# Patient Record
Sex: MALE | Race: WHITE | NOT HISPANIC OR LATINO | Employment: FULL TIME | URBAN - METROPOLITAN AREA
[De-identification: names, ages, dates, MRNs, and addresses within clinical notes are randomized per-mention and may not be internally consistent; named-entity substitution may affect disease eponyms.]

---

## 2017-02-22 ENCOUNTER — HOSPITAL ENCOUNTER (OUTPATIENT)
Dept: RADIOLOGY | Facility: OTHER | Age: 42
Discharge: HOME | End: 2017-02-22
Payer: MEDICARE

## 2017-02-22 ENCOUNTER — ALLSCRIPTS OFFICE VISIT (OUTPATIENT)
Dept: OTHER | Facility: OTHER | Age: 42
End: 2017-02-22

## 2017-02-22 ENCOUNTER — TRANSCRIBE ORDERS (OUTPATIENT)
Dept: ADMINISTRATIVE | Facility: HOSPITAL | Age: 42
End: 2017-02-22

## 2017-02-22 DIAGNOSIS — M25.511 PAIN IN JOINT OF RIGHT SHOULDER: Primary | ICD-10-CM

## 2017-02-22 DIAGNOSIS — M25.519 PAIN IN SHOULDER: ICD-10-CM

## 2017-02-22 PROCEDURE — 73030 X-RAY EXAM OF SHOULDER: CPT

## 2017-03-02 ENCOUNTER — HOSPITAL ENCOUNTER (OUTPATIENT)
Dept: RADIOLOGY | Facility: HOSPITAL | Age: 42
Discharge: HOME | End: 2017-03-02
Attending: ORTHOPAEDIC SURGERY
Payer: MEDICARE

## 2017-03-02 ENCOUNTER — HOSPITAL ENCOUNTER (OUTPATIENT)
Dept: RADIOLOGY | Facility: HOSPITAL | Age: 42
Discharge: HOME | End: 2017-03-02
Attending: ORTHOPAEDIC SURGERY | Admitting: RADIOLOGY
Payer: MEDICARE

## 2017-03-02 ENCOUNTER — TRANSCRIBE ORDERS (OUTPATIENT)
Dept: RADIOLOGY | Facility: HOSPITAL | Age: 42
End: 2017-03-02

## 2017-03-02 DIAGNOSIS — M25.519 PAIN IN SHOULDER: ICD-10-CM

## 2017-03-02 DIAGNOSIS — M25.511 PAIN IN JOINT OF RIGHT SHOULDER: ICD-10-CM

## 2017-03-02 PROCEDURE — A9577 INJ MULTIHANCE: HCPCS | Performed by: ORTHOPAEDIC SURGERY

## 2017-03-02 PROCEDURE — 20610 DRAIN/INJ JOINT/BURSA W/O US: CPT

## 2017-03-02 PROCEDURE — 23350 INJECTION FOR SHOULDER X-RAY: CPT

## 2017-03-02 PROCEDURE — 77002 NEEDLE LOCALIZATION BY XRAY: CPT

## 2017-03-02 PROCEDURE — 73222 MRI JOINT UPR EXTREM W/DYE: CPT

## 2017-03-02 PROCEDURE — 72080 X-RAY EXAM THORACOLMB 2/> VW: CPT

## 2017-03-02 RX ORDER — LIDOCAINE HYDROCHLORIDE 10 MG/ML
5 INJECTION, SOLUTION INFILTRATION; PERINEURAL
Status: COMPLETED | OUTPATIENT
Start: 2017-03-02 | End: 2017-03-02

## 2017-03-02 RX ORDER — 0.9 % SODIUM CHLORIDE 0.9 %
5 VIAL (ML) INJECTION
Status: COMPLETED | OUTPATIENT
Start: 2017-03-02 | End: 2017-03-02

## 2017-03-02 RX ADMIN — IOHEXOL 3 ML: 300 INJECTION, SOLUTION INTRAVENOUS at 15:20

## 2017-03-02 RX ADMIN — SODIUM CHLORIDE 3 ML: 9 INJECTION, SOLUTION INTRAMUSCULAR; INTRAVENOUS; SUBCUTANEOUS at 15:20

## 2017-03-02 RX ADMIN — GADOBENATE DIMEGLUMINE 0.01 ML: 529 INJECTION, SOLUTION INTRAVENOUS at 16:39

## 2017-03-02 RX ADMIN — LIDOCAINE HYDROCHLORIDE 2 ML: 10 INJECTION, SOLUTION INFILTRATION; PERINEURAL at 15:20

## 2017-03-08 ENCOUNTER — ALLSCRIPTS OFFICE VISIT (OUTPATIENT)
Dept: OTHER | Facility: OTHER | Age: 42
End: 2017-03-08

## 2017-05-02 ENCOUNTER — ALLSCRIPTS OFFICE VISIT (OUTPATIENT)
Dept: OTHER | Facility: OTHER | Age: 42
End: 2017-05-02

## 2017-05-02 DIAGNOSIS — F41.8 OTHER SPECIFIED ANXIETY DISORDERS: ICD-10-CM

## 2017-05-02 DIAGNOSIS — Z98.84 BARIATRIC SURGERY STATUS: ICD-10-CM

## 2017-05-02 DIAGNOSIS — Z13.6 ENCOUNTER FOR SCREENING FOR CARDIOVASCULAR DISORDERS: ICD-10-CM

## 2017-05-02 DIAGNOSIS — M25.519 PAIN IN SHOULDER: ICD-10-CM

## 2017-05-02 DIAGNOSIS — R53.83 OTHER FATIGUE: ICD-10-CM

## 2017-05-12 ENCOUNTER — GENERIC CONVERSION - ENCOUNTER (OUTPATIENT)
Dept: OTHER | Facility: OTHER | Age: 42
End: 2017-05-12

## 2017-06-02 ENCOUNTER — ALLSCRIPTS OFFICE VISIT (OUTPATIENT)
Dept: OTHER | Facility: OTHER | Age: 42
End: 2017-06-02

## 2017-06-30 ENCOUNTER — ALLSCRIPTS OFFICE VISIT (OUTPATIENT)
Dept: OTHER | Facility: OTHER | Age: 42
End: 2017-06-30

## 2017-07-28 ENCOUNTER — GENERIC CONVERSION - ENCOUNTER (OUTPATIENT)
Dept: OTHER | Facility: OTHER | Age: 42
End: 2017-07-28

## 2017-08-02 ENCOUNTER — APPOINTMENT (EMERGENCY)
Dept: RADIOLOGY | Facility: HOSPITAL | Age: 42
End: 2017-08-02
Payer: MEDICARE

## 2017-08-02 ENCOUNTER — HOSPITAL ENCOUNTER (EMERGENCY)
Facility: HOSPITAL | Age: 42
Discharge: HOME | End: 2017-08-02
Attending: EMERGENCY MEDICINE
Payer: MEDICARE

## 2017-08-02 VITALS
TEMPERATURE: 98.2 F | HEART RATE: 72 BPM | WEIGHT: 160 LBS | RESPIRATION RATE: 19 BRPM | SYSTOLIC BLOOD PRESSURE: 152 MMHG | DIASTOLIC BLOOD PRESSURE: 79 MMHG | OXYGEN SATURATION: 99 %

## 2017-08-02 DIAGNOSIS — M54.42 CHRONIC BILATERAL LOW BACK PAIN WITH LEFT-SIDED SCIATICA: Primary | ICD-10-CM

## 2017-08-02 DIAGNOSIS — G89.29 CHRONIC BILATERAL LOW BACK PAIN WITH LEFT-SIDED SCIATICA: Primary | ICD-10-CM

## 2017-08-02 DIAGNOSIS — N20.0 KIDNEY STONE ON RIGHT SIDE: ICD-10-CM

## 2017-08-02 LAB
BACTERIA UR QL AUTO: ABNORMAL /HPF
BILIRUB UR QL STRIP: ABNORMAL
CLARITY UR: ABNORMAL
CLARITY, POC: NORMAL
COLOR UR: YELLOW
COLOR, POC: YELLOW
GLUCOSE UR STRIP-MCNC: NEGATIVE MG/DL
HGB UR QL STRIP.AUTO: ABNORMAL
HYALINE CASTS #/AREA URNS LPF: ABNORMAL /LPF
KETONES UR STRIP-MCNC: NEGATIVE MG/DL
LEUKOCYTE ESTERASE UR QL STRIP: ABNORMAL
NITRITE UR QL STRIP: NEGATIVE
NON-SQ EPI CELLS URNS QL MICRO: ABNORMAL /HPF
PH UR STRIP.AUTO: 6.5 [PH] (ref 4.5–8)
PROT UR STRIP-MCNC: ABNORMAL MG/DL
RBC #/AREA URNS AUTO: ABNORMAL /HPF
SP GR UR STRIP.AUTO: >=1.03 (ref 1–1.03)
UROBILINOGEN UR QL STRIP.AUTO: 1 E.U./DL
WBC #/AREA URNS AUTO: ABNORMAL /HPF

## 2017-08-02 PROCEDURE — 81001 URINALYSIS AUTO W/SCOPE: CPT

## 2017-08-02 PROCEDURE — 87086 URINE CULTURE/COLONY COUNT: CPT

## 2017-08-02 PROCEDURE — 74176 CT ABD & PELVIS W/O CONTRAST: CPT

## 2017-08-02 PROCEDURE — 96372 THER/PROPH/DIAG INJ SC/IM: CPT

## 2017-08-02 PROCEDURE — 72080 X-RAY EXAM THORACOLMB 2/> VW: CPT

## 2017-08-02 PROCEDURE — 81002 URINALYSIS NONAUTO W/O SCOPE: CPT | Performed by: EMERGENCY MEDICINE

## 2017-08-02 PROCEDURE — 99284 EMERGENCY DEPT VISIT MOD MDM: CPT

## 2017-08-02 RX ORDER — CELECOXIB 100 MG/1
100 CAPSULE ORAL DAILY
COMMUNITY
End: 2017-11-14 | Stop reason: HOSPADM

## 2017-08-02 RX ORDER — KETOROLAC TROMETHAMINE 30 MG/ML
15 INJECTION, SOLUTION INTRAMUSCULAR; INTRAVENOUS ONCE
Status: COMPLETED | OUTPATIENT
Start: 2017-08-02 | End: 2017-08-02

## 2017-08-02 RX ORDER — MORPHINE SULFATE 15 MG/1
15 TABLET ORAL ONCE
Status: COMPLETED | OUTPATIENT
Start: 2017-08-02 | End: 2017-08-02

## 2017-08-02 RX ORDER — ONDANSETRON 4 MG/1
4 TABLET, ORALLY DISINTEGRATING ORAL ONCE
Status: COMPLETED | OUTPATIENT
Start: 2017-08-02 | End: 2017-08-02

## 2017-08-02 RX ADMIN — KETOROLAC TROMETHAMINE 15 MG: 30 INJECTION, SOLUTION INTRAMUSCULAR at 01:52

## 2017-08-02 RX ADMIN — ONDANSETRON 4 MG: 4 TABLET, ORALLY DISINTEGRATING ORAL at 01:52

## 2017-08-02 RX ADMIN — MORPHINE SULFATE 15 MG: 15 TABLET ORAL at 02:54

## 2017-08-03 ENCOUNTER — ALLSCRIPTS OFFICE VISIT (OUTPATIENT)
Dept: OTHER | Facility: OTHER | Age: 42
End: 2017-08-03

## 2017-08-03 LAB — BACTERIA UR CULT: NORMAL

## 2017-08-04 ENCOUNTER — ALLSCRIPTS OFFICE VISIT (OUTPATIENT)
Dept: OTHER | Facility: OTHER | Age: 42
End: 2017-08-04

## 2017-08-04 ENCOUNTER — APPOINTMENT (OUTPATIENT)
Dept: LAB | Facility: CLINIC | Age: 42
End: 2017-08-04
Payer: MEDICARE

## 2017-08-04 DIAGNOSIS — N20.0 CALCULUS OF KIDNEY: ICD-10-CM

## 2017-08-04 DIAGNOSIS — M89.9 DISORDER OF BONE: ICD-10-CM

## 2017-08-04 DIAGNOSIS — M25.519 PAIN IN SHOULDER: ICD-10-CM

## 2017-08-04 DIAGNOSIS — Z13.6 ENCOUNTER FOR SCREENING FOR CARDIOVASCULAR DISORDERS: ICD-10-CM

## 2017-08-04 DIAGNOSIS — F41.8 OTHER SPECIFIED ANXIETY DISORDERS: ICD-10-CM

## 2017-08-04 DIAGNOSIS — Z98.84 BARIATRIC SURGERY STATUS: ICD-10-CM

## 2017-08-04 DIAGNOSIS — R53.83 OTHER FATIGUE: ICD-10-CM

## 2017-08-04 LAB
25(OH)D3 SERPL-MCNC: 18.1 NG/ML (ref 30–100)
ALBUMIN SERPL BCP-MCNC: 3.7 G/DL (ref 3.5–5)
ALP SERPL-CCNC: 66 U/L (ref 46–116)
ALT SERPL W P-5'-P-CCNC: 11 U/L (ref 12–78)
ANION GAP SERPL CALCULATED.3IONS-SCNC: 8 MMOL/L (ref 4–13)
AST SERPL W P-5'-P-CCNC: 8 U/L (ref 5–45)
BILIRUB SERPL-MCNC: 0.31 MG/DL (ref 0.2–1)
BUN SERPL-MCNC: 20 MG/DL (ref 5–25)
CALCIUM SERPL-MCNC: 9.2 MG/DL (ref 8.3–10.1)
CHLORIDE SERPL-SCNC: 104 MMOL/L (ref 100–108)
CHOLEST SERPL-MCNC: 129 MG/DL (ref 50–200)
CO2 SERPL-SCNC: 26 MMOL/L (ref 21–32)
CREAT SERPL-MCNC: 0.9 MG/DL (ref 0.6–1.3)
GFR SERPL CREATININE-BSD FRML MDRD: 106 ML/MIN/1.73SQ M
GLUCOSE P FAST SERPL-MCNC: 77 MG/DL (ref 65–99)
HDLC SERPL-MCNC: 47 MG/DL (ref 40–60)
IRON SERPL-MCNC: 16 UG/DL (ref 65–175)
LDLC SERPL CALC-MCNC: 66 MG/DL (ref 0–100)
POTASSIUM SERPL-SCNC: 4.7 MMOL/L (ref 3.5–5.3)
PROT SERPL-MCNC: 7.5 G/DL (ref 6.4–8.2)
SODIUM SERPL-SCNC: 138 MMOL/L (ref 136–145)
TRIGL SERPL-MCNC: 80 MG/DL
TSH SERPL DL<=0.05 MIU/L-ACNC: 0.86 UIU/ML (ref 0.36–3.74)
VIT B12 SERPL-MCNC: 290 PG/ML (ref 100–900)

## 2017-08-04 PROCEDURE — 36415 COLL VENOUS BLD VENIPUNCTURE: CPT

## 2017-08-04 PROCEDURE — 82306 VITAMIN D 25 HYDROXY: CPT

## 2017-08-04 PROCEDURE — 80053 COMPREHEN METABOLIC PANEL: CPT

## 2017-08-04 PROCEDURE — 84425 ASSAY OF VITAMIN B-1: CPT

## 2017-08-04 PROCEDURE — 82607 VITAMIN B-12: CPT

## 2017-08-04 PROCEDURE — 83540 ASSAY OF IRON: CPT

## 2017-08-04 PROCEDURE — 80061 LIPID PANEL: CPT

## 2017-08-04 PROCEDURE — 84165 PROTEIN E-PHORESIS SERUM: CPT

## 2017-08-04 PROCEDURE — 84443 ASSAY THYROID STIM HORMONE: CPT

## 2017-08-07 ENCOUNTER — GENERIC CONVERSION - ENCOUNTER (OUTPATIENT)
Dept: OTHER | Facility: OTHER | Age: 42
End: 2017-08-07

## 2017-08-07 LAB
ALBUMIN SERPL ELPH-MCNC: 4.16 G/DL (ref 3.5–5)
ALBUMIN SERPL ELPH-MCNC: 57 % (ref 52–65)
ALPHA1 GLOB SERPL ELPH-MCNC: 0.35 G/DL (ref 0.1–0.4)
ALPHA1 GLOB SERPL ELPH-MCNC: 4.8 % (ref 2.5–5)
ALPHA2 GLOB SERPL ELPH-MCNC: 0.87 G/DL (ref 0.4–1.2)
ALPHA2 GLOB SERPL ELPH-MCNC: 11.9 % (ref 7–13)
BETA GLOB ABNORMAL SERPL ELPH-MCNC: 0.52 G/DL (ref 0.4–0.8)
BETA1 GLOB SERPL ELPH-MCNC: 7.1 % (ref 5–13)
BETA2 GLOB SERPL ELPH-MCNC: 4.5 % (ref 2–8)
BETA2+GAMMA GLOB SERPL ELPH-MCNC: 0.33 G/DL (ref 0.2–0.5)
GAMMA GLOB ABNORMAL SERPL ELPH-MCNC: 1.07 G/DL (ref 0.5–1.6)
GAMMA GLOB SERPL ELPH-MCNC: 14.7 % (ref 12–22)
IGG/ALB SER: 1.33 {RATIO} (ref 1.1–1.8)
PROT PATTERN SERPL ELPH-IMP: NORMAL
PROT SERPL-MCNC: 7.3 G/DL (ref 6.4–8.2)
VIT B1 BLD-SCNC: 98.8 NMOL/L (ref 66.5–200)

## 2017-08-08 ENCOUNTER — HOSPITAL ENCOUNTER (OUTPATIENT)
Facility: HOSPITAL | Age: 42
Setting detail: OUTPATIENT SURGERY
Discharge: HOME | End: 2017-08-08
Attending: UROLOGY | Admitting: UROLOGY
Payer: MEDICARE

## 2017-08-08 ENCOUNTER — ANESTHESIA (OUTPATIENT)
Dept: PERIOP | Facility: HOSPITAL | Age: 42
End: 2017-08-08
Payer: MEDICARE

## 2017-08-08 ENCOUNTER — ANESTHESIA EVENT (OUTPATIENT)
Dept: PERIOP | Facility: HOSPITAL | Age: 42
End: 2017-08-08
Payer: MEDICARE

## 2017-08-08 ENCOUNTER — APPOINTMENT (OUTPATIENT)
Dept: RADIOLOGY | Facility: HOSPITAL | Age: 42
End: 2017-08-08
Payer: MEDICARE

## 2017-08-08 VITALS
TEMPERATURE: 97.2 F | HEART RATE: 64 BPM | DIASTOLIC BLOOD PRESSURE: 78 MMHG | SYSTOLIC BLOOD PRESSURE: 133 MMHG | RESPIRATION RATE: 16 BRPM | BODY MASS INDEX: 23.19 KG/M2 | OXYGEN SATURATION: 100 % | WEIGHT: 171.2 LBS | HEIGHT: 72 IN

## 2017-08-08 PROCEDURE — C1769 GUIDE WIRE: HCPCS | Performed by: UROLOGY

## 2017-08-08 PROCEDURE — C1758 CATHETER, URETERAL: HCPCS | Performed by: UROLOGY

## 2017-08-08 PROCEDURE — 74000 HB X-RAY EXAM OF ABDOMEN (SINGLE ANTEROPOSTERIOR VIEW): CPT

## 2017-08-08 PROCEDURE — C2617 STENT, NON-COR, TEM W/O DEL: HCPCS | Performed by: UROLOGY

## 2017-08-08 DEVICE — STENT URETERAL 6 FR 26CM INLAY OPTIMA: Type: IMPLANTABLE DEVICE | Site: URETER | Status: FUNCTIONAL

## 2017-08-08 RX ORDER — FENTANYL CITRATE 50 UG/ML
INJECTION, SOLUTION INTRAMUSCULAR; INTRAVENOUS AS NEEDED
Status: DISCONTINUED | OUTPATIENT
Start: 2017-08-08 | End: 2017-08-08 | Stop reason: SURG

## 2017-08-08 RX ORDER — MIDAZOLAM HYDROCHLORIDE 1 MG/ML
INJECTION INTRAMUSCULAR; INTRAVENOUS AS NEEDED
Status: DISCONTINUED | OUTPATIENT
Start: 2017-08-08 | End: 2017-08-08 | Stop reason: SURG

## 2017-08-08 RX ORDER — HYDROCODONE BITARTRATE AND ACETAMINOPHEN 5; 325 MG/1; MG/1
1 TABLET ORAL EVERY 4 HOURS PRN
Status: DISCONTINUED | OUTPATIENT
Start: 2017-08-08 | End: 2017-08-11 | Stop reason: HOSPADM

## 2017-08-08 RX ORDER — GLYCOPYRROLATE 0.2 MG/ML
INJECTION INTRAMUSCULAR; INTRAVENOUS AS NEEDED
Status: DISCONTINUED | OUTPATIENT
Start: 2017-08-08 | End: 2017-08-08 | Stop reason: SURG

## 2017-08-08 RX ORDER — ONDANSETRON 2 MG/ML
4 INJECTION INTRAMUSCULAR; INTRAVENOUS EVERY 4 HOURS PRN
Status: DISCONTINUED | OUTPATIENT
Start: 2017-08-08 | End: 2017-08-11 | Stop reason: HOSPADM

## 2017-08-08 RX ORDER — ONDANSETRON 2 MG/ML
INJECTION INTRAMUSCULAR; INTRAVENOUS AS NEEDED
Status: DISCONTINUED | OUTPATIENT
Start: 2017-08-08 | End: 2017-08-08 | Stop reason: SURG

## 2017-08-08 RX ORDER — SODIUM CHLORIDE, SODIUM LACTATE, POTASSIUM CHLORIDE, CALCIUM CHLORIDE 600; 310; 30; 20 MG/100ML; MG/100ML; MG/100ML; MG/100ML
100 INJECTION, SOLUTION INTRAVENOUS CONTINUOUS
Status: DISCONTINUED | OUTPATIENT
Start: 2017-08-08 | End: 2017-08-11 | Stop reason: HOSPADM

## 2017-08-08 RX ORDER — HYDROMORPHONE HYDROCHLORIDE 2 MG/1
2 TABLET ORAL EVERY 6 HOURS PRN
COMMUNITY
End: 2017-11-14 | Stop reason: HOSPADM

## 2017-08-08 RX ORDER — MAGNESIUM HYDROXIDE 1200 MG/15ML
LIQUID ORAL AS NEEDED
Status: DISCONTINUED | OUTPATIENT
Start: 2017-08-08 | End: 2017-08-08 | Stop reason: HOSPADM

## 2017-08-08 RX ORDER — PROPOFOL 10 MG/ML
INJECTION, EMULSION INTRAVENOUS AS NEEDED
Status: DISCONTINUED | OUTPATIENT
Start: 2017-08-08 | End: 2017-08-08 | Stop reason: SURG

## 2017-08-08 RX ORDER — LIDOCAINE HYDROCHLORIDE 10 MG/ML
INJECTION, SOLUTION INFILTRATION; PERINEURAL AS NEEDED
Status: DISCONTINUED | OUTPATIENT
Start: 2017-08-08 | End: 2017-08-08 | Stop reason: SURG

## 2017-08-08 RX ORDER — CIPROFLOXACIN 500 MG/1
500 TABLET, FILM COATED ORAL 2 TIMES DAILY
Qty: 6 TABLET | Refills: 0 | Status: SHIPPED | OUTPATIENT
Start: 2017-08-08 | End: 2017-08-11

## 2017-08-08 RX ORDER — ACETAMINOPHEN 325 MG/1
650 TABLET ORAL EVERY 4 HOURS PRN
Status: DISCONTINUED | OUTPATIENT
Start: 2017-08-08 | End: 2017-08-11 | Stop reason: HOSPADM

## 2017-08-08 RX ORDER — SODIUM CHLORIDE, SODIUM LACTATE, POTASSIUM CHLORIDE, CALCIUM CHLORIDE 600; 310; 30; 20 MG/100ML; MG/100ML; MG/100ML; MG/100ML
INJECTION, SOLUTION INTRAVENOUS CONTINUOUS PRN
Status: DISCONTINUED | OUTPATIENT
Start: 2017-08-08 | End: 2017-08-08 | Stop reason: SURG

## 2017-08-08 RX ORDER — FENTANYL CITRATE/PF 50 MCG/ML
25 SYRINGE (ML) INJECTION
Status: COMPLETED | OUTPATIENT
Start: 2017-08-08 | End: 2017-08-08

## 2017-08-08 RX ORDER — MORPHINE SULFATE 4 MG/ML
4 INJECTION, SOLUTION INTRAMUSCULAR; INTRAVENOUS EVERY 4 HOURS PRN
Status: DISCONTINUED | OUTPATIENT
Start: 2017-08-08 | End: 2017-08-11 | Stop reason: HOSPADM

## 2017-08-08 RX ORDER — HYDROCODONE BITARTRATE AND ACETAMINOPHEN 5; 325 MG/1; MG/1
TABLET ORAL
Status: DISPENSED
Start: 2017-08-08 | End: 2017-08-09

## 2017-08-08 RX ORDER — HYDROCODONE BITARTRATE AND ACETAMINOPHEN 5; 325 MG/1; MG/1
1 TABLET ORAL EVERY 6 HOURS PRN
Qty: 20 TABLET | Refills: 0 | Status: SHIPPED | OUTPATIENT
Start: 2017-08-08 | End: 2017-08-18

## 2017-08-08 RX ORDER — ONDANSETRON 2 MG/ML
4 INJECTION INTRAMUSCULAR; INTRAVENOUS ONCE AS NEEDED
Status: DISCONTINUED | OUTPATIENT
Start: 2017-08-08 | End: 2017-08-08 | Stop reason: HOSPADM

## 2017-08-08 RX ADMIN — FENTANYL CITRATE 25 MCG: 50 INJECTION, SOLUTION INTRAMUSCULAR; INTRAVENOUS at 14:10

## 2017-08-08 RX ADMIN — GLYCOPYRROLATE 0.2 MG: 0.2 INJECTION, SOLUTION INTRAMUSCULAR; INTRAVENOUS at 12:52

## 2017-08-08 RX ADMIN — LIDOCAINE HYDROCHLORIDE 50 MG: 10 INJECTION, SOLUTION INFILTRATION; PERINEURAL at 12:52

## 2017-08-08 RX ADMIN — MIDAZOLAM HYDROCHLORIDE 2 MG: 1 INJECTION, SOLUTION INTRAMUSCULAR; INTRAVENOUS at 12:48

## 2017-08-08 RX ADMIN — FENTANYL CITRATE 25 MCG: 50 INJECTION, SOLUTION INTRAMUSCULAR; INTRAVENOUS at 14:20

## 2017-08-08 RX ADMIN — ONDANSETRON 4 MG: 2 INJECTION INTRAMUSCULAR; INTRAVENOUS at 12:59

## 2017-08-08 RX ADMIN — FENTANYL CITRATE 50 MCG: 50 INJECTION INTRAMUSCULAR; INTRAVENOUS at 13:11

## 2017-08-08 RX ADMIN — DEXAMETHASONE SODIUM PHOSPHATE 10 MG: 10 INJECTION INTRAMUSCULAR; INTRAVENOUS at 12:59

## 2017-08-08 RX ADMIN — PROPOFOL 200 MG: 10 INJECTION, EMULSION INTRAVENOUS at 12:52

## 2017-08-08 RX ADMIN — HYDROCODONE BITARTRATE AND ACETAMINOPHEN 1 TABLET: 5; 325 TABLET ORAL at 15:04

## 2017-08-08 RX ADMIN — FENTANYL CITRATE 50 MCG: 50 INJECTION INTRAMUSCULAR; INTRAVENOUS at 12:59

## 2017-08-08 RX ADMIN — CEFAZOLIN SODIUM 1000 MG: 1 SOLUTION INTRAVENOUS at 12:49

## 2017-08-08 RX ADMIN — SODIUM CHLORIDE, SODIUM LACTATE, POTASSIUM CHLORIDE, AND CALCIUM CHLORIDE: .6; .31; .03; .02 INJECTION, SOLUTION INTRAVENOUS at 12:38

## 2017-08-08 RX ADMIN — FENTANYL CITRATE 25 MCG: 50 INJECTION, SOLUTION INTRAMUSCULAR; INTRAVENOUS at 14:05

## 2017-08-08 RX ADMIN — FENTANYL CITRATE 25 MCG: 50 INJECTION, SOLUTION INTRAMUSCULAR; INTRAVENOUS at 14:15

## 2017-08-24 ENCOUNTER — GENERIC CONVERSION - ENCOUNTER (OUTPATIENT)
Dept: OTHER | Facility: OTHER | Age: 42
End: 2017-08-24

## 2017-08-29 ENCOUNTER — TRANSCRIBE ORDERS (OUTPATIENT)
Dept: ADMINISTRATIVE | Facility: HOSPITAL | Age: 42
End: 2017-08-29

## 2017-08-29 ENCOUNTER — ALLSCRIPTS OFFICE VISIT (OUTPATIENT)
Dept: OTHER | Facility: OTHER | Age: 42
End: 2017-08-29

## 2017-08-29 DIAGNOSIS — N20.0 URIC ACID NEPHROLITHIASIS: Primary | ICD-10-CM

## 2017-08-29 LAB
BILIRUB UR QL STRIP: NORMAL
CLARITY UR: NORMAL
COLOR UR: YELLOW
GLUCOSE (HISTORICAL): NORMAL
HGB UR QL STRIP.AUTO: NORMAL
KETONES UR STRIP-MCNC: NORMAL MG/DL
LEUKOCYTE ESTERASE UR QL STRIP: NORMAL
NITRITE UR QL STRIP: NORMAL
PH UR STRIP.AUTO: 7 [PH]
PROT UR STRIP-MCNC: NORMAL MG/DL
SP GR UR STRIP.AUTO: 1.03

## 2017-08-30 ENCOUNTER — GENERIC CONVERSION - ENCOUNTER (OUTPATIENT)
Dept: OTHER | Facility: OTHER | Age: 42
End: 2017-08-30

## 2017-08-31 ENCOUNTER — GENERIC CONVERSION - ENCOUNTER (OUTPATIENT)
Dept: OTHER | Facility: OTHER | Age: 42
End: 2017-08-31

## 2017-09-05 ENCOUNTER — GENERIC CONVERSION - ENCOUNTER (OUTPATIENT)
Dept: OTHER | Facility: OTHER | Age: 42
End: 2017-09-05

## 2017-09-14 ENCOUNTER — GENERIC CONVERSION - ENCOUNTER (OUTPATIENT)
Dept: OTHER | Facility: OTHER | Age: 42
End: 2017-09-14

## 2017-09-15 ENCOUNTER — GENERIC CONVERSION - ENCOUNTER (OUTPATIENT)
Dept: OTHER | Facility: OTHER | Age: 42
End: 2017-09-15

## 2017-09-20 ENCOUNTER — GENERIC CONVERSION - ENCOUNTER (OUTPATIENT)
Dept: OTHER | Facility: OTHER | Age: 42
End: 2017-09-20

## 2017-10-13 ENCOUNTER — GENERIC CONVERSION - ENCOUNTER (OUTPATIENT)
Dept: OTHER | Facility: OTHER | Age: 42
End: 2017-10-13

## 2017-10-23 DIAGNOSIS — N20.0 CALCULUS OF KIDNEY: ICD-10-CM

## 2017-10-24 NOTE — PROCEDURES
Assessment  1  Nephrolithiasis (592 0) (N20 0)    Plan  Nephrolithiasis    · LevoFLOXacin 500 MG Oral Tablet (Levaquin); TAKE 1 TABLET DAILY AS  DIRECTED   Rx By: Stacie Heimlich; Dispense: 2 Days ; #:2 Tablet; Refill: 0;For: Nephrolithiasis; TOMASZ = N; Sent To: [facility]/PHARMACY #[id number]  · * XR ABDOMEN 1 VIEW KUB; Status:Active; Requested for:10Oct2017;    Perform:[facility]; Due:10Oct2018; Ordered;For:Nephrolithiasis; Ordered By:Mitra Ortega;   · Urine Dip Non-Automated- POC; Status:Resulted - Requires Verification,Retrospective  By Protocol Authorization;   Done: 20KIK2929 09:25AM   Performed: In Office; Due:73Ope5980; Last Updated By:Herminio Gunderson; 8/29/2017 9:26:21 AM;Ordered;For:Nephrolithiasis; Ordered By:Mitra Ortega;   · [address] [place] [address]; Status:Hold For - Scheduling; Requested for:10Oct2017;    Perform:[facility]; Due:10Oct2018; Ordered;For:Nephrolithiasis; Ordered By:Mitra Ortega;    Discussion/Summary  Discussion Summary: This is a healthy 51-year-old male status post ureteroscopic management of a 1 5 cm right UPJ calculus  He is doing well clinically  His postoperative ureteral stent was removed today via cystoscopy  Next  will be prescribed 48 hours of the fluoroquinolone antibiotic to prevent any infection  He will follow-up in 2 months time with a postoperative KUB and renal ultrasound or sooner if needed  Chief Complaint  Chief Complaint Free Text Note Form: Patient presents for cysto/stent removal s/p cysto/URS, right ureteral stent on 8/8/17 with Dr Eduar Chen      History of Present Illness  HPI: Patient returns in follow-up status post ureteroscopic extraction of a 1 5 cm right UPJ calculus with my partner Dr Eduar Chen on August 8  Next  did well postoperatively  He did experience postoperative lower urinary tract symptoms which are managed with empiric antibiotic  He completed a course of this   He is tolerating the stent well and has no lower urinary tract symptoms at the present time  He presents today for stent removal      Review of Systems  Complete-Male Urology:   Constitutional: No fever or chills, feels well, no tiredness, no recent weight gain or weight loss  Respiratory: No complaints of shortness of breath, no wheezing, no cough, no SOB on exertion, no orthopnea or PND  Cardiovascular: No complaints of slow heart rate, no fast heart rate, no chest pain, no palpitations, no leg claudication, no lower extremity  Gastrointestinal: No complaints of abdominal pain, no constipation, no nausea or vomiting, no diarrhea or bloody stools  Genitourinary: No complaints of dysuria, no incontinence, no hesitancy, no nocturia, no genital lesion, no testicular pain  Musculoskeletal: No complaints of arthralgia, no myalgias, no joint swelling or stiffness, no limb pain or swelling  Integumentary: No complaints of skin rash or skin lesions, no itching, no skin wound, no dry skin  Hematologic/Lymphatic: No complaints of swollen glands, no swollen glands in the neck, does not bleed easily, no easy bruising  Neurological: No compliants of headache, no confusion, no convulsions, no numbness or tingling, no dizziness or fainting, no limb weakness, no difficulty walking  Active Problems  1  Chronic low back pain (724 2,338 29) (M54 5,G89 29)   2  Depression with anxiety (300 4) (F41 8)   3  Encounter for screening for cardiovascular disorders (V81 2) (Z13 6)   4  Fatigue (780 79) (R53 83)   5  Hydronephrosis (591) (N13 30)   6  Lytic bone lesions on xray (733 90) (M89 9)   7  Nephrolithiasis (592 0) (N20 0)   8  Shoulder pain (719 41) (M25 519)   9  Status post gastric bypass for obesity (V45 86) (Z98 84)   10  Tobacco abuse counseling (V65 42,305 1) (Z71 6)    Past Medical History  1  History of depression (V11 8) (Z86 59)   2  No pertinent past medical history  Active Problems And Past Medical History Reviewed:    The active problems and past medical history were reviewed and updated today  Surgical History  1  History of Fusion / Refusion Of Vertebrae   2  History of Gastric Surgery For Morbid Obesity Gastric Bypass   3  History of Tonsillectomy With Adenoidectomy    Family History  Mother    1  Family history of arthritis (V17 7) (Z82 61)   2  Family history of malignant neoplasm of stomach (V16 0) (Z80 0)  Father    3  Family history of Brain tumor   4  Family history of malignant neoplasm of esophagus (V16 0) (Z80 0)    Social History   · Current every day smoker (305 1) (F17 200)   · Drinks caffeinated tea   · No alcohol use    Current Meds   1  ALPRAZolam 0 5 MG Oral Tablet; take 1 tablet every 4-6  hours prn anxiety; Therapy: 07Aug2017 to (Last Rx:07Aug2017) Ordered   2  Celecoxib 200 MG Oral Capsule; take 1 capsule daily as needed; Therapy: 07FWJ8113 to (Evaluate:17Oct2017)  Requested for: 00ZUB8412; Last   Rx:18Aug2017 Ordered   3  HYDROmorphone HCl - 4 MG Oral Tablet; TAKE 1 TABLET AT BEDTIME AS NEEDED   FOR PAIN;   Therapy: (Recorded:30Jun2017) to Recorded   4  PriLOSEC OTC 20 MG Oral Tablet Delayed Release; TAKE 1 TABLET DAILY; Therapy: 71CHI2224 to (Evaluate:75Bff9496); Last Rx:30Jun2017 Ordered   5  Sertraline HCl - 100 MG Oral Tablet; TAKE 1 TABLET BY MOUTH DAILY; Therapy: 55YNQ1053 to (Sylvia Buenrostro)  Requested for: 07Aug2017; Last   Rx:07Aug2017 Ordered    Allergies  1   Aspirin TABS    Vitals  Vital Signs    Recorded: 29Aug2017 09:23AM   Heart Rate 78   Systolic 703   Diastolic 78   Height 6 ft    Weight 174 lb 12 8 oz   BMI Calculated 23 71   BSA Calculated 2 01     Results/Data  Urine Dip Non-Automated- POC 29Aug2017 09:25AM Lucnancy Pinks     Test Name Result Flag Reference   Color Yellow     Clarity Transparent     Leukocytes neg     Nitrite neg     Blood large     Bilirubin neg     Protein neg     Ph 7 0     Specific Gravity 1 030     Ketone neg     Glucose neg     Color Yellow     Clarity Transparent     Leukocytes neg     Nitrite neg     Blood large     Bilirubin neg     Protein neg     Ph 7 0     Specific Gravity 1 030     Ketone neg     Glucose neg               Procedure    Procedure: Written consent was obtained prior to the procedure and is detailed in the patient's record  Procedure Note:    Post-procedure: the bladder was drained and the cystoscope was removed       Procedure: Cystoscopy / Stent Removal   After consent was obtained, the patient was placed in the supine position and prepped in the usual fashion  Flexible cystourethroscopy was performed and the indwelling right ureteral stent was identified  The flexible grasping forceps were then used to remove the stent without difficulty  The patient tolerated the procedure well and without complications  Future Appointments    Date/Time Provider Specialty Site   08/30/2017 01:00 PM ANTONIA Hampton [facility] SPINE   10/31/2017 11:00 AM HATTIE Estrella   Psychiatry [facility] 81     Signatures   Electronically signed by : Neida Hester MD; Aug 29 2017  9:50AM EST                       (Author)

## 2017-10-25 ENCOUNTER — HOSPITAL ENCOUNTER (OUTPATIENT)
Dept: RADIOLOGY | Facility: HOSPITAL | Age: 42
Discharge: HOME | End: 2017-10-25
Payer: MEDICARE

## 2017-10-25 ENCOUNTER — TRANSCRIBE ORDERS (OUTPATIENT)
Dept: LAB | Facility: HOSPITAL | Age: 42
End: 2017-10-25

## 2017-10-25 ENCOUNTER — TRANSCRIBE ORDERS (OUTPATIENT)
Dept: RADIOLOGY | Facility: HOSPITAL | Age: 42
End: 2017-10-25

## 2017-10-25 ENCOUNTER — APPOINTMENT (OUTPATIENT)
Dept: LAB | Facility: HOSPITAL | Age: 42
End: 2017-10-25
Payer: MEDICARE

## 2017-10-25 DIAGNOSIS — M89.9 BONE DISORDER: ICD-10-CM

## 2017-10-25 DIAGNOSIS — Z98.84 BARIATRIC SURGERY STATUS: ICD-10-CM

## 2017-10-25 DIAGNOSIS — M89.9 DISORDER OF BONE: ICD-10-CM

## 2017-10-25 DIAGNOSIS — R53.83 FATIGUE, UNSPECIFIED TYPE: ICD-10-CM

## 2017-10-25 DIAGNOSIS — F41.8 DEPRESSION WITH ANXIETY: ICD-10-CM

## 2017-10-25 DIAGNOSIS — M89.9 BONE DISORDER: Primary | ICD-10-CM

## 2017-10-25 LAB
BASOPHILS # BLD AUTO: 0.05 THOUSANDS/ΜL (ref 0–0.1)
BASOPHILS NFR BLD AUTO: 1 % (ref 0–1)
EOSINOPHIL # BLD AUTO: 0.08 THOUSAND/ΜL (ref 0–0.61)
EOSINOPHIL NFR BLD AUTO: 2 % (ref 0–6)
ERYTHROCYTE [DISTWIDTH] IN BLOOD BY AUTOMATED COUNT: 16.9 % (ref 11.6–15.1)
HCT VFR BLD AUTO: 37.7 % (ref 36.5–49.3)
HGB BLD-MCNC: 11.5 G/DL (ref 12–17)
LYMPHOCYTES # BLD AUTO: 1.94 THOUSANDS/ΜL (ref 0.6–4.47)
LYMPHOCYTES NFR BLD AUTO: 39 % (ref 14–44)
MCH RBC QN AUTO: 20 PG (ref 26.8–34.3)
MCHC RBC AUTO-ENTMCNC: 30.5 G/DL (ref 31.4–37.4)
MCV RBC AUTO: 66 FL (ref 82–98)
MONOCYTES # BLD AUTO: 0.42 THOUSAND/ΜL (ref 0.17–1.22)
MONOCYTES NFR BLD AUTO: 9 % (ref 4–12)
NEUTROPHILS # BLD AUTO: 2.43 THOUSANDS/ΜL (ref 1.85–7.62)
NEUTS SEG NFR BLD AUTO: 49 % (ref 43–75)
NRBC BLD AUTO-RTO: 0 /100 WBCS
PLATELET # BLD AUTO: 354 THOUSANDS/UL (ref 149–390)
PMV BLD AUTO: 10.4 FL (ref 8.9–12.7)
RBC # BLD AUTO: 5.74 MILLION/UL (ref 3.88–5.62)
WBC # BLD AUTO: 4.93 THOUSAND/UL (ref 4.31–10.16)

## 2017-10-25 PROCEDURE — 72110 X-RAY EXAM L-2 SPINE 4/>VWS: CPT

## 2017-10-25 PROCEDURE — 85025 COMPLETE CBC W/AUTO DIFF WBC: CPT

## 2017-10-25 PROCEDURE — 36415 COLL VENOUS BLD VENIPUNCTURE: CPT

## 2017-10-25 PROCEDURE — 83883 ASSAY NEPHELOMETRY NOT SPEC: CPT

## 2017-10-25 PROCEDURE — 72170 X-RAY EXAM OF PELVIS: CPT

## 2017-10-26 LAB
KAPPA LC FREE SER-MCNC: 20.8 MG/L (ref 3.3–19.4)
KAPPA LC FREE/LAMBDA FREE SER: 1.08 {RATIO} (ref 0.26–1.65)
LAMBDA LC FREE SERPL-MCNC: 19.3 MG/L (ref 5.7–26.3)

## 2017-10-30 ENCOUNTER — ALLSCRIPTS OFFICE VISIT (OUTPATIENT)
Dept: OTHER | Facility: OTHER | Age: 42
End: 2017-10-30

## 2017-10-31 ENCOUNTER — ALLSCRIPTS OFFICE VISIT (OUTPATIENT)
Dept: OTHER | Facility: OTHER | Age: 42
End: 2017-10-31

## 2017-10-31 NOTE — CONSULTS
Assessment  1  Chronic left lumbar radiculopathy (724 4) (M54 16)  2  Pain syndrome, chronic (338 4) (G89 4)  3  Status post lumbar laminectomy (V45 89) (U20 499)    Plan  Status post lumbar laminectomy    · Schedule Surgery Treatment  Procedure  Status: Hold For - Scheduling  Requested for:  71WLW0206  Ordered; For: Status post lumbar laminectomy;  Ordered By: Colby Asher    Performed:   Due: 16PGK5526    Discussion/Summary    This is a 49-year-old male with a history of chronic pain of his back and lower extremities who underwent placement of a thoracic paddle spinal cord stimulator system 1 year prior  He is noting significant pain relief with the system  However he is referred by Dr Gali Gatica of for evaluation of pain at the right lower medial buttock generator site as well as difficulty with Re- charging  He is also noticing a burning sensation with charging, as well as the generator having significant movement with flipping  The generator is in a location in which she sits and lies on it  On examination the generator is quite mobile and tender to palpation  His symptoms are consistent with poor location as well as malfunction of generator  Options were discussed with the patient including observation, conservative measures, and surgery  Surgery would involve either replacement of the generator or replacement with repositioning of the generator  After discussion he would like to proceed with replacement and repositioning the generator in a more superior, lateral position  Further discussion was held in reference to a non rechargeable versus rechargeable device  After discussion of the pros and cons of the Medtronic system he would like to proceed with another rechargeable device with upgrade to the new [employer] technologyis weaned off all narcotics and is presently on a Butryn patch  Dr Gali Gatica be managing his pain medications postoperatively  He works as an        The risks and benefits of surgery were reviewed with the patient and family and they wish to proceed  These risks include, but are not limited to bleeding, infection, device malfunction, and malpositioning  All questions were answered and appropriate contact information was given in case questions arise in the future  They will undergo preoperative clearance and be scheduled for surgery in the near future  Chief Complaint  Patient presents to office for consult of medtronic IPG change  History of Present Illness  This is a very pleasant 41-year-old male with a history of chronic pain involving his lower back and lower extremities  He does have a history of a prior lumbar surgery  He underwent placement of a Medtronic thoracic paddle spinal cord stimulator implantation by Dr Jose Severino in August of 2016 in [address]  He is noting significant pain relief with the stimulator and is very pleased with settings under tonic stimulation  However he notes that he has discomfort at the right lower medial buttock spinal cord stimulator generator site  He notes that he sits and lies on it  He is having difficulty with charging in which he received has burning sensations as well as cannot connect properly  Lastly, he is noting a significant amount of movement and flipping of the generator in the pocket  is referred by Dr Lola Sofia to discuss options  He has weaned off his narcotic medications and is presently on a Butryn patch  He has returned to work as an   Review of Systems    Constitutional: No fever or chills, feels well, no tiredness, no recent weight gain or weight loss  Eyes: No complaints of eye pain, no red eyes, no discharge from eyes, no itchy eyes  ENT: no complaints of earache, no hearing loss, no nosebleeds, no nasal discharge, no sore throat, no hoarseness  Cardiovascular: No complaints of slow heart rate, no fast heart rate, no chest pain, no palpitations, no leg claudication, no lower extremity     Respiratory: No complaints of shortness of breath, no wheezing, no cough, no SOB on exertion, no orthopnea or PND  Gastrointestinal: No complaints of abdominal pain, no constipation, no nausea or vomiting, no diarrhea or bloody stools  Genitourinary: No complaints of dysuria, no incontinence, no hesitancy, no nocturia, no genital lesion, no testicular pain  Musculoskeletal: limb pain-- and-- bilateral leg pain worse on left  Integumentary: No complaints of skin rash or skin lesions, no itching, no skin wound, no dry skin  Neurological: numbness,-- limb weakness-- and-- left leg numbness between knee and upper thigh, but-- no headache,-- no tingling,-- no confusion,-- no dizziness,-- no convulsions-- and-- no fainting  Psychiatric: anxiety,-- depression-- and-- PTSD  Endocrine: No complaints of proptosis, no hot flashes, no muscle weakness, no erectile dysfunction, no deepening of the voice, no feelings of weakness  Hematologic/Lymphatic: No complaints of swollen glands, no swollen glands in the neck, does not bleed easily, no easy bruising  ROS reviewed  Active Problems  1  Chronic left lumbar radiculopathy (724 4) (M54 16)  2  Chronic low back pain (724 2,338 29) (M54 5,G89 29)  3  Chronic right shoulder pain (719 41,338 29) (M25 511,G89 29)  4  Depression with anxiety (300 4) (F41 8)  5  Encounter for long-term opiate analgesic use (V58 69) (Z79 891)  6  Encounter for screening for cardiovascular disorders (V81 2) (Z13 6)  7  Fatigue (780 79) (R53 83)  8  Hydronephrosis (591) (N13 30)  9  Lytic bone lesions on xray (733 90) (M89 9)  10  Nephrolithiasis (592 0) (N20 0)  11  Pain syndrome, chronic (338 4) (G89 4)  12  Status post gastric bypass for obesity (V45 86) (Z98 84)  13  Status post lumbar laminectomy (V45 89) (Z98 890)  14  Tobacco abuse counseling (V65 42,305 1) (Z71 6)  15  Uncomplicated opioid dependence (304 00) (F11 20)    Past Medical History  1  History of arthritis (V13 4) (Z87 50)  2  History of depression (V11 8) (Z86 59)  3  No pertinent past medical history  4  History of Shoulder pain (719 41) (M25 519)    The active problems and past medical history were reviewed and updated today  Surgical History  1  History of Appendectomy  2  History of Fusion / Refusion Of Vertebrae  3  History of Gastric Surgery For Morbid Obesity Gastric Bypass  4  History of Tonsillectomy With Adenoidectomy    The surgical history was reviewed and updated today  Family History  Mother   1  Family history of arthritis (V17 7) (Z82 61)  2  Family history of malignant neoplasm of stomach (V16 0) (Z80 0)  Father   3  Family history of Brain tumor  4  Family history of malignant neoplasm of esophagus (V16 0) (Z80 0)    The family history was reviewed and updated today  Social History   · Current every day smoker (305 1) (F17 200)   · Drinks caffeinated tea   · Denied: History of No alcohol use   · Social alcohol use (Z78 9)  The social history was reviewed and updated today  Current Meds  1  ALPRAZolam 0 5 MG Oral Tablet; take 1 tablet every 4-6  hours prn anxiety; Therapy: 66Dyv7843 to (Last Rx:75Zcc7208) Ordered  2  Butrans 7 5 MCG/HR Transdermal Patch Weekly; Apply 1 patch td every 7 days; Therapy: 98Zpg6138 to (Evaluate:12Nov2017); Last Rx:13Oct2017 Ordered  3  PriLOSEC OTC 20 MG Oral Tablet Delayed Release; TAKE 1 TABLET DAILY; Therapy: 94NAV8745 to (Evaluate:38Rsu0510); Last Rx:30Jun2017 Ordered  4  Sertraline HCl - 100 MG Oral Tablet; take 1 tablet every day; Therapy: 39HNX3687 to (Rissa Soler)  Requested for: 21Oct2017; Last   Rx:21Oct2017 Ordered    The medication list was reviewed and updated today  Allergies  1  Aspirin TABS  2   Penicillins    Vitals  Vital Signs    Recorded: 99ZCG8448 08:03AM   Temperature 97 2 F, Tympanic   Heart Rate 75, L Brachial Artery   Pulse Quality Normal, L Brachial Artery   Respiration Quality Normal   Respiration 16   Systolic 901, LUE, Sitting   Diastolic 60, LUE, Sitting   Height 6 ft    Weight 178 lb    BMI Calculated 24 14   BSA Calculated 2 03     Physical Exam     Constitutional Patient appears healthy and well developed  Head and Face Normal on inspection  Respiratory Respiratory effort: Normal    Abdomen Soft, nontender, and nondistended without guarding, rigidity or rebound tenderness  Musculo: Spine Contour is normal  No tenderness of the spine billaterally  Discomfort with palpation of right lower medial buttock generator  Skin warm and dry  -- Incisions for thoracic paddle spinal cord stimulator placement are well healed  Neurologic - Mental Status: Alert and Oriented x3  Motor System General Motor Strength: No pronator drift and no parietal drift  Motor System - Upper Extremities: Muscle strength: 5/5 bilaterally  Motor System - Lower Extremities: Muscle strength: 5/5 bilaterally  Reflexes: DTR's are brisk, symmetric and 2+ bilaterally  Babinski's reflex is down going bilaterally symmetrical bilaterally  Coordination: Normal    Sensory: Sensation grossly intact to light touch  Sensation grossly intact to pinprick  Gait and Station: [place] with a normal gait  Surgery Scheduling Form    Location:[address] [place] [address]    Confirmation Number:   Procedure Date: 11/14/171    Requested Time:   Surgeon: Sada Carver  Co-Surgeon:   [facility] Required:   Bed:1  Out Patient - No Bed Required1   Anesthesia: IV Sedation w/Anesthesia  PROCEDURE DETAILS   Procedure:  Removal 1  of right lower medial buttock spinal cord stimulator generator, placement of a new right buttock spinal cord stimulator generator through separate incision  Laterality/Level: Anticipated frozen section:   CPT Code(s):1  [id number], [id number]    Pre-Op Diagnosis: chronic pain syndrome, post laminectomy syndrome  Dx Code(s):1  G89 4, M96 11     Length of Procedure: 30 minutes  Equipment:   Equipment Needs: Lateral,-- No monitoring needs  Implants/Representative: [employer]   Is the patient able to walk up a flight of stairs, walk up a hill or do heavy housework WITHOUT having chest pain or shortness of breath? REGISTRATION & FINANCIAL CLEARANCE    FA Initials:   Insurance:1  MEDICARE1    Policy Number:1  [id number]  Group Number:     PRE-ADMISSION TESTING/CLINICAL INFORMATION   PAT Location:1  Outpatient Testing Elsewhere1    PAT Comments:1  OUTPT AT  - NO LATER THEN 11/7/171   Communication Barrier:   Primary Care Physician:Jamey MCKAY       CONSULTS NEEDED:   Anesthesia Consult:   Medical Consult:   Cardiac Consult:     ALLERGIES AND ALERTS   Latex Allergy:1  NO1    Penicillin Allergy:1  YES1    Malignant Hyperthermia:1  NO1    Diabetic Patient:1  NO1    Height:1  6'01   Weight:1  80 74 KG1   COMMENTS   Scheduling Information Provided by:1  WILL   IN OFFICE USE   Urgency:   Craniotomy Details:   Lab Studies Ordered: Full Labs Ordered,-- Medically Cleared--   RIDGE - NO LATER THEN 11/7/171   Films   Bracing:   Bone Stimulator   Return to office 2 Weeks post op  1 Amended By: Reji Galdamez; Oct 30 2017 1:43 PM EST    Future Appointments    Date/Time Provider Specialty Site   11/09/2017 08:00 AM ANTONIA Gutierrez Pain Management [facility] SPINE   10/31/2017 11:00 AM HATTIE Field   Psychiatry [facility]   10/31/2017 10:15 AM NELY MariaCTCHEKO AT [facility] Urology [address]     Signatures   Electronically signed by : HATTIE Waters ; Oct 30 2017  9:36AM EST                       (Author)    Electronically signed by : HATTIE Waters ; Oct 30 2017  4:14PM EST                       (Author)

## 2017-11-02 ENCOUNTER — APPOINTMENT (OUTPATIENT)
Dept: LAB | Facility: CLINIC | Age: 42
End: 2017-11-02
Payer: MEDICARE

## 2017-11-02 ENCOUNTER — TRANSCRIBE ORDERS (OUTPATIENT)
Dept: LAB | Facility: CLINIC | Age: 42
End: 2017-11-02

## 2017-11-02 DIAGNOSIS — Z79.899 LONG TERM USE OF DRUG: ICD-10-CM

## 2017-11-02 DIAGNOSIS — G89.4 CHRONIC PAIN SYNDROME: Primary | ICD-10-CM

## 2017-11-02 DIAGNOSIS — G89.4 CHRONIC PAIN SYNDROME: ICD-10-CM

## 2017-11-02 DIAGNOSIS — Z01.812 PRE-OPERATIVE LABORATORY EXAMINATION: ICD-10-CM

## 2017-11-02 LAB
ANION GAP SERPL CALCULATED.3IONS-SCNC: 3 MMOL/L (ref 4–13)
APTT PPP: 33 SECONDS (ref 23–35)
BACTERIA UR QL AUTO: ABNORMAL /HPF
BILIRUB UR QL STRIP: NEGATIVE
BUN SERPL-MCNC: 20 MG/DL (ref 5–25)
CALCIUM SERPL-MCNC: 9.4 MG/DL (ref 8.3–10.1)
CHLORIDE SERPL-SCNC: 103 MMOL/L (ref 100–108)
CLARITY UR: ABNORMAL
CO2 SERPL-SCNC: 31 MMOL/L (ref 21–32)
COLOR UR: YELLOW
CREAT SERPL-MCNC: 0.91 MG/DL (ref 0.6–1.3)
EST. AVERAGE GLUCOSE BLD GHB EST-MCNC: 126 MG/DL
GFR SERPL CREATININE-BSD FRML MDRD: 104 ML/MIN/1.73SQ M
GLUCOSE P FAST SERPL-MCNC: 80 MG/DL (ref 65–99)
GLUCOSE UR STRIP-MCNC: NEGATIVE MG/DL
HBA1C MFR BLD: 6 % (ref 4.2–6.3)
HGB UR QL STRIP.AUTO: NEGATIVE
HYALINE CASTS #/AREA URNS LPF: ABNORMAL /LPF
INR PPP: 1.09 (ref 0.86–1.16)
KETONES UR STRIP-MCNC: NEGATIVE MG/DL
LEUKOCYTE ESTERASE UR QL STRIP: NEGATIVE
NITRITE UR QL STRIP: NEGATIVE
NON-SQ EPI CELLS URNS QL MICRO: ABNORMAL /HPF
PH UR STRIP.AUTO: 7 [PH] (ref 4.5–8)
POTASSIUM SERPL-SCNC: 4.7 MMOL/L (ref 3.5–5.3)
PROT UR STRIP-MCNC: ABNORMAL MG/DL
PROTHROMBIN TIME: 14.1 SECONDS (ref 12.1–14.4)
RBC #/AREA URNS AUTO: ABNORMAL /HPF
SODIUM SERPL-SCNC: 137 MMOL/L (ref 136–145)
SP GR UR STRIP.AUTO: 1.02 (ref 1–1.03)
UROBILINOGEN UR QL STRIP.AUTO: 0.2 E.U./DL
WBC #/AREA URNS AUTO: ABNORMAL /HPF

## 2017-11-02 PROCEDURE — 85730 THROMBOPLASTIN TIME PARTIAL: CPT

## 2017-11-02 PROCEDURE — 81001 URINALYSIS AUTO W/SCOPE: CPT

## 2017-11-02 PROCEDURE — 83036 HEMOGLOBIN GLYCOSYLATED A1C: CPT

## 2017-11-02 PROCEDURE — 85610 PROTHROMBIN TIME: CPT

## 2017-11-02 PROCEDURE — 80048 BASIC METABOLIC PNL TOTAL CA: CPT

## 2017-11-02 PROCEDURE — 36415 COLL VENOUS BLD VENIPUNCTURE: CPT

## 2017-11-07 ENCOUNTER — ALLSCRIPTS OFFICE VISIT (OUTPATIENT)
Dept: OTHER | Facility: OTHER | Age: 42
End: 2017-11-07

## 2017-11-08 NOTE — PROGRESS NOTES
Assessment  1  Pre-operative clearance (V72 84) (Z01 818)   2  Chronic low back pain (724 2,338 29) (M54 5,G89 29)   3  Iron deficiency anemia (280 9) (D50 9)   4  Status post gastric bypass for obesity (V45 86) (C19 93)    Discussion/Summary  Surgical Clearance: He is at a LOW risk from a cardiovascular standpoint at this time without any additional cardiac testing  Reevaluation needed, if he should present with symptoms prior to surgery/procedure  Preop evaluation prior to pain stimulator replacement  Patient offers no complaints and denies chest pain, palpitations, shortness of breath or dizziness  of post anesthesia nausea and vomiting in the past, patient will review with anesthesiologist prior to surgery  remains under care of Psychiatry, Pain Management and Neurosurgery  Recent diagnosis of iron deficiency anemia  I advised him again to set up an evaluation with Hematology for further evaluation  No lytic lesions on recent spine/pelvis/hip x-rays  I advisedto review results of this workup with Dr Grijalva  as needed  Chief Complaint  Pt is here for pre operative clearance  Pt states that he is scheduled to have his spinal stimulator moved to a different area on 11/14/2017  Surgery is to be performed by Dr Silke Wellington in [facility]  All meds/allergies reviewed with pt  History of Present Illness  Pre-Op Visit (Brief): The patient is being seen for a preoperative visit  Surgical Risk Assessment:   Prior Anesthesia: He had prior anesthesia,-- a prior adverse reaction to general anesthesia-- and-- Postop nausea vomiting  Exercise Capacity: able to walk four blocks without symptoms-- and-- able to walk two flights of stairs without symptoms  Lifestyle Factors: denies tobacco use  Symptoms: no easy bleeding,-- no easy bruising,-- no frequent nosebleeds,-- no chest pain,-- no cough,-- no dyspnea,-- no edema,-- no palpitations-- and-- no wheezing     HPI: no exertional s/o N/V with anesthesia  patient denies chest pain, palpitations, shortness of breath or dizzinessremains under care of Neurosurgery, Pain Management and Psychiatrymedications updatedwork and x-ray results discussedconcern about lytic lesions detected on CT so far ruled out  was normallytic lesions on pelvic and hip x-raysdeficiency anemia with decreased hemoglobin of 11 5, patient with history of gastric bypass surgery and likely decreased iron absorptiondenies abdominal pain, melena or bright red blood per rectumwas recently evaluated by Psychiatry  He remains on Zoloft 100 mg daily  Recent addition of Seroquel 50 mg at bedtime         Review of Systems    Constitutional: No fever or chills, feels well, no tiredness, no recent weight gain or weight loss  Eyes: No complaints of eye pain, no red eyes, no discharge from eyes, no itchy eyes  ENT: no complaints of earache, no hearing loss, no nosebleeds, no nasal discharge, no sore throat, no hoarseness  Cardiovascular: No complaints of slow heart rate, no fast heart rate, no chest pain, no palpitations, no leg claudication, no lower extremity  Respiratory: No complaints of shortness of breath, no wheezing, no cough, no SOB on exertion, no orthopnea or PND  Gastrointestinal: No complaints of abdominal pain, no constipation, no nausea or vomiting, no diarrhea or bloody stools  Genitourinary: No complaints of dysuria, no incontinence, no hesitancy, no nocturia, no genital lesion, no testicular pain  Musculoskeletal: lower back pain  Integumentary: No complaints of skin rash or skin lesions, no itching, no skin wound, no dry skin  Neurological: No compliants of headache, no confusion, no convulsions, no numbness or tingling, no dizziness or fainting, no limb weakness, no difficulty walking  Psychiatric: as noted in HPI  Endocrine: No complaints of proptosis, no hot flashes, no muscle weakness, no erectile dysfunction, no deepening of the voice, no feelings of weakness  Hematologic/Lymphatic: No complaints of swollen glands, no swollen glands in the neck, does not bleed easily, no easy bruising  Active Problems  1  Bipolar 2 disorder, major depressive episode (296 89) (F31 81)   2  Chronic left lumbar radiculopathy (724 4) (M54 16)   3  Chronic low back pain (724 2,338 29) (M54 5,G89 29)   4  Chronic right shoulder pain (719 41,338 29) (M25 511,G89 29)   5  Depression with anxiety (300 4) (F41 8)   6  Encounter for long-term opiate analgesic use (V58 69) (Z79 891)   7  Encounter for screening for cardiovascular disorders (V81 2) (Z13 6)   8  Fatigue (780 79) (R53 83)   9  Hydronephrosis (591) (N13 30)   10  Iron deficiency anemia (280 9) (D50 9)   11  Lytic bone lesions on xray (733 90) (M89 9)   12  Nephrolithiasis (592 0) (N20 0)   13  Pain syndrome, chronic (338 4) (G89 4)   14  Status post gastric bypass for obesity (V45 86) (Z98 84)   15  Status post lumbar laminectomy (V45 89) (Z98 890)   16  Tobacco abuse counseling (V65 42,305 1) (Z71 6)   17  Uncomplicated opioid dependence (304 00) (F11 20)    Past Medical History   · History of arthritis (V13 4) (Z87 39)   · History of depression (V11 8) (Z86 59)   · No pertinent past medical history   · History of Shoulder pain (719 41) (M25 519)    Surgical History   · History of Appendectomy   · History of Fusion / Refusion Of Vertebrae   · History of Gastric Surgery For Morbid Obesity Gastric Bypass   · History of Tonsillectomy With Adenoidectomy    The surgical history was reviewed and updated today         Family History  Mother    · Family history of arthritis (V17 7) (Z82 61)   · Family history of malignant neoplasm of stomach (V16 0) (Z80 0)  Father    · Family history of Brain tumor   · Family history of malignant neoplasm of esophagus (V16 0) (Z80 0)    Social History   · Chooses not to have children (V25 9) (Z30 9)   · Current every day smoker (305 1) (F17 200)   ·    · Drinks caffeinated tea   · General equivalency diploma (GED)   · Lives with spouse   · Denied: History of No alcohol use   · Quit drinking alcohol (V11 3) (Z87 898)   · Social alcohol use (Z78 9)    Current Meds   1  ALPRAZolam 0 5 MG Oral Tablet; take 1 tablet every 4-6  hours prn anxiety; Therapy: 83Blu8962 to (Evaluate:30Nov2017); Last Rx:31Oct2017 Ordered   2  Butrans 7 5 MCG/HR Transdermal Patch Weekly; Apply 1 patch td every 7 days; Therapy: 43Foz1844 to (Evaluate:12Nov2017); Last Rx:13Oct2017 Ordered   3  PriLOSEC OTC 20 MG Oral Tablet Delayed Release; TAKE 1 TABLET DAILY; Therapy: 31OOZ0701 to (Evaluate:76Ant5336); Last Rx:30Jun2017 Ordered   4  QUEtiapine Fumarate 50 MG Oral Tablet; TAKE 1 TABLET Bedtime; Therapy: 08PSR2219 to [id number] [id number])  Requested for: 31Oct2017; Last   Rx:31Oct2017 Ordered   5  Sertraline HCl - 100 MG Oral Tablet; take 1 tablet every day; Therapy: 37ATV2742 to (Yolanda Michel)  Requested for: 31Oct2017; Last   Rx:21Oct2017 Ordered    The medication list was reviewed and updated today  Allergies  1  Aspirin TABS   2  Penicillins    Vitals   Recorded: 21UNN2615 10:01AM   Temperature 96 2 F   Heart Rate 80   Systolic 922   Diastolic 60   Height 6 ft    Weight 177 lb 2 oz   BMI Calculated 24 02   BSA Calculated 2 02     Physical Exam    Constitutional   General appearance: No acute distress, well appearing and well nourished  Neck   Neck: Supple, symmetric, trachea midline, no masses  Thyroid: Normal, no thyromegaly  Pulmonary   Respiratory effort: No increased work of breathing or signs of respiratory distress  Auscultation of lungs: Clear to auscultation  Cardiovascular   Auscultation of heart: Normal rate and rhythm, normal S1 and S2, no murmurs  Carotid pulses: 2+ bilaterally  Abdominal aorta: Normal     Abdomen   Abdomen: Non-tender, no masses  Liver and spleen: No hepatomegaly or splenomegaly      Musculoskeletal   Gait and station: Normal     Neurologic   Cranial nerves: Cranial nerves 2-12 intact  Psychiatric   Judgment and insight: Normal     Orientation to person, place and time: Normal     Recent and remote memory: Intact  Mood and affect: Normal        Results/Data  A 12 lead ECG was performed and was normal -- No acute ischemia  Rhythm and rate: normal sinus rhythm  (1) HEMOGLOBIN A1C 58BJE6644 10:19AM Cam Bunch     Test Name Result Flag Reference   HEMOGLOBIN A1C 6 0 %  4 2-6 3   EST  AVG  GLUCOSE 126 mg/dl       (1) BASIC METABOLIC PROFILE 36FYY9875 10:19AM Cam Bunch     Test Name Result Flag Reference   SODIUM 137 mmol/L  136-145   POTASSIUM 4 7 mmol/L  3 5-5 3   CHLORIDE 103 mmol/L  100-108   CARBON DIOXIDE 31 mmol/L  21-32   ANION GAP (CALC) 3 mmol/L L 4-13   BLOOD UREA NITROGEN 20 mg/dL  5-25   CREATININE 0 91 mg/dL  0 60-1 30   Standardized to IDMS reference method   CALCIUM 9 4 mg/dL  8 3-10 1   eGFR 104 ml/min/1 73sq m     National Kidney Disease Education Program recommendations are as follows:  GFR calculation is accurate only with a steady state creatinine  Chronic Kidney disease less than 60 ml/min/1 73 sq  meters  Kidney failure less than 15 ml/min/1 73 sq  meters  GLUCOSE FASTING 80 mg/dL  65-99   Specimen collection should occur prior to Sulfasalazine administration due to the potential for falsely depressed results  Specimen collection should occur prior to Sulfapyridine administration due to the potential for falsely elevated results  * XR SPINE LUMBAR MINIMUM 4 VIEWS NON INJURY 25Oct2017 10:34AM Eugene Moran Order Number: [id number]     Test Name Result Flag Reference   XR SPINE LUMBAR MINIMUM 4 VIEWS (Report)     LUMBAR SPINE     INDICATION: Lower back pain  COMPARISON: August 2, 2017     VIEWS: AP, lateral and bilateral oblique projections;      IMAGES: 4     FINDINGS: Lumbar fusion noted extending from L4 through S1 with the transpedicular screws   Alignment is stable     Moderate to severe disc space narrowing seen at L5-S1   A spinal cord stimulator is noted   There is solid osseous fusion noted in the bone graft in the paravertebral region   The disc spaces are still visible   There is no radiographic evidence of acute fracture or destructive osseous lesion  Visualized soft tissues appear unremarkable  IMPRESSION:     Stable alignment   Intact spinal fusion hardware       Workstation performed: [id number]     Signed by:   Helen eBlle MD   10/28/17     * XR PELVIS AP ONLY 1 OR 2 VIEW 25Oct2017 10:34AM Shira Spence Order Number: [id number]     Test Name Result Flag Reference   XR PELVIS AP ONLY 1 OR 2 VW (Report)     PELVIS     INDICATION: Pelvic pain  COMPARISON: None     VIEWS: AP     IMAGES: 1     FINDINGS:     No fracture or pathologic bone lesions  Mild degenerative changes seen in the both hip joint   Lumbar fusion noted with the disc replacement at L4-5 and L5-S1   No lytic or blastic lesions are seen  Regional soft tissues are unremarkable  IMPRESSION:     No acute displaced fracture seen   Mild degenerative changes in the both hip joint        Workstation performed: [id number]     Signed by:   Helen Belle MD   10/28/17     (1) CBC/PLT/DIFF 85YSK3576 10:24AM Jorje Hollins     Test Name Result Flag Reference   WBC COUNT 4 93 Thousand/uL  4 31-10 16   RBC COUNT 5 74 Million/uL H 3 88-5 62   HEMOGLOBIN 11 5 g/dL L 12 0-17 0   HEMATOCRIT 37 7 %  36 5-49 3   MCV 66 fL L 82-98   MCH 20 0 pg L 26 8-34 3   MCHC 30 5 g/dL L 31 4-37 4   RDW 16 9 % H 11 6-15 1   MPV 10 4 fL  8 9-12 7   PLATELET COUNT 654 Thousands/uL  149-390   nRBC AUTOMATED 0 /100 WBCs     NEUTROPHILS RELATIVE PERCENT 49 %  43-75   LYMPHOCYTES RELATIVE PERCENT 39 %  14-44   MONOCYTES RELATIVE PERCENT 9 %  4-12   EOSINOPHILS RELATIVE PERCENT 2 %  0-6   BASOPHILS RELATIVE PERCENT 1 %  0-1   NEUTROPHILS ABSOLUTE COUNT 2 43 Thousands/? ??L  1 85-7 62   LYMPHOCYTES ABSOLUTE COUNT 1 94 Thousands/? ??L  0 60-4 47   MONOCYTES ABSOLUTE COUNT 0 42 Thousand/? ??L  0 17-1 22   EOSINOPHILS ABSOLUTE COUNT 0 08 Thousand/? ??L  0 00-0 61   BASOPHILS ABSOLUTE COUNT 0 05 Thousands/? ??L  0 00-0 10   This is a patient instruction: This test is non-fasting  Please drink two glasses of water morning of bloodwork  (1) FREE LIGHT CHAINS, SERUM 25Oct2017 10:24AM Juan Antonio Shearing     Test Name Result Flag Reference   FREE KAPPA LIGHT CHAINS, SERUM 20 8 mg/L H 3 3 - 19 4   FREE LAMBDA LIGHT CHAINS, SERUM 19 3 mg/L  5 7 - 26 3   KAPPA/LAMBDA RATIO, SERUM 1 08  0 26 - 1 65   Performed at:  [facility] [employer]  [id number]  : Isrrael Beltre MD, Phone:  [phone]     End of Encounter Meds  1  QUEtiapine Fumarate 50 MG Oral Tablet; TAKE 1 TABLET Bedtime; Therapy: 80SAJ1021 to [phone])  Requested for: 31Oct2017; Last   Rx:31Oct2017 Ordered  2  ALPRAZolam 0 5 MG Oral Tablet (Xanax); take 1 tablet every 4-6  hours prn anxiety; Therapy: 47Llv8941 to (Evaluate:30Nov2017); Last Rx:31Oct2017 Ordered   3  Sertraline HCl - 100 MG Oral Tablet; take 1 tablet every day; Therapy: 88BCI9097 to (Evaluate:20Nov2017)  Requested for: 31Oct2017; Last   Rx:21Oct2017 Ordered  4  Butrans 7 5 MCG/HR Transdermal Patch Weekly; Apply 1 patch td every 7 days; Therapy: 85Zqv6770 to (Evaluate:12Nov2017); Last Rx:13Oct2017 Ordered  5  PriLOSEC OTC 20 MG Oral Tablet Delayed Release; TAKE 1 TABLET DAILY; Therapy: 34XFG7506 to (Evaluate:83Ept2138); Last Rx:30Jun2017 Ordered    Future Appointments    Date/Time Provider Specialty Site   11/14/2017 12:00 PM HATTIE Ackerman  [email or web address] OR   11/09/2017 08:00 AM ANTONIA Zhang Pain Management [facility] SPINE   12/06/2017 04:00 PM HATTIE Gomez   Psychiatry [facility]   11/28/2017 09:00 AM Maru Luis, [facility] Neurosurgery [facility] Signatures   Electronically signed by :  HATTIE Wilson ; Nov 7 2017 10:54PM EST                       (Author)

## 2017-11-09 ENCOUNTER — ALLSCRIPTS OFFICE VISIT (OUTPATIENT)
Dept: OTHER | Facility: OTHER | Age: 42
End: 2017-11-09

## 2017-11-09 RX ORDER — QUETIAPINE FUMARATE 50 MG/1
1 TABLET, FILM COATED ORAL
COMMUNITY
Start: 2017-10-31 | End: 2018-02-15 | Stop reason: SDUPTHER

## 2017-11-09 RX ORDER — OMEPRAZOLE 20 MG/1
20 TABLET, DELAYED RELEASE ORAL AS NEEDED
COMMUNITY
Start: 2017-06-30 | End: 2018-12-17

## 2017-11-09 RX ORDER — ALPRAZOLAM 0.5 MG/1
0.5 TABLET ORAL EVERY 4 HOURS PRN
COMMUNITY
Start: 2017-08-07 | End: 2018-02-15 | Stop reason: SDUPTHER

## 2017-11-09 RX ORDER — BUPRENORPHINE 7.5 UG/H
1 PATCH TRANSDERMAL WEEKLY
COMMUNITY
Start: 2017-11-09 | End: 2018-02-01 | Stop reason: SDUPTHER

## 2017-11-09 RX ORDER — SERTRALINE HYDROCHLORIDE 100 MG/1
100 TABLET, FILM COATED ORAL
COMMUNITY
Start: 2017-05-02 | End: 2018-02-15 | Stop reason: SDUPTHER

## 2017-11-09 NOTE — PRE-PROCEDURE INSTRUCTIONS
The following information was developed to assist you to prepare for your operation  What do I need to do before coming to the hospital?  · Arrange for a responsible person to drive you to and from the hospital   · Arrange care for your children at home  Children are not allowed in the recovery area of the hospital   · Plan to wear clothing that is easy to put on and take off  If you are having shoulder surgery, wear a shirt that buttons or zippers in front  Bathing  · Shower the evening before and the morning of your surgery with antibacterial soap  Please refer to the Pre Op Showering Instructions for Surgery Patient Sheet  · Remove nail polish and all body piercing jewelry  · Do not shave any body part for at least 24 hours before surgery-this includes face, arm, legs and upper body  Food  · Nothing to eat or drink after midnight the night before your surgery  This includes candy and chewing gum  Exception: If your surgery is after 12:00 pm (noon), you may have clear liquids such as 7-Up, ginger ale, apple or cranberry juice, Jello-O, water, or clear broth until 8:00 am   · Do not drink mild or juice with pulp on the morning before surgery  · Do not drink alcohol 24 hours before surgery  · Do not smoke 12 hours before surgery  Medicine  · Follow instructions you are received from your surgeon about which medicines you may take on the day of surgery  · If instructed to take medicine on the morning of surgery, take pills with just a small sip of water  Call your prescribing doctor for specific instructions on what to do if you take insulin  What should I bring to the hospital?  Bring  · Crutches or a walker, if you have them, for foot or knee surgery  · A list of the daily medications, vitamins, minerals, herbals and nutritional supplements you take  Include the dosages of medicines and the time you take them each day    · Glasses, dentures or hearing aids  · Minimal clothing; you will be wearing hospital sleepwear  · Photo ID; required to verify your identity  · If you have a Living Will or Power of , bring a copy of the documents  (only if being admitted)  · If you have an ostomy, bring an extra pouch and any supplies you use  Do Not Bring  · Medicines or Inhalers  · Money, valuables or jewelry    What other information should I know about the day of surgery? · Notify your surgeon if you develop a cold, sore throat, cough, fever, rash or any other illness  · Report to the Ambulatory Surgical/Same Day Surgery Unit  You will be instructed to stop at Registration only if you have not been pre-registered  · Inform your  if they do not stay that they will be asked by the staff to leave a phone number where they can be reached  · Be available to be reached before surgery  In the even the operating room schedule changes, you may be asked to come in earlier or later than expected  It is important to tell your doctor and others involved in your health care if you are taking or have been taking any non-prescription drugs, vitamins, minerals, herbals or other nutritional supplements  Any of these may interact with some food or medicines and cause a reaction  Before your operation, you play an important role in decreasing your risk for infection by washing with special antiseptic soap  This is an effective way to reduce bacteria on the skin which may help to prevent infections at the surgical site  Please read the following directions in advance  2  In the week before your operation purchase a 4 ounce bottle of antiseptic soap containing chlorhexidine gluconate 4%  Some brand names include: Aplicare, Endure, and Hibiclens  The cost is usually less than $5 00  · For your convenience, the [address] [place] [address] carries the soap  · It may also be available at your doctor's office or pre-admission testing center, and at most retail pharmacies    · If you are allergic or sensitive to soaps containing chlorhexidine gluconate (CHG), please let your doctor know so another antiseptic soap can be suggested  · CHG antiseptic soap is for external use only  2      The day before your operation follow these directions carefully to get ready  · Place clean lines (sheets) on your bed; you should sleep on clean sheets after your evening shower  · Get clean towels and washcloths ready - you need enough for 2 showers  · Set aside clean underwear, pajamas, and clothing to wear after the shower  Reminders:  · DO NOT use any other soap or body rinse on your skin during or after the antiseptic showers  · DO NOT use lotion , powder, deodorant, or perfume/aftershave of any kind on your skin after your antiseptic shower  · DO NOT shave any body parts in the 24 hours/the day before your operation  · DO NOT get the antiseptic soap in your eyes, ears, nose, mouth, or vaginal area  3      You will need to shower the night before AND the morning of your Surgery  Shower 1:  · The evening before your operation, take the fist shower  · First, shampoo your hair with regular shampoo and rinse it completely before you use the anitseptic soap  After washing and rinsing your hair, rinse your body  · Next, use a clean wash cloth to apply the antiseptic soap and wash your body from the neck down to your toes using 1/2 bottle of the antiseptic soap  You will use the other 1/2 bottle for the second shower  · Clean the area where your incision will be; later this area well for about 2 minutes  · If you ar having head or neck surgery, wash areas with the antiseptic soap  · Rinse yourself completely with running water  · Use a clean towel to dry off  · Wear clean underwear and clothing/pajamas  Shower 2:  · The Morning of your operation, take the second shower following the same steps as Shower 1 using the second 1/2 of the bottle of antiseptic soap    · Use clean cloths and towels to was and dry yourself off  · Wear clean underwear and clothing  Pre-Surgery Instructions:   Medication Instructions    ALPRAZolam (XANAX) 0 5 mg tablet Patient was instructed by Physician and understands   Buprenorphine (BUTRANS) 7 5 MCG/HR PTWK Patient was instructed to contact Physician for medication instruction   omeprazole (PRILOSEC OTC) 20 MG tablet Instructed patient per Anesthesia Guidelines   QUEtiapine (SEROquel) 50 mg tablet Patient was instructed by Physician and understands   sertraline (ZOLOFT) 100 mg tablet Patient was instructed by Physician and understands

## 2017-11-11 NOTE — PROGRESS NOTES
Assessment    1  Chronic low back pain (724 2,338 29) (M54 5,G89 29)   2  Chronic left lumbar radiculopathy (724 4) (M54 16)   3  Chronic right shoulder pain (719 41,338 29) (M25 511,G89 29)   4  Encounter for long-term opiate analgesic use (V58 69) (Z79 891)   5  Pain syndrome, chronic (338 4) (G89 4)   6  Status post lumbar laminectomy (V45 89) (Z98 890)   7  Uncomplicated opioid dependence (304 00) (F11 20)   8  Tobacco abuse counseling (V65 42,305 1) (Z71 6)    Plan   Chronic left lumbar radiculopathy, Chronic low back pain, Pain syndrome, chronic    · Hydrocodone-Acetaminophen 5-325 MG Oral Tablet; Take 1 PO QD PRN for severepain ONLY for post op pain   Rx By: Juliet Bey; Dispense: 10 Days ; #:10 Tablet; Refill: 0;For: Chronic left lumbar radiculopathy, Chronic low back pain, Pain syndrome, chronic; TOMSAZ = N; Print Rx  Pain syndrome, chronic    · Butrans 7 5 MCG/HR Transdermal Patch Weekly; Apply 1 patch td every 7 days   Rx By: Juliet Bey; Dispense: 30 Days ; #:1 X 4 Patch Weekly Box; Refill: 0;Pain syndrome, chronic; TOMASZ = N; Print Rx  Tobacco abuse counseling    · We recommend you quit smoking  Time spent counseling today was greater than 3minutes ; Status:Complete;   Done: 21LDD1782   Ordered; For:Tobacco abuse counseling; Ordered By:Patric Dozier;   · You need to quit smoking ; Status:Complete;   Done: 31YFB3028   Ordered;abuse counseling; Ordered By:Patric Dozier; Follow-up visit in 3 months Evaluation and Treatment  Follow-up  Status: Hold For - Scheduling  Requested for: 54ALQ2969 Ordered; For: Chronic low back pain, Pain syndrome, chronic;  Ordered By: Juliet Bey  Performed:   Due: 59VIY4462   Discussion/Summary    While the patient was in the office today, I did have a thorough conversation with the patient regarding his medication regimen treatment plan   At this point time he should proceed with the Medtronics IPG replacement surgery with Dr Hien Hammond as scheduled on the 14th and I did give him a small prescription for hydrocodone 5/3251 tab p o  q day p r n  for severe pain to help him get through the recovery after the procedure  I advised him that as per the contract if he has any issues managing his pain symptoms with his medication regimen after the procedure, he is to call our office unless it is surgically related  I advised the patient that since the Butrans patch seems to be providing significant relief, without side effects, I feel would be in his best interest for now to continue with medication as prescribed  However, hopefully with the IPG replacement, he was decreased pain at the IPG site and get better relief from the stimulator than the future at the very least we can decrease the Butrans patch down to 5 mcg, if not eventually come off of it altogether  The patient was agreeable and verbalized an understanding  the patient was in the office today, I did review the patient's report on the [address] [place] [address] web site and found it to be appropriate for what is being prescribed and I reviewed it for any inconsistencies or evidence of multiple prescribers/drug diversion  A copy of the patient's report can be found in their chart  urine drug screen was collected at today's office visit as part of our medication management protocol  The point of care testing results were appropriate for what was being prescribed  The specimen will be sent for confirmatory testing  The drug screen is medically necessary because the patient is either dependent on opioid medication or is being considered for opioid medication therapy and the results could impact ongoing or future treatment  The drug screen is to evaluate for the presences or absence of prescribed, non-prescribed, and/or illicit drugs/substances  patient was given a 3 month supply of prescriptions with a Do Not Fill date(s) of December 7, 2017 and January 4, 2018    risk of opioid medications, including dependence, addiction and tolerance were explained to the patient  The patient understands and agrees to use these medications only as prescribed  I have fully discussed the potential side effects of the medication with the patient, which include, but are not limited to, constipation, drowsiness, addiction, impaired judgment and risk of fatal overdose as not taken as prescribed  I have warned the patient that sharing medications is a felony  I warned against driving while taking sedating medications  At this point in time, the patient is showing no signs of addiction, abuse, diversion or suicidal ideation  The patient has the current Goals: To continue with at least a current level of pain relief and hopefully proceed with the IPG replacement and site revision to see if he can get continued use of the Medtronics stimulator  The patent has the current Barriers: Chronic pain syndrome and opioid dependence  Patient is able to Self-Care  Educational resources provided: The patient was in the office today, I did spend 5-10 minutes reviewing smoking cessation techniques and encouraged the patient to participate with support groups as research shows that patients who quit with a support network are more likely to be successful with smoking cessation  I also discussed with the patient that more and more research is showing that patient's who smoke do not process medications as well, including pain medications  I reviewed with the patient that it is in their best interest to quit smoking for their pain and overall health  I did provide the cessation materials/hand outs today  The treatment plan was reviewed with the patient/guardian  The patient/guardian understands and agrees with the treatment plan   The patient was counseled regarding instructions for management,-- prognosis,-- patient and family education,-- risks and benefits of treatment options-- and-- importance of compliance with treatment   total time of encounter was 25 minutes  Chief Complaint    1  Pain  Chronic thoracic, low back, and left leg pain, improved/stable  History of Present Illness  The patient presents today for a follow-up office visit  He is currently being treated for his chronic back and leg pain symptoms which have improved since his last office visit with the changes in his medication regimen  Patient is now on the Butrans patch 7 5 mcg and reports that overall he is noting a 70% improvement in his pain symptoms as a result the medication, without any side effects  Since his last office visit he has also followed up with reprogramming with the [address] [place] [address]  staff, who at this point feels that he needs an IPG replacement and site revision which may help with the discomfort at his IPG pocket site  At this point he is scheduled on November 14, 2017 for the IPG replacement  The patient presents today to discuss his medication regimen and treatment plan  Eddie Xiong presents with complaints of intermittent episodes of moderate bilateral lower back and left shoulder pain, described as sharp and throbbing  On a scale of 1 to 10, the patient rates the pain as 5  Symptoms are improving  Review of Systems   Constitutional: no fever,-- no recent weight gain-- and-- no recent weight loss  Eyes: no double vision-- and-- no blurry vision  Cardiovascular: no chest pain,-- no palpitations-- and-- no lower extremity edema  Respiratory: no complaints of shortness of breath-- and-- no wheezing  Musculoskeletal: joint stiffness-- and-- decreased range of motion, but-- no difficulty walking,-- no muscle weakness,-- no joint swelling,-- no limb swelling-- and-- no pain in extremity  Neurological: no dizziness,-- no difficulty swallowing,-- no memory loss,-- no loss of consciousness-- and-- no seizures  Gastrointestinal: no nausea,-- no vomiting,-- no constipation-- and-- no diarrhea    Genitourinary: no difficulty initiating urine stream,-- no genital pain-- and-- no frequent urination  Integumentary: no complaints of skin rash  Psychiatric: no depression  Endocrine: no excessive thirst,-- no adrenal disease,-- no hypothyroidism-- and-- no hyperthyroidism  Hematologic/Lymphatic: no tendency for easy bruising-- and-- no tendency for easy bleeding  Active Problems  1  Bipolar 2 disorder, major depressive episode (296 89) (F31 81)   2  Chronic left lumbar radiculopathy (724 4) (M54 16)   3  Chronic low back pain (724 2,338 29) (M54 5,G89 29)   4  Chronic right shoulder pain (719 41,338 29) (M25 511,G89 29)   5  Depression with anxiety (300 4) (F41 8)   6  Encounter for long-term opiate analgesic use (V58 69) (Z79 891)   7  Encounter for screening for cardiovascular disorders (V81 2) (Z13 6)   8  Fatigue (780 79) (R53 83)   9  Hydronephrosis (591) (N13 30)   10  Iron deficiency anemia (280 9) (D50 9)   11  Lytic bone lesions on xray (733 90) (M89 9)   12  Nephrolithiasis (592 0) (N20 0)   13  Pain syndrome, chronic (338 4) (G89 4)   14  Pre-operative clearance (V72 84) (Z01 818)   15  Status post gastric bypass for obesity (V45 86) (Z98 84)   16  Status post lumbar laminectomy (V45 89) (Z98 890)   17  Tobacco abuse counseling (V65 42,305 1) (Z71 6)   18  Uncomplicated opioid dependence (304 00) (F11 20)    Past Medical History  1  History of arthritis (V13 4) (Z87 39)   2  History of depression (V11 8) (Z86 59)   3  No pertinent past medical history   4  History of Shoulder pain (719 41) (M25 519)    The active problems and past medical history were reviewed and updated today  Surgical History  1  History of Appendectomy   2  History of Fusion / Refusion Of Vertebrae   3  History of Gastric Surgery For Morbid Obesity Gastric Bypass   4  History of Tonsillectomy With Adenoidectomy    The surgical history was reviewed and updated today  Family History  Mother    1  Family history of arthritis (V17 7) (Z82 61)   2   Family history of malignant neoplasm of stomach (V16 0) (Z80 0)  Father    3  Family history of Brain tumor   4  Family history of malignant neoplasm of esophagus (V16 0) (Z80 0)    The family history was reviewed and updated today  Social History     · Chooses not to have children (V25 9) (Z30 9)   · Current every day smoker (305 1) (F17 200)   ·    · Drinks caffeinated tea   · General equivalency diploma (GED)   · Lives with spouse   · Denied: History of No alcohol use   · Quit drinking alcohol (V11 3) (Z87 898)   · Social alcohol use (Z78 9)  The social history was reviewed and updated today  The social history was reviewed and is unchanged  Current Meds   1  ALPRAZolam 0 5 MG Oral Tablet; take 1 tablet every 4-6  hours prn anxiety; Therapy: 75Jnr4894 to (Evaluate:30Nov2017); Last Rx:31Oct2017 Ordered   2  Butrans 7 5 MCG/HR Transdermal Patch Weekly; Apply 1 patch td every 7 days; Therapy: 80Vde8611 to (Evaluate:12Nov2017); Last Rx:13Oct2017 Ordered   3  PriLOSEC OTC 20 MG Oral Tablet Delayed Release; TAKE 1 TABLET DAILY; Therapy: 73GEL5144 to (Evaluate:60Cpf8418); Last Rx:30Jun2017 Ordered   4  QUEtiapine Fumarate 50 MG Oral Tablet; TAKE 1 TABLET Bedtime; Therapy: 06QUQ7844 to 96 117150)  Requested for: 31Oct2017; Last Rx:31Oct2017 Ordered   5  Sertraline HCl - 100 MG Oral Tablet; take 1 tablet every day; Therapy: 95OON1558 to (Jose Vogel)  Requested for: 31Oct2017; Last Rx:21Oct2017 Ordered    The medication list was reviewed and updated today  Allergies  1  Aspirin TABS   2  Penicillins    Vitals  Vital Signs    Recorded: 15LKM1588 07:49AM   Temperature 98 2 F   Heart Rate 78   Systolic 876   Diastolic 62   Height 6 ft    Weight 185 lb    BMI Calculated 25 09   BSA Calculated 2 06   Pain Scale 5       Physical Exam   Constitutional  General appearance: Well developed, well nourished, alert, in no distress, non-toxic and no overt pain behavior     Eyes  Sclera: anicteric  HEENT  Hearing grossly intact  Pulmonary  Respiratory effort: Even and unlabored  Cardiovascular  Examination of extremities: No edema or pitting edema present  Abdomen  Abdomen: Soft, non-tender, non-distended  Skin  Skin and subcutaneous tissue: Abnormal   multiple tattoos  Psychiatric  Mood and affect: Mood and affect appropriate  Neurologic Motor Tone:   Cranial nerves: Cranial nerves II-XII grossly intact  -- Slightly antalgic, but steady gait without the use of any assistive devices  Musculoskeletal       Results/Data  Procedure Flowsheet [id number] 09:36AM Valeriy Berg     Test Name Result Flag Reference   Urine Drug Screen Performed Date 20KNP6271         Future Appointments    Date/Time Provider Specialty Site   11/14/2017 12:00 PM HATTIE Bansal  Neurosurgery [facility] HOSPTIAL [place] [facility]   02/01/2018 08:00 AM ANTONIA Cunningham Pain Management [facility] SPINE   12/06/2017 04:00 PM HATTIE Hodges   Psychiatry [facility]   11/28/2017 09:00 AM Ines Luis, [address] [facility]       Signatures   Electronically signed by : [address]; Nov 10 2017 10:37AM EST                       (Author)    Electronically signed by : Manuel Hudson DO; Nov 10 2017 11:15AM EST

## 2017-11-13 ENCOUNTER — GENERIC CONVERSION - ENCOUNTER (OUTPATIENT)
Dept: OTHER | Facility: OTHER | Age: 42
End: 2017-11-13

## 2017-11-13 RX ORDER — VANCOMYCIN HYDROCHLORIDE 1 G/200ML
1000 INJECTION, SOLUTION INTRAVENOUS ONCE
Status: COMPLETED | OUTPATIENT
Start: 2017-11-14 | End: 2017-11-14

## 2017-11-13 RX ORDER — SODIUM CHLORIDE, SODIUM LACTATE, POTASSIUM CHLORIDE, CALCIUM CHLORIDE 600; 310; 30; 20 MG/100ML; MG/100ML; MG/100ML; MG/100ML
20 INJECTION, SOLUTION INTRAVENOUS CONTINUOUS
Status: DISCONTINUED | OUTPATIENT
Start: 2017-11-14 | End: 2017-11-14 | Stop reason: HOSPADM

## 2017-11-13 RX ORDER — CHLORHEXIDINE GLUCONATE 0.12 MG/ML
15 RINSE ORAL ONCE
Status: COMPLETED | OUTPATIENT
Start: 2017-11-14 | End: 2017-11-14

## 2017-11-14 ENCOUNTER — ANESTHESIA EVENT (OUTPATIENT)
Dept: PERIOP | Facility: HOSPITAL | Age: 42
End: 2017-11-14
Payer: MEDICARE

## 2017-11-14 ENCOUNTER — GENERIC CONVERSION - ENCOUNTER (OUTPATIENT)
Dept: PERIOP | Facility: HOSPITAL | Age: 42
End: 2017-11-14

## 2017-11-14 ENCOUNTER — HOSPITAL ENCOUNTER (OUTPATIENT)
Facility: HOSPITAL | Age: 42
Setting detail: OUTPATIENT SURGERY
Discharge: HOME | End: 2017-11-14
Attending: NEUROLOGICAL SURGERY | Admitting: NEUROLOGICAL SURGERY
Payer: MEDICARE

## 2017-11-14 ENCOUNTER — ANESTHESIA (OUTPATIENT)
Dept: PERIOP | Facility: HOSPITAL | Age: 42
End: 2017-11-14
Payer: MEDICARE

## 2017-11-14 VITALS
RESPIRATION RATE: 16 BRPM | DIASTOLIC BLOOD PRESSURE: 63 MMHG | BODY MASS INDEX: 24.11 KG/M2 | HEIGHT: 72 IN | TEMPERATURE: 97.7 F | WEIGHT: 178 LBS | HEART RATE: 54 BPM | OXYGEN SATURATION: 100 % | SYSTOLIC BLOOD PRESSURE: 107 MMHG

## 2017-11-14 PROCEDURE — C1820 GENERATOR NEURO RECHG BAT SY: HCPCS | Performed by: NEUROLOGICAL SURGERY

## 2017-11-14 DEVICE — NEUROSTIM INTELLIS SURESCAN MRI W/ ADAPTIVESTIM
Type: IMPLANTABLE DEVICE | Site: BUTTOCKS | Status: NON-FUNCTIONAL
Removed: 2022-01-12

## 2017-11-14 RX ORDER — FENTANYL CITRATE 50 UG/ML
INJECTION, SOLUTION INTRAMUSCULAR; INTRAVENOUS AS NEEDED
Status: DISCONTINUED | OUTPATIENT
Start: 2017-11-14 | End: 2017-11-14 | Stop reason: SURG

## 2017-11-14 RX ORDER — MIDAZOLAM HYDROCHLORIDE 1 MG/ML
INJECTION INTRAMUSCULAR; INTRAVENOUS AS NEEDED
Status: DISCONTINUED | OUTPATIENT
Start: 2017-11-14 | End: 2017-11-14 | Stop reason: SURG

## 2017-11-14 RX ORDER — OXYCODONE HYDROCHLORIDE AND ACETAMINOPHEN 5; 325 MG/1; MG/1
2 TABLET ORAL EVERY 4 HOURS PRN
Status: DISCONTINUED | OUTPATIENT
Start: 2017-11-14 | End: 2017-11-14 | Stop reason: HOSPADM

## 2017-11-14 RX ORDER — SCOLOPAMINE TRANSDERMAL SYSTEM 1 MG/1
1 PATCH, EXTENDED RELEASE TRANSDERMAL
Status: DISCONTINUED | OUTPATIENT
Start: 2017-11-14 | End: 2017-11-14 | Stop reason: HOSPADM

## 2017-11-14 RX ORDER — FENTANYL CITRATE/PF 50 MCG/ML
50 SYRINGE (ML) INJECTION
Status: DISCONTINUED | OUTPATIENT
Start: 2017-11-14 | End: 2017-11-14 | Stop reason: HOSPADM

## 2017-11-14 RX ORDER — LIDOCAINE HYDROCHLORIDE AND EPINEPHRINE 5; 5 MG/ML; UG/ML
INJECTION, SOLUTION INFILTRATION; PERINEURAL AS NEEDED
Status: DISCONTINUED | OUTPATIENT
Start: 2017-11-14 | End: 2017-11-14 | Stop reason: HOSPADM

## 2017-11-14 RX ORDER — PROPOFOL 10 MG/ML
INJECTION, EMULSION INTRAVENOUS CONTINUOUS PRN
Status: DISCONTINUED | OUTPATIENT
Start: 2017-11-14 | End: 2017-11-14 | Stop reason: SURG

## 2017-11-14 RX ADMIN — CHLORHEXIDINE GLUCONATE 15 ML: 1.2 RINSE ORAL at 09:43

## 2017-11-14 RX ADMIN — PROPOFOL 120 MCG/KG/MIN: 10 INJECTION, EMULSION INTRAVENOUS at 10:14

## 2017-11-14 RX ADMIN — FENTANYL CITRATE 100 MCG: 50 INJECTION, SOLUTION INTRAMUSCULAR; INTRAVENOUS at 10:03

## 2017-11-14 RX ADMIN — VANCOMYCIN HYDROCHLORIDE 1000 MG: 1 INJECTION, SOLUTION INTRAVENOUS at 10:02

## 2017-11-14 RX ADMIN — MIDAZOLAM HYDROCHLORIDE 2 MG: 1 INJECTION, SOLUTION INTRAMUSCULAR; INTRAVENOUS at 10:03

## 2017-11-14 RX ADMIN — SCOPALAMINE 1 PATCH: 1 PATCH, EXTENDED RELEASE TRANSDERMAL at 10:06

## 2017-11-14 RX ADMIN — SODIUM CHLORIDE, SODIUM LACTATE, POTASSIUM CHLORIDE, AND CALCIUM CHLORIDE 20 ML/HR: .6; .31; .03; .02 INJECTION, SOLUTION INTRAVENOUS at 09:44

## 2017-11-14 RX ADMIN — OXYCODONE HYDROCHLORIDE AND ACETAMINOPHEN 1 TABLET: 5; 325 TABLET ORAL at 11:19

## 2017-11-14 RX ADMIN — MIDAZOLAM HYDROCHLORIDE 2 MG: 1 INJECTION, SOLUTION INTRAMUSCULAR; INTRAVENOUS at 10:19

## 2017-11-14 NOTE — OP NOTE
OPERATIVE REPORT  PATIENT NAME: Yosef Alejo    :  1975  MRN: [id number]  Pt Location: [facility] OR ROOM 03    SURGERY DATE: 2017    Surgeon(s) and Role:     * Femi Raza MD - Primary     * Holly Carrera PA-C - Assisting  No qualified resident available for exposure  PA Present throughout procedure  Assisted with exposure and wound closure providing essential assistance throughout including retraction and hemostasis to achieve the necessary surgical goal       Preop Diagnosis:  Chronic pain syndrome [G89 4]  Postlaminectomy syndrome, not elsewhere classified [M96 1]    Post-Op Diagnosis Codes:     * Chronic pain syndrome [G89 4]     * Postlaminectomy syndrome, not elsewhere classified [M96 1]    Specimen(s):  * No specimens in log *    Estimated Blood Loss:   Minimal    Drains:       Anesthesia Type:   IV Sedation with Anesthesia    Operative Indications:  Chronic pain syndrome [G89 4]  Postlaminectomy syndrome, not elsewhere classified [M96 1]  EOL SCS IPG    Complications:   None    Procedure and Technique:  1  Removal of a right back implantable pulse generator    2  Placement of a new right buttock implantable pulse generator through separate incision  3  Electronic analysis complex programming spinal cord stimulator system postoperative period approximately 1 hour     Operative Findings:  Impedances WNL     Implants:  1  Medtronic Intellus [id number] rechargeable Generator SN [id number]     Indications for procedure; This is a 49-year-old male with a long-standing history of chronic pain involving the lower back and lower extremities  Previous placed SCS system that delivers efficacy and now presents at EOL of IPG with possible malfunction  He also has pain at the current IPG site and would like it repositioned  The risks and benefits of the procedure for replacement reviewed with the patient and family and they wished to proceed      Description of procedure;   The patient was identified and brought to the operating room where they underwent the induction of MAC anesthesia  Placed lateral on the operating room table with chest rolls  Prepped and draped in the usual sterile fashion  1g of Vanco was administered as antibiotic prophylaxis  Bilateral lower extremity SCDs were placed for DVT prophylaxis  A timeout was then performed      Attention placed on the right back incision which was anesthetized with 1% lidocaine with epinephrine and incised with a 10 blade  Bovie electrocautery dissected the subcutaneous tissue  Then using sharp and blunt dissection the IPG was freed from the underlying pocket and  from the distal portion of the electrode  The electrode was then freed from the underlying tissue      Attention placed on the new right buttock incision which was anesthetized with 1% lidocaine with epinephrine and incised with a 10 blade inferior and lateral to the previous incision  Bovie electrocautery dissected the subcutaneous tissue  Then using sharp and blunt dissection a pocket was created in the inferior direction  Electrode then tunneled to this incision with a tunneling tool  The distal portion of the electrode was then connected to the new generator and secured  Both the excess lead and generator were then placed into the pocket and secured with a 2-0 silk suture  System was interrogated and working within normal limits with normal impedances      Incisions copiously irrigated with antibiotic-induced solution  The left back incision had the pocket closed upon itself with an interrupted 2-0 Vicryl plus suture  The skin was approximated with interrupted inverted 2-0 Vicryl plus suture with stainless steel staples for the skin  The new left buttock incision had the pocket closed over the generator with an interrupted 2-0 Vicryl plus suture  The skin was approximated with interrupted inverted 2-0 Vicryl plus suture with stainless steel staples for the skin   Routine sterile dressings were then applied      At the end of the case all counts were reported to be correct  There was no breaks in surgical technique or complications encountered during the procedure  The patient woke from anesthesia in stable condition being taken to the recovery room      In the postoperative period the patient underwent electronic analysis and complex programming of the spinal cord stimulator system for approximately 1 hour  The final settings used bipole configuration on tripole electrode  This was run at a pulse width of 350 and a rate of 40 Hz       I was present for the entire procedure    Patient Disposition:  PACU     SIGNATURE: Jim Mandel MD  DATE: November 14, 2017  TIME: 10:38 AM

## 2017-11-14 NOTE — INTERIM OP NOTE
REMOVAL LOWER MEDIAL BUTTOCK SPINAL CORD STIMULATOR GENERATOR; PLACEMENT OF NEW BUTTOCK SPINAL CORD STIMULATOR THROUGH SEPERATE INCISION  Postoperative Note  PATIENT NAME: Arvind December  : 1975  MRN: [id number]  [facility] ROOM 03    Surgery Date: 2017    Preop Diagnosis:  Chronic pain syndrome [G89 4]  Postlaminectomy syndrome, not elsewhere classified [M96 1]    Post-Op Diagnosis Codes:     * Chronic pain syndrome [G89 4]     * Postlaminectomy syndrome, not elsewhere classified [M96 1]    Procedure(s) (LRB):  REMOVAL LOWER MEDIAL BUTTOCK SPINAL CORD STIMULATOR GENERATOR; PLACEMENT OF NEW BUTTOCK SPINAL CORD STIMULATOR THROUGH SEPERATE INCISION (Right)    Surgeon(s) and Role:     * Radha Jack MD - Primary     * Shruthi Leonard PA-C - Assisting    Specimens:  * No specimens in log *    Estimated Blood Loss:   Minimal    Anesthesia Type:   IV Sedation with Anesthesia     Findings:    Impedances WNL    Complications:   None    SIGNATURE: Radha Jack MD   DATE: 2017   TIME: 10:38 AM

## 2017-11-14 NOTE — ANESTHESIA POSTPROCEDURE EVALUATION
Post-Op Assessment Note      CV Status:  Stable    Mental Status:  Alert and awake    Hydration Status:  Euvolemic    PONV Controlled:  Controlled    Airway Patency:  Patent    Post Op Vitals Reviewed: Yes          Staff: Anesthesiologist           BP      Temp      Pulse    Resp      SpO2

## 2017-11-14 NOTE — DISCHARGE INSTRUCTIONS
Follow Up Dr Adrienne Jade 2 weeks  Remove Dressing 3 days  Antibiotics prescription at pharmacy  Prescription for pain by pain physician     STIMULATOR BATTERY CHANGE DISCHARGE INSTRUCTIONS    · No strenous activities for 2 days following surgery  · You can remove the dressing in 3 days  · You may shower in 2 days  · You will be given a prescription for one week of post-operative antibiotics  Please take them as directed  · Please set up a two-week follow-up appointment with our office  · Contact our office if you experience any of the following after your surgery:       Skin around the incision feels warm to the touch; there is redness, swelling,   drainage, or bleeding from the incision site  You are experiencing a fever over 101 degrees  · If any questions arise after your procedure, do not hesitate to call our office at [phone]

## 2017-11-14 NOTE — ANESTHESIA PREPROCEDURE EVALUATION
Review of Systems/Medical History  Patient summary reviewed  Chart reviewed  History of anesthetic complications PONV    Cardiovascular  Negative cardio ROS Exercise tolerance: good,     Pulmonary  Smoker cigarette smoker more than 10 packs per year , ,        GI/Hepatic    GERD well controlled,        Kidney stones,        Endo/Other     GYN       Hematology  Negative hematology ROS      Musculoskeletal  Back pain , chronic back pain and lumbar pain,        Neurology  Negative neurology ROS      Psychology   Anxiety,            Physical Exam    Airway    Mallampati score: I  TM Distance: >3 FB  Neck ROM: full     Dental   upper dentures and lower dentures,     Cardiovascular  Comment: Negative ROS, Cardiovascular exam normal    Pulmonary  Pulmonary exam normal     Other Findings        Anesthesia Plan  ASA Score- 2       Anesthesia Type- IV sedation with anesthesia with ASA Monitors  Additional Monitors:   Airway Plan:           Induction- intravenous  Informed Consent- Anesthetic plan and risks discussed with patient

## 2017-11-15 ENCOUNTER — GENERIC CONVERSION - ENCOUNTER (OUTPATIENT)
Dept: OTHER | Facility: OTHER | Age: 42
End: 2017-11-15

## 2017-11-16 ENCOUNTER — GENERIC CONVERSION - ENCOUNTER (OUTPATIENT)
Dept: OTHER | Facility: OTHER | Age: 42
End: 2017-11-16

## 2017-11-21 ENCOUNTER — GENERIC CONVERSION - ENCOUNTER (OUTPATIENT)
Dept: OTHER | Facility: OTHER | Age: 42
End: 2017-11-21

## 2017-11-28 ENCOUNTER — ALLSCRIPTS OFFICE VISIT (OUTPATIENT)
Dept: OTHER | Facility: OTHER | Age: 42
End: 2017-11-28

## 2017-11-29 NOTE — PROGRESS NOTES
Assessment    1  Encounter for staple removal (V58 32) (Z48 02)    Plan  Encounter for staple removal    · Follow-up PRN Evaluation and Treatment  Follow-up  Status: Complete  Done:28Nov2017   Ordered;Encounter for staple removal; Ordered By: Olivia Hillman Performed:  Due: 83IGU1304    Discussion/Summary    This is a 43year old male with reported history of chronic low back pain and chronic left thigh numbness  He has history of prior lumbar surgery  He previously underwent placement of a [employer] thoracic paddle spinal cord stimulator implantation by Dr Celestina Holland in August 2016 in [address]  Patient found the SCS beneficial but noted discomfort at the right low back SCS generator site and difficulty with charging the battery  He saw Dr Katerina Kyle 10/30/17 and options of management were discussed and patient subsequently underwent the following procedure on 11/14/17 with Dr Sandra Dove of a right back implantable pulse generator  Placement of a new right buttock implantable pulse generator through separate incision  Electronic analysis complex programming spinal cord stimulator system postoperative period approximately 1 hour  Yamilex Terry presents now for routine post-operative follow up staple removal denies any difficulty with his surgical incisions  Hailey reports current SCS device is on and operational and is providing approximately 80% relief of his chronic low back pain  Patient denies any difficulty with charging his new IPG  Patient reports he is happy with the new battery location in right lower buttock and is happy that the new battery is smaller in size from prior battery  He reports the prior discomfort of right side of low back at prior battery site that he experienced prior to surgery has resolved after removal of the old battery  surgical incisions are healing well  The staples were removed from right sided low back incision and right buttock incision without event   There was scant bleeding at few staple sites of the right buttock incision when staples removed  Folded 4x4 gauze dressing were applied / taped on the right low back and right buttock incisions  Patient advised he may remove the gauze dressing later today and leave incisions open to air if dry  Patient advised to refrain from placing ointments, oils, lotions on surgical incisions  He is advised to refrain from submerging incision (ie  no baths, no hot-tubs) for another 4 weeks  may gradually increase his activity from neurosurgical standpoint but cautioned to avoid strenuous activity and refrain from bending / twisting back for the next few weeks post-op as he continues to heal Hailey reports he has resumed working as an  as of a few days post-op and reports he has tolerated returning to work  Hailey is released to follow up with our office on an as needed basis from a neurosurgical standpoint at this juncture  Patient advised to contact our office if he experiences incision concern (ie  redness, swelling, drainage, opening / gapping), fevers/chills, new/worsening pain, sensory / motor change, or other neurological change  is advised to continue follow up with his Pain Management specialist Belinda Beltran) at [facility] for his chronic pain management care  Hailey is meeting with the [employer] SCS representative today for additional SCS programming  Patient advised to contact the SCS representative in future if he desires further thoracic SCS programming  Hailey expressed understanding and agreement  The patient was counseled regarding instructions for management,-- impressions  The patient has the current Goals: Staple removal  The patent has the current Barriers: None  Patient is able to Self-Care  The treatment plan was reviewed with the patient/guardian   The patient/guardian understands and agrees with the treatment plan      Chief Complaint  Follow up for staple removal Post-Op  Post-Op Lumbar/Sacral Spine:  Nelson Mcdonnell is status post of 11/14/17 (Dr Ra Johnson)  Procedure and Technique:Removal of a right back implantable pulse generator Â Placement of a new right buttock implantable pulse generator through separate incisionElectronic analysis complex programming spinal cord stimulator system postoperative period approximately 1 hour  for chronic pain syndrome, postlaminectomy syndrome, EOL of SCS IPG   History of Present Illness: This is a 43year old male with reported history of chronic low back pain and left thigh numbness  He has history of prior lumbar surgery  He previously underwent placement of a [employer]  thoracic paddle spinal cord stimulator implantation by Dr Hilary Turner in August 2016 in [address]  He found the SCS beneficial but noted discomfort at the right low back SCS generator site and difficulty with charging the battery  He saw Dr Ra Johnson 10/30/17 and options of management were discussed and patient  subsequently underwent  the following on 11/14/17 (Dr Jaden Benz): 1  Removal of a right back implantable pulse generator   2  Placement of a new right buttock implantable pulse generator through separate incision 3  Electronic analysis complex programming spinal cord stimulator system postoperative period approximately 1 hour  He presents now for routine post-operative follow up staple removal  Patient denies any incision difficulty  He denies any drainage form surgical incisions  He reports his significant other placed a dressing (gauze / Rebeka Distance) on the lower right buttock incision about 2 days ago as staples were rubbing on pants  He denies fevers or chills  He reports he completed the routine post-operative antibiotic as was prescribed    The patient reports lower extremity numbness-- (numbness anterior left thigh which patient reports is at his chronic baseline since work injury in 2010), but-- no nausea,-- no fever,-- no lower extremity weakness,-- normal bowel and bladder function,-- no lower extremity pain-- and-- walking/standing/sitting tolerance--   The patient presents with complaints of back pain (Patient reports current SCS device has provided approximately 80% relief of his chronic low back pain  Patient reports he is happy with the new battery location in right lower buttock and given that the new battery is smaller in size from prior battery  He reports the prior discomfort of right side of low back at prior battery site that he experienced prior to surgery has resolved with removal of the old battery)  Patient reports he remains on Butrans which is chronically managed by Pain management specialist at [facility]  Additionally, he reports taking Tizanidine 2 mg 1 tab BID prn  Patient reports he stopped utilizing Hydrocodone/APAP about 1 week ago  After discontinuing Hydrocodone/APAP patient reports he began taking OTC Tylenol 500mg 2 tab q 12 hrs prn pain  Patient reports he completed Medrol dose pack as prescribed by his Pain Management specialist    Mr Shanta Cramer is scheduled for routine follow up with Sharon Mae at [facility] in Feb 2018  Mr Shanta Cramer reports he resumed working ([employer]) about 4-5 days after his surgery with Dr Abdelrahman Bloom  Patient denies any difficulty completing his work functions  Physical Examination:  Surgical incision site is clean, dry and intact-- (right sided lower back and right buttock incisions are clean, dry, and intact w/ staples)  Evaluation of the back demonstrates no warmth,-- no erythema,-- no swelling,-- no induration,-- no ecchymosis-- and-- no tenderness  The gait was normal and the station was normal   Lower Extremity DVT Assessment: no signs or symptoms suggestive of deep vein thrombosis  Motor, Sensory, Deep Tendon Reflexes: (Sensation: Pinprick diminished of anterior left thigh [patient reports chronic baseline prior to surgery]   Otherwise sensation to pinprick intact upper extremities, torso, and lower extremities bilaterally  Reflexes: Patellar +2 bilaterally  Achilles +2 bilaterally)  Motor Exam: motor groups within normal limits of strength & tone bilaterally  Assessment:  Post-op, the patient is doing well-- and-- with good pain control  Plan: Released to prn neurosurgical follow up  Patient encouraged continued pain management follow up with his established pain management provider Cherrie Santana at [facility])  Medication(s): no medications were prescribed  Review of Systems   Constitutional: No fever or chills, feels well, no tiredness, no recent weight gain or weight loss  Eyes: No complaints of eye pain, no red eyes, no discharge from eyes, no itchy eyes  ENT: no complaints of earache, no hearing loss, no nosebleeds, no nasal discharge, no sore throat, no hoarseness  Cardiovascular: No complaints of slow heart rate, no fast heart rate, no chest pain, no palpitations, no leg claudication, no lower extremity  Respiratory: No complaints of shortness of breath, no wheezing, no cough, no SOB on exertion, no orthopnea or PND  Gastrointestinal: No complaints of abdominal pain, no constipation, no nausea or vomiting, no diarrhea or bloody stools  Genitourinary: No complaints of dysuria, no incontinence, no hesitancy, no nocturia, no genital lesion, no testicular pain  Musculoskeletal: No complaints of arthralgia, no myalgias, no joint swelling or stiffness, no limb pain or swelling  Integumentary: No complaints of skin rash or skin lesions, no itching, no skin wound, no dry skin  Neurological: numbness-- (chronic left anterior thigh numbness ), but-- No compliants of headache, no confusion, no convulsions, no numbness or tingling, no dizziness or fainting, no limb weakness, no difficulty walking    Psychiatric: anxiety-- and-- bipolar, but-- not suicidal,-- no personality change,-- no sleep disturbances-- and-- no emotional problems  Endocrine: No complaints of proptosis, no hot flashes, no muscle weakness, no erectile dysfunction, no deepening of the voice, no feelings of weakness  Hematologic/Lymphatic: No complaints of swollen glands, no swollen glands in the neck, does not bleed easily, no easy bruising  ROS reviewed  Active Problems  1  Bipolar 2 disorder, major depressive episode (296 89) (F31 81)   2  Chronic left lumbar radiculopathy (724 4) (M54 16)   3  Chronic low back pain (724 2,338 29) (M54 5,G89 29)   4  Chronic right shoulder pain (719 41,338 29) (M25 511,G89 29)   5  Depression with anxiety (300 4) (F41 8)   6  Encounter for long-term opiate analgesic use (V58 69) (Z79 891)   7  Encounter for screening for cardiovascular disorders (V81 2) (Z13 6)   8  Fatigue (780 79) (R53 83)   9  Hydronephrosis (591) (N13 30)   10  Iron deficiency anemia (280 9) (D50 9)   11  Lytic bone lesions on xray (733 90) (M89 9)   12  Nephrolithiasis (592 0) (N20 0)   13  Pain syndrome, chronic (338 4) (G89 4)   14  Pre-operative clearance (V72 84) (Z01 818)   15  Status post gastric bypass for obesity (V45 86) (Z98 84)   16  Status post lumbar laminectomy (V45 89) (Z98 890)   17  Status post surgery (V45 89) (Z98 890)   18  Tobacco abuse counseling (V65 42,305 1) (Z71 6)   19  Uncomplicated opioid dependence (304 00) (F11 20)    Social History     · Chooses not to have children (V25 9) (Z30 9)   · Current every day smoker (305 1) (F17 200)   ·    · Drinks caffeinated tea   · General equivalency diploma (GED)   · Lives with spouse   · Denied: History of No alcohol use   · Quit drinking alcohol (V11 3) (Z87 898)   · Social alcohol use (Z78 9)    Current Meds   1  ALPRAZolam 0 5 MG Oral Tablet; TAKE 1 TABLET EVERY 4 TO 6 HOURS AS NEEDED FOR ANXIETY; Therapy: 54Ubf3401 to (Toni Yarbrough)  Requested for: 21Nov2017; Last Rx:21Nov2017 Ordered   2  Butrans 7 5 MCG/HR Transdermal Patch Weekly;  Apply 1 patch td every 7 days; Therapy: 83Ecb8735 to (Evaluate:74Sgg8245); Last Rx:09Nov2017 Ordered   3  PriLOSEC OTC 20 MG Oral Tablet Delayed Release; TAKE 1 TABLET DAILY; Therapy: 94WIQ7150 to (Evaluate:71Ghn7187); Last Rx:86Lqk4610 Ordered   4  QUEtiapine Fumarate 50 MG Oral Tablet; TAKE 1 TABLET Bedtime; Therapy: 20YTP8630 to 450 39 173)  Requested for: 31Oct2017; Last Rx:31Oct2017 Ordered   5  Sertraline HCl - 100 MG Oral Tablet; take 1 tablet every day; Therapy: 94SIW3255 to (Evaluate:87Put3868)  Requested for: 20Nov2017; Last Rx:20Nov2017 Ordered   6  TiZANidine HCl - 2 MG Oral Tablet; Take 1 po tid prn spasms; Therapy: 00EGB4968 to (Evaluate:47Rwm3583)  Requested for: 75LKA0102; Last Rx:16Nov2017 Ordered   7  Tylenol 500 MG CAPS; TAKE 2 CAPSULE Every twelve hours PRN pain; Therapy: (Recorded:28Nov2017) to Recorded    Allergies  1  Aspirin TABS   2  Penicillins    Vitals   Recorded: 79FUT3857 09:24AM   Temperature 97 5 F   Respiration 10   Systolic 749   Diastolic 60   Height 6 ft    Weight 181 lb 4 oz   BMI Calculated 24 58   BSA Calculated 2 04   Pain Scale 2       Procedure   Wound Exam: well healed with no sign of infection  Procedure Note: staples were removed  Staples were removed from right sided low back incision and right buttock incision  Dressing: a sterile dressing was placed-- (folded 4x4 gauze dressing applied / taped over the right lower back and right buttock incisions)  Patient Status:  the patient tolerated the procedure well  Complications:  there were no complications  There was scant bleeding at few staple sites of the right buttock incision when staples removed  Future Appointments    Date/Time Provider Specialty Site   02/01/2018 08:00 AM ANTONIA Graham Pain Management [facility] SPINE   12/06/2017 04:00 PM HATTIE Mobley   Psychiatry [facility] 81       Signatures   Electronically signed by : Ruby Owens, [facility]; Nov 28 2017 11:09AM EST (Author)    Electronically signed by : HATTIE Franco ; Nov 28 2017  3:54PM EST                       (Author)

## 2018-01-10 NOTE — MISCELLANEOUS
Message  Pre operative call day prior surgery scheduled in the AM w/ DR Rona Hodges the following information is confirmed / discussed: Allergies Reviewed ASA PCN   Hold medications reviewed: NA  NPO after MN, night prior surgery reviewed:--YES   Medication (s) instructed by healthcare provider to take the morning of surgery w/ sip of water 4 OZ discussed:--Prilosec  Post operative scripts electronic transmission: clindamycin   PDMP site reviewed accessed and reviewed scheduled drug list printed and scanned into record--yes  Pain management script:Pain managed by Luana Walls Hydrocodone , no script necessary   Pre- operative shower protocol reviewed; Clarify instructions as per protocol, third chlorhexidine shower tonight before surgery, then use MYLA wipes as per packaging instructions, Use a clean towel and wash cloth starting tonight and continue nightly until seen 2 weeks post operative visit for staple removal  Change bed linens tonight and continue at least 1-2 times weekly  Smoking cessation discussed   Informed will receive a telephone call tonight from a hospital representative with time to report on surgery day: Informed will receive a f/u call within in 24 -48 hours post-op for DR Rona Hodges NP to see how you are recovering, provide additional instructions, and to answer any questions  PAT packet review complete per protocol: Follow-up appointments reviewed --yes   Patient verbalized understanding information provided /discussed        Plan  Status post surgery    · Clindamycin HCl - 300 MG Oral Capsule; TAKE 2 CAPSULE Every 8 hours    Signatures   Electronically signed by : Lois Cordova; Nov 13 2017  5:37PM EST                       (Author)

## 2018-01-10 NOTE — PSYCH
Behavioral Health Outpatient Intake    Referred By: DR Mikie Adorno  Intake Questions: there are no developmental disabilities  the patient does not have a hearing impairment  the patient does not have an ICM or CTT  patient is not taking injectable psychiatric medications  Employment: The patient is not employed  at [employer]  Emergency Contact Information:   Emergency Contact: Benita Peña   Relationship to Patient: LUIS   Phone Number: [phone]   Previous Psychiatric Treatment: He has previously been seen by a psychiatrist  2014  He has previously been seen by a therapist  2016   History: no  service  He has not had combat service  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

## 2018-01-11 NOTE — MISCELLANEOUS
Message  Dear Mr Hart,    I'm writing you in regards of outstanding testing for evaluation of abnormal bony lesions that were noted incidentally on a CAT scan performed at emergency room of [facility] on August 2  As you recall, I discussed those findings with you during office visit on August 4  I recommended to proceed with x-ray of your pelvic bones and lumbar sacral spine for further evaluation  I also advised to proceed with CBC with differential, blood work, that unfortunately was not performed by our lab as ordered     My nursing staff contacted you regarding this matter within past few weeks and I personally left a detailed message for you on September 8th, but unfortunately have not received results of those tests so far  I believe this testing is of ultimate importance to rule out possibility of multiple myeloma  I strongly advise you to proceed with this evaluation as soon as possible  I am attaching blood work and x-ray orders  If you have any questions, please contact us at [phone]  Thank you       Signatures   Electronically signed by :  HATTIE Houston ; Sep 15 2017  7:40AM EST                       (Author)
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
## 2018-01-11 NOTE — RESULT NOTES
Message   Recorded as Task   Date: 11/16/2017 03:29 PM, Created By: Rob Clifford   Task Name: Miscellaneous   Assigned To: [facility]od[facility] Daley clinical,Team   Regarding Patient: Tere Rojo, Status: Active   Comment:    Eliza Sofia - 16 Nov 2017 3:29 PM     TASK CREATED  Pt called stating instructions given yesterday regarding medication are not working  He said nothing is touching the pain  Pls call pt at [phone]  Savita Benjamin - 16 Nov 2017 3:53 PM     TASK EDITED  What do you think DG  ? Call Elizabeth Ramsay? SCS placed on 11/14 c/ Patric Hazel - 16 Nov 2017 4:04 PM     TASK REPLIED TO: Previously Assigned To Reji Miller - 16 Nov 2017 4:10 PM     TASK REASSIGNED: Previously Assigned To [facility] [place] [facility],Team  He is probably having an severe acute inflammatory flare up fro the IPG replacement yesterday  I escribed a medrol dose maegan to his pharmacy to try to help with the inflammation and pain  He is probably having spasms as well and I escribed a muscle relaxer, tizanidine, 2 mg 1 PO TID to take as well  He is to continue the rest of his medication regiment as presribed and discussed  Hopefully this will help, but increasing narcotics more, most likely will not help at this time  Savita Benjamin - 16 Nov 2017 4:19 PM     TASK EDITED  S/w the pt  and he is aware          Signatures   Electronically signed by : Elvira Poon, ; Nov 16 2017  4:19PM EST                       (Author)

## 2018-01-12 VITALS
SYSTOLIC BLOOD PRESSURE: 118 MMHG | DIASTOLIC BLOOD PRESSURE: 62 MMHG | WEIGHT: 185 LBS | BODY MASS INDEX: 25.06 KG/M2 | TEMPERATURE: 98.2 F | HEIGHT: 72 IN | HEART RATE: 78 BPM

## 2018-01-12 VITALS
HEART RATE: 72 BPM | DIASTOLIC BLOOD PRESSURE: 68 MMHG | RESPIRATION RATE: 16 BRPM | WEIGHT: 181.38 LBS | BODY MASS INDEX: 24.57 KG/M2 | HEIGHT: 72 IN | TEMPERATURE: 96.4 F | SYSTOLIC BLOOD PRESSURE: 110 MMHG

## 2018-01-12 VITALS
DIASTOLIC BLOOD PRESSURE: 60 MMHG | BODY MASS INDEX: 24.55 KG/M2 | HEIGHT: 72 IN | WEIGHT: 181.25 LBS | RESPIRATION RATE: 10 BRPM | TEMPERATURE: 97.5 F | SYSTOLIC BLOOD PRESSURE: 118 MMHG

## 2018-01-13 VITALS
HEIGHT: 72 IN | SYSTOLIC BLOOD PRESSURE: 132 MMHG | BODY MASS INDEX: 23.03 KG/M2 | HEART RATE: 72 BPM | WEIGHT: 170 LBS | DIASTOLIC BLOOD PRESSURE: 80 MMHG

## 2018-01-13 VITALS
HEIGHT: 72 IN | DIASTOLIC BLOOD PRESSURE: 60 MMHG | SYSTOLIC BLOOD PRESSURE: 122 MMHG | HEART RATE: 80 BPM | BODY MASS INDEX: 23.99 KG/M2 | WEIGHT: 177.13 LBS | TEMPERATURE: 96.2 F

## 2018-01-13 VITALS
DIASTOLIC BLOOD PRESSURE: 78 MMHG | SYSTOLIC BLOOD PRESSURE: 108 MMHG | WEIGHT: 174.8 LBS | BODY MASS INDEX: 23.68 KG/M2 | HEIGHT: 72 IN | HEART RATE: 78 BPM

## 2018-01-14 VITALS
HEART RATE: 68 BPM | HEIGHT: 72 IN | RESPIRATION RATE: 20 BRPM | SYSTOLIC BLOOD PRESSURE: 138 MMHG | DIASTOLIC BLOOD PRESSURE: 70 MMHG | WEIGHT: 182.13 LBS | TEMPERATURE: 97.6 F | BODY MASS INDEX: 24.67 KG/M2

## 2018-01-14 VITALS
TEMPERATURE: 96 F | HEART RATE: 76 BPM | HEIGHT: 72 IN | BODY MASS INDEX: 23.45 KG/M2 | DIASTOLIC BLOOD PRESSURE: 68 MMHG | WEIGHT: 173.13 LBS | SYSTOLIC BLOOD PRESSURE: 120 MMHG

## 2018-01-14 VITALS
BODY MASS INDEX: 24.11 KG/M2 | TEMPERATURE: 97.2 F | SYSTOLIC BLOOD PRESSURE: 103 MMHG | WEIGHT: 178 LBS | HEIGHT: 72 IN | RESPIRATION RATE: 16 BRPM | HEART RATE: 75 BPM | DIASTOLIC BLOOD PRESSURE: 60 MMHG

## 2018-01-14 VITALS
HEART RATE: 76 BPM | DIASTOLIC BLOOD PRESSURE: 76 MMHG | BODY MASS INDEX: 24.04 KG/M2 | HEIGHT: 72 IN | WEIGHT: 177.5 LBS | SYSTOLIC BLOOD PRESSURE: 122 MMHG

## 2018-01-15 NOTE — MISCELLANEOUS
Message   Recorded as Task   Date: 09/15/2017 01:20 PM, Created By: Leyla Luis   Task Name: Miscellaneous   Assigned To: [facility] [place] [facility],Team   Regarding Patient: Mike Romo, Status: In Progress   Comment:    KimberlynEliza - 15 Sep 2017 1:20 PM     TASK CREATED  Pt called stating he saw [facility] yesterday and was given a script for Butrans patch 5mg  He said he used to be on 10mg  He said the 5mg is not working  He said he put the patch on yesterday when he got home and woke up and this morning and had a lot of pain  Pt uses [facility] (on file in [facility])  Pt can be reached at [phone]  Savita Benjamin - 15 Sep 2017 1:55 PM     TASK EDITED  DG please advise  thanks   Patric Dozier - 15 Sep 2017 2:14 PM     TASK REPLIED TO: Previously Assigned To Patric Dozier  The patch may take a few days to work  I would like him to give it a few more days and call us early next week with an update  His insurance is not going to let us fill a new script yet  Even so I would actually consider increasing him to the 7 5 mcg dosage before I would go to the 10 mcg dosage  I want to try to keep him on the lowest amount  Savita Benjamin - 15 Sep 2017 2:42 PM     TASK EDITED  S/w the pt  and he was agreeable and will CB on monday if his pain is not relieved  Savita Benjamin - 15 Sep 2017 2:42 PM     TASK IN PROGRESS        Active Problems    1  Chronic left lumbar radiculopathy (724 4) (M54 16)   2  Chronic low back pain (724 2,338 29) (M54 5,G89 29)   3  Chronic right shoulder pain (719 41,338 29) (M25 511,G89 29)   4  Depression with anxiety (300 4) (F41 8)   5  Encounter for long-term opiate analgesic use (V58 69) (Z79 891)   6  Encounter for screening for cardiovascular disorders (V81 2) (Z13 6)   7  Fatigue (780 79) (R53 83)   8  Hydronephrosis (591) (N13 30)   9  Lytic bone lesions on xray (733 90) (M89 9)   10  Nephrolithiasis (592 0) (N20 0)   11  Pain syndrome, chronic (338 4) (G89 4)   12  Status post gastric bypass for obesity (V45 86) (Z98 84)   13  Status post lumbar laminectomy (V45 89) (Z98 890)   14  Tobacco abuse counseling (V65 42,305 1) (Z71 6)   15  Uncomplicated opioid dependence (304 00) (F11 20)    Current Meds   1  ALPRAZolam 0 5 MG Oral Tablet (Xanax); take 1 tablet every 4-6  hours prn anxiety; Therapy: 97Ytm6066 to (Last Rx:02Tqy8334) Ordered   2  Butrans 5 MCG/HR Transdermal Patch Weekly; Apply 1 patch TD every 7 days; Therapy: 33Oed0917 to (Evaluate:14Oct2017); Last Rx:73Dnk9561 Ordered   3  PriLOSEC OTC 20 MG Oral Tablet Delayed Release; TAKE 1 TABLET DAILY; Therapy: 84SIJ6533 to (Evaluate:44Qib5957); Last Rx:30Jun2017 Ordered    Allergies    1  Aspirin TABS   2   Penicillins    Signatures   Electronically signed by : Abdoulaye Bryant, ; Sep 20 2017  9:47AM EST                       (Author)

## 2018-01-15 NOTE — PROGRESS NOTES
Assessment    1  Shoulder pain (294 25) (M25 519)    Plan  Shoulder pain    · Apply an ice pack as needed for pain twice a day for 20 minutes ; Status:Complete;    Done: 13ETY3591   · *1 - [facility] Therapy  Consult right shoulder subluxation, partial  cuff tear, focus on shoulder ROM, scapular stabilization, light cuff strengthening, 1-3x per  week for 6 weeks  Status: Active  Requested for: 14LEL7476  Care Summary provided  : Yes    Chief Complaint    1  Shoulder Pain    Discussion/Summary    Right shoulder subluxation, Hill-Sachs lesion, partial thickness supraspinatus tear    1  MRI reviewed with the patient  We will begin physical therapy for range of motion and scapular stabilizing  2  Anti-inflammatories as needed  3  Follow-up in 6 weeks  History of Present Illness  42-year-old male follow-up right shoulder pain and instability  She has had onset of burning pain posteriorly in October 2016 without injury  This was intermittent and gradually worsened until about a week ago when he was carrying garbage bags and felt sudden shooting pain in his arm and what felt like a dislocation  He was able to reduce the shoulder on his own  Since then he has had severe pain with all shoulder movements  He does have some radiation down the arm to the elbow  He does have some constant numbness in the ulnar 3 digits  Denies neck pain  Currently reports moderate improvements over the past couple of weeks  He is here to review her MRI  The patient's medical history, surgical history, social history, family history, medications, allergies, and review of systems were reviewed and updated today     _______________________________________________________________________________  Review of Systems:   Constitutional: No fever or chills, feels well, no tiredness, no recent weight gain or loss  Eyes: No complaints of eyesight problems, no red eyes     ENT: No loss of hearing, no nosebleeds, no sore throat  Cardiovascular: No chest pain, palpitations, leg claudication, or lower extremity edema  Respiratory: No shortness of breath, wheezing, or cough  Gastrointestinal: No abdominal pain, constipation, nausea / vomiting, no diarrhea  Genitourinary: No dysruia or incontinence  Musculoskeletal: As noted in HPI  Integumentary: No rash or skin lesions, no itching or dry skin, no wounds  Neurological: No headache, confusion, numbness or tingling, or dizziness  Endocrine: No muscle weakness, frequent urination, or excessive thirst    Psychiatric: No suicidal thoughts, anxiety, or depression  Active Problems    1  Shoulder pain (719 41) (M25 519)    Past Medical History    · No pertinent past medical history    Surgical History    · History of Fusion / Refusion Of Vertebrae   · History of Gastric Surgery For Morbid Obesity Gastric Bypass    Family History  Mother    · Family history of arthritis (V17 7) (Z82 61)   · Family history of malignant neoplasm of stomach (V16 0) (Z80 0)  Father    · Family history of malignant neoplasm of esophagus (V16 0) (Z80 0)    Social History    · Current every day smoker (305 1) (F17 200)   · Drinks caffeinated tea   · No alcohol use    Current Meds   1  HYDROmorphone HCl - 4 MG Oral Tablet; Therapy: (Recorded:07Msg8894) to Recorded    Allergies    1  Aspirin TABS    Vitals   Recorded: 90OEO4995 01:12PM   Heart Rate 69   Systolic 515   Diastolic 71   Height 6 ft    Weight 173 lb    BMI Calculated 23 46   BSA Calculated 2 01     Physical Exam    Right Shoulder: Appearance: Normal except  Tenderness: None except the  ROM: Full except as noted: Motor: Normal except as noted:   Special Tests: Negative except  (Active elevation 150 degrees , external rotation to 60 degrees , internal rotation to T8  Cuff strength 4+/5 all directions  Negative apprehension and relocation  Negative Spurling    Neurovascular intact)      Constitutional - General appearance: Normal  Musculoskeletal - Gait and station: Normal  Digits and nails: Normal  Muscle strength/tone: Normal    Cardiovascular - Pulses: Normal  Examination of extremities for edema and/or varicosities: Normal    Skin - Skin and subcutaneous tissue: Normal    Neurologic - Sensation: Normal    Psychiatric - Orientation to person, place, and time: Normal  Mood and affect: Normal    Eyes   Conjunctiva and lids: Normal     Pupils and irises: Normal        Results/Data  I personally reviewed the films/images/results in the office today  My interpretation follows  MRI Review Right shoulder: Intact glenoid labrum  Partial tearing of the supraspinatus  Possible Hill-Sachs lesion  Future Appointments    Date/Time Provider Specialty Site   04/19/2017 09:10 AM HATTIE Chase   Orthopedic Surgery [facility]     Signatures   Electronically signed by : HATTIE Vázquez ; Mar  8 2017  4:29PM EST                       (Author)

## 2018-01-15 NOTE — RESULT NOTES
Message   Recorded as Task   Date: 10/13/2017 01:27 PM, Created By: Yoan Knott   Task Name: Miscellaneous   Assigned To: [facility] [place] [facility],Team   Regarding Patient: Lila Morse, Status: Active   Comment:    NaimaIvania - 13 Oct 2017 1:27 PM     TASK CREATED  pt calling in and said that he was on a butrans 5 mg patch  [facility] but wanted the dosage to be changed to 7 5 so he was seeing if that can be done  please call pt back at [phone]   Beaumont Ladi - 13 Oct 2017 1:58 PM     TASK EDITED  S/w the pt  and he stated the 5mg patch is not really covering all of his pain  The pharmacy is not able to get the 5mg patch in until monday  He wants to know if the patch can be increased  Inquired to see if he was able to make an appointment to see Dr Tapan Parish and he stated it is on 10/30  His next office visit with you is on 11/3 he stated  DG to advise  Thanks   Patric Dozier - 13 Oct 2017 2:01 PM     TASK REPLIED TO: Previously Assigned To Patric Dozier  He would have to bring the script for the 5 mcg patch back today and we can give a script for 7 5 mcg to get him to his next OV as scheduled  Savita Benjmain - 13 Oct 2017 2:07 PM     TASK EDITED  S/w the pt  and he stated he will return the 5mcg in exchange for the 7 5mcg patch  Pt  aware of hours  Patric Dozier - 13 Oct 2017 2:08 PM     TASK REPLIED TO: Previously Assigned To Patric Dozier  Provider aware  It is upfront at [facility] office  THank you          Signatures   Electronically signed by : Pipo Young, ; Oct 13 2017  2:23PM EST                       (Author)
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
## 2018-01-16 NOTE — CONSULTS
Chief Complaint  Pt is here for pre operative clearance  Pt states that he is scheduled to have his spinal stimulator moved to a different area on 11/14/2017  Surgery is to be performed by Dr Maria C Andre in [address] [place] [address]  All meds/allergies reviewed with pt  History of Present Illness  Pre-Op Visit (Brief): The patient is being seen for a preoperative visit  Surgical Risk Assessment:   Prior Anesthesia: He had prior anesthesia, a prior adverse reaction to general anesthesia and Postop nausea vomiting  Exercise Capacity: able to walk four blocks without symptoms and able to walk two flights of stairs without symptoms  Lifestyle Factors: denies tobacco use  Symptoms: no easy bleeding, no easy bruising, no frequent nosebleeds, no chest pain, no cough, no dyspnea, no edema, no palpitations and no wheezing  HPI: no exertional s/o   h/o N/V with anesthesia   patient denies chest pain, palpitations, shortness of breath or dizziness  He remains under care of Neurosurgery, Pain Management and Psychiatry  Current medications updated  Blood work and x-ray results discussed  Original concern about lytic lesions detected on CT so far ruled out  SPEP was normal  No lytic lesions on pelvic and hip x-rays  Iron deficiency anemia with decreased hemoglobin of 11 5, patient with history of gastric bypass surgery and likely decreased iron absorption  He denies abdominal pain, melena or bright red blood per rectum  Patient was recently evaluated by Psychiatry  He remains on Zoloft 100 mg daily  Recent addition of Seroquel 50 mg at bedtime         Review of Systems    Constitutional: No fever or chills, feels well, no tiredness, no recent weight gain or weight loss  Eyes: No complaints of eye pain, no red eyes, no discharge from eyes, no itchy eyes  ENT: no complaints of earache, no hearing loss, no nosebleeds, no nasal discharge, no sore throat, no hoarseness     Cardiovascular: No complaints of slow heart rate, no fast heart rate, no chest pain, no palpitations, no leg claudication, no lower extremity  Respiratory: No complaints of shortness of breath, no wheezing, no cough, no SOB on exertion, no orthopnea or PND  Gastrointestinal: No complaints of abdominal pain, no constipation, no nausea or vomiting, no diarrhea or bloody stools  Genitourinary: No complaints of dysuria, no incontinence, no hesitancy, no nocturia, no genital lesion, no testicular pain  Musculoskeletal: lower back pain  Integumentary: No complaints of skin rash or skin lesions, no itching, no skin wound, no dry skin  Neurological: No compliants of headache, no confusion, no convulsions, no numbness or tingling, no dizziness or fainting, no limb weakness, no difficulty walking  Psychiatric: as noted in HPI  Endocrine: No complaints of proptosis, no hot flashes, no muscle weakness, no erectile dysfunction, no deepening of the voice, no feelings of weakness  Hematologic/Lymphatic: No complaints of swollen glands, no swollen glands in the neck, does not bleed easily, no easy bruising  Active Problems    1  Bipolar 2 disorder, major depressive episode (296 89) (F31 81)   2  Chronic left lumbar radiculopathy (724 4) (M54 16)   3  Chronic low back pain (724 2,338 29) (M54 5,G89 29)   4  Chronic right shoulder pain (719 41,338 29) (M25 511,G89 29)   5  Depression with anxiety (300 4) (F41 8)   6  Encounter for long-term opiate analgesic use (V58 69) (Z79 891)   7  Encounter for screening for cardiovascular disorders (V81 2) (Z13 6)   8  Fatigue (780 79) (R53 83)   9  Hydronephrosis (591) (N13 30)   10  Iron deficiency anemia (280 9) (D50 9)   11  Lytic bone lesions on xray (733 90) (M89 9)   12  Nephrolithiasis (592 0) (N20 0)   13  Pain syndrome, chronic (338 4) (G89 4)   14  Status post gastric bypass for obesity (V45 86) (Z98 84)   15  Status post lumbar laminectomy (V45 89) (Z98 890)   16  Tobacco abuse counseling (V65 42,305 1) (Z71 6)   17  Uncomplicated opioid dependence (304 00) (F11 20)    Past Medical History    · History of arthritis (V13 4) (Z87 39)   · History of depression (V11 8) (Z86 59)   · No pertinent past medical history   · History of Shoulder pain (719 41) (M25 519)    Surgical History    · History of Appendectomy   · History of Fusion / Refusion Of Vertebrae   · History of Gastric Surgery For Morbid Obesity Gastric Bypass   · History of Tonsillectomy With Adenoidectomy    The surgical history was reviewed and updated today  Family History    · Family history of arthritis (V17 7) (Z82 61)   · Family history of malignant neoplasm of stomach (V16 0) (Z80 0)    · Family history of Brain tumor   · Family history of malignant neoplasm of esophagus (V16 0) (Z80 0)    Social History    · Chooses not to have children (V25 9) (Z30 9)   · Current every day smoker (305 1) (F17 200)   ·    · Drinks caffeinated tea   · General equivalency diploma (GED)   · Lives with spouse   · Denied: History of No alcohol use   · Quit drinking alcohol (V11 3) (Z87 898)   · Social alcohol use (Z78 9)    Current Meds   1  ALPRAZolam 0 5 MG Oral Tablet; take 1 tablet every 4-6  hours prn anxiety; Therapy: 86Sdq9280 to (Evaluate:30Nov2017); Last Rx:31Oct2017 Ordered   2  Butrans 7 5 MCG/HR Transdermal Patch Weekly; Apply 1 patch td every 7 days; Therapy: 21Pjm4627 to (Evaluate:12Nov2017); Last Rx:13Oct2017 Ordered   3  PriLOSEC OTC 20 MG Oral Tablet Delayed Release; TAKE 1 TABLET DAILY; Therapy: 91IAW4752 to (Evaluate:61Tcn9652); Last Rx:87Sjo0408 Ordered   4  QUEtiapine Fumarate 50 MG Oral Tablet; TAKE 1 TABLET Bedtime; Therapy: 51KPX5796 to [phone])  Requested for: 31Oct2017; Last   Rx:31Oct2017 Ordered   5  Sertraline HCl - 100 MG Oral Tablet; take 1 tablet every day; Therapy: 75JKA7434 to (Annalisa Marlow)  Requested for: 31Oct2017; Last   Rx:21Oct2017 Ordered    The medication list was reviewed and updated today  Allergies    1  Aspirin TABS   2  Penicillins    Vitals  Signs    Temperature: 96 2 F  Heart Rate: 80  Systolic: 061  Diastolic: 60  Height: 6 ft   Weight: 177 lb 2 oz  BMI Calculated: 24 02  BSA Calculated: 2 02    Physical Exam    Constitutional   General appearance: No acute distress, well appearing and well nourished  Neck   Neck: Supple, symmetric, trachea midline, no masses  Thyroid: Normal, no thyromegaly  Pulmonary   Respiratory effort: No increased work of breathing or signs of respiratory distress  Auscultation of lungs: Clear to auscultation  Cardiovascular   Auscultation of heart: Normal rate and rhythm, normal S1 and S2, no murmurs  Carotid pulses: 2+ bilaterally  Abdominal aorta: Normal     Abdomen   Abdomen: Non-tender, no masses  Liver and spleen: No hepatomegaly or splenomegaly  Musculoskeletal   Gait and station: Normal     Neurologic   Cranial nerves: Cranial nerves 2-12 intact  Psychiatric   Judgment and insight: Normal     Orientation to person, place and time: Normal     Recent and remote memory: Intact  Mood and affect: Normal        Results/Data  A 12 lead ECG was performed and was normal  No acute ischemia  Rhythm and rate: normal sinus rhythm  (1) HEMOGLOBIN A1C 37OJI4659 10:19AM [facility]     Test Name Result Flag Reference   HEMOGLOBIN A1C 6 0 %  4 2-6 3   EST  AVG   GLUCOSE 126 mg/dl       (1) BASIC METABOLIC PROFILE 88GQZ0828 10:19AM [facility]     Test Name Result Flag Reference   SODIUM 137 mmol/L  136-145   POTASSIUM 4 7 mmol/L  3 5-5 3   CHLORIDE 103 mmol/L  100-108   CARBON DIOXIDE 31 mmol/L  21-32   ANION GAP (CALC) 3 mmol/L L 4-13   BLOOD UREA NITROGEN 20 mg/dL  5-25   CREATININE 0 91 mg/dL  0 60-1 30   Standardized to IDMS reference method   CALCIUM 9 4 mg/dL  8 3-10 1   eGFR 104 ml/min/1 73sq m     National Kidney Disease Education Program recommendations are as follows:  GFR calculation is accurate only with a steady state creatinine  Chronic Kidney disease less than 60 ml/min/1 73 sq  meters  Kidney failure less than 15 ml/min/1 73 sq  meters  GLUCOSE FASTING 80 mg/dL  65-99   Specimen collection should occur prior to Sulfasalazine administration due to the potential for falsely depressed results  Specimen collection should occur prior to Sulfapyridine administration due to the potential for falsely elevated results  * XR SPINE LUMBAR MINIMUM 4 VIEWS NON INJURY 25Oct2017 10:34AM  [facility] Order Number: [id number]     Test Name Result Flag Reference   XR SPINE LUMBAR MINIMUM 4 VIEWS (Report)     LUMBAR SPINE     INDICATION: Lower back pain  COMPARISON: August 2, 2017     VIEWS: AP, lateral and bilateral oblique projections;      IMAGES: 4     FINDINGS: Lumbar fusion noted extending from L4 through S1 with the transpedicular screws  Alignment is stable     Moderate to severe disc space narrowing seen at L5-S1   A spinal cord stimulator is noted   There is solid osseous fusion noted in the bone graft in the paravertebral region   The disc spaces are still visible   There is no radiographic evidence of acute fracture or destructive osseous lesion  Visualized soft tissues appear unremarkable  IMPRESSION:     Stable alignment   Intact spinal fusion hardware       Workstation performed: [id number]     Signed by:   Stan Salmeron MD   10/28/17     * XR PELVIS AP ONLY 1 OR 2 VIEW 25Oct2017 10:34AM  [facility] Order Number: [id number]     Test Name Result Flag Reference   XR PELVIS AP ONLY 1 OR 2 VW (Report)     PELVIS     INDICATION: Pelvic pain  COMPARISON: None     VIEWS: AP     IMAGES: 1     FINDINGS:     No fracture or pathologic bone lesions  Mild degenerative changes seen in the both hip joint   Lumbar fusion noted with the disc replacement at L4-5 and L5-S1   No lytic or blastic lesions are seen  Regional soft tissues are unremarkable          IMPRESSION:     No acute displaced fracture seen   Mild degenerative changes in the both hip joint        Workstation performed: [id number]     Signed by:   Danny De La Cruz MD   10/28/17     (1) CBC/PLT/DIFF 84UOI8017 10:24AM [facility]     Test Name Result Flag Reference   WBC COUNT 4 93 Thousand/uL  4 31-10 16   RBC COUNT 5 74 Million/uL H 3 88-5 62   HEMOGLOBIN 11 5 g/dL L 12 0-17 0   HEMATOCRIT 37 7 %  36 5-49 3   MCV 66 fL L 82-98   MCH 20 0 pg L 26 8-34 3   MCHC 30 5 g/dL L 31 4-37 4   RDW 16 9 % H 11 6-15 1   MPV 10 4 fL  8 9-12 7   PLATELET COUNT 591 Thousands/uL  149-390   nRBC AUTOMATED 0 /100 WBCs     NEUTROPHILS RELATIVE PERCENT 49 %  43-75   LYMPHOCYTES RELATIVE PERCENT 39 %  14-44   MONOCYTES RELATIVE PERCENT 9 %  4-12   EOSINOPHILS RELATIVE PERCENT 2 %  0-6   BASOPHILS RELATIVE PERCENT 1 %  0-1   NEUTROPHILS ABSOLUTE COUNT 2 43 Thousands/? ??L  1 85-7 62   LYMPHOCYTES ABSOLUTE COUNT 1 94 Thousands/? ??L  0 60-4 47   MONOCYTES ABSOLUTE COUNT 0 42 Thousand/? ??L  0 17-1 22   EOSINOPHILS ABSOLUTE COUNT 0 08 Thousand/? ??L  0 00-0 61   BASOPHILS ABSOLUTE COUNT 0 05 Thousands/? ??L  0 00-0 10   This is a patient instruction: This test is non-fasting  Please drink two glasses of water morning of bloodwork  (1) FREE LIGHT CHAINS, SERUM 25Oct2017 10:24AM [facility]     Test Name Result Flag Reference   FREE KAPPA LIGHT CHAINS, SERUM 20 8 mg/L H 3 3 - 19 4   FREE LAMBDA LIGHT CHAINS, SERUM 19 3 mg/L  5 7 - 26 3   KAPPA/LAMBDA RATIO, SERUM 1 08  0 26 - 1 65   Performed at:  [facility] [place] [address]  [id number]  : Heide Barriga MD, Phone:  [phone]     Assessment    1  Pre-operative clearance (V72 84) (Z01 818)   2  Chronic low back pain (724 2,338 29) (M54 5,G89 29)   3  Iron deficiency anemia (280 9) (D50 9)   4   Status post gastric bypass for obesity (V45 86) (Z98 84)    Discussion/Summary  Surgical Clearance: He is at a LOW risk from a cardiovascular standpoint at this time without any additional cardiac testing  Reevaluation needed, if he should present with symptoms prior to surgery/procedure  Preop evaluation prior to pain stimulator replacement  Patient offers no complaints and denies chest pain, palpitations, shortness of breath or dizziness  History of post anesthesia nausea and vomiting in the past, patient will review with anesthesiologist prior to surgery  Patient remains under care of Psychiatry, Pain Management and Neurosurgery  Recent diagnosis of iron deficiency anemia  I advised him again to set up an evaluation with Hematology for further evaluation  No lytic lesions on recent spine/pelvis/hip x-rays  I advised  Patient to review results of this workup with Dr Grijalva  Follow-up as needed  End of Encounter Meds    1  QUEtiapine Fumarate 50 MG Oral Tablet; TAKE 1 TABLET Bedtime; Therapy: 40MSX0749 to [phone])  Requested for: 31Oct2017; Last   Rx:31Oct2017 Ordered    2  ALPRAZolam 0 5 MG Oral Tablet (Xanax); take 1 tablet every 4-6  hours prn anxiety; Therapy: 18Hws8160 to (Evaluate:30Nov2017); Last Rx:31Oct2017 Ordered   3  Sertraline HCl - 100 MG Oral Tablet; take 1 tablet every day; Therapy: 92GEM4920 to (Evaluate:20Nov2017)  Requested for: 31Oct2017; Last   Rx:21Oct2017 Ordered    4  Butrans 7 5 MCG/HR Transdermal Patch Weekly; Apply 1 patch td every 7 days; Therapy: 25Cvj8986 to (Evaluate:12Nov2017); Last Rx:13Oct2017 Ordered    5  PriLOSEC OTC 20 MG Oral Tablet Delayed Release; TAKE 1 TABLET DAILY; Therapy: 05BIW3242 to (Evaluate:02Jnk6940); Last Rx:30Jun2017 Ordered    Signatures   Electronically signed by :  Hudson Sacks, M D ; Nov 7 2017 10:54PM EST                       (Author)

## 2018-01-16 NOTE — MISCELLANEOUS
Message   Recorded as Task   Date: 09/01/2017 08:01 AM, Created By: Todd Jo   Task Name: Follow Up   Assigned To: [facility]   Regarding Patient: Rhonda Soliz, Status: Active   Fransisca Hernandez - 01 Sep 2017 8:01 AM     TASK CREATED  Can you please call [facility] in [place] and have them fax the last MRI results of the thoracic and lumbar spine to our office? Thank you  Radha Padilla - 01 Sep 2017 10:32 AM     TASK EDITED  LMOM to cb on home / cell   Eliza Sofia - 01 Sep 2017 11:09 AM     TASK EDITED  Pt returned call and can be reached at [phone]  Radha Padilla - 01 Sep 2017 2:21 PM     TASK EDITED  s/w pt, states his mri's were done in [address] [place] [address]  Confirmed MRI of Magdaleno Helton  Reports were requested  Fax number provided  Radha Padilla - 05 Sep 2017 8:57 AM     TASK REASSIGNED: Previously Assigned To [facility]  do you have these reports? Patric Dozier - 05 Sep 2017 9:12 AM     TASK REPLIED TO: Previously Assigned To Patric Dozier  Yes, I reviewed them and they are in his chart  We will discuss everything further at his next OV  Thank you  Hilary Villegas - 05 Sep 2017 9:56 AM     TASK EDITED  Bernardino W/cb# provided office hours   Callie Garnett - 05 Sep 2017 2:19 PM     TASK EDITED  phone call from patient returning your call, please call patient at [phone]  Radha Padilla - 05 Sep 2017 2:36 PM     TASK EDITED  s/w pt, advised of above  Confirmed 9/14 ov  Pt verbalized understanding and appreciation  Active Problems    1  Chronic left lumbar radiculopathy (724 4) (M54 16)   2  Chronic low back pain (724 2,338 29) (M54 5,G89 29)   3  Chronic right shoulder pain (719 41,338 29) (M25 511,G89 29)   4  Depression with anxiety (300 4) (F41 8)   5  Encounter for long-term opiate analgesic use (V58 69) (Z79 891)   6  Encounter for screening for cardiovascular disorders (V81 2) (Z13 6)   7  Fatigue (780 79) (R53 83)   8  Hydronephrosis (591) (N13 30)   9  Lytic bone lesions on xray (733 90) (M89 9)   10  Nephrolithiasis (592 0) (N20 0)   11  Pain syndrome, chronic (338 4) (G89 4)   12  Status post gastric bypass for obesity (V45 86) (Z98 84)   13  Status post lumbar laminectomy (V45 89) (Z98 890)   14  Tobacco abuse counseling (V65 42,305 1) (Z71 6)   15  Uncomplicated opioid dependence (304 00) (F11 20)    Current Meds   1  ALPRAZolam 0 5 MG Oral Tablet (Xanax); take 1 tablet every 4-6  hours prn anxiety; Therapy: 07Aug2017 to (Last Rx:07Aug2017) Ordered   2  Gabapentin 300 MG Oral Capsule; Take 1 pill every other night x 4 days, then 1 PO HS; Therapy: 39Mur9196 to (Juan Carrasco)  Requested for: 31Aug2017; Last   Rx:31Aug2017 Ordered   3  HYDROmorphone HCl - 4 MG Oral Tablet; TAKE 1 TABLET AT BEDTIME AS NEEDED   FOR PAIN;   Therapy: (Recorded:81Mek7383) to Recorded   4  PriLOSEC OTC 20 MG Oral Tablet Delayed Release; TAKE 1 TABLET DAILY; Therapy: 08WQT6132 to (Evaluate:11Utl0711); Last Rx:30Jun2017 Ordered   5  Sertraline HCl - 100 MG Oral Tablet; TAKE 1 TABLET BY MOUTH DAILY; Therapy: 06EBQ8111 to (Elena Lopez)  Requested for: 29Jys8403; Last   Rx:07Aug2017 Ordered    Allergies    1  Aspirin TABS   2   Penicillins    Signatures   Electronically signed by : Eva Graham, ; Sep  5 2017  2:36PM EST                       (Author)

## 2018-01-16 NOTE — MISCELLANEOUS
Message  Post operative call s/p surgery on w/ DR Gabriela Foote s/p ;IPG replacement   Post operative pain: not controlled , managed by pain management called office x 2 past to days to increasing Vicodin dosing schedule   Antibiotic:clindamycin started   Gastrointestinal (BM, NVD, Appetite /Fluid intake)--denies problems  Urine ---denies problems   Call if you develop any signs or symptoms of incision (s) redness, drainage, dehiscence, or a fever of 101 or higher , uncontrolled nausea and /or vomiting  Wound/dressing; as per postoperative discharge instructions remove dressings in change Friday  Post operative Hygiene: Gently wash surgical incision(s) with a clean wash cloth and pat dry using a clean wash towel  Reinforced use clean wash cloth and towel daily, use antibacterial soap, change bed linens 1-2 times per week and pajamas several times per week  Post operative Activity: Ambulate as tolerate, Lift no greater than 10 LBS until 6 weeks, Avoid submersion in water x 3 weeks, and refrain for bending or twisting until about 4 weeks -light duty until then    Patent verbalized an understanding of information communicated         Signatures   Electronically signed by : Dejan Burciaga; Nov 16 2017  4:14PM EST                       (Author)

## 2018-01-16 NOTE — MISCELLANEOUS
Message   Recorded as Task   Date: 08/07/2017 07:42 AM, Created By: Gilma Whaley   Task Name: Follow Up   Assigned To: Elba Newby   Regarding Patient: Esperanza Saleh, Status: Active   CommentJosefina Nix - 07 Aug 2017 7:42 AM     TASK CREATED  Caller: Self; General Medical Question; [phone] (Home)  Patient would like medication prescribed for anxiety due to his upcoming surgery he is not able to sleep  Patient uses [facility] in [place]  Patient can be reached at [phone]  Lenka Ferrari - 07 Aug 2017 10:44 AM     TASK EDITED   Bridgette Mouraker - 07 Aug 2017 5:31 PM     TASK REPLIED TO: Previously Assigned To Elba Newby for pt   I spoke with patient regarding bloodwork results  Unfortunately CBC with differential was not performed as ordered  Patient will proceed with the next few days  Vitamin D level is low, we will start vitamin D3 over-the-counter 5000 units once a day  Vitamin B 12 level is low  We will start vitamin B 12 injections in the office every 2 weeks Ã4 then every 4 weeks  We will address iron deficiency based on results of CBC  Serum protein electrophoresis is normal  X-rays are pending  Patient is complaining of increased symptoms of anxiety and irritability  We will increase dose of Zoloft to 100 mg daily  Patient will use Xanax short-term on the as-needed basis, he is worried about recent health developments  Patient is not using any opioid containing patches or Dilaudid at present time  I advise him that he cannot combine Xanax with pain medications, he is well aware  Plan  Depression with anxiety    · ALPRAZolam 0 5 MG Oral Tablet (Xanax); take 1 tablet every 4-6  hours prn  anxiety   · Sertraline HCl - 100 MG Oral Tablet; TAKE 1 TABLET BY MOUTH DAILY    Signatures   Electronically signed by :  HATTIE Salazar ; Aug  7 2017  6:09PM EST                       (Author)

## 2018-01-16 NOTE — MISCELLANEOUS
Message   Recorded as Task   Date: 2017 01:41 PM, Created By: Radha Harris   Task Name: Intake   Assigned To: Zhang Miner   Regarding Patient: Vik Breaux, Status: In Progress   Comment:    Toshia Taveras - 2017 1:41 PM     TASK CREATED  Date: 7/3     Patient Name: Eddie Xiong  : 1975  Address: [place] [address]  Home Phone: [phone]  Cell Phone: [  ]    Insurance: Blaire Dayner  Referral Required? No ]     PCP: Luisana Andrade   Phone: [phone]     [ Maribel Velazquez ] [ Beth Jack ]   Date of Injury: [  ]  Employer: [  ]  Co: [  ]  Claim #: [  ]  Address: [  ]  Contact Name: [  ]  Phone #: [  ]    Reviewed: [  ]    Ref Phys: Luisana Andrade  Phone #: [phone]    Reason for Appointment: Chronic Back Pain  Studies: MRI   Where: Pt not sure  When: Aug 2016    Have you seen a pain specialist in the past? Yes Dr Nery Beard,   (needed records) Who, Where and When? 2017     Notes: Pt has spinal stimulator   Appointment: Date [  ] Time: [  ]    Dr Shaggy Kimbrough     Mailed Packet: [ Yes ] [ No ]  Printed Forms: date [  ]  Reviewed by: [  ]   Selina Fernandez - 2017 3:55 PM     TASK EDITED  PLEASE REVIEW INTAKE  PREV RECS EMAILED TO Rogers Petersen - 2017 4:28 PM     TASK REPLIED TO: Previously Assigned To Rogers Barnett  where would I see the records? Chuck Pascual - 2017 12:27 PM     TASK EDITED  The patient's records were emailed to Marcela Reynolds Jerrie Eaton and Hoyt Look on 17  Rogers Barnett - 2017 12:35 PM     TASK REASSIGNED: Previously Assigned To Rogers Barnett  do we have them? Mercy Beaulieu - 2017 9:59 AM     TASK IN PROGRESS   Mercy Beaulieu - 2017 10:00 AM     TASK EDITED  Patient intake & medical records waiting for Dr Loc Rivera to review  Mercy Beaulieu - 2017 10:28 AM     TASK REPLIED TO: Previously Assigned To Zhang Miner  Have these patient records been reviewed?   The records are not at the [facility] and this task dates back to 7/13/17  Rogers Barnett - 28 Jul 2017 10:41 AM     TASK REPLIED TO: Previously Assigned To Rogers Barnett  yes, i believe there is a pop up   [employer] - 28 Jul 2017 11:11 AM     TASK REPLIED TO: Previously Assigned To [employer]  The only pop up in [facility] states that you are reviewing the records  When I checked further, the records we received were already scanned in his chart under correspondence  I don't know why they were scanned when no appointment was made  Would you please look at them and see if I should schedule the patient? Thank you  Rogers Barnett - 28 Jul 2017 11:39 AM     TASK REPLIED TO: Previously Assigned To Rogers Barnett  he is a do not shcedule please put pop up in to not schedule-thank you   Mercy Beaulieu - 28 Jul 2017 1:25 PM     TASK REPLIED TO: Previously Assigned To [employer]  I put a pop up in [facility] do NOT schedule at [facility]  Thank you  Rogers Barnett - 28 Jul 2017 2:10 PM     TASK REPLIED TO: Previously Assigned To Rogers Barnett  thanks        Active Problems    1  Chronic low back pain (724 2,338 29) (M54 5,G89 29)   2  Depression with anxiety (300 4) (F41 8)   3  Encounter for screening for cardiovascular disorders (V81 2) (Z13 6)   4  Fatigue (780 79) (R53 83)   5  Shoulder pain (719 41) (M25 519)   6  Status post gastric bypass for obesity (V45 86) (Z98 84)   7  Tobacco abuse counseling (V65 42,305 1) (Z71 6)    Current Meds   1  Butrans 10 MCG/HR Transdermal Patch Weekly; use 1 patch weekly; Therapy: (Recorded:30Jun2017) to Recorded   2  Celecoxib 200 MG Oral Capsule (CeleBREX); TAKE 1 CAPSULE DAILY AS NEEDED; Therapy: 97FQQ5087 to (Evaluate:69Tuh8089)  Requested for: 49WSA5936; Last   Rx:30Jun2017 Ordered   3  HYDROmorphone HCl - 4 MG Oral Tablet; TAKE 1 TABLET AT BEDTIME AS NEEDED   FOR PAIN;   Therapy: (Recorded:30Jun2017) to Recorded   4  PriLOSEC OTC 20 MG Oral Tablet Delayed Release; TAKE 1 TABLET DAILY;    Therapy: 31VOJ7595 to (Evaluate:14Vio5878); Last Rx:30Jun2017 Ordered   5  Sertraline HCl - 50 MG Oral Tablet (Zoloft); TAKE  1 TABLET BY MOUTH EVERY DAY; Therapy: 22DBP7584 to (Last Rx:02Jun2017)  Requested for: 02Jun2017 Ordered    Allergies    1  Aspirin TABS    Signatures   Electronically signed by :  Kallie Fine, ; Jul 28 2017  2:59PM EST                       (Author)

## 2018-01-16 NOTE — MISCELLANEOUS
Message   Recorded as Task   Date: 08/30/2017 01:17 PM, Created By: Abundio Crockett   Task Name: Miscellaneous   Assigned To: Abundio Crockett   Regarding Patient: Milton Tamayo, Status: Active   CommentBeatrice Duverney - 30 Aug 2017 1:17 PM     TASK CREATED  Patient missed his appt  8/30/17 @ 1 PM with Patric  I called the patient & left a message on his voice mail about the missed appt  & told him to call the office to reschedule  the patient was going to fill out the paperwork online, but I sent a packet in the mail today  Patric Dozier - 30 Aug 2017 2:18 PM     TASK REPLIED TO: Previously Assigned To Patric Dozier  Provider aware  Thank you  Signatures   Electronically signed by :  Francisca Dukes, ; Aug 30 2017  3:35PM EST                       (Author)

## 2018-01-17 NOTE — MISCELLANEOUS
Message   Recorded as Task   Date: 08/22/2017 10:46 AM, Created By: Milton Richmond   Task Name: Care Coordination   Assigned To: Sharon Eldridge   Regarding Patient: Johanna Waters, Status: Active   Comment:    Milton Richmond - 22 Aug 2017 10:46 AM     TASK CREATED  please schedule him for new consult with DG, remind patient no meds first visit, please obtain records from previous pain doc     I spoke with his primary care physician who stated that he is only on the Butrans patch she feels he is being medicated like to wean off the Butrans patch he was given a prescription for Dilaudid but is not taking at this time  Please put in no speak with [facility] - 23 Aug 2017 2:31 PM     TASK REPLIED TO: Previously Assigned To Sharon Eldridge  Patient scheduled 8/30/17 @ 1 PM with Rogers Reyes - 23 Aug 2017 4:06 PM     TASK REPLIED TO: Previously Assigned To Rogers Barnett  aware        Active Problems    1  Chronic low back pain (724 2,338 29) (M54 5,G89 29)   2  Depression with anxiety (300 4) (F41 8)   3  Encounter for screening for cardiovascular disorders (V81 2) (Z13 6)   4  Fatigue (780 79) (R53 83)   5  Hydronephrosis (591) (N13 30)   6  Lytic bone lesions on xray (733 90) (M89 9)   7  Nephrolithiasis (592 0) (N20 0)   8  Shoulder pain (719 41) (M25 519)   9  Status post gastric bypass for obesity (V45 86) (Z98 84)   10  Tobacco abuse counseling (V65 42,305 1) (Z71 6)    Current Meds   1  ALPRAZolam 0 5 MG Oral Tablet (Xanax); take 1 tablet every 4-6  hours prn anxiety; Therapy: 29Lwm7115 to (Last Rx:55Knv6674) Ordered   2  Celecoxib 200 MG Oral Capsule (CeleBREX); take 1 capsule daily as needed; Therapy: 53BUM9743 to (Evaluate:17Oct2017)  Requested for: 78SPF0309; Last   Rx:31Xrg2408 Ordered   3  HYDROmorphone HCl - 4 MG Oral Tablet; TAKE 1 TABLET AT BEDTIME AS NEEDED   FOR PAIN;   Therapy: (Recorded:70Rhf1589) to Recorded   4  PriLOSEC OTC 20 MG Oral Tablet Delayed Release; TAKE 1 TABLET DAILY;    Therapy: 14QJL4046 to (Evaluate:57Kyc9942); Last Rx:30Jun2017 Ordered   5  Sertraline HCl - 100 MG Oral Tablet; TAKE 1 TABLET BY MOUTH DAILY; Therapy: 68HIW5602 to (Doyle Allison)  Requested for: 90Xoc5147; Last   Rx:09Bdi7023 Ordered    Allergies    1  Aspirin TABS    Signatures   Electronically signed by :  Mary Garcia, ; Aug 24 2017  1:41PM EST                       (Author)

## 2018-01-18 NOTE — MISCELLANEOUS
Message  Patient telephoned requesting staples be removed are causing him much discomfort , 2 staples already came out  staples medial incision 2 came out, denies fever chills, redness, drainage , swelling , dehiscence or tenderness to touch  Reinforced the BLT and stretching, reinforced s/s to assess incision for as above , call office immediately if occur come to office if unable to speak with someone on one call  Question patient about order for Medrol dose pack (11/16 , surgery date 11/14) , reports he was stated on medication by pain management post op for pain control, to early to fill narcotic scripts  Explained the effects of steroids on wound healing  1  Explained the importance of staples for 2 weeks postoperatively and risk of premature removal, area where staples out denies signs /symptoms as described above  Patient in agreement w/ maintaining staples intact thru 2 week f/u appointment 111/28/ he verbalized an understanding  1 Amended By: Reinier Hernandez;  Nov 21 2017 2:17 PM EST    Signatures   Electronically signed by : Poornima Wong; Nov 21 2017  2:20PM EST                       (Author)

## 2018-01-18 NOTE — MISCELLANEOUS
Message   Recorded as Task   Date: 11/15/2017 08:20 AM, Created By: Leelee Zeng   Task Name: Miscellaneous   Assigned To: [facility] [place]   Regarding Patient: Hilary Clark, Status: In Progress   Comment:    Mable Sutton - 15 Nov 2017 8:20 AM     TASK CREATED  Pt called stating that he had stim surgery yesterday and he is in a lot of pain  Was told to call back if the Vicodin was not helping and that the dosage could be increased  Pt would like a call back to discuss at [phone]  Radha Padilla - 15 Nov 2017 12:15 PM     TASK EDITED  LMOM to cb on home/ cell #  Provided cb number and office hours  Advised pt to ask to s/w the nurse  ***Please tx caller to the nurse  Thanks! Radha Padilla - 15 Nov 2017 12:38 PM     TASK EDITED  s/w pt, c/o increased pain s/t scs surgery yesterday, 11/14  Per pt, hydrocodone-acetaminophen 5/325mg, 1 tab po qd prn severe pain w/ + relief, no se's  Pt states he feels he needs to take the medication more frequently than ordered  Advised pt, will d/w DG and cb to advise  Patric Dozier - 15 Nov 2017 12:47 PM     TASK REPLIED TO: Previously Assigned To Patric Dozier  Advise him he can take it 1 PO BID PRN for the next 2-3 days and try to use ice and 1000 mg tylenol TID as well to try to help  Then after 2-3 days, try to go back to the 1 PO QD and continue the rest, ice, and Tylenol and rest of his meds as prescribed  Eliza Sofia - 15 Nov 2017 4:03 PM     TASK EDITED  Pt called asking for the nurse to see if she heard anything yet  Pls call pt at [phone]  Jaleesa Maciel - 15 Nov 2017 4:12 PM     TASK IN PROGRESS   Navya Mccall - 15 Nov 2017 4:13 PM     TASK EDITED  S/w pt and advised of same  Pt verbalized understanding and appreciative of cb  Active Problems    1  Bipolar 2 disorder, major depressive episode (296 89) (F31 81)   2  Chronic left lumbar radiculopathy (724 4) (M54 16)   3   Chronic low back pain (724 2,338 29) (M54 5,G89 29) 4  Chronic right shoulder pain (719 41,338 29) (M25 511,G89 29)   5  Depression with anxiety (300 4) (F41 8)   6  Encounter for long-term opiate analgesic use (V58 69) (Z79 891)   7  Encounter for screening for cardiovascular disorders (V81 2) (Z13 6)   8  Fatigue (780 79) (R53 83)   9  Hydronephrosis (591) (N13 30)   10  Iron deficiency anemia (280 9) (D50 9)   11  Lytic bone lesions on xray (733 90) (M89 9)   12  Nephrolithiasis (592 0) (N20 0)   13  Pain syndrome, chronic (338 4) (G89 4)   14  Pre-operative clearance (V72 84) (Z01 818)   15  Status post gastric bypass for obesity (V45 86) (Z98 84)   16  Status post lumbar laminectomy (V45 89) (Z98 890)   17  Status post surgery (V45 89) (Z98 890)   18  Tobacco abuse counseling (V65 42,305 1) (Z71 6)   19  Uncomplicated opioid dependence (304 00) (F11 20)    Current Meds   1  ALPRAZolam 0 5 MG Oral Tablet (Xanax); take 1 tablet every 4-6  hours prn anxiety; Therapy: 50Mxk0311 to (Evaluate:30Nov2017); Last Rx:31Oct2017 Ordered   2  Butrans 7 5 MCG/HR Transdermal Patch Weekly; Apply 1 patch td every 7 days; Therapy: 96Kmd1590 to (Evaluate:38Ppd9708); Last Rx:09Nov2017 Ordered   3  Clindamycin HCl - 300 MG Oral Capsule; TAKE 2 CAPSULE Every 8 hours; Therapy: 65IOG0918 to (Evaluate:20Nov2017)  Requested for: 42HQS7479; Last   Rx:13Nov2017 Ordered   4  Hydrocodone-Acetaminophen 5-325 MG Oral Tablet; Take 1 PO QD PRN for severe pain   ONLY for post op pain; Therapy: 11YEO4447 to (Evaluate:19Nov2017); Last Rx:09Nov2017 Ordered   5  PriLOSEC OTC 20 MG Oral Tablet Delayed Release; TAKE 1 TABLET DAILY; Therapy: 32TDY7067 to (Evaluate:77Zzm7144); Last Rx:07Nyz8239 Ordered   6  QUEtiapine Fumarate 50 MG Oral Tablet; TAKE 1 TABLET Bedtime; Therapy: 19NQJ1168 to 498 21 311)  Requested for: 31Oct2017; Last   Rx:31Oct2017 Ordered   7  Sertraline HCl - 100 MG Oral Tablet; take 1 tablet every day;    Therapy: 20PAC5462 to (Evaluate:20Nov2017)  Requested for: 86ZEL6130; Last   Rx:21Oct2017 Ordered    Allergies    1  Aspirin TABS   2   Penicillins    Signatures   Electronically signed by : Suzy Wilcox, ; Nov 15 2017  4:14PM EST                       (Author)

## 2018-01-19 ENCOUNTER — ALLSCRIPTS OFFICE VISIT (OUTPATIENT)
Dept: OTHER | Facility: OTHER | Age: 43
End: 2018-01-19

## 2018-01-19 ENCOUNTER — APPOINTMENT (OUTPATIENT)
Dept: RADIOLOGY | Facility: OTHER | Age: 43
End: 2018-01-19
Payer: MEDICARE

## 2018-01-19 DIAGNOSIS — M77.11 LATERAL EPICONDYLITIS OF RIGHT ELBOW: ICD-10-CM

## 2018-01-19 DIAGNOSIS — M25.521 PAIN IN RIGHT ELBOW: ICD-10-CM

## 2018-01-19 PROCEDURE — 73080 X-RAY EXAM OF ELBOW: CPT

## 2018-01-20 NOTE — PROGRESS NOTES
Assessment   1  Right lateral epicondylitis (726 32) (M77 11)   2  Right elbow pain (719 42) (M25 521)    Plan   Right elbow pain    · * XR ELBOW 3+ VIEW RIGHT; Status:Active - Retrospective By Protocol Authorization; Requested [id number];   Right elbow pain, Right lateral epicondylitis    · Pennsaid 2 % Transdermal Solution; Apply 2 sprays to right elbow twice daily as    needed for pain   · *1 - [facility]  *  Status: Active  Requested for: 36CFO3695  Care Summary provided  : Yes  Right lateral epicondylitis    · Dexamethasone Sodium Phosphate 4 MG/ML Injection Solution; USE AS    DIRECTED    Discussion/Summary   Patient discussion: discussed with the patient, 30 minute visit, greater than half of the time was spent on counseling  Explained my current clinical findings to Mr Hart  He likely has right lateral elbow epicondylitis  Explained treatment options and suggest: Wearing a tennis elbow strap during the day and a right wrist brace at night  Physical therapy rehabilitation including iontophoresis using dexamethasone  Topical Pennsaid solution and local ice application as needed and may also use local TENS machine for comfort  Activity modification  follow-up in about 2 months time for clinical reassessment in this regard  The patient was counseled regarding diagnostic results,-- instructions for management,-- risk factor reductions,-- prognosis,-- patient and family education,-- impressions,-- risks and benefits of treatment options  Possible side effects of new medications were reviewed with the patient/guardian today  The treatment plan was reviewed with the patient/guardian   The patient/guardian understands and agrees with the treatment plan      Chief Complaint   1  Elbow Pain  Right elbow pain      History of Present Illness   HPI: Mr Miller Agosto is a 59-year-old right-hand-dominant gentleman who is here today for evaluation of right lateral elbow pain of approximately 2 weeks' duration  Denies any history of trauma prior to the onset of his right elbow pain  Pain is aching in nature mostly on the lateral aspect with some radiation on the dorsoradial aspect of the right forearm up to the wrist  Denies any associated right distal upper extremity tingling numbness or weakness  Pain is made worse with elbow movement as well as right wrist movement  It is aching in nature and intermittently sharp  Review of Systems        Constitutional: No fever or chills, feels well, no tiredness, no recent weight loss or weight gain  Eyes: No complaints of red eyes, no eyesight problems  ENT: no complaints of loss of hearing, no nosebleeds, no sore throat  Cardiovascular: No complaints of chest pain, no palpitations, no leg claudication or lower extremity edema  Respiratory: No complaints of shortness of breath, no wheezing, no cough  Gastrointestinal: No complaints of abdominal pain, no constipation, no nausea or vomiting, no diarrhea or bloody stools  Genitourinary: No complaints of dysuria or incontinence, no hesitancy, no nocturia  Musculoskeletal: as noted in HPI  Integumentary: No complaints of skin rash or lesion, no itching or dry skin, no skin wounds  Neurological: No complaints of headache, no confusion, no numbness or tingling, no dizziness  Psychiatric: No suicidal thoughts, no anxiety, no depression  Endocrine: No muscle weakness, no frequent urination, no excessive thirst, no feelings of weakness  Active Problems   1  Bipolar 2 disorder, major depressive episode (296 89) (F31 81)   2  Chronic left lumbar radiculopathy (724 4) (M54 16)   3  Chronic low back pain (724 2,338 29) (M54 5,G89 29)   4  Chronic right shoulder pain (719 41,338 29) (M25 511,G89 29)   5  Depression with anxiety (300 4) (F41 8)   6  Encounter for long-term opiate analgesic use (V58 69) (Z79 891)   7   Encounter for screening for cardiovascular disorders (V81 2) (Z13 6)   8  Encounter for staple removal (V58 32) (Z48 02)   9  Fatigue (780 79) (R53 83)   10  Hydronephrosis (591) (N13 30)   11  Iron deficiency anemia (280 9) (D50 9)   12  Lytic bone lesions on xray (733 90) (M89 9)   13  Nephrolithiasis (592 0) (N20 0)   14  Pain syndrome, chronic (338 4) (G89 4)   15  Pre-operative clearance (V72 84) (Z01 818)   16  Right elbow pain (719 42) (M25 521)   17  Right lateral epicondylitis (726 32) (M77 11)   18  Status post gastric bypass for obesity (V45 86) (Z98 84)   19  Status post lumbar laminectomy (V45 89) (Z98 890)   20  Status post surgery (V45 89) (Z98 890)   21  Tobacco abuse counseling (V65 42,305 1) (Z71 6)   22  Uncomplicated opioid dependence (304 00) (F11 20)    Past Medical History    · History of arthritis (V13 4) (Z87 39)   · History of depression (V11 8) (Z86 59)   · No pertinent past medical history   · History of Shoulder pain (719 41) (M25 519)     The active problems and past medical history were reviewed and updated today  Surgical History    · History of Appendectomy   · History of Fusion / Refusion Of Vertebrae   · History of Gastric Surgery For Morbid Obesity Gastric Bypass   · History of Spinal Neurostimulator Pulse Generator   · History of Spinal Neurostimulator Pulse Generator Removal   · History of Tonsillectomy With Adenoidectomy     The surgical history was reviewed and updated today  Family History   Mother    · Family history of arthritis (V17 7) (Z82 61)   · Family history of malignant neoplasm of stomach (V16 0) (Z80 0)  Father    · Family history of Brain tumor   · Family history of malignant neoplasm of esophagus (V16 0) (Z80 0)     The family history was reviewed and updated today         Social History    · Chooses not to have children (V25 9) (Z30 9)   · Current every day smoker (305 1) (F17 200)   ·    · Drinks caffeinated tea   · General equivalency diploma (GED)   · Lives with spouse   · Denied: History of No alcohol use   · Quit drinking alcohol (V11 3) (Z87 898)   · Social alcohol use (Z78 9)  The social history was reviewed and updated today  The social history was reviewed and is unchanged  Current Meds    1  ALPRAZolam 0 5 MG Oral Tablet (Xanax); TAKE 1 TABLET EVERY 4 TO 6 HOURS AS     NEEDED FOR ANXIETY; Therapy: 74Gvg1860 to (Evaluate:06Jan2018)  Requested for: 66Pfr5746; Last     Rx:29Dec2017 Ordered   2  Butrans 7 5 MCG/HR Transdermal Patch Weekly; Apply 1 patch td every 7 days; Therapy: 56Xds4620 to (Evaluate:23Xbk4090); Last Rx:09Nov2017 Ordered   3  PriLOSEC OTC 20 MG Oral Tablet Delayed Release; TAKE 1 TABLET DAILY; Therapy: 03LMD3478 to (Evaluate:84Pva7006); Last Rx:30Jun2017 Ordered   4  QUEtiapine Fumarate 50 MG Oral Tablet; TAKE 1 TABLET Bedtime; Therapy: 11JCN9416 to [phone])  Requested for: 64RCJ4090; Last     Rx:17Jan2018 Ordered   5  Sertraline HCl - 100 MG Oral Tablet; take 1 tablet every day; Therapy: 84WRF3964 to (Evaluate:30Moh9581)  Requested for: 64OBN7112; Last     Rx:29Nov2017 Ordered   6  TiZANidine HCl - 2 MG Oral Tablet; Take 1 po tid prn spasms; Therapy: 91NJD6389 to (Evaluate:70Jzn9986)  Requested for: 49Fad2344; Last     Rx:16Nov2017; Status: ACTIVE - Renewal Denied Ordered   7  Tylenol 500 MG CAPS; TAKE 2 CAPSULE Every twelve hours PRN pain; Therapy: (Recorded:28Nov2017) to Recorded     The medication list was reviewed and updated today  Allergies   1  Aspirin TABS   2  Penicillins    Vitals    Recorded: 62SHE4545 10:16AM   Heart Rate 73   Systolic 534   Diastolic 69   Height 6 ft    Weight 188 lb    BMI Calculated 25 5   BSA Calculated 2 08   Pain Scale 6-7     Physical Exam      Right Elbow: Appearance: Normal  Tenderness: lateral epicondyle  ROM: Full   Special Tests: Discomfort with resisted right wrist dorsiflexion both with elbow in extension as well as at 90Â° flexion, but-- negative Milking test,-- negative Tinel's at the ulnar nerve,-- negative Tinel's at the cubital tunnel-- and-- negative valgus stress  Constitutional - General appearance: Normal       Musculoskeletal - Gait and station: Normal       Cardiovascular - Pulses: Normal       Skin - Skin and subcutaneous tissue: Normal       Neurologic - Cranial nerves: Normal -- Sensation: Normal -- Upper extremity peripheral neuro exam: Normal       Psychiatric - Orientation to person, place, and time: Normal -- Mood and affect: Normal       Eyes      Conjunctiva and lids: Normal        Pupils and irises: Normal        Results/Data   I personally reviewed the films/images/results in the office today  My interpretation follows  X-ray Review Plain radiograph of the right elbow does not reveal any acute fracture or dislocation  Future Appointments      Date/Time Provider Specialty Site   02/01/2018 08:00 AM ANTONIA [facility]   02/14/2018 11:20 AM HATTIE Vicente  Psychiatry [facility] [address]     Signatures    Electronically signed by :  HATTIE Ferris ; Jan 19 2018 12:51PM EST                       (Author)

## 2018-01-22 VITALS
TEMPERATURE: 97.6 F | WEIGHT: 175.81 LBS | DIASTOLIC BLOOD PRESSURE: 80 MMHG | SYSTOLIC BLOOD PRESSURE: 130 MMHG | HEART RATE: 80 BPM | BODY MASS INDEX: 23.81 KG/M2 | HEIGHT: 72 IN

## 2018-01-22 VITALS
HEIGHT: 72 IN | BODY MASS INDEX: 23.43 KG/M2 | HEART RATE: 69 BPM | WEIGHT: 173 LBS | DIASTOLIC BLOOD PRESSURE: 71 MMHG | SYSTOLIC BLOOD PRESSURE: 134 MMHG

## 2018-01-22 VITALS
WEIGHT: 176.81 LBS | HEART RATE: 80 BPM | SYSTOLIC BLOOD PRESSURE: 106 MMHG | HEIGHT: 72 IN | DIASTOLIC BLOOD PRESSURE: 70 MMHG | BODY MASS INDEX: 23.95 KG/M2 | TEMPERATURE: 97.2 F

## 2018-01-22 VITALS
SYSTOLIC BLOOD PRESSURE: 112 MMHG | HEIGHT: 72 IN | WEIGHT: 181 LBS | HEART RATE: 88 BPM | DIASTOLIC BLOOD PRESSURE: 70 MMHG | BODY MASS INDEX: 24.52 KG/M2 | RESPIRATION RATE: 16 BRPM

## 2018-01-23 VITALS
DIASTOLIC BLOOD PRESSURE: 69 MMHG | SYSTOLIC BLOOD PRESSURE: 111 MMHG | HEIGHT: 72 IN | BODY MASS INDEX: 25.47 KG/M2 | WEIGHT: 188 LBS | HEART RATE: 73 BPM

## 2018-01-23 NOTE — MISCELLANEOUS
Message  Post operative phone call received  left message         Signatures   Electronically signed by : Candace Samuel; Jan 6 2018  7:59PM EST                       (Author)

## 2018-01-30 ENCOUNTER — OFFICE VISIT (OUTPATIENT)
Dept: FAMILY MEDICINE CLINIC | Facility: CLINIC | Age: 43
End: 2018-01-30
Payer: MEDICARE

## 2018-01-30 VITALS
DIASTOLIC BLOOD PRESSURE: 70 MMHG | WEIGHT: 188.8 LBS | BODY MASS INDEX: 25.57 KG/M2 | TEMPERATURE: 96.5 F | SYSTOLIC BLOOD PRESSURE: 130 MMHG | HEIGHT: 72 IN | HEART RATE: 80 BPM

## 2018-01-30 DIAGNOSIS — J06.9 ACUTE URI: Primary | ICD-10-CM

## 2018-01-30 PROCEDURE — 99213 OFFICE O/P EST LOW 20 MIN: CPT | Performed by: FAMILY MEDICINE

## 2018-01-30 RX ORDER — OSELTAMIVIR PHOSPHATE 75 MG/1
75 CAPSULE ORAL 2 TIMES DAILY
Qty: 10 CAPSULE | Refills: 0 | Status: SHIPPED | OUTPATIENT
Start: 2018-01-30 | End: 2018-02-04

## 2018-01-30 RX ORDER — AZITHROMYCIN 250 MG/1
TABLET, FILM COATED ORAL
Qty: 6 TABLET | Refills: 0 | Status: SHIPPED | OUTPATIENT
Start: 2018-01-30 | End: 2018-02-03

## 2018-01-30 NOTE — PROGRESS NOTES
FAMILY PRACTICE OFFICE VISIT       NAME: Atif Reich  AGE: 43 y o  SEX: male       : 1975        MRN: [id number]    DATE: 2018  TIME: 10:02 AM    Assessment and Plan     Problem List Items Addressed This Visit     None      Visit Diagnoses     Acute URI    -  Primary    Relevant Medications    oseltamivir (TAMIFLU) 75 mg capsule    azithromycin (ZITHROMAX) 250 mg tablet       Patient presents for evaluation of acute febrile upper respiratory infection  His history and physical exam are consistent with influenza  Will treat with combination of Tamiflu on Z-Derrick  Patient will use Tylenol and ibuprofen as needed for fever  Advised him to try Delsym as needed for cough  He will contact me in a few days if his symptoms are not improving significant    Patient Instructions   I please take 400 mg of ibuprofen every 6-8 hours after food  You may also use Tylenol/acetaminophen 650 mg every 4-6 hours on  as needed basis  Please try Delsym as needed for cough please do not take Tamiflu and Zithromax at the same time  to avoid GI upset          Chief Complaint     Chief Complaint   Patient presents with    Cold Like Symptoms     Pt began not feeling well for the past two days  He c/o fever, sweats, cough, sore throat  He states that his finance was diagnosed last week with flu  History of Present Illness     Patient presents for evaluation of cold symptoms  He is complaining of symptoms of fever, chills, fatigue, generalized achiness, sore throat, cough, he denies chest tightness or shortness of breath  He is fiance was diagnosed with flu a and flu B few days ago  His symptoms started within past 48 hours  Review of Systems   Review of Systems   Constitutional: Positive for appetite change, chills, fatigue and fever  HENT: Positive for postnasal drip and sore throat  Eyes: Negative  Respiratory: Positive for cough  Negative for shortness of breath and wheezing  Cardiovascular: Negative  Gastrointestinal: Negative  Genitourinary: Negative  Musculoskeletal: Negative  Neurological: Positive for headaches  Active Problem List   There is no problem list on file for this patient  Past Medical History:  Past Medical History:   Diagnosis Date    Anxiety     Bipolar disorder ([facility] [address] )     GERD (gastroesophageal reflux disease)     Kidney stone     PONV (postoperative nausea and vomiting)        Past Surgical History:  Past Surgical History:   Procedure Laterality Date    ADENOIDECTOMY Bilateral     APPENDECTOMY      BACK SURGERY      GASTRIC BYPASS      2009    MD CYSTO/URETERO W/LITHOTRIPSY &INDWELL STENT INSRT Right 8/8/2017    Procedure: CYSTOSCOPY; URETEROSCOPY; HOLMIUM LASER; RETROGRADE PYELOGRAM; STENT;  Surgeon: Tono Obregon MD;  [facility];  Service: Urology    MD IMPLANT SPINAL NEUROSTIM/ Right 11/14/2017    Procedure: REMOVAL LOWER MEDIAL BUTTOCK SPINAL CORD STIMULATOR GENERATOR; PLACEMENT OF NEW BUTTOCK SPINAL CORD STIMULATOR THROUGH SEPERATE INCISION;  Surgeon: Mckenzie Ko MD;  Location: [facility];  Service: Neurosurgery    SPINAL CORD STIMULATOR IMPLANT      TONSILLECTOMY         Family History:  Family History   Problem Relation Age of Onset    Cancer Mother     Cancer Father     Esophageal cancer Father     No Known Problems Sister     No Known Problems Sister     No Known Problems Sister        Social History:  Social History     Social History    Marital status: Single     Spouse name: N/A    Number of children: N/A    Years of education: N/A     Occupational History    Not on file       Social History Main Topics    Smoking status: Current Every Day Smoker     Packs/day: 1 00     Types: Cigarettes    Smokeless tobacco: Never Used    Alcohol use No    Drug use: No    Sexual activity: Not on file     Other Topics Concern    Not on file     Social History Narrative    No narrative on file I have reviewed the patient's medical history in detail; there are no changes to the history as noted in the electronic medical record  Objective     Vitals:    01/30/18 0931   BP: 130/70   Pulse: 80   Temp: (!) 96 5 °F (35 8 °C)   Weight: 85 6 kg (188 lb 12 8 oz)   Height: 6' (1 829 m)     Wt Readings from Last 3 Encounters:   01/30/18 85 6 kg (188 lb 12 8 oz)   01/19/18 85 3 kg (188 lb)   11/28/17 82 2 kg (181 lb 4 oz)       Physical Exam   Constitutional: He appears well-developed  He appears ill  HENT:   Head: Normocephalic and atraumatic  Oropharynx-erythema, copious postnasal drip, no exudates   Eyes: Conjunctivae are normal  Pupils are equal, round, and reactive to light  Neck: Neck supple  Cardiovascular: Normal rate, regular rhythm and normal heart sounds  No murmur heard  Pulmonary/Chest: Effort normal and breath sounds normal  No respiratory distress  He has no wheezes  He has no rales  Neurological: He is alert  Skin: No rash noted  Psychiatric: He has a normal mood and affect  His behavior is normal    Nursing note and vitals reviewed        Pertinent Laboratory/Diagnostic Studies:  Lab Results   Component Value Date    BUN 20 11/02/2017    CREATININE 0 91 11/02/2017    CALCIUM 9 4 11/02/2017     11/02/2017    K 4 7 11/02/2017    CO2 31 11/02/2017     11/02/2017     Lab Results   Component Value Date    ALT 11 (L) 08/04/2017    AST 8 08/04/2017    ALKPHOS 66 08/04/2017    BILITOT 0 31 08/04/2017       Lab Results   Component Value Date    WBC 4 93 10/25/2017    HGB 11 5 (L) 10/25/2017    HCT 37 7 10/25/2017    MCV 66 (L) 10/25/2017     10/25/2017       No results found for: TSH    Lab Results   Component Value Date    CHOL 129 08/04/2017     Lab Results   Component Value Date    TRIG 80 08/04/2017     Lab Results   Component Value Date    HDL 47 08/04/2017     Lab Results   Component Value Date    LDLCALC 66 08/04/2017     Lab Results   Component Value Date HGBA1C 6 0 11/02/2017       Results for orders placed or performed in visit on 40/39/27   Basic metabolic panel   Result Value Ref Range    Sodium 137 136 - 145 mmol/L    Potassium 4 7 3 5 - 5 3 mmol/L    Chloride 103 100 - 108 mmol/L    CO2 31 21 - 32 mmol/L    Anion Gap 3 (L) 4 - 13 mmol/L    BUN 20 5 - 25 mg/dL    Creatinine 0 91 0 60 - 1 30 mg/dL    Glucose, Fasting 80 65 - 99 mg/dL    Calcium 9 4 8 3 - 10 1 mg/dL    eGFR 104 ml/min/1 73sq m   Protime-INR   Result Value Ref Range    Protime 14 1 12 1 - 14 4 seconds    INR 1 09 0 86 - 1 16   APTT   Result Value Ref Range    PTT 33 23 - 35 seconds   Hemoglobin A1c   Result Value Ref Range    Hemoglobin A1C 6 0 4 2 - 6 3 %     mg/dl       No orders of the defined types were placed in this encounter  ALLERGIES:  Allergies   Allergen Reactions    Ampicillin GI Intolerance     Vomiting    Aspirin GI Intolerance     vomiting      Penicillins GI Intolerance     Vomit       Current Medications     Current Outpatient Prescriptions   Medication Sig Dispense Refill    ALPRAZolam (XANAX) 0 5 mg tablet Take 0 5 mg by mouth every 4 (four) hours as needed      Buprenorphine (BUTRANS) 7 5 MCG/HR PTWK Place 1 patch on the skin once a week      omeprazole (PRILOSEC OTC) 20 MG tablet Take 20 mg by mouth daily        QUEtiapine (SEROquel) 50 mg tablet Take 1 tablet by mouth daily at bedtime      sertraline (ZOLOFT) 100 mg tablet Take 100 mg by mouth daily at bedtime        azithromycin (ZITHROMAX) 250 mg tablet Take 2 tablets today then 1 tablet daily x 4 days 6 tablet 0    oseltamivir (TAMIFLU) 75 mg capsule Take 1 capsule (75 mg total) by mouth 2 (two) times a day for 5 days 10 capsule 0     No current facility-administered medications for this visit            Health Maintenance     Health Maintenance   Topic Date Due    HIV SCREENING  1975    PNEUMOCOCCAL POLYSACCHARIDE VACCINE AGE 2-64 HIGH RISK  08/22/1977    DTaP,Tdap,and Td Vaccines (1 - Tdap) 08/22/1982    INFLUENZA VACCINE  09/01/2017       There is no immunization history on file for this patient      Jesu Jacob MD

## 2018-01-30 NOTE — PATIENT INSTRUCTIONS
I please take 400 mg of ibuprofen/  Same as Advil and Motrin every 6-8 hours after food  You may also use Tylenol/acetaminophen 650 mg every 4-6 hours on  as needed basis    Please try Delsym as needed for cough please do not take Tamiflu and Zithromax at the same time  to avoid GI upset

## 2018-02-01 ENCOUNTER — OFFICE VISIT (OUTPATIENT)
Dept: PAIN MEDICINE | Facility: CLINIC | Age: 43
End: 2018-02-01
Payer: MEDICARE

## 2018-02-01 VITALS
BODY MASS INDEX: 25.44 KG/M2 | WEIGHT: 187.6 LBS | HEART RATE: 68 BPM | TEMPERATURE: 98.6 F | SYSTOLIC BLOOD PRESSURE: 122 MMHG | DIASTOLIC BLOOD PRESSURE: 68 MMHG

## 2018-02-01 DIAGNOSIS — M77.11 RIGHT LATERAL EPICONDYLITIS: ICD-10-CM

## 2018-02-01 DIAGNOSIS — M25.521 RIGHT ELBOW PAIN: ICD-10-CM

## 2018-02-01 DIAGNOSIS — M54.42 CHRONIC LOW BACK PAIN WITH BILATERAL SCIATICA, UNSPECIFIED BACK PAIN LATERALITY: Primary | ICD-10-CM

## 2018-02-01 DIAGNOSIS — G89.29 CHRONIC LOW BACK PAIN WITH BILATERAL SCIATICA, UNSPECIFIED BACK PAIN LATERALITY: Primary | ICD-10-CM

## 2018-02-01 DIAGNOSIS — G89.29 CHRONIC RIGHT SHOULDER PAIN: ICD-10-CM

## 2018-02-01 DIAGNOSIS — M54.16 CHRONIC LEFT LUMBAR RADICULOPATHY: ICD-10-CM

## 2018-02-01 DIAGNOSIS — M54.41 CHRONIC LOW BACK PAIN WITH BILATERAL SCIATICA, UNSPECIFIED BACK PAIN LATERALITY: Primary | ICD-10-CM

## 2018-02-01 DIAGNOSIS — G89.4 PAIN SYNDROME, CHRONIC: ICD-10-CM

## 2018-02-01 DIAGNOSIS — M77.01 EPICONDYLITIS ELBOW, MEDIAL, RIGHT: ICD-10-CM

## 2018-02-01 DIAGNOSIS — M25.511 CHRONIC RIGHT SHOULDER PAIN: ICD-10-CM

## 2018-02-01 PROBLEM — N13.30 HYDRONEPHROSIS: Status: ACTIVE | Noted: 2017-08-03

## 2018-02-01 PROBLEM — N20.0 NEPHROLITHIASIS: Status: ACTIVE | Noted: 2017-08-03

## 2018-02-01 PROBLEM — F41.8 DEPRESSION WITH ANXIETY: Status: ACTIVE | Noted: 2017-05-02

## 2018-02-01 PROBLEM — M89.9 LYTIC BONE LESIONS ON XRAY: Status: ACTIVE | Noted: 2017-08-04

## 2018-02-01 PROBLEM — M89.8X9 LYTIC BONE LESIONS ON XRAY: Status: ACTIVE | Noted: 2017-08-04

## 2018-02-01 PROBLEM — F31.81 BIPOLAR 2 DISORDER, MAJOR DEPRESSIVE EPISODE (HCC): Status: ACTIVE | Noted: 2017-10-31

## 2018-02-01 PROBLEM — R53.83 FATIGUE: Status: ACTIVE | Noted: 2017-05-02

## 2018-02-01 PROBLEM — F11.20 UNCOMPLICATED OPIOID DEPENDENCE (HCC): Status: ACTIVE | Noted: 2017-08-31

## 2018-02-01 PROBLEM — M54.50 CHRONIC LOW BACK PAIN: Status: ACTIVE | Noted: 2017-05-05

## 2018-02-01 PROBLEM — D50.9 IRON DEFICIENCY ANEMIA: Status: ACTIVE | Noted: 2017-10-31

## 2018-02-01 PROCEDURE — 99214 OFFICE O/P EST MOD 30 MIN: CPT | Performed by: NURSE PRACTITIONER

## 2018-02-01 RX ORDER — BUPRENORPHINE 7.5 UG/H
1 PATCH TRANSDERMAL WEEKLY
Qty: 4 PATCH | Refills: 0 | Status: SHIPPED | OUTPATIENT
Start: 2018-02-01 | End: 2018-04-26 | Stop reason: SDUPTHER

## 2018-02-01 RX ORDER — BUPRENORPHINE 7.5 UG/H
1 PATCH TRANSDERMAL WEEKLY
Qty: 4 PATCH | Refills: 0 | Status: SHIPPED | OUTPATIENT
Start: 2018-02-01 | End: 2018-02-01 | Stop reason: SDUPTHER

## 2018-02-01 NOTE — PROGRESS NOTES
Assessment:  1  Chronic low back pain with bilateral sciatica, unspecified back pain laterality    2  Epicondylitis elbow, medial, right    3  Chronic right shoulder pain    4  Pain syndrome, chronic    5  Right elbow pain    6  Chronic left lumbar radiculopathy    7  Right lateral epicondylitis        Plan:  While the patient was in the office today, I did discuss with the patient that at this point time with regards to his right elbow pain, I agree that it seems most likely that is related to epicondylitis and that it would be in his best interest to proceed with the physical therapy and I encouraged him to take the brace with him to therapy as they can help him adjusted to make it more comfortable  I also feel that he would benefit from a topical NSAID and since the Pennsaid is not covered any has had gastric bypass surgery so he cannot tolerate oral NSAIDs, I feel we can try diclofenac gel and hopefully his insurance company will cover that  The patient was agreeable and verbalized an understanding  With regards to the Butrans, explained to the patient at this point time since he is noting moderate to significant relief and I feel that his medication regimen is reasonable appropriate, for now, we will continue with the Butrans as prescribed, however, he does understand that our long-term goal to continue to titrate down and hopefully off of any opioid medications in the future  The patient was agreeable and verbalized an understanding  [facility] Prescription Drug Monitoring Program report was reviewed and was appropriate      The patient was not picked for a pill count today, but he did bring his medications as required  The patient was given a 3 month supply of prescriptions with a Do Not Fill date(s) of March 1, 2018 and March 29, 2018  There are risks associated with opioid medications, including dependence, addiction and tolerance   The patient understands and agrees to use these medications only as prescribed  Potential side effects of the medications include, but are not limited to, constipation, drowsiness, addiction, impaired judgment and risk of fatal overdose if not taken as prescribed  The patient was warned against driving while taking sedation medications  Sharing medications is a felony  At this point in time, the patient is showing no signs of addiction, abuse, diversion or suicidal ideation  The patient will follow-up in 12 weeks for medication prescription refill and reevaluation  The patient was advised to contact the office should their symptoms worsen in the interim  The patient was agreeable and verbalized an understanding  I attest that I have spent 25 minutes face to face with the patient and that at least 50% of the time was educating and/or discussing the patient's symptoms and treatment plan  History of Present Illness: The patient is a 43 y o  male last seen on November 9, 2017 who presents for a follow up office visit in regards to chronic pain secondary to lumbar spondylosis and stenosis  The patient currently reports he does feel that his back and leg pain has improved since his last office visit with the changes made to his medication regimen, however, he reports that without any recent trauma or injury he has noted worsening right elbow pain and did follow-up with orthopedics who diagnose as epicondylitis  He reports that he was given a brace for his elbow, however, he feels that actually make the pain worse and was also instructed to follow up with physical therapy, which he has not yet started  Orthopedic also ordered Pennsaid, however, his insurance does not cover it  Current pain medications includes:  Butrans patch 7 5 mcg  The patient reports that this regimen is providing 80% pain relief  The patient is reporting no side effects from this pain medication regimen      Pain Contract Signed:  September 14, 2017  Last Urine Drug Screen: November 13, 2017    I have personally reviewed and/or updated the patient's past medical history, past surgical history, family history, social history, current medications, allergies, and vital signs today  Review of Systems:    Review of Systems      Past Medical History:   Diagnosis Date    Anxiety     Bipolar disorder ([address] )     GERD (gastroesophageal reflux disease)     Kidney stone     PONV (postoperative nausea and vomiting)        Past Surgical History:   Procedure Laterality Date    ADENOIDECTOMY Bilateral     APPENDECTOMY      BACK SURGERY      GASTRIC BYPASS      2009    ID CYSTO/URETERO W/LITHOTRIPSY &INDWELL STENT INSRT Right 8/8/2017    Procedure: CYSTOSCOPY; URETEROSCOPY; HOLMIUM LASER; RETROGRADE PYELOGRAM; STENT;  Surgeon: Arabella Kilpatrick MD;  [facility];  Service: Urology    ID IMPLANT SPINAL NEUROSTIM/ Right 11/14/2017    Procedure: REMOVAL LOWER MEDIAL BUTTOCK SPINAL CORD STIMULATOR GENERATOR; PLACEMENT OF NEW BUTTOCK SPINAL CORD STIMULATOR THROUGH SEPERATE INCISION;  Surgeon: Aguilar Hernandez MD;  [facility];  Service: Neurosurgery    SPINAL CORD STIMULATOR IMPLANT      TONSILLECTOMY         Family History   Problem Relation Age of Onset    Cancer Mother     Cancer Father     Esophageal cancer Father     No Known Problems Sister     No Known Problems Sister     No Known Problems Sister        Social History     Occupational History    Not on file       Social History Main Topics    Smoking status: Current Every Day Smoker     Packs/day: 1 00     Types: Cigarettes    Smokeless tobacco: Never Used    Alcohol use No    Drug use: No    Sexual activity: Not on file         Current Outpatient Prescriptions:     ALPRAZolam (XANAX) 0 5 mg tablet, Take 0 5 mg by mouth every 4 (four) hours as needed, Disp: , Rfl:     azithromycin (ZITHROMAX) 250 mg tablet, Take 2 tablets today then 1 tablet daily x 4 days, Disp: 6 tablet, Rfl: 0    Buprenorphine (BUTRANS) 7 5 MCG/HR PTWK, Place 1 patch on the skin once a week, Disp: 4 patch, Rfl: 0    diclofenac sodium (VOLTAREN) 1 %, Apply 4 g topically 4 (four) times a day Apply 4 grams to right elbow QID PRN for pain, Disp: 300 Tube, Rfl: 2    omeprazole (PRILOSEC OTC) 20 MG tablet, Take 20 mg by mouth daily  , Disp: , Rfl:     oseltamivir (TAMIFLU) 75 mg capsule, Take 1 capsule (75 mg total) by mouth 2 (two) times a day for 5 days, Disp: 10 capsule, Rfl: 0    QUEtiapine (SEROquel) 50 mg tablet, Take 1 tablet by mouth daily at bedtime, Disp: , Rfl:     sertraline (ZOLOFT) 100 mg tablet, Take 100 mg by mouth daily at bedtime  , Disp: , Rfl:     Allergies   Allergen Reactions    Ampicillin GI Intolerance     Vomiting    Aspirin GI Intolerance     vomiting      Penicillins GI Intolerance     Vomit       Physical Exam:    /68 (BP Location: Left arm, Patient Position: Sitting, Cuff Size: Standard)   Pulse 68   Temp 98 6 °F (37 °C) (Oral)   Wt 85 1 kg (187 lb 9 6 oz)   BMI 25 44 kg/m²     Constitutional:normal, well developed, well nourished, alert, in no distress and non-toxic and no overt pain behavior  Eyes:anicteric  HEENT:grossly intact  Neck:supple, symmetric, trachea midline and no masses   Pulmonary:even and unlabored  Cardiovascular:No edema or pitting edema present  Skin:Normal without rashes or lesions and well hydrated  Psychiatric:Mood and affect appropriate  Neurologic:Cranial Nerves II-XII grossly intact  Musculoskeletal:normal and right elbow was tender to touch and manipulation, but with no obvious signs of erythema or edema  Imaging  No orders to display         No orders of the defined types were placed in this encounter

## 2018-02-12 ENCOUNTER — TELEPHONE (OUTPATIENT)
Dept: PAIN MEDICINE | Facility: MEDICAL CENTER | Age: 43
End: 2018-02-12

## 2018-02-12 DIAGNOSIS — M79.646 THUMB PAIN, UNSPECIFIED LATERALITY: Primary | ICD-10-CM

## 2018-02-12 NOTE — TELEPHONE ENCOUNTER
Pt is calling to inform Patric that he is on his way to the ED and was told that he should call because of medication purposes   Please call pt back at [phone]

## 2018-02-12 NOTE — TELEPHONE ENCOUNTER
S/w pt, states he went to urgent care  Dx w/ 2nd degree electrical burn on his thumb while installing a water heater  Pt states the wound was cleaned, received a torradol injection and rx for hydrocodone - acetaminophen 5/300, 1 tab po q 4-6 hrs prn pain #10  Pt states he plans to take tylenol during the day and the hydrocodone - acetaminophen at hs  Advised pt, will make DG aware and cb if there is anything additional  Pt verbalized understanding and appreciation

## 2018-02-14 ENCOUNTER — TELEPHONE (OUTPATIENT)
Dept: BEHAVIORAL/MENTAL HEALTH CLINIC | Facility: CLINIC | Age: 43
End: 2018-02-14

## 2018-02-14 NOTE — TELEPHONE ENCOUNTER
Called patient to reschedule appointment today, and he states he needs refills on all his medications  He is schedule to see you on 05/29

## 2018-02-15 DIAGNOSIS — F33.2 MDD (MAJOR DEPRESSIVE DISORDER), RECURRENT SEVERE, WITHOUT PSYCHOSIS (HCC): Primary | ICD-10-CM

## 2018-02-15 RX ORDER — QUETIAPINE FUMARATE 50 MG/1
50 TABLET, FILM COATED ORAL
Qty: 30 TABLET | Refills: 2 | Status: SHIPPED | OUTPATIENT
Start: 2018-02-15 | End: 2018-04-23 | Stop reason: SDUPTHER

## 2018-02-15 RX ORDER — ALPRAZOLAM 0.5 MG/1
0.5 TABLET ORAL EVERY 4 HOURS PRN
Qty: 120 TABLET | Refills: 2 | Status: SHIPPED | OUTPATIENT
Start: 2018-02-15 | End: 2018-05-02 | Stop reason: SDUPTHER

## 2018-02-15 RX ORDER — SERTRALINE HYDROCHLORIDE 100 MG/1
100 TABLET, FILM COATED ORAL
Qty: 30 TABLET | Refills: 2 | Status: SHIPPED | OUTPATIENT
Start: 2018-02-15 | End: 2018-05-02 | Stop reason: SDUPTHER

## 2018-02-16 PROBLEM — M79.646 THUMB PAIN: Status: ACTIVE | Noted: 2018-02-16

## 2018-02-16 RX ORDER — HYDROCODONE BITARTRATE AND ACETAMINOPHEN 7.5; 325 MG/1; MG/1
1 TABLET ORAL 2 TIMES DAILY PRN
Qty: 30 TABLET | Refills: 0
Start: 2018-02-16 | End: 2018-04-26

## 2018-02-16 NOTE — TELEPHONE ENCOUNTER
I will give him a short script for Norco 7 5/325, 1 PO BID PRN for pain, disp #30, which is a 2 week supply and hopefully by then his finger should be better and if it gets worse and does not improve in the next day or two he should either see his PCP or go back to the Er  Thank you  I updated the Stephanie Amor in his chart

## 2018-02-16 NOTE — TELEPHONE ENCOUNTER
Pt calling back stating that he is still having a lot of pain in his L thumb from the burn  States that he finished the medication that he received from the ER and wondering if we can prescribe him something for the pain  Pt uses [facility] pharmacy in [place] (on file)  Pt can be reached at [phone]

## 2018-04-23 ENCOUNTER — TELEPHONE (OUTPATIENT)
Dept: BEHAVIORAL/MENTAL HEALTH CLINIC | Facility: CLINIC | Age: 43
End: 2018-04-23

## 2018-04-23 DIAGNOSIS — F33.2 MDD (MAJOR DEPRESSIVE DISORDER), RECURRENT SEVERE, WITHOUT PSYCHOSIS (HCC): ICD-10-CM

## 2018-04-23 RX ORDER — QUETIAPINE FUMARATE 50 MG/1
50 TABLET, FILM COATED ORAL
Qty: 30 TABLET | Refills: 2 | Status: SHIPPED | OUTPATIENT
Start: 2018-04-23 | End: 2018-05-02 | Stop reason: SDUPTHER

## 2018-04-26 ENCOUNTER — OFFICE VISIT (OUTPATIENT)
Dept: PAIN MEDICINE | Facility: CLINIC | Age: 43
End: 2018-04-26
Payer: MEDICARE

## 2018-04-26 VITALS
TEMPERATURE: 98 F | DIASTOLIC BLOOD PRESSURE: 70 MMHG | HEIGHT: 72 IN | WEIGHT: 198 LBS | HEART RATE: 76 BPM | SYSTOLIC BLOOD PRESSURE: 110 MMHG | BODY MASS INDEX: 26.82 KG/M2

## 2018-04-26 DIAGNOSIS — G89.29 CHRONIC RIGHT SHOULDER PAIN: ICD-10-CM

## 2018-04-26 DIAGNOSIS — M25.511 CHRONIC RIGHT SHOULDER PAIN: ICD-10-CM

## 2018-04-26 DIAGNOSIS — G89.4 PAIN SYNDROME, CHRONIC: ICD-10-CM

## 2018-04-26 DIAGNOSIS — F11.20 UNCOMPLICATED OPIOID DEPENDENCE (HCC): ICD-10-CM

## 2018-04-26 DIAGNOSIS — G89.29 CHRONIC LOW BACK PAIN WITH BILATERAL SCIATICA, UNSPECIFIED BACK PAIN LATERALITY: Primary | ICD-10-CM

## 2018-04-26 DIAGNOSIS — M54.41 CHRONIC LOW BACK PAIN WITH BILATERAL SCIATICA, UNSPECIFIED BACK PAIN LATERALITY: Primary | ICD-10-CM

## 2018-04-26 DIAGNOSIS — M54.16 CHRONIC LEFT LUMBAR RADICULOPATHY: ICD-10-CM

## 2018-04-26 DIAGNOSIS — M54.42 CHRONIC LOW BACK PAIN WITH BILATERAL SCIATICA, UNSPECIFIED BACK PAIN LATERALITY: Primary | ICD-10-CM

## 2018-04-26 DIAGNOSIS — Z79.891 ENCOUNTER FOR LONG-TERM OPIATE ANALGESIC USE: ICD-10-CM

## 2018-04-26 PROCEDURE — 80305 DRUG TEST PRSMV DIR OPT OBS: CPT | Performed by: NURSE PRACTITIONER

## 2018-04-26 PROCEDURE — 99214 OFFICE O/P EST MOD 30 MIN: CPT | Performed by: NURSE PRACTITIONER

## 2018-04-26 RX ORDER — BUPRENORPHINE 7.5 UG/H
1 PATCH TRANSDERMAL WEEKLY
Qty: 4 PATCH | Refills: 2 | Status: SHIPPED | OUTPATIENT
Start: 2018-04-26 | End: 2018-07-19

## 2018-04-26 NOTE — PROGRESS NOTES
Assessment:  1  Chronic low back pain with bilateral sciatica, unspecified back pain laterality    2  Chronic right shoulder pain    3  Pain syndrome, chronic    4  Uncomplicated opioid dependence ([address] )    5  Chronic left lumbar radiculopathy        Plan:  While the patient was in the office today, I discussed with the patient that at this point time I agree that it would be in his best interest to try to find a job that is not as physical and just continue with his current medication regimen as prescribed  The patient was agreeable and verbalized an understanding  [place] Prescription Drug Monitoring Program report was reviewed and was appropriate     A urine drug screen was collected at today's office visit as part of our medication management protocol  The point of care testing results were appropriate for what was being prescribed  The specimen will be sent for confirmatory testing  The drug screen is medically necessary because the patient is either dependent on opioid medication or is being considered for opioid medication therapy and the results could impact ongoing or future treatment  The drug screen is to evaluate for the presences or absence of prescribed, non-prescribed, and/or illicit drugs/substances  There are risks associated with opioid medications, including dependence, addiction and tolerance  The patient understands and agrees to use these medications only as prescribed  Potential side effects of the medications include, but are not limited to, constipation, drowsiness, addiction, impaired judgment and risk of fatal overdose if not taken as prescribed  The patient was warned against driving while taking sedation medications  Sharing medications is a felony  At this point in time, the patient is showing no signs of addiction, abuse, diversion or suicidal ideation  The patient will follow-up in 12 weeks for medication prescription refill and reevaluation   The patient was advised to contact the office should their symptoms worsen in the interim  The patient was agreeable and verbalized an understanding  I attest that I have spent at least 25 minutes face to face with the patient and that at least 50% of the time was educating and/or discussing the patient's symptoms and treatment plan options until the patient was satisfied with the plan  History of Present Illness: The patient is a 43 y o  male last seen on 2/1/18 who presents for a follow up office visit in regards to chronic pain secondary to cervical, thoracic, and lumbar spondylosis with stenosis  The patient currently reports that since his last office visit his pain symptoms have worsened, however, he has started a new job in the past week or so which he was recently total was not going to of all of heavy lifting, come to find out a does and he is going to have to give up the job because he cannot continue to do heavy lifting on a daily basis  He reports that this point he is going to try to find 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, but does not require so much physical activity  Current pain medications includes:  Butrans patch 7 5 mcg, changing 1 patch every 7 days  The patient reports that this regimen is providing 30% pain relief  The patient is reporting no side effects from this pain medication regimen  Pain Contract Signed: 9/14/17  Last Urine Drug Screen: 4/26/18    I have personally reviewed and/or updated the patient's past medical history, past surgical history, family history, social history, current medications, allergies, and vital signs today  Review of Systems:    Review of Systems   Musculoskeletal: Positive for gait problem           Past Medical History:   Diagnosis Date    Anxiety     Bipolar disorder (HCC)     Chronic left lumbar radiculopathy     Chronic low back pain     Chronic pain syndrome     Chronic right shoulder pain     Depression with anxiety  Fatigue     GERD (gastroesophageal reflux disease)     Hydronephrosis     Iron deficiency anemia     Kidney stone     Lytic bone lesions on xray     Nephrolithiasis     PONV (postoperative nausea and vomiting)     Right elbow pain     Right lateral epicondylitis        Past Surgical History:   Procedure Laterality Date    ADENOIDECTOMY Bilateral     APPENDECTOMY      BACK SURGERY      GASTRIC BYPASS      2009    HI CYSTO/URETERO W/LITHOTRIPSY &INDWELL STENT INSRT Right 8/8/2017    Procedure: CYSTOSCOPY; URETEROSCOPY; HOLMIUM LASER; RETROGRADE PYELOGRAM; STENT;  Surgeon: Phineas Mohs, MD;  [facility];  Service: Urology    HI IMPLANT SPINAL NEUROSTIM/ Right 11/14/2017    Procedure: REMOVAL LOWER MEDIAL BUTTOCK SPINAL CORD STIMULATOR GENERATOR; PLACEMENT OF NEW BUTTOCK SPINAL CORD STIMULATOR THROUGH SEPERATE INCISION;  Surgeon: Radha Jack MD;  [facility];  Service: Neurosurgery    SPINAL CORD STIMULATOR IMPLANT      TONSILLECTOMY         Family History   Problem Relation Age of Onset    Cancer Mother     Cancer Father     Esophageal cancer Father     No Known Problems Sister     No Known Problems Sister     No Known Problems Sister        Social History     Occupational History    Not on file       Social History Main Topics    Smoking status: Current Every Day Smoker     Packs/day: 1 00     Types: Cigarettes    Smokeless tobacco: Never Used    Alcohol use No    Drug use: No    Sexual activity: Not on file         Current Outpatient Prescriptions:     ALPRAZolam (XANAX) 0 5 mg tablet, Take 1 tablet (0 5 mg total) by mouth every 4 (four) hours as needed for anxiety, Disp: 120 tablet, Rfl: 2    Buprenorphine (BUTRANS) 7 5 MCG/HR PTWK, Place 1 patch on the skin once a week, Disp: 4 patch, Rfl: 0    diclofenac sodium (VOLTAREN) 1 %, Apply 4 g topically 4 (four) times a day Apply 4 grams to right elbow QID PRN for pain, Disp: 300 Tube, Rfl: 2   HYDROcodone-acetaminophen (NORCO) 7 5-325 mg per tablet, Take 1 tablet by mouth 2 (two) times a day as needed for pain (For thumb pain) Max Daily Amount: 2 tablets, Disp: 30 tablet, Rfl: 0    HYDROcodone-acetaminophen (XODOL) 5-300 MG per tablet, , Disp: , Rfl:     omeprazole (PRILOSEC OTC) 20 MG tablet, Take 20 mg by mouth daily  , Disp: , Rfl:     QUEtiapine (SEROquel) 50 mg tablet, Take 1 tablet (50 mg total) by mouth daily at bedtime, Disp: 30 tablet, Rfl: 2    sertraline (ZOLOFT) 100 mg tablet, Take 1 tablet (100 mg total) by mouth daily at bedtime, Disp: 30 tablet, Rfl: 2    Allergies   Allergen Reactions    Ampicillin GI Intolerance     Vomiting    Aspirin GI Intolerance     vomiting      Penicillins GI Intolerance     Vomit       Physical Exam:    There were no vitals taken for this visit  Constitutional:normal, well developed, well nourished, alert, in no distress and non-toxic and no overt pain behavior  Eyes:anicteric  HEENT:grossly intact  Neck:supple, symmetric, trachea midline and no masses   Pulmonary:even and unlabored  Cardiovascular:No edema or pitting edema present  Skin:Normal without rashes or lesions and well hydrated  Psychiatric:Mood and affect appropriate  Neurologic:Cranial Nerves II-XII grossly intact  Musculoskeletal:normal      Imaging  No orders to display         No orders of the defined types were placed in this encounter

## 2018-05-02 ENCOUNTER — OFFICE VISIT (OUTPATIENT)
Dept: PSYCHIATRY | Facility: CLINIC | Age: 43
End: 2018-05-02
Payer: MEDICARE

## 2018-05-02 DIAGNOSIS — F33.2 MDD (MAJOR DEPRESSIVE DISORDER), RECURRENT SEVERE, WITHOUT PSYCHOSIS (HCC): ICD-10-CM

## 2018-05-02 PROCEDURE — 99213 OFFICE O/P EST LOW 20 MIN: CPT | Performed by: PSYCHIATRY & NEUROLOGY

## 2018-05-02 RX ORDER — ALPRAZOLAM 0.5 MG/1
0.5 TABLET ORAL EVERY 4 HOURS PRN
Qty: 120 TABLET | Refills: 2 | Status: SHIPPED | OUTPATIENT
Start: 2018-05-02 | End: 2019-01-19 | Stop reason: SDUPTHER

## 2018-05-02 RX ORDER — QUETIAPINE FUMARATE 100 MG/1
100 TABLET, FILM COATED ORAL
Qty: 30 TABLET | Refills: 2 | Status: SHIPPED | OUTPATIENT
Start: 2018-05-02 | End: 2018-07-24 | Stop reason: SDUPTHER

## 2018-05-02 RX ORDER — SERTRALINE HYDROCHLORIDE 100 MG/1
150 TABLET, FILM COATED ORAL
Qty: 45 TABLET | Refills: 2 | Status: SHIPPED | OUTPATIENT
Start: 2018-05-02 | End: 2018-08-13 | Stop reason: ALTCHOICE

## 2018-05-02 NOTE — PSYCH
Subjective: Medication Management      Patient ID: Bernardino Rousseau is a 43 y o  male  HPI ROS Appetite Changes and Sleep: normal appetite, decreased energy, no weight change and normal number of sleep hours   Patient is reporting anxiety and racing thoughts at night and often difficulties falling asleep  He agrees with dose increase on bedtime quetiapine  Mood is depressed as well  No recent health changes or new medications  Denies medications side effects  Review Of Systems:     Mood Anxiety, Depression and Emotional Lability   Behavior Normal    Thought Content Disturbing Thoughts, Feelings and Unreasonalbe or Irrational Fears   General Relationship Problems, Emotional Problems, Sleep Disturbances and Decreased Functioning   Personality Normal   Other Psych Symptoms Normal   Constitutional Negative   ENT Negative   Cardiovascular Negative   Respiratory Negative   Gastrointestinal Negative   Genitourinary Negative   Musculoskeletal Negative   Integumentary Negative   Neurological Negative   Endocrine Normal    Other Symptoms Normal              Laboratory Results: No results found for this or any previous visit  Substance Abuse History:  History   Drug Use No       Family Psychiatric History:   Family History   Problem Relation Age of Onset    Cancer Mother     Cancer Father     Esophageal cancer Father     No Known Problems Sister     No Known Problems Sister     No Known Problems Sister        The following portions of the patient's history were reviewed and updated as appropriate: allergies, current medications, past family history, past medical history, past social history, past surgical history and problem list     Social History     Social History    Marital status: Single     Spouse name: N/A    Number of children: N/A    Years of education: N/A     Occupational History    Not on file       Social History Main Topics    Smoking status: Current Every Day Smoker     Packs/day: 1 00 Types: Cigarettes    Smokeless tobacco: Never Used    Alcohol use No    Drug use: No    Sexual activity: Not on file     Other Topics Concern    Not on file     Social History Narrative    No narrative on file     Social History     Social History Narrative    No narrative on file       Objective:       Mental status:  Appearance calm and cooperative , adequate hygiene and grooming and good eye contact    Mood dysphoric, depressed and anxious   Affect affect was constricted   Speech a normal rate   Thought Processes coherent/organized and normal thought processes   Hallucinations no hallucinations present    Thought Content no delusions   Abnormal Thoughts no suicidal thoughts  and no homicidal thoughts    Orientation  oriented to person and place and time   Remote Memory short term memory intact and long term memory intact   Attention Span concentration impaired   Intellect Appears to be of Average Intelligence   Insight Limited insight   Judgement judgment was limited   Muscle Strength Muscle strength and tone were normal and Normal gait    Language no difficulty naming common objects, no difficulty repeating a phrase  and no difficulty writing a sentence    Fund of Knowledge displays adequate knowledge of current events, adequate fund of knowledge regarding past history and adequate fund of knowledge regarding vocabulary    Pain none   Pain Scale 0       Assessment/Plan:       Diagnoses and all orders for this visit:    MDD (major depressive disorder), recurrent severe, without psychosis ([facility] )  -     QUEtiapine (SEROquel) 100 mg tablet; Take 1 tablet (100 mg total) by mouth daily at bedtime  -     sertraline (ZOLOFT) 100 mg tablet; Take 1 5 tablets (150 mg total) by mouth daily at bedtime  -     ALPRAZolam (XANAX) 0 5 mg tablet;  Take 1 tablet (0 5 mg total) by mouth every 4 (four) hours as needed for anxiety            Treatment Recommendations- Risks Benefits      Immediate Medical/Psychiatric/Psychotherapy Treatments and Any Precautions: increase quetiapine to 100 mg qhs     Risks, Benefits And Possible Side Effects Of Medications:  {PSYCH RISK, BENEFITS AND POSSIBLE SIDE EFFECTS (Optional):10745    Controlled Medication Discussion: Discussed with patient Black Box warning on concurrent use of benzodiazepines and opioid medications including sedation, respiratory depression, coma and death  Patient understands the risk of treatment with benzodiazepines in addition to opioids and wants to continue taking those medications  , Discussed with patient the risks of sedation, respiratory depression, impairment of ability to drive and potential for abuse and addiction related to treatment with benzodiazepine medications  The patient understands risk of treatment with benzodiazepine medications, agrees to not drive if feels impaired and agrees to take medications as prescribed   and The patient has been filling controlled prescriptions on time as prescribed to [facility]

## 2018-05-03 ENCOUNTER — TELEPHONE (OUTPATIENT)
Dept: PSYCHIATRY | Facility: CLINIC | Age: 43
End: 2018-05-03

## 2018-05-03 NOTE — TELEPHONE ENCOUNTER
----- Message from Pascual Walker MD sent at 5/2/2018 12:29 PM EDT -----  Regarding: counseling appt  Patient will like to start counseling

## 2018-05-04 ENCOUNTER — TELEPHONE (OUTPATIENT)
Dept: PSYCHIATRY | Facility: CLINIC | Age: 43
End: 2018-05-04

## 2018-05-04 NOTE — TELEPHONE ENCOUNTER
----- Message from Fidencio Greenberg sent at 5/3/2018  9:23 AM EDT -----  Regarding: FW: counseling appt      ----- Message -----  From: Urszula Segura MD  Sent: 5/2/2018  12:29 PM  To:  Rodríguez Hi MA  Subject: counseling appt                                  Patient will like to start counseling

## 2018-05-10 ENCOUNTER — OFFICE VISIT (OUTPATIENT)
Dept: BEHAVIORAL/MENTAL HEALTH CLINIC | Facility: CLINIC | Age: 43
End: 2018-05-10
Payer: MEDICARE

## 2018-05-10 DIAGNOSIS — F43.10 PTSD (POST-TRAUMATIC STRESS DISORDER): Primary | ICD-10-CM

## 2018-05-10 DIAGNOSIS — F31.81 BIPOLAR 2 DISORDER, MAJOR DEPRESSIVE EPISODE (HCC): ICD-10-CM

## 2018-05-10 DIAGNOSIS — F41.8 DEPRESSION WITH ANXIETY: ICD-10-CM

## 2018-05-10 PROCEDURE — 90791 PSYCH DIAGNOSTIC EVALUATION: CPT | Performed by: SOCIAL WORKER

## 2018-05-10 NOTE — PSYCH
Assessment/Plan:      There are no diagnoses linked to this encounter  Subjective:      Patient ID: Jb Huitron is a 43 y o  male  HPI: "Madisyn Room"  Experiencing nightmares from past trauma  Involved with a crowd 2 years ago 'used to do a lot of bad things'  Fiance - dating 2 years  She has 3 boys all live together and get along well  Racing thoughts - find things around the house to distract  Do odd jobs  Has one biological father - doesn't communicate  Turned 18 last year  Very traumatic  Lost mother 2 years ago and dad year before that  Pre-morbid level of function and History of Present Illness: n/a  Previous Psychiatric/psychological treatment/year: n/a  Current Psychiatrist/Therapist: Dr Benjamín Calix and/or Partial and Other Community Resources Used (CTT, ICM, VNA): Outpatient  [facility]      Problem Assessment:     SOCIAL/VOCATION:  Family Constellation (include parents, relationship with each and pertinent Psych/Medical History):     Family History   Problem Relation Age of Onset    Cancer Mother     Cancer Father     Esophageal cancer Father     No Known Problems Sister     No Known Problems Sister     No Known Problems Sister          Sibling: sister Meseret, Bipolar (chose to stop taking meds)      Liliana Stevenson relates best to [employer]  he lives with Christopher Montana and her 3 kids  he does not live alone  Domestic Violence: No past history of domestic violence and There is no history of child abuse    Additional Comments related to family/relationships/peer support: "I keep to myself'  Spend time with Christopher oMntana and kids  School or Work History (strengths/limitations/needs):  On disability - got hurt at [facility]    Her highest grade level achieved was HS     history includes N/A    Financial status includes N/A    LEISURE ASSESSMENT (Include past and present hobbies/interests and level of involvement (Ex: Group/Club Affiliations): [employer]  his primary language is [place]  Preferred language is [place]  Ethnic considerations are Causcian  Religions affiliations and level of involvement Baptism  Does spirituality help you cope? Yes     HEALTH ASSESSMENT: PCP not notified     LEGAL: No Mental Health Advance Directive or Power of  on file      Risk Assessment:   The following ratings are based on my observation of this patient over the last 1 session    Risk of Harm to Self:   Demographic risk factors include   Historical Risk Factors include history of suicidal behaviors/attempts and self-mutilating behaviors  Recent Specific Risk Factors include feelings of guilt or self blame  Additional Factors for a Child or Adolescent N/A    Risk of Harm to Others:   Demographic Risk Factors include male  Historical Risk Factors include Exposed to trauma  Recent Specific Risk Factors include multiple stressors    Access to Weapons:   Maureen Salazar has access to the following weapons: NO  The following steps have been taken to ensure weapons are properly secured: N/A    Based on the above information, the client presents the following risk of harm to self or others:  low    The following interventions are recommended:   no intervention changes    Notes regarding this Risk Assessment: Acknowledged Suicide attempt 'years back'  Currently denied SI/HI or SIB          Review Of Systems:     Mood Normal   Behavior Normal    Thought Content Normal   General Normal    Personality Normal   Other Psych Symptoms Normal   Constitutional Normal   ENT Normal   Cardiovascular Normal    Respiratory Normal    Gastrointestinal Normal   Genitourinary Normal    Musculoskeletal Negative   Integumentary Normal    Neurological Normal    Endocrine Normal          Mental status:  Appearance calm and cooperative    Mood depressed   Affect affect was flat   Speech a normal rate   Thought Processes normal thought processes   Hallucinations no hallucinations present Thought Content no delusions   Abnormal Thoughts no suicidal thoughts    Orientation  oriented to person   Remote Memory short term memory intact and long term memory intact   Attention Span concentration impaired   Intellect Appears to be of Average Intelligence   Fund of Knowledge displays adequate knowledge of current events   Insight Insight intact   Judgement judgment was intact   Muscle Strength Muscle strength and tone were normal   Language no difficulty naming common objects   Pain none   Pain Scale 0

## 2018-05-22 DIAGNOSIS — F33.2 MDD (MAJOR DEPRESSIVE DISORDER), RECURRENT SEVERE, WITHOUT PSYCHOSIS (HCC): ICD-10-CM

## 2018-05-22 RX ORDER — SERTRALINE HYDROCHLORIDE 100 MG/1
100 TABLET, FILM COATED ORAL
Qty: 30 TABLET | Refills: 2 | OUTPATIENT
Start: 2018-05-22

## 2018-05-23 NOTE — TELEPHONE ENCOUNTER
Please contact [facility] pharmacy in [place]   They are requesting refill of Zoloft which was recently authorized by patient's psychiatrist   They should stop faxing automated refills on this medication since another physician is prescribing it  It can create a lot a confusion and med error      thanks

## 2018-06-15 ENCOUNTER — TELEPHONE (OUTPATIENT)
Dept: PSYCHIATRY | Facility: CLINIC | Age: 43
End: 2018-06-15

## 2018-06-15 NOTE — TELEPHONE ENCOUNTER
Patient called to cancel his appointment  He wanted you to know that he is doing well on his medications and would like you to give him a call as he would like to tell you so  He rescheduled his appointment for 8/29

## 2018-06-18 NOTE — TELEPHONE ENCOUNTER
Left voicemail message, left in the message that Dr Juarez Tran received the message he left and if he has any questions he can callback, callback number was provided

## 2018-06-28 ENCOUNTER — TELEPHONE (OUTPATIENT)
Dept: FAMILY MEDICINE CLINIC | Facility: CLINIC | Age: 43
End: 2018-06-28

## 2018-06-28 DIAGNOSIS — L25.5 DERMATITIS DUE TO PLANTS, INCLUDING POISON IVY, SUMAC, AND OAK: Primary | ICD-10-CM

## 2018-06-28 RX ORDER — PREDNISONE 10 MG/1
TABLET ORAL
Qty: 30 TABLET | Refills: 0 | Status: SHIPPED | OUTPATIENT
Start: 2018-06-28 | End: 2018-07-19

## 2018-06-29 NOTE — TELEPHONE ENCOUNTER
Patient's girlfriend Rex Collazo, called on call provider  Patient was also heard on the phone with Rex Collazo  Patient developed poison ivy which is spreading  It started on his arms and is now on his face and groin area  States he has had severe poison ivy in the past requiring cortisone injections  Prescription sent to [facility] for Prednisone taper to begin tonight  50 mg for 2 days, 40 mg for 2 days, 30 mg for 2 days, 20 mg for 2 days, and 10 mg for days  He will call if rash worsens or is not improving  Reviewed side effect profile of this medication  Always take with food

## 2018-07-09 ENCOUNTER — TELEPHONE (OUTPATIENT)
Dept: PAIN MEDICINE | Facility: MEDICAL CENTER | Age: 43
End: 2018-07-09

## 2018-07-09 DIAGNOSIS — M79.18 MYOFASCIAL PAIN SYNDROME: Primary | ICD-10-CM

## 2018-07-09 RX ORDER — TIZANIDINE 2 MG/1
TABLET ORAL
Qty: 90 TABLET | Refills: 1 | Status: SHIPPED | OUTPATIENT
Start: 2018-07-09 | End: 2018-07-30

## 2018-07-09 NOTE — TELEPHONE ENCOUNTER
Pt called stating that he is having a lot of pain  He is scheduled to see Patric on 7/19, but states that he cannot wait that long  Pt is using the patches and has his scs turned up to 7, but nothing seems to be helping with the pain  Pt would like a call back to discuss what he can do  Pt can be reached at [phone]

## 2018-07-09 NOTE — TELEPHONE ENCOUNTER
S/w pt, advised of above  Pt verbalized understanding and appreciation  Will cb prn  Pt stated that he contacted [employer] - would like to discuss alt settings at his ov  Pt stated that [employer] agreed to have a rep available at his 7/19 ov this time, however in the future, these arrangements should be made via his provider  Advised pt, will make DG aware  Pt verbalized understanding and appreciation

## 2018-07-09 NOTE — TELEPHONE ENCOUNTER
At this point, we try a muscle relaxer such as Tizanidine  I e-scribed a script for 2 mg QID PRN for pain/spasms  He should try that and give it some more time and use rest, ice, heat, and continue his Butrans patch as prescribed  He should see how the Tizanidine affects him before he drives or operates machinery  Thank you

## 2018-07-09 NOTE — TELEPHONE ENCOUNTER
S/w pt, stated that he was prescribed prednisone for poison ivy  Started 6/28, finished 2 days ago  Per pt, no help with pain symptoms  Advised pt, will d/w DG and cb to advise  Pt verbalized understanding and appreciation

## 2018-07-09 NOTE — TELEPHONE ENCOUNTER
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

## 2018-07-19 ENCOUNTER — OFFICE VISIT (OUTPATIENT)
Dept: FAMILY MEDICINE CLINIC | Facility: CLINIC | Age: 43
End: 2018-07-19
Payer: MEDICARE

## 2018-07-19 ENCOUNTER — TELEPHONE (OUTPATIENT)
Dept: UROLOGY | Facility: AMBULATORY SURGERY CENTER | Age: 43
End: 2018-07-19

## 2018-07-19 ENCOUNTER — OFFICE VISIT (OUTPATIENT)
Dept: PAIN MEDICINE | Facility: CLINIC | Age: 43
End: 2018-07-19
Payer: MEDICARE

## 2018-07-19 VITALS
DIASTOLIC BLOOD PRESSURE: 70 MMHG | HEIGHT: 72 IN | WEIGHT: 188 LBS | SYSTOLIC BLOOD PRESSURE: 110 MMHG | BODY MASS INDEX: 25.47 KG/M2 | HEART RATE: 66 BPM

## 2018-07-19 VITALS
SYSTOLIC BLOOD PRESSURE: 106 MMHG | HEIGHT: 72 IN | TEMPERATURE: 95.4 F | BODY MASS INDEX: 25.3 KG/M2 | WEIGHT: 186.8 LBS | HEART RATE: 68 BPM | RESPIRATION RATE: 14 BRPM | DIASTOLIC BLOOD PRESSURE: 58 MMHG

## 2018-07-19 DIAGNOSIS — R33.8 ACUTE URINARY RETENTION: Primary | ICD-10-CM

## 2018-07-19 DIAGNOSIS — G89.29 CHRONIC LOW BACK PAIN WITH BILATERAL SCIATICA, UNSPECIFIED BACK PAIN LATERALITY: Primary | ICD-10-CM

## 2018-07-19 DIAGNOSIS — G89.29 CHRONIC RIGHT SHOULDER PAIN: ICD-10-CM

## 2018-07-19 DIAGNOSIS — G89.4 PAIN SYNDROME, CHRONIC: ICD-10-CM

## 2018-07-19 DIAGNOSIS — Z01.818 PREOP TESTING: ICD-10-CM

## 2018-07-19 DIAGNOSIS — M54.41 CHRONIC LOW BACK PAIN WITH BILATERAL SCIATICA, UNSPECIFIED BACK PAIN LATERALITY: Primary | ICD-10-CM

## 2018-07-19 DIAGNOSIS — M54.16 CHRONIC LEFT LUMBAR RADICULOPATHY: ICD-10-CM

## 2018-07-19 DIAGNOSIS — M25.511 CHRONIC RIGHT SHOULDER PAIN: ICD-10-CM

## 2018-07-19 DIAGNOSIS — M96.1 LUMBAR POST-LAMINECTOMY SYNDROME: ICD-10-CM

## 2018-07-19 DIAGNOSIS — M54.42 CHRONIC LOW BACK PAIN WITH BILATERAL SCIATICA, UNSPECIFIED BACK PAIN LATERALITY: Primary | ICD-10-CM

## 2018-07-19 DIAGNOSIS — M79.18 MYOFASCIAL PAIN SYNDROME: ICD-10-CM

## 2018-07-19 PROCEDURE — 95971 ALYS SMPL SP/PN NPGT W/PRGRM: CPT | Performed by: NURSE PRACTITIONER

## 2018-07-19 PROCEDURE — 99213 OFFICE O/P EST LOW 20 MIN: CPT | Performed by: FAMILY MEDICINE

## 2018-07-19 PROCEDURE — 99214 OFFICE O/P EST MOD 30 MIN: CPT | Performed by: NURSE PRACTITIONER

## 2018-07-19 RX ORDER — BUPRENORPHINE 10 UG/H
PATCH TRANSDERMAL
Qty: 4 PATCH | Refills: 2 | Status: SHIPPED | OUTPATIENT
Start: 2018-07-19 | End: 2018-08-20

## 2018-07-19 NOTE — PROGRESS NOTES
Assessment:  1  Chronic low back pain with bilateral sciatica, unspecified back pain laterality    2  Chronic left lumbar radiculopathy    3  Pain syndrome, chronic    4  Lumbar post-laminectomy syndrome    5  Preop testing    6  Chronic right shoulder pain    7  Myofascial pain syndrome        Plan:  While the patient was in the office today, I did have a thorough conversation with the patient regarding his medication regimen and treatment plan  I explained to the patient at this point since it has been quite sometime since he has had any updated imaging and his pain and symptoms have changed and are worsening, that it would be beneficial to proceed with an MRI of the lumbar sacral spine with without contrast to better evaluate any new or worsening underlying etiology  I explained to the patient that once we receive the results of the MRI, our office will give him a call to review the results and discuss any other treatment plan recommendations, which may include an epidural steroid injection with Dr Tory Miller  The patient was agreeable and verbalized an understanding  I discussed with the patient that at this point I feel would be in his best interest to proceed with the stimulator reprogramming with the [facility] [place] staff as discussed  While the patient was in the office today, simple spinal cord stimulator reprogramming was completed by the [employer] representative  under my direct supervision  Programs, leads, battery, and overall functioning of the stimulator device was evaluated during the re-programming session today  We will continue to evaluate the patient's progress with the stimulation changes made today a follow up with re-programming needs/sessions in the future as needed  The [employer] representative will follow up with the patient over the next 2 weeks to see how he is doing              With regards to his medication regimen, explained to the patient at this point time since he is still on a lower dose of the Butrans patch, I feel would be beneficial to further titrate the doses to the 10 mcg patch to help with the increase in pain for now and he can continue with the tizanidine as prescribed  I advised the patient that if they experience any side effects or issues with the changes in their medication regiment, they should give our office a call to discuss  I also advised the patient not to drive or operate machinery until they see how the changes in the medication regimen affects them  The patient was agreeable and verbalized an understanding  [place] Prescription Drug Monitoring Program report was reviewed and was appropriate      The patient was not picked for a medication count today, but he did bring his medications as required  There are risks associated with opioid medications, including dependence, addiction and tolerance  The patient understands and agrees to use these medications only as prescribed  Potential side effects of the medications include, but are not limited to, constipation, drowsiness, addiction, impaired judgment and risk of fatal overdose if not taken as prescribed  The patient was warned against driving while taking sedation medications  Sharing medications is a felony  At this point in time, the patient is showing no signs of addiction, abuse, diversion or suicidal ideation  The patient will follow-up in 12 weeks for medication prescription refill and reevaluation  The patient was advised to contact the office should their symptoms worsen in the interim  The patient was agreeable and verbalized an understanding  History of Present Illness: The patient is a 43 y o  male last seen on 4/26/18 who presents for a follow up office visit in regards to chronic pain secondary to lumbar post-laminectomy syndrome    The patient currently reports that his low back and leg pain has worsened since his last office visit as he is currently participating in a government Program to try to get him back to work in some capacity  However, 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 and it had to be grabbed quickly and when he did so he felt a pop" in his back and ever since then his pain has been worse and the radicular symptoms down his left leg have definitely worsened  He denies any signs or symptoms of cauda equina syndrome but is convinced that there is something wrong  The patient presents today for stimulator reprogramming to see if they can better capture coverage of his back and leg pain  He presents today to discuss his medication regimen treatment plan  Current pain medications includes:  Butrans patch 7 5 mcg applying 1 patch transdermally every 7 days and tizanidine 2 mg q i d  p r n  for spasms  The patient reports that this regimen is providing 10% pain relief  The patient is reporting no side effects from this pain medication regimen  Pain Contract Signed: 9/14/17  Last Urine Drug Screen: 4/26/18    I have personally reviewed and/or updated the patient's past medical history, past surgical history, family history, social history, current medications, allergies, and vital signs today  Review of Systems:    Review of Systems   Respiratory: Negative for shortness of breath  Cardiovascular: Negative for chest pain  Gastrointestinal: Negative for constipation, diarrhea, nausea and vomiting  Musculoskeletal: Positive for gait problem  Negative for arthralgias, joint swelling and myalgias  Skin: Negative for rash  Neurological: Negative for dizziness, seizures and weakness  All other systems reviewed and are negative          Past Medical History:   Diagnosis Date    Anxiety     Arthritis     Bipolar disorder (HCC)     Chronic left lumbar radiculopathy     Chronic low back pain     Chronic pain syndrome     Chronic right shoulder pain     Depression with anxiety     Fatigue     GERD (gastroesophageal reflux disease)     Hydronephrosis     Iron deficiency anemia     Kidney stone     Lytic bone lesions on xray     Nephrolithiasis     PONV (postoperative nausea and vomiting)     Right elbow pain     Right lateral epicondylitis        Past Surgical History:   Procedure Laterality Date    ADENOIDECTOMY Bilateral     APPENDECTOMY      BACK SURGERY      GASTRIC BYPASS      2009    OTHER SURGICAL HISTORY      fusion/refusion of vertebrae, 2010 and 2011 l5-s1 and l4-l5    CT CYSTO/URETERO W/LITHOTRIPSY &INDWELL STENT INSRT Right 8/8/2017    Procedure: CYSTOSCOPY; URETEROSCOPY; HOLMIUM LASER; RETROGRADE PYELOGRAM; STENT;  Surgeon: Joel Rdz MD;  Location[facility];  Service: Urology    CT IMPLANT SPINAL NEUROSTIM/ Right 11/14/2017    Procedure: REMOVAL LOWER MEDIAL BUTTOCK SPINAL CORD STIMULATOR GENERATOR; PLACEMENT OF NEW BUTTOCK SPINAL CORD STIMULATOR THROUGH SEPERATE INCISION;  Surgeon: Alex Benitez MD;  Location: [facility];  Service: Neurosurgery    RIK-EN-Y PROCEDURE  2003    SPINAL CORD STIMULATOR IMPLANT  11/14/2017    dr Penny Right procedure and technique 1  removal of a right back implantable pulse generator 2 placement of a new right buttock impantable pulse generator through seperate incision 3 electric analys complex programming spinal cord stimulator system postoperative period approx 1 hour    TONSILLECTOMY         Family History   Problem Relation Age of Onset    Cancer Mother     Arthritis Mother     Stomach cancer Mother     Cancer Father     Esophageal cancer Father     Other Father         brain tumor    No Known Problems Sister     No Known Problems Sister     No Known Problems Sister        Social History     Occupational History    Not on file       Social History Main Topics    Smoking status: Current Every Day Smoker     Packs/day: 1 00     Types: Cigarettes    Smokeless tobacco: Never Used    Alcohol use No      Comment: quit drinking, social    Drug use: No    Sexual activity: Not on file         Current Outpatient Prescriptions:     ALPRAZolam (XANAX) 0 5 mg tablet, Take 1 tablet (0 5 mg total) by mouth every 4 (four) hours as needed for anxiety, Disp: 120 tablet, Rfl: 2    Buprenorphine (BUTRANS) 7 5 MCG/HR PTWK, Place 1 patch on the skin once a week, Disp: 4 patch, Rfl: 2    omeprazole (PRILOSEC OTC) 20 MG tablet, Take 20 mg by mouth daily  , Disp: , Rfl:     QUEtiapine (SEROquel) 100 mg tablet, Take 1 tablet (100 mg total) by mouth daily at bedtime, Disp: 30 tablet, Rfl: 2    sertraline (ZOLOFT) 100 mg tablet, Take 1 5 tablets (150 mg total) by mouth daily at bedtime, Disp: 45 tablet, Rfl: 2    tiZANidine (ZANAFLEX) 2 mg tablet, Take 1 PO QID PRN for pain/spasms  , Disp: 90 tablet, Rfl: 1    Allergies   Allergen Reactions    Ampicillin GI Intolerance     Vomiting    Aspirin GI Intolerance     vomiting      Penicillins GI Intolerance     Vomit       Physical Exam:    There were no vitals taken for this visit  Constitutional:normal, well developed, well nourished, alert, in no distress and non-toxic and no overt pain behavior  Eyes:anicteric  HEENT:grossly intact  Neck:supple, symmetric, trachea midline and no masses   Pulmonary:even and unlabored  Cardiovascular:No edema or pitting edema present  Skin:Normal without rashes or lesions and well hydrated  Psychiatric:Mood and affect appropriate  Neurologic:Cranial Nerves II-XII grossly intact  Musculoskeletal:Slightly antalgic, but steady gait without the use of any assistive devices  Imaging  No orders to display         No orders of the defined types were placed in this encounter

## 2018-07-19 NOTE — TELEPHONE ENCOUNTER
Patient's PCP called to see if patient could be seen today for urinary retention  Per Dr Chris García, patient has urinated x2 in the past 3 days  Takes very long time to initiate stream   Offered to see patient for RN visit and perform PVR and insert Jensen if appropriate  PCP asked patient and advised RN that neither she or the patient are comfortable with patient just seeing a RN and not a provider  Patient will proceed to ER instead

## 2018-07-19 NOTE — TELEPHONE ENCOUNTER
Patient left message on RN voicemail stating he is able to urinate, but it takes a long time to initiate a stream   He would like to make an appt  Patient is currently in ER, will wait until discharged

## 2018-07-19 NOTE — PROGRESS NOTES
FAMILY PRACTICE OFFICE VISIT       NAME: Elizabeth Saxena  AGE: 43 y o  SEX: male       : 1975        MRN: [id number]    DATE: 2018  TIME: 1:57 PM    Assessment and Plan     Problem List Items Addressed This Visit     None      Visit Diagnoses     Acute urinary retention    -  Primary    Relevant Orders    Transfer to other facility       Patient presents for evaluation of acute urinary retention  I contacted [facility]'s Urology group, unfortunately there is no available mid level provider physician to see him on an urgent basis  With that in mind, patient will proceed to emergency room of [facility] for further evaluation and treatment  He will likely need catheter and Flomax as well as PVR measure  Patient understands instructions and agrees  There are no Patient Instructions on file for this visit  Chief Complaint     Chief Complaint   Patient presents with    Difficulty Urinating     Patient is here c/o difficulty urinating x's 3 days which is getting progressively worse  History of Present Illness     Difficulty urinating for 3 days ago  Patient urinates with significant difficulty in sitting or standing position  Takes up 30- 40 minutes per day    Urinated once yesterday only   Urinated x 1 this am  C/o lower abdominal pressure   no fever or chills  Worsening of lower back pain lately, no flank pain  Patient is not able to provide urine sample today          Difficulty Urinating          Review of Systems   Review of Systems   Constitutional: Negative  Respiratory: Negative  Cardiovascular: Negative  Genitourinary: Positive for difficulty urinating  Negative for dysuria  Musculoskeletal: Positive for back pain         Active Problem List     Patient Active Problem List   Diagnosis    Chronic low back pain    Chronic left lumbar radiculopathy    Bipolar 2 disorder, major depressive episode ([facility] [address] )    Chronic right shoulder pain    Depression with anxiety  Fatigue    Hydronephrosis    Iron deficiency anemia    Lytic bone lesions on xray    Nephrolithiasis    Pain syndrome, chronic    Right elbow pain    Right lateral epicondylitis    Uncomplicated opioid dependence ([address] )    Thumb pain       Past Medical History:  Past Medical History:   Diagnosis Date    Anxiety     Arthritis     Bipolar disorder (HCC)     Chronic left lumbar radiculopathy     Chronic low back pain     Chronic pain syndrome     Chronic right shoulder pain     Depression with anxiety     Fatigue     GERD (gastroesophageal reflux disease)     Hydronephrosis     Iron deficiency anemia     Kidney stone     Lytic bone lesions on xray     Nephrolithiasis     PONV (postoperative nausea and vomiting)     Right elbow pain     Right lateral epicondylitis        Past Surgical History:  Past Surgical History:   Procedure Laterality Date    ADENOIDECTOMY Bilateral     APPENDECTOMY      BACK SURGERY      GASTRIC BYPASS      2009    OTHER SURGICAL HISTORY      fusion/refusion of vertebrae, 2010 and 2011 l5-s1 and l4-l5    ME CYSTO/URETERO W/LITHOTRIPSY &INDWELL STENT INSRT Right 8/8/2017    Procedure: CYSTOSCOPY; URETEROSCOPY; HOLMIUM LASER; RETROGRADE PYELOGRAM; STENT;  Surgeon: Tiffany Everett MD;  [facility];  Service: Urology    ME IMPLANT SPINAL NEUROSTIM/ Right 11/14/2017    Procedure: REMOVAL LOWER MEDIAL BUTTOCK SPINAL CORD STIMULATOR GENERATOR; PLACEMENT OF NEW BUTTOCK SPINAL CORD STIMULATOR THROUGH SEPERATE INCISION;  Surgeon: Rian Salomon MD;  [facility];  Service: Neurosurgery    RIK-EN-Y PROCEDURE  2003    SPINAL CORD STIMULATOR IMPLANT  11/14/2017    dr Roach Ask procedure and technique 1  removal of a right back implantable pulse generator 2 placement of a new right buttock impantable pulse generator through seperate incision 3 electric analys complex programming spinal cord stimulator system postoperative period approx 1 hour    TONSILLECTOMY         Family History:  Family History   Problem Relation Age of Onset    Cancer Mother     Arthritis Mother     Stomach cancer Mother     Cancer Father     Esophageal cancer Father     Other Father         brain tumor    No Known Problems Sister     No Known Problems Sister     No Known Problems Sister        Social History:  Social History     Social History    Marital status:      Spouse name: N/A    Number of children: N/A    Years of education: GED     Occupational History    Not on file  Social History Main Topics    Smoking status: Current Every Day Smoker     Packs/day: 1 00     Types: Cigarettes    Smokeless tobacco: Never Used    Alcohol use No      Comment: quit drinking, social    Drug use: No    Sexual activity: Not on file     Other Topics Concern    Not on file     Social History Narrative    Chooses not to have children    Drinks caffienated tea    Lives with spouse                   Objective     Vitals:    07/19/18 1316   BP: 106/58   Pulse: 68   Resp: 14   Temp: (!) 95 4 °F (35 2 °C)     Wt Readings from Last 3 Encounters:   07/19/18 84 7 kg (186 lb 12 8 oz)   04/26/18 89 8 kg (198 lb)   02/01/18 85 1 kg (187 lb 9 6 oz)       Physical Exam   Constitutional: He is oriented to person, place, and time  He appears well-developed and well-nourished  HENT:   Head: Normocephalic and atraumatic  Eyes: Conjunctivae are normal    Neck: Neck supple  Carotid bruit is not present  Cardiovascular: Normal rate, regular rhythm and normal heart sounds  No murmur heard  Pulmonary/Chest: Effort normal and breath sounds normal  No respiratory distress  He has no wheezes  He has no rales  Abdominal: Normal appearance and bowel sounds are normal  He exhibits no distension and no abdominal bruit  There is tenderness in the right lower quadrant, suprapubic area and left lower quadrant  There is no rigidity, no guarding and no CVA tenderness     Musculoskeletal: Normal range of motion  Neurological: He is alert and oriented to person, place, and time  Psychiatric: He has a normal mood and affect  His behavior is normal    Nursing note and vitals reviewed        Pertinent Laboratory/Diagnostic Studies:  Lab Results   Component Value Date    BUN 20 11/02/2017    CREATININE 0 91 11/02/2017    CALCIUM 9 4 11/02/2017     11/02/2017    K 4 7 11/02/2017    CO2 31 11/02/2017     11/02/2017     Lab Results   Component Value Date    ALT 11 (L) 08/04/2017    AST 8 08/04/2017    ALKPHOS 66 08/04/2017    BILITOT 0 31 08/04/2017       Lab Results   Component Value Date    WBC 4 93 10/25/2017    HGB 11 5 (L) 10/25/2017    HCT 37 7 10/25/2017    MCV 66 (L) 10/25/2017     10/25/2017       No results found for: TSH    Lab Results   Component Value Date    CHOL 129 08/04/2017     Lab Results   Component Value Date    TRIG 80 08/04/2017     Lab Results   Component Value Date    HDL 47 08/04/2017     Lab Results   Component Value Date    LDLCALC 66 08/04/2017     Lab Results   Component Value Date    HGBA1C 6 0 11/02/2017       Results for orders placed or performed in visit on 52/10/91   Basic metabolic panel   Result Value Ref Range    Sodium 137 136 - 145 mmol/L    Potassium 4 7 3 5 - 5 3 mmol/L    Chloride 103 100 - 108 mmol/L    CO2 31 21 - 32 mmol/L    Anion Gap 3 (L) 4 - 13 mmol/L    BUN 20 5 - 25 mg/dL    Creatinine 0 91 0 60 - 1 30 mg/dL    Glucose, Fasting 80 65 - 99 mg/dL    Calcium 9 4 8 3 - 10 1 mg/dL    eGFR 104 ml/min/1 73sq m   Protime-INR   Result Value Ref Range    Protime 14 1 12 1 - 14 4 seconds    INR 1 09 0 86 - 1 16   APTT   Result Value Ref Range    PTT 33 23 - 35 seconds   Hemoglobin A1c   Result Value Ref Range    Hemoglobin A1C 6 0 4 2 - 6 3 %     mg/dl       Orders Placed This Encounter   Procedures    Transfer to other facility       ALLERGIES:  Allergies   Allergen Reactions    Ampicillin GI Intolerance     Vomiting    Aspirin GI Intolerance     vomiting      Penicillins GI Intolerance     Vomit       Current Medications     Current Outpatient Prescriptions   Medication Sig Dispense Refill    ALPRAZolam (XANAX) 0 5 mg tablet Take 1 tablet (0 5 mg total) by mouth every 4 (four) hours as needed for anxiety 120 tablet 2    Buprenorphine (BUTRANS) 7 5 MCG/HR PTWK Place 1 patch on the skin once a week 4 patch 2    omeprazole (PRILOSEC OTC) 20 MG tablet Take 20 mg by mouth daily        QUEtiapine (SEROquel) 100 mg tablet Take 1 tablet (100 mg total) by mouth daily at bedtime 30 tablet 2    sertraline (ZOLOFT) 100 mg tablet Take 1 5 tablets (150 mg total) by mouth daily at bedtime 45 tablet 2    tiZANidine (ZANAFLEX) 2 mg tablet Take 1 PO QID PRN for pain/spasms  90 tablet 1     No current facility-administered medications for this visit  Health Maintenance     Health Maintenance   Topic Date Due    HIV SCREENING  1975    PNEUMOCOCCAL POLYSACCHARIDE VACCINE AGE 2-64 HIGH RISK  08/22/1977    DTaP,Tdap,and Td Vaccines (1 - Tdap) 08/22/1996    INFLUENZA VACCINE  09/01/2018       There is no immunization history on file for this patient      Cherylene Quarry, MD

## 2018-07-20 ENCOUNTER — PROCEDURE VISIT (OUTPATIENT)
Dept: UROLOGY | Facility: AMBULATORY SURGERY CENTER | Age: 43
End: 2018-07-20

## 2018-07-20 DIAGNOSIS — N40.1 BENIGN PROSTATIC HYPERPLASIA WITH INCOMPLETE BLADDER EMPTYING: Primary | ICD-10-CM

## 2018-07-20 DIAGNOSIS — R39.14 BENIGN PROSTATIC HYPERPLASIA WITH INCOMPLETE BLADDER EMPTYING: Primary | ICD-10-CM

## 2018-07-20 PROCEDURE — 99213 OFFICE O/P EST LOW 20 MIN: CPT | Performed by: NURSE PRACTITIONER

## 2018-07-20 RX ORDER — TAMSULOSIN HYDROCHLORIDE 0.4 MG/1
0.4 CAPSULE ORAL
Qty: 30 CAPSULE | Refills: 11 | Status: SHIPPED | OUTPATIENT
Start: 2018-07-20 | End: 2018-08-13

## 2018-07-20 NOTE — PROGRESS NOTES
7/20/2018    Yenni Puckett  1975  [id number]        Assessment  BPH with incomplete bladder emptying  Nephrolithiasis s/p right ureteroscopy (8/2017)    Discussion  Evonne Hernandez is a 43 y o  male being managed by Dr Romo  A bladder ultrasound was obtained today in the office and PVR was 100 ml  We discussed that the patient does not require Jensen catheterization however he may benefit from the use of an alpha-blocker  Use and side effects reviewed and he is agreeable  He was prescribed Flomax  He will return in 6 weeks for follow-up  Repeat PVR will be obtained at that time  He has tried to call with any issues  All questions were answered  History of Present Illness  43 y o  male with a history of nephrolithiasis status post right ureteroscopy (08/2017) presents today with complaints of urinary hesitancy and weak stream   He states that over the past few weeks his urinary symptoms have progressively worsened  States it takes long amount of time to start his stream   His stream is very weak  He denies any dysuria or gross hematuria  Denies any other complaints  He denies any stone episodes since his last visit  Review of Systems  Review of Systems   Constitutional: Negative  HENT: Negative  Respiratory: Negative  Cardiovascular: Negative  Gastrointestinal: Negative  Genitourinary:        As per HPI   Musculoskeletal: Negative  Skin: Negative  Neurological: Negative  Hematological: Negative            Past Medical History  Past Medical History:   Diagnosis Date    Anxiety     Arthritis     Bipolar disorder (HCC)     Chronic left lumbar radiculopathy     Chronic low back pain     Chronic pain syndrome     Chronic right shoulder pain     Depression with anxiety     Fatigue     GERD (gastroesophageal reflux disease)     Hydronephrosis     Iron deficiency anemia     Kidney stone     Lytic bone lesions on xray     Nephrolithiasis     PONV (postoperative nausea and vomiting)     Right elbow pain     Right lateral epicondylitis        Past Surgical History  Past Surgical History:   Procedure Laterality Date    ADENOIDECTOMY Bilateral     APPENDECTOMY      BACK SURGERY      GASTRIC BYPASS      2009    OTHER SURGICAL HISTORY      fusion/refusion of vertebrae, 2010 and 2011 l5-s1 and l4-l5    GA CYSTO/URETERO W/LITHOTRIPSY &INDWELL STENT INSRT Right 8/8/2017    Procedure: CYSTOSCOPY; URETEROSCOPY; HOLMIUM LASER; RETROGRADE PYELOGRAM; STENT;  Surgeon: Eric Finney MD;  [facility];  Service: Urology    GA IMPLANT SPINAL NEUROSTIM/ Right 11/14/2017    Procedure: REMOVAL LOWER MEDIAL BUTTOCK SPINAL CORD STIMULATOR GENERATOR; PLACEMENT OF NEW BUTTOCK SPINAL CORD STIMULATOR THROUGH SEPERATE INCISION;  Surgeon: Shahida Vera MD;  [facility];  Service: Neurosurgery    RIK-EN-Y PROCEDURE  2003    SPINAL CORD STIMULATOR IMPLANT  11/14/2017    dr Noreen Espinoza procedure and technique 1  removal of a right back implantable pulse generator 2 placement of a new right buttock impantable pulse generator through seperate incision 3 electric analys complex programming spinal cord stimulator system postoperative period approx 1 hour    TONSILLECTOMY          Past Family History  Family History   Problem Relation Age of Onset    Cancer Mother     Arthritis Mother     Stomach cancer Mother     Cancer Father     Esophageal cancer Father     Other Father         brain tumor    No Known Problems Sister     No Known Problems Sister     No Known Problems Sister        Past Social history  Social History     Social History    Marital status:      Spouse name: N/A    Number of children: N/A    Years of education: GED     Occupational History    Not on file       Social History Main Topics    Smoking status: Current Every Day Smoker     Packs/day: 1 00     Types: Cigarettes    Smokeless tobacco: Never Used    Alcohol use No Comment: quit drinking, social    Drug use: No    Sexual activity: Not on file     Other Topics Concern    Not on file     Social History Narrative    Chooses not to have children    Drinks caffienated tea    Lives with spouse                   Current Medications  Current Outpatient Prescriptions   Medication Sig Dispense Refill    ALPRAZolam (XANAX) 0 5 mg tablet Take 1 tablet (0 5 mg total) by mouth every 4 (four) hours as needed for anxiety 120 tablet 2    Buprenorphine (BUTRANS) 10 MCG/HR PTWK Apply 1 patch TD every 7 days  4 patch 2    omeprazole (PRILOSEC OTC) 20 MG tablet Take 20 mg by mouth daily        QUEtiapine (SEROquel) 100 mg tablet Take 1 tablet (100 mg total) by mouth daily at bedtime 30 tablet 2    sertraline (ZOLOFT) 100 mg tablet Take 1 5 tablets (150 mg total) by mouth daily at bedtime 45 tablet 2    tamsulosin (FLOMAX) 0 4 mg Take 1 capsule (0 4 mg total) by mouth daily with dinner 30 capsule 11    tiZANidine (ZANAFLEX) 2 mg tablet Take 1 PO QID PRN for pain/spasms  90 tablet 1     No current facility-administered medications for this visit  Allergies  Allergies   Allergen Reactions    Ampicillin GI Intolerance     Vomiting    Aspirin GI Intolerance     vomiting      Penicillins GI Intolerance     Vomit       Past Medical History, Social History, Family History, medications and allergies were reviewed and updated as appropriate  Vitals  There were no vitals filed for this visit  Physical Exam  Skin: warm, dry, intact  Pulmonary: Non-labored breathing  Abdomen: Soft, non-tender, non-distended  Musculoskeletal: AROM with no joint deformity or tenderness    Neurology: Alert and oriented        Results    Lab Results   Component Value Date    CALCIUM 9 4 11/02/2017     11/02/2017    K 4 7 11/02/2017    CO2 31 11/02/2017     11/02/2017    BUN 20 11/02/2017    CREATININE 0 91 11/02/2017     Lab Results   Component Value Date    WBC 4 93 10/25/2017    HGB 11 5 (L) 10/25/2017    HCT 37 7 10/25/2017    MCV 66 (L) 10/25/2017     10/25/2017

## 2018-07-20 NOTE — TELEPHONE ENCOUNTER
Per [facility], patient left the ER without being seen  Called patient to schedule appt here, but received voicemail  Left message to call back and schedule appt

## 2018-07-21 ENCOUNTER — APPOINTMENT (OUTPATIENT)
Dept: LAB | Facility: HOSPITAL | Age: 43
End: 2018-07-21
Payer: MEDICARE

## 2018-07-21 DIAGNOSIS — Z01.818 PREOP TESTING: ICD-10-CM

## 2018-07-21 LAB
BUN SERPL-MCNC: 16 MG/DL (ref 5–25)
CREAT SERPL-MCNC: 0.94 MG/DL (ref 0.6–1.3)
GFR SERPL CREATININE-BSD FRML MDRD: 100 ML/MIN/1.73SQ M

## 2018-07-21 PROCEDURE — 36415 COLL VENOUS BLD VENIPUNCTURE: CPT

## 2018-07-21 PROCEDURE — 84520 ASSAY OF UREA NITROGEN: CPT

## 2018-07-21 PROCEDURE — 82565 ASSAY OF CREATININE: CPT

## 2018-07-23 ENCOUNTER — TELEPHONE (OUTPATIENT)
Dept: PAIN MEDICINE | Facility: CLINIC | Age: 43
End: 2018-07-23

## 2018-07-23 NOTE — TELEPHONE ENCOUNTER
Message # [id number]  07:27p [AM]   [facility] [address] [facility] [place] [address]   PHONE: [phone]   ----------------------------------------------------------------------   Dr:NORA   Pt Name:CARMITA Skys   :[birth year]   Emerg?:Y   Msg:NEEDS NEW SCRIPT SENT TO [facility] INSURANCE DOES   NOT COVER IT  (Message Delivered)   ------------ CHILO TOM------------- :   2018 07:34p AM   TEXT TO CELL DR MATTHEW @ 7:34 PM

## 2018-07-23 NOTE — TELEPHONE ENCOUNTER
Left a detailed message on machine as per release of info on file advising of above  Provided cb number and office hours

## 2018-07-23 NOTE — TELEPHONE ENCOUNTER
Spoke to patient on Friday evening  States that he needs a prior authorization for the buprenorphine patches  I stated that the office was closed and that this could be taken care of Monday  Please follow up with patient today

## 2018-07-23 NOTE — TELEPHONE ENCOUNTER
I called the patient's pharmacy and his insurance only covers the brand name Butrans not the generic, so they were able to fill it  Can you please call the patient and let him know his script will be ready today  Thank you

## 2018-07-24 DIAGNOSIS — F33.2 MDD (MAJOR DEPRESSIVE DISORDER), RECURRENT SEVERE, WITHOUT PSYCHOSIS (HCC): ICD-10-CM

## 2018-07-24 RX ORDER — QUETIAPINE FUMARATE 100 MG/1
100 TABLET, FILM COATED ORAL
Qty: 30 TABLET | Refills: 2 | Status: ON HOLD | OUTPATIENT
Start: 2018-07-24 | End: 2018-10-28 | Stop reason: SDUPTHER

## 2018-07-25 ENCOUNTER — TELEPHONE (OUTPATIENT)
Dept: BEHAVIORAL/MENTAL HEALTH CLINIC | Facility: CLINIC | Age: 43
End: 2018-07-25

## 2018-07-30 ENCOUNTER — TELEPHONE (OUTPATIENT)
Dept: PAIN MEDICINE | Facility: MEDICAL CENTER | Age: 43
End: 2018-07-30

## 2018-07-30 ENCOUNTER — OFFICE VISIT (OUTPATIENT)
Dept: PAIN MEDICINE | Facility: CLINIC | Age: 43
End: 2018-07-30
Payer: MEDICARE

## 2018-07-30 VITALS
BODY MASS INDEX: 25.47 KG/M2 | DIASTOLIC BLOOD PRESSURE: 70 MMHG | WEIGHT: 188 LBS | HEART RATE: 72 BPM | SYSTOLIC BLOOD PRESSURE: 124 MMHG | HEIGHT: 72 IN

## 2018-07-30 DIAGNOSIS — G89.4 PAIN SYNDROME, CHRONIC: ICD-10-CM

## 2018-07-30 DIAGNOSIS — M54.16 CHRONIC LEFT LUMBAR RADICULOPATHY: Primary | ICD-10-CM

## 2018-07-30 DIAGNOSIS — M96.1 LUMBAR POST-LAMINECTOMY SYNDROME: ICD-10-CM

## 2018-07-30 DIAGNOSIS — M79.18 MYOFASCIAL PAIN SYNDROME: ICD-10-CM

## 2018-07-30 DIAGNOSIS — M54.41 CHRONIC LOW BACK PAIN WITH BILATERAL SCIATICA, UNSPECIFIED BACK PAIN LATERALITY: ICD-10-CM

## 2018-07-30 DIAGNOSIS — M54.42 CHRONIC LOW BACK PAIN WITH BILATERAL SCIATICA, UNSPECIFIED BACK PAIN LATERALITY: Primary | ICD-10-CM

## 2018-07-30 DIAGNOSIS — M54.16 CHRONIC LEFT LUMBAR RADICULOPATHY: ICD-10-CM

## 2018-07-30 DIAGNOSIS — M54.41 CHRONIC LOW BACK PAIN WITH BILATERAL SCIATICA, UNSPECIFIED BACK PAIN LATERALITY: Primary | ICD-10-CM

## 2018-07-30 DIAGNOSIS — G89.29 CHRONIC LOW BACK PAIN WITH BILATERAL SCIATICA, UNSPECIFIED BACK PAIN LATERALITY: ICD-10-CM

## 2018-07-30 DIAGNOSIS — M54.42 CHRONIC LOW BACK PAIN WITH BILATERAL SCIATICA, UNSPECIFIED BACK PAIN LATERALITY: ICD-10-CM

## 2018-07-30 DIAGNOSIS — G89.29 CHRONIC LOW BACK PAIN WITH BILATERAL SCIATICA, UNSPECIFIED BACK PAIN LATERALITY: Primary | ICD-10-CM

## 2018-07-30 PROCEDURE — 99214 OFFICE O/P EST MOD 30 MIN: CPT | Performed by: NURSE PRACTITIONER

## 2018-07-30 RX ORDER — HYDROCODONE BITARTRATE AND ACETAMINOPHEN 7.5; 325 MG/1; MG/1
TABLET ORAL
Qty: 45 TABLET | Refills: 0 | Status: SHIPPED | OUTPATIENT
Start: 2018-07-30 | End: 2018-08-17

## 2018-07-30 RX ORDER — BACLOFEN 10 MG/1
10 TABLET ORAL 3 TIMES DAILY
Qty: 90 TABLET | Refills: 2 | Status: SHIPPED | OUTPATIENT
Start: 2018-07-30 | End: 2018-09-21

## 2018-07-30 NOTE — PROGRESS NOTES
Assessment:  1  Chronic low back pain with bilateral sciatica, unspecified back pain laterality    2  Chronic left lumbar radiculopathy    3  Lumbar post-laminectomy syndrome    4  Pain syndrome, chronic    5  Myofascial pain syndrome        Plan:  While the patient was in the office today, I discussed with the patient that I feel it is in his best interest to proceed with the MRI of the lumbosacral spine as scheduled for later on this week once we have the results, our office will give him a call to review the results and discuss the treatment plan options  The patient was agreeable and verbalized an understanding  I discussed with the patient that since he has recently completed a dose of titrating oral prednisone for poison ivy, I do not feel would be in his best interest to put him on another dose of prednisone, especially since it really seem to irritate his stomach  For now, we will continue him on the Butrans patch and discontinue the tizanidine and try different muscle relaxer such as Baclofen  I did give him a small prescription for Norco 7 5/325, 1 p  o  b i d  p r n  for pain dispense number 45 pills and explained him that this is a temporary small prescription and so we can get a better idea of what is causing his pain  I advised the patient that if they experience any side effects or issues with the changes in their medication regiment, they should give our office a call to discuss  I also advised the patient not to drive or operate machinery until they see how the changes in the medication regimen affects them  The patient was agreeable and verbalized an understanding  He is to follow-up as scheduled in 10 weeks or as needed if his pain symptoms should continue to change  The patient was agreeable and verbalized an understanding           [facility] Prescription Drug Monitoring Program report was reviewed and was appropriate     There are risks associated with opioid medications, including dependence, addiction and tolerance  The patient understands and agrees to use these medications only as prescribed  Potential side effects of the medications include, but are not limited to, constipation, drowsiness, addiction, impaired judgment and risk of fatal overdose if not taken as prescribed  The patient was warned against driving while taking sedation medications  Sharing medications is a felony  At this point in time, the patient is showing no signs of addiction, abuse, diversion or suicidal ideation  The patient will follow-up in 10 weeks for medication prescription refill and reevaluation  The patient was advised to contact the office should their symptoms worsen in the interim  The patient was agreeable and verbalized an understanding  History of Present Illness: The patient is a 43 y o  male last seen on 7/20/18 who presents for a follow up office visit in regards to chronic pain secondary to lumbar post-laminectomy syndrome  The patient currently reports that since his last office visit, and especially over the past week or so his back and leg pain has continued to worsen despite the fact that we did increase his Butrans patch to the 10 mcg dosage and increse the tizanidine to the q i d  dosing  At this point he is noting minimal relief with his medication regimen but is scheduled for his MRI of the lumbosacral spine on August 2, 2018  The patient reports that this is continued after the injury and stress he had put on his back at work 2 weeks ago  The patient presents today to discuss his medication regimen treatment plan as he was not able to go to work today because of the pain   He reports that this point time he realizes that he cannot do a job that is physical any longer and does have an interview set up later on this week for less it intensive job as a  which she has done in the past   However, he presents today to see if there is anything else we can do to try to manage his pain and still we have the MRI results  Current pain medications includes:  Butrans patch 10 mcg, applying 1 patch every 7 days and tizanidine 2 mg q i d  p r n  for pain and spasms  The patient reports that this regimen is providing minimal pain relief  The patient is reporting no side effects from this pain medication regimen  I have personally reviewed and/or updated the patient's past medical history, past surgical history, family history, social history, current medications, allergies, and vital signs today  Review of Systems:    Review of Systems   Respiratory: Negative for shortness of breath  Cardiovascular: Negative for chest pain  Gastrointestinal: Positive for nausea  Negative for constipation, diarrhea and vomiting  Musculoskeletal: Positive for gait problem  Negative for arthralgias, joint swelling and myalgias  Skin: Negative for rash  Neurological: Negative for dizziness, seizures and weakness  All other systems reviewed and are negative          Past Medical History:   Diagnosis Date    Anxiety     Arthritis     Bipolar disorder (HCC)     Chronic left lumbar radiculopathy     Chronic low back pain     Chronic pain syndrome     Chronic right shoulder pain     Depression with anxiety     Fatigue     GERD (gastroesophageal reflux disease)     Hydronephrosis     Iron deficiency anemia     Kidney stone     Lytic bone lesions on xray     Nephrolithiasis     PONV (postoperative nausea and vomiting)     Right elbow pain     Right lateral epicondylitis        Past Surgical History:   Procedure Laterality Date    ADENOIDECTOMY Bilateral     APPENDECTOMY      BACK SURGERY      GASTRIC BYPASS      2009    OTHER SURGICAL HISTORY      fusion/refusion of vertebrae, 2010 and 2011 l5-s1 and l4-l5    IL CYSTO/URETERO W/LITHOTRIPSY &INDWELL STENT INSRT Right 8/8/2017    Procedure: CYSTOSCOPY; URETEROSCOPY; HOLMIUM LASER; RETROGRADE PYELOGRAM; STENT; Surgeon: Yboany Hess MD;  [facility];  Service: Urology    CT IMPLANT SPINAL NEUROSTIM/ Right 11/14/2017    Procedure: REMOVAL LOWER MEDIAL BUTTOCK SPINAL CORD STIMULATOR GENERATOR; PLACEMENT OF NEW BUTTOCK SPINAL CORD STIMULATOR THROUGH SEPERATE INCISION;  Surgeon: Lore Avalos MD;  Location: [facility];  Service: Neurosurgery    RIK-EN-Y PROCEDURE  2003    SPINAL CORD STIMULATOR IMPLANT  11/14/2017    dr Ginny Cuello procedure and technique 1  removal of a right back implantable pulse generator 2 placement of a new right buttock impantable pulse generator through seperate incision 3 electric analys complex programming spinal cord stimulator system postoperative period approx 1 hour    TONSILLECTOMY         Family History   Problem Relation Age of Onset    Cancer Mother     Arthritis Mother     Stomach cancer Mother     Cancer Father     Esophageal cancer Father     Other Father         brain tumor    No Known Problems Sister     No Known Problems Sister     No Known Problems Sister        Social History     Occupational History    Not on file  Social History Main Topics    Smoking status: Current Every Day Smoker     Packs/day: 1 00     Types: Cigarettes    Smokeless tobacco: Never Used    Alcohol use No      Comment: quit drinking, social    Drug use: No    Sexual activity: Not on file         Current Outpatient Prescriptions:     ALPRAZolam (XANAX) 0 5 mg tablet, Take 1 tablet (0 5 mg total) by mouth every 4 (four) hours as needed for anxiety, Disp: 120 tablet, Rfl: 2    Buprenorphine (BUTRANS) 10 MCG/HR PTWK, Apply 1 patch TD every 7 days  , Disp: 4 patch, Rfl: 2    omeprazole (PRILOSEC OTC) 20 MG tablet, Take 20 mg by mouth daily  , Disp: , Rfl:     QUEtiapine (SEROquel) 100 mg tablet, TAKE 1 TABLET (100 MG TOTAL) BY MOUTH DAILY AT BEDTIME, Disp: 30 tablet, Rfl: 2    sertraline (ZOLOFT) 100 mg tablet, Take 1 5 tablets (150 mg total) by mouth daily at bedtime, Disp: 45 tablet, Rfl: 2    tamsulosin (FLOMAX) 0 4 mg, Take 1 capsule (0 4 mg total) by mouth daily with dinner, Disp: 30 capsule, Rfl: 11    tiZANidine (ZANAFLEX) 2 mg tablet, Take 1 PO QID PRN for pain/spasms  , Disp: 90 tablet, Rfl: 1    Allergies   Allergen Reactions    Ampicillin GI Intolerance     Vomiting    Aspirin GI Intolerance     vomiting      Penicillins GI Intolerance     Vomit       Physical Exam:    There were no vitals taken for this visit  Constitutional:normal, well developed, well nourished, alert, in no distress and non-toxic and no overt pain behavior  Eyes:anicteric  HEENT:grossly intact  Neck:supple, symmetric, trachea midline and no masses   Pulmonary:even and unlabored  Cardiovascular:No edema or pitting edema present  Skin:Normal without rashes or lesions and well hydrated  Psychiatric:Mood and affect appropriate  Neurologic:Cranial Nerves II-XII grossly intact  Musculoskeletal:Slightly antalgic, but steady gait without the use of any assistive devices  Imaging  No orders to display         No orders of the defined types were placed in this encounter

## 2018-07-30 NOTE — TELEPHONE ENCOUNTER
Please confirm that MRI thoracic spine is to be done without contrast only      Scheduled lumbar MRI, 8/2, is with/without contrast

## 2018-07-30 NOTE — TELEPHONE ENCOUNTER
Linnette Camejo from MRI is calling stating pt called asking to make appt for MRI of the thoracic spine without contrast but pt is claiming it is for both with and without contrast  Linnette Camejo wants to verify what the order is suppose to be  Please advise   Linnette Camejo can be reached at [phone]

## 2018-07-30 NOTE — TELEPHONE ENCOUNTER
He is correct the thoracic spine should also be with and without contrast  I discontinued the old order and put in a new order  Thank you

## 2018-08-02 ENCOUNTER — HOSPITAL ENCOUNTER (OUTPATIENT)
Dept: RADIOLOGY | Facility: HOSPITAL | Age: 43
Discharge: HOME | End: 2018-08-02
Payer: MEDICARE

## 2018-08-02 ENCOUNTER — HOSPITAL ENCOUNTER (OUTPATIENT)
Dept: RADIOLOGY | Facility: HOSPITAL | Age: 43
End: 2018-08-02
Payer: MEDICARE

## 2018-08-02 DIAGNOSIS — M54.42 CHRONIC LOW BACK PAIN WITH BILATERAL SCIATICA, UNSPECIFIED BACK PAIN LATERALITY: ICD-10-CM

## 2018-08-02 DIAGNOSIS — M54.41 CHRONIC LOW BACK PAIN WITH BILATERAL SCIATICA, UNSPECIFIED BACK PAIN LATERALITY: ICD-10-CM

## 2018-08-02 DIAGNOSIS — G89.4 PAIN SYNDROME, CHRONIC: ICD-10-CM

## 2018-08-02 DIAGNOSIS — G89.29 CHRONIC LOW BACK PAIN WITH BILATERAL SCIATICA, UNSPECIFIED BACK PAIN LATERALITY: ICD-10-CM

## 2018-08-02 DIAGNOSIS — M96.1 LUMBAR POST-LAMINECTOMY SYNDROME: ICD-10-CM

## 2018-08-02 DIAGNOSIS — M54.16 CHRONIC LEFT LUMBAR RADICULOPATHY: ICD-10-CM

## 2018-08-02 PROCEDURE — 72157 MRI CHEST SPINE W/O & W/DYE: CPT

## 2018-08-02 PROCEDURE — A9585 GADOBUTROL INJECTION: HCPCS | Performed by: NURSE PRACTITIONER

## 2018-08-02 RX ADMIN — GADOBUTROL 10 ML: 604.72 INJECTION INTRAVENOUS at 11:48

## 2018-08-06 ENCOUNTER — TELEPHONE (OUTPATIENT)
Dept: PAIN MEDICINE | Facility: CLINIC | Age: 43
End: 2018-08-06

## 2018-08-06 NOTE — TELEPHONE ENCOUNTER
Left a detailed message on machine as per release of info on file, advising of above  Provided cb number and office hours   Will await pt's cb re: lumbar mri

## 2018-08-06 NOTE — TELEPHONE ENCOUNTER
Can you please call the patient and advise him that his thoracic MRI was normal, without any sig new or worsening etiology and that the thoracic SCStim lead seemed to be in place  Did he also have the lumbar MRI?

## 2018-08-07 ENCOUNTER — TELEPHONE (OUTPATIENT)
Dept: OBGYN CLINIC | Facility: HOSPITAL | Age: 43
End: 2018-08-07

## 2018-08-07 ENCOUNTER — HOSPITAL ENCOUNTER (OUTPATIENT)
Dept: RADIOLOGY | Facility: HOSPITAL | Age: 43
Discharge: HOME | End: 2018-08-07
Payer: MEDICARE

## 2018-08-07 DIAGNOSIS — M96.1 LUMBAR POST-LAMINECTOMY SYNDROME: ICD-10-CM

## 2018-08-07 DIAGNOSIS — G89.4 PAIN SYNDROME, CHRONIC: ICD-10-CM

## 2018-08-07 DIAGNOSIS — G89.29 CHRONIC LOW BACK PAIN WITH BILATERAL SCIATICA, UNSPECIFIED BACK PAIN LATERALITY: ICD-10-CM

## 2018-08-07 DIAGNOSIS — M54.16 CHRONIC LEFT LUMBAR RADICULOPATHY: ICD-10-CM

## 2018-08-07 DIAGNOSIS — M54.41 CHRONIC LOW BACK PAIN WITH BILATERAL SCIATICA, UNSPECIFIED BACK PAIN LATERALITY: ICD-10-CM

## 2018-08-07 DIAGNOSIS — M54.42 CHRONIC LOW BACK PAIN WITH BILATERAL SCIATICA, UNSPECIFIED BACK PAIN LATERALITY: ICD-10-CM

## 2018-08-07 PROCEDURE — 72158 MRI LUMBAR SPINE W/O & W/DYE: CPT

## 2018-08-07 PROCEDURE — A9585 GADOBUTROL INJECTION: HCPCS | Performed by: NURSE PRACTITIONER

## 2018-08-07 RX ADMIN — GADOBUTROL 8 ML: 604.72 INJECTION INTRAVENOUS at 14:49

## 2018-08-07 NOTE — TELEPHONE ENCOUNTER
Caller: patient  Call back number: [phone]  Fax number: [phone] attention Marjorie Argentinadax    Patient is asking for a note stating he can not work currently due to a back injury he received at work   Please advise Home

## 2018-08-07 NOTE — TELEPHONE ENCOUNTER
S/w pt, advised that this office does not determine work status  Pt may d/w pcp and/or referring doctor or this office may provide a referral to a doctor who does determine work status  Pt verbalized understanding  Stated that he will fu w/ his pcp  Pt will cb prn

## 2018-08-08 ENCOUNTER — OFFICE VISIT (OUTPATIENT)
Dept: BEHAVIORAL/MENTAL HEALTH CLINIC | Facility: CLINIC | Age: 43
End: 2018-08-08
Payer: MEDICARE

## 2018-08-08 ENCOUNTER — TELEPHONE (OUTPATIENT)
Dept: PAIN MEDICINE | Facility: CLINIC | Age: 43
End: 2018-08-08

## 2018-08-08 DIAGNOSIS — F41.8 DEPRESSION WITH ANXIETY: ICD-10-CM

## 2018-08-08 DIAGNOSIS — F31.81 BIPOLAR 2 DISORDER, MAJOR DEPRESSIVE EPISODE (HCC): Primary | ICD-10-CM

## 2018-08-08 DIAGNOSIS — M46.47 DISCITIS OF LUMBOSACRAL REGION: Primary | ICD-10-CM

## 2018-08-08 PROBLEM — F43.10 PTSD (POST-TRAUMATIC STRESS DISORDER): Status: ACTIVE | Noted: 2018-08-08

## 2018-08-08 PROCEDURE — 90834 PSYTX W PT 45 MINUTES: CPT | Performed by: SOCIAL WORKER

## 2018-08-08 NOTE — PSYCH
Elizabeth Saxena  1975     Date of Initial Treatment Plan: 8/8/18   Date of Current Treatment Plan: 08/08/18      Treatment Plan Number 1    Strengths/Personal Resources for Self Care: Compassionate, hard worker,     Diagnosis:   1  Bipolar 2 disorder, major depressive episode ([facility] [address] )     2  Depression with anxiety         Area of Needs: PTSD, Fiance's medical issues, Keyur's medical issues    Long Term Goal 1:    My depression has been elivated  Target Date: 12/2/18  Completion Date:          Short Term Objectives for Goal 1:   1  Mine and finance's medical issues  2  Family dynamics in the home    Long Term Goal 2:    My PTSD symptoms and decreased  Target Date: 12/2/18  Completion Date:     Short Term Objectives for Goal 2:   1  Avoiding crime programs on TV   2  My nightmares have left   3  Process trauma    GOAL 1: Modality: Individual therapy 2x month  Completion Date:                                  Medication management every 3 months  Completion Date:                                  Individuals responsible for goals:  Benito Gallardo and Dr Angelica Bro 2: Modality: Individual therapy 2x month  Completion Date:                                  Medication management every 3 months  Completion Date:                                  Individuals responsible for goals:  Benito Gallardo and Dr Casiano Bel: Diagnosis and Treatment Plan explained to Judy Spencer relates understanding diagnosis and is agreeable to Treatment Plan         Client Comments : Please share your thoughts, feelings, need and/or experiences regarding your treatment plan: __________________________________________________________________    __________________________________________________________________    __________________________________________________________________    __________________________________________________________________    _______________________________________                Patient signature, Date Time: __________________________________________             Physician cosigner signature, Date, Time: ________________________________
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
## 2018-08-08 NOTE — PSYCH
Treatment Plan Tracking    # 1Treatment Plan not completed within required time limits due to: Client cancelled/no-showed scheduled appointment  Chino Gonzales

## 2018-08-08 NOTE — TELEPHONE ENCOUNTER
Can you please call the patient and advise him that his lumbar spine MRI and previous surgery levels showed mild deg changes, but was stable without any sig or new etiology noted  However, there was a mild collection of fluid at L5-S1 that could represent disc edema vs OA vs discitis  To rule out the discitis of infection, I am going to order a CBC with Diff, Sed Rate, and CRP  We will call once we have those results  Thank you

## 2018-08-09 ENCOUNTER — TELEPHONE (OUTPATIENT)
Dept: NEUROSURGERY | Facility: CLINIC | Age: 43
End: 2018-08-09

## 2018-08-09 ENCOUNTER — TELEPHONE (OUTPATIENT)
Dept: PAIN MEDICINE | Facility: CLINIC | Age: 43
End: 2018-08-09

## 2018-08-09 ENCOUNTER — APPOINTMENT (OUTPATIENT)
Dept: LAB | Facility: HOSPITAL | Age: 43
End: 2018-08-09
Payer: MEDICARE

## 2018-08-09 DIAGNOSIS — M46.47 DISCITIS OF LUMBOSACRAL REGION: ICD-10-CM

## 2018-08-09 LAB
BASOPHILS # BLD AUTO: 0.06 THOUSANDS/ΜL (ref 0–0.1)
BASOPHILS NFR BLD AUTO: 1 % (ref 0–1)
CRP SERPL QL: <3 MG/L
EOSINOPHIL # BLD AUTO: 0.09 THOUSAND/ΜL (ref 0–0.61)
EOSINOPHIL NFR BLD AUTO: 1 % (ref 0–6)
ERYTHROCYTE [DISTWIDTH] IN BLOOD BY AUTOMATED COUNT: 21.3 % (ref 11.6–15.1)
ERYTHROCYTE [SEDIMENTATION RATE] IN BLOOD: 7 MM/HOUR (ref 0–10)
HCT VFR BLD AUTO: 38.8 % (ref 36.5–49.3)
HGB BLD-MCNC: 10.9 G/DL (ref 12–17)
IMM GRANULOCYTES # BLD AUTO: 0.02 THOUSAND/UL (ref 0–0.2)
IMM GRANULOCYTES NFR BLD AUTO: 0 % (ref 0–2)
LYMPHOCYTES # BLD AUTO: 1.87 THOUSANDS/ΜL (ref 0.6–4.47)
LYMPHOCYTES NFR BLD AUTO: 29 % (ref 14–44)
MCH RBC QN AUTO: 18.4 PG (ref 26.8–34.3)
MCHC RBC AUTO-ENTMCNC: 28.1 G/DL (ref 31.4–37.4)
MCV RBC AUTO: 65 FL (ref 82–98)
MONOCYTES # BLD AUTO: 0.46 THOUSAND/ΜL (ref 0.17–1.22)
MONOCYTES NFR BLD AUTO: 7 % (ref 4–12)
NEUTROPHILS # BLD AUTO: 4.01 THOUSANDS/ΜL (ref 1.85–7.62)
NEUTS SEG NFR BLD AUTO: 62 % (ref 43–75)
NRBC BLD AUTO-RTO: 0 /100 WBCS
PLATELET # BLD AUTO: 319 THOUSANDS/UL (ref 149–390)
RBC # BLD AUTO: 5.93 MILLION/UL (ref 3.88–5.62)
WBC # BLD AUTO: 6.51 THOUSAND/UL (ref 4.31–10.16)

## 2018-08-09 PROCEDURE — 85025 COMPLETE CBC W/AUTO DIFF WBC: CPT

## 2018-08-09 PROCEDURE — 85652 RBC SED RATE AUTOMATED: CPT

## 2018-08-09 PROCEDURE — 86140 C-REACTIVE PROTEIN: CPT

## 2018-08-09 PROCEDURE — 36415 COLL VENOUS BLD VENIPUNCTURE: CPT

## 2018-08-09 NOTE — TELEPHONE ENCOUNTER
S/w pt, advised of above  Pt verbalized understanding and appreciation  Will fu as directed  Advised pt, this office will cb when results are available

## 2018-08-09 NOTE — TELEPHONE ENCOUNTER
S/w pt, advised of above  Pt verbalized understanding and appreciation  Stated that his pain is no better  Pt stated that Dr Kitchen Antis did his surgery  Advised pt, will make DG aware and cb with any update

## 2018-08-09 NOTE — TELEPHONE ENCOUNTER
Returned call to patient he reports loss of SCS efficacy and new onset pain   His chronic pain is in his left low back radiates into the left buttock and front  Thigh stops above the knee  He also reports he has new onset low back pain on the right side that  radiates into the right buttock  Reports the DR that inserted his SCS placed it more on the left to cover the left side and not the right     Reports he contacted pain management had medication adjustments with no improvement in pain  PM ordered MRI lumbar and thoracici spine  MRI Lumbar spine abnormal , showed fluid   PM directed him to contact Dr Mimi Amaral  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 , recently while lifting heard/felt a pop in his back then the pain started on his right side and pain going down the left leg, sharp stabbing constant, numbness and tingling  He reports meeting with Reps for programing - x 2  with no improvement in pain on left side   The SCS does not improve the  new onset pain on the right side  Reps advised him t contact Dr Mimi Amaral  A review of MRI Lumbar spine  Revels Mild fluid signal within the L5-S1 disc without adjacent disc edema  This is likely degenerative finding  In the right clinical setting, discitis should be considered  Correlation with clinical laboratory parameters of infection recommended to include   sedimentation rate and CRP  ---lab studies completed with normal findings  Review of notes PM has referred patient to see DR Mimi Amaral concurrently / Sachin Haddad  Patient reports would like to meet with Dr Mimi Amaral to discuss if there is any surgery he can perform to cover rigt sided pain  Scheduled appointment 8/27 @ 0900 SLQ       11/14/2017REMOVAL LOWER MEDIAL BUTTOCK SPINAL CORD STIMULATOR GENERATOR; PLACEMENT OF NEW BUTTOCK SPINAL CORD STIMULATOR THROUGH SEPERATE INCISION (Right Head)    10/30/2018 Excerpt form DR Mimi Amaral consult note; He underwent placement of a [employer] thoracic paddle spinal cord stimulator implantation by Dr Angelia Laurent in August of 2016 in [address]  He is noting significant pain relief with the stimulator and is very pleased with settings under tonic stimulation  However he notes that he has discomfort at the right lower medial buttock spinal cord stimulator generator site

## 2018-08-09 NOTE — TELEPHONE ENCOUNTER
Can you please call the patient and advise him that his blood work did show some irregularities, as his H/H is lower than his last blood work, but all of the blood work to evaluate of infection is normal, so I am not suspicious that he is having discitis  How is his pain? Any better? Who did his surgery and put in his stimulator?

## 2018-08-09 NOTE — TELEPHONE ENCOUNTER
At this point, I would call Dr Shelly Agee office and schedule a f/u OV for re-evaluation and call stim rep to have them there at the same time as well

## 2018-08-13 ENCOUNTER — OFFICE VISIT (OUTPATIENT)
Dept: FAMILY MEDICINE CLINIC | Facility: CLINIC | Age: 43
End: 2018-08-13
Payer: MEDICARE

## 2018-08-13 ENCOUNTER — TELEPHONE (OUTPATIENT)
Dept: PAIN MEDICINE | Facility: MEDICAL CENTER | Age: 43
End: 2018-08-13

## 2018-08-13 VITALS
WEIGHT: 187.4 LBS | HEIGHT: 72 IN | HEART RATE: 80 BPM | DIASTOLIC BLOOD PRESSURE: 80 MMHG | BODY MASS INDEX: 25.38 KG/M2 | TEMPERATURE: 96.2 F | RESPIRATION RATE: 16 BRPM | SYSTOLIC BLOOD PRESSURE: 122 MMHG

## 2018-08-13 DIAGNOSIS — F43.10 PTSD (POST-TRAUMATIC STRESS DISORDER): ICD-10-CM

## 2018-08-13 DIAGNOSIS — M54.41 CHRONIC LOW BACK PAIN WITH BILATERAL SCIATICA, UNSPECIFIED BACK PAIN LATERALITY: ICD-10-CM

## 2018-08-13 DIAGNOSIS — M54.42 CHRONIC LOW BACK PAIN WITH BILATERAL SCIATICA, UNSPECIFIED BACK PAIN LATERALITY: ICD-10-CM

## 2018-08-13 DIAGNOSIS — M54.41 ACUTE BACK PAIN WITH SCIATICA, RIGHT: ICD-10-CM

## 2018-08-13 DIAGNOSIS — M96.1 LUMBAR POST-LAMINECTOMY SYNDROME: Primary | ICD-10-CM

## 2018-08-13 DIAGNOSIS — M54.16 LUMBAR RADICULOPATHY: ICD-10-CM

## 2018-08-13 DIAGNOSIS — M96.1 POSTLAMINECTOMY SYNDROME, LUMBAR REGION: Primary | ICD-10-CM

## 2018-08-13 DIAGNOSIS — E53.8 VITAMIN B12 DEFICIENCY: ICD-10-CM

## 2018-08-13 DIAGNOSIS — F31.81 BIPOLAR 2 DISORDER, MAJOR DEPRESSIVE EPISODE (HCC): Primary | ICD-10-CM

## 2018-08-13 DIAGNOSIS — N39.9 URINARY TRACT DISORDER: ICD-10-CM

## 2018-08-13 DIAGNOSIS — M48.062 LUMBAR STENOSIS WITH NEUROGENIC CLAUDICATION: ICD-10-CM

## 2018-08-13 DIAGNOSIS — D50.9 IRON DEFICIENCY ANEMIA, UNSPECIFIED IRON DEFICIENCY ANEMIA TYPE: ICD-10-CM

## 2018-08-13 DIAGNOSIS — G89.29 CHRONIC LOW BACK PAIN WITH BILATERAL SCIATICA, UNSPECIFIED BACK PAIN LATERALITY: ICD-10-CM

## 2018-08-13 DIAGNOSIS — M48.062 SPINAL STENOSIS OF LUMBAR REGION WITH NEUROGENIC CLAUDICATION: ICD-10-CM

## 2018-08-13 PROCEDURE — 96372 THER/PROPH/DIAG INJ SC/IM: CPT | Performed by: FAMILY MEDICINE

## 2018-08-13 PROCEDURE — 99214 OFFICE O/P EST MOD 30 MIN: CPT | Performed by: FAMILY MEDICINE

## 2018-08-13 RX ORDER — CYANOCOBALAMIN 1000 UG/ML
1000 INJECTION INTRAMUSCULAR; SUBCUTANEOUS ONCE
Status: COMPLETED | OUTPATIENT
Start: 2018-08-13 | End: 2018-08-13

## 2018-08-13 RX ORDER — DULOXETIN HYDROCHLORIDE 60 MG/1
60 CAPSULE, DELAYED RELEASE ORAL DAILY
Qty: 30 CAPSULE | Refills: 2 | Status: SHIPPED | OUTPATIENT
Start: 2018-08-13 | End: 2018-09-10 | Stop reason: SDUPTHER

## 2018-08-13 RX ADMIN — CYANOCOBALAMIN 1000 MCG: 1000 INJECTION INTRAMUSCULAR; SUBCUTANEOUS at 17:26

## 2018-08-13 NOTE — TELEPHONE ENCOUNTER
lmom to cb  Provided cb number and office hours  Please schedule procedure as discussed and advise of the following:     With regards to medications, he should stop the Hydrocodone and just continue the [facility] for now and proceed with the injections  I put in the orders  Thank you

## 2018-08-13 NOTE — TELEPHONE ENCOUNTER
S/w pt, advised of above  Pt verbalized understanding, scheduled procedure on 8/16  Reviewed pre procedure instructions: eat a light meal - npo 1 hour prioir, , loose fitting clothing, cb if illness / abx start prior to procedure  Pt verbalized understanding and denied blood thinning medication  Will cb if questions or concerns arise

## 2018-08-13 NOTE — TELEPHONE ENCOUNTER
Is the pain more in the middle of his back or the low back? If it is more his low back and legs, we can try an B/L S1 TFESI x 2 with Dr Veronica Vega  With regards to medications, he should stop the Hydrocodone and just continue the [facility] for now and proceed with the injections  I put in the orders  Thank you

## 2018-08-13 NOTE — TELEPHONE ENCOUNTER
Phone call from patient stating that the hydrocodone does not seem to be helping  Please call patient at [phone]

## 2018-08-13 NOTE — TELEPHONE ENCOUNTER
S/w pt, states he is not getting pain relief w/ hydrocodone as prescribed  Pt c/o difficulty urinating w/ hydrocodone  Pt stated that he tries to use only muscle relaxers due to urinary se's  States that provides poor pain relief  Advised pt, will d/w DG and cb to advise  Pt verbalized understanding  Pt added, Dr Foy Nathan office questioned why shawn's were not ordered for herniated disc  Advised pt, will make DG aware  Pt verbalized understanding and appreciation

## 2018-08-13 NOTE — PATIENT INSTRUCTIONS
Zoloft 100 mg once a day for 5-6 days- use in am  Stop after 6 days and switch to Duloxetine 60 mg daily    Keep night time med as before   Keep xanax as needed   Schedule eval with hematology  /t/c iron infusions   we tisha start B12 injections

## 2018-08-13 NOTE — PROGRESS NOTES
FAMILY PRACTICE OFFICE VISIT       NAME: Gilford Bitters  AGE: 43 y o  SEX: male       : 1975        MRN: [id number]        Assessment and Plan     Problem List Items Addressed This Visit     Chronic low back pain    Bipolar 2 disorder, major depressive episode ([facility] [address] ) - Primary    Relevant Medications    DULoxetine (CYMBALTA) 60 mg delayed release capsule    Iron deficiency anemia    Relevant Orders    Ambulatory referral to Hematology / Oncology    PTSD (post-traumatic stress disorder)    Relevant Medications    DULoxetine (CYMBALTA) 60 mg delayed release capsule      Other Visit Diagnoses     Urinary tract disorder        Relevant Orders    Ambulatory referral to Urology    Vitamin B12 deficiency        Relevant Medications    cyanocobalamin injection 1,000 mcg (Completed)       Patient presents for follow-up of chronic medical conditions  Persistent symptoms of depression, irritability, insomnia  He has been on regimen of Zoloft 150 mg along with Seroquel  100 mg at bedtime  Will switch his counseling sessions to our office to assure proper close follow-up  Will decrease dose of Zoloft from 150-100 mg once a day for the next 5-6 days and then switch it to Cymbalta 60 mg daily  I advised him to use Cymbalta in the morning and keep Seroquel at night  Will schedule follow-up in 1 month  Patient will contact me in the interim with any questions or concerns  He will proceed [facility] after recent episode of urinary retention  Anemia  Iron deficiency and B12 deficiency  I suspect that it is contributing to chronic fatigue and also could be a significant factor in patient's day by day symptoms  Will start vitamin B12 injections and will refer patient to [facility] to start iron infusions  Low back pain  I reviewed results of MRI with patient  He has pending follow-up with Neurosurgery    Clinically no signs of diskitis as he is nontoxic, febrile and has normal white blood cell count and inflammatory markers  Patient remains under care of [facility]  Follow-up 1 month  I have spent 30 minutes with Patient  today in which greater than 50% of this time was spent in counseling/coordination of care regarding Diagnostic results, Prognosis, Risks and benefits of tx options, Intructions for management, Patient and family education, Importance of tx compliance, Risk factor reductions and Impressions  Patient Instructions   Zoloft 100 mg once a day for 5-6 days- use in am  Stop after 6 days and switch to Duloxetine 60 mg daily    Keep night time med as before   Keep xanax as needed   Schedule eval with hematology  /t/c iron infusions   we tisha start B12 injections          Chief Complaint     Chief Complaint   Patient presents with    Medication Management     Pt is here for medication discussion        History of Present Illness     Patient presents for follow-up  He is here to discuss medications for depression, insomnia and bipolar disorder  He was diagnosed with PTSD and bipolar disorder  Patient has been under care of [address] but is concerned that his appointments a cancel frequently due to physicians schedule changes  Has missed few doses of sertraline and has experienced significant withdrawal symptoms  He is also concerned that his medication regimen is not working well as patient is complaining of persistent symptoms of irritability, anger outbursts and poor sleep  His current medication regimen includes Seroquel 100 mg at bedtime along with Zoloft 150 mg at bedtime  He has Xanax on hand but uses medication very infrequently  Patient is in counseling but would like to have his sessions scheduled on more regular basis  Ongoing stressors, personal and family member health issues  Patient was evaluated by emergency room and [facility] due to episode of urinary retention    He was prescribed Flomax, took medication for 3 days and has discontinued due to side effects  Her currently denies any recurrences of dysuria or urinary retention  He will follow up with Urology further  Chronic low back pain  Patient is under care of [facility] and was evaluated by Neurosurgery  Results of most recent MRI of lumbar sacral spine reviewed with patient  Radiologist speculates possibility of diskitis  Most recent blood work included CBC with diff, C reactive protein and sed rate  His inflammatory markers are negative  White blood cell count is normal but CBC reveals persistent anemia of 10 9  Patient was diagnosed with iron deficiency and vitamin B12 deficiency over a year ago but unfortunately did not proceed with treatment that I have advised  He states that he has tried over-the-counter iron tablets and a causing significant GI side upset  Review of Systems   Review of Systems   Constitutional: Negative  HENT: Negative  Eyes: Negative  Respiratory: Negative  Cardiovascular: Negative  Gastrointestinal: Negative  Endocrine: Negative  Genitourinary: Negative  Musculoskeletal: Positive for arthralgias and back pain  Allergic/Immunologic: Negative  Neurological: Negative  Psychiatric/Behavioral: Positive for dysphoric mood and sleep disturbance  The patient is not nervous/anxious           Increased irritability       Active Problem List     Patient Active Problem List   Diagnosis    Chronic low back pain    Chronic left lumbar radiculopathy    Bipolar 2 disorder, major depressive episode (HCC)    Chronic right shoulder pain    Depression with anxiety    Fatigue    Hydronephrosis    Iron deficiency anemia    Lytic bone lesions on xray    Nephrolithiasis    Pain syndrome, chronic    Right elbow pain    Right lateral epicondylitis    Uncomplicated opioid dependence (HCC)    Thumb pain    Lumbar post-laminectomy syndrome    Myofascial pain syndrome    PTSD (post-traumatic stress disorder)    Lumbar radiculopathy    Spinal stenosis of lumbar region with neurogenic claudication       Past Medical History:  Past Medical History:   Diagnosis Date    Anxiety     Arthritis     Bipolar disorder (HCC)     Chronic left lumbar radiculopathy     Chronic low back pain     Chronic pain syndrome     Chronic right shoulder pain     Depression with anxiety     Fatigue     GERD (gastroesophageal reflux disease)     Hydronephrosis     Iron deficiency anemia     Kidney stone     Lytic bone lesions on xray     Nephrolithiasis     PONV (postoperative nausea and vomiting)     Right elbow pain     Right lateral epicondylitis        Past Surgical History:  Past Surgical History:   Procedure Laterality Date    ADENOIDECTOMY Bilateral     APPENDECTOMY      BACK SURGERY      GASTRIC BYPASS      2009    OTHER SURGICAL HISTORY      fusion/refusion of vertebrae, 2010 and 2011 l5-s1 and l4-l5    NV CYSTO/URETERO W/LITHOTRIPSY &INDWELL STENT INSRT Right 8/8/2017    Procedure: CYSTOSCOPY; URETEROSCOPY; HOLMIUM LASER; RETROGRADE PYELOGRAM; STENT;  Surgeon: Hector Ortiz MD;  [facility];  Service: Urology    NV IMPLANT SPINAL NEUROSTIM/ Right 11/14/2017    Procedure: REMOVAL LOWER MEDIAL BUTTOCK SPINAL CORD STIMULATOR GENERATOR; PLACEMENT OF NEW BUTTOCK SPINAL CORD [address] [place] [address];  Surgeon: Ruben Olson MD;  [facility];  Service: Neurosurgery    RIK-EN-Y PROCEDURE  2003    SPINAL CORD STIMULATOR IMPLANT  11/14/2017    dr Kitchen Antis procedure and technique 1  removal of a right back implantable pulse generator 2 placement of a new right buttock impantable pulse generator through seperate incision 3 electric analys complex programming spinal cord stimulator system postoperative period approx 1 hour    TONSILLECTOMY         Family History:  Family History   Problem Relation Age of Onset    Cancer Mother  Arthritis Mother     Stomach cancer Mother     Cancer Father     Esophageal cancer Father     Other Father         brain tumor    No Known Problems Sister     No Known Problems Sister     No Known Problems Sister        Social History:  Social History     Social History    Marital status:      Spouse name: N/A    Number of children: N/A    Years of education: GED     Occupational History    Not on file  Social History Main Topics    Smoking status: Current Every Day Smoker     Packs/day: 1 00     Types: Cigarettes    Smokeless tobacco: Never Used    Alcohol use No      Comment: quit drinking, social    Drug use: No    Sexual activity: Not on file     Other Topics Concern    Not on file     Social History Narrative    Chooses not to have children    Drinks caffienated tea    Lives with spouse                   Objective     Vitals:    08/13/18 1334   BP: 122/80   Pulse: 80   Resp: 16   Temp: (!) 96 2 °F (35 7 °C)     Wt Readings from Last 3 Encounters:   08/13/18 85 kg (187 lb 6 4 oz)   08/02/18 85 3 kg (188 lb)   07/30/18 85 3 kg (188 lb)       Physical Exam   Constitutional: He is oriented to person, place, and time  He appears well-developed and well-nourished  HENT:   Head: Normocephalic and atraumatic  Neurological: He is alert and oriented to person, place, and time  Psychiatric: He has a normal mood and affect  His behavior is normal    Nursing note and vitals reviewed        Pertinent Laboratory/Diagnostic Studies:  Lab Results   Component Value Date    BUN 16 07/21/2018    CREATININE 0 94 07/21/2018    CALCIUM 9 4 11/02/2017     11/02/2017    K 4 7 11/02/2017    CO2 31 11/02/2017     11/02/2017     Lab Results   Component Value Date    ALT 11 (L) 08/04/2017    AST 8 08/04/2017    ALKPHOS 66 08/04/2017    BILITOT 0 31 08/04/2017       Lab Results   Component Value Date    WBC 6 51 08/09/2018    HGB 10 9 (L) 08/09/2018    HCT 38 8 08/09/2018    MCV 65 (L) 08/09/2018     08/09/2018       No results found for: TSH    Lab Results   Component Value Date    CHOL 129 08/04/2017     Lab Results   Component Value Date    TRIG 80 08/04/2017     Lab Results   Component Value Date    HDL 47 08/04/2017     Lab Results   Component Value Date    LDLCALC 66 08/04/2017     Lab Results   Component Value Date    HGBA1C 6 0 11/02/2017       Results for orders placed or performed in visit on 08/09/18   CBC and differential   Result Value Ref Range    WBC 6 51 4 31 - 10 16 Thousand/uL    RBC 5 93 (H) 3 88 - 5 62 Million/uL    Hemoglobin 10 9 (L) 12 0 - 17 0 g/dL    Hematocrit 38 8 36 5 - 49 3 %    MCV 65 (L) 82 - 98 fL    MCH 18 4 (L) 26 8 - 34 3 pg    MCHC 28 1 (L) 31 4 - 37 4 g/dL    RDW 21 3 (H) 11 6 - 15 1 %    Platelets 110 264 - 596 Thousands/uL    nRBC 0 /100 WBCs    Neutrophils Relative 62 43 - 75 %    Immat GRANS % 0 0 - 2 %    Lymphocytes Relative 29 14 - 44 %    Monocytes Relative 7 4 - 12 %    Eosinophils Relative 1 0 - 6 %    Basophils Relative 1 0 - 1 %    Neutrophils Absolute 4 01 1 85 - 7 62 Thousands/µL    Immature Grans Absolute 0 02 0 00 - 0 20 Thousand/uL    Lymphocytes Absolute 1 87 0 60 - 4 47 Thousands/µL    Monocytes Absolute 0 46 0 17 - 1 22 Thousand/µL    Eosinophils Absolute 0 09 0 00 - 0 61 Thousand/µL    Basophils Absolute 0 06 0 00 - 0 10 Thousands/µL   Sedimentation rate, automated   Result Value Ref Range    Sed Rate 7 0 - 10 mm/hour   C-reactive protein   Result Value Ref Range    CRP <3 0 <3 0 mg/L       Orders Placed This Encounter   Procedures    Ambulatory referral to Urology    Ambulatory referral to Hematology / Oncology       ALLERGIES:  Allergies   Allergen Reactions    Ampicillin GI Intolerance     Vomiting    Aspirin GI Intolerance     vomiting      Penicillins GI Intolerance     Vomit       Current Medications     Current Outpatient Prescriptions   Medication Sig Dispense Refill    ALPRAZolam (XANAX) 0 5 mg tablet Take 1 tablet (0 5 mg total) by mouth every 4 (four) hours as needed for anxiety 120 tablet 2    baclofen 10 mg tablet Take 1 tablet (10 mg total) by mouth 3 (three) times a day 90 tablet 2    Buprenorphine (BUTRANS) 10 MCG/HR PTWK Apply 1 patch TD every 7 days  4 patch 2    HYDROcodone-acetaminophen (NORCO) 7 5-325 mg per tablet Take 1 PO BID PRN FOR break through pain  (Patient taking differently: Take 1 tablet by mouth 3 (three) times a day Take 1 PO BID PRN FOR break through pain  ) 45 tablet 0    omeprazole (PRILOSEC OTC) 20 MG tablet Take 20 mg by mouth daily        QUEtiapine (SEROquel) 100 mg tablet TAKE 1 TABLET (100 MG TOTAL) BY MOUTH DAILY AT BEDTIME 30 tablet 2    DULoxetine (CYMBALTA) 60 mg delayed release capsule Take 1 capsule (60 mg total) by mouth daily 30 capsule 2     No current facility-administered medications for this visit  Health Maintenance     Health Maintenance   Topic Date Due    HIV SCREENING  1975    PNEUMOCOCCAL POLYSACCHARIDE VACCINE AGE 2-64 HIGH RISK  08/22/1977    DTaP,Tdap,and Td Vaccines (1 - Tdap) 08/13/2019 (Originally 8/22/1996)    INFLUENZA VACCINE  09/01/2018       There is no immunization history on file for this patient      Diana Ventura MD

## 2018-08-15 DIAGNOSIS — F33.2 MDD (MAJOR DEPRESSIVE DISORDER), RECURRENT SEVERE, WITHOUT PSYCHOSIS (HCC): ICD-10-CM

## 2018-08-15 RX ORDER — SERTRALINE HYDROCHLORIDE 100 MG/1
TABLET, FILM COATED ORAL
Qty: 45 TABLET | Refills: 2 | Status: SHIPPED | OUTPATIENT
Start: 2018-08-15 | End: 2018-09-10 | Stop reason: ALTCHOICE

## 2018-08-16 ENCOUNTER — HOSPITAL ENCOUNTER (OUTPATIENT)
Dept: RADIOLOGY | Facility: CLINIC | Age: 43
Discharge: HOME | End: 2018-08-16
Attending: ANESTHESIOLOGY | Admitting: ANESTHESIOLOGY
Payer: MEDICARE

## 2018-08-16 VITALS
HEART RATE: 79 BPM | DIASTOLIC BLOOD PRESSURE: 62 MMHG | TEMPERATURE: 98.7 F | SYSTOLIC BLOOD PRESSURE: 100 MMHG | OXYGEN SATURATION: 100 % | RESPIRATION RATE: 20 BRPM

## 2018-08-16 DIAGNOSIS — M54.16 LUMBAR RADICULOPATHY: ICD-10-CM

## 2018-08-16 DIAGNOSIS — M48.062 LUMBAR STENOSIS WITH NEUROGENIC CLAUDICATION: ICD-10-CM

## 2018-08-16 DIAGNOSIS — M96.1 POSTLAMINECTOMY SYNDROME, LUMBAR REGION: ICD-10-CM

## 2018-08-16 DIAGNOSIS — M54.41 ACUTE BACK PAIN WITH SCIATICA, RIGHT: ICD-10-CM

## 2018-08-16 DIAGNOSIS — M79.18 MYOFASCIAL PAIN SYNDROME: ICD-10-CM

## 2018-08-16 PROCEDURE — 64483 NJX AA&/STRD TFRM EPI L/S 1: CPT | Performed by: ANESTHESIOLOGY

## 2018-08-16 RX ORDER — METHYLPREDNISOLONE ACETATE 80 MG/ML
80 INJECTION, SUSPENSION INTRA-ARTICULAR; INTRALESIONAL; INTRAMUSCULAR; PARENTERAL; SOFT TISSUE ONCE
Status: COMPLETED | OUTPATIENT
Start: 2018-08-16 | End: 2018-08-16

## 2018-08-16 RX ORDER — LIDOCAINE HYDROCHLORIDE 10 MG/ML
5 INJECTION, SOLUTION EPIDURAL; INFILTRATION; INTRACAUDAL; PERINEURAL ONCE
Status: COMPLETED | OUTPATIENT
Start: 2018-08-16 | End: 2018-08-16

## 2018-08-16 RX ORDER — TIZANIDINE 2 MG/1
TABLET ORAL
Qty: 90 TABLET | Refills: 1 | OUTPATIENT
Start: 2018-08-16

## 2018-08-16 RX ADMIN — IOHEXOL 1 ML: 300 INJECTION, SOLUTION INTRAVENOUS at 13:15

## 2018-08-16 RX ADMIN — METHYLPREDNISOLONE ACETATE 80 MG: 80 INJECTION, SUSPENSION INTRA-ARTICULAR; INTRALESIONAL; INTRAMUSCULAR; SOFT TISSUE at 13:03

## 2018-08-16 RX ADMIN — LIDOCAINE HYDROCHLORIDE 3 ML: 10 INJECTION, SOLUTION EPIDURAL; INFILTRATION; INTRACAUDAL; PERINEURAL at 13:03

## 2018-08-16 NOTE — DISCHARGE INSTRUCTIONS
Epidural Steroid Injection   WHAT YOU NEED TO KNOW:   An epidural steroid injection (PHILOMENA) is a procedure to inject steroid medicine into the epidural space  The epidural space is between your spinal cord and vertebrae  Steroids reduce inflammation and fluid buildup in your spine that may be causing pain  You may be given pain medicine along with the steroids  ACTIVITY  · Do not drive or operate machinery today  · No strenuous activity today - bending, lifting, etc   · You may resume normal activites starting tomorrow - start slowly and as tolerated  · You may shower today, but no tub baths or hot tubs  · You may have numbness for several hours from the local anesthetic  Please use caution and common sense, especially with weight-bearing activities  CARE OF THE INJECTION SITE  · If you have soreness or pain, apply ice to the area today (20 minutes on/20 minutes off)  · Starting tomorrow, you may use warm, moist heat or ice if needed  · You may have an increase or change in your discomfort for 36-48 hours after your treatment  · Apply ice and continue with any pain medication you have been prescribed  · Notify [facility] if you have any of the following: redness, drainage, swelling, headache, stiff neck or fever above 100°F     SPECIAL INSTRUCTIONS  · Our office will contact you in approximately 7 days for a progress report  MEDICATIONS  · Continue to take all routine medications  · Our office may have instructed you to hold some medications  If you have a problem specifically related to your procedure, please call our office at [phone]  Problems not related to your procedure should be directed to your primary care physician

## 2018-08-16 NOTE — H&P
History of Present Illness: The patient is a 43 y o  male who presents with complaints of low back and leg pain      Patient Active Problem List   Diagnosis    Chronic low back pain    Chronic left lumbar radiculopathy    Bipolar 2 disorder, major depressive episode ([address] )    Chronic right shoulder pain    Depression with anxiety    Fatigue    Hydronephrosis    Iron deficiency anemia    Lytic bone lesions on xray    Nephrolithiasis    Pain syndrome, chronic    Right elbow pain    Right lateral epicondylitis    Uncomplicated opioid dependence ([address] )    Thumb pain    Lumbar post-laminectomy syndrome    Myofascial pain syndrome    PTSD (post-traumatic stress disorder)    Lumbar radiculopathy    Spinal stenosis of lumbar region with neurogenic claudication       Past Medical History:   Diagnosis Date    Anxiety     Arthritis     Bipolar disorder (HCC)     Chronic left lumbar radiculopathy     Chronic low back pain     Chronic pain syndrome     Chronic right shoulder pain     Depression with anxiety     Fatigue     GERD (gastroesophageal reflux disease)     Hydronephrosis     Iron deficiency anemia     Kidney stone     Lytic bone lesions on xray     Nephrolithiasis     PONV (postoperative nausea and vomiting)     Right elbow pain     Right lateral epicondylitis        Past Surgical History:   Procedure Laterality Date    ADENOIDECTOMY Bilateral     APPENDECTOMY      BACK SURGERY      GASTRIC BYPASS      2009    OTHER SURGICAL HISTORY      fusion/refusion of vertebrae, 2010 and 2011 l5-s1 and l4-l5    FL CYSTO/URETERO W/LITHOTRIPSY &INDWELL STENT INSRT Right 8/8/2017    Procedure: CYSTOSCOPY; URETEROSCOPY; HOLMIUM LASER; RETROGRADE PYELOGRAM; STENT;  Surgeon: Keisha Vallecillo MD;  [facility];  Service: Urology    FL IMPLANT SPINAL NEUROSTIM/ Right 11/14/2017    Procedure: REMOVAL LOWER MEDIAL BUTTOCK SPINAL CORD STIMULATOR GENERATOR; PLACEMENT OF NEW BUTTOCK SPINAL CORD STIMULATOR THROUGH SEPERATE INCISION;  Surgeon: Jim Mandel MD;  [facility];  Service: Neurosurgery    RIK-EN-Y PROCEDURE  2003    SPINAL CORD STIMULATOR IMPLANT  11/14/2017    dr Miley Villarreal procedure and technique 1  removal of a right back implantable pulse generator 2 placement of a new right buttock impantable pulse generator through seperate incision 3 electric analys complex programming spinal cord stimulator system postoperative period approx 1 hour    TONSILLECTOMY           Current Outpatient Prescriptions:     ALPRAZolam (XANAX) 0 5 mg tablet, Take 1 tablet (0 5 mg total) by mouth every 4 (four) hours as needed for anxiety, Disp: 120 tablet, Rfl: 2    baclofen 10 mg tablet, Take 1 tablet (10 mg total) by mouth 3 (three) times a day, Disp: 90 tablet, Rfl: 2    Buprenorphine (BUTRANS) 10 MCG/HR PTWK, Apply 1 patch TD every 7 days  , Disp: 4 patch, Rfl: 2    DULoxetine (CYMBALTA) 60 mg delayed release capsule, Take 1 capsule (60 mg total) by mouth daily, Disp: 30 capsule, Rfl: 2    HYDROcodone-acetaminophen (NORCO) 7 5-325 mg per tablet, Take 1 PO BID PRN FOR break through pain   (Patient taking differently: Take 1 tablet by mouth 3 (three) times a day Take 1 PO BID PRN FOR break through pain  ), Disp: 45 tablet, Rfl: 0    omeprazole (PRILOSEC OTC) 20 MG tablet, Take 20 mg by mouth daily  , Disp: , Rfl:     QUEtiapine (SEROquel) 100 mg tablet, TAKE 1 TABLET (100 MG TOTAL) BY MOUTH DAILY AT BEDTIME, Disp: 30 tablet, Rfl: 2    sertraline (ZOLOFT) 100 mg tablet, TAKE 1 AND 1/2 TABLETS (150 MG TOTAL) BY MOUTH DAILY AT BEDTIME, Disp: 45 tablet, Rfl: 2    Allergies   Allergen Reactions    Ampicillin GI Intolerance     Vomiting    Aspirin GI Intolerance     vomiting      Penicillins GI Intolerance     Vomit       Physical Exam:   Vitals:    08/16/18 1250   BP: 110/69   Pulse: 78   Resp: 18   Temp: 98 7 °F (37 1 °C)   SpO2: 100%     General: Awake, Alert, Oriented x 3, Mood and affect appropriate  Respiratory: Respirations even and unlabored  Cardiovascular: Peripheral pulses intact; no edema  Musculoskeletal Exam:  Decreased range of motion lumbar spine  ASA Score: II         Assessment:   1  Postlaminectomy syndrome, lumbar region    2  Acute back pain with sciatica, right    3  Lumbar radiculopathy    4   Lumbar stenosis with neurogenic claudication        Plan: R S1 TFESI

## 2018-08-17 ENCOUNTER — TELEPHONE (OUTPATIENT)
Dept: PAIN MEDICINE | Facility: CLINIC | Age: 43
End: 2018-08-17

## 2018-08-17 DIAGNOSIS — M54.41 CHRONIC LOW BACK PAIN WITH BILATERAL SCIATICA, UNSPECIFIED BACK PAIN LATERALITY: Primary | ICD-10-CM

## 2018-08-17 DIAGNOSIS — G89.29 CHRONIC LOW BACK PAIN WITH BILATERAL SCIATICA, UNSPECIFIED BACK PAIN LATERALITY: Primary | ICD-10-CM

## 2018-08-17 DIAGNOSIS — G89.4 PAIN SYNDROME, CHRONIC: ICD-10-CM

## 2018-08-17 DIAGNOSIS — M54.42 CHRONIC LOW BACK PAIN WITH BILATERAL SCIATICA, UNSPECIFIED BACK PAIN LATERALITY: Primary | ICD-10-CM

## 2018-08-17 DIAGNOSIS — M96.1 LUMBAR POST-LAMINECTOMY SYNDROME: ICD-10-CM

## 2018-08-17 NOTE — TELEPHONE ENCOUNTER
S/w [facility] pharmacy Payal Brito and reviewed and inquired about the Butrans patch and she stated he picked it up on 7/23 and it cost him $42, then he tried to have it filled and now it will cost him $161  She does not understand why they covered it and now they won't  She stated maybe because it is less than a month? So she suggested to wait until 8/20 to resubmit and see if they will cover it  She stated she will handle it on Monday and inform us of what happens  Contacted the patient and reviewed the game plan, in the meantime suggested per your task for him to start the Cymbalta to see if will help over the weekend until we can get clarification on the butrans patch  Patient was appreciative of the call

## 2018-08-17 NOTE — TELEPHONE ENCOUNTER
Patient is S/P an opro on 8/16  He stated his pain is 10 times worse today  Reviewed the postop notes with him  He stated he couldn't  the butrans patch because it would cost him $175 and he couldn't afford it  He also never picked up the cymbalta and he has tried to take the tylenol but it's not helping either  He also had a bad reaction to the vicodin  What do you think DG?  Thanks

## 2018-08-17 NOTE — TELEPHONE ENCOUNTER
Can you please call his pharmacy and find out why all of a sudden the Butrans patch is so expensive  I am not prescribing the Cymbalta, but I would recommend that he take it as it can help with pain as well  Thank you

## 2018-08-17 NOTE — TELEPHONE ENCOUNTER
Call from patient  Call back # [phone]  Tai Brody      Patient would like a call back from nurse to discuss current pain level, rates 7/8 pain level  Patient states his rx coverage stated patient is in a "donut hole" Patient is unsure what that means, states he can't afford lidocaine patch  Threw all Vicodin pills out, due to bad reaction

## 2018-08-20 RX ORDER — MORPHINE SULFATE 15 MG/1
TABLET ORAL
Qty: 60 TABLET | Refills: 0 | Status: SHIPPED | OUTPATIENT
Start: 2018-08-20 | End: 2018-09-13

## 2018-08-20 NOTE — TELEPHONE ENCOUNTER
Can we call the pharmacy and see if they tried running it through today to see why the cost went up so much? Thank you

## 2018-08-20 NOTE — TELEPHONE ENCOUNTER
Can you please call the patient and advise him that because he is in the donut hole, most likely everything we prescribe may be expensive  However, if he wants we can discontinue the Butrans and try Morphine Sulfate IR 15 mg, 1/2 tablet every 6 hours PRN for pain  Let me know what he would like to do?

## 2018-08-20 NOTE — TELEPHONE ENCOUNTER
S/w pharmacist at [facility]  Stated that he tried the butrans patch today  Cost was $161  Per pharmacist, no specific reason is given for the cost increase - likely due to the donut hole

## 2018-08-20 NOTE — TELEPHONE ENCOUNTER
I e-scribed a script for MS IR 15 mg and he can take 1/2 tablet QID PRN for pain  He is to see how it affects him before he drives or operates machinery  Can you please call and cancel the Butrans script? Thank you

## 2018-08-20 NOTE — TELEPHONE ENCOUNTER
Pt is calling saying that his butrans patch is 160 dollars and he cant afford that   Please call pt back at [phone]

## 2018-08-20 NOTE — TELEPHONE ENCOUNTER
S/w pharmacist at [facility]  Confirmed ms ir rx  Cancelled butrans rx  Pharmacist verbalized understanding and confirmed cancellation of butrans

## 2018-08-20 NOTE — TELEPHONE ENCOUNTER
Patient called, would like to know the status of a replacement to Butrans patch, pharmacy informed the pt no change in cost  Pt stats his pain is increasing without medication   Please call pt and pharmacy to resolve current issue

## 2018-08-20 NOTE — TELEPHONE ENCOUNTER
S/w pt's Guerda barreto, per release of info on file  Advised of above  Borderick Hobson questioned if the pt can drive on [place]  Advised guerda, drowsiness is a common se  This office will advise the pt - do not drive or operate machinery until you are familiar with this medication  Advised guerda, different medications affect people differently - this pt's response to MSIR cannot be predicted  Broderick Hobson questioned OTC medications equal to butrans or MSIR  Advised Guerda, no otc equivalents to [employer] or MSIR  Pt may try OTC medications if he prefers  Guerda verbalized understanding  Stated that she will contact medicare re: donut hole and d/w pt at this time

## 2018-08-21 PROBLEM — N40.1 BPH WITH OBSTRUCTION/LOWER URINARY TRACT SYMPTOMS: Status: ACTIVE | Noted: 2018-08-21

## 2018-08-21 PROBLEM — N13.8 BPH WITH OBSTRUCTION/LOWER URINARY TRACT SYMPTOMS: Status: ACTIVE | Noted: 2018-08-21

## 2018-08-22 ENCOUNTER — TELEPHONE (OUTPATIENT)
Dept: BEHAVIORAL/MENTAL HEALTH CLINIC | Facility: CLINIC | Age: 43
End: 2018-08-22

## 2018-08-23 ENCOUNTER — TELEPHONE (OUTPATIENT)
Dept: PAIN MEDICINE | Facility: CLINIC | Age: 43
End: 2018-08-23

## 2018-08-23 NOTE — TELEPHONE ENCOUNTER
Telephone note   Reason for the call    Post Op F/u [facility] [place]    Pt states taht he has had 80% relief and his pain is a 2     S/P RT S1 TFESI on 8/16 w/[facility] in [place]      F/U 10/11/18 with DG

## 2018-08-27 ENCOUNTER — OFFICE VISIT (OUTPATIENT)
Dept: NEUROSURGERY | Facility: CLINIC | Age: 43
End: 2018-08-27
Payer: MEDICARE

## 2018-08-27 VITALS
SYSTOLIC BLOOD PRESSURE: 128 MMHG | TEMPERATURE: 98.7 F | HEIGHT: 72 IN | BODY MASS INDEX: 25.33 KG/M2 | HEART RATE: 66 BPM | WEIGHT: 187 LBS | RESPIRATION RATE: 16 BRPM | DIASTOLIC BLOOD PRESSURE: 77 MMHG

## 2018-08-27 DIAGNOSIS — G89.4 CHRONIC PAIN SYNDROME: Primary | ICD-10-CM

## 2018-08-27 DIAGNOSIS — M46.1 SACROILIITIS, NOT ELSEWHERE CLASSIFIED (HCC): ICD-10-CM

## 2018-08-27 DIAGNOSIS — M96.1 POST LAMINECTOMY SYNDROME: ICD-10-CM

## 2018-08-27 DIAGNOSIS — M54.16 LUMBAR RADICULOPATHY: ICD-10-CM

## 2018-08-27 PROCEDURE — 99214 OFFICE O/P EST MOD 30 MIN: CPT | Performed by: NEUROLOGICAL SURGERY

## 2018-08-27 PROCEDURE — 95972 ALYS CPLX SP/PN NPGT W/PRGRM: CPT | Performed by: NEUROLOGICAL SURGERY

## 2018-08-27 NOTE — PROGRESS NOTES
Assessment/Plan:    No problem-specific Assessment & Plan notes found for this encounter  Diagnoses and all orders for this visit:    Chronic pain syndrome    Post laminectomy syndrome    Lumbar radiculopathy    Sacroiliitis, not elsewhere classified [facility])        Summary: This is a 51-year-old male with chronic pain involving his lower back and left lower extremity  History of a previous lumbar fusion  Has a Medtronic thoracic spinal cord stimulator that delivers efficacy for his chronic pain  He has developed a 6 week history of right-sided lower back and upper buttock pain  His pain pattern appears consistent with sacroiliitis  His MRI of the lumbar spine demonstrates expected postoperative changes from lumbar decompression and fusion  There is no further nerve compromise, stenosis, or instability  I do not appreciate a surgical pathology  I will have him return to Dr Franklin Frias for further treatment of his right-sided SI joint pain  His thoracic x-ray imaging demonstrates midline placement of his Medtronic tripolar paddle electrode from T7-T9  He underwent electronic analysis and complex programming in the office today in order to increase coverage on the right side  He prefers tonic programming  Transverse tripole on the right side of the 3 column electrode was utilized at a pulse width of 400 and rate of 40 hertz  He will follow-up as needed with me  Subjective:      Patient ID: Gilford Bitters is a 37 y o  male  HPI     This is a pleasant 51-year-old male with a history of chronic pain involving his lower back and left lower extremity  He had placement of a thoracic spinal cord stimulator approximately 3 years ago in [place]  He is now 1 year from replacement with repositioning of his generator with myself for malfunction and discomfort  The stimulator continues to work well for his baseline chronic pain      He presents now with a new pain involving the right side of his lower back and right upper buttock  He reports that he recently returned to work and while lifting an object this pain started  The pain is been present for approximately 6 weeks  He is presently on morphine  By report from the patient, he underwent a right-sided SI joint injection with Dr Gali Gatica 1 week ago without relief  The following portions of the patient's history were reviewed and updated as appropriate: allergies, current medications, past family history, past medical history, past social history and past surgical history  Review of Systems   Constitutional: Negative  HENT: Negative  Eyes: Negative  Respiratory: Negative  Cardiovascular: Negative  Gastrointestinal: Negative  Endocrine: Negative  Genitourinary: Negative  Musculoskeletal: Positive for back pain (Center out to right side of LB radiating to front of right leg into knee)  SCS implanted for LBP on left and left leg   Skin: Negative  Allergic/Immunologic: Negative  Neurological: Negative  Hematological: Negative  Psychiatric/Behavioral: The patient is nervous/anxious  Objective:      /77 (BP Location: Left arm)   Pulse 66   Temp 98 7 °F (37 1 °C) (Tympanic)   Resp 16   Ht 6' (1 829 m)   Wt 84 8 kg (187 lb)   BMI 25 36 kg/m²          Physical Exam   Constitutional: He is oriented to person, place, and time  He appears well-developed  HENT:   Head: Normocephalic  Eyes: Pupils are equal, round, and reactive to light  Neck: Normal range of motion  Pulmonary/Chest: Effort normal    Musculoskeletal: Normal range of motion  Neurological: He is alert and oriented to person, place, and time  He has normal strength  No sensory deficit  Incisions well healed        Results:   Reviewed imaging and report of MRI lumbar spine August 2018  Expected postoperative changes L4-S1 decompression and fusion  Well maintained alignment  No nerve compromise  No stenosis

## 2018-09-04 ENCOUNTER — CLINICAL SUPPORT (OUTPATIENT)
Dept: FAMILY MEDICINE CLINIC | Facility: CLINIC | Age: 43
End: 2018-09-04
Payer: MEDICARE

## 2018-09-04 DIAGNOSIS — E53.8 B12 DEFICIENCY: Primary | ICD-10-CM

## 2018-09-04 PROCEDURE — 96372 THER/PROPH/DIAG INJ SC/IM: CPT | Performed by: FAMILY MEDICINE

## 2018-09-04 RX ORDER — CYANOCOBALAMIN 1000 UG/ML
1000 INJECTION INTRAMUSCULAR; SUBCUTANEOUS ONCE
Status: COMPLETED | OUTPATIENT
Start: 2018-09-04 | End: 2018-09-04

## 2018-09-04 RX ADMIN — CYANOCOBALAMIN 1000 MCG: 1000 INJECTION INTRAMUSCULAR; SUBCUTANEOUS at 14:16

## 2018-09-10 ENCOUNTER — CLINICAL SUPPORT (OUTPATIENT)
Dept: FAMILY MEDICINE CLINIC | Facility: CLINIC | Age: 43
End: 2018-09-10
Payer: MEDICARE

## 2018-09-10 DIAGNOSIS — F43.10 PTSD (POST-TRAUMATIC STRESS DISORDER): ICD-10-CM

## 2018-09-10 DIAGNOSIS — F31.81 BIPOLAR 2 DISORDER, MAJOR DEPRESSIVE EPISODE (HCC): ICD-10-CM

## 2018-09-10 DIAGNOSIS — E53.8 B12 DEFICIENCY: Primary | ICD-10-CM

## 2018-09-10 PROCEDURE — 96372 THER/PROPH/DIAG INJ SC/IM: CPT | Performed by: FAMILY MEDICINE

## 2018-09-10 RX ORDER — CYANOCOBALAMIN 1000 UG/ML
1000 INJECTION INTRAMUSCULAR; SUBCUTANEOUS ONCE
Status: COMPLETED | OUTPATIENT
Start: 2018-09-10 | End: 2018-09-10

## 2018-09-10 RX ORDER — DULOXETIN HYDROCHLORIDE 60 MG/1
60 CAPSULE, DELAYED RELEASE ORAL DAILY
Qty: 90 CAPSULE | Refills: 0 | Status: SHIPPED | OUTPATIENT
Start: 2018-09-10 | End: 2018-09-13

## 2018-09-10 RX ADMIN — CYANOCOBALAMIN 1000 MCG: 1000 INJECTION INTRAMUSCULAR; SUBCUTANEOUS at 11:33

## 2018-09-11 ENCOUNTER — TELEPHONE (OUTPATIENT)
Dept: OBGYN CLINIC | Facility: HOSPITAL | Age: 43
End: 2018-09-11

## 2018-09-11 DIAGNOSIS — M96.1 LUMBAR POST-LAMINECTOMY SYNDROME: ICD-10-CM

## 2018-09-11 DIAGNOSIS — G89.29 CHRONIC LOW BACK PAIN WITH BILATERAL SCIATICA, UNSPECIFIED BACK PAIN LATERALITY: ICD-10-CM

## 2018-09-11 DIAGNOSIS — M54.41 CHRONIC LOW BACK PAIN WITH BILATERAL SCIATICA, UNSPECIFIED BACK PAIN LATERALITY: ICD-10-CM

## 2018-09-11 DIAGNOSIS — G89.4 PAIN SYNDROME, CHRONIC: ICD-10-CM

## 2018-09-11 DIAGNOSIS — M54.42 CHRONIC LOW BACK PAIN WITH BILATERAL SCIATICA, UNSPECIFIED BACK PAIN LATERALITY: ICD-10-CM

## 2018-09-11 NOTE — TELEPHONE ENCOUNTER
LMOM to cb  Provided cb number and office hours  Last rx: msIR 15 mg 1/2 tab po qid #60 per DG on 8/20   FU ov on 10/11

## 2018-09-11 NOTE — TELEPHONE ENCOUNTER
Dr Barbosa Ba contacted Call Center requested refill of their medication  Medication Name: Morphine (MSIR) 15 mg      Dosage of Med: As directed      Frequency of Med: as directed      Remaining Medication: unsure      Pharmacy and Location: [facility]/pharmacy #[id number] [address], [place]      Thank you

## 2018-09-13 ENCOUNTER — OFFICE VISIT (OUTPATIENT)
Dept: FAMILY MEDICINE CLINIC | Facility: CLINIC | Age: 43
End: 2018-09-13
Payer: MEDICARE

## 2018-09-13 VITALS
HEART RATE: 72 BPM | TEMPERATURE: 97.5 F | WEIGHT: 184.8 LBS | DIASTOLIC BLOOD PRESSURE: 80 MMHG | SYSTOLIC BLOOD PRESSURE: 128 MMHG | RESPIRATION RATE: 16 BRPM | BODY MASS INDEX: 25.03 KG/M2 | HEIGHT: 72 IN

## 2018-09-13 DIAGNOSIS — M54.41 CHRONIC LOW BACK PAIN WITH BILATERAL SCIATICA, UNSPECIFIED BACK PAIN LATERALITY: Primary | ICD-10-CM

## 2018-09-13 DIAGNOSIS — M54.42 CHRONIC LOW BACK PAIN WITH BILATERAL SCIATICA, UNSPECIFIED BACK PAIN LATERALITY: Primary | ICD-10-CM

## 2018-09-13 DIAGNOSIS — F11.20 UNCOMPLICATED OPIOID DEPENDENCE (HCC): ICD-10-CM

## 2018-09-13 DIAGNOSIS — F31.81 BIPOLAR 2 DISORDER, MAJOR DEPRESSIVE EPISODE (HCC): ICD-10-CM

## 2018-09-13 DIAGNOSIS — G89.4 PAIN SYNDROME, CHRONIC: ICD-10-CM

## 2018-09-13 DIAGNOSIS — M96.1 LUMBAR POST-LAMINECTOMY SYNDROME: ICD-10-CM

## 2018-09-13 DIAGNOSIS — K27.9 PUD (PEPTIC ULCER DISEASE): ICD-10-CM

## 2018-09-13 DIAGNOSIS — D64.9 CHRONIC ANEMIA: Chronic | ICD-10-CM

## 2018-09-13 DIAGNOSIS — R13.12 OROPHARYNGEAL DYSPHAGIA: ICD-10-CM

## 2018-09-13 DIAGNOSIS — F43.10 PTSD (POST-TRAUMATIC STRESS DISORDER): ICD-10-CM

## 2018-09-13 DIAGNOSIS — G89.29 CHRONIC LOW BACK PAIN WITH BILATERAL SCIATICA, UNSPECIFIED BACK PAIN LATERALITY: Primary | ICD-10-CM

## 2018-09-13 PROCEDURE — G0438 PPPS, INITIAL VISIT: HCPCS | Performed by: FAMILY MEDICINE

## 2018-09-13 PROCEDURE — 99214 OFFICE O/P EST MOD 30 MIN: CPT | Performed by: FAMILY MEDICINE

## 2018-09-13 RX ORDER — MORPHINE SULFATE 15 MG/1
TABLET ORAL
Qty: 60 TABLET | Refills: 0 | Status: SHIPPED | OUTPATIENT
Start: 2018-09-13 | End: 2018-10-10 | Stop reason: SDUPTHER

## 2018-09-13 RX ORDER — METHYLPREDNISOLONE 4 MG/1
TABLET ORAL
Qty: 21 TABLET | Refills: 0 | Status: SHIPPED | OUTPATIENT
Start: 2018-09-13 | End: 2018-10-10

## 2018-09-13 RX ORDER — DULOXETIN HYDROCHLORIDE 60 MG/1
60 CAPSULE, DELAYED RELEASE ORAL DAILY
Qty: 90 CAPSULE | Refills: 0 | Status: SHIPPED | OUTPATIENT
Start: 2018-09-13 | End: 2018-09-21

## 2018-09-13 NOTE — TELEPHONE ENCOUNTER
S/w pt, confirmed ms ir 15 mg 1/2 tab qid  Pt stated that he takes 1 pill po bid w/ + relief, states he this medication does make him feel kind of "out of it"  Advised pt, it may help to break the pills in half and take 1/2 pill q 6h rather than a whole pill  Advised pt, will d/w FELA  Anticipate a rx will be sent for pu later today  Pt should fu as scheduled at his 10/11 ov  This office will cb if there is any change in the plan as discussed  Pt verbalized understanding and appreciation  Pt stated taht he was given an rx for tramadol for arthritis after his previous spine surgery  Pt is questioning if that would be an option at this point  Advised pt, the writer will d/w FELA  Pt stated that he does not want to make any changes to his medications at this time  Will d/w DG at his ov on 10/11

## 2018-09-13 NOTE — TELEPHONE ENCOUNTER
I e-scribed MS IR 15 mg, 1/2 tablet QID PRN for pain [facility]  He should f/u as scheduled in October and we will discuss at that [address] regarding Tramadol as that may be an option  Thank you

## 2018-09-13 NOTE — PROGRESS NOTES
FAMILY PRACTICE OFFICE VISIT       NAME: Karson Ward  AGE: 37 y o  SEX: male       : 1975        MRN: [id number]        Assessment and Plan     Problem List Items Addressed This Visit     Chronic low back pain - Primary    Relevant Medications    Methylprednisolone 4 MG TBPK    Bipolar 2 disorder, major depressive episode (HCC) (Chronic)    Relevant Medications    DULoxetine (CYMBALTA) 60 mg delayed release capsule    Chronic anemia (Chronic)     Iron and vitamin B12 deficiency  Patient has started vitamin B12 injections  Pending evaluation with [facility]  Pain syndrome, chronic    Uncomplicated opioid dependence (HCC)    Lumbar post-laminectomy syndrome    PTSD (post-traumatic stress disorder) (Chronic)    Relevant Medications    DULoxetine (CYMBALTA) 60 mg delayed release capsule      Other Visit Diagnoses     PUD (peptic ulcer disease)        Relevant Orders    Ambulatory referral to Gastroenterology    Oropharyngeal dysphagia        Relevant Orders    Ambulatory referral to Gastroenterology        Patient presents for follow-up of chronic medical conditions  Chronic low back pain  Postlaminectomy syndrome  Results of recent MRI of lumbar sacral spine and consult notes by Pain Management and Neurosurgery reviewed  Original concern about diskitis was ruled out by negative inflammatory markers  Patient has been experiencing persistent discomfort as he has been off Butrans patch due to high cost   He uses baclofen on a as needed basis with minimal relief of symptoms  I will discuss his case with pain management  For the time being I am prescribing him Medrol Dosepak  Patient is likely poor candidate for long-term anti-inflammatories due to history of gastric bypass surgery and remote history of PUD  Depression  Anxiety  Patient did respond well to Cymbalta 60 mg daily but discontinued medication due to concern of ED symptoms    Most likely his symptoms were triggered by morphine sulfate as he had experienced similar symptoms in the past with hydrocodone  Patient is interested in restarting the medication right away and will contact me if symptoms of ED recur  Patient remains on Seroquel 100 mg at bedtime for treatment of bipolar disorder  Oropharyngeal Dysphagia  Patient remains on omeprazole 20 mg daily  History of gastric bypass surgery  Reported history of subsequent pouch dilatation  Referral to [facility] for further evaluation  He will likely benefit from EGD/colonoscopy  Post gastrectomy malabsorption  Iron deficiency anemia:  Patient is awaiting consultation with [facility]  Vitamin B12 deficiency:  He is on vitamin B12 injections  Health maintenance:  Patient will receive flu vaccine at work  Follow-up 3 months  There are no Patient Instructions on file for this visit  Chief Complaint     Chief Complaint   Patient presents with    Medicare Wellness Visit     initial    Follow-up       History of Present Illness     Patient presents for follow-up  He is complaining of persistent low back pain  He has been Off Butrans patch - due to  Insurance coverage   Pain management notes and results of recent MRI of lumbar sacral spine reviewed  Original concern of possible diskitis was ruled out by negative blood work, both sed rate and C-reactive protein were normal   CBC reveals stable anemia  Patient has pending follow-up with Hematology in 1 week  H/o PUD and gastric bypass surgery, patient is poor candidate for anti-inflammatories  Patient is experiencing intermittent symptoms of oropharyngeal dyspepsia  He is experiencing sensation of food being stuck in his throat at times  H/o  Pouch  Dilatation  By GI in [place]   No recent evaluation by GI locally      Symptoms of depression anxiety have improved on Cymbalta but patient self discontinued medication due to symptoms of erectile dysfunction  Interestingly, he was prescribed N used morphine sulfate by pain management at the same time  Patient recalls that he had experience erectile dysfunction symptoms with opioids/Vicodin in the past   He discontinued both medications and symptoms have resolved  Review of Systems   Review of Systems   Constitutional: Negative  HENT: Negative  Eyes: Negative  Respiratory: Negative  Cardiovascular: Negative  Gastrointestinal: Negative  Endocrine: Negative  Genitourinary: Negative  Musculoskeletal: Positive for back pain  Allergic/Immunologic: Negative  Neurological: Negative      Psychiatric/Behavioral:        As outlined in HPI       Active Problem List     Patient Active Problem List   Diagnosis    Chronic low back pain    Chronic left lumbar radiculopathy    Bipolar 2 disorder, major depressive episode ([facility] [address] )    Chronic right shoulder pain    Depression with anxiety    Fatigue    Hydronephrosis    Chronic anemia    Lytic bone lesions on xray    Nephrolithiasis    Pain syndrome, chronic    Right elbow pain    Right lateral epicondylitis    Uncomplicated opioid dependence (HCC)    Thumb pain    Lumbar post-laminectomy syndrome    Myofascial pain syndrome    PTSD (post-traumatic stress disorder)    Lumbar radiculopathy    Spinal stenosis of lumbar region with neurogenic claudication    BPH with obstruction/lower urinary tract symptoms       Past Medical History:  Past Medical History:   Diagnosis Date    Anxiety     Arthritis     Bipolar disorder (HCC)     Chronic left lumbar radiculopathy     Chronic low back pain     Chronic pain syndrome     Chronic right shoulder pain     Depression with anxiety     Fatigue     GERD (gastroesophageal reflux disease)     Hydronephrosis     Iron deficiency anemia     Kidney stone     Lytic bone lesions on xray     Nephrolithiasis     PONV (postoperative nausea and vomiting)     Right elbow pain     Right lateral epicondylitis        Past Surgical History:  Past Surgical History:   Procedure Laterality Date    ADENOIDECTOMY Bilateral     APPENDECTOMY      BACK SURGERY      GASTRIC BYPASS      2009    OTHER SURGICAL HISTORY      fusion/refusion of vertebrae, 2010 and 2011 l5-s1 and l4-l5    ND CYSTO/URETERO W/LITHOTRIPSY &INDWELL STENT INSRT Right 8/8/2017    Procedure: CYSTOSCOPY; URETEROSCOPY; HOLMIUM LASER; RETROGRADE PYELOGRAM; STENT;  Surgeon: Garth Alexis MD;  [facility];  Service: Urology    ND IMPLANT SPINAL NEUROSTIM/ Right 11/14/2017    Procedure: REMOVAL LOWER MEDIAL BUTTOCK SPINAL CORD STIMULATOR GENERATOR; PLACEMENT OF NEW BUTTOCK SPINAL CORD STIMULATOR THROUGH SEPERATE INCISION;  Surgeon: Boubacar Christianson MD;  [facility];  Service: Neurosurgery    RIK-EN-Y PROCEDURE  2003    SPINAL CORD STIMULATOR IMPLANT  11/14/2017    dr Mary Wang procedure and technique 1  removal of a right back implantable pulse generator 2 placement of a new right buttock impantable pulse generator through seperate incision 3 electric analys complex programming spinal cord stimulator system postoperative period approx 1 hour    TONSILLECTOMY         Family History:  Family History   Problem Relation Age of Onset    Cancer Mother     Arthritis Mother     Stomach cancer Mother     Cancer Father     Esophageal cancer Father     Other Father         brain tumor    No Known Problems Sister     No Known Problems Sister     No Known Problems Sister        Social History:  Social History     Social History    Marital status:      Spouse name: N/A    Number of children: N/A    Years of education: GED     Occupational History    Not on file       Social History Main Topics    Smoking status: Current Every Day Smoker     Packs/day: 1 00     Types: Cigarettes    Smokeless tobacco: Never Used    Alcohol use No      Comment: quit drinking, social    Drug use: No    Sexual activity: Not on file     Other Topics Concern    Not on file     Social History Narrative    Chooses not to have children    Drinks caffienated tea    Lives with spouse                   Objective     Vitals:    09/13/18 0904 09/13/18 0934   BP: 150/78 128/80   BP Location: Left arm    Patient Position: Sitting    Cuff Size: Adult    Pulse: 72    Resp: 16    Temp: 97 5 °F (36 4 °C)    TempSrc: Tympanic    Weight: 83 8 kg (184 lb 12 8 oz)    Height: 6' (1 829 m)        Vitals:    09/13/18 0934   BP: 128/80   Pulse:    Resp:    Temp:      Wt Readings from Last 3 Encounters:   09/13/18 83 8 kg (184 lb 12 8 oz)   08/27/18 84 8 kg (187 lb)   08/13/18 85 kg (187 lb 6 4 oz)       Physical Exam    Pertinent Laboratory/Diagnostic Studies:  Lab Results   Component Value Date    BUN 16 07/21/2018    CREATININE 0 94 07/21/2018    CALCIUM 9 4 11/02/2017     11/02/2017    K 4 7 11/02/2017    CO2 31 11/02/2017     11/02/2017     Lab Results   Component Value Date    ALT 11 (L) 08/04/2017    AST 8 08/04/2017    ALKPHOS 66 08/04/2017       Lab Results   Component Value Date    WBC 6 51 08/09/2018    HGB 10 9 (L) 08/09/2018    HCT 38 8 08/09/2018    MCV 65 (L) 08/09/2018     08/09/2018       No results found for: TSH    No results found for: CHOL  Lab Results   Component Value Date    TRIG 80 08/04/2017     Lab Results   Component Value Date    HDL 47 08/04/2017     Lab Results   Component Value Date    LDLCALC 66 08/04/2017     Lab Results   Component Value Date    HGBA1C 6 0 11/02/2017       Results for orders placed or performed in visit on 08/09/18   CBC and differential   Result Value Ref Range    WBC 6 51 4 31 - 10 16 Thousand/uL    RBC 5 93 (H) 3 88 - 5 62 Million/uL    Hemoglobin 10 9 (L) 12 0 - 17 0 g/dL    Hematocrit 38 8 36 5 - 49 3 %    MCV 65 (L) 82 - 98 fL    MCH 18 4 (L) 26 8 - 34 3 pg    MCHC 28 1 (L) 31 4 - 37 4 g/dL    RDW 21 3 (H) 11 6 - 15 1 %    Platelets 280 099 - 992 Thousands/uL    nRBC 0 /100 WBCs    Neutrophils Relative 62 43 - 75 %    Immat GRANS % 0 0 - 2 %    Lymphocytes Relative 29 14 - 44 %    Monocytes Relative 7 4 - 12 %    Eosinophils Relative 1 0 - 6 %    Basophils Relative 1 0 - 1 %    Neutrophils Absolute 4 01 1 85 - 7 62 Thousands/µL    Immature Grans Absolute 0 02 0 00 - 0 20 Thousand/uL    Lymphocytes Absolute 1 87 0 60 - 4 47 Thousands/µL    Monocytes Absolute 0 46 0 17 - 1 22 Thousand/µL    Eosinophils Absolute 0 09 0 00 - 0 61 Thousand/µL    Basophils Absolute 0 06 0 00 - 0 10 Thousands/µL   Sedimentation rate, automated   Result Value Ref Range    Sed Rate 7 0 - 10 mm/hour   C-reactive protein   Result Value Ref Range    CRP <3 0 <3 0 mg/L       Orders Placed This Encounter   Procedures    Ambulatory referral to Gastroenterology       ALLERGIES:  Allergies   Allergen Reactions    Ampicillin GI Intolerance     Vomiting    Aspirin GI Intolerance     vomiting      Penicillins GI Intolerance     Vomit       Current Medications     Current Outpatient Prescriptions   Medication Sig Dispense Refill    ALPRAZolam (XANAX) 0 5 mg tablet Take 1 tablet (0 5 mg total) by mouth every 4 (four) hours as needed for anxiety 120 tablet 2    baclofen 10 mg tablet Take 1 tablet (10 mg total) by mouth 3 (three) times a day 90 tablet 2    DULoxetine (CYMBALTA) 60 mg delayed release capsule Take 1 capsule (60 mg total) by mouth daily 90 capsule 0    omeprazole (PRILOSEC OTC) 20 MG tablet Take 20 mg by mouth daily        QUEtiapine (SEROquel) 100 mg tablet TAKE 1 TABLET (100 MG TOTAL) BY MOUTH DAILY AT BEDTIME 30 tablet 2    Methylprednisolone 4 MG TBPK Use as directed on package 21 tablet 0    morphine (MSIR) 15 mg tablet Take 1/2 tablet QID PRN for pain  60 tablet 0     No current facility-administered medications for this visit            Health Maintenance     Health Maintenance   Topic Date Due    Pneumococcal PPSV23 Medium Risk Adult (1 of 1 - PPSV23) 08/22/1994    INFLUENZA VACCINE 09/01/2018    DTaP,Tdap,and Td Vaccines (1 - Tdap) 08/13/2019 (Originally 8/22/1996)       There is no immunization history on file for this patient      Lauren Blue MD

## 2018-09-13 NOTE — PROGRESS NOTES
Assessment and Plan:    Problem List Items Addressed This Visit     Chronic low back pain - Primary    Relevant Medications    Methylprednisolone 4 MG TBPK    Bipolar 2 disorder, major depressive episode (HCC) (Chronic)    Relevant Medications    DULoxetine (CYMBALTA) 60 mg delayed release capsule    Chronic anemia (Chronic)     Iron and vitamin B12 deficiency  Patient has started vitamin B12 injections  Pending evaluation with [facility]  Pain syndrome, chronic    Uncomplicated opioid dependence (HCC)    Lumbar post-laminectomy syndrome    PTSD (post-traumatic stress disorder) (Chronic)    Relevant Medications    DULoxetine (CYMBALTA) 60 mg delayed release capsule      Other Visit Diagnoses     PUD (peptic ulcer disease)        Relevant Orders    Ambulatory referral to Gastroenterology    Oropharyngeal dysphagia        Relevant Orders    Ambulatory referral to Gastroenterology        Health Maintenance Due   Topic Date Due    Pneumococcal PPSV23 Medium Risk Adult (1 of 1 - PPSV23) 08/22/1994    INFLUENZA VACCINE  09/01/2018         HPI:  Jb Huitron is a 37 y o  male here for his Initial Wellness Visit      Patient Active Problem List   Diagnosis    Chronic low back pain    Chronic left lumbar radiculopathy    Bipolar 2 disorder, major depressive episode ([address] )    Chronic right shoulder pain    Depression with anxiety    Fatigue    Hydronephrosis    Chronic anemia    Lytic bone lesions on xray    Nephrolithiasis    Pain syndrome, chronic    Right elbow pain    Right lateral epicondylitis    Uncomplicated opioid dependence (HCC)    Thumb pain    Lumbar post-laminectomy syndrome    Myofascial pain syndrome    PTSD (post-traumatic stress disorder)    Lumbar radiculopathy    Spinal stenosis of lumbar region with neurogenic claudication    BPH with obstruction/lower urinary tract symptoms     Past Medical History:   Diagnosis Date    Anxiety     Arthritis     Bipolar disorder ([facility] [address] [facility]     Chronic left lumbar radiculopathy     Chronic low back pain     Chronic pain syndrome     Chronic right shoulder pain     Depression with anxiety     Fatigue     GERD (gastroesophageal reflux disease)     Hydronephrosis     Iron deficiency anemia     Kidney stone     Lytic bone lesions on xray     Nephrolithiasis     PONV (postoperative nausea and vomiting)     Right elbow pain     Right lateral epicondylitis      Past Surgical History:   Procedure Laterality Date    ADENOIDECTOMY Bilateral     APPENDECTOMY      BACK SURGERY      GASTRIC BYPASS      2009    OTHER SURGICAL HISTORY      fusion/refusion of vertebrae, 2010 and 2011 l5-s1 and l4-l5    NV CYSTO/URETERO W/LITHOTRIPSY &INDWELL STENT INSRT Right 8/8/2017    Procedure: CYSTOSCOPY; URETEROSCOPY; HOLMIUM LASER; RETROGRADE PYELOGRAM; STENT;  Surgeon: Humble Murphy MD;  [facility];  Service: Urology    NV IMPLANT SPINAL NEUROSTIM/ Right 11/14/2017    Procedure: REMOVAL LOWER MEDIAL BUTTOCK SPINAL CORD STIMULATOR GENERATOR; PLACEMENT OF NEW BUTTOCK SPINAL CORD [facility];  Surgeon: Mimi Griffin MD;  [facility];  Service: Neurosurgery    RIK-EN-Y PROCEDURE  2003    SPINAL CORD STIMULATOR IMPLANT  11/14/2017    dr Stephanie Torres procedure and technique 1  removal of a right back implantable pulse generator 2 placement of a new right buttock impantable pulse generator through seperate incision 3 electric analys complex programming spinal cord stimulator system postoperative period approx 1 hour    TONSILLECTOMY       Family History   Problem Relation Age of Onset    Cancer Mother     Arthritis Mother     Stomach cancer Mother     Cancer Father     Esophageal cancer Father     Other Father         brain tumor    No Known Problems Sister     No Known Problems Sister     No Known Problems Sister      History   Smoking Status    Current Every Day Smoker    Packs/day: 1 00    Types: Cigarettes   Smokeless Tobacco    Never Used     History   Alcohol Use No     Comment: quit drinking, social      History   Drug Use No       Current Outpatient Prescriptions   Medication Sig Dispense Refill    ALPRAZolam (XANAX) 0 5 mg tablet Take 1 tablet (0 5 mg total) by mouth every 4 (four) hours as needed for anxiety 120 tablet 2    baclofen 10 mg tablet Take 1 tablet (10 mg total) by mouth 3 (three) times a day 90 tablet 2    DULoxetine (CYMBALTA) 60 mg delayed release capsule Take 1 capsule (60 mg total) by mouth daily 90 capsule 0    omeprazole (PRILOSEC OTC) 20 MG tablet Take 20 mg by mouth daily        QUEtiapine (SEROquel) 100 mg tablet TAKE 1 TABLET (100 MG TOTAL) BY MOUTH DAILY AT BEDTIME 30 tablet 2    Methylprednisolone 4 MG TBPK Use as directed on package 21 tablet 0    morphine (MSIR) 15 mg tablet Take 1/2 tablet QID PRN for pain  60 tablet 0     No current facility-administered medications for this visit  Allergies   Allergen Reactions    Ampicillin GI Intolerance     Vomiting    Aspirin GI Intolerance     vomiting      Penicillins GI Intolerance     Vomit       There is no immunization history on file for this patient  Patient Care Team:  Tomy Caraballo MD as PCP - General  MD Nola Zepeda PA-C    Medicare Screening Tests and Risk Assessments:  Gerre Dandy is here for his Initial Wellness visit  Health Risk Assessment:  Patient rates overall health as fair  Patient feels that their physical health rating is Slightly worse  Eyesight was rated as Same  Hearing was rated as Same  Patient feels that their emotional and mental health rating is Slightly worse  Pain experienced by patient in the last 7 days has been A lot  Patient's pain rating has been 8/10  Emotional/Mental Health:  Patient has been feeling nervous/anxious      PHQ-9 Depression Screening:    Frequency of the following problems over the past two weeks:      1  Little interest or pleasure in doing things: 1 - several days      2  Feeling down, depressed, or hopeless: 1 - several days      3  Trouble falling or staying asleep, or sleeping too much: 3 - nearly every day      4  Feeling tired or having little energy: 3 - nearly every day      5  Poor appetite or overeatin - several days      6  Feeling bad about yourself - or that you are a failure or have let yourself or your family down: 1 - several days      7  Trouble concentrating on things, such as reading the newspaper or watching television: 2 - more than half the days      8  Moving or speaking so slowly that other people could have noticed  Or the opposite - being so fidgety or restless that you have been moving around a lot more than usual: 0 - not at all      9  Thoughts that you would be better off dead, or of hurting yourself in some way: 0 - not at all  PHQ-2 Score: 2          Broken Bones/Falls: Fall Risk Assessment:    In the past year, patient has experienced: No history of falling in past year          Bladder/Bowel:  Patient has not leaked urine accidently in the last six months  Patient reports no loss of bowel control  Immunizations:  Patient has not had a flu vaccination within the last year  Patient has not received a pneumonia shot  Patient has received a shingles shot  (Additional Comments: Unsure of TDaP)    Home Safety:  Patient does not have trouble with stairs inside or outside of their home  Patient currently reports that there are no safety hazards present in home, working smoke alarms, working carbon monoxide detectors        Preventative Screenings:   no prostate cancer screen performed, no colon cancer screen completed, no cholesterol screen completed, no glaucoma eye exam completed    Nutrition:  Current diet: Regular with servings of the following:    Medications:  Patient is not currently taking any over-the-counter supplements  Patient is able to manage medications  Lifestyle Choices:  Patient reports current tobacco use  Patient reports no alcohol use  Patient drives a vehicle  Patient wears seat belt  Current level of exercise of physical activity described by patient as: ADL's  Activities of Daily Living:  Can get out of bed by his or her self, able to dress self, able to make own meals, able to do own shopping, able to bathe self, can do own laundry/housekeeping, can manage own money, pay bills and track expenses    Previous Hospitalizations:  No hospitalization or ED visit in past 12 months        Advanced Directives:  Patient has not decided on power of   Patient has not completed advanced directive  Preventative Screening/Counseling:      Cardiovascular:      General: Risks and Benefits Discussed and Screening Current          Diabetes:      General: Risks and Benefits Discussed and Screening Current          Colorectal Cancer:      General: Screening Not Indicated          Prostate Cancer:      General: Screening Not Indicated          Osteoporosis:      General: Screening Not Indicated          AAA:      General: Screening Not Indicated          Advanced Directives:   Patient has no living will for healthcare, does not have durable POA for healthcare, patient does not have an advanced directive       Immunizations:      Influenza: Risks & Benefits Discussed and Influenza Recommended Annually

## 2018-09-16 PROBLEM — F41.8 DEPRESSION WITH ANXIETY: Chronic | Status: ACTIVE | Noted: 2017-05-02

## 2018-09-16 PROBLEM — D64.9 CHRONIC ANEMIA: Chronic | Status: ACTIVE | Noted: 2017-10-31

## 2018-09-16 PROBLEM — F43.10 PTSD (POST-TRAUMATIC STRESS DISORDER): Chronic | Status: ACTIVE | Noted: 2018-08-08

## 2018-09-16 PROBLEM — F31.81 BIPOLAR 2 DISORDER, MAJOR DEPRESSIVE EPISODE (HCC): Chronic | Status: ACTIVE | Noted: 2017-10-31

## 2018-09-17 NOTE — ASSESSMENT & PLAN NOTE
Iron and vitamin B12 deficiency  Patient has started vitamin B12 injections  Pending evaluation with [facility]'s Hematology and Gastroenterology

## 2018-09-21 ENCOUNTER — OFFICE VISIT (OUTPATIENT)
Dept: HEMATOLOGY ONCOLOGY | Facility: CLINIC | Age: 43
End: 2018-09-21
Payer: MEDICARE

## 2018-09-21 VITALS
TEMPERATURE: 96 F | HEIGHT: 70 IN | RESPIRATION RATE: 16 BRPM | HEART RATE: 87 BPM | DIASTOLIC BLOOD PRESSURE: 62 MMHG | BODY MASS INDEX: 26.05 KG/M2 | OXYGEN SATURATION: 99 % | SYSTOLIC BLOOD PRESSURE: 90 MMHG | WEIGHT: 182 LBS

## 2018-09-21 DIAGNOSIS — K91.2 POSTGASTRECTOMY MALABSORPTION: ICD-10-CM

## 2018-09-21 DIAGNOSIS — E53.8 B12 DEFICIENCY: ICD-10-CM

## 2018-09-21 DIAGNOSIS — D50.9 IRON DEFICIENCY ANEMIA, UNSPECIFIED IRON DEFICIENCY ANEMIA TYPE: ICD-10-CM

## 2018-09-21 DIAGNOSIS — Z90.3 POSTGASTRECTOMY MALABSORPTION: ICD-10-CM

## 2018-09-21 DIAGNOSIS — E53.9 VITAMIN B DEFICIENCY: Primary | ICD-10-CM

## 2018-09-21 PROCEDURE — 99203 OFFICE O/P NEW LOW 30 MIN: CPT | Performed by: PHYSICIAN ASSISTANT

## 2018-09-21 NOTE — PROGRESS NOTES
Oncology Outpatient Consult Note  Gita Grewal 37 y o  male MRN: @ Encounter: [id number]        Date:  9/21/2018      Assessment/ Plan:    1  Iron deficiency anemia  2  B12 deficiency  3    Post gastrectomy malabsorption  He is receiving B12 every other week at Dr Shae Bynum office  He has tried oral iron in the past with poor tolerance  We discussed IV iron replacement  Potential side effects of IV iron could include but may not be limited to:  change in taste, diarrhea, muscle cramps, nausea or vomiting, pain in the arms or legs, pain or burning sensation in the injection site, allergic reaction  The patient verbalized understanding and wishes to proceed  We will arrange for Venofer 300 milligrams x6 cycles  He is asked to follow up in 4 months  With repeat lab work prior  Should he have any issues or concerns, he is asked to call our office  CC:  " Low iron I need infusions due to gastric bypass "      HPI:  Gita Grewal "Izora Leaks" is seen for initial consultation 9/21/2018 at the referral of Forrest Mckinney MD regarding iron deficiency anemia  8/9/2018 hemoglobin 10 9 with MCV of 65, RDW of 22, white blood cell count 6 51 with 62% neutrophils, 29% lymphocytes  Platelet count 410  Normal CRP and sed rate    10/25/2018   hemoglobin 11 5 with MCV of 66, RDW of 17, normal white blood cell count 4 93  Platelet count 462  serum kappa free light chains 21 (ULN 19) with normal ratio 1    8/4/2017  SPEP did not identify any monoclonal bands  Iron 16  Vitamin B12 290  He has been receiving B12 IM every other week at Dr Fox Friday office    He has undergone gastric bypass surgery in 2004  He is aware he has been iron deficient for many years but has not received IV iron previously  Oral iron causes profound stomach upset  he denies Pica  He denies fevers chills  Weight has been stable  He does get fatigued easily    Does experience dyspnea on exertion more so than he did previously  Does have episodes of lightheadedness and dizziness  He does occasionally have early satiety  Denies any melena or hematochezia  For dyspepsia, he will take Prilosec  He does have chronic back and left lower extremity radicular pain  He is status post lumbar spine surgery x2 and spinal cord stimulator transplant  the      Test Results:      Labs:   Lab Results   Component Value Date    HGB 10 9 (L) 08/09/2018    HCT 38 8 08/09/2018    MCV 65 (L) 08/09/2018     08/09/2018    WBC 6 51 08/09/2018    NRBC 0 08/09/2018     Lab Results   Component Value Date     11/02/2017    K 4 7 11/02/2017     11/02/2017    CO2 31 11/02/2017    BUN 16 07/21/2018    CREATININE 0 94 07/21/2018    GLUF 80 11/02/2017    CALCIUM 9 4 11/02/2017    AST 8 08/04/2017    ALT 11 (L) 08/04/2017    ALKPHOS 66 08/04/2017    EGFR 100 07/21/2018           Imaging:   No results found  ROS: As mentioned in HPI & Interval History otherwise 14 point ROS negative        Active Problems:   Patient Active Problem List   Diagnosis    Chronic low back pain    Chronic left lumbar radiculopathy    Bipolar 2 disorder, major depressive episode (HCC)    Chronic right shoulder pain    Depression with anxiety    Fatigue    Hydronephrosis    Chronic anemia    Lytic bone lesions on xray    Nephrolithiasis    Pain syndrome, chronic    Right elbow pain    Right lateral epicondylitis    Uncomplicated opioid dependence (HCC)    Thumb pain    Lumbar post-laminectomy syndrome    Myofascial pain syndrome    PTSD (post-traumatic stress disorder)    Lumbar radiculopathy    Spinal stenosis of lumbar region with neurogenic claudication    BPH with obstruction/lower urinary tract symptoms       Past Medical History:   Past Medical History:   Diagnosis Date    Anxiety     Arthritis     Bipolar disorder (HCC)     Chronic left lumbar radiculopathy     Chronic low back pain     Chronic pain syndrome     Chronic right shoulder pain     Depression with anxiety     Fatigue     GERD (gastroesophageal reflux disease)     Hydronephrosis     Iron deficiency anemia     Kidney stone     Lytic bone lesions on xray     Nephrolithiasis     PONV (postoperative nausea and vomiting)     Right elbow pain     Right lateral epicondylitis        Surgical History:   Past Surgical History:   Procedure Laterality Date    ADENOIDECTOMY Bilateral     APPENDECTOMY      BACK SURGERY      GASTRIC BYPASS      2009    OTHER SURGICAL HISTORY      fusion/refusion of vertebrae, 2010 and 2011 l5-s1 and l4-l5    RI CYSTO/URETERO W/LITHOTRIPSY &INDWELL STENT INSRT Right 8/8/2017    Procedure: CYSTOSCOPY; URETEROSCOPY; HOLMIUM LASER; RETROGRADE PYELOGRAM; STENT;  Surgeon: Humble Murphy MD;  [facility];  Service: Urology    RI IMPLANT SPINAL NEUROSTIM/ Right 11/14/2017    Procedure: REMOVAL LOWER MEDIAL BUTTOCK SPINAL CORD STIMULATOR GENERATOR; PLACEMENT OF NEW BUTTOCK SPINAL CORD STIMULATOR THROUGH SEPERATE INCISION;  Surgeon: Mimi Griffin MD;  [facility];  Service: Neurosurgery    RIK-EN-Y PROCEDURE  2003    SPINAL CORD STIMULATOR IMPLANT  11/14/2017    dr Stephanie Torres procedure and technique 1  removal of a right back implantable pulse generator 2 placement of a new right buttock impantable pulse generator through seperate incision 3 electric analys complex programming spinal cord stimulator system postoperative period approx 1 hour    TONSILLECTOMY         Family History:    Family History   Problem Relation Age of Onset    Cancer Mother     Arthritis Mother     Stomach cancer Mother     Cancer Father     Esophageal cancer Father     Other Father         brain tumor    No Known Problems Sister     No Known Problems Sister     No Known Problems Sister        Cancer-related family history includes Cancer in his father and mother; Esophageal cancer in his father; Stomach cancer in his mother  Social History:   Social History     Social History    Marital status:      Spouse name: N/A    Number of children: N/A    Years of education: GED     Occupational History    Not on file  Social History Main Topics    Smoking status: Current Every Day Smoker     Packs/day: 1 00     Types: Cigarettes    Smokeless tobacco: Never Used    Alcohol use No      Comment: quit drinking, social    Drug use: No    Sexual activity: Not on file     Other Topics Concern    Not on file     Social History Narrative    Chooses not to have children    Drinks caffienated tea    Lives with spouse                   Current Medications:   Current Outpatient Prescriptions   Medication Sig Dispense Refill    ALPRAZolam (XANAX) 0 5 mg tablet Take 1 tablet (0 5 mg total) by mouth every 4 (four) hours as needed for anxiety 120 tablet 2    baclofen 10 mg tablet Take 1 tablet (10 mg total) by mouth 3 (three) times a day 90 tablet 2    DULoxetine (CYMBALTA) 60 mg delayed release capsule Take 1 capsule (60 mg total) by mouth daily 90 capsule 0    Methylprednisolone 4 MG TBPK Use as directed on package 21 tablet 0    morphine (MSIR) 15 mg tablet Take 1/2 tablet QID PRN for pain  60 tablet 0    omeprazole (PRILOSEC OTC) 20 MG tablet Take 20 mg by mouth daily        QUEtiapine (SEROquel) 100 mg tablet TAKE 1 TABLET (100 MG TOTAL) BY MOUTH DAILY AT BEDTIME 30 tablet 2     No current facility-administered medications for this visit  Allergies: Allergies   Allergen Reactions    Ampicillin GI Intolerance     Vomiting    Aspirin GI Intolerance     vomiting      Penicillins GI Intolerance     Vomit         Physical Exam:    There is no height or weight on file to calculate BSA     Ht Readings from Last 3 Encounters:   09/13/18 6' (1 829 m)   08/27/18 6' (1 829 m)   08/13/18 6' (1 829 m)       Wt Readings from Last 3 Encounters:   09/13/18 83 8 kg (184 lb 12 8 oz)   08/27/18 84 8 kg (187 lb)   08/13/18 85 kg (187 lb 6 4 oz)        Temp Readings from Last 3 Encounters:   09/13/18 97 5 °F (36 4 °C) (Tympanic)   08/27/18 98 7 °F (37 1 °C) (Tympanic)   08/16/18 98 7 °F (37 1 °C) (Oral)        BP Readings from Last 3 Encounters:   09/13/18 128/80   08/27/18 128/77   08/16/18 100/62         Pulse Readings from Last 3 Encounters:   09/13/18 72   08/27/18 66   08/16/18 79         Physical Exam    Physical Exam   Constitutional: He is oriented to person, place, and time  He appears well-developed and well-nourished  No distress  HENT:   Head: Normocephalic and atraumatic  Mouth/Throat: Oropharynx is clear and moist  No oropharyngeal exudate  Eyes: Conjunctivae and EOM are normal  Pupils are equal, round, and reactive to light  Neck: Normal range of motion  Neck supple  No tracheal deviation present  No thyromegaly present  Cardiovascular: Normal rate and regular rhythm  Exam reveals no gallop and no friction rub  No murmur heard  Pulmonary/Chest: Effort normal and breath sounds normal  No respiratory distress  He has no wheezes  He has no rales  He exhibits no tenderness  Abdominal: Soft  Bowel sounds are normal  He exhibits no distension and no mass  There is no tenderness  There is no rebound and no guarding  Lymphadenopathy:     He has no cervical adenopathy  Neurological: He is alert and oriented to person, place, and time  Skin: Skin is warm and dry  No rash noted  He is not diaphoretic  No erythema  No pallor  Psychiatric: He has a normal mood and affect  His behavior is normal  Judgment and thought content normal    Vitals reviewed  Goals and Barriers:  Current Goal: Further evaluate reason for consultation  Barriers: None  Patient's Capacity to Self Care:  Patient is able to self care        Emergency Contacts:    Gage Woods, ,     Code Status: [unfilled]  Advance Directive and Living Will:      Power of :    POLST:

## 2018-10-04 ENCOUNTER — HOSPITAL ENCOUNTER (OUTPATIENT)
Dept: INFUSION CENTER | Facility: HOSPITAL | Age: 43
Discharge: HOME | End: 2018-10-04
Payer: MEDICARE

## 2018-10-04 VITALS
HEIGHT: 70 IN | WEIGHT: 191.36 LBS | SYSTOLIC BLOOD PRESSURE: 138 MMHG | BODY MASS INDEX: 27.4 KG/M2 | HEART RATE: 74 BPM | TEMPERATURE: 96.6 F | RESPIRATION RATE: 20 BRPM | DIASTOLIC BLOOD PRESSURE: 72 MMHG

## 2018-10-04 PROCEDURE — 96365 THER/PROPH/DIAG IV INF INIT: CPT

## 2018-10-04 PROCEDURE — 96366 THER/PROPH/DIAG IV INF ADDON: CPT

## 2018-10-04 RX ORDER — SODIUM CHLORIDE 9 MG/ML
20 INJECTION, SOLUTION INTRAVENOUS ONCE
Status: COMPLETED | OUTPATIENT
Start: 2018-10-04 | End: 2018-10-04

## 2018-10-04 RX ADMIN — SODIUM CHLORIDE 20 ML/HR: 0.9 INJECTION, SOLUTION INTRAVENOUS at 09:13

## 2018-10-04 RX ADMIN — IRON SUCROSE 300 MG: 20 INJECTION, SOLUTION INTRAVENOUS at 09:19

## 2018-10-05 ENCOUNTER — DOCUMENTATION (OUTPATIENT)
Dept: BEHAVIORAL/MENTAL HEALTH CLINIC | Facility: CLINIC | Age: 43
End: 2018-10-05

## 2018-10-05 DIAGNOSIS — F43.10 PTSD (POST-TRAUMATIC STRESS DISORDER): Primary | Chronic | ICD-10-CM

## 2018-10-05 DIAGNOSIS — F41.8 DEPRESSION WITH ANXIETY: Chronic | ICD-10-CM

## 2018-10-05 DIAGNOSIS — F31.81 BIPOLAR 2 DISORDER, MAJOR DEPRESSIVE EPISODE (HCC): Chronic | ICD-10-CM

## 2018-10-05 NOTE — PROGRESS NOTES
Assessment/Plan:      Diagnoses and all orders for this visit:    PTSD (post-traumatic stress disorder)    Bipolar 2 disorder, major depressive episode ([facility] )    Depression with anxiety          Subjective:     Patient ID: Devan Qureshi is a 37 y o  male  Outpatient Discharge Summary:   Admission Date: 5/10/18  Mena Reddy was referred by Alfredo Waters  Discharge Date: 10/1/18    Discharge Diagnosis:    1  PTSD (post-traumatic stress disorder)     2  Bipolar 2 disorder, major depressive episode ([facility] )     3  Depression with anxiety         Treating Physician: Coco Hunt  Treatment Complications: n/a  Presenting Problem: 'Keyur' - Experiencing nightmares from past trauma  Involved with a crowd 2 years ago 'used to do a lot of bad things'  Fiance - dating 2 years  She has 3 boys all live together and get along well  Racing thoughts - find things around the house to distract  Do odd jobs  Has one biological father - doesn't communicate  Turned 18 last year  Very traumatic  Lost mother 2 years ago and dad year before that  Thomas Owens began to attend individual therapy sessions x3 to process historic trauma, learn mindfulness skills and further psycho education on PTSD  Multiple cancellations and no- shows  Course of treatment includes:    individual therapy   Treatment Progress: fair  Criteria for Discharge: two or more unexcused absences for services  Aftercare recommendations include Encouraged to return to therapy when/able to find a schedule Thomas Owens can commit to    Discharge Medications include:  Current Outpatient Prescriptions:     ALPRAZolam (XANAX) 0 5 mg tablet, Take 1 tablet (0 5 mg total) by mouth every 4 (four) hours as needed for anxiety, Disp: 120 tablet, Rfl: 2    Methylprednisolone 4 MG TBPK, Use as directed on package, Disp: 21 tablet, Rfl: 0    morphine (MSIR) 15 mg tablet, Take 1/2 tablet QID PRN for pain , Disp: 60 tablet, Rfl: 0    omeprazole (PRILOSEC OTC) 20 MG tablet, Take 20 mg by mouth as needed  , Disp: , Rfl:     QUEtiapine (SEROquel) 100 mg tablet, TAKE 1 TABLET (100 MG TOTAL) BY MOUTH DAILY AT BEDTIME, Disp: 30 tablet, Rfl: 2  No current facility-administered medications for this visit       Prognosis: fair

## 2018-10-08 ENCOUNTER — OFFICE VISIT (OUTPATIENT)
Dept: BEHAVIORAL/MENTAL HEALTH CLINIC | Facility: CLINIC | Age: 43
End: 2018-10-08
Payer: MEDICARE

## 2018-10-08 DIAGNOSIS — F41.8 DEPRESSION WITH ANXIETY: Primary | ICD-10-CM

## 2018-10-08 PROCEDURE — 90834 PSYTX W PT 45 MINUTES: CPT | Performed by: SOCIAL WORKER

## 2018-10-08 NOTE — PSYCH
Assessment/Plan: Manage mood issues     There are no diagnoses linked to this encounter  Subjective: Continues to have issues with lack of energy and motivation  Patient ID: Lucas Appiah is a 37 y o  male  Sheran Art has history of mood issues for many years that have been exacerbated by estrangement from daughter and the years of court issues with her mother  Feeling more positive with recent developments with his daughter  Review of Systems   Psychiatric/Behavioral: Positive for dysphoric mood  Objective: Nidia Hanson presents with a somewhat neutral affect  However, his 26 y/o daughter has reached out to him and they are working to re-establish their relationship after many years of separation  Very pleased about this  He is verbal, cooperative and well oriented during session  Physical Exam   Psychiatric: His speech is normal and behavior is normal  Judgment and thought content normal  Cognition and memory are normal  He exhibits a depressed mood     No SI

## 2018-10-08 NOTE — PATIENT INSTRUCTIONS
Focused on rapport building  Utilized recent progress with daughter as evidence of his strengths to bolster self-esteem  Reviewed coping strategies for depression and anxiety  Will see on monthly basis  Provided my contact information

## 2018-10-09 ENCOUNTER — CLINICAL SUPPORT (OUTPATIENT)
Dept: FAMILY MEDICINE CLINIC | Facility: CLINIC | Age: 43
End: 2018-10-09
Payer: MEDICARE

## 2018-10-09 DIAGNOSIS — E53.8 B12 DEFICIENCY: Primary | ICD-10-CM

## 2018-10-09 PROCEDURE — 96372 THER/PROPH/DIAG INJ SC/IM: CPT

## 2018-10-09 RX ORDER — CYANOCOBALAMIN 1000 UG/ML
1000 INJECTION INTRAMUSCULAR; SUBCUTANEOUS
Status: DISCONTINUED | OUTPATIENT
Start: 2018-10-09 | End: 2019-11-01 | Stop reason: ALTCHOICE

## 2018-10-09 RX ADMIN — CYANOCOBALAMIN 1000 MCG: 1000 INJECTION INTRAMUSCULAR; SUBCUTANEOUS at 10:49

## 2018-10-10 ENCOUNTER — TELEPHONE (OUTPATIENT)
Dept: PAIN MEDICINE | Facility: CLINIC | Age: 43
End: 2018-10-10

## 2018-10-10 DIAGNOSIS — M54.41 CHRONIC LOW BACK PAIN WITH BILATERAL SCIATICA, UNSPECIFIED BACK PAIN LATERALITY: ICD-10-CM

## 2018-10-10 DIAGNOSIS — M54.42 CHRONIC LOW BACK PAIN WITH BILATERAL SCIATICA, UNSPECIFIED BACK PAIN LATERALITY: ICD-10-CM

## 2018-10-10 DIAGNOSIS — M96.1 LUMBAR POST-LAMINECTOMY SYNDROME: ICD-10-CM

## 2018-10-10 DIAGNOSIS — G89.29 CHRONIC LOW BACK PAIN WITH BILATERAL SCIATICA, UNSPECIFIED BACK PAIN LATERALITY: ICD-10-CM

## 2018-10-10 DIAGNOSIS — G89.4 PAIN SYNDROME, CHRONIC: ICD-10-CM

## 2018-10-10 RX ORDER — MORPHINE SULFATE 15 MG/1
TABLET ORAL
Qty: 60 TABLET | Refills: 0 | Status: SHIPPED | OUTPATIENT
Start: 2018-10-10 | End: 2018-10-22 | Stop reason: SDUPTHER

## 2018-10-10 NOTE — TELEPHONE ENCOUNTER
Caller: patient  Callback# [phone]  Dr Mahnaz Houston        Patient called requesting a refill for morphine (MSIR) 15 mg tablet until next office visit 10/22  Please send to pharmacy on file thanks

## 2018-10-10 NOTE — TELEPHONE ENCOUNTER
S/w pt, states he has #8 tabs of MSIR on hand  Confirmed 1/2 tab po qid prn w/ + relief / no se's  Advised pt, will d/w DG  Anticipate a rx will be sent to his pharmacy for pu  Fu as scheduled on 10/22  This office will cb if there is any change in the plan as discussed  Pt verbalized understanding and appreciation

## 2018-10-10 NOTE — TELEPHONE ENCOUNTER
Patient left message at [facility] today at 1225 PM states she was returning a call to nurse    Call back number for patient [phone]

## 2018-10-10 NOTE — TELEPHONE ENCOUNTER
LMOM to cb  Provided cb number and office hours       Note: last rx 9/13  MS IR 15 mg  1/2 tab po qid prn  #60 / 0   DG    Next ov 10/22/2018

## 2018-10-11 ENCOUNTER — TELEPHONE (OUTPATIENT)
Dept: FAMILY MEDICINE CLINIC | Facility: CLINIC | Age: 43
End: 2018-10-11

## 2018-10-11 RX ORDER — SODIUM CHLORIDE 9 MG/ML
20 INJECTION, SOLUTION INTRAVENOUS ONCE
Status: COMPLETED | OUTPATIENT
Start: 2018-10-12 | End: 2018-10-12

## 2018-10-11 NOTE — TELEPHONE ENCOUNTER
Patient called and stated that the Cymbalta is not working and wanted to know if you can send over a script for Welbutrin    Please send to [facility] and notify patient

## 2018-10-11 NOTE — TELEPHONE ENCOUNTER
I am not comfortable switching patient's antidepressants over the phone  Please advise him to schedule office visit    15 min is okay thank you

## 2018-10-12 ENCOUNTER — HOSPITAL ENCOUNTER (OUTPATIENT)
Dept: INFUSION CENTER | Facility: HOSPITAL | Age: 43
Discharge: HOME | End: 2018-10-12
Payer: MEDICARE

## 2018-10-12 VITALS
RESPIRATION RATE: 18 BRPM | DIASTOLIC BLOOD PRESSURE: 68 MMHG | SYSTOLIC BLOOD PRESSURE: 136 MMHG | HEART RATE: 82 BPM | TEMPERATURE: 97.6 F

## 2018-10-12 PROCEDURE — 96366 THER/PROPH/DIAG IV INF ADDON: CPT

## 2018-10-12 PROCEDURE — 96365 THER/PROPH/DIAG IV INF INIT: CPT

## 2018-10-12 RX ADMIN — IRON SUCROSE 300 MG: 20 INJECTION, SOLUTION INTRAVENOUS at 12:57

## 2018-10-12 RX ADMIN — SODIUM CHLORIDE 20 ML/HR: 0.9 INJECTION, SOLUTION INTRAVENOUS at 12:54

## 2018-10-16 ENCOUNTER — TELEPHONE (OUTPATIENT)
Dept: FAMILY MEDICINE CLINIC | Facility: CLINIC | Age: 43
End: 2018-10-16

## 2018-10-16 NOTE — TELEPHONE ENCOUNTER
RE: Wellbutrin     Patient wants Dr Chris Mistry to call him  He states she was to call in this medication for him per their last conversation  But then he was told he needs to come in to see her & he does not understand why  Please call

## 2018-10-17 RX ORDER — SODIUM CHLORIDE 9 MG/ML
20 INJECTION, SOLUTION INTRAVENOUS ONCE
Status: DISCONTINUED | OUTPATIENT
Start: 2018-10-18 | End: 2018-10-21 | Stop reason: HOSPADM

## 2018-10-18 ENCOUNTER — HOSPITAL ENCOUNTER (OUTPATIENT)
Dept: INFUSION CENTER | Facility: HOSPITAL | Age: 43
Discharge: HOME | End: 2018-10-18

## 2018-10-19 ENCOUNTER — HOSPITAL ENCOUNTER (OUTPATIENT)
Dept: INFUSION CENTER | Facility: HOSPITAL | Age: 43
Discharge: HOME | End: 2018-10-19
Payer: MEDICARE

## 2018-10-19 VITALS
HEART RATE: 72 BPM | RESPIRATION RATE: 16 BRPM | TEMPERATURE: 97.2 F | DIASTOLIC BLOOD PRESSURE: 63 MMHG | SYSTOLIC BLOOD PRESSURE: 123 MMHG

## 2018-10-19 PROCEDURE — 96365 THER/PROPH/DIAG IV INF INIT: CPT

## 2018-10-19 PROCEDURE — 96366 THER/PROPH/DIAG IV INF ADDON: CPT

## 2018-10-19 RX ORDER — SODIUM CHLORIDE 9 MG/ML
20 INJECTION, SOLUTION INTRAVENOUS ONCE
Status: COMPLETED | OUTPATIENT
Start: 2018-10-19 | End: 2018-10-19

## 2018-10-19 RX ADMIN — SODIUM CHLORIDE 20 ML/HR: 0.9 INJECTION, SOLUTION INTRAVENOUS at 12:26

## 2018-10-19 RX ADMIN — IRON SUCROSE 300 MG: 20 INJECTION, SOLUTION INTRAVENOUS at 12:25

## 2018-10-22 ENCOUNTER — OFFICE VISIT (OUTPATIENT)
Dept: PAIN MEDICINE | Facility: CLINIC | Age: 43
End: 2018-10-22
Payer: MEDICARE

## 2018-10-22 VITALS
WEIGHT: 188 LBS | HEART RATE: 80 BPM | SYSTOLIC BLOOD PRESSURE: 118 MMHG | BODY MASS INDEX: 26.92 KG/M2 | HEIGHT: 70 IN | DIASTOLIC BLOOD PRESSURE: 60 MMHG

## 2018-10-22 DIAGNOSIS — M48.062 SPINAL STENOSIS OF LUMBAR REGION WITH NEUROGENIC CLAUDICATION: ICD-10-CM

## 2018-10-22 DIAGNOSIS — M54.42 CHRONIC LOW BACK PAIN WITH BILATERAL SCIATICA, UNSPECIFIED BACK PAIN LATERALITY: ICD-10-CM

## 2018-10-22 DIAGNOSIS — G89.29 CHRONIC RIGHT SHOULDER PAIN: ICD-10-CM

## 2018-10-22 DIAGNOSIS — M25.511 CHRONIC RIGHT SHOULDER PAIN: ICD-10-CM

## 2018-10-22 DIAGNOSIS — M54.41 CHRONIC LOW BACK PAIN WITH BILATERAL SCIATICA, UNSPECIFIED BACK PAIN LATERALITY: ICD-10-CM

## 2018-10-22 DIAGNOSIS — F31.81 BIPOLAR 2 DISORDER, MAJOR DEPRESSIVE EPISODE (HCC): Primary | Chronic | ICD-10-CM

## 2018-10-22 DIAGNOSIS — M54.16 CHRONIC LEFT LUMBAR RADICULOPATHY: ICD-10-CM

## 2018-10-22 DIAGNOSIS — M79.18 MYOFASCIAL PAIN SYNDROME: ICD-10-CM

## 2018-10-22 DIAGNOSIS — G89.29 CHRONIC LOW BACK PAIN WITH BILATERAL SCIATICA, UNSPECIFIED BACK PAIN LATERALITY: ICD-10-CM

## 2018-10-22 DIAGNOSIS — M96.1 LUMBAR POST-LAMINECTOMY SYNDROME: ICD-10-CM

## 2018-10-22 DIAGNOSIS — G89.4 PAIN SYNDROME, CHRONIC: ICD-10-CM

## 2018-10-22 PROCEDURE — 99214 OFFICE O/P EST MOD 30 MIN: CPT | Performed by: NURSE PRACTITIONER

## 2018-10-22 RX ORDER — DULOXETIN HYDROCHLORIDE 20 MG/1
20 CAPSULE, DELAYED RELEASE ORAL DAILY
COMMUNITY
End: 2018-10-22 | Stop reason: ALTCHOICE

## 2018-10-22 RX ORDER — BUPROPION HYDROCHLORIDE 75 MG/1
75 TABLET ORAL 2 TIMES DAILY
Qty: 60 TABLET | Refills: 1 | Status: SHIPPED | OUTPATIENT
Start: 2018-11-05 | End: 2018-12-12 | Stop reason: SDUPTHER

## 2018-10-22 RX ORDER — BUPRENORPHINE 10 UG/H
PATCH TRANSDERMAL
Qty: 4 PATCH | Refills: 0 | Status: SHIPPED | OUTPATIENT
Start: 2018-10-22 | End: 2018-10-27

## 2018-10-22 RX ORDER — BACLOFEN 10 MG/1
10 TABLET ORAL 3 TIMES DAILY
Qty: 75 TABLET | Refills: 2 | Status: SHIPPED | OUTPATIENT
Start: 2018-10-22 | End: 2018-11-01 | Stop reason: HOSPADM

## 2018-10-22 RX ORDER — MORPHINE SULFATE 15 MG/1
TABLET ORAL
Qty: 60 TABLET | Refills: 0 | Status: ON HOLD | OUTPATIENT
Start: 2018-10-22 | End: 2018-11-01

## 2018-10-22 RX ORDER — MORPHINE SULFATE 15 MG/1
TABLET ORAL
Qty: 60 TABLET | Refills: 0 | Status: SHIPPED | OUTPATIENT
Start: 2018-10-22 | End: 2018-10-22 | Stop reason: SDUPTHER

## 2018-10-22 NOTE — TELEPHONE ENCOUNTER
I spoke with patient on Wednesday, October 17th  I will discuss medication options with his psychiatrist in pharmacist   Patient states that duloxetine has caused sexual side effects and he would like to wean off medication  He will use 30 mg once a day for now and I will get back to him

## 2018-10-22 NOTE — TELEPHONE ENCOUNTER
I spoke with patient  He is currently on Cymbalta 30 mg daily  I advised him to start weaning of by using 30 mg every other day within next 10 days  Patient will start Wellbutrin on November fifth  Will use 75 mg twice a day  Patient will repeat main on Seroquel  I advised him to contact me with an update on Wellbutrin in a few weeks after start of therapy  Patient understands instructions and agrees

## 2018-10-22 NOTE — TELEPHONE ENCOUNTER
I spoke with pharmacist   There is no interaction between Seroquel and Wellbutrin 75 mg twice a day    I left message for patient to call me back

## 2018-10-22 NOTE — PROGRESS NOTES
Assessment:  1  Chronic low back pain with bilateral sciatica, unspecified back pain laterality    2  Lumbar post-laminectomy syndrome    3  Spinal stenosis of lumbar region with neurogenic claudication    4  Chronic right shoulder pain    5  Pain syndrome, chronic    6  Myofascial pain syndrome    7  Chronic left lumbar radiculopathy        Plan:  While the patient was in the office today, I did discuss with the patient that at this point time since we know that his insurance has not changed from now to the into year I feel it makes the most sense just to continue the morphine into the end of the year and then for the 3rd prescription or his 1st prescription in the new year we will put him back on the Butrans patch  The patient was agreeable and verbalized an understanding  [place] Prescription Drug Monitoring Program report was reviewed and was appropriate      The patient did not have their opioid medications available for confirmation or counting while in the office today  I reviewed with the patient that as per the opioid contract, they are to bring in the last filled prescription for all of their opioid medications, with what they have left to every office visit  I advised the patient that if they would continue to not bring in their prescriptions as discussed, we may not be able to continue prescribing opioid medications in the future  The patient was agreeable and verbalized an understanding  The patient's opioid scripts were sent to their pharmacy electronically and was given a 3 month supply of prescriptions with a Do Not Fill date(s) of November 10, 2018, December 8, 2018, and January 5, 2019  There are risks associated with opioid medications, including dependence, addiction and tolerance  The patient understands and agrees to use these medications only as prescribed   Potential side effects of the medications include, but are not limited to, constipation, drowsiness, addiction, impaired judgment and risk of fatal overdose if not taken as prescribed  The patient was warned against driving while taking sedation medications  Sharing medications is a felony  At this point in time, the patient is showing no signs of addiction, abuse, diversion or suicidal ideation  The patient will follow-up in 12 weeks for medication prescription refill and reevaluation  The patient was advised to contact the office should their symptoms worsen in the interim  The patient was agreeable and verbalized an understanding  History of Present Illness: The patient is a 37 y o  male last seen on 8/16/18 who presents for a follow up office visit in regards to chronic pain secondary to lumbar post-laminectomy syndrome  The patient currently reports that since his last office visit he does feel his symptoms have somewhat improved and stabilized as he feels that the Morphine is at least providing stable relief  However, he did report that after his most recent office visit with his primary care provider, she should adjusted that since things have improved to try to see if we can get him back on the Butrans patch because it was helpful and it would be safer for him and probably provide better round-the-clock relief as he cannot have oral long-acting medications because of his gastric bypass surgery  However, 1 of the issues of having to come off of the Butrans patch was the cost and he is hoping in the new year with his new insurance that will not be an issue  He also reports that since his last office visit is now working 2 part-time jobs doing security, which is not physically strenuous and he has been able to do it with minimal and manageable pain  Patient is actually quite excited about the fact of him trying get back to work and earn an honest living  Current pain medications includes:  Morphine sulfate IR 15 mg, half a tablet every 6 hr as needed for pain    The patient reports that this regimen is providing 60% pain relief  The patient is reporting no side effects from this pain medication regimen  Pain Contract Signed: 9*14/17  Last Urine Drug Screen: 4/26/18    I have personally reviewed and/or updated the patient's past medical history, past surgical history, family history, social history, current medications, allergies, and vital signs today  Review of Systems:    Review of Systems   Respiratory: Negative for shortness of breath  Cardiovascular: Negative for chest pain  Gastrointestinal: Negative for constipation, diarrhea, nausea and vomiting  Musculoskeletal: Negative for arthralgias, gait problem, joint swelling (joint stiffness) and myalgias  Skin: Negative for rash  Neurological: Negative for dizziness, seizures and weakness  All other systems reviewed and are negative          Past Medical History:   Diagnosis Date    Anxiety     Arthritis     Bipolar disorder (HCC)     Chronic left lumbar radiculopathy     Chronic low back pain     Chronic pain syndrome     Chronic right shoulder pain     Depression with anxiety     Fatigue     GERD (gastroesophageal reflux disease)     Hydronephrosis     Iron deficiency anemia     Kidney stone     Lytic bone lesions on xray     Nephrolithiasis     PONV (postoperative nausea and vomiting)     Right elbow pain     Right lateral epicondylitis        Past Surgical History:   Procedure Laterality Date    ADENOIDECTOMY Bilateral     APPENDECTOMY      BACK SURGERY      GASTRIC BYPASS      2009    OTHER SURGICAL HISTORY      fusion/refusion of vertebrae, 2010 and 2011 l5-s1 and l4-l5    MS CYSTO/URETERO W/LITHOTRIPSY &INDWELL STENT INSRT Right 8/8/2017    Procedure: CYSTOSCOPY; URETEROSCOPY; HOLMIUM LASER; RETROGRADE PYELOGRAM; STENT;  Surgeon: Carolina Looney MD;  [facility];  Service: Urology    MS IMPLANT SPINAL NEUROSTIM/ Right 11/14/2017    Procedure: REMOVAL LOWER MEDIAL BUTTOCK SPINAL CORD STIMULATOR GENERATOR; PLACEMENT OF NEW BUTTOCK SPINAL CORD STIMULATOR THROUGH SEPERATE INCISION;  Surgeon: Celia Pryor MD;  [facility];  Service: Neurosurgery    RIK-EN-Y PROCEDURE  2003    SPINAL CORD STIMULATOR IMPLANT  11/14/2017    dr Benito Abdi procedure and technique 1  removal of a right back implantable pulse generator 2 placement of a new right buttock impantable pulse generator through seperate incision 3 electric analys complex programming spinal cord stimulator system postoperative period approx 1 hour    TONSILLECTOMY         Family History   Problem Relation Age of Onset    Cancer Mother     Arthritis Mother     Stomach cancer Mother     Cancer Father     Esophageal cancer Father     Other Father         brain tumor    No Known Problems Sister     No Known Problems Sister     No Known Problems Sister        Social History     Occupational History    Not on file       Social History Main Topics    Smoking status: Current Every Day Smoker     Packs/day: 1 00     Types: Cigarettes    Smokeless tobacco: Never Used    Alcohol use No      Comment: quit drinking, social    Drug use: No    Sexual activity: Not on file         Current Outpatient Prescriptions:     ALPRAZolam (XANAX) 0 5 mg tablet, Take 1 tablet (0 5 mg total) by mouth every 4 (four) hours as needed for anxiety, Disp: 120 tablet, Rfl: 2    baclofen 10 mg tablet, Take 10 mg by mouth 3 (three) times a day, Disp: , Rfl: 2    morphine (MSIR) 15 mg tablet, Take 1/2 tablet QID PRN for pain , Disp: 60 tablet, Rfl: 0    omeprazole (PRILOSEC OTC) 20 MG tablet, Take 20 mg by mouth as needed  , Disp: , Rfl:     QUEtiapine (SEROquel) 100 mg tablet, TAKE 1 TABLET (100 MG TOTAL) BY MOUTH DAILY AT BEDTIME, Disp: 30 tablet, Rfl: 2    Current Facility-Administered Medications:     cyanocobalamin injection 1,000 mcg, 1,000 mcg, Intramuscular, Q30 Days, Jordy Walton MD, 1,000 mcg at 10/09/18 1049    Allergies Allergen Reactions    Ampicillin GI Intolerance     Vomiting    Aspirin GI Intolerance     vomiting      Penicillins GI Intolerance     Vomit       Physical Exam:    There were no vitals taken for this visit  Constitutional:normal, well developed, well nourished, alert, in no distress and non-toxic and no overt pain behavior  Eyes:anicteric  HEENT:grossly intact  Neck:supple, symmetric, trachea midline and no masses   Pulmonary:even and unlabored  Cardiovascular:No edema or pitting edema present  Skin:Normal without rashes or lesions and well hydrated  Psychiatric:Mood and affect appropriate  Neurologic:Cranial Nerves II-XII grossly intact  Musculoskeletal:Slightly antalgic, but steady gait without the use of any assistive devices  Imaging  No orders to display         No orders of the defined types were placed in this encounter

## 2018-10-25 RX ORDER — SODIUM CHLORIDE 9 MG/ML
20 INJECTION, SOLUTION INTRAVENOUS ONCE
Status: COMPLETED | OUTPATIENT
Start: 2018-10-26 | End: 2018-10-26

## 2018-10-26 ENCOUNTER — HOSPITAL ENCOUNTER (OUTPATIENT)
Dept: INFUSION CENTER | Facility: HOSPITAL | Age: 43
Discharge: HOME | DRG: 418 | End: 2018-10-26
Payer: MEDICARE

## 2018-10-26 VITALS
RESPIRATION RATE: 18 BRPM | SYSTOLIC BLOOD PRESSURE: 147 MMHG | TEMPERATURE: 96.5 F | DIASTOLIC BLOOD PRESSURE: 69 MMHG | HEART RATE: 76 BPM

## 2018-10-26 PROCEDURE — 96366 THER/PROPH/DIAG IV INF ADDON: CPT

## 2018-10-26 PROCEDURE — 96365 THER/PROPH/DIAG IV INF INIT: CPT

## 2018-10-26 RX ADMIN — IRON SUCROSE 300 MG: 20 INJECTION, SOLUTION INTRAVENOUS at 08:35

## 2018-10-26 RX ADMIN — SODIUM CHLORIDE 20 ML/HR: 9 INJECTION, SOLUTION INTRAVENOUS at 08:36

## 2018-10-27 ENCOUNTER — APPOINTMENT (EMERGENCY)
Dept: RADIOLOGY | Facility: HOSPITAL | Age: 43
DRG: 418 | End: 2018-10-27
Payer: MEDICARE

## 2018-10-27 ENCOUNTER — HOSPITAL ENCOUNTER (INPATIENT)
Facility: HOSPITAL | Age: 43
LOS: 5 days | Discharge: HOME | DRG: 418 | End: 2018-11-01
Attending: EMERGENCY MEDICINE | Admitting: SURGERY
Payer: MEDICARE

## 2018-10-27 DIAGNOSIS — G89.4 PAIN SYNDROME, CHRONIC: ICD-10-CM

## 2018-10-27 DIAGNOSIS — M54.41 CHRONIC LOW BACK PAIN WITH BILATERAL SCIATICA, UNSPECIFIED BACK PAIN LATERALITY: ICD-10-CM

## 2018-10-27 DIAGNOSIS — G89.29 CHRONIC LOW BACK PAIN WITH BILATERAL SCIATICA, UNSPECIFIED BACK PAIN LATERALITY: ICD-10-CM

## 2018-10-27 DIAGNOSIS — R10.11 RUQ PAIN: ICD-10-CM

## 2018-10-27 DIAGNOSIS — R11.2 NAUSEA & VOMITING: ICD-10-CM

## 2018-10-27 DIAGNOSIS — M54.42 CHRONIC LOW BACK PAIN WITH BILATERAL SCIATICA, UNSPECIFIED BACK PAIN LATERALITY: ICD-10-CM

## 2018-10-27 DIAGNOSIS — Z98.84 HISTORY OF ROUX-EN-Y GASTRIC BYPASS: ICD-10-CM

## 2018-10-27 DIAGNOSIS — K81.9 CHOLECYSTITIS: Primary | ICD-10-CM

## 2018-10-27 DIAGNOSIS — M96.1 LUMBAR POST-LAMINECTOMY SYNDROME: ICD-10-CM

## 2018-10-27 PROBLEM — Z72.0 TOBACCO ABUSE: Status: ACTIVE | Noted: 2018-10-27

## 2018-10-27 PROBLEM — N17.9 ACUTE KIDNEY INJURY (HCC): Status: ACTIVE | Noted: 2018-10-27

## 2018-10-27 LAB
ALBUMIN SERPL BCP-MCNC: 4.2 G/DL (ref 3.5–5)
ALP SERPL-CCNC: 132 U/L (ref 46–116)
ALT SERPL W P-5'-P-CCNC: 91 U/L (ref 12–78)
ANION GAP SERPL CALCULATED.3IONS-SCNC: 8 MMOL/L (ref 4–13)
AST SERPL W P-5'-P-CCNC: 136 U/L (ref 5–45)
BASOPHILS # BLD AUTO: 0.06 THOUSANDS/ΜL (ref 0–0.1)
BASOPHILS NFR BLD AUTO: 1 % (ref 0–1)
BILIRUB SERPL-MCNC: 0.82 MG/DL (ref 0.2–1)
BILIRUB UR QL STRIP: ABNORMAL
BUN SERPL-MCNC: 13 MG/DL (ref 5–25)
CALCIUM SERPL-MCNC: 9.1 MG/DL (ref 8.3–10.1)
CHLORIDE SERPL-SCNC: 103 MMOL/L (ref 100–108)
CLARITY UR: CLEAR
CO2 SERPL-SCNC: 27 MMOL/L (ref 21–32)
COLOR UR: ABNORMAL
CREAT SERPL-MCNC: 1.23 MG/DL (ref 0.6–1.3)
EOSINOPHIL # BLD AUTO: 0.08 THOUSAND/ΜL (ref 0–0.61)
EOSINOPHIL NFR BLD AUTO: 1 % (ref 0–6)
ERYTHROCYTE [DISTWIDTH] IN BLOOD BY AUTOMATED COUNT: 27 % (ref 11.6–15.1)
GFR SERPL CREATININE-BSD FRML MDRD: 71 ML/MIN/1.73SQ M
GLUCOSE SERPL-MCNC: 105 MG/DL (ref 65–140)
GLUCOSE UR STRIP-MCNC: NEGATIVE MG/DL
HCT VFR BLD AUTO: 45.5 % (ref 36.5–49.3)
HGB BLD-MCNC: 13.4 G/DL (ref 12–17)
HGB UR QL STRIP.AUTO: NEGATIVE
IMM GRANULOCYTES # BLD AUTO: 0.04 THOUSAND/UL (ref 0–0.2)
IMM GRANULOCYTES NFR BLD AUTO: 0 % (ref 0–2)
KETONES UR STRIP-MCNC: NEGATIVE MG/DL
LEUKOCYTE ESTERASE UR QL STRIP: NEGATIVE
LYMPHOCYTES # BLD AUTO: 1.65 THOUSANDS/ΜL (ref 0.6–4.47)
LYMPHOCYTES NFR BLD AUTO: 18 % (ref 14–44)
MCH RBC QN AUTO: 20.8 PG (ref 26.8–34.3)
MCHC RBC AUTO-ENTMCNC: 29.5 G/DL (ref 31.4–37.4)
MCV RBC AUTO: 71 FL (ref 82–98)
MONOCYTES # BLD AUTO: 0.85 THOUSAND/ΜL (ref 0.17–1.22)
MONOCYTES NFR BLD AUTO: 9 % (ref 4–12)
NEUTROPHILS # BLD AUTO: 6.61 THOUSANDS/ΜL (ref 1.85–7.62)
NEUTS SEG NFR BLD AUTO: 71 % (ref 43–75)
NITRITE UR QL STRIP: NEGATIVE
NRBC BLD AUTO-RTO: 0 /100 WBCS
PH UR STRIP.AUTO: 6 [PH] (ref 4.5–8)
PLATELET # BLD AUTO: 272 THOUSANDS/UL (ref 149–390)
POTASSIUM SERPL-SCNC: 3.8 MMOL/L (ref 3.5–5.3)
PROT SERPL-MCNC: 8.2 G/DL (ref 6.4–8.2)
PROT UR STRIP-MCNC: NEGATIVE MG/DL
RBC # BLD AUTO: 6.45 MILLION/UL (ref 3.88–5.62)
SODIUM SERPL-SCNC: 138 MMOL/L (ref 136–145)
SP GR UR STRIP.AUTO: 1.02 (ref 1–1.03)
UROBILINOGEN UR QL STRIP.AUTO: 0.2 E.U./DL
WBC # BLD AUTO: 9.29 THOUSAND/UL (ref 4.31–10.16)

## 2018-10-27 PROCEDURE — 80053 COMPREHEN METABOLIC PANEL: CPT | Performed by: EMERGENCY MEDICINE

## 2018-10-27 PROCEDURE — 74177 CT ABD & PELVIS W/CONTRAST: CPT

## 2018-10-27 PROCEDURE — 81003 URINALYSIS AUTO W/O SCOPE: CPT

## 2018-10-27 PROCEDURE — 76705 ECHO EXAM OF ABDOMEN: CPT

## 2018-10-27 PROCEDURE — 99285 EMERGENCY DEPT VISIT HI MDM: CPT

## 2018-10-27 PROCEDURE — 99223 1ST HOSP IP/OBS HIGH 75: CPT | Performed by: SURGERY

## 2018-10-27 PROCEDURE — 36415 COLL VENOUS BLD VENIPUNCTURE: CPT

## 2018-10-27 PROCEDURE — 85025 COMPLETE CBC W/AUTO DIFF WBC: CPT | Performed by: EMERGENCY MEDICINE

## 2018-10-27 PROCEDURE — 96374 THER/PROPH/DIAG INJ IV PUSH: CPT

## 2018-10-27 PROCEDURE — 96361 HYDRATE IV INFUSION ADD-ON: CPT

## 2018-10-27 PROCEDURE — 96376 TX/PRO/DX INJ SAME DRUG ADON: CPT

## 2018-10-27 PROCEDURE — 96375 TX/PRO/DX INJ NEW DRUG ADDON: CPT

## 2018-10-27 RX ORDER — KETOROLAC TROMETHAMINE 30 MG/ML
15 INJECTION, SOLUTION INTRAMUSCULAR; INTRAVENOUS ONCE
Status: COMPLETED | OUTPATIENT
Start: 2018-10-27 | End: 2018-10-27

## 2018-10-27 RX ORDER — HYDROMORPHONE HCL/PF 1 MG/ML
0.5 SYRINGE (ML) INJECTION
Status: DISCONTINUED | OUTPATIENT
Start: 2018-10-27 | End: 2018-10-31

## 2018-10-27 RX ORDER — HYDROMORPHONE HCL/PF 1 MG/ML
0.5 SYRINGE (ML) INJECTION ONCE
Status: COMPLETED | OUTPATIENT
Start: 2018-10-27 | End: 2018-10-27

## 2018-10-27 RX ORDER — PANTOPRAZOLE SODIUM 20 MG/1
20 TABLET, DELAYED RELEASE ORAL
Status: DISCONTINUED | OUTPATIENT
Start: 2018-10-27 | End: 2018-11-01 | Stop reason: HOSPADM

## 2018-10-27 RX ORDER — OXYCODONE HYDROCHLORIDE 5 MG/1
5 TABLET ORAL EVERY 4 HOURS PRN
Status: DISCONTINUED | OUTPATIENT
Start: 2018-10-27 | End: 2018-10-31

## 2018-10-27 RX ORDER — ONDANSETRON 2 MG/ML
4 INJECTION INTRAMUSCULAR; INTRAVENOUS EVERY 6 HOURS PRN
Status: DISCONTINUED | OUTPATIENT
Start: 2018-10-27 | End: 2018-11-01 | Stop reason: HOSPADM

## 2018-10-27 RX ORDER — QUETIAPINE FUMARATE 100 MG/1
100 TABLET, FILM COATED ORAL
Status: DISCONTINUED | OUTPATIENT
Start: 2018-10-27 | End: 2018-11-01 | Stop reason: HOSPADM

## 2018-10-27 RX ORDER — BUPROPION HYDROCHLORIDE 75 MG/1
75 TABLET ORAL DAILY
Status: DISCONTINUED | OUTPATIENT
Start: 2018-10-27 | End: 2018-11-01 | Stop reason: HOSPADM

## 2018-10-27 RX ORDER — SODIUM CHLORIDE, SODIUM LACTATE, POTASSIUM CHLORIDE, CALCIUM CHLORIDE 600; 310; 30; 20 MG/100ML; MG/100ML; MG/100ML; MG/100ML
125 INJECTION, SOLUTION INTRAVENOUS CONTINUOUS
Status: DISCONTINUED | OUTPATIENT
Start: 2018-10-27 | End: 2018-10-28

## 2018-10-27 RX ORDER — ALPRAZOLAM 0.5 MG/1
0.5 TABLET ORAL EVERY 4 HOURS PRN
Status: DISCONTINUED | OUTPATIENT
Start: 2018-10-27 | End: 2018-10-31

## 2018-10-27 RX ORDER — HEPARIN SODIUM 5000 [USP'U]/ML
5000 INJECTION, SOLUTION INTRAVENOUS; SUBCUTANEOUS EVERY 8 HOURS SCHEDULED
Status: DISCONTINUED | OUTPATIENT
Start: 2018-10-27 | End: 2018-11-01 | Stop reason: HOSPADM

## 2018-10-27 RX ORDER — BACLOFEN 10 MG/1
10 TABLET ORAL 3 TIMES DAILY PRN
Status: DISCONTINUED | OUTPATIENT
Start: 2018-10-27 | End: 2018-10-31

## 2018-10-27 RX ORDER — ACETAMINOPHEN 325 MG/1
650 TABLET ORAL EVERY 6 HOURS PRN
Status: DISCONTINUED | OUTPATIENT
Start: 2018-10-27 | End: 2018-11-01

## 2018-10-27 RX ORDER — OXYCODONE HYDROCHLORIDE 10 MG/1
10 TABLET ORAL EVERY 4 HOURS PRN
Status: DISCONTINUED | OUTPATIENT
Start: 2018-10-27 | End: 2018-10-31

## 2018-10-27 RX ADMIN — CEFAZOLIN SODIUM 2000 MG: 2 SOLUTION INTRAVENOUS at 13:42

## 2018-10-27 RX ADMIN — QUETIAPINE FUMARATE 100 MG: 100 TABLET ORAL at 21:52

## 2018-10-27 RX ADMIN — BUPROPION HYDROCHLORIDE 75 MG: 75 TABLET, FILM COATED ORAL at 14:25

## 2018-10-27 RX ADMIN — OXYCODONE HYDROCHLORIDE 10 MG: 10 TABLET ORAL at 18:08

## 2018-10-27 RX ADMIN — IOHEXOL 100 ML: 350 INJECTION, SOLUTION INTRAVENOUS at 07:24

## 2018-10-27 RX ADMIN — METRONIDAZOLE 500 MG: 500 INJECTION, SOLUTION INTRAVENOUS at 20:24

## 2018-10-27 RX ADMIN — SODIUM CHLORIDE, SODIUM LACTATE, POTASSIUM CHLORIDE, AND CALCIUM CHLORIDE 125 ML/HR: .6; .31; .03; .02 INJECTION, SOLUTION INTRAVENOUS at 21:52

## 2018-10-27 RX ADMIN — SODIUM CHLORIDE 500 ML: 0.9 INJECTION, SOLUTION INTRAVENOUS at 05:49

## 2018-10-27 RX ADMIN — PANTOPRAZOLE SODIUM 20 MG: 20 TABLET, DELAYED RELEASE ORAL at 12:28

## 2018-10-27 RX ADMIN — HYDROMORPHONE HYDROCHLORIDE 0.5 MG: 1 INJECTION, SOLUTION INTRAMUSCULAR; INTRAVENOUS; SUBCUTANEOUS at 08:23

## 2018-10-27 RX ADMIN — OXYCODONE HYDROCHLORIDE 5 MG: 5 TABLET ORAL at 12:29

## 2018-10-27 RX ADMIN — KETOROLAC TROMETHAMINE 15 MG: 30 INJECTION, SOLUTION INTRAMUSCULAR at 06:49

## 2018-10-27 RX ADMIN — HEPARIN SODIUM 5000 UNITS: 5000 INJECTION INTRAVENOUS; SUBCUTANEOUS at 14:23

## 2018-10-27 RX ADMIN — HEPARIN SODIUM 5000 UNITS: 5000 INJECTION INTRAVENOUS; SUBCUTANEOUS at 21:52

## 2018-10-27 RX ADMIN — ALPRAZOLAM 0.5 MG: 0.5 TABLET ORAL at 14:23

## 2018-10-27 RX ADMIN — HYDROMORPHONE HYDROCHLORIDE 0.5 MG: 1 INJECTION, SOLUTION INTRAMUSCULAR; INTRAVENOUS; SUBCUTANEOUS at 21:54

## 2018-10-27 RX ADMIN — HYDROMORPHONE HYDROCHLORIDE 0.5 MG: 1 INJECTION, SOLUTION INTRAMUSCULAR; INTRAVENOUS; SUBCUTANEOUS at 15:29

## 2018-10-27 RX ADMIN — CEFAZOLIN SODIUM 2000 MG: 2 SOLUTION INTRAVENOUS at 19:51

## 2018-10-27 RX ADMIN — HYDROMORPHONE HYDROCHLORIDE 0.5 MG: 1 INJECTION, SOLUTION INTRAMUSCULAR; INTRAVENOUS; SUBCUTANEOUS at 10:50

## 2018-10-27 RX ADMIN — METRONIDAZOLE 500 MG: 500 INJECTION, SOLUTION INTRAVENOUS at 12:29

## 2018-10-27 RX ADMIN — SODIUM CHLORIDE, SODIUM LACTATE, POTASSIUM CHLORIDE, AND CALCIUM CHLORIDE 125 ML/HR: .6; .31; .03; .02 INJECTION, SOLUTION INTRAVENOUS at 12:29

## 2018-10-27 NOTE — ASSESSMENT & PLAN NOTE
- clear liquids today, NPO midnight for possible operation  - if total bilirubin is elevated tomorrow or if MRCP is positive for a bile duct obstruction, we will need to discuss ERCP in conjunction with GI service

## 2018-10-27 NOTE — H&P
H&P Exam - General Surgery   Amee Reid 37 y o  male MRN: [id number]  Unit/Bed#: [facility]B Encounter: [id number]    Assessment/Plan     Assessment:  71-year-old male with acute cholecystitis in setting of remote history of gastric bypass surgery  Plan:  * Cholecystitis   Assessment & Plan    - admit to observation  - Ancef / Flagyl  - will obtain MRCP given dilated common bile duct but normal total bilirubin  - if the MRCP is normal, will plan for laparoscopic cholecystectomy tomorrow morning  - pain control  - patient has implantable pain pump; however, per the patient, the pump is MRI compatible     Acute kidney injury ([facility] [address] )   Assessment & Plan    - present on admission  - likely secondary to nausea and vomiting causing dehydration  - LR @ 125 mL / hour  - check BUN / Cr tomorrow     History of Parth-en-Y gastric bypass   Assessment & Plan    - clear liquids today, NPO midnight for possible operation  - if total bilirubin is elevated tomorrow or if MRCP is positive for a bile duct obstruction, we will need to discuss ERCP in conjunction with GI service     Tobacco abuse   Assessment & Plan    - offered patient nicotine patch but he refused       Justin Terry MD PGY-5  11:34 AM  10/27/18      History of Present Illness     HPI:  Amee Reid is a 37 y o  male who presents with one day of abdominal pain  The patient reports that he had kidney stones in the past and felt that this was the same pain, however this time he also had pain in the right upper quadrant was abdomen radiating towards his back  The patient has a history of gastric bypass surgery, and has maintained a steady weight at 190 lb for several years now  He is an everyday smoker  The patient also has a implanted pain pump in his buttock, however he reports that this is MRI compatible  The patient has nausea and vomiting, and the pain has been relentless since last night  Review of Systems   Constitutional: Negative for fever  HENT: Negative for sore throat  Eyes: Negative for visual disturbance  Respiratory: Negative for shortness of breath  Cardiovascular: Negative for chest pain  Gastrointestinal: Positive for abdominal pain, nausea and vomiting  Endocrine: Negative for polyuria  Genitourinary: Negative for dysuria  Musculoskeletal: Positive for back pain  Skin: Negative for rash  Allergic/Immunologic: Negative for environmental allergies  Neurological: Negative for dizziness  Hematological: Negative for adenopathy  Psychiatric/Behavioral: The patient is not nervous/anxious          Historical Information   Past Medical History:   Diagnosis Date    Anxiety     Arthritis     Bipolar disorder (HCC)     Chronic left lumbar radiculopathy     Chronic low back pain     Chronic pain syndrome     Chronic right shoulder pain     Depression with anxiety     Fatigue     GERD (gastroesophageal reflux disease)     Hydronephrosis     Iron deficiency anemia     Kidney stone     Lytic bone lesions on xray     Nephrolithiasis     PONV (postoperative nausea and vomiting)     Right elbow pain     Right lateral epicondylitis      Past Surgical History:   Procedure Laterality Date    ADENOIDECTOMY Bilateral     APPENDECTOMY      BACK SURGERY      GASTRIC BYPASS      2009    OTHER SURGICAL HISTORY      fusion/refusion of vertebrae, 2010 and 2011 l5-s1 and l4-l5    MA CYSTO/URETERO W/LITHOTRIPSY &INDWELL STENT INSRT Right 8/8/2017    Procedure: CYSTOSCOPY; URETEROSCOPY; HOLMIUM LASER; RETROGRADE PYELOGRAM; STENT;  Surgeon: Ying De La Torre MD;  [facility];  Service: Urology    MA IMPLANT SPINAL NEUROSTIM/ Right 11/14/2017    Procedure: REMOVAL LOWER MEDIAL BUTTOCK SPINAL CORD STIMULATOR GENERATOR; PLACEMENT OF NEW BUTTOCK SPINAL CORD [facility];  Surgeon: Rosalba Fish MD;  [facility];  Service: Neurosurgery    RIK-EN-Y PROCEDURE  2003   [facility] STIMULATOR IMPLANT  11/14/2017    dr Fiorella Villanueva procedure and technique 1  removal of a right back implantable pulse generator 2 placement of a new right buttock impantable pulse generator through seperate incision 3 electric analys complex programming spinal cord stimulator system postoperative period approx 1 hour    TONSILLECTOMY       Social History   History   Alcohol Use No     Comment: quit drinking, social     History   Drug Use No     History   Smoking Status    Current Every Day Smoker    Packs/day: 1 00    Types: Cigarettes   Smokeless Tobacco    Never Used     Family History: non-contributory    Meds/Allergies   all medications and allergies reviewed  Allergies   Allergen Reactions    Ampicillin GI Intolerance     Vomiting    Aspirin GI Intolerance     vomiting      Penicillins GI Intolerance     Vomit       Objective   First Vitals:   Blood Pressure: 154/84 (10/27/18 0524)  Pulse: 65 (10/27/18 0524)  Temperature: 98 °F (36 7 °C) (10/27/18 0524)  Temp Source: Oral (10/27/18 0524)  Respirations: 18 (10/27/18 0524)  Weight - Scale: 86 2 kg (190 lb) (10/27/18 0524)  SpO2: 98 % (10/27/18 0524)    Current Vitals:   Blood Pressure: 118/64 (10/27/18 1015)  Pulse: 62 (10/27/18 1015)  Temperature: 98 °F (36 7 °C) (10/27/18 0524)  Temp Source: Oral (10/27/18 0524)  Respirations: 16 (10/27/18 1015)  Weight - Scale: 86 2 kg (190 lb) (10/27/18 0524)  SpO2: 98 % (10/27/18 1015)    No intake or output data in the 24 hours ending 10/27/18 1134    Invasive Devices          No matching active lines, drains, or airways          Physical Exam   Constitutional: He is oriented to person, place, and time  He appears well-developed and well-nourished  HENT:   Head: Normocephalic and atraumatic  Eyes: Pupils are equal, round, and reactive to light  Neck: Normal range of motion  No tracheal deviation present  Cardiovascular: Normal rate and regular rhythm  Pulmonary/Chest: Effort normal  He has no wheezes  Abdominal: Soft  There is tenderness (RUQ with positive Moraes's sign)  Healed surgical scars, excessive skin   Musculoskeletal: Normal range of motion  He exhibits no edema  Neurological: He is alert and oriented to person, place, and time  No cranial nerve deficit  Skin: Skin is warm  No rash noted  Psychiatric: He has a normal mood and affect  Vitals reviewed  Lab Results:   CBC:   Lab Results   Component Value Date    WBC 9 29 10/27/2018    HGB 13 4 10/27/2018    HCT 45 5 10/27/2018    MCV 71 (L) 10/27/2018     10/27/2018    MCH 20 8 (L) 10/27/2018    MCHC 29 5 (L) 10/27/2018    RDW 27 0 (H) 10/27/2018    NRBC 0 10/27/2018   , CMP:   Lab Results   Component Value Date     10/27/2018    K 3 8 10/27/2018     10/27/2018    CO2 27 10/27/2018    BUN 13 10/27/2018    CREATININE 1 23 10/27/2018    CALCIUM 9 1 10/27/2018     (H) 10/27/2018    ALT 91 (H) 10/27/2018    ALKPHOS 132 (H) 10/27/2018    EGFR 71 10/27/2018   , Coagulation: No results found for: PT, INR, APTT, Urinalysis:   Lab Results   Component Value Date    COLORU Lizzy 10/27/2018    CLARITYU Clear 10/27/2018    SPECGRAV 1 025 10/27/2018    PHUR 6 0 10/27/2018    LEUKOCYTESUR Negative 10/27/2018    NITRITE Negative 10/27/2018    PROTEINUA Negative 10/27/2018    GLUCOSEU Negative 10/27/2018    KETONESU Negative 10/27/2018    BILIRUBINUR Interference- unable to analyze (A) 10/27/2018    BLOODU Negative 10/27/2018   , Amylase: No results found for: AMYLASE, Lipase: No results found for: LIPASE  Imaging: I have personally reviewed pertinent films in PACS  EKG, Pathology, and Other Studies: I have personally reviewed pertinent films in PACS    Code Status: Level 1 - Full Code  Advance Directive and Living Will:      Power of :    POLST:      Counseling / Coordination of Care  Total floor / unit time spent today 30 minutes    Greater than 50% of total time was spent with the patient and / or family counseling and / or coordination of care  A description of the counseling / coordination of care: plan of care

## 2018-10-27 NOTE — ASSESSMENT & PLAN NOTE
- Acute kidney injury present on admission, now resolved  - Maintain adequate hydration following discharge  - Outpatient follow-up with your primary care provider

## 2018-10-27 NOTE — ASSESSMENT & PLAN NOTE
- Acute cholecystitis status post laparoscopic cholecystectomy on 10/30/2018  - Diet as tolerated  - Continue current oral analgesic regimen and follow up as an outpatient with your chronic pain prescribed  - Stable for discharge on 11/01/2018 with outpatient surgical follow-up in 2 weeks

## 2018-10-27 NOTE — ED NOTES
Patient transported to [address] [place] [address] 934, [address] [facility]  10/27/18 7083

## 2018-10-27 NOTE — ED ATTENDING ATTESTATION
Lotus Sandhu MD, saw and evaluated the patient  I have discussed the patient with the resident/non-physician practitioner and agree with the resident's/non-physician practitioner's findings, Plan of Care, and MDM as documented in the resident's/non-physician practitioner's note, except where noted  All available labs and Radiology studies were reviewed  At this point I agree with the current assessment done in the Emergency Department  I have conducted an independent evaluation of this patient including a focused history of:    Emergency Department Note- Devin Cueto 37 y o  male MRN: [id number]    Unit/Bed#: [facility] 626-01 Encounter: [id number]    Devin Cueto is a 37 y o  male who presents with   Chief Complaint   Patient presents with    Flank Pain     pt brought in by ems pt c/o flank pain starting tonight          History of Present Illness   HPI:  Devin Cueto is a 37 y o  male who presents for evaluation of:  Bilateral flank pain right greater than left  Patient has associated nausea and vomiting  Patient has noted some hematuria  Patient denies fevers, chills, and diarrhea  Review of Systems   Constitutional: Positive for fatigue  Negative for fever  Gastrointestinal: Positive for abdominal pain, nausea and vomiting  Genitourinary: Positive for flank pain and hematuria         Historical Information   Past Medical History:   Diagnosis Date    Anxiety     Arthritis     Bipolar disorder (HCC)     Chronic left lumbar radiculopathy     Chronic low back pain     Chronic pain syndrome     Chronic right shoulder pain     Depression with anxiety     Fatigue     GERD (gastroesophageal reflux disease)     Hydronephrosis     Iron deficiency anemia     Kidney stone     Lytic bone lesions on xray     Nephrolithiasis     PONV (postoperative nausea and vomiting)     Right elbow pain     Right lateral epicondylitis      Past Surgical History:   Procedure Laterality Date    ADENOIDECTOMY Bilateral     APPENDECTOMY      BACK SURGERY      GASTRIC BYPASS      2009    OTHER SURGICAL HISTORY      fusion/refusion of vertebrae, 2010 and 2011 l5-s1 and l4-l5    MO CYSTO/URETERO W/LITHOTRIPSY &INDWELL STENT INSRT Right 8/8/2017    Procedure: CYSTOSCOPY; URETEROSCOPY; HOLMIUM LASER; RETROGRADE PYELOGRAM; STENT;  Surgeon: Mariela Waldron MD;  [facility];  Service: Urology    MO IMPLANT SPINAL NEUROSTIM/ Right 11/14/2017    Procedure: REMOVAL LOWER MEDIAL BUTTOCK SPINAL CORD STIMULATOR GENERATOR; PLACEMENT OF NEW BUTTOCK SPINAL CORD STIMULATOR THROUGH SEPERATE INCISION;  Surgeon: Ivan Junior MD;  [facility];  Service: Neurosurgery    RIK-EN-Y PROCEDURE  2003    SPINAL CORD STIMULATOR IMPLANT  11/14/2017    dr Aurora Salomon procedure and technique 1  removal of a right back implantable pulse generator 2 placement of a new right buttock impantable pulse generator through seperate incision 3 electric analys complex programming spinal cord stimulator system postoperative period approx 1 hour    TONSILLECTOMY       Social History   History   Alcohol Use No     Comment: quit drinking, social     History   Drug Use No     History   Smoking Status    Current Every Day Smoker    Packs/day: 1 00    Types: Cigarettes   Smokeless Tobacco    Never Used     Family History: non-contributory    Meds/Allergies   all medications and allergies reviewed  Allergies   Allergen Reactions    Ampicillin GI Intolerance     Vomiting    Aspirin GI Intolerance     vomiting      Penicillins GI Intolerance     Vomit       Objective   First Vitals:   Blood Pressure: 154/84 (10/27/18 0524)  Pulse: 65 (10/27/18 0524)  Temperature: 98 °F (36 7 °C) (10/27/18 0524)  Temp Source: Oral (10/27/18 0524)  Respirations: 18 (10/27/18 0524)  Height: 6' (182 9 cm) (10/27/18 1525)  Weight - Scale: 86 2 kg (190 lb) (10/27/18 0524)  SpO2: 98 % (10/27/18 0524)    Current Vitals:   Blood Pressure: 118/66 (10/27/18 1525)  Pulse: 58 (10/27/18 1551)  Temperature: 97 9 °F (36 6 °C) (10/27/18 1525)  Temp Source: Oral (10/27/18 1525)  Respirations: 20 (10/27/18 1525)  Height: 6' (182 9 cm) (10/27/18 1525)  Weight - Scale: 82 3 kg (181 lb 7 oz) (10/27/18 152)  SpO2: 99 % (10/27/18 1551)      Intake/Output Summary (Last 24 hours) at 10/27/18 1606  Last data filed at 10/27/18 1458   Gross per 24 hour   Intake           460 42 ml   Output                0 ml   Net           460 42 ml       Invasive Devices     Peripheral Intravenous Line            Peripheral IV 10/27/18 Left Antecubital less than 1 day                Physical Exam   Constitutional: He is oriented to person, place, and time  He appears well-developed and well-nourished  HENT:   Head: Normocephalic and atraumatic  Abdominal: Soft  Bowel sounds are normal    Musculoskeletal: Normal range of motion  He exhibits no deformity  Neurological: He is alert and oriented to person, place, and time  Skin: Skin is warm and dry  Psychiatric: He has a normal mood and affect  His behavior is normal  Judgment and thought content normal    Nursing note and vitals reviewed  Medical Decision Makin   Acute flank pain: CTAP r/o ureteral colic    Recent Results (from the past 36 hour(s))   CBC and differential    Collection Time: 10/27/18  5:46 AM   Result Value Ref Range    WBC 9 29 4 31 - 10 16 Thousand/uL    RBC 6 45 (H) 3 88 - 5 62 Million/uL    Hemoglobin 13 4 12 0 - 17 0 g/dL    Hematocrit 45 5 36 5 - 49 3 %    MCV 71 (L) 82 - 98 fL    MCH 20 8 (L) 26 8 - 34 3 pg    MCHC 29 5 (L) 31 4 - 37 4 g/dL    RDW 27 0 (H) 11 6 - 15 1 %    Platelets 169 447 - 533 Thousands/uL    nRBC 0 /100 WBCs    Neutrophils Relative 71 43 - 75 %    Immat GRANS % 0 0 - 2 %    Lymphocytes Relative 18 14 - 44 %    Monocytes Relative 9 4 - 12 %    Eosinophils Relative 1 0 - 6 %    Basophils Relative 1 0 - 1 %    Neutrophils Absolute 6 61 1 85 - 7 62 Thousands/µL    Immature Grans Absolute 0 04 0 00 - 0 20 Thousand/uL    Lymphocytes Absolute 1 65 0 60 - 4 47 Thousands/µL    Monocytes Absolute 0 85 0 17 - 1 22 Thousand/µL    Eosinophils Absolute 0 08 0 00 - 0 61 Thousand/µL    Basophils Absolute 0 06 0 00 - 0 10 Thousands/µL   Comprehensive metabolic panel    Collection Time: 10/27/18  5:46 AM   Result Value Ref Range    Sodium 138 136 - 145 mmol/L    Potassium 3 8 3 5 - 5 3 mmol/L    Chloride 103 100 - 108 mmol/L    CO2 27 21 - 32 mmol/L    ANION GAP 8 4 - 13 mmol/L    BUN 13 5 - 25 mg/dL    Creatinine 1 23 0 60 - 1 30 mg/dL    Glucose 105 65 - 140 mg/dL    Calcium 9 1 8 3 - 10 1 mg/dL     (H) 5 - 45 U/L    ALT 91 (H) 12 - 78 U/L    Alkaline Phosphatase 132 (H) 46 - 116 U/L    Total Protein 8 2 6 4 - 8 2 g/dL    Albumin 4 2 3 5 - 5 0 g/dL    Total Bilirubin 0 82 0 20 - 1 00 mg/dL    eGFR 71 ml/min/1 73sq m   ED Urine Macroscopic    Collection Time: 10/27/18  5:56 AM   Result Value Ref Range    Color, UA Lizzy     Clarity, UA Clear     pH, UA 6 0 4 5 - 8 0    Leukocytes, UA Negative Negative    Nitrite, UA Negative Negative    Protein, UA Negative Negative mg/dl    Glucose, UA Negative Negative mg/dl    Ketones, UA Negative Negative mg/dl    Urobilinogen, UA 0 2 0 2, 1 0 E U /dl E U /dl    Bilirubin, UA Interference- unable to analyze (A) Negative    Blood, UA Negative Negative    Specific Gravity, UA 1 025 1 003 - 1 030     US right upper quadrant   Final Result   Cholelithiasis with sonographic findings suggestive of early, acute cholecystitis  If there is continued concern for acute cholecystitis, nuclear medicine HIDA scan should be obtained to assess for cystic duct patency  The study was marked in [facility] for immediate notification  Workstation performed: [id number]         CT abdomen pelvis with contrast   Final Result      No acute inflammatory stranding      Distended gallbladder without surrounding pericholecystic inflammatory change    If concern for gallbladder pathology ultrasound can be performed      Dilated common bile duct, more pronounced from the previous study of August 2, 2017  Correlation with liver function tests suggested will need for evaluating with MRCP  On the current study the CBD measures about 14 mm  On the previous study the CBD    was measuring about 8 mm      Unremarkable pancreas      Status post gastric bypass with no evidence of bowel obstruction   The excluded the stomach demonstrates air and fluid, unchanged from the previous study of August 2, 1970      Lucencies seen within the right iliac bone and left iliac bone, unchanged from the previous study of August 2, 2017  IF These have not been characterized in the past MRI of the pelvis can be considered  Workstation performed: [id number]         MRI abdomen wo contrast and mrcp    (Results Pending)         Portions of the record may have been created with voice recognition software  Occasional wrong word or "sound a like" substitutions may have occurred due to the inherent limitations of voice recognition software  Read the chart carefully and recognize, using context, where substitutions have occurred

## 2018-10-27 NOTE — ED PROVIDER NOTES
History  Chief Complaint   Patient presents with    Flank Pain     pt brought in by ems pt c/o flank pain starting tonight      This is a 44-year-old male with past medical history of nephrolithiasis who presents to the emergency department this morning with acute onset right flank pain  Patient states that he was at work this evening, working as a , when he started to feel some nausea  Patient [age] thinking that he was hungry but the nausea got worse and he vomited approximately 13 times since then  Patient states that shortly after one and after the nausea started, he developed some right flank pain as well  Patient states that this is typically how his kidney stones develop  Patient states that the last kidney stone he had was approximately one and half years ago  He is required stenting and lithotripsy in the past for stones as big as 1 cm  Patient has a past surgical history of gastric bypass and lost approximately 170 lb since 2003  Patient does note a red tinge to his urine but denies any dysuria  He denies any fevers or chills  Prior to Admission Medications   Prescriptions Last Dose Informant Patient Reported? Taking? ALPRAZolam (XANAX) 0 5 mg tablet Past Month at Unknown time Self No Yes   Sig: Take 1 tablet (0 5 mg total) by mouth every 4 (four) hours as needed for anxiety   QUEtiapine (SEROquel) 100 mg tablet 10/27/2018 at Unknown time Self No Yes   Sig: TAKE 1 TABLET (100 MG TOTAL) BY MOUTH DAILY AT BEDTIME   baclofen 10 mg tablet Past Month at Unknown time  No Yes   Sig: Take 1 tablet (10 mg total) by mouth 3 (three) times a day   Patient taking differently: Take 10 mg by mouth 2 (two) times a day     buPROPion (WELLBUTRIN) 75 mg tablet 10/27/2018 at Unknown time  No Yes   Sig: Take 1 tablet (75 mg total) by mouth 2 (two) times a day   morphine (MSIR) 15 mg tablet   No No   Sig: Take 1/2 tablet QID PRN for pain   DO NOT FILL UNTIL: 12/8/18   omeprazole (Godwin Camacho OTC) 20 MG tablet Past Month at Unknown time Self Yes Yes   Sig: Take 20 mg by mouth as needed        Facility-Administered Medications Last Administration Doses Remaining   cyanocobalamin injection 1,000 mcg 10/9/2018 10:49 AM           Past Medical History:   Diagnosis Date    Anxiety     Arthritis     Bipolar disorder ([address] )     Chronic left lumbar radiculopathy     Chronic low back pain     Chronic pain syndrome     Chronic right shoulder pain     Depression with anxiety     Fatigue     GERD (gastroesophageal reflux disease)     Hydronephrosis     Iron deficiency anemia     Kidney stone     Lytic bone lesions on xray     Nephrolithiasis     PONV (postoperative nausea and vomiting)     Right elbow pain     Right lateral epicondylitis        Past Surgical History:   Procedure Laterality Date    ADENOIDECTOMY Bilateral     APPENDECTOMY      BACK SURGERY      GASTRIC BYPASS      2009    OTHER SURGICAL HISTORY      fusion/refusion of vertebrae, 2010 and 2011 l5-s1 and l4-l5    TX CYSTO/URETERO W/LITHOTRIPSY &INDWELL STENT INSRT Right 8/8/2017    Procedure: CYSTOSCOPY; URETEROSCOPY; HOLMIUM LASER; RETROGRADE PYELOGRAM; STENT;  Surgeon: Yessica Antoine MD;  [facility];  Service: Urology    TX IMPLANT SPINAL NEUROSTIM/ Right 11/14/2017    Procedure: REMOVAL LOWER MEDIAL BUTTOCK SPINAL CORD STIMULATOR GENERATOR; PLACEMENT OF NEW BUTTOCK SPINAL CORD [facility];  Surgeon: Bryce Yo MD;  [facility];  Service: Neurosurgery    RIK-EN-Y PROCEDURE  2003    SPINAL CORD STIMULATOR IMPLANT  11/14/2017    dr Ryder Son procedure and technique 1  removal of a right back implantable pulse generator 2 placement of a new right buttock impantable pulse generator through seperate incision 3 electric analys complex programming spinal cord stimulator system postoperative period approx 1 hour    TONSILLECTOMY         Family History   Problem Relation Age of Onset    Cancer Mother     Arthritis Mother     Stomach cancer Mother     Cancer Father     Esophageal cancer Father     Other Father         brain tumor    No Known Problems Sister     No Known Problems Sister     No Known Problems Sister      I have reviewed and agree with the history as documented  Social History   Substance Use Topics    Smoking status: Current Every Day Smoker     Packs/day: 1 00     Types: Cigarettes    Smokeless tobacco: Never Used    Alcohol use No      Comment: quit drinking, social        Review of Systems   Constitutional: Negative for chills, diaphoresis and fever  HENT: Negative for congestion, rhinorrhea, sinus pressure and sore throat  Eyes: Negative for visual disturbance  Respiratory: Negative for cough, chest tightness and shortness of breath  Cardiovascular: Negative for chest pain  Gastrointestinal: Negative for abdominal pain, constipation, diarrhea, nausea and vomiting  Genitourinary: Positive for flank pain and hematuria  Negative for dysuria, frequency and urgency  Musculoskeletal: Negative for arthralgias and myalgias  Skin: Negative for color change and rash  Neurological: Negative for dizziness, numbness and headaches  Physical Exam  ED Triage Vitals   Temperature Pulse Respirations Blood Pressure SpO2   10/27/18 0524 10/27/18 0524 10/27/18 0524 10/27/18 0524 10/27/18 0524   98 °F (36 7 °C) 65 18 154/84 98 %      Temp Source Heart Rate Source Patient Position - Orthostatic VS BP Location FiO2 (%)   10/27/18 0524 10/27/18 0524 10/27/18 0830 10/27/18 0652 --   Oral Monitor Lying Right arm       Pain Score       10/27/18 0649       9           Orthostatic Vital Signs  Vitals:    10/27/18 0524 10/27/18 0652 10/27/18 0830 10/27/18 1015   BP: 154/84 140/86 134/85 118/64   Pulse: 65 56 64 62   Patient Position - Orthostatic VS:   Lying Lying       Physical Exam   Constitutional: He is oriented to person, place, and time   He appears well-developed and well-nourished  No distress  HENT:   Head: Normocephalic and atraumatic  Eyes: Pupils are equal, round, and reactive to light  Conjunctivae are normal    Neck: Normal range of motion  Neck supple  No JVD present  Cardiovascular: Normal rate, regular rhythm and normal heart sounds  Exam reveals no gallop and no friction rub  No murmur heard  Pulmonary/Chest: Effort normal and breath sounds normal  No stridor  No respiratory distress  He has no wheezes  He has no rales  Abdominal: Soft  Bowel sounds are normal  He exhibits no distension  There is tenderness (Right-sided)  There is no guarding  Musculoskeletal: Normal range of motion  He exhibits tenderness ( bilateral CVA tenderness with right side > left-side)  He exhibits no edema or deformity  Neurological: He is alert and oriented to person, place, and time  No cranial nerve deficit or sensory deficit  He exhibits normal muscle tone  Skin: Skin is warm and dry  No rash noted  He is not diaphoretic  No erythema  No pallor  No zoster rash   Psychiatric: He has a normal mood and affect  His behavior is normal    Nursing note and vitals reviewed        ED Medications  Medications   pantoprazole (PROTONIX) EC tablet 20 mg (not administered)   ceFAZolin (ANCEF) IVPB (premix) 2,000 mg (not administered)   metroNIDAZOLE (FLAGYL) IVPB (premix) 500 mg (not administered)   HYDROmorphone (DILAUDID) injection 0 5 mg (not administered)   oxyCODONE (ROXICODONE) immediate release tablet 10 mg (not administered)   oxyCODONE (ROXICODONE) IR tablet 5 mg (not administered)   acetaminophen (TYLENOL) tablet 650 mg (not administered)   lactated ringers infusion (not administered)   ondansetron (ZOFRAN) injection 4 mg (not administered)   heparin (porcine) subcutaneous injection 5,000 Units (not administered)   ketorolac (TORADOL) injection 15 mg (15 mg Intravenous Given 10/27/18 0649)   iohexol (OMNIPAQUE) 350 MG/ML injection (MULTI-DOSE) 100 mL (100 mL Intravenous Given 10/27/18 0724)   HYDROmorphone (DILAUDID) injection 0 5 mg (0 5 mg Intravenous Given 10/27/18 0823)   HYDROmorphone (DILAUDID) injection 0 5 mg (0 5 mg Intravenous Given 10/27/18 1050)       Diagnostic Studies  Results Reviewed     Procedure Component Value Units Date/Time    Platelet count [[id number]]     Lab Status:  No result Specimen:  Blood     Comprehensive metabolic panel [[id number]]  (Abnormal) Collected:  10/27/18 0546    Lab Status:  Final result Specimen:  Blood from Arm, Left Updated:  10/27/18 0700     Sodium 138 mmol/L      Potassium 3 8 mmol/L      Chloride 103 mmol/L      CO2 27 mmol/L      ANION GAP 8 mmol/L      BUN 13 mg/dL      Creatinine 1 23 mg/dL      Glucose 105 mg/dL      Calcium 9 1 mg/dL       (H) U/L      ALT 91 (H) U/L      Alkaline Phosphatase 132 (H) U/L      Total Protein 8 2 g/dL      Albumin 4 2 g/dL      Total Bilirubin 0 82 mg/dL      eGFR 71 ml/min/1 73sq m     Narrative:         National Kidney Disease Education Program recommendations are as follows:  GFR calculation is accurate only with a steady state creatinine  Chronic Kidney disease less than 60 ml/min/1 73 sq  meters  Kidney failure less than 15 ml/min/1 73 sq  meters      POCT urinalysis dipstick [[id number]]  (Abnormal) Resulted:  10/27/18 0553    Lab Status:  Final result Specimen:  Urine Updated:  10/27/18 0655    CBC and differential [[id number]]  (Abnormal) Collected:  10/27/18 0546    Lab Status:  Final result Specimen:  Blood from Arm, Left Updated:  10/27/18 0607     WBC 9 29 Thousand/uL      RBC 6 45 (H) Million/uL      Hemoglobin 13 4 g/dL      Hematocrit 45 5 %      MCV 71 (L) fL      MCH 20 8 (L) pg      MCHC 29 5 (L) g/dL      RDW 27 0 (H) %      Platelets 085 Thousands/uL      nRBC 0 /100 WBCs      Neutrophils Relative 71 %      Immat GRANS % 0 %      Lymphocytes Relative 18 %      Monocytes Relative 9 %      Eosinophils Relative 1 %      Basophils Relative 1 %      Neutrophils Absolute 6 61 Thousands/µL      Immature Grans Absolute 0 04 Thousand/uL      Lymphocytes Absolute 1 65 Thousands/µL      Monocytes Absolute 0 85 Thousand/µL      Eosinophils Absolute 0 08 Thousand/µL      Basophils Absolute 0 06 Thousands/µL     ED Urine Macroscopic [[id number]]  (Abnormal) Collected:  10/27/18 0556    Lab Status:  Final result Specimen:  Urine Updated:  10/27/18 0553     Color, UA Lizzy     Clarity, UA Clear     pH, UA 6 0     Leukocytes, UA Negative     Nitrite, UA Negative     Protein, UA Negative mg/dl      Glucose, UA Negative mg/dl      Ketones, UA Negative mg/dl      Urobilinogen, UA 0 2 E U /dl      Bilirubin, UA Interference- unable to analyze (A)     Blood, UA Negative     Specific Gravity, UA 1 025    Narrative:       [facility] RESULT                 US right upper quadrant   Final Result by Kimberlee Calhoun DO (10/27 2715)   Cholelithiasis with sonographic findings suggestive of early, acute cholecystitis  If there is continued concern for acute cholecystitis, nuclear medicine HIDA scan should be obtained to assess for cystic duct patency  The study was marked in [facility] for immediate notification  Workstation performed: [id number]         CT abdomen pelvis with contrast   Final Result by Raisa Hinds MD (10/27 8575)      No acute inflammatory stranding      Distended gallbladder without surrounding pericholecystic inflammatory change  If concern for gallbladder pathology ultrasound can be performed      Dilated common bile duct, more pronounced from the previous study of August 2, 2017  Correlation with liver function tests suggested will need for evaluating with MRCP  On the current study the CBD measures about 14 mm    On the previous study the CBD    was measuring about 8 mm      Unremarkable pancreas      Status post gastric bypass with no evidence of bowel obstruction   The excluded the stomach demonstrates air and fluid, unchanged from the previous study of August 2, 1970      Lucencies seen within the right iliac bone and left iliac bone, unchanged from the previous study of August 2, 2017  IF These have not been characterized in the past MRI of the pelvis can be considered  Workstation performed: [id number]         MRI inpatient order    (Results Pending)         Procedures  Procedures      Phone Consults  ED Phone Contact    ED Course                               MDM  Number of Diagnoses or Management Options  Diagnosis management comments: Patient's CT scan was concerning for gallbladder pathology and the right upper quadrant ultrasound was ordered  Right upper quadrant ultrasound revealed signs of possible early acute cholecystitis  Surgery was called to come evaluate the patient and they decided to admit the patient to their service  CritCare Time    Disposition  Final diagnoses:   RUQ pain   Nausea & vomiting     Time reflects when diagnosis was documented in both MDM as applicable and the Disposition within this note     Time User Action Codes Description Comment    10/27/2018 11:03 AM Ko Ala Add [K81 9] Cholecystitis     10/27/2018 11:12 AM Donnagie Butt Add [R10 11] RUQ pain     10/27/2018 11:12 AM Donangie Butt Add [R11 2] Nausea & vomiting       ED Disposition     ED Disposition Condition Comment    Admit  Case was discussed with Surgery and the patient's admission status was agreed to be Admission Status: observation status to the service of Dr Minesh Garcia   Follow-up Information    None         Patient's Medications   Discharge Prescriptions    No medications on file     No discharge procedures on file  ED Provider  Attending physically available and evaluated Deny Soto I managed the patient along with the ED Attending      Electronically Signed by         Merritt Yen MD  10/27/18 8614

## 2018-10-28 ENCOUNTER — ANESTHESIA EVENT (INPATIENT)
Dept: PERIOP | Facility: HOSPITAL | Age: 43
DRG: 418 | End: 2018-10-28
Payer: MEDICARE

## 2018-10-28 DIAGNOSIS — F33.2 MDD (MAJOR DEPRESSIVE DISORDER), RECURRENT SEVERE, WITHOUT PSYCHOSIS (HCC): ICD-10-CM

## 2018-10-28 DIAGNOSIS — M79.18 MYOFASCIAL PAIN SYNDROME: ICD-10-CM

## 2018-10-28 LAB
ABO GROUP BLD: NORMAL
ALBUMIN SERPL BCP-MCNC: 3 G/DL (ref 3.5–5)
ALP SERPL-CCNC: 97 U/L (ref 46–116)
ALT SERPL W P-5'-P-CCNC: 86 U/L (ref 12–78)
ANION GAP SERPL CALCULATED.3IONS-SCNC: 5 MMOL/L (ref 4–13)
AST SERPL W P-5'-P-CCNC: 47 U/L (ref 5–45)
BASOPHILS # BLD AUTO: 0.06 THOUSANDS/ΜL (ref 0–0.1)
BASOPHILS NFR BLD AUTO: 2 % (ref 0–1)
BILIRUB SERPL-MCNC: 0.33 MG/DL (ref 0.2–1)
BLD GP AB SCN SERPL QL: NEGATIVE
BUN SERPL-MCNC: 8 MG/DL (ref 5–25)
CALCIUM SERPL-MCNC: 8.1 MG/DL (ref 8.3–10.1)
CHLORIDE SERPL-SCNC: 106 MMOL/L (ref 100–108)
CO2 SERPL-SCNC: 27 MMOL/L (ref 21–32)
CREAT SERPL-MCNC: 0.98 MG/DL (ref 0.6–1.3)
EOSINOPHIL # BLD AUTO: 0.17 THOUSAND/ΜL (ref 0–0.61)
EOSINOPHIL NFR BLD AUTO: 5 % (ref 0–6)
ERYTHROCYTE [DISTWIDTH] IN BLOOD BY AUTOMATED COUNT: 26.7 % (ref 11.6–15.1)
GFR SERPL CREATININE-BSD FRML MDRD: 94 ML/MIN/1.73SQ M
GLUCOSE SERPL-MCNC: 105 MG/DL (ref 65–140)
HCT VFR BLD AUTO: 38.7 % (ref 36.5–49.3)
HGB BLD-MCNC: 11.2 G/DL (ref 12–17)
IMM GRANULOCYTES # BLD AUTO: 0.01 THOUSAND/UL (ref 0–0.2)
IMM GRANULOCYTES NFR BLD AUTO: 0 % (ref 0–2)
LYMPHOCYTES # BLD AUTO: 1.72 THOUSANDS/ΜL (ref 0.6–4.47)
LYMPHOCYTES NFR BLD AUTO: 46 % (ref 14–44)
MAGNESIUM SERPL-MCNC: 2 MG/DL (ref 1.6–2.6)
MCH RBC QN AUTO: 21 PG (ref 26.8–34.3)
MCHC RBC AUTO-ENTMCNC: 28.9 G/DL (ref 31.4–37.4)
MCV RBC AUTO: 73 FL (ref 82–98)
MONOCYTES # BLD AUTO: 0.31 THOUSAND/ΜL (ref 0.17–1.22)
MONOCYTES NFR BLD AUTO: 9 % (ref 4–12)
NEUTROPHILS # BLD AUTO: 1.37 THOUSANDS/ΜL (ref 1.85–7.62)
NEUTS SEG NFR BLD AUTO: 38 % (ref 43–75)
NRBC BLD AUTO-RTO: 0 /100 WBCS
PLATELET # BLD AUTO: 246 THOUSANDS/UL (ref 149–390)
PMV BLD AUTO: 10.7 FL (ref 8.9–12.7)
POTASSIUM SERPL-SCNC: 4.1 MMOL/L (ref 3.5–5.3)
PROT SERPL-MCNC: 5.8 G/DL (ref 6.4–8.2)
RBC # BLD AUTO: 5.33 MILLION/UL (ref 3.88–5.62)
RH BLD: POSITIVE
SODIUM SERPL-SCNC: 138 MMOL/L (ref 136–145)
SPECIMEN EXPIRATION DATE: NORMAL
WBC # BLD AUTO: 3.64 THOUSAND/UL (ref 4.31–10.16)

## 2018-10-28 PROCEDURE — 85025 COMPLETE CBC W/AUTO DIFF WBC: CPT | Performed by: SURGERY

## 2018-10-28 PROCEDURE — 80053 COMPREHEN METABOLIC PANEL: CPT | Performed by: SURGERY

## 2018-10-28 PROCEDURE — 86901 BLOOD TYPING SEROLOGIC RH(D): CPT | Performed by: SURGERY

## 2018-10-28 PROCEDURE — 86900 BLOOD TYPING SEROLOGIC ABO: CPT | Performed by: SURGERY

## 2018-10-28 PROCEDURE — 99232 SBSQ HOSP IP/OBS MODERATE 35: CPT | Performed by: SURGERY

## 2018-10-28 PROCEDURE — 83735 ASSAY OF MAGNESIUM: CPT | Performed by: SURGERY

## 2018-10-28 PROCEDURE — 86850 RBC ANTIBODY SCREEN: CPT | Performed by: SURGERY

## 2018-10-28 RX ORDER — DOCUSATE SODIUM 100 MG/1
100 CAPSULE, LIQUID FILLED ORAL 2 TIMES DAILY
Status: DISCONTINUED | OUTPATIENT
Start: 2018-10-28 | End: 2018-11-01 | Stop reason: HOSPADM

## 2018-10-28 RX ORDER — DEXTROSE, SODIUM CHLORIDE, AND POTASSIUM CHLORIDE 5; .45; .15 G/100ML; G/100ML; G/100ML
75 INJECTION INTRAVENOUS CONTINUOUS
Status: DISCONTINUED | OUTPATIENT
Start: 2018-10-28 | End: 2018-10-31

## 2018-10-28 RX ORDER — NICOTINE 21 MG/24HR
14 PATCH, TRANSDERMAL 24 HOURS TRANSDERMAL DAILY
Status: DISCONTINUED | OUTPATIENT
Start: 2018-10-28 | End: 2018-11-01 | Stop reason: HOSPADM

## 2018-10-28 RX ORDER — SENNOSIDES 8.6 MG
1 TABLET ORAL
Status: DISCONTINUED | OUTPATIENT
Start: 2018-10-28 | End: 2018-11-01 | Stop reason: HOSPADM

## 2018-10-28 RX ADMIN — DOCUSATE SODIUM 100 MG: 100 CAPSULE, LIQUID FILLED ORAL at 17:09

## 2018-10-28 RX ADMIN — HEPARIN SODIUM 5000 UNITS: 5000 INJECTION INTRAVENOUS; SUBCUTANEOUS at 14:12

## 2018-10-28 RX ADMIN — HEPARIN SODIUM 5000 UNITS: 5000 INJECTION INTRAVENOUS; SUBCUTANEOUS at 06:11

## 2018-10-28 RX ADMIN — NICOTINE 14 MG: 14 PATCH, EXTENDED RELEASE TRANSDERMAL at 14:50

## 2018-10-28 RX ADMIN — CEFAZOLIN SODIUM 2000 MG: 2 SOLUTION INTRAVENOUS at 10:35

## 2018-10-28 RX ADMIN — METRONIDAZOLE 500 MG: 500 INJECTION, SOLUTION INTRAVENOUS at 19:56

## 2018-10-28 RX ADMIN — METRONIDAZOLE 500 MG: 500 INJECTION, SOLUTION INTRAVENOUS at 03:08

## 2018-10-28 RX ADMIN — OXYCODONE HYDROCHLORIDE 10 MG: 10 TABLET ORAL at 22:40

## 2018-10-28 RX ADMIN — HYDROMORPHONE HYDROCHLORIDE 0.5 MG: 1 INJECTION, SOLUTION INTRAMUSCULAR; INTRAVENOUS; SUBCUTANEOUS at 20:40

## 2018-10-28 RX ADMIN — CEFAZOLIN SODIUM 2000 MG: 2 SOLUTION INTRAVENOUS at 19:22

## 2018-10-28 RX ADMIN — DEXTROSE, SODIUM CHLORIDE, AND POTASSIUM CHLORIDE 75 ML/HR: 5; .45; .15 INJECTION INTRAVENOUS at 08:54

## 2018-10-28 RX ADMIN — OXYCODONE HYDROCHLORIDE 10 MG: 10 TABLET ORAL at 10:33

## 2018-10-28 RX ADMIN — OXYCODONE HYDROCHLORIDE 10 MG: 10 TABLET ORAL at 01:37

## 2018-10-28 RX ADMIN — HYDROMORPHONE HYDROCHLORIDE 0.5 MG: 1 INJECTION, SOLUTION INTRAMUSCULAR; INTRAVENOUS; SUBCUTANEOUS at 23:51

## 2018-10-28 RX ADMIN — SENNOSIDES 8.6 MG: 8.6 TABLET, FILM COATED ORAL at 21:14

## 2018-10-28 RX ADMIN — CEFAZOLIN SODIUM 2000 MG: 2 SOLUTION INTRAVENOUS at 03:08

## 2018-10-28 RX ADMIN — METRONIDAZOLE 500 MG: 500 INJECTION, SOLUTION INTRAVENOUS at 10:33

## 2018-10-28 RX ADMIN — HEPARIN SODIUM 5000 UNITS: 5000 INJECTION INTRAVENOUS; SUBCUTANEOUS at 21:14

## 2018-10-28 RX ADMIN — OXYCODONE HYDROCHLORIDE 10 MG: 10 TABLET ORAL at 17:07

## 2018-10-28 RX ADMIN — QUETIAPINE FUMARATE 100 MG: 100 TABLET ORAL at 21:14

## 2018-10-28 RX ADMIN — BUPROPION HYDROCHLORIDE 75 MG: 75 TABLET, FILM COATED ORAL at 08:00

## 2018-10-28 RX ADMIN — HYDROMORPHONE HYDROCHLORIDE 0.5 MG: 1 INJECTION, SOLUTION INTRAMUSCULAR; INTRAVENOUS; SUBCUTANEOUS at 08:00

## 2018-10-28 RX ADMIN — HYDROMORPHONE HYDROCHLORIDE 0.5 MG: 1 INJECTION, SOLUTION INTRAMUSCULAR; INTRAVENOUS; SUBCUTANEOUS at 17:07

## 2018-10-28 RX ADMIN — SODIUM CHLORIDE, SODIUM LACTATE, POTASSIUM CHLORIDE, AND CALCIUM CHLORIDE 125 ML/HR: .6; .31; .03; .02 INJECTION, SOLUTION INTRAVENOUS at 08:00

## 2018-10-28 RX ADMIN — DEXTROSE, SODIUM CHLORIDE, AND POTASSIUM CHLORIDE 75 ML/HR: 5; .45; .15 INJECTION INTRAVENOUS at 23:51

## 2018-10-28 RX ADMIN — PANTOPRAZOLE SODIUM 20 MG: 20 TABLET, DELAYED RELEASE ORAL at 06:13

## 2018-10-28 RX ADMIN — HYDROMORPHONE HYDROCHLORIDE 0.5 MG: 1 INJECTION, SOLUTION INTRAMUSCULAR; INTRAVENOUS; SUBCUTANEOUS at 14:11

## 2018-10-28 RX ADMIN — ALPRAZOLAM 0.5 MG: 0.5 TABLET ORAL at 12:03

## 2018-10-28 RX ADMIN — OXYCODONE HYDROCHLORIDE 10 MG: 10 TABLET ORAL at 06:11

## 2018-10-28 RX ADMIN — HYDROMORPHONE HYDROCHLORIDE 0.5 MG: 1 INJECTION, SOLUTION INTRAMUSCULAR; INTRAVENOUS; SUBCUTANEOUS at 11:00

## 2018-10-28 NOTE — PROGRESS NOTES
Progress Note - General Surgery   Orin Austin 37 y o  male MRN: [id number]  Unit/Bed#: [facility] 626-01 Encounter: [id number]    Assessment:  36 y/o M w/ hx of gastric bypass, p/w acute cholecystitis and dilated CBD    10/27 CTAP: CBD 14mm, distended gallbladder  10/27 RUQ U/S: Cholelithiasis w/ distended GB, 10mm CBD  10/27 MRCP: unobtainable d/t staff limitations required given pt's implanted neurostimulator    Plan:  --Laparoscopic-assisted ERCP, cholecystectomy on 10/29  --Full liquids, NPO @MN  --Continue Abx  --Continue home meds  --Pain control  --DVT ppx    Subjective/Objective     Subjective:    No acute events overnight  Pt c/o 9/10 RUQ pain  Denies any N/V  No flatus or bm  Objective:     Blood pressure 118/66, pulse 58, temperature 98 6 °F (37 °C), temperature source Oral, resp  rate 20, height 6' (1 829 m), weight 82 3 kg (181 lb 7 oz), SpO2 100 %  ,Body mass index is 24 61 kg/m²  I/O       10/26 0701 - 10/27 0700 10/27 0701 - 10/28 0700    P  O   1060    I V  (mL/kg)  1166 7 (14 2)    IV Piggyback  300    Total Intake(mL/kg)  2526 7 (30 7)    Urine (mL/kg/hr)  1930 (1)    Total Output   1930    Net   +596 7          Unmeasured Urine Occurrence  1 x            Invasive Devices     Peripheral Intravenous Line            Peripheral IV 10/27/18 Left Antecubital less than 1 day                Physical Exam:     GEN: NAD  HEENT: MMM  CV: RRR  Lung: normal effort  Ab: Soft, ND, tender to palpation in RUQ  Extrem: No CCE  Neuro:  A+Ox3, motor and sensation grossly intact      Lab, Imaging and other studies:  CBC:   Lab Results   Component Value Date    WBC 9 29 10/27/2018    HGB 13 4 10/27/2018    HCT 45 5 10/27/2018    MCV 71 (L) 10/27/2018     10/27/2018    MCH 20 8 (L) 10/27/2018    MCHC 29 5 (L) 10/27/2018    RDW 27 0 (H) 10/27/2018    NRBC 0 10/27/2018   , CMP:   Lab Results   Component Value Date     10/27/2018    K 3 8 10/27/2018     10/27/2018    CO2 27 10/27/2018    BUN 13 10/27/2018    CREATININE 1 23 10/27/2018    CALCIUM 9 1 10/27/2018     (H) 10/27/2018    ALT 91 (H) 10/27/2018    ALKPHOS 132 (H) 10/27/2018    EGFR 71 10/27/2018   , Coagulation: No results found for: PT, INR, APTT, Urinalysis:   Lab Results   Component Value Date    COLORU Lizzy 10/27/2018    CLARITYU Clear 10/27/2018    SPECGRAV 1 025 10/27/2018    PHUR 6 0 10/27/2018    LEUKOCYTESUR Negative 10/27/2018    NITRITE Negative 10/27/2018    PROTEINUA Negative 10/27/2018    GLUCOSEU Negative 10/27/2018    KETONESU Negative 10/27/2018    BILIRUBINUR Interference- unable to analyze (A) 10/27/2018    BLOODU Negative 10/27/2018   , Amylase: No results found for: AMYLASE, Lipase: No results found for: LIPASE  VTE Pharmacologic Prophylaxis: Heparin  VTE Mechanical Prophylaxis: sequential compression device

## 2018-10-28 NOTE — SOCIAL WORK
Met with pt and discussed role of CM  Pt lives with his ant, ant's 3 children, and 26 yo dtr in a 1-story home with 1 step at entrance  Pt is independent in ADLs  Pt denies having any DME or prior C  Preference for pharmacy is [facility] in [place], [place]  Pt has hx Bipolar D/o and PTSD--reports last inpt psych admission was in 2012  No POA  Main contact: Pierre Molina ([phone])  CM reviewed d/c planning process including the following: identifying help at home, patient preference for d/c planning needs, Discharge Lounge, [facility] Meds to Bed program, availability of treatment team to discuss questions or concerns patient and/or family may have regarding understanding medications and recognizing signs and symptoms once discharged  CM also encouraged patient to follow up with all recommended appointments after discharge  Patient advised of importance for patient and family to participate in managing patients medical well being  Patient/caregiver received discharge checklist  Content reviewed  Patient/caregiver encouraged to participate in discharge plan of care prior to discharge home

## 2018-10-29 ENCOUNTER — ANESTHESIA (INPATIENT)
Dept: PERIOP | Facility: HOSPITAL | Age: 43
DRG: 418 | End: 2018-10-29
Payer: MEDICARE

## 2018-10-29 ENCOUNTER — APPOINTMENT (INPATIENT)
Dept: RADIOLOGY | Facility: HOSPITAL | Age: 43
DRG: 418 | End: 2018-10-29
Payer: MEDICARE

## 2018-10-29 PROBLEM — K80.50 CHOLEDOCHOLITHIASIS: Status: ACTIVE | Noted: 2018-10-29

## 2018-10-29 LAB
ALBUMIN SERPL BCP-MCNC: 3.4 G/DL (ref 3.5–5)
ALP SERPL-CCNC: 102 U/L (ref 46–116)
ALT SERPL W P-5'-P-CCNC: 59 U/L (ref 12–78)
ANION GAP SERPL CALCULATED.3IONS-SCNC: 4 MMOL/L (ref 4–13)
AST SERPL W P-5'-P-CCNC: 27 U/L (ref 5–45)
BASOPHILS # BLD AUTO: 0.05 THOUSANDS/ΜL (ref 0–0.1)
BASOPHILS NFR BLD AUTO: 1 % (ref 0–1)
BILIRUB SERPL-MCNC: 0.3 MG/DL (ref 0.2–1)
BUN SERPL-MCNC: 5 MG/DL (ref 5–25)
CALCIUM SERPL-MCNC: 8.6 MG/DL (ref 8.3–10.1)
CHLORIDE SERPL-SCNC: 107 MMOL/L (ref 100–108)
CO2 SERPL-SCNC: 30 MMOL/L (ref 21–32)
CREAT SERPL-MCNC: 0.9 MG/DL (ref 0.6–1.3)
EOSINOPHIL # BLD AUTO: 0.16 THOUSAND/ΜL (ref 0–0.61)
EOSINOPHIL NFR BLD AUTO: 4 % (ref 0–6)
ERYTHROCYTE [DISTWIDTH] IN BLOOD BY AUTOMATED COUNT: 27.4 % (ref 11.6–15.1)
GFR SERPL CREATININE-BSD FRML MDRD: 104 ML/MIN/1.73SQ M
GLUCOSE SERPL-MCNC: 91 MG/DL (ref 65–140)
HCT VFR BLD AUTO: 43 % (ref 36.5–49.3)
HGB BLD-MCNC: 12.3 G/DL (ref 12–17)
IMM GRANULOCYTES # BLD AUTO: 0.01 THOUSAND/UL (ref 0–0.2)
IMM GRANULOCYTES NFR BLD AUTO: 0 % (ref 0–2)
LYMPHOCYTES # BLD AUTO: 2.19 THOUSANDS/ΜL (ref 0.6–4.47)
LYMPHOCYTES NFR BLD AUTO: 55 % (ref 14–44)
MCH RBC QN AUTO: 21 PG (ref 26.8–34.3)
MCHC RBC AUTO-ENTMCNC: 28.6 G/DL (ref 31.4–37.4)
MCV RBC AUTO: 73 FL (ref 82–98)
MONOCYTES # BLD AUTO: 0.32 THOUSAND/ΜL (ref 0.17–1.22)
MONOCYTES NFR BLD AUTO: 8 % (ref 4–12)
NEUTROPHILS # BLD AUTO: 1.28 THOUSANDS/ΜL (ref 1.85–7.62)
NEUTS SEG NFR BLD AUTO: 32 % (ref 43–75)
NRBC BLD AUTO-RTO: 0 /100 WBCS
PLATELET # BLD AUTO: 222 THOUSANDS/UL (ref 149–390)
POTASSIUM SERPL-SCNC: 4.7 MMOL/L (ref 3.5–5.3)
PROT SERPL-MCNC: 6.6 G/DL (ref 6.4–8.2)
RBC # BLD AUTO: 5.86 MILLION/UL (ref 3.88–5.62)
SODIUM SERPL-SCNC: 141 MMOL/L (ref 136–145)
WBC # BLD AUTO: 4.01 THOUSAND/UL (ref 4.31–10.16)

## 2018-10-29 PROCEDURE — 99222 1ST HOSP IP/OBS MODERATE 55: CPT | Performed by: INTERNAL MEDICINE

## 2018-10-29 PROCEDURE — 76376 3D RENDER W/INTRP POSTPROCES: CPT

## 2018-10-29 PROCEDURE — 99232 SBSQ HOSP IP/OBS MODERATE 35: CPT | Performed by: SURGERY

## 2018-10-29 PROCEDURE — 74181 MRI ABDOMEN W/O CONTRAST: CPT

## 2018-10-29 PROCEDURE — 85025 COMPLETE CBC W/AUTO DIFF WBC: CPT | Performed by: SURGERY

## 2018-10-29 PROCEDURE — 80053 COMPREHEN METABOLIC PANEL: CPT | Performed by: SURGERY

## 2018-10-29 RX ORDER — BACLOFEN 10 MG/1
TABLET ORAL
Qty: 90 TABLET | Refills: 2 | OUTPATIENT
Start: 2018-10-29

## 2018-10-29 RX ORDER — QUETIAPINE FUMARATE 100 MG/1
100 TABLET, FILM COATED ORAL
Qty: 30 TABLET | Refills: 2 | Status: SHIPPED | OUTPATIENT
Start: 2018-10-29 | End: 2018-11-16 | Stop reason: SDUPTHER

## 2018-10-29 RX ADMIN — BUPROPION HYDROCHLORIDE 75 MG: 75 TABLET, FILM COATED ORAL at 08:49

## 2018-10-29 RX ADMIN — PANTOPRAZOLE SODIUM 20 MG: 20 TABLET, DELAYED RELEASE ORAL at 05:15

## 2018-10-29 RX ADMIN — ALPRAZOLAM 0.5 MG: 0.5 TABLET ORAL at 18:29

## 2018-10-29 RX ADMIN — HYDROMORPHONE HYDROCHLORIDE 0.5 MG: 1 INJECTION, SOLUTION INTRAMUSCULAR; INTRAVENOUS; SUBCUTANEOUS at 09:36

## 2018-10-29 RX ADMIN — HYDROMORPHONE HYDROCHLORIDE 0.5 MG: 1 INJECTION, SOLUTION INTRAMUSCULAR; INTRAVENOUS; SUBCUTANEOUS at 12:35

## 2018-10-29 RX ADMIN — OXYCODONE HYDROCHLORIDE 10 MG: 10 TABLET ORAL at 19:45

## 2018-10-29 RX ADMIN — CEFAZOLIN SODIUM 2000 MG: 2 SOLUTION INTRAVENOUS at 18:29

## 2018-10-29 RX ADMIN — CEFAZOLIN SODIUM 2000 MG: 2 SOLUTION INTRAVENOUS at 11:23

## 2018-10-29 RX ADMIN — METRONIDAZOLE 500 MG: 500 INJECTION, SOLUTION INTRAVENOUS at 11:57

## 2018-10-29 RX ADMIN — METRONIDAZOLE 500 MG: 500 INJECTION, SOLUTION INTRAVENOUS at 04:04

## 2018-10-29 RX ADMIN — HYDROMORPHONE HYDROCHLORIDE 0.5 MG: 1 INJECTION, SOLUTION INTRAMUSCULAR; INTRAVENOUS; SUBCUTANEOUS at 05:18

## 2018-10-29 RX ADMIN — ALPRAZOLAM 0.5 MG: 0.5 TABLET ORAL at 14:16

## 2018-10-29 RX ADMIN — HEPARIN SODIUM 5000 UNITS: 5000 INJECTION INTRAVENOUS; SUBCUTANEOUS at 13:39

## 2018-10-29 RX ADMIN — HYDROMORPHONE HYDROCHLORIDE 0.5 MG: 1 INJECTION, SOLUTION INTRAMUSCULAR; INTRAVENOUS; SUBCUTANEOUS at 18:29

## 2018-10-29 RX ADMIN — OXYCODONE HYDROCHLORIDE 10 MG: 10 TABLET ORAL at 08:49

## 2018-10-29 RX ADMIN — OXYCODONE HYDROCHLORIDE 10 MG: 10 TABLET ORAL at 04:08

## 2018-10-29 RX ADMIN — NICOTINE 14 MG: 14 PATCH, EXTENDED RELEASE TRANSDERMAL at 11:23

## 2018-10-29 RX ADMIN — METRONIDAZOLE 500 MG: 500 INJECTION, SOLUTION INTRAVENOUS at 19:40

## 2018-10-29 RX ADMIN — HEPARIN SODIUM 5000 UNITS: 5000 INJECTION INTRAVENOUS; SUBCUTANEOUS at 05:15

## 2018-10-29 RX ADMIN — DEXTROSE, SODIUM CHLORIDE, AND POTASSIUM CHLORIDE 75 ML/HR: 5; .45; .15 INJECTION INTRAVENOUS at 17:49

## 2018-10-29 RX ADMIN — HYDROMORPHONE HYDROCHLORIDE 0.5 MG: 1 INJECTION, SOLUTION INTRAMUSCULAR; INTRAVENOUS; SUBCUTANEOUS at 15:36

## 2018-10-29 RX ADMIN — HEPARIN SODIUM 5000 UNITS: 5000 INJECTION INTRAVENOUS; SUBCUTANEOUS at 22:24

## 2018-10-29 RX ADMIN — CEFAZOLIN SODIUM 2000 MG: 2 SOLUTION INTRAVENOUS at 02:43

## 2018-10-29 RX ADMIN — HYDROMORPHONE HYDROCHLORIDE 0.5 MG: 1 INJECTION, SOLUTION INTRAMUSCULAR; INTRAVENOUS; SUBCUTANEOUS at 22:24

## 2018-10-29 RX ADMIN — OXYCODONE HYDROCHLORIDE 10 MG: 10 TABLET ORAL at 14:16

## 2018-10-29 RX ADMIN — QUETIAPINE FUMARATE 100 MG: 100 TABLET ORAL at 22:24

## 2018-10-29 NOTE — UTILIZATION REVIEW
Initial Clinical Review    Admission: Date/Time/Statement:  Pt initially placed in Observation status on 10/27 @ 10:58 - changed to Inpatient admission 10/27 @ 17:53     Orders Placed This Encounter   Procedures    Inpatient Admission     Standing Status:   Standing     Number of Occurrences:   1     Order Specific Question:   Admitting Physician     Answer:   Author Preston     Order Specific Question:   Level of Care     Answer:   Med Surg [16]     Order Specific Question:   Estimated length of stay     Answer:   More than 2 Midnights     Order Specific Question:   Certification     Answer:   I certify that inpatient services are medically necessary for this patient for a duration of greater than two midnights  See H&P and MD Progress Notes for additional information about the patient's course of treatment  ED: Date/Time/Mode of Arrival:   ED Arrival Information     Expected Arrival Acuity Means of Arrival Escorted By Service Admission Type    - 10/27/2018 05:19 Emergent Walk-In Self Surgery-General Emergency    Arrival Complaint    Kidney Stones          Chief Complaint:   Chief Complaint   Patient presents with    Flank Pain     pt brought in by ems pt c/o flank pain starting tonight        History of Illness: Silvia Hall is a 37 y o  male who presents with one day of abdominal pain  The patient reports that he had kidney stones in the past and felt that this was the same pain, however this time he also had pain in the right upper quadrant was abdomen radiating towards his back  The patient has a history of gastric bypass surgery, and has maintained a steady weight at 190 lb for several years now  He is an everyday smoker  The patient also has a implanted pain pump in his buttock, however he reports that this is MRI compatible  The patient has nausea and vomiting, and the pain has been relentless since last night      ED Vital Signs:   ED Triage Vitals   Temperature Pulse Respirations Blood Pressure SpO2   10/27/18 0524 10/27/18 0524 10/27/18 0524 10/27/18 0524 10/27/18 0524   98 °F (36 7 °C) 65 18 154/84 98 %      Temp Source Heart Rate Source Patient Position - Orthostatic VS BP Location FiO2 (%)   10/27/18 0524 10/27/18 0524 10/27/18 0830 10/27/18 0652 --   Oral Monitor Lying Right arm       Pain Score       10/27/18 0649       9        Wt Readings from Last 1 Encounters:   10/27/18 82 3 kg (181 lb 7 oz)       Abnormal Labs/Diagnostic Test Results:  10/27 - Ast 136- Alt 91 - Alk Phos 132 -     10/28 - Michel 8 1 - Ast 47 - Alt 86 - T Protein 5 8 - Albumin 3 0 - Wbc 3 64 -   10/29 - Albumin 3 4 - Wbc 4 01 - H/H 12 3/43 0     US ABD - Cholelithiasis with sonographic findings suggestive of early, acute cholecystitis     If there is continued concern for acute cholecystitis, nuclear medicine HIDA scan should be obtained to assess for cystic duct patency        CT A & P - No acute inflammatory stranding  Distended gallbladder without surrounding pericholecystic inflammatory change   If concern for gallbladder pathology ultrasound can be performed  Dilated common bile duct, more pronounced from the previous study of August 2, 2017   Correlation with liver function tests suggested will need for evaluating with MRCP   On the current study the CBD measures about 14 mm   On the previous study the CBD was measuring about 8 mm  Unremarkable pancreas  Status post gastric bypass with no evidence of bowel obstruction  The excluded the stomach demonstrates air and fluid, unchanged from the previous study of August 2, 1970  Lucencies seen within the right iliac bone and left iliac bone, unchanged from the previous study of August 2, 2017   IF These have not been characterized in the past MRI of the pelvis can be considered             ED Treatment:   Medication Administration from 10/27/2018 0519 to 10/27/2018 1330       Date/Time Order Dose Route Action     10/27/2018 0549 sodium chloride 0 9 % bolus 500 mL 500 mL Intravenous New Bag     10/27/2018 0649 ketorolac (TORADOL) injection 15 mg 15 mg Intravenous Given     10/27/2018 0724 iohexol (OMNIPAQUE) 350 MG/ML injection (MULTI-DOSE) 100 mL 100 mL Intravenous Given     10/27/2018 0823 HYDROmorphone (DILAUDID) injection 0 5 mg 0 5 mg Intravenous Given     10/27/2018 1050 HYDROmorphone (DILAUDID) injection 0 5 mg 0 5 mg Intravenous Given     10/27/2018 1228 pantoprazole (PROTONIX) EC tablet 20 mg 20 mg Oral Given     10/27/2018 1229 metroNIDAZOLE (FLAGYL) IVPB (premix) 500 mg 500 mg Intravenous New Bag     10/27/2018 1229 oxyCODONE (ROXICODONE) IR tablet 5 mg 5 mg Oral Given     10/27/2018 1229 lactated ringers infusion 125 mL/hr Intravenous New Bag       Past Medical/Surgical History:   Diagnosis    Anxiety    Arthritis    Bipolar disorder ([address] )    Chronic left lumbar radiculopathy    Chronic low back pain    Chronic pain syndrome    Chronic right shoulder pain    Depression with anxiety    Fatigue    GERD (gastroesophageal reflux disease)    Hydronephrosis    Iron deficiency anemia    Kidney stone    Lytic bone lesions on xray    Nephrolithiasis    PONV (postoperative nausea and vomiting)    Right elbow pain    Right lateral epicondylitis     Past Surgical History:   Procedure Laterality Date    ADENOIDECTOMY Bilateral      APPENDECTOMY        BACK SURGERY        GASTRIC BYPASS         2009    OTHER SURGICAL HISTORY         fusion/refusion of vertebrae, 2010 and 2011 l5-s1 and l4-l5    TX CYSTO/URETERO W/LITHOTRIPSY &INDWELL STENT INSRT Right 8/8/2017     Procedure: CYSTOSCOPY; URETEROSCOPY; HOLMIUM LASER; RETROGRADE PYELOGRAM; STENT;  Surgeon: Katy Dickerson MD;  Location: [facility];  Service: Urology    TX IMPLANT SPINAL NEUROSTIM/ Right 11/14/2017     Procedure: REMOVAL LOWER MEDIAL BUTTOCK SPINAL CORD STIMULATOR GENERATOR; PLACEMENT OF NEW BUTTOCK SPINAL CORD STIMULATOR THROUGH SEPERATE INCISION;  Surgeon: Bonifacio Austin MD;  Location: [facility];  Service: Neurosurgery    PARTH-EN-Y PROCEDURE   2003    SPINAL CORD STIMULATOR IMPLANT   11/14/2017     dr James Proper procedure and technique 1  removal of a right back implantable pulse generator 2 placement of a new right buttock impantable pulse generator through seperate incision 3 electric analys complex programming spinal cord stimulator system postoperative period approx 1 hour    TONSILLECTOMY                   Admitting Diagnosis: Cholecystitis [K81 9]  RUQ pain [R10 11]  Nausea & vomiting [R11 2]  Flank pain [R10 9]    Age/Sex: 37 y o  male    Assessment/Plan: Plan:      * Cholecystitis   Assessment & Plan     - admit to observation  - Ancef / Flagyl  - will obtain MRCP given dilated common bile duct but normal total bilirubin  - if the MRCP is normal, will plan for laparoscopic cholecystectomy tomorrow morning  - pain control  - patient has implantable pain pump; however, per the patient, the pump is MRI compatible      Acute kidney injury [address] [address] )   Assessment & Plan     - present on admission  - likely secondary to nausea and vomiting causing dehydration  - LR @ 125 mL / hour  - check BUN / Cr tomorrow      History of Parth-en-Y gastric bypass   Assessment & Plan     - clear liquids today, NPO midnight for possible operation  - if total bilirubin is elevated tomorrow or if MRCP is positive for a bile duct obstruction, we will need to discuss ERCP in conjunction with GI service      Tobacco abuse   Assessment & Plan     - offered patient nicotine patch but he refused            Admission Orders:  Scheduled Meds:   Current Facility-Administered Medications:  acetaminophen 650 mg Oral Q6H PRN    ALPRAZolam 0 5 mg Oral Q4H PRN    baclofen 10 mg Oral TID PRN    buPROPion 75 mg Oral Daily    cefazolin 2,000 mg Intravenous Q8H    docusate sodium 100 mg Oral BID    heparin (porcine) 5,000 Units Subcutaneous Q8H [address]    HYDROmorphone 0 5 mg Intravenous Q3H PRN    metroNIDAZOLE 500 mg Intravenous Q8H nicotine 14 mg Transdermal Daily    ondansetron 4 mg Intravenous Q6H PRN    oxyCODONE 10 mg Oral Q4H PRN    oxyCODONE 5 mg Oral Q4H PRN    pantoprazole 20 mg Oral Early Morning    QUEtiapine 100 mg Oral HS    senna 1 tablet Oral HS      Continuous Infusions:   dextrose 5 % and sodium chloride 0 45 % with KCl 20 mEq/L 75 mL/hr     Nursing Order - VS q 4 - SCD's to le's- incentive spirometry - up as tolerated - Diet NPO     Progress note 10/229 -    Assessment:  36 y/o M w/ hx of gastric bypass, chronic pain w/ spinal cord stimulator in place, p/w acute cholecystitis and dilated CBD     10/27 CTAP: CBD 14mm, distended gallbladder  10/27 RUQ U/S: Cholelithiasis w/ distended GB, 10mm CBD  10/27 MRCP: unobtainable d/t staff limitations required given pt's implanted neurostimulator     Plan:  --OR for ERCP, laparoscopic cholecystectomy today  --MRCP prior to OR  --NPO  --IVF  --IV Abx  --Pain control  --f/u AM labs

## 2018-10-29 NOTE — QUICK NOTE
Nurse-Patient-Provider rounds were completed with the patient's nurse today, Darrius Flores  We discussed the plan is to keep him NPO and on IV fluids in anticipation of operative intervention incoordination with a GI service later today  MRI results reviewed with the patient and his wife  Further discussion needed with the GI service to finalize plan for planned laparoscopic assisted ERCP followed by laparoscopic cholecystectomy  The patient's anatomy and operative plan was discussed with him and his wife  Continue IV antibiotics  We reviewed all of the invasive devices/lines/telemetry orders   - None  Pain Assessment / Plan:  - Continue current analgesic regimen as needed  Mobility Assessment / Plan:  - Activity as tolerated  Goals / Barriers for discharge:  - Not yet appropriate for discharge while ongoing management continues for acute cholecystitis and choledocholithiasis with planned operative intervention   - Case management following; case and discharge needs discussed  All questions and concerns were addressed  I spent greater than 25 minutes reviewing the plan with the patient and the nurse, and coordinating care for the day      Whit Parham PA-C  10/29/2018 01:03 PM

## 2018-10-29 NOTE — CONSULTS
Consultation - [facility]  Marietta Srinivasan 37 y o  male MRN: [id number]  Unit/Bed#: [facility] 626-01 Encounter: [id number]             Inpatient consult to gastroenterology     Performed by  Kerrie Kuo, [address] [place] [address] by Richi Montana              Reason for Consult / Principal Problem:   Choledocholithiasis          ASSESSMENT AND PLAN:    Choledocholithiasis  14-year-old male patient status post Parth-en-Y gastric bypass presenting with abdominal pain in the right upper quadrant  Common bile duct dilation on CT scan of the abdomen, 14 mm from 8 mm on previous CT of the abdomen  Cholecystitis on ultrasound of the abdomen  Normal total bilirubin and alk-phos  MRCP showed choledocholithiasis  Currently normal white count, afebrile, on antibiotics  Plan is to perform laparoscopically assisted ERCP through the gastric remnant  Laparoscopic cholecystectomy to follow      ______________________________________________________________________    HPI:    14-year-old male patient with history of Parth-en-Y gastric bypass in 2003 currently on a stable weight, nephrolithiasis, chronic pain with implanted pain pump, presenting with abdominal pain in the right flank/right upper quadrant along with nausea, pain initially was thought to be secondary to nephrolithiasis but then he was found to have elevated cholecystitis along with dilated CBD concerning for choledocholithiasis    REVIEW OF SYSTEMS:    CONSTITUTIONAL: Denies any fever, chills, rigors, and weight loss  HEENT: No earache or tinnitus  Denies hearing loss or visual disturbances  CARDIOVASCULAR: No chest pain or palpitations  RESPIRATORY: Denies any cough, hemoptysis, shortness of breath or dyspnea on exertion  GASTROINTESTINAL: As noted in the History of Present Illness  GENITOURINARY: No problems with urination  Denies any hematuria or dysuria  NEUROLOGIC: No dizziness or vertigo, denies headaches     MUSCULOSKELETAL: Denies any muscle or joint pain  SKIN: Denies skin rashes or itching  ENDOCRINE: Denies excessive thirst  Denies intolerance to heat or cold  PSYCHOSOCIAL: Denies depression or anxiety  Denies any recent memory loss         Historical Information   Past Medical History:   Diagnosis Date    Anxiety     Arthritis     Bipolar disorder (HCC)     Chronic left lumbar radiculopathy     Chronic low back pain     Chronic pain syndrome     Chronic right shoulder pain     Depression with anxiety     Fatigue     GERD (gastroesophageal reflux disease)     Hydronephrosis     Iron deficiency anemia     Kidney stone     Lytic bone lesions on xray     Nephrolithiasis     PONV (postoperative nausea and vomiting)     Right elbow pain     Right lateral epicondylitis      Past Surgical History:   Procedure Laterality Date    ADENOIDECTOMY Bilateral     APPENDECTOMY      BACK SURGERY      GASTRIC BYPASS      2009    OTHER SURGICAL HISTORY      fusion/refusion of vertebrae, 2010 and 2011 l5-s1 and l4-l5    IL CYSTO/URETERO W/LITHOTRIPSY &INDWELL STENT INSRT Right 8/8/2017    Procedure: CYSTOSCOPY; URETEROSCOPY; HOLMIUM LASER; RETROGRADE PYELOGRAM; STENT;  Surgeon: Mariela Waldron MD;  Location: [facility];  Service: Urology    IL IMPLANT SPINAL NEUROSTIM/ Right 11/14/2017    Procedure: REMOVAL LOWER MEDIAL BUTTOCK SPINAL CORD STIMULATOR GENERATOR; PLACEMENT OF NEW BUTTOCK SPINAL CORD [facility];  Surgeon: Ivan Junior MD;  Location: [facility];  Service: Neurosurgery    RIK-EN-Y PROCEDURE  2003    SPINAL CORD STIMULATOR IMPLANT  11/14/2017    dr Aurora Salomon procedure and technique 1  removal of a right back implantable pulse generator 2 placement of a new right buttock impantable pulse generator through seperate incision 3 electric analys complex programming spinal cord stimulator system postoperative period approx 1 hour    TONSILLECTOMY       Social History   History   Alcohol Use No Comment: quit drinking, social     History   Drug Use No     History   Smoking Status    Current Every Day Smoker    Packs/day: 1 00    Types: Cigarettes   Smokeless Tobacco    Never Used     Family History   Problem Relation Age of Onset    Cancer Mother     Arthritis Mother     Stomach cancer Mother     Cancer Father     Esophageal cancer Father     Other Father         brain tumor    No Known Problems Sister     No Known Problems Sister     No Known Problems Sister        Meds/Allergies     Facility-Administered Medications Prior to Admission   Medication    cyanocobalamin injection 1,000 mcg     Prescriptions Prior to Admission   Medication    ALPRAZolam (XANAX) 0 5 mg tablet    baclofen 10 mg tablet    [START ON 11/5/2018] buPROPion (WELLBUTRIN) 75 mg tablet    omeprazole (PRILOSEC OTC) 20 MG tablet    morphine (MSIR) 15 mg tablet     Current Facility-Administered Medications   Medication Dose Route Frequency    acetaminophen (TYLENOL) tablet 650 mg  650 mg Oral Q6H PRN    ALPRAZolam (XANAX) tablet 0 5 mg  0 5 mg Oral Q4H PRN    baclofen tablet 10 mg  10 mg Oral TID PRN    buPROPion (WELLBUTRIN) tablet 75 mg  75 mg Oral Daily    ceFAZolin (ANCEF) IVPB (premix) 2,000 mg  2,000 mg Intravenous Q8H    dextrose 5 % and sodium chloride 0 45 % with KCl 20 mEq/L infusion  75 mL/hr Intravenous Continuous    docusate sodium (COLACE) capsule 100 mg  100 mg Oral BID    heparin (porcine) subcutaneous injection 5,000 Units  5,000 Units Subcutaneous Q8H [facility]    HYDROmorphone (DILAUDID) injection 0 5 mg  0 5 mg Intravenous Q3H PRN    metroNIDAZOLE (FLAGYL) IVPB (premix) 500 mg  500 mg Intravenous Q8H    nicotine (NICODERM CQ) 14 mg/24hr TD 24 hr patch 14 mg  14 mg Transdermal Daily    ondansetron (ZOFRAN) injection 4 mg  4 mg Intravenous Q6H PRN    oxyCODONE (ROXICODONE) immediate release tablet 10 mg  10 mg Oral Q4H PRN    oxyCODONE (ROXICODONE) IR tablet 5 mg  5 mg Oral Q4H PRN    pantoprazole (PROTONIX) EC tablet 20 mg  20 mg Oral Early Morning    QUEtiapine (SEROquel) tablet 100 mg  100 mg Oral HS    senna (SENOKOT) tablet 8 6 mg  1 tablet Oral HS       Allergies   Allergen Reactions    Ampicillin GI Intolerance     Vomiting    Aspirin GI Intolerance     vomiting      Penicillins GI Intolerance     Vomit           Objective     Blood pressure 126/79, pulse 77, temperature 98 8 °F (37 1 °C), resp  rate 20, height 6' (1 829 m), weight 82 3 kg (181 lb 7 oz), SpO2 98 %  Body mass index is 24 61 kg/m²  Intake/Output Summary (Last 24 hours) at 10/29/18 1610  Last data filed at 10/29/18 1413   Gross per 24 hour   Intake           1460 5 ml   Output             3625 ml   Net          -2164 5 ml         PHYSICAL EXAM:      General Appearance:   Alert, cooperative, no distress   HEENT:   Normocephalic, atraumatic, anicteric      Neck:  Supple, symmetrical, trachea midline   Lungs:   Clear to auscultation bilaterally; no rales, rhonchi or wheezing; respirations unlabored    Heart[de-identified]   Regular rate and rhythm; no murmur, rub, or gallop     Abdomen:   Soft, mildly tender, non-distended; normal bowel sounds; no masses, no organomegaly    Genitalia:   Deferred    Rectal:   Deferred    Extremities:  No cyanosis, clubbing or edema    Pulses:  2+ and symmetric all extremities    Skin:  No jaundice, rashes, or lesions    Lymph nodes:  No palpable cervical lymphadenopathy        Lab Results:   Admission on 10/27/2018   Component Date Value    WBC 10/27/2018 9 29     RBC 10/27/2018 6 45*    Hemoglobin 10/27/2018 13 4     Hematocrit 10/27/2018 45 5     MCV 10/27/2018 71*    MCH 10/27/2018 20 8*    MCHC 10/27/2018 29 5*    RDW 10/27/2018 27 0*    Platelets 37/51/4800 272     nRBC 10/27/2018 0     Neutrophils Relative 10/27/2018 71     Immat GRANS % 10/27/2018 0     Lymphocytes Relative 10/27/2018 18     Monocytes Relative 10/27/2018 9     Eosinophils Relative 10/27/2018 1     Basophils Relative 10/27/2018 1     Neutrophils Absolute 10/27/2018 6 61     Immature Grans Absolute 10/27/2018 0 04     Lymphocytes Absolute 10/27/2018 1 65     Monocytes Absolute 10/27/2018 0 85     Eosinophils Absolute 10/27/2018 0 08     Basophils Absolute 10/27/2018 0 06     Sodium 10/27/2018 138     Potassium 10/27/2018 3 8     Chloride 10/27/2018 103     CO2 10/27/2018 27     ANION GAP 10/27/2018 8     BUN 10/27/2018 13     Creatinine 10/27/2018 1 23     Glucose 10/27/2018 105     Calcium 10/27/2018 9 1     AST 10/27/2018 136*    ALT 10/27/2018 91*    Alkaline Phosphatase 10/27/2018 132*    Total Protein 10/27/2018 8 2     Albumin 10/27/2018 4 2     Total Bilirubin 10/27/2018 0 82     eGFR 10/27/2018 71     Color, UA 10/27/2018 Lizzy     Clarity, UA 10/27/2018 Clear     pH, UA 10/27/2018 6 0     Leukocytes, UA 10/27/2018 Negative     Nitrite, UA 10/27/2018 Negative     Protein, UA 10/27/2018 Negative     Glucose, UA 10/27/2018 Negative     Ketones, UA 10/27/2018 Negative     Urobilinogen, UA 10/27/2018 0 2     Bilirubin, UA 10/27/2018 Interference- unable to analyze*    Blood, UA 10/27/2018 Negative     Specific Gravity, UA 10/27/2018 1 025     Sodium 10/28/2018 138     Potassium 10/28/2018 4 1     Chloride 10/28/2018 106     CO2 10/28/2018 27     ANION GAP 10/28/2018 5     BUN 10/28/2018 8     Creatinine 10/28/2018 0 98     Glucose 10/28/2018 105     Calcium 10/28/2018 8 1*    AST 10/28/2018 47*    ALT 10/28/2018 86*    Alkaline Phosphatase 10/28/2018 97     Total Protein 10/28/2018 5 8*    Albumin 10/28/2018 3 0*    Total Bilirubin 10/28/2018 0 33     eGFR 10/28/2018 94     WBC 10/28/2018 3 64*    RBC 10/28/2018 5 33     Hemoglobin 10/28/2018 11 2*    Hematocrit 10/28/2018 38 7     MCV 10/28/2018 73*    MCH 10/28/2018 21 0*    MCHC 10/28/2018 28 9*    RDW 10/28/2018 26 7*    MPV 10/28/2018 10 7     Platelets 51/40/0886 246     nRBC 10/28/2018 0     Neutrophils Relative 10/28/2018 38*    Immat GRANS % 10/28/2018 0     Lymphocytes Relative 10/28/2018 46*    Monocytes Relative 10/28/2018 9     Eosinophils Relative 10/28/2018 5     Basophils Relative 10/28/2018 2*    Neutrophils Absolute 10/28/2018 1 37*    Immature Grans Absolute 10/28/2018 0 01     Lymphocytes Absolute 10/28/2018 1 72     Monocytes Absolute 10/28/2018 0 31     Eosinophils Absolute 10/28/2018 0 17     Basophils Absolute 10/28/2018 0 06     Magnesium 10/28/2018 2 0     ABO Grouping 10/28/2018 A     Rh Factor 10/28/2018 Positive     Antibody Screen 10/28/2018 Negative     Specimen Expiration Date 10/28/2018 [id number]     WBC 10/29/2018 4 01*    RBC 10/29/2018 5 86*    Hemoglobin 10/29/2018 12 3     Hematocrit 10/29/2018 43 0     MCV 10/29/2018 73*    MCH 10/29/2018 21 0*    MCHC 10/29/2018 28 6*    RDW 10/29/2018 27 4*    Platelets 05/99/9724 222     nRBC 10/29/2018 0     Neutrophils Relative 10/29/2018 32*    Immat GRANS % 10/29/2018 0     Lymphocytes Relative 10/29/2018 55*    Monocytes Relative 10/29/2018 8     Eosinophils Relative 10/29/2018 4     Basophils Relative 10/29/2018 1     Neutrophils Absolute 10/29/2018 1 28*    Immature Grans Absolute 10/29/2018 0 01     Lymphocytes Absolute 10/29/2018 2 19     Monocytes Absolute 10/29/2018 0 32     Eosinophils Absolute 10/29/2018 0 16     Basophils Absolute 10/29/2018 0 05     Sodium 10/29/2018 141     Potassium 10/29/2018 4 7     Chloride 10/29/2018 107     CO2 10/29/2018 30     ANION GAP 10/29/2018 4     BUN 10/29/2018 5     Creatinine 10/29/2018 0 90     Glucose 10/29/2018 91     Calcium 10/29/2018 8 6     AST 10/29/2018 27     ALT 10/29/2018 59     Alkaline Phosphatase 10/29/2018 102     Total Protein 10/29/2018 6 6     Albumin 10/29/2018 3 4*    Total Bilirubin 10/29/2018 0 30     eGFR 10/29/2018 104        Imaging Studies: I have personally reviewed pertinent imaging studies

## 2018-10-29 NOTE — PLAN OF CARE
INFECTION - ADULT     Absence of fever/infection during neutropenic period Completed          DISCHARGE PLANNING     Discharge to home or other facility with appropriate resources Progressing        DISCHARGE PLANNING - CARE MANAGEMENT     Discharge to post-acute care or home with appropriate resources Progressing        INFECTION - ADULT     Absence or prevention of progression during hospitalization Progressing        Knowledge Deficit     Patient/family/caregiver demonstrates understanding of disease process, treatment plan, medications, and discharge instructions Progressing        PAIN - ADULT     Verbalizes/displays adequate comfort level or baseline comfort level Progressing        Potential for Falls     Patient will remain free of falls Progressing        SAFETY ADULT     Maintain or return to baseline ADL function Progressing     Maintain or return mobility status to optimal level Progressing

## 2018-10-29 NOTE — PROGRESS NOTES
Progress Note - General Surgery   Silvia Hall 37 y o  male MRN: [id number]  Unit/Bed#: [facility] 626-01 Encounter: [id number]    Assessment:  38 y/o M w/ hx of gastric bypass, chronic pain w/ spinal cord stimulator in place, p/w acute cholecystitis and dilated CBD    10/27 CTAP: CBD 14mm, distended gallbladder  10/27 RUQ U/S: Cholelithiasis w/ distended GB, 10mm CBD  10/27 MRCP: unobtainable d/t staff limitations required given pt's implanted neurostimulator    Plan:  --OR for ERCP, laparoscopic cholecystectomy today  --MRCP prior to OR  --NPO  --IVF  --IV Abx  --Pain control  --f/u AM labs    Subjective/Objective     Subjective:    No acute events overnight  This morning, pt denies any complaints of abdominal pain since receiving his pain medication  However, RUQ pain reaches 9/10 when pain medication wears off  Denies any N/V/D  Objective:    Blood pressure 114/65, pulse 55, temperature 97 52 °F (36 4 °C), resp  rate 18, height 6' (1 829 m), weight 82 3 kg (181 lb 7 oz), SpO2 100 %  ,Body mass index is 24 61 kg/m²  I/O       10/27 0701 - 10/28 0700 10/28 0701 - 10/29 0700    P  O  1540 1160    I V  (mL/kg) 1166 7 (14 2) 2299 7 (27 9)    IV Piggyback 300 300    Total Intake(mL/kg) 3006 7 (36 5) 3759 7 (45 7)    Urine (mL/kg/hr) 2580 (1 3) 1250 (0 6)    Total Output 2580 1250    Net +426 7 +2509 7          Unmeasured Urine Occurrence 1 x     Unmeasured Stool Occurrence 0 x             Invasive Devices     Peripheral Intravenous Line            Peripheral IV 10/27/18 Left Antecubital 1 day                Physical Exam:     GEN: NAD  HEENT: MMM  CV: RRR  Lung: normal effort  Ab: Soft, ND, RUQ tenderness  Extrem: No CCE  Neuro:  A+Ox3, motor and sensation grossly intact    Lab, Imaging and other studies:  CBC:   Lab Results   Component Value Date    WBC 3 64 (L) 10/28/2018    HGB 11 2 (L) 10/28/2018    HCT 38 7 10/28/2018    MCV 73 (L) 10/28/2018     10/28/2018    MCH 21 0 (L) 10/28/2018    MCHC 28 9 (L) 10/28/2018    RDW 26 7 (H) 10/28/2018    MPV 10 7 10/28/2018    NRBC 0 10/28/2018   , CMP:   Lab Results   Component Value Date     10/28/2018    K 4 1 10/28/2018     10/28/2018    CO2 27 10/28/2018    BUN 8 10/28/2018    CREATININE 0 98 10/28/2018    CALCIUM 8 1 (L) 10/28/2018    AST 47 (H) 10/28/2018    ALT 86 (H) 10/28/2018    ALKPHOS 97 10/28/2018    EGFR 94 10/28/2018   , Coagulation: No results found for: PT, INR, APTT, Urinalysis: No results found for: COLORU, CLARITYU, SPECGRAV, PHUR, LEUKOCYTESUR, NITRITE, PROTEINUA, GLUCOSEU, KETONESU, BILIRUBINUR, BLOODU, Amylase: No results found for: AMYLASE, Lipase: No results found for: LIPASE  VTE Pharmacologic Prophylaxis: Heparin  VTE Mechanical Prophylaxis: sequential compression device

## 2018-10-30 ENCOUNTER — APPOINTMENT (INPATIENT)
Dept: RADIOLOGY | Facility: HOSPITAL | Age: 43
DRG: 418 | End: 2018-10-30
Payer: MEDICARE

## 2018-10-30 LAB
ALBUMIN SERPL BCP-MCNC: 3 G/DL (ref 3.5–5)
ALP SERPL-CCNC: 87 U/L (ref 46–116)
ALT SERPL W P-5'-P-CCNC: 38 U/L (ref 12–78)
ANION GAP SERPL CALCULATED.3IONS-SCNC: 4 MMOL/L (ref 4–13)
AST SERPL W P-5'-P-CCNC: 20 U/L (ref 5–45)
BILIRUB SERPL-MCNC: 0.24 MG/DL (ref 0.2–1)
BUN SERPL-MCNC: 5 MG/DL (ref 5–25)
CALCIUM SERPL-MCNC: 8.2 MG/DL (ref 8.3–10.1)
CHLORIDE SERPL-SCNC: 104 MMOL/L (ref 100–108)
CO2 SERPL-SCNC: 30 MMOL/L (ref 21–32)
CREAT SERPL-MCNC: 0.99 MG/DL (ref 0.6–1.3)
GFR SERPL CREATININE-BSD FRML MDRD: 93 ML/MIN/1.73SQ M
GLUCOSE SERPL-MCNC: 88 MG/DL (ref 65–140)
POTASSIUM SERPL-SCNC: 3.6 MMOL/L (ref 3.5–5.3)
PROT SERPL-MCNC: 5.8 G/DL (ref 6.4–8.2)
SODIUM SERPL-SCNC: 138 MMOL/L (ref 136–145)

## 2018-10-30 PROCEDURE — 0FC94ZZ EXTIRPATION OF MATTER FROM COMMON BILE DUCT, PERCUTANEOUS ENDOSCOPIC APPROACH: ICD-10-PCS | Performed by: INTERNAL MEDICINE

## 2018-10-30 PROCEDURE — 88342 IMHCHEM/IMCYTCHM 1ST ANTB: CPT | Performed by: PATHOLOGY

## 2018-10-30 PROCEDURE — 80053 COMPREHEN METABOLIC PANEL: CPT | Performed by: SURGERY

## 2018-10-30 PROCEDURE — 99233 SBSQ HOSP IP/OBS HIGH 50: CPT | Performed by: SURGERY

## 2018-10-30 PROCEDURE — 47562 LAPAROSCOPIC CHOLECYSTECTOMY: CPT | Performed by: SURGERY

## 2018-10-30 PROCEDURE — 0FT44ZZ RESECTION OF GALLBLADDER, PERCUTANEOUS ENDOSCOPIC APPROACH: ICD-10-PCS | Performed by: SURGERY

## 2018-10-30 PROCEDURE — 43264 ERCP REMOVE DUCT CALCULI: CPT | Performed by: INTERNAL MEDICINE

## 2018-10-30 PROCEDURE — 88304 TISSUE EXAM BY PATHOLOGIST: CPT | Performed by: PATHOLOGY

## 2018-10-30 PROCEDURE — 43273 ENDOSCOPIC PANCREATOSCOPY: CPT | Performed by: INTERNAL MEDICINE

## 2018-10-30 PROCEDURE — 43277 ERCP EA DUCT/AMPULLA DILATE: CPT | Performed by: INTERNAL MEDICINE

## 2018-10-30 PROCEDURE — 88307 TISSUE EXAM BY PATHOLOGIST: CPT | Performed by: PATHOLOGY

## 2018-10-30 PROCEDURE — 0F794DZ DILATION OF COMMON BILE DUCT WITH INTRALUMINAL DEVICE, PERCUTANEOUS ENDOSCOPIC APPROACH: ICD-10-PCS | Performed by: INTERNAL MEDICINE

## 2018-10-30 PROCEDURE — 0F7C8ZZ DILATION OF AMPULLA OF VATER, VIA NATURAL OR ARTIFICIAL OPENING ENDOSCOPIC: ICD-10-PCS | Performed by: INTERNAL MEDICINE

## 2018-10-30 PROCEDURE — 74330 X-RAY BILE/PANC ENDOSCOPY: CPT

## 2018-10-30 RX ORDER — METOCLOPRAMIDE HYDROCHLORIDE 5 MG/ML
INJECTION INTRAMUSCULAR; INTRAVENOUS AS NEEDED
Status: DISCONTINUED | OUTPATIENT
Start: 2018-10-30 | End: 2018-10-30 | Stop reason: SURG

## 2018-10-30 RX ORDER — PROPOFOL 10 MG/ML
INJECTION, EMULSION INTRAVENOUS AS NEEDED
Status: DISCONTINUED | OUTPATIENT
Start: 2018-10-30 | End: 2018-10-30 | Stop reason: SURG

## 2018-10-30 RX ORDER — ONDANSETRON 2 MG/ML
4 INJECTION INTRAMUSCULAR; INTRAVENOUS ONCE AS NEEDED
Status: DISCONTINUED | OUTPATIENT
Start: 2018-10-30 | End: 2018-10-30 | Stop reason: HOSPADM

## 2018-10-30 RX ORDER — SODIUM CHLORIDE 9 MG/ML
75 INJECTION, SOLUTION INTRAVENOUS CONTINUOUS
Status: DISCONTINUED | OUTPATIENT
Start: 2018-10-30 | End: 2018-10-31

## 2018-10-30 RX ORDER — SODIUM CHLORIDE 9 MG/ML
INJECTION, SOLUTION INTRAVENOUS CONTINUOUS PRN
Status: DISCONTINUED | OUTPATIENT
Start: 2018-10-30 | End: 2018-10-30 | Stop reason: SURG

## 2018-10-30 RX ORDER — LIDOCAINE HYDROCHLORIDE 10 MG/ML
INJECTION, SOLUTION INFILTRATION; PERINEURAL AS NEEDED
Status: DISCONTINUED | OUTPATIENT
Start: 2018-10-30 | End: 2018-10-30 | Stop reason: SURG

## 2018-10-30 RX ORDER — HYDROMORPHONE HCL/PF 1 MG/ML
0.5 SYRINGE (ML) INJECTION
Status: DISCONTINUED | OUTPATIENT
Start: 2018-10-30 | End: 2018-10-30 | Stop reason: HOSPADM

## 2018-10-30 RX ORDER — MAGNESIUM HYDROXIDE 1200 MG/15ML
LIQUID ORAL AS NEEDED
Status: DISCONTINUED | OUTPATIENT
Start: 2018-10-30 | End: 2018-10-30 | Stop reason: HOSPADM

## 2018-10-30 RX ORDER — MEPERIDINE HYDROCHLORIDE 25 MG/ML
12.5 INJECTION INTRAMUSCULAR; INTRAVENOUS; SUBCUTANEOUS
Status: DISCONTINUED | OUTPATIENT
Start: 2018-10-30 | End: 2018-10-30 | Stop reason: HOSPADM

## 2018-10-30 RX ORDER — GLYCOPYRROLATE 0.2 MG/ML
INJECTION INTRAMUSCULAR; INTRAVENOUS AS NEEDED
Status: DISCONTINUED | OUTPATIENT
Start: 2018-10-30 | End: 2018-10-30 | Stop reason: SURG

## 2018-10-30 RX ORDER — FENTANYL CITRATE/PF 50 MCG/ML
50 SYRINGE (ML) INJECTION
Status: DISCONTINUED | OUTPATIENT
Start: 2018-10-30 | End: 2018-10-30 | Stop reason: HOSPADM

## 2018-10-30 RX ORDER — HYDROMORPHONE HCL/PF 1 MG/ML
SYRINGE (ML) INJECTION AS NEEDED
Status: DISCONTINUED | OUTPATIENT
Start: 2018-10-30 | End: 2018-10-30 | Stop reason: SURG

## 2018-10-30 RX ORDER — DIPHENHYDRAMINE HYDROCHLORIDE 50 MG/ML
12.5 INJECTION INTRAMUSCULAR; INTRAVENOUS ONCE AS NEEDED
Status: DISCONTINUED | OUTPATIENT
Start: 2018-10-30 | End: 2018-10-30 | Stop reason: HOSPADM

## 2018-10-30 RX ORDER — FENTANYL CITRATE 50 UG/ML
INJECTION, SOLUTION INTRAMUSCULAR; INTRAVENOUS AS NEEDED
Status: DISCONTINUED | OUTPATIENT
Start: 2018-10-30 | End: 2018-10-30 | Stop reason: SURG

## 2018-10-30 RX ORDER — BUPIVACAINE HYDROCHLORIDE AND EPINEPHRINE 5; 5 MG/ML; UG/ML
INJECTION, SOLUTION PERINEURAL AS NEEDED
Status: DISCONTINUED | OUTPATIENT
Start: 2018-10-30 | End: 2018-10-30 | Stop reason: HOSPADM

## 2018-10-30 RX ORDER — ONDANSETRON 2 MG/ML
INJECTION INTRAMUSCULAR; INTRAVENOUS AS NEEDED
Status: DISCONTINUED | OUTPATIENT
Start: 2018-10-30 | End: 2018-10-30 | Stop reason: SURG

## 2018-10-30 RX ORDER — KETAMINE HYDROCHLORIDE 50 MG/ML
INJECTION, SOLUTION, CONCENTRATE INTRAMUSCULAR; INTRAVENOUS AS NEEDED
Status: DISCONTINUED | OUTPATIENT
Start: 2018-10-30 | End: 2018-10-30 | Stop reason: SURG

## 2018-10-30 RX ORDER — ROCURONIUM BROMIDE 10 MG/ML
INJECTION, SOLUTION INTRAVENOUS AS NEEDED
Status: DISCONTINUED | OUTPATIENT
Start: 2018-10-30 | End: 2018-10-30 | Stop reason: SURG

## 2018-10-30 RX ORDER — MIDAZOLAM HYDROCHLORIDE 1 MG/ML
INJECTION INTRAMUSCULAR; INTRAVENOUS AS NEEDED
Status: DISCONTINUED | OUTPATIENT
Start: 2018-10-30 | End: 2018-10-30 | Stop reason: SURG

## 2018-10-30 RX ADMIN — HYDROMORPHONE HYDROCHLORIDE 0.5 MG: 1 INJECTION, SOLUTION INTRAMUSCULAR; INTRAVENOUS; SUBCUTANEOUS at 05:40

## 2018-10-30 RX ADMIN — DOCUSATE SODIUM 100 MG: 100 CAPSULE, LIQUID FILLED ORAL at 08:11

## 2018-10-30 RX ADMIN — MIDAZOLAM 2 MG: 1 INJECTION INTRAMUSCULAR; INTRAVENOUS at 12:59

## 2018-10-30 RX ADMIN — FENTANYL CITRATE 50 MCG: 50 INJECTION, SOLUTION INTRAMUSCULAR; INTRAVENOUS at 12:59

## 2018-10-30 RX ADMIN — OXYCODONE HYDROCHLORIDE 10 MG: 10 TABLET ORAL at 17:54

## 2018-10-30 RX ADMIN — FENTANYL CITRATE 50 MCG: 50 INJECTION, SOLUTION INTRAMUSCULAR; INTRAVENOUS at 16:54

## 2018-10-30 RX ADMIN — CEFAZOLIN SODIUM 2000 MG: 2 SOLUTION INTRAVENOUS at 11:30

## 2018-10-30 RX ADMIN — OXYCODONE HYDROCHLORIDE 10 MG: 10 TABLET ORAL at 04:05

## 2018-10-30 RX ADMIN — ROCURONIUM BROMIDE 10 MG: 10 INJECTION INTRAVENOUS at 14:58

## 2018-10-30 RX ADMIN — OXYCODONE HYDROCHLORIDE 10 MG: 10 TABLET ORAL at 22:34

## 2018-10-30 RX ADMIN — ROCURONIUM BROMIDE 20 MG: 10 INJECTION INTRAVENOUS at 13:30

## 2018-10-30 RX ADMIN — HYDROMORPHONE HYDROCHLORIDE 0.5 MG: 1 INJECTION, SOLUTION INTRAMUSCULAR; INTRAVENOUS; SUBCUTANEOUS at 17:03

## 2018-10-30 RX ADMIN — DEXTROSE, SODIUM CHLORIDE, AND POTASSIUM CHLORIDE 75 ML/HR: 5; .45; .15 INJECTION INTRAVENOUS at 09:57

## 2018-10-30 RX ADMIN — SENNOSIDES 8.6 MG: 8.6 TABLET, FILM COATED ORAL at 22:34

## 2018-10-30 RX ADMIN — BUPROPION HYDROCHLORIDE 75 MG: 75 TABLET, FILM COATED ORAL at 08:12

## 2018-10-30 RX ADMIN — HEPARIN SODIUM 5000 UNITS: 5000 INJECTION INTRAVENOUS; SUBCUTANEOUS at 22:34

## 2018-10-30 RX ADMIN — BACLOFEN 10 MG: 10 TABLET ORAL at 20:31

## 2018-10-30 RX ADMIN — SODIUM CHLORIDE: 0.9 INJECTION, SOLUTION INTRAVENOUS at 12:55

## 2018-10-30 RX ADMIN — LIDOCAINE HYDROCHLORIDE 50 MG: 10 INJECTION, SOLUTION INFILTRATION; PERINEURAL at 13:01

## 2018-10-30 RX ADMIN — CEFAZOLIN SODIUM 2000 MG: 2 SOLUTION INTRAVENOUS at 03:28

## 2018-10-30 RX ADMIN — FENTANYL CITRATE 50 MCG: 50 INJECTION, SOLUTION INTRAMUSCULAR; INTRAVENOUS at 16:44

## 2018-10-30 RX ADMIN — FENTANYL CITRATE 50 MCG: 50 INJECTION, SOLUTION INTRAMUSCULAR; INTRAVENOUS at 14:58

## 2018-10-30 RX ADMIN — ALPRAZOLAM 0.5 MG: 0.5 TABLET ORAL at 09:56

## 2018-10-30 RX ADMIN — PANTOPRAZOLE SODIUM 20 MG: 20 TABLET, DELAYED RELEASE ORAL at 05:19

## 2018-10-30 RX ADMIN — DEXTROSE, SODIUM CHLORIDE, AND POTASSIUM CHLORIDE 75 ML/HR: 5; .45; .15 INJECTION INTRAVENOUS at 17:06

## 2018-10-30 RX ADMIN — HYDROMORPHONE HYDROCHLORIDE 0.5 MG: 1 INJECTION, SOLUTION INTRAMUSCULAR; INTRAVENOUS; SUBCUTANEOUS at 09:51

## 2018-10-30 RX ADMIN — METRONIDAZOLE 500 MG: 500 INJECTION, SOLUTION INTRAVENOUS at 02:40

## 2018-10-30 RX ADMIN — GLYCOPYRROLATE 0.6 MG: 0.2 INJECTION, SOLUTION INTRAMUSCULAR; INTRAVENOUS at 15:58

## 2018-10-30 RX ADMIN — SODIUM CHLORIDE: 0.9 INJECTION, SOLUTION INTRAVENOUS at 15:00

## 2018-10-30 RX ADMIN — PROPOFOL 150 MG: 10 INJECTION, EMULSION INTRAVENOUS at 13:01

## 2018-10-30 RX ADMIN — DOCUSATE SODIUM 100 MG: 100 CAPSULE, LIQUID FILLED ORAL at 17:59

## 2018-10-30 RX ADMIN — METOCLOPRAMIDE 10 MG: 5 INJECTION, SOLUTION INTRAMUSCULAR; INTRAVENOUS at 16:01

## 2018-10-30 RX ADMIN — QUETIAPINE FUMARATE 100 MG: 100 TABLET ORAL at 22:34

## 2018-10-30 RX ADMIN — KETAMINE HYDROCHLORIDE 40 MG: 50 INJECTION, SOLUTION INTRAMUSCULAR; INTRAVENOUS at 13:10

## 2018-10-30 RX ADMIN — ACETAMINOPHEN 650 MG: 325 TABLET, FILM COATED ORAL at 20:11

## 2018-10-30 RX ADMIN — FENTANYL CITRATE 50 MCG: 50 INJECTION, SOLUTION INTRAMUSCULAR; INTRAVENOUS at 16:29

## 2018-10-30 RX ADMIN — OXYCODONE HYDROCHLORIDE 10 MG: 10 TABLET ORAL at 08:11

## 2018-10-30 RX ADMIN — HYDROMORPHONE HYDROCHLORIDE 0.5 MG: 1 INJECTION, SOLUTION INTRAMUSCULAR; INTRAVENOUS; SUBCUTANEOUS at 20:31

## 2018-10-30 RX ADMIN — METRONIDAZOLE 500 MG: 500 INJECTION, SOLUTION INTRAVENOUS at 20:12

## 2018-10-30 RX ADMIN — ROCURONIUM BROMIDE 30 MG: 10 INJECTION INTRAVENOUS at 13:01

## 2018-10-30 RX ADMIN — NICOTINE 14 MG: 14 PATCH, EXTENDED RELEASE TRANSDERMAL at 08:12

## 2018-10-30 RX ADMIN — HYDROMORPHONE HYDROCHLORIDE 0.5 MG: 1 INJECTION, SOLUTION INTRAMUSCULAR; INTRAVENOUS; SUBCUTANEOUS at 17:28

## 2018-10-30 RX ADMIN — FENTANYL CITRATE 50 MCG: 50 INJECTION, SOLUTION INTRAMUSCULAR; INTRAVENOUS at 13:01

## 2018-10-30 RX ADMIN — PROPOFOL 50 MG: 10 INJECTION, EMULSION INTRAVENOUS at 13:30

## 2018-10-30 RX ADMIN — HYDROMORPHONE HYDROCHLORIDE 0.5 MG: 1 INJECTION, SOLUTION INTRAMUSCULAR; INTRAVENOUS; SUBCUTANEOUS at 15:15

## 2018-10-30 RX ADMIN — NEOSTIGMINE METHYLSULFATE 3 MG: 1 INJECTION, SOLUTION INTRAMUSCULAR; INTRAVENOUS; SUBCUTANEOUS at 15:58

## 2018-10-30 RX ADMIN — ONDANSETRON 4 MG: 2 INJECTION INTRAMUSCULAR; INTRAVENOUS at 16:01

## 2018-10-30 RX ADMIN — CEFAZOLIN SODIUM 2000 MG: 2 SOLUTION INTRAVENOUS at 19:24

## 2018-10-30 NOTE — ANESTHESIA PREPROCEDURE EVALUATION
Review of Systems/Medical History  Patient summary reviewed  Chart reviewed  History of anesthetic complications PONV    Cardiovascular  EKG reviewed, Exercise tolerance (METS): >4,     Pulmonary  Negative pulmonary ROS        GI/Hepatic    GERD well controlled,        Kidney stones,        Endo/Other  Negative endo/other ROS      GYN       Hematology  Anemia ,     Musculoskeletal    Arthritis     Neurology  Negative neurology ROS      Psychology   Anxiety,              Physical Exam    Airway    Mallampati score: I  TM Distance: >3 FB  Neck ROM: full     Dental       Cardiovascular  Rhythm: regular, Rate: normal,     Pulmonary      Other Findings        Anesthesia Plan  ASA Score- 2     Anesthesia Type- general with ASA Monitors  Additional Monitors:   Airway Plan: ETT  Plan Factors-    Induction- intravenous  Postoperative Plan- Plan for postoperative opioid use  Informed Consent- Anesthetic plan and risks discussed with patient

## 2018-10-30 NOTE — OP NOTE
OPERATIVE REPORT  PATIENT NAME: Buzz Silva    :  1975  MRN: [id number]  Pt Location: BE OR ROOM 06    SURGERY DATE: 10/30/2018    Surgeon(s) and Role:  Panel 1:     * Enzo Campbell MD - Primary    Panel 2:     * Nola Oshea MD - Primary     * Essie Mederos MD - Assisting    Preop Diagnosis:  History of Parth-en-Y gastric bypass [Z98 84]  Cholecystitis [K81 9]    Post-Op Diagnosis Codes:     * History of Parth-en-Y gastric bypass [Z98 84]     * Cholecystitis [K81 9]    Procedure(s) (LRB):  ENDOSCOPIC RETROGRADE CHOLANGIOPANCREATOGRAPHY (ERCP) W/ SPHINCTEROTOMY (N/A)  CHOLECYSTECTOMY WITH GASTROTOMY LAPAROSCOPIC (N/A)    Specimen(s):  ID Type Source Tests Collected by Time Destination   1 : portion of stomach  Tissue Stomach TISSUE EXAM Nola Oshea MD 10/30/2018 1511    2 :  Tissue Gallbladder TISSUE EXAM Nola Oshea MD 10/30/2018 1525        Estimated Blood Loss:   50 mL    Drains:  [REMOVED] Urethral Catheter Latex 16 Fr  (Removed)   Number of days: 0       Anesthesia Type:   General    Operative Indications:  History of Parth-en-Y gastric bypass [Z98 84]  Cholecystitis [K81 9]    Operative Findings:  Acutely inflamed and distended gallbladder  Gastric remnant with some adhesions  Jejuno-jejunal anastomosis intact    Complications:   None    Procedure and Technique:  The patient was identified in the operating suite by conversation and hospital assigned identification number  He was then transferred and placed supine on the operating table  Preop antibiotics were administered  SCD boots were placed  After successful induction of general endotracheal anesthesia the patient's abdomen was prepared and draped in a sterile fashion  A time-out was then called to confirm the correct patient, correct procedure, correct location with all parties in agreement  Local anesthetic was in infiltrated into the subcutaneous tissues in the infraumbilical location    A curvilinear incision was then made in the infraumbilical location and an 11 trocar was placed after access into the peritoneal cavity was obtained using the open Adalberto technique  CO2 pneumoperitoneum was then achieved to 15 mm of mercury  A 30 degree laparoscope was then inserted into the peritoneal cavity and diagnostic laparoscopy was performed  A 5 mm port was then placed just to the left of the umbilicus  A 12 mm port was then placed in the subxiphoid position  Adhesions to the gastric remnant were then taken down using a combination of blunt and sharp dissection  A pursestring suture was then placed in the gastric body using 0 silk suture  A gastrostomy was then made in the middle of the pursestring suture using hot Bovie cautery  A suture Passer was then used to pull the end of the pursestring suture out of the abdominal cavity  A 15 mm port was then placed in the left lower quadrant and the port was directed into the gastrostomy  The pursestring suture was tightened and the 15mm port secured to the abdominal wall skin  At this point the GI team proceeded with performing the laparoscopic assisted ERCP (see separate op report)  After successful completion of the ERCP with sphincterotomy and stone extraction the 15 mm port was then removed from the abdominal wall  Two 45 blue load staplers were then used to close the gastrostomy  Attention was then turned to completion of the laparoscopic cholecystectomy  Two additional 5 mm trocars were then placed in the right side of the abdomen  The gallbladder was noticed to be tense and bile was aspirated  The fundus of the gallbladder was then grasped and retracted cephalad and over the liver  The infundibulum of the gallbladder was then grasped and retracted laterally  The peritoneum was then scored and the the cystic duct and cystic artery were then carefully dissected to obtain the critical view of safety  The cystic duct and cystic artery were then doubly clipped and ligated  Hook cautery was then used to dissect the gallbladder off the liver bed  Hemostasis was assured  The gallbladder was then placed in a Endo-Catch bag and removed from the abdominal cavity  The gallbladder fossa was then irrigated and the fluid aspirated  All ports were then removed under direct visualization and pneumoperitoneum was evacuated  The fascia of all port sites greater than 5 mm were closed with 0 Vicryl in a figure-of-eight fashion  Skin incisions were then closed using 4-0 Monocryl in a subcuticular fashion  Histoacryl was applied  Radiofrequency wanding was performed and this was negative  Anesthesia was then reversed and the patient successfully extubated and transferred to the recovery area having tolerated the procedure well  All needle, sponge, instrument counts were correct x2 at the end of the procedure  Dr Yung Tavera was present for the entire procedure      Patient Disposition:  PACU     SIGNATURE: Donny Yadav MD  DATE: October 30, 2018  TIME: 4:41 PM

## 2018-10-30 NOTE — OP NOTE
OPERATIVE REPORT  PATIENT NAME: Katrin Archer    :  1975  MRN: [id number]  Pt Location: [facility] OR ROOM 06    SURGERY DATE: 10/30/2018    Surgeon(s) and Role:  Panel 1:     * Ashwini Barcenas MD - Primary    Panel 2:     * Rossana Savage MD - Primary     * Leyla Rodas MD - Assisting    ENDOSCOPIC RETROGRADE CHOLAGIOPANCREATOGRAPHY    PROCEDURE: ERCP/ Sphincterotomy, sphincteroplasty, Stone Extraction with Balloon    INDICATIONS: Choledocholithiasis  Patient is status post Parth-en-Y gastric bypass  Laparoscopic-assisted ERCP    POST-OP DIAGNOSIS: See the impression below    SEDATION: Monitored anesthesia care, check anesthesia records    PHYSICAL EXAM:    Blood pressure 125/78, pulse 60, temperature 97 7 °F (36 5 °C), resp  rate 19, height 6' (1 829 m), weight 82 3 kg (181 lb 7 oz), SpO2 100 %  Body mass index is 24 61 kg/m²  General: NAD  Heart: S1 & S2 normal, RRR  Lungs: CTA, No rales or rhonchi  Abdomen: Soft, nontender, nondistended, good bowel sounds    CONSENT:  Informed consent was obtained for the procedure, including sedation after explaining the risks and benefits of the procedure  Risks including but not limited to severe pancreatitis, bleeding, perforation, infection, aspiration were discussed in detail  Also explained about less than 100% sensitivity with the exam and other alternatives  PREPARATION:   EKG tracing, pulse oximetry, blood pressure were monitored throughout the procedure  Patient was identified by myself both verbally and by visual inspection of ID band  Indomethacin was not given as the patient is allergic to aspirin  DESCRIPTION and FINDINGS:    Access to gastric remnant was achieved through a gastrostomy created by Dr Levar Olvera  A 15 mm trocar was placed through gastrostomy into the stomach  A side-viewing gastroscope was advanced through the trocar into the stomach and then to the duodenum  Major papilla was visualized    On initial attempts, pancreatic duct was cannulated x 2 but not injected  Then, I left the guidewire in the pancreatic duct and biliary cannulation was achieved using a sphincterotome and guidewire  Initial cholangiogram showed round filling defect in the distal CBD  CBD measured about 11 mm  A 10 mm sphincterotomy was performed  I also performed sphincteroplasty using a hurricane balloon measuring 10 mm x 4 cm  Dilation was performed for 10 seconds  Then, using an inflated  9-12 stone extraction balloon, 2 yellow stones were removed  The duct was swept further and moderate amount of sludge was removed  Since the patient has Parth-en-Y gastric bypass with no access to the papilla I decided not to put a pancreatic duct stent  IMPRESSIONS:      Laparoscopic assisted ERCP with sphincterotomy, sphincteroplasty and stone extraction  RECOMMENDATIONS:     Cholecystectomy to follow  Monitor CBC and liver function tests daily  COMPLICATIONS: None; patient tolerated the procedure well          DISPOSITION: PACU           CONDITION: Stable    SIGNATURE: Carline Pozo MD  DATE: October 30, 2018  TIME: 3:14 PM

## 2018-10-30 NOTE — PLAN OF CARE
PAIN - ADULT     Verbalizes/displays adequate comfort level or baseline comfort level Not Progressing          DISCHARGE PLANNING     Discharge to home or other facility with appropriate resources Progressing        DISCHARGE PLANNING - CARE MANAGEMENT     Discharge to post-acute care or home with appropriate resources Progressing        INFECTION - ADULT     Absence or prevention of progression during hospitalization Progressing        Knowledge Deficit     Patient/family/caregiver demonstrates understanding of disease process, treatment plan, medications, and discharge instructions Progressing        Potential for Falls     Patient will remain free of falls Progressing        SAFETY ADULT     Maintain or return to baseline ADL function Progressing     Maintain or return mobility status to optimal level Progressing

## 2018-10-30 NOTE — MEDICAL STUDENT
Progress Note - General Surgery   Idalmis Perez 37 y o  male MRN: [id number]  Unit/Bed#: [facility] 626-01 Encounter: [id number]    Assessment:  36 y/o male with history of gastric bypass and chronic pain managed with spinal cord stimulator presenting with acute cholecystitis and dilated common bile duct secondary to choledocholithiasis  10/29 MRCP: choledocholithiasis with distal common bile duct stone, CBD 12 mm  10/27 CT of abdomen and pelvis: distended gallbladder, CBD 14 mm  10/27 RUQ ultrasound: cholelithiasis with distended gallbladder, CBD 10 mm    Plan:  - Laparoscopic-assisted ERCP with laparoscopic cholecystectomy   - Patient NPO  - IV D5NS  - IV antibiotic ppx with flagyl and ancef  - DVT ppx with SQ heparin and SCD  - Pain medication as needed      Subjective/Objective   Subjective:    Patient denies any acute overnight events  Patient reports pain is well-controlled by pain medications, but RUQ pain reaches 10/10 severity when medication wears off  Patient denies nausea, vomiting, and diarrhea or any additional symptoms  Objective:    Blood pressure 125/78, pulse 60, temperature 97 7 °F (36 5 °C), resp  rate 19, height 6' (1 829 m), weight 82 3 kg (181 lb 7 oz), SpO2 100 %  ,Body mass index is 24 61 kg/m²        Intake/Output Summary (Last 24 hours) at 10/30/18 0507  Last data filed at 10/30/18 0257   Gross per 24 hour   Intake           1812 5 ml   Output             3175 ml   Net          -1362 5 ml       Invasive Devices     Peripheral Intravenous Line            Peripheral IV 10/27/18 Left Antecubital 2 days                Physical Exam: /78   Pulse 60   Temp 97 7 °F (36 5 °C)   Resp 19   Ht 6' (1 829 m)   Wt 82 3 kg (181 lb 7 oz)   SpO2 100%   BMI 24 61 kg/m²     General Appearance:    Alert, cooperative, no distress, appears stated age   Head:    Normocephalic, without obvious abnormality, atraumatic   Eyes:    PERRL, conjunctiva/corneas clear              Throat:   Lips, mucosa, and tongue normal; teeth and gums normal   Neck:   Supple, symmetrical, trachea midline, no adenopathy;        thyroid:  No enlargement/tenderness/nodules; no carotid    bruit or JVD       Lungs:     Clear to auscultation bilaterally, respirations unlabored   Chest wall:    No tenderness or deformity   Heart:    Regular rate and rhythm, S1 and S2 normal, no murmur, rub   or gallop   Abdomen:     Soft, non-distended  RUQ tenderness to light and deep palpation  Bowel sounds present  Extremities:   Extremities normal, atraumatic, no cyanosis or edema   Pulses:   2+ and symmetric all extremities   Skin:   Skin color, texture, turgor normal, no rashes or lesions       Neurologic:   Alert and oriented to person, place, and time   Sensation grossly intact       Lab, Imaging and other studies:  CBC:   Lab Results   Component Value Date    WBC 4 01 (L) 10/29/2018    HGB 12 3 10/29/2018    HCT 43 0 10/29/2018    MCV 73 (L) 10/29/2018     10/29/2018    MCH 21 0 (L) 10/29/2018    MCHC 28 6 (L) 10/29/2018    RDW 27 4 (H) 10/29/2018    NRBC 0 10/29/2018   , CMP:   Lab Results   Component Value Date     10/29/2018    K 4 7 10/29/2018     10/29/2018    CO2 30 10/29/2018    BUN 5 10/29/2018    CREATININE 0 90 10/29/2018    CALCIUM 8 6 10/29/2018    AST 27 10/29/2018    ALT 59 10/29/2018    ALKPHOS 102 10/29/2018    EGFR 104 10/29/2018     VTE Pharmacologic Prophylaxis: Heparin  VTE Mechanical Prophylaxis: sequential compression device

## 2018-10-30 NOTE — PROGRESS NOTES
Progress Note - General Surgery   Lucas Appiah 37 y o  male MRN: [id number]  Unit/Bed#: [facility] 626-01 Encounter: [id number]    Assessment:  36 y/o M w/ hx of gastric bypass, chronic pain w/ spinal cord stimulator in place, p/w acute cholecystitis and dilated CBD     10/27 CTAP: CBD 14mm, distended gallbladder  10/27 RUQ U/S: Cholelithiasis w/ distended GB, 10mm CBD  10/29 MRCP: persistent 7 x3 mm filling defect in distal CBD, likely a CBD stone    Plan:  --OR today for ERCP, laparoscopic cholecystectomy  --NPO  --IVF  --IV Abx  --Pain control  --f/u AM labs    Subjective/Objective      Subjective:    No acute events overnight  Pt c/o 9/10 RUQ pain this morning, which he reports is unchanged from his initial hospital presentation  Denies any N/V/D  Objective:     Blood pressure 125/78, pulse 60, temperature 97 7 °F (36 5 °C), resp  rate 19, height 6' (1 829 m), weight 82 3 kg (181 lb 7 oz), SpO2 100 %  ,Body mass index is 24 61 kg/m²  Intake/Output Summary (Last 24 hours) at 10/30/18 0553  Last data filed at 10/30/18 0544   Gross per 24 hour   Intake             2300 ml   Output             3225 ml   Net             -925 ml       Invasive Devices     Peripheral Intravenous Line            Peripheral IV 10/27/18 Left Antecubital 2 days                Physical Exam:     GEN: NAD  HEENT: MMM  CV: RRR  Lung: normal effort  Ab: Soft, NT/ND  Extrem: No CCE  Neuro:  A+Ox3, motor and sensation grossly intact      Lab, Imaging and other studies:CBC: No results found for: WBC, HGB, HCT, MCV, PLT, ADJUSTEDWBC, MCH, MCHC, RDW, MPV, NRBC, CMP: No results found for: NA, K, CL, CO2, ANIONGAP, BUN, CREATININE, GLUCOSE, CALCIUM, AST, ALT, ALKPHOS, PROT, ALBUMIN, BILITOT, EGFR, Coagulation: No results found for: PT, INR, APTT, Urinalysis: No results found for: COLORU, CLARITYU, SPECGRAV, PHUR, LEUKOCYTESUR, NITRITE, PROTEINUA, GLUCOSEU, KETONESU, BILIRUBINUR, BLOODU, Amylase: No results found for: AMYLASE, Lipase: No results found for: LIPASE  VTE Pharmacologic Prophylaxis: Heparin  VTE Mechanical Prophylaxis: sequential compression device

## 2018-10-30 NOTE — ANESTHESIA POSTPROCEDURE EVALUATION
Post-Op Assessment Note      CV Status:  Stable    Mental Status:  Alert and awake    Hydration Status:  Euvolemic    PONV Controlled:  Controlled    Airway Patency:  Patent    Post Op Vitals Reviewed: Yes          Staff: CRNA           /77 (10/30/18 1634)    Temp 97 6 °F (36 4 °C) (10/30/18 1634)    Pulse 75 (10/30/18 1634)   Resp 16 (10/30/18 1634)    SpO2 100 % (10/30/18 1634)

## 2018-10-31 LAB
ALBUMIN SERPL BCP-MCNC: 3.5 G/DL (ref 3.5–5)
ALP SERPL-CCNC: 93 U/L (ref 46–116)
ALT SERPL W P-5'-P-CCNC: 52 U/L (ref 12–78)
ANION GAP SERPL CALCULATED.3IONS-SCNC: 6 MMOL/L (ref 4–13)
AST SERPL W P-5'-P-CCNC: 44 U/L (ref 5–45)
BASOPHILS # BLD AUTO: 0.02 THOUSANDS/ΜL (ref 0–0.1)
BASOPHILS NFR BLD AUTO: 0 % (ref 0–1)
BILIRUB SERPL-MCNC: 0.52 MG/DL (ref 0.2–1)
BUN SERPL-MCNC: 5 MG/DL (ref 5–25)
CALCIUM SERPL-MCNC: 8.2 MG/DL (ref 8.3–10.1)
CHLORIDE SERPL-SCNC: 103 MMOL/L (ref 100–108)
CO2 SERPL-SCNC: 26 MMOL/L (ref 21–32)
CREAT SERPL-MCNC: 0.85 MG/DL (ref 0.6–1.3)
EOSINOPHIL # BLD AUTO: 0.03 THOUSAND/ΜL (ref 0–0.61)
EOSINOPHIL NFR BLD AUTO: 0 % (ref 0–6)
ERYTHROCYTE [DISTWIDTH] IN BLOOD BY AUTOMATED COUNT: 26.8 % (ref 11.6–15.1)
GFR SERPL CREATININE-BSD FRML MDRD: 107 ML/MIN/1.73SQ M
GLUCOSE SERPL-MCNC: 101 MG/DL (ref 65–140)
HCT VFR BLD AUTO: 40.3 % (ref 36.5–49.3)
HGB BLD-MCNC: 12 G/DL (ref 12–17)
IMM GRANULOCYTES # BLD AUTO: 0.03 THOUSAND/UL (ref 0–0.2)
IMM GRANULOCYTES NFR BLD AUTO: 0 % (ref 0–2)
LYMPHOCYTES # BLD AUTO: 0.99 THOUSANDS/ΜL (ref 0.6–4.47)
LYMPHOCYTES NFR BLD AUTO: 10 % (ref 14–44)
MCH RBC QN AUTO: 21.6 PG (ref 26.8–34.3)
MCHC RBC AUTO-ENTMCNC: 29.8 G/DL (ref 31.4–37.4)
MCV RBC AUTO: 73 FL (ref 82–98)
MONOCYTES # BLD AUTO: 0.76 THOUSAND/ΜL (ref 0.17–1.22)
MONOCYTES NFR BLD AUTO: 8 % (ref 4–12)
NEUTROPHILS # BLD AUTO: 7.88 THOUSANDS/ΜL (ref 1.85–7.62)
NEUTS SEG NFR BLD AUTO: 82 % (ref 43–75)
NRBC BLD AUTO-RTO: 0 /100 WBCS
PLATELET # BLD AUTO: 214 THOUSANDS/UL (ref 149–390)
POTASSIUM SERPL-SCNC: 3.8 MMOL/L (ref 3.5–5.3)
PROT SERPL-MCNC: 7 G/DL (ref 6.4–8.2)
RBC # BLD AUTO: 5.55 MILLION/UL (ref 3.88–5.62)
SODIUM SERPL-SCNC: 135 MMOL/L (ref 136–145)
WBC # BLD AUTO: 9.71 THOUSAND/UL (ref 4.31–10.16)

## 2018-10-31 PROCEDURE — 99024 POSTOP FOLLOW-UP VISIT: CPT | Performed by: SURGERY

## 2018-10-31 PROCEDURE — 80053 COMPREHEN METABOLIC PANEL: CPT | Performed by: SURGERY

## 2018-10-31 PROCEDURE — 99232 SBSQ HOSP IP/OBS MODERATE 35: CPT | Performed by: INTERNAL MEDICINE

## 2018-10-31 PROCEDURE — 85025 COMPLETE CBC W/AUTO DIFF WBC: CPT | Performed by: SURGERY

## 2018-10-31 RX ORDER — KETOROLAC TROMETHAMINE 30 MG/ML
15 INJECTION, SOLUTION INTRAMUSCULAR; INTRAVENOUS EVERY 6 HOURS SCHEDULED
Status: COMPLETED | OUTPATIENT
Start: 2018-10-31 | End: 2018-10-31

## 2018-10-31 RX ORDER — MORPHINE SULFATE 15 MG/1
7.5 TABLET ORAL EVERY 4 HOURS PRN
Status: DISCONTINUED | OUTPATIENT
Start: 2018-10-31 | End: 2018-11-01 | Stop reason: HOSPADM

## 2018-10-31 RX ORDER — DICYCLOMINE HCL 20 MG
20 TABLET ORAL
Status: DISCONTINUED | OUTPATIENT
Start: 2018-10-31 | End: 2018-10-31

## 2018-10-31 RX ORDER — LIDOCAINE 50 MG/G
1 PATCH TOPICAL DAILY
Status: DISCONTINUED | OUTPATIENT
Start: 2018-10-31 | End: 2018-11-01 | Stop reason: HOSPADM

## 2018-10-31 RX ORDER — HYDROMORPHONE HCL/PF 1 MG/ML
1 SYRINGE (ML) INJECTION
Status: DISCONTINUED | OUTPATIENT
Start: 2018-10-31 | End: 2018-11-01 | Stop reason: HOSPADM

## 2018-10-31 RX ORDER — SIMETHICONE 80 MG
80 TABLET,CHEWABLE ORAL EVERY 6 HOURS PRN
Status: DISCONTINUED | OUTPATIENT
Start: 2018-10-31 | End: 2018-11-01 | Stop reason: HOSPADM

## 2018-10-31 RX ORDER — DIAZEPAM 5 MG/1
5 TABLET ORAL EVERY 6 HOURS PRN
Status: DISCONTINUED | OUTPATIENT
Start: 2018-10-31 | End: 2018-11-01 | Stop reason: HOSPADM

## 2018-10-31 RX ORDER — METHOCARBAMOL 750 MG/1
750 TABLET, FILM COATED ORAL EVERY 6 HOURS SCHEDULED
Status: DISCONTINUED | OUTPATIENT
Start: 2018-10-31 | End: 2018-11-01 | Stop reason: HOSPADM

## 2018-10-31 RX ORDER — DICYCLOMINE HCL 20 MG
20 TABLET ORAL EVERY 6 HOURS PRN
Status: DISCONTINUED | OUTPATIENT
Start: 2018-10-31 | End: 2018-11-01 | Stop reason: HOSPADM

## 2018-10-31 RX ORDER — GABAPENTIN 100 MG/1
100 CAPSULE ORAL 3 TIMES DAILY
Status: DISCONTINUED | OUTPATIENT
Start: 2018-10-31 | End: 2018-11-01 | Stop reason: HOSPADM

## 2018-10-31 RX ORDER — MORPHINE SULFATE 15 MG/1
15 TABLET ORAL EVERY 4 HOURS PRN
Status: DISCONTINUED | OUTPATIENT
Start: 2018-10-31 | End: 2018-11-01 | Stop reason: HOSPADM

## 2018-10-31 RX ORDER — HYDROMORPHONE HCL/PF 1 MG/ML
0.5 SYRINGE (ML) INJECTION ONCE
Status: COMPLETED | OUTPATIENT
Start: 2018-10-31 | End: 2018-10-31

## 2018-10-31 RX ADMIN — HYDROMORPHONE HYDROCHLORIDE 0.5 MG: 1 INJECTION, SOLUTION INTRAMUSCULAR; INTRAVENOUS; SUBCUTANEOUS at 15:10

## 2018-10-31 RX ADMIN — HYDROMORPHONE HYDROCHLORIDE 0.5 MG: 1 INJECTION, SOLUTION INTRAMUSCULAR; INTRAVENOUS; SUBCUTANEOUS at 04:06

## 2018-10-31 RX ADMIN — SENNOSIDES 8.6 MG: 8.6 TABLET, FILM COATED ORAL at 21:24

## 2018-10-31 RX ADMIN — DIAZEPAM 5 MG: 5 TABLET ORAL at 02:59

## 2018-10-31 RX ADMIN — MORPHINE SULFATE 15 MG: 15 TABLET ORAL at 23:22

## 2018-10-31 RX ADMIN — QUETIAPINE FUMARATE 100 MG: 100 TABLET ORAL at 21:24

## 2018-10-31 RX ADMIN — GABAPENTIN 100 MG: 100 CAPSULE ORAL at 08:13

## 2018-10-31 RX ADMIN — DOCUSATE SODIUM 100 MG: 100 CAPSULE, LIQUID FILLED ORAL at 08:13

## 2018-10-31 RX ADMIN — METRONIDAZOLE 500 MG: 500 INJECTION, SOLUTION INTRAVENOUS at 04:08

## 2018-10-31 RX ADMIN — HYDROMORPHONE HYDROCHLORIDE 0.5 MG: 1 INJECTION, SOLUTION INTRAMUSCULAR; INTRAVENOUS; SUBCUTANEOUS at 02:45

## 2018-10-31 RX ADMIN — CEFAZOLIN SODIUM 2000 MG: 2 SOLUTION INTRAVENOUS at 02:46

## 2018-10-31 RX ADMIN — KETOROLAC TROMETHAMINE 15 MG: 30 INJECTION, SOLUTION INTRAMUSCULAR at 11:52

## 2018-10-31 RX ADMIN — HYDROMORPHONE HYDROCHLORIDE 1 MG: 1 INJECTION, SOLUTION INTRAMUSCULAR; INTRAVENOUS; SUBCUTANEOUS at 07:30

## 2018-10-31 RX ADMIN — HEPARIN SODIUM 5000 UNITS: 5000 INJECTION INTRAVENOUS; SUBCUTANEOUS at 06:12

## 2018-10-31 RX ADMIN — GABAPENTIN 100 MG: 100 CAPSULE ORAL at 21:25

## 2018-10-31 RX ADMIN — METHOCARBAMOL 750 MG: 750 TABLET ORAL at 23:19

## 2018-10-31 RX ADMIN — SIMETHICONE CHEW TAB 80 MG 80 MG: 80 TABLET ORAL at 10:35

## 2018-10-31 RX ADMIN — KETOROLAC TROMETHAMINE 15 MG: 30 INJECTION, SOLUTION INTRAMUSCULAR at 23:19

## 2018-10-31 RX ADMIN — METHOCARBAMOL 750 MG: 750 TABLET ORAL at 18:06

## 2018-10-31 RX ADMIN — LIDOCAINE 1 PATCH: 50 PATCH CUTANEOUS at 08:13

## 2018-10-31 RX ADMIN — HYDROMORPHONE HYDROCHLORIDE 0.5 MG: 1 INJECTION, SOLUTION INTRAMUSCULAR; INTRAVENOUS; SUBCUTANEOUS at 19:48

## 2018-10-31 RX ADMIN — DOCUSATE SODIUM 100 MG: 100 CAPSULE, LIQUID FILLED ORAL at 18:06

## 2018-10-31 RX ADMIN — OXYCODONE HYDROCHLORIDE 10 MG: 10 TABLET ORAL at 06:09

## 2018-10-31 RX ADMIN — PANTOPRAZOLE SODIUM 20 MG: 20 TABLET, DELAYED RELEASE ORAL at 06:12

## 2018-10-31 RX ADMIN — DICYCLOMINE HYDROCHLORIDE 20 MG: 20 TABLET ORAL at 14:02

## 2018-10-31 RX ADMIN — METHOCARBAMOL 750 MG: 750 TABLET ORAL at 11:53

## 2018-10-31 RX ADMIN — NICOTINE 14 MG: 14 PATCH, EXTENDED RELEASE TRANSDERMAL at 08:14

## 2018-10-31 RX ADMIN — GABAPENTIN 100 MG: 100 CAPSULE ORAL at 16:20

## 2018-10-31 RX ADMIN — MORPHINE SULFATE 15 MG: 15 TABLET ORAL at 14:01

## 2018-10-31 RX ADMIN — BACLOFEN 10 MG: 10 TABLET ORAL at 02:46

## 2018-10-31 RX ADMIN — METHOCARBAMOL 750 MG: 750 TABLET ORAL at 06:12

## 2018-10-31 RX ADMIN — OXYCODONE HYDROCHLORIDE 10 MG: 10 TABLET ORAL at 10:02

## 2018-10-31 RX ADMIN — BUPROPION HYDROCHLORIDE 75 MG: 75 TABLET, FILM COATED ORAL at 08:16

## 2018-10-31 RX ADMIN — MORPHINE SULFATE 15 MG: 15 TABLET ORAL at 18:06

## 2018-10-31 RX ADMIN — KETOROLAC TROMETHAMINE 15 MG: 30 INJECTION, SOLUTION INTRAMUSCULAR at 18:06

## 2018-10-31 RX ADMIN — HYDROMORPHONE HYDROCHLORIDE 0.5 MG: 1 INJECTION, SOLUTION INTRAMUSCULAR; INTRAVENOUS; SUBCUTANEOUS at 00:11

## 2018-10-31 RX ADMIN — HYDROMORPHONE HYDROCHLORIDE 1 MG: 1 INJECTION, SOLUTION INTRAMUSCULAR; INTRAVENOUS; SUBCUTANEOUS at 10:35

## 2018-10-31 NOTE — CONSULTS
Consultation - Inpatient Pain Management   Orin Austin 37 y o  male MRN: [id number]  Unit/Bed#: [facility] 626-01 Encounter: [id number]               Assessment/Plan     Assessment:     Principal Problem:    Cholecystitis  Active Problems:    Acute kidney injury ([address] )    History of Parth-en-Y gastric bypass    Tobacco abuse    Choledocholithiasis      Plan/Recommendations:   Acute on chronic pain  · Gabapentin 100mg TID (started 10/31/18 at 0900), continue and monitor for effectiveness  · Continue Toradol 15mg IV Q6 hours scheduled (started 10/31)  · Lidocaine patch daily, on for 12 hours and off for 12 hours (started 10/31)  · Robaxin 750mg Q6 hours scheduled (started 10/31)  · Valium 5mg PO Q6 hours PRN breakthrough spasms  · Discontinue Xanax PRN  · Decrease IV Dilaudid to 0 5mg IV Q3 hours PRN breakthrough pain  · Plan to discontinue IV opioids in 24 hours  · Discontinue Oxycodone products  · Morphine IR 7 5mg PO Q4 hours PRN moderate pain and Morphine IR PO Q4 hours PRN severe pain  · Patient uses Morphine products at home  · Consider the addition of Bentyl PRN for GI spasms if ok with surgery and GI team     Reviewed the above treatment plan and recommendations with ANTONIA Dee DNP    Final decisions regarding starting or changing doses of the analgesic regimen are at the discretion of the primary service  History of Present Illness    Admit Date:  10/27/2018  Hospital Day:  4 days  Primary Service:  Surgery-General  Attending Provider:  Daniel Power MD  Physician Requesting Consult: Daniel Power MD  Reason for Consult / Principal Problem: acute on chronic pain   HPI: Orin Austin is a 37y o  year old male who presents with abdominal pain radiating towards his back with N/V  History of gastric bypass, smoker, chronic pain and SCS  Undergoing management for choledocholithiasis  On 10/30/18 has an ERCP with sphincterotomy and laparoscopic cholecystectomy with gastrotomy       Review of Systems:  Denied headache and dizziness  + chest wall tightness with severe pain radiating from his abdomen  + abdominal spasms  Denied LE weakness  + left thigh numbness, chronic   + vomiting this morning     Pain History:  Current pain location(s): abdomen   Pain Scale:   7-10  Severity:  severe  Quality: sharp, spasms  Pain Management Physician:  [facility]  Follows with Jose Brittle for chronic pain secondary to lumbar post-laminectomy syndrome  History of gastric bypass surgery  In the past has been on Butrans patch with good relief  Last filled Morphine IR 15mg tablets #60/30 days on 10/13/18 with the instruction to take 0 5 tablet 4 times a day PRN pain  I have reviewed the patient's controlled substance dispensing history in the Prescription Drug Monitoring Program in compliance with the [facility] regulations before prescribing any controlled substances  Treatment History:  Patient is sitting in chair  Left sided abdominal pain with spasms is most severe  Tolerating pain regimen  Continues to have pain despite current medications       Current Analgesic regimen:  Gabapentin 100mg TID, Toradol 15mg IV Q6 hours, Lidocaine patch, Robaxin 750mg Q6 hours, Tylenol 650mg PRN (one dose), Xanax 0 5mg Q4 hours PRN, Valium 5mg Q6 hours PRN, Oxycodone 5mg Q4 hours PRN (zero), Oxycodone 10mg Q4 hours PRN (4 doses), Dilaudid 1mg IV Q3 hours PRN (total 5 5mg)  Bowel Regimen: Colace, Senna    Historical Information   Past Medical History:   Diagnosis Date    Anxiety     Arthritis     Bipolar disorder (HCC)     Chronic left lumbar radiculopathy     Chronic low back pain     Chronic pain syndrome     Chronic right shoulder pain     Depression with anxiety     Fatigue     GERD (gastroesophageal reflux disease)     Hydronephrosis     Iron deficiency anemia     Kidney stone     Lytic bone lesions on xray     Nephrolithiasis     PONV (postoperative nausea and vomiting)     Right elbow pain     Right lateral epicondylitis      Past Surgical History:   Procedure Laterality Date    ADENOIDECTOMY Bilateral     APPENDECTOMY      BACK SURGERY      GASTRIC BYPASS      2009    OTHER SURGICAL HISTORY      fusion/refusion of vertebrae, 2010 and 2011 l5-s1 and l4-l5    CA CYSTO/URETERO W/LITHOTRIPSY &INDWELL STENT INSRT Right 8/8/2017    Procedure: CYSTOSCOPY; URETEROSCOPY; HOLMIUM LASER; RETROGRADE PYELOGRAM; STENT;  Surgeon: Linda Pool MD;  Location: [facility];  Service: Urology    CA IMPLANT SPINAL NEUROSTIM/ Right 11/14/2017    Procedure: REMOVAL LOWER MEDIAL BUTTOCK SPINAL CORD STIMULATOR GENERATOR; PLACEMENT OF NEW BUTTOCK SPINAL CORD STIMULATOR THROUGH SEPERATE INCISION;  Surgeon: Edilberto Morfin MD;  Location: [facility];  Service: Neurosurgery    RIK-EN-Y PROCEDURE  2003    SPINAL CORD STIMULATOR IMPLANT  11/14/2017    dr Buck Gordon procedure and technique 1  removal of a right back implantable pulse generator 2 placement of a new right buttock impantable pulse generator through seperate incision 3 electric analys complex programming spinal cord stimulator system postoperative period approx 1 hour    TONSILLECTOMY       Social History   History   Alcohol Use No     Comment: quit drinking, social     History   Drug Use No     History   Smoking Status    Current Every Day Smoker    Packs/day: 1 00    Types: Cigarettes   Smokeless Tobacco    Never Used     Family History: non-contributory    Meds/Allergies   Prior to Admission Medications  Facility-Administered Medications Prior to Admission   Medication    cyanocobalamin injection 1,000 mcg     Prescriptions Prior to Admission   Medication    ALPRAZolam (XANAX) 0 5 mg tablet    baclofen 10 mg tablet    [START ON 11/5/2018] buPROPion (WELLBUTRIN) 75 mg tablet    omeprazole (PRILOSEC OTC) 20 MG tablet    morphine (MSIR) 15 mg tablet     Hospital Medications  Current Facility-Administered Medications   Medication Dose Route Frequency    acetaminophen (TYLENOL) tablet 650 mg  650 mg Oral Q6H PRN    ALPRAZolam (XANAX) tablet 0 5 mg  0 5 mg Oral Q4H PRN    buPROPion (WELLBUTRIN) tablet 75 mg  75 mg Oral Daily    diazepam (VALIUM) tablet 5 mg  5 mg Oral Q6H PRN    docusate sodium (COLACE) capsule 100 mg  100 mg Oral BID    gabapentin (NEURONTIN) capsule 100 mg  100 mg Oral TID    heparin (porcine) subcutaneous injection 5,000 Units  5,000 Units Subcutaneous Q8H [facility]    HYDROmorphone (DILAUDID) injection 1 mg  1 mg Intravenous Q3H PRN    ketorolac (TORADOL) injection 15 mg  15 mg Intravenous Q6H ANILA    lidocaine (LIDODERM) 5 % patch 1 patch  1 patch Topical Daily    methocarbamol (ROBAXIN) tablet 750 mg  750 mg Oral Q6H [facility]    nicotine (NICODERM CQ) 14 mg/24hr TD 24 hr patch 14 mg  14 mg Transdermal Daily    ondansetron (ZOFRAN) injection 4 mg  4 mg Intravenous Q6H PRN    oxyCODONE (ROXICODONE) immediate release tablet 10 mg  10 mg Oral Q4H PRN    oxyCODONE (ROXICODONE) IR tablet 5 mg  5 mg Oral Q4H PRN    pantoprazole (PROTONIX) EC tablet 20 mg  20 mg Oral Early Morning    QUEtiapine (SEROquel) tablet 100 mg  100 mg Oral HS    senna (SENOKOT) tablet 8 6 mg  1 tablet Oral HS    simethicone (MYLICON) chewable tablet 80 mg  80 mg Oral Q6H PRN       Allergies   Allergen Reactions    Ampicillin GI Intolerance     Vomiting    Aspirin GI Intolerance     vomiting      Penicillins GI Intolerance     Vomit       Objective   Temp:  [97 3 °F (36 3 °C)-99 1 °F (37 3 °C)] 99 1 °F (37 3 °C)  HR:  [62-76] 76  Resp:  [12-18] 18  BP: (113-141)/(56-77) 141/77    Intake/Output Summary (Last 24 hours) at 10/31/18 1112  Last data filed at 10/31/18 0941   Gross per 24 hour   Intake             3325 ml   Output             2375 ml   Net              950 ml     Physical Exam:  General Appearance:    Alert, cooperative, no distress, appears stated age   Neurological:   Oriented to person, place, and time   Head:    Normocephalic, without obvious abnormality, atraumatic   Eyes:    EOM's intact   Back:     ROM normal   Lungs:     Decreased, Clear to auscultation bilaterally, respirations unlabored   Chest Wall:    No tenderness or deformity   Abdomen:        Round, trocar sites CONNIE, left abdomen trocar site with erythema and ecchymosis    Heart:    Regular rate and rhythm, S1 and S2 normal   Extremities:   Extremities intact sensation to light touch   Skin:   Skin warm and dry      Lab Results:   Results from last 7 days  Lab Units 10/31/18  0848   WBC Thousand/uL 9 71   HEMOGLOBIN g/dL 12 0   HEMATOCRIT % 40 3   PLATELETS Thousands/uL 214      Results from last 7 days  Lab Units 10/31/18  0848   SODIUM mmol/L 135*   POTASSIUM mmol/L 3 8   CHLORIDE mmol/L 103   CO2 mmol/L 26   BUN mg/dL 5   CREATININE mg/dL 0 85   CALCIUM mg/dL 8 2*   ALK PHOS U/L 93   ALT U/L 52   AST U/L 44       Imaging Studies: I have personally reviewed pertinent reports  Counseling / Coordination of Care  Total floor / unit time spent today 30 minutes  Greater than 50% of total time was spent with the patient and / or family counseling and / or coordination of care   A description of the counseling / coordination of care: Reviewed plan of care and medications with patient, RN staff and primary care team      Josef Bundy MS, RN-BC  Acute Pain

## 2018-10-31 NOTE — MEDICAL STUDENT
Progress Note - General Surgery   Buzz Silva 37 y o  male MRN: [id number]  Unit/Bed#: [facility] 626-01 Encounter: [id number]    Assessment:  38 y/o M with hx of gastric bypass and chronic back pain w/ spinal cord stimulator who presented with choledocholithiasis, POD1 from ERCP w/ sphincterotomy and laparoscopic cholecystectomy with gastrotomy  10/30 ERCP: round filling defect in distal CBD  CBD 11 mm  Plan:  - Advance patient's diet to regular diet as tolerated  - Maintenance IVF 75 mL/hr D5NS with KCl  - Pain medication and muscle relaxants for cramping as needed  - Continue IV antibiotic ppx (Flagyl, Ancef)  - DVT ppx with SQ heparin and SCD  - Encourage incentive spirometry and ambulation  - Continue home psychiatric medications  - F/U on tissue exam and AM labs    Subjective/Objective     Subjective:    Overnight patient started experiencing left sided muscle cramping with 10/10 pain  Patient received Baclofen, Valium, and two doses of 0 5 mg dilaudid overnight, which decreased the pain  This morning patient still experiencing right-sided tenderness in area of incision site  Denies nausea, vomiting, or diarrhea  Patient is drinking liquids  Patient had one BM overnight  Objective:    Blood pressure 113/56, pulse 68, temperature 98 6 °F (37 °C), resp  rate 18, height 6' (1 829 m), weight 82 3 kg (181 lb 7 oz), SpO2 100 %  ,Body mass index is 24 61 kg/m²        Intake/Output Summary (Last 24 hours) at 10/31/18 0532  Last data filed at 10/31/18 0401   Gross per 24 hour   Intake             3155 ml   Output             2625 ml   Net              530 ml       Invasive Devices     Peripheral Intravenous Line            Peripheral IV 10/27/18 Left Antecubital 3 days    Peripheral IV 10/30/18 Right Arm less than 1 day                Physical Exam: /56   Pulse 68   Temp 98 6 °F (37 °C)   Resp 18   Ht 6' (1 829 m)   Wt 82 3 kg (181 lb 7 oz)   SpO2 100%   BMI 24 61 kg/m²     General: no acute distress  HEENT: moist mucous membranes, normocephalic  CV: regular rate and rhythm  Lungs: clear lung sounds to auscultation, no SOB, no increased WOB  Abdomen: Non-distended, soft  Tenderness to palpation of incision site  Neurologic: alert and oriented to person, place, and time        Lab, Imaging and other studies:CBC: No results found for: WBC, HGB, HCT, MCV, PLT, ADJUSTEDWBC, MCH, MCHC, RDW, MPV, NRBC, CMP: No results found for: NA, K, CL, CO2, ANIONGAP, BUN, CREATININE, GLUCOSE, CALCIUM, AST, ALT, ALKPHOS, PROT, ALBUMIN, BILITOT, EGFR  VTE Pharmacologic Prophylaxis: Heparin  VTE Mechanical Prophylaxis: sequential compression device

## 2018-10-31 NOTE — NURSING NOTE
Pt still in significant pain  Still standing at side of bed holding on  Called srinivas morrissey, he wants another dose of 0 5 iv dialudid given now, then change the dose to 1mg Q3 hours after

## 2018-10-31 NOTE — PROGRESS NOTES
Progress Note- Trung Pandey 37 y o  male MRN: [id number]    Unit/Bed#: [facility] 626-01 Encounter: [id number]      Assessment and Plan:    Choledocholithiasis s/p laporoscopic cholecystectomy, lap assisted ERCP with stone removal  AST/ALT stable at 44/52  Alk phos wnl at 93  T Bili wnl  52  Leftsided abdominal pain radiating to epigastric area likely musculoskeletal in nature given reproducibility with palpation/positional changes and ropelike hypertonicity corresponding to pain  Pain control per primary team/acute pain service  Black/red emesis  Given recently eaten Oreos and cranberry juice with stable hemoglobin and hemodynamic stability, unlikely to be GI bleed  Acute nausea/vomiting may be secondary to increased opiate requirement for abdominal pain  Continue to monitor Hemoglobin  ______________________________________________________________________    Subjective:     38 yo Male seen and examined today  Pt is reporting cramping left sided abdominal pain that radiates to epigastric area with positional changes/motion and lasts for approximately 20 seconds  Pt states pain occurs frequently every few minutes and that he feels tensing of abdominal wall muscle in the radiation of the pain  Pt states he had this same sensation very rarely prior to admission  Pt also reports episode of emesis this morning around 8:30/9am that was red, brown, and black in color, however pt reports eating both oreos overnight and cranberry juice this morning  Pt states nausea and vomiting came on very suddenly approximately 1/2 h after Dilaudid injection but that he has had dilaudid in the past with no problems and nausea resolved completely after vomiting  Pt denies abdominal pain in other quadrants besides soreness post-op  On ROS, pt reporting chest pain but states that it is soreness along upper chest and due to multiple episodes of vomiting earlier on this admission prior to surgical intervention      Review of Systems   Constitutional: Negative for chills and fever  Respiratory: Negative for cough and wheezing  Cardiovascular: Positive for chest pain  Upper chest pain near shoulders   Gastrointestinal: Positive for abdominal pain, nausea and vomiting  Negative for blood in stool, constipation and diarrhea  Neurological: Negative for light-headedness         Medication Administration - last 24 hours from 10/30/2018 1136 to 10/31/2018 1136       Date/Time Order Dose Route Action Action by     10/30/2018 1626 ALPRAZolam (XANAX) tablet 0 5 mg   Oral VIKAS Chow MD     10/30/2018 1146 ALPRAZolam Ardelle Fritter) tablet 0 5 mg   Oral MAR Hold Automatic Transfer Provider     10/31/2018 0246 baclofen tablet 10 mg 10 mg Oral Given Jennifer Payne RN     10/30/2018 2031 baclofen tablet 10 mg 10 mg Oral Given Jennifer Payne RN     10/30/2018 1626 baclofen tablet 10 mg   Oral VIKAS Chow MD     10/30/2018 1146 baclofen tablet 10 mg   Oral MAR Hold Automatic Transfer Provider     10/30/2018 2234 QUEtiapine (SEROquel) tablet 100 mg 100 mg Oral Given Jennifer Payne RN     10/30/2018 1626 QUEtiapine (SEROquel) tablet 100 mg   Oral VIKAS Chow MD     10/30/2018 1146 QUEtiapine (SEROquel) tablet 100 mg   Oral MAR Hold Automatic Transfer Provider     10/31/2018 0816 buPROPion [facility]) tablet 75 mg 75 mg Oral Given Alice Turner RN     10/30/2018 1626 buPROPion (WELLBUTRIN) tablet 75 mg   Oral VIKAS Chow MD     10/30/2018 1146 buPROPion [facility]) tablet 75 mg   Oral MAR Hold Automatic Transfer Provider     10/31/2018 0612 pantoprazole (PROTONIX) EC tablet 20 mg 20 mg Oral Given Jennifer Payne RN     10/30/2018 1626 pantoprazole (PROTONIX) EC tablet 20 mg   Oral VIKAS Chow MD     10/30/2018 1146 pantoprazole (PROTONIX) EC tablet 20 mg   Oral MAR Hold Automatic Transfer Provider     10/31/2018 0325 ceFAZolin (ANCEF) IVPB (premix) 2,000 mg 0 mg Intravenous Stopped [facility] May, RN     10/31/2018 0246 ceFAZolin (ANCEF) IVPB (premix) 2,000 mg 2,000 mg Intravenous New Bag Anna Dario May, RN     10/30/2018 1924 ceFAZolin (ANCEF) IVPB (premix) 2,000 mg 2,000 mg Intravenous New Bag Geovanniedyta Rise, RN     10/30/2018 1626 ceFAZolin (ANCEF) IVPB (premix) 2,000 mg   Intravenous VIKAS Sainz MD     10/30/2018 1146 ceFAZolin (ANCEF) IVPB (premix) 2,000 mg   Intravenous MAR Hold Automatic Transfer Provider     10/31/2018 0445 metroNIDAZOLE (FLAGYL) IVPB (premix) 500 mg 0 mg Intravenous Stopped Anna Dario May, RN     10/31/2018 0408 metroNIDAZOLE (FLAGYL) IVPB (premix) 500 mg 500 mg Intravenous New Bag Christian MCADAMS May, RN     10/30/2018 2012 metroNIDAZOLE (FLAGYL) IVPB (premix) 500 mg 500 mg Intravenous New Bag [facility] May, RN     10/30/2018 1626 metroNIDAZOLE (FLAGYL) IVPB (premix) 500 mg   Intravenous VIKAS Sainz MD     10/30/2018 1215 metroNIDAZOLE (FLAGYL) IVPB (premix) 500 mg 500 mg Intravenous Continued from [address]     10/30/2018 1146 metroNIDAZOLE (FLAGYL) IVPB (premix) 500 mg   Intravenous MAR Hold Automatic Transfer Provider     10/31/2018 0245 HYDROmorphone (DILAUDID) injection 0 5 mg 0 5 mg Intravenous Given [facility] May, RN     10/31/2018 0011 HYDROmorphone (DILAUDID) injection 0 5 mg 0 5 mg Intravenous Given [facility] May, RN     10/30/2018 2031 HYDROmorphone (DILAUDID) injection 0 5 mg 0 5 mg Intravenous Given [facility] May, RN     10/30/2018 1728 HYDROmorphone (DILAUDID) injection 0 5 mg 0 5 mg Intravenous Given Doredyta Rise, RN     10/30/2018 1626 HYDROmorphone (DILAUDID) injection 0 5 mg   Intravenous VIKAS Sainz MD     10/30/2018 1146 HYDROmorphone (DILAUDID) injection 0 5 mg   Intravenous MAR Hold Automatic Transfer Provider     10/31/2018 1002 oxyCODONE (ROXICODONE) immediate release tablet 10 mg 10 mg Oral Given Rick Laurent RN     10/31/2018 0609 oxyCODONE (ROXICODONE) immediate release tablet 10 mg 10 mg Oral Given Saeed [place] MayMAXIMILIAN     10/30/2018 2234 oxyCODONE (ROXICODONE) immediate release tablet 10 mg 10 mg Oral Given Saeed [place] MayMAXIMILIAN     10/30/2018 1754 oxyCODONE (ROXICODONE) immediate release tablet 10 mg 10 mg Oral Given John GarciaMAXIMILIAN dennison     10/30/2018 1626 oxyCODONE (ROXICODONE) immediate release tablet 10 mg   Oral MAR Ericka Gaucher, MD     10/30/2018 1146 oxyCODONE (ROXICODONE) immediate release tablet 10 mg   Oral MAR Hold Automatic Transfer Provider     10/30/2018 1626 oxyCODONE (ROXICODONE) IR tablet 5 mg   Oral MAR Ericka Gaucher, MD     10/30/2018 1146 oxyCODONE (ROXICODONE) IR tablet 5 mg   Oral MAR Hold Automatic Transfer Provider     10/30/2018 2011 acetaminophen (TYLENOL) tablet 650 mg 650 mg Oral Given Saeed [place] Payne RN     10/30/2018 1626 acetaminophen (TYLENOL) tablet 650 mg   Oral MAR Ericka Gaucher, MD     10/30/2018 1146 acetaminophen (TYLENOL) tablet 650 mg   Oral MAR Hold Automatic Transfer Provider     10/30/2018 1626 ondansetron (ZOFRAN) injection 4 mg   Intravenous MAR Ericka Gaucher, MD     10/30/2018 1146 ondansetron (ZOFRAN) injection 4 mg   Intravenous MAR Hold Automatic Transfer Provider     10/31/2018 0612 heparin (porcine) subcutaneous injection 5,000 Units 5,000 Units Subcutaneous Given Saeed [place] MayMAXIMILIAN     10/30/2018 2234 heparin (porcine) subcutaneous injection 5,000 Units 5,000 Units Subcutaneous Given Saeed [place] MayMAXIMILIAN     10/30/2018 1626 heparin (porcine) subcutaneous injection 5,000 Units   Subcutaneous MAR Ericka Gaucher, MD     10/30/2018 1400 heparin (porcine) subcutaneous injection 5,000 Units   Subcutaneous Dose Auto Held Automatic Transfer Provider     10/30/2018 1146 heparin (porcine) subcutaneous injection 5,000 Units   Subcutaneous MAR Hold Automatic Transfer Provider     10/31/2018 0807 dextrose 5 % and sodium chloride 0 45 % with KCl 20 mEq/L infusion 0 mL/hr Intravenous Stopped Penelope Bucio RN     10/30/2018 1706 dextrose 5 % and sodium chloride 0 45 % with KCl 20 mEq/L infusion 75 mL/hr Intravenous New Bag Jo Ann Terry, MAXIMILIAN     10/31/2018 0813 docusate sodium (COLACE) capsule 100 mg 100 mg Oral Given Ameena Russo RN     10/30/2018 1759 docusate sodium (COLACE) capsule 100 mg 100 mg Oral Given Gonzales Reddy RN     10/30/2018 1626 docusate sodium (COLACE) capsule 100 mg   Oral MAR Jerry Gaines MD     10/30/2018 1146 docusate sodium (COLACE) capsule 100 mg   Oral MAR Hold Automatic Transfer Provider     10/30/2018 2234 senna (SENOKOT) tablet 8 6 mg 8 6 mg Oral Given Chuy Payne RN     10/30/2018 1626 senna (SENOKOT) tablet 8 6 mg   Oral MAR Jerry Gaines MD     10/30/2018 1146 senna (SENOKOT) tablet 8 6 mg   Oral MAR Hold Automatic Transfer Provider     10/31/2018 0814 nicotine (NICODERM CQ) 14 mg/24hr TD 24 hr patch 14 mg 14 mg Transdermal Medication Applied Penelope Bucio, MAXIMILIAN     10/31/2018 0813 nicotine (NICODERM CQ) 14 mg/24hr TD 24 hr patch 14 mg 14 mg Transdermal Patch Removed Ameena Russo RN     10/30/2018 1626 nicotine (NICODERM CQ) 14 mg/24hr TD 24 hr patch 14 mg   Transdermal MAR Jerry Gaines MD     10/30/2018 1146 nicotine (NICODERM CQ) 14 mg/24hr TD 24 hr patch 14 mg   Transdermal MAR Hold Automatic Transfer Provider     10/30/2018 1706 sodium chloride 0 9 % infusion 0 mL/hr Intravenous Stopped Teresa Munoz RN     10/30/2018 1634 sodium chloride 0 9 % infusion 75 mL/hr Intravenous Continued from [facility], RN     10/30/2018 1654 fentaNYL (SUBLIMAZE) injection 50 mcg 50 mcg Intravenous Given Teresa Munoz RN     10/30/2018 1644 fentaNYL (SUBLIMAZE) injection 50 mcg 50 mcg Intravenous Given Teresa Munoz RN     10/30/2018 1703 HYDROmorphone (DILAUDID) injection 0 5 mg 0 5 mg Intravenous Given Teresa Munoz RN     10/31/2018 0259 diazepam (VALIUM) tablet 5 mg 5 mg Oral Given Chuy Matos May, RN     10/31/2018 0813 gabapentin (NEURONTIN) capsule 100 mg 100 mg Oral Given Ameena Russo, RN 10/31/2018 1035 HYDROmorphone (DILAUDID) injection 1 mg 1 mg Intravenous Given Marito Klein RN     10/31/2018 0730 HYDROmorphone (DILAUDID) injection 1 mg 1 mg Intravenous Given Marito Klein RN     10/31/2018 0406 HYDROmorphone (DILAUDID) injection 0 5 mg 0 5 mg Intravenous Given Yinka Payne RN     10/31/2018 0612 methocarbamol (ROBAXIN) tablet 750 mg 750 mg Oral Given Yinka Payne RN     10/31/2018 0813 lidocaine (LIDODERM) 5 % patch 1 patch 1 patch Topical Medication Applied Penelope Bucio RN     10/31/2018 0897 simethicone (MYLICON) chewable tablet 80 mg 80 mg Oral Given Penelope Bucio RN          Objective:     Vitals: Blood pressure 141/77, pulse 76, temperature 99 1 °F (37 3 °C), resp  rate 18, height 6' (1 829 m), weight 82 3 kg (181 lb 7 oz), SpO2 100 %  ,Body mass index is 24 61 kg/m²  Intake/Output Summary (Last 24 hours) at 10/31/18 1136  Last data filed at 10/31/18 1117   Gross per 24 hour   Intake             3325 ml   Output             2776 ml   Net              549 ml       Physical Exam:   Physical Exam   Constitutional: He appears well-developed and well-nourished  HENT:   Head: Normocephalic and atraumatic  Eyes: Pupils are equal, round, and reactive to light  Cardiovascular: Normal rate and regular rhythm  Exam reveals no gallop and no friction rub  No murmur heard  Pulmonary/Chest: Effort normal and breath sounds normal  No respiratory distress  He has no wheezes  He has no rales  Abdominal: Soft  Mild tenderness near sites of laporoscopic incisions with surrounding erythema  No tenderness to deep palpation in RUQ, RLQ  Reproducibility of radiating cramping abdominal pain upon palpation of left abdomen at level of umbilicus associated with ropelike hypertonicity in the area of pain         Invasive Devices     Peripheral Intravenous Line            Peripheral IV 10/30/18 Right Arm less than 1 day                Lab Results:  Admission on 10/27/2018   Component Date Value    WBC 10/27/2018 9 29     RBC 10/27/2018 6 45*    Hemoglobin 10/27/2018 13 4     Hematocrit 10/27/2018 45 5     MCV 10/27/2018 71*    MCH 10/27/2018 20 8*    MCHC 10/27/2018 29 5*    RDW 10/27/2018 27 0*    Platelets 01/42/6173 272     nRBC 10/27/2018 0     Neutrophils Relative 10/27/2018 71     Immat GRANS % 10/27/2018 0     Lymphocytes Relative 10/27/2018 18     Monocytes Relative 10/27/2018 9     Eosinophils Relative 10/27/2018 1     Basophils Relative 10/27/2018 1     Neutrophils Absolute 10/27/2018 6 61     Immature Grans Absolute 10/27/2018 0 04     Lymphocytes Absolute 10/27/2018 1 65     Monocytes Absolute 10/27/2018 0 85     Eosinophils Absolute 10/27/2018 0 08     Basophils Absolute 10/27/2018 0 06     Sodium 10/27/2018 138     Potassium 10/27/2018 3 8     Chloride 10/27/2018 103     CO2 10/27/2018 27     ANION GAP 10/27/2018 8     BUN 10/27/2018 13     Creatinine 10/27/2018 1 23     Glucose 10/27/2018 105     Calcium 10/27/2018 9 1     AST 10/27/2018 136*    ALT 10/27/2018 91*    Alkaline Phosphatase 10/27/2018 132*    Total Protein 10/27/2018 8 2     Albumin 10/27/2018 4 2     Total Bilirubin 10/27/2018 0 82     eGFR 10/27/2018 71     Color, UA 10/27/2018 Lizzy     Clarity, UA 10/27/2018 Clear     pH, UA 10/27/2018 6 0     Leukocytes, UA 10/27/2018 Negative     Nitrite, UA 10/27/2018 Negative     Protein, UA 10/27/2018 Negative     Glucose, UA 10/27/2018 Negative     Ketones, UA 10/27/2018 Negative     Urobilinogen, UA 10/27/2018 0 2     Bilirubin, UA 10/27/2018 Interference- unable to analyze*    Blood, UA 10/27/2018 Negative     Specific Gravity, UA 10/27/2018 1 025     Sodium 10/28/2018 138     Potassium 10/28/2018 4 1     Chloride 10/28/2018 106     CO2 10/28/2018 27     ANION GAP 10/28/2018 5     BUN 10/28/2018 8     Creatinine 10/28/2018 0 98     Glucose 10/28/2018 105     Calcium 10/28/2018 8 1*    AST 10/28/2018 47*    ALT 10/28/2018 86*    Alkaline Phosphatase 10/28/2018 97     Total Protein 10/28/2018 5 8*    Albumin 10/28/2018 3 0*    Total Bilirubin 10/28/2018 0 33     eGFR 10/28/2018 94     WBC 10/28/2018 3 64*    RBC 10/28/2018 5 33     Hemoglobin 10/28/2018 11 2*    Hematocrit 10/28/2018 38 7     MCV 10/28/2018 73*    MCH 10/28/2018 21 0*    MCHC 10/28/2018 28 9*    RDW 10/28/2018 26 7*    MPV 10/28/2018 10 7     Platelets 61/31/5124 246     nRBC 10/28/2018 0     Neutrophils Relative 10/28/2018 38*    Immat GRANS % 10/28/2018 0     Lymphocytes Relative 10/28/2018 46*    Monocytes Relative 10/28/2018 9     Eosinophils Relative 10/28/2018 5     Basophils Relative 10/28/2018 2*    Neutrophils Absolute 10/28/2018 1 37*    Immature Grans Absolute 10/28/2018 0 01     Lymphocytes Absolute 10/28/2018 1 72     Monocytes Absolute 10/28/2018 0 31     Eosinophils Absolute 10/28/2018 0 17     Basophils Absolute 10/28/2018 0 06     Magnesium 10/28/2018 2 0     ABO Grouping 10/28/2018 A     Rh Factor 10/28/2018 Positive     Antibody Screen 10/28/2018 Negative     Specimen Expiration Date 10/28/2018 [id number]     WBC 10/29/2018 4 01*    RBC 10/29/2018 5 86*    Hemoglobin 10/29/2018 12 3     Hematocrit 10/29/2018 43 0     MCV 10/29/2018 73*    MCH 10/29/2018 21 0*    MCHC 10/29/2018 28 6*    RDW 10/29/2018 27 4*    Platelets 48/84/3088 222     nRBC 10/29/2018 0     Neutrophils Relative 10/29/2018 32*    Immat GRANS % 10/29/2018 0     Lymphocytes Relative 10/29/2018 55*    Monocytes Relative 10/29/2018 8     Eosinophils Relative 10/29/2018 4     Basophils Relative 10/29/2018 1     Neutrophils Absolute 10/29/2018 1 28*    Immature Grans Absolute 10/29/2018 0 01     Lymphocytes Absolute 10/29/2018 2 19     Monocytes Absolute 10/29/2018 0 32     Eosinophils Absolute 10/29/2018 0 16     Basophils Absolute 10/29/2018 0 05     Sodium 10/29/2018 141     Potassium 10/29/2018 4 7     Chloride 10/29/2018 107     CO2 10/29/2018 30     ANION GAP 10/29/2018 4     BUN 10/29/2018 5     Creatinine 10/29/2018 0 90     Glucose 10/29/2018 91     Calcium 10/29/2018 8 6     AST 10/29/2018 27     ALT 10/29/2018 59     Alkaline Phosphatase 10/29/2018 102     Total Protein 10/29/2018 6 6     Albumin 10/29/2018 3 4*    Total Bilirubin 10/29/2018 0 30     eGFR 10/29/2018 104     Sodium 10/30/2018 138     Potassium 10/30/2018 3 6     Chloride 10/30/2018 104     CO2 10/30/2018 30     ANION GAP 10/30/2018 4     BUN 10/30/2018 5     Creatinine 10/30/2018 0 99     Glucose 10/30/2018 88     Calcium 10/30/2018 8 2*    AST 10/30/2018 20     ALT 10/30/2018 38     Alkaline Phosphatase 10/30/2018 87     Total Protein 10/30/2018 5 8*    Albumin 10/30/2018 3 0*    Total Bilirubin 10/30/2018 0 24     eGFR 10/30/2018 93     Sodium 10/31/2018 135*    Potassium 10/31/2018 3 8     Chloride 10/31/2018 103     CO2 10/31/2018 26     ANION GAP 10/31/2018 6     BUN 10/31/2018 5     Creatinine 10/31/2018 0 85     Glucose 10/31/2018 101     Calcium 10/31/2018 8 2*    AST 10/31/2018 44     ALT 10/31/2018 52     Alkaline Phosphatase 10/31/2018 93     Total Protein 10/31/2018 7 0     Albumin 10/31/2018 3 5     Total Bilirubin 10/31/2018 0 52     eGFR 10/31/2018 107     WBC 10/31/2018 9 71     RBC 10/31/2018 5 55     Hemoglobin 10/31/2018 12 0     Hematocrit 10/31/2018 40 3     MCV 10/31/2018 73*    MCH 10/31/2018 21 6*    MCHC 10/31/2018 29 8*    RDW 10/31/2018 26 8*    Platelets 65/23/9479 214     nRBC 10/31/2018 0     Neutrophils Relative 10/31/2018 82*    Immat GRANS % 10/31/2018 0     Lymphocytes Relative 10/31/2018 10*    Monocytes Relative 10/31/2018 8     Eosinophils Relative 10/31/2018 0     Basophils Relative 10/31/2018 0     Neutrophils Absolute 10/31/2018 7 88*    Immature Grans Absolute 10/31/2018 0 03     Lymphocytes Absolute 10/31/2018 0 99     Monocytes Absolute 10/31/2018 0 76     Eosinophils Absolute 10/31/2018 0 03     Basophils Absolute 10/31/2018 0 02        Imaging Studies: I have personally reviewed pertinent imaging studies

## 2018-10-31 NOTE — UTILIZATION REVIEW
Continued Stay Review    Date: 10/31/2018    Vital Signs: /77   Pulse 76   Temp 99 1 °F (37 3 °C)   Resp 18   Ht 6' (1 829 m)   Wt 82 3 kg (181 lb 7 oz)   SpO2 100%   BMI 24 61 kg/m²     Medications:   Scheduled Meds:   Current Facility-Administered Medications:  acetaminophen 650 mg Oral Q6H PRN 10/30 x 1    ALPRAZolam 0 5 mg Oral Q4H PRN 10/30 x 1    buPROPion 75 mg Oral Daily    diazepam 5 mg Oral Q6H PRN 10/31 x 1    docusate sodium 100 mg Oral BID    gabapentin 100 mg Oral TID    heparin (porcine) 5,000 Units Subcutaneous Q8H [address]    HYDROmorphone 1 mg Intravenous Q3H PRN 10/30 x 4  10/31 x 2    ketorolac 15 mg Intravenous Q6H ANILA    lidocaine 1 patch Topical Daily    methocarbamol 750 mg Oral Q6H ANILA    nicotine 14 mg Transdermal Daily    ondansetron 4 mg Intravenous Q6H PRN    oxyCODONE 10 mg Oral Q4H PRN    oxyCODONE 5 mg Oral Q4H PRN    pantoprazole 20 mg Oral Early Morning    QUEtiapine 100 mg Oral HS    senna 1 tablet Oral HS    simethicone 80 mg Oral Q6H PRN       Ancef & Flagyl  D/c 'd on 10/31   D5 0 45 NS  W/ 20 meq KCL @ 75 ML/hr    Abnormal Labs/Diagnostic Results: 10/31 - Na 135 - Michel 8 2 - A Neut 7 83    Age/Sex: 37 y o  male     Assessment/Plan:      Assessment:  38 y/o M w/ hx of gastric bypass, chronic pain w/ spinal cord stimulator in place, p/w choledocholithiasis, s/p laparoscopic-assisted ERCP, cholecystectomy     Plan:  --Regular diet  --d/c IVF  --Pain control, add scheduled Robaxin for muscle spasms, Lidocaine patch  --d/c Abx  --f/u AM labs  --OOB, ambulate  --DVT ppx  Overnight, pt c/o severe L-sided abdominal pain requiring PRN doses of Dilaudid, Baclofen, and Valium  This morning, pt still c/o 10/10 L flank pain and muscle spasms, near one of his incision sites  Tolerating liquids   Denies any N/V/D  + bowel movement      Discharge Plan tbd

## 2018-10-31 NOTE — NURSING NOTE
Pt called out stating that he was in excruciating pain  He wanted dr to come up and evaluate him  Torsten aMldonado came to see him  Pt states that he is getting spasms and cramps on the left side that radiate to the right  Where he can't lie down or site in a chair  I gave him 0 5mg dilaudid, baclofen po, and dr ordered vpo valium  He stated it was ok for him to take all of it  He thinks that its from the local wearing off at the trocar site

## 2018-11-01 ENCOUNTER — TELEPHONE (OUTPATIENT)
Dept: PAIN MEDICINE | Facility: CLINIC | Age: 43
End: 2018-11-01

## 2018-11-01 VITALS
DIASTOLIC BLOOD PRESSURE: 65 MMHG | WEIGHT: 181.44 LBS | SYSTOLIC BLOOD PRESSURE: 112 MMHG | HEART RATE: 80 BPM | BODY MASS INDEX: 24.58 KG/M2 | HEIGHT: 72 IN | OXYGEN SATURATION: 100 % | TEMPERATURE: 98.2 F | RESPIRATION RATE: 16 BRPM

## 2018-11-01 LAB
ALBUMIN SERPL BCP-MCNC: 2.9 G/DL (ref 3.5–5)
ALP SERPL-CCNC: 75 U/L (ref 46–116)
ALT SERPL W P-5'-P-CCNC: 39 U/L (ref 12–78)
ANION GAP SERPL CALCULATED.3IONS-SCNC: 4 MMOL/L (ref 4–13)
AST SERPL W P-5'-P-CCNC: 27 U/L (ref 5–45)
BASOPHILS # BLD AUTO: 0.02 THOUSANDS/ΜL (ref 0–0.1)
BASOPHILS NFR BLD AUTO: 0 % (ref 0–1)
BILIRUB DIRECT SERPL-MCNC: 0.14 MG/DL (ref 0–0.2)
BILIRUB SERPL-MCNC: 0.39 MG/DL (ref 0.2–1)
BUN SERPL-MCNC: 6 MG/DL (ref 5–25)
CALCIUM SERPL-MCNC: 8.4 MG/DL (ref 8.3–10.1)
CHLORIDE SERPL-SCNC: 107 MMOL/L (ref 100–108)
CO2 SERPL-SCNC: 28 MMOL/L (ref 21–32)
CREAT SERPL-MCNC: 0.79 MG/DL (ref 0.6–1.3)
EOSINOPHIL # BLD AUTO: 0.13 THOUSAND/ΜL (ref 0–0.61)
EOSINOPHIL NFR BLD AUTO: 2 % (ref 0–6)
ERYTHROCYTE [DISTWIDTH] IN BLOOD BY AUTOMATED COUNT: 26.8 % (ref 11.6–15.1)
GFR SERPL CREATININE-BSD FRML MDRD: 110 ML/MIN/1.73SQ M
GLUCOSE SERPL-MCNC: 89 MG/DL (ref 65–140)
HCT VFR BLD AUTO: 36.9 % (ref 36.5–49.3)
HGB BLD-MCNC: 10.9 G/DL (ref 12–17)
IMM GRANULOCYTES # BLD AUTO: 0.03 THOUSAND/UL (ref 0–0.2)
IMM GRANULOCYTES NFR BLD AUTO: 1 % (ref 0–2)
LYMPHOCYTES # BLD AUTO: 1.74 THOUSANDS/ΜL (ref 0.6–4.47)
LYMPHOCYTES NFR BLD AUTO: 28 % (ref 14–44)
MCH RBC QN AUTO: 21.6 PG (ref 26.8–34.3)
MCHC RBC AUTO-ENTMCNC: 29.5 G/DL (ref 31.4–37.4)
MCV RBC AUTO: 73 FL (ref 82–98)
MONOCYTES # BLD AUTO: 0.54 THOUSAND/ΜL (ref 0.17–1.22)
MONOCYTES NFR BLD AUTO: 9 % (ref 4–12)
NEUTROPHILS # BLD AUTO: 3.87 THOUSANDS/ΜL (ref 1.85–7.62)
NEUTS SEG NFR BLD AUTO: 60 % (ref 43–75)
NRBC BLD AUTO-RTO: 0 /100 WBCS
PLATELET # BLD AUTO: 143 THOUSANDS/UL (ref 149–390)
POTASSIUM SERPL-SCNC: 4.1 MMOL/L (ref 3.5–5.3)
PROT SERPL-MCNC: 5.7 G/DL (ref 6.4–8.2)
RBC # BLD AUTO: 5.05 MILLION/UL (ref 3.88–5.62)
SODIUM SERPL-SCNC: 139 MMOL/L (ref 136–145)
WBC # BLD AUTO: 6.33 THOUSAND/UL (ref 4.31–10.16)

## 2018-11-01 PROCEDURE — 85025 COMPLETE CBC W/AUTO DIFF WBC: CPT | Performed by: SURGERY

## 2018-11-01 PROCEDURE — 80076 HEPATIC FUNCTION PANEL: CPT | Performed by: INTERNAL MEDICINE

## 2018-11-01 PROCEDURE — 99024 POSTOP FOLLOW-UP VISIT: CPT | Performed by: SURGERY

## 2018-11-01 PROCEDURE — 99232 SBSQ HOSP IP/OBS MODERATE 35: CPT | Performed by: INTERNAL MEDICINE

## 2018-11-01 PROCEDURE — 80048 BASIC METABOLIC PNL TOTAL CA: CPT | Performed by: SURGERY

## 2018-11-01 RX ORDER — DOCUSATE SODIUM 100 MG/1
100 CAPSULE, LIQUID FILLED ORAL 2 TIMES DAILY
Qty: 14 CAPSULE | Refills: 0 | Status: SHIPPED | OUTPATIENT
Start: 2018-11-01 | End: 2018-12-17

## 2018-11-01 RX ORDER — SODIUM CHLORIDE 9 MG/ML
20 INJECTION, SOLUTION INTRAVENOUS ONCE
Status: DISCONTINUED | OUTPATIENT
Start: 2018-11-02 | End: 2018-11-05 | Stop reason: HOSPADM

## 2018-11-01 RX ORDER — DICYCLOMINE HCL 20 MG
20 TABLET ORAL EVERY 6 HOURS PRN
Qty: 28 TABLET | Refills: 0 | Status: SHIPPED | OUTPATIENT
Start: 2018-11-01 | End: 2018-12-17

## 2018-11-01 RX ORDER — SENNOSIDES 8.6 MG
1 TABLET ORAL
Qty: 120 EACH | Refills: 0
Start: 2018-11-01 | End: 2018-12-17

## 2018-11-01 RX ORDER — METHOCARBAMOL 750 MG/1
750 TABLET, FILM COATED ORAL EVERY 6 HOURS SCHEDULED
Qty: 20 TABLET | Refills: 0 | Status: SHIPPED | OUTPATIENT
Start: 2018-11-01 | End: 2018-12-17

## 2018-11-01 RX ORDER — MORPHINE SULFATE 15 MG/1
7.5-15 TABLET ORAL EVERY 4 HOURS PRN
Qty: 30 TABLET | Refills: 0 | Status: SHIPPED | OUTPATIENT
Start: 2018-11-01 | End: 2019-01-14

## 2018-11-01 RX ORDER — SIMETHICONE 80 MG
80 TABLET,CHEWABLE ORAL EVERY 6 HOURS PRN
Qty: 30 TABLET | Refills: 0
Start: 2018-11-01 | End: 2018-12-17

## 2018-11-01 RX ORDER — ACETAMINOPHEN 325 MG/1
975 TABLET ORAL EVERY 8 HOURS SCHEDULED
Status: DISCONTINUED | OUTPATIENT
Start: 2018-11-01 | End: 2018-11-01 | Stop reason: HOSPADM

## 2018-11-01 RX ORDER — ACETAMINOPHEN 325 MG/1
975 TABLET ORAL EVERY 8 HOURS
Qty: 30 TABLET | Refills: 0 | Status: SHIPPED | OUTPATIENT
Start: 2018-11-01 | End: 2021-02-01

## 2018-11-01 RX ADMIN — LIDOCAINE 1 PATCH: 50 PATCH CUTANEOUS at 08:57

## 2018-11-01 RX ADMIN — MORPHINE SULFATE 15 MG: 15 TABLET ORAL at 09:47

## 2018-11-01 RX ADMIN — HYDROMORPHONE HYDROCHLORIDE 0.5 MG: 1 INJECTION, SOLUTION INTRAMUSCULAR; INTRAVENOUS; SUBCUTANEOUS at 10:55

## 2018-11-01 RX ADMIN — METHOCARBAMOL 750 MG: 750 TABLET ORAL at 12:26

## 2018-11-01 RX ADMIN — METHOCARBAMOL 750 MG: 750 TABLET ORAL at 05:28

## 2018-11-01 RX ADMIN — PANTOPRAZOLE SODIUM 20 MG: 20 TABLET, DELAYED RELEASE ORAL at 05:29

## 2018-11-01 RX ADMIN — MORPHINE SULFATE 15 MG: 15 TABLET ORAL at 13:30

## 2018-11-01 RX ADMIN — ACETAMINOPHEN 975 MG: 325 TABLET, FILM COATED ORAL at 13:30

## 2018-11-01 RX ADMIN — DICYCLOMINE HYDROCHLORIDE 20 MG: 20 TABLET ORAL at 08:56

## 2018-11-01 RX ADMIN — GABAPENTIN 100 MG: 100 CAPSULE ORAL at 08:55

## 2018-11-01 RX ADMIN — HYDROMORPHONE HYDROCHLORIDE 0.5 MG: 1 INJECTION, SOLUTION INTRAMUSCULAR; INTRAVENOUS; SUBCUTANEOUS at 00:18

## 2018-11-01 RX ADMIN — SIMETHICONE CHEW TAB 80 MG 80 MG: 80 TABLET ORAL at 08:56

## 2018-11-01 RX ADMIN — DOCUSATE SODIUM 100 MG: 100 CAPSULE, LIQUID FILLED ORAL at 08:55

## 2018-11-01 RX ADMIN — BUPROPION HYDROCHLORIDE 75 MG: 75 TABLET, FILM COATED ORAL at 08:57

## 2018-11-01 RX ADMIN — NICOTINE 14 MG: 14 PATCH, EXTENDED RELEASE TRANSDERMAL at 08:57

## 2018-11-01 RX ADMIN — HYDROMORPHONE HYDROCHLORIDE 0.5 MG: 1 INJECTION, SOLUTION INTRAMUSCULAR; INTRAVENOUS; SUBCUTANEOUS at 06:24

## 2018-11-01 RX ADMIN — MORPHINE SULFATE 15 MG: 15 TABLET ORAL at 05:29

## 2018-11-01 NOTE — PROGRESS NOTES
Progress Note- Fritz Scott 37 y o  male MRN: [id number]    Unit/Bed#: [facility] 626-01 Encounter: [id number]      Assessment and Plan:  Choledocolithiasis s/p laporoscopic cholecystectomy, lap assisted ERCP with sphincerotomy, sphincteroplasty, and stone extraction  AST/ALT, TBili continues to be stable and wnl  Abdominal pain appears to be musculoskeletal with improvement on pain control regimen by primary team and acute pain service  Emesis, resolved  Pt reports no further episodes of nausea/vomiting since yesterday morning  Hgb 10 9 slightly decreased from yesterday's 12 0, but stable per previous baseline values pre-admission  Pt remains hemodynamically stable with no overt bleeding/melena, low suspicion for GI bleed  GI will sign off at this time, call back with questions or concerns  ______________________________________________________________________    Subjective:  Patient seen and evaluated today  Patient states he continues to have cramping abdominal pain, today worse in epigastric region, but states that it is improved on his current medication regimen and overall improved since yesterday  Denies any additional nausea/vomiting  Denies constipation or diarrhea, blood in stool  Review of Systems   Constitutional: Negative for chills and fever  Respiratory: Negative for shortness of breath  Gastrointestinal: Positive for abdominal pain  Negative for constipation, diarrhea, nausea and vomiting         Medication Administration - last 24 hours from 10/31/2018 1128 to 11/01/2018 1128       Date/Time Order Dose Route Action Action by     10/31/2018 2124 QUEtiapine (SEROquel) tablet 100 mg 100 mg Oral Given Penelope Bucio RN     11/01/2018 0857 buPROPion (WELLBUTRIN) tablet 75 mg 75 mg Oral Given Penelope Bucio RN     11/01/2018 0529 pantoprazole (PROTONIX) EC tablet 20 mg 20 mg Oral Given Enzo Richmond RN     10/31/2018 2125 heparin (porcine) subcutaneous injection 5,000 Units 5,000 Units Subcutaneous Not Given Mariam Avalos RN     10/31/2018 1403 heparin (porcine) subcutaneous injection 5,000 Units 5,000 Units Subcutaneous Not Given Mariam Avalos RN     11/01/2018 0855 docusate sodium (COLACE) capsule 100 mg 100 mg Oral Given Mariam Avalos RN     10/31/2018 1806 docusate sodium (COLACE) capsule 100 mg 100 mg Oral Given Mariam Avalos RN     10/31/2018 2124 senna (SENOKOT) tablet 8 6 mg 8 6 mg Oral Given Mariam Avalos RN     11/01/2018 0857 nicotine (NICODERM CQ) 14 mg/24hr TD 24 hr patch 14 mg 14 mg Transdermal Medication Applied Penelope Bucio RN     11/01/2018 0856 nicotine (NICODERM CQ) 14 mg/24hr TD 24 hr patch 14 mg 14 mg Transdermal Patch Removed Penelope Bucio RN     11/01/2018 0855 gabapentin (NEURONTIN) capsule 100 mg 100 mg Oral Given Mariam Avalos RN     10/31/2018 2125 gabapentin (NEURONTIN) capsule 100 mg 100 mg Oral Given Mariam Avalos RN     10/31/2018 1620 gabapentin (NEURONTIN) capsule 100 mg 100 mg Oral Given Mariam Avalos RN     11/01/2018 1055 HYDROmorphone (DILAUDID) injection 1 mg 0 5 mg Intravenous Given Mariam Avalos RN     11/01/2018 0624 HYDROmorphone (DILAUDID) injection 1 mg 0 5 mg Intravenous Given Mateo Fuchs RN     11/01/2018 0018 HYDROmorphone (DILAUDID) injection 1 mg 0 5 mg Intravenous Given Mateo Fuchs RN     10/31/2018 1948 HYDROmorphone (DILAUDID) injection 1 mg 0 5 mg Intravenous Given Mariam Avalos RN     10/31/2018 1510 HYDROmorphone (DILAUDID) injection 1 mg 0 5 mg Intravenous Given Mariam Avalos RN     11/01/2018 0528 methocarbamol (ROBAXIN) tablet 750 mg 750 mg Oral Given Mateo Fuchs RN     10/31/2018 2319 methocarbamol (ROBAXIN) tablet 750 mg 750 mg Oral Given Mateo Fuchs RN     10/31/2018 1806 methocarbamol (ROBAXIN) tablet 750 mg 750 mg Oral Given Mariam Avalos RN     10/31/2018 1153 methocarbamol (ROBAXIN) tablet 750 mg 750 mg Oral Given Penelope Bucio RN     11/01/2018 0857 lidocaine (LIDODERM) 5 % patch 1 patch 1 patch Topical Medication Applied Elizabeth Reed RN     10/31/2018 2125 lidocaine (LIDODERM) 5 % patch 1 patch 1 patch Topical Patch Removed Penelope Bucio RN     11/01/2018 3962 simethicone (MYLICON) chewable tablet 80 mg 80 mg Oral Given Elizabeth Reed RN     10/31/2018 2319 ketorolac (TORADOL) injection 15 mg 15 mg Intravenous Given Jessica Flores RN     10/31/2018 1806 ketorolac (TORADOL) injection 15 mg 15 mg Intravenous Given Elizabeth Reed RN     10/31/2018 1152 ketorolac (TORADOL) injection 15 mg 15 mg Intravenous Given Elizabeth Reed RN     11/01/2018 0947 morphine (MSIR) IR tablet 15 mg 15 mg Oral Given Penelope Bucio RN     11/01/2018 0529 morphine (MSIR) IR tablet 15 mg 15 mg Oral Given Jessica Flores RN     10/31/2018 2322 morphine (MSIR) IR tablet 15 mg 15 mg Oral Given Jessica Flores RN     10/31/2018 1806 morphine (MSIR) IR tablet 15 mg 15 mg Oral Given Elizabeth Reed RN     10/31/2018 1401 morphine (MSIR) IR tablet 15 mg 15 mg Oral Given Penelope Bucio RN     11/01/2018 0856 dicyclomine (BENTYL) tablet 20 mg 20 mg Oral Given Penelope Bucio RN     10/31/2018 1402 dicyclomine (BENTYL) tablet 20 mg 20 mg Oral Given Penelope Bucio RN          Objective:     Vitals: Blood pressure 112/65, pulse 80, temperature 98 2 °F (36 8 °C), resp  rate 16, height 6' (1 829 m), weight 82 3 kg (181 lb 7 oz), SpO2 100 %  ,Body mass index is 24 61 kg/m²  Intake/Output Summary (Last 24 hours) at 11/01/18 1128  Last data filed at 11/01/18 0917   Gross per 24 hour   Intake              780 ml   Output             1550 ml   Net             -770 ml       Physical Exam:   Physical Exam   Constitutional: He is oriented to person, place, and time  He appears well-developed and well-nourished  HENT:   Head: Normocephalic and atraumatic  Cardiovascular: Normal rate and regular rhythm  Exam reveals no friction rub  No murmur heard    Pulmonary/Chest: Effort normal and breath sounds normal  No respiratory distress  He has no wheezes  He has no rales  Abdominal: Soft  He exhibits no mass  There is tenderness  There is no rebound and no guarding  Tenderness in RLQ, LLQ, epigastric area  Musculoskeletal: He exhibits no edema or deformity  Neurological: He is alert and oriented to person, place, and time  Skin: Skin is warm and dry  Invasive Devices     Peripheral Intravenous Line            Peripheral IV 10/30/18 Right Arm 1 day                Lab Results:  Admission on 10/27/2018   No results displayed because visit has over 200 results  Imaging Studies: I have personally reviewed pertinent imaging studies

## 2018-11-01 NOTE — TELEPHONE ENCOUNTER
I spoke with Zack Rondon 1 of the physicians from the [facility] the patient had to be admitted for cholecystectomy and at this point is having abdominal postop surgical pain and he currently needs to morphine sulfate IR 7 5 mg to 15 mg every 4-6 hours as needed  At this point they are going to give him a small prescription for the next week or so and he can fill his regular morphine prescription for 60 pills on the 10th of this month  The patient is aware that he is to use there prescription to get him to the 10th and he is still having significant postoperative abdominal pain, in 9 days, which should be improving, he is to call our office before he takes more than how we are prescribing it

## 2018-11-01 NOTE — ASSESSMENT & PLAN NOTE
- Choledocholithiasis status post laparoscopic assisted ERCP with common bile duct stone extraction on 10/30/2018  - Diet as tolerated  - Outpatient follow-up with GI as needed

## 2018-11-01 NOTE — TELEPHONE ENCOUNTER
Darshana Haile from [facility] calling because the patient has recently been hospitalized and they need to know whether or not to prescribe any medication or would pain mgt want to decide what he can be given? Darshana Haile can be reached at #[phone]

## 2018-11-01 NOTE — PLAN OF CARE
Problem: Potential for Falls  Goal: Patient will remain free of falls  INTERVENTIONS:  - Assess patient frequently for physical needs  -  Identify cognitive and physical deficits and behaviors that affect risk of falls    -  [place] fall precautions as indicated by assessment   - Educate patient/family on patient safety including physical limitations  - Instruct patient to call for assistance with activity based on assessment  - Modify environment to reduce risk of injury  - Consider OT/PT consult to assist with strengthening/mobility   Outcome: Progressing      Problem: PAIN - ADULT  Goal: Verbalizes/displays adequate comfort level or baseline comfort level  Interventions:  - Encourage patient to monitor pain and request assistance  - Assess pain using appropriate pain scale  - Administer analgesics based on type and severity of pain and evaluate response  - Implement non-pharmacological measures as appropriate and evaluate response  - Consider cultural and social influences on pain and pain management  - Notify physician/advanced practitioner if interventions unsuccessful or patient reports new pain   Outcome: Progressing      Problem: INFECTION - ADULT  Goal: Absence or prevention of progression during hospitalization  INTERVENTIONS:  - Assess and monitor for signs and symptoms of infection  - Monitor lab/diagnostic results  - Monitor all insertion sites, i e  indwelling lines, tubes, and drains  - Monitor endotracheal (as able) and nasal secretions for changes in amount and color  - [place] appropriate cooling/warming therapies per order  - Administer medications as ordered  - Instruct and encourage patient and family to use good hand hygiene technique  - Identify and instruct in appropriate isolation precautions for identified infection/condition   Outcome: Progressing      Problem: SAFETY ADULT  Goal: Maintain or return to baseline ADL function  INTERVENTIONS:  -  Assess patient's ability to carry out ADLs; assess patient's baseline for ADL function and identify physical deficits which impact ability to perform ADLs (bathing, care of mouth/teeth, toileting, grooming, dressing, etc )  - Assess/evaluate cause of self-care deficits   - Assess range of motion  - Assess patient's mobility; develop plan if impaired  - Assess patient's need for assistive devices and provide as appropriate  - Encourage maximum independence but intervene and supervise when necessary  ¯ Involve family in performance of ADLs  ¯ Assess for home care needs following discharge   ¯ Request OT consult to assist with ADL evaluation and planning for discharge  ¯ Provide patient education as appropriate   Outcome: Progressing    Goal: Maintain or return mobility status to optimal level  INTERVENTIONS:  - Assess patient's baseline mobility status (ambulation, transfers, stairs, etc )    - Identify cognitive and physical deficits and behaviors that affect mobility  - Identify mobility aids required to assist with transfers and/or ambulation (gait belt, sit-to-stand, lift, walker, cane, etc )  - [place] fall precautions as indicated by assessment  - Record patient progress and toleration of activity level on Mobility SBAR; progress patient to next Phase/Stage  - Instruct patient to call for assistance with activity based on assessment  - Request Rehabilitation consult to assist with strengthening/weightbearing, etc    Outcome: Progressing      Problem: DISCHARGE PLANNING  Goal: Discharge to home or other facility with appropriate resources  INTERVENTIONS:  - Identify barriers to discharge w/patient and caregiver  - Arrange for needed discharge resources and transportation as appropriate  - Identify discharge learning needs (meds, wound care, etc )  - Arrange for interpretive services to assist at discharge as needed  - Refer to Case Management Department for coordinating discharge planning if the patient needs post-hospital services based on physician/advanced practitioner order or complex needs related to functional status, cognitive ability, or social support system   Outcome: Progressing      Problem: Knowledge Deficit  Goal: Patient/family/caregiver demonstrates understanding of disease process, treatment plan, medications, and discharge instructions  Complete learning assessment and assess knowledge base    Interventions:  - Provide teaching at level of understanding  - Provide teaching via preferred learning methods   Outcome: Progressing      Problem: DISCHARGE PLANNING - CARE MANAGEMENT  Goal: Discharge to post-acute care or home with appropriate resources  INTERVENTIONS:  - Conduct assessment to determine patient/family and health care team treatment goals, and need for post-acute services based on payer coverage, community resources, and patient preferences, and barriers to discharge  - Address psychosocial, clinical, and financial barriers to discharge as identified in assessment in conjunction with the patient/family and health care team  - Arrange appropriate level of post-acute services according to patient's   needs and preference and payer coverage in collaboration with the physician and health care team  - Communicate with and update the patient/family, physician, and health care team regarding progress on the discharge plan  - Arrange appropriate transportation to post-acute venues  -Anticipate return to home once medically stable   Outcome: Progressing

## 2018-11-01 NOTE — MEDICAL STUDENT
Progress Note - General Surgery   Ashlyn Cool 37 y o  male MRN: [id number]  Unit/Bed#: [facility] 626-01 Encounter: [id number]    Assessment:  38 y/o M with hx gastric bypass and chronic pain w/ spinal stimulator who presented with choledocholethiasis and is POD2 from ERCP w/ sphincterectomy and laparascopic cholecystectomy with gastrotomy  Plan:  - Regular diet  - Pain management per Acute Pain Services  Continue to decrease IV pain medications   - DVT ppx with SQ heparin and SCD  - Encourage ambulation and incentive spirometry  - Continue home psych medications  - F/U on tissue exam and AM labs    Subjective/Objective   Subjective:    Patient slept through the night without calling for any pain medication and denies any acute overnight events  This morning patient has stabbing 10/10 pain on the left side near an incision site, but says pain and muscle cramping is well controlled on new pain management regimen  Tolerating regular diet  Patient denies N/V/D  Patient has not had a BM  Objective:    Blood pressure 112/66, pulse 74, temperature 99 9 °F (37 7 °C), resp  rate 20, height 6' (1 829 m), weight 82 3 kg (181 lb 7 oz), SpO2 100 %  ,Body mass index is 24 61 kg/m²  Intake/Output Summary (Last 24 hours) at 11/01/18 0523  Last data filed at 11/01/18 0300   Gross per 24 hour   Intake              930 ml   Output             2251 ml   Net            -1321 ml       Invasive Devices     Peripheral Intravenous Line            Peripheral IV 10/30/18 Right Arm 1 day                Physical Exam:  General: no acute distress  HEENT: moist mucous membranes, normocephalic  CV: regular rate and rhythm, no murmurs  Lungs: clear to auscultation, no SOB, no increased WOB  Abdomen: Non-distended, soft  Tenderness to palpation of incision site  Soft mass noted on left posterior flank  Neurologic: alert and oriented to person, place, and time        Lab, Imaging and other studies:  CBC:   Lab Results   Component Value Date    WBC 9 71 10/31/2018    HGB 12 0 10/31/2018    HCT 40 3 10/31/2018    MCV 73 (L) 10/31/2018     10/31/2018    MCH 21 6 (L) 10/31/2018    MCHC 29 8 (L) 10/31/2018    RDW 26 8 (H) 10/31/2018    NRBC 0 10/31/2018   , CMP:   Lab Results   Component Value Date     (L) 10/31/2018    K 3 8 10/31/2018     10/31/2018    CO2 26 10/31/2018    BUN 5 10/31/2018    CREATININE 0 85 10/31/2018    CALCIUM 8 2 (L) 10/31/2018    AST 44 10/31/2018    ALT 52 10/31/2018    ALKPHOS 93 10/31/2018    EGFR 107 10/31/2018     VTE Pharmacologic Prophylaxis: Heparin  VTE Mechanical Prophylaxis: sequential compression device

## 2018-11-01 NOTE — DISCHARGE INSTRUCTIONS
Acute Care Surgery Discharge Instructions    Please follow-up as instructed  Activity:  - No lifting greater than 20 pounds or strenuous physical activity or exercise for at least 2 weeks  - Walking and normal light activities are encouraged  - Normal daily activities including climbing steps are okay  - No driving until no longer using pain medications  Return to work:    - You may return to work on Thursday, 11/8/2018 or sooner if you are feeling well enough  Diet:    - You may resume your normal diet  Wound Care:  - May shower daily  No tub baths or swimming until cleared by your surgeon   - Wash incision gently with soap and water and pat dry  - Do not apply any creams or ointments unless instructed to do so by your surgeon  Medications:    - You may resume all of your regular medications, including blood thinners and aspirin, after going home unless otherwise instructed  Please refer to your discharge medication list for further details  - Please take the pain medications as directed  - You are encouraged to use non-narcotic pain medications first and whenever possible  Reserve the use of narcotic pain medication for moderate to severe pain not controlled by non-narcotic medications   - No driving while taking narcotic pain medications  - You may become constipated, especially if taking pain medications  You may take any over the counter stool softeners or laxatives as needed  Examples: Milk of Magnesia, Colace, Senna  Additional Instructions:  - If you have any questions or concerns after discharge please call the office   - Call office or return to ER if fever greater than 101, chills, persistent nausea/vomiting, worsening/uncontrollable pain, and/or increasing redness or purulent/foul smelling drainage from incision(s)

## 2018-11-01 NOTE — PROGRESS NOTES
Progress Note - General Surgery   Katrin Archer 37 y o  male MRN: [id number]  Unit/Bed#: [facility] 626-01 Encounter: [id number]    Assessment:  38 y/o M w/ hx of gastric bypass, chronic pain w/ spinal cord stimulator in place, p/w choledocholithiasis, s/p laparoscopic-assisted ERCP, cholecystectomy, POD #2    Plan:  --Regular diet  --Pain control per Acute Pain recs  --f/u AM labs  --OOB, ambulate  --IS  --DVT ppx  --f/u left lower back mass (likely lipoma) with General Surgery outpatient    Subjective/Objective     Subjective:     No acute events overnight  Pt slept well, but awakened this morning in 10/10 intense L flank pain, near his incision site  Tolerating diet  Denies any N/V/D  Reports new pain medication regimen seems to be relieving his sx  Passing flatus, no bowel movements  Objective:     Blood pressure 112/66, pulse 74, temperature 99 9 °F (37 7 °C), resp  rate 20, height 6' (1 829 m), weight 82 3 kg (181 lb 7 oz), SpO2 100 %  ,Body mass index is 24 61 kg/m²  I/O       10/30 0701 - 10/31 0700 10/31 0701 - 11/01 0700    P  O  180 780    I V  (mL/kg) 3125 (38)     IV Piggyback 150     Total Intake(mL/kg) 3455 (42) 780 (9 5)    Urine (mL/kg/hr) 1925 (1) 1900 (1)    Emesis/NG output  1    Blood 50     Total Output 1975 1901    Net +1480 -1121                Invasive Devices     Peripheral Intravenous Line            Peripheral IV 10/30/18 Right Arm 1 day                Physical Exam:    GEN: NAD  HEENT: MMM  CV: RRR  Lung: normal effort  Ab: Soft, ND, +incisional tenderness  Back: 4 cm soft, mobile mass  Extrem: No CCE  Neuro:  A+Ox3, motor and sensation grossly intact      Lab, Imaging and other studies:  CBC:   Lab Results   Component Value Date    WBC 9 71 10/31/2018    HGB 12 0 10/31/2018    HCT 40 3 10/31/2018    MCV 73 (L) 10/31/2018     10/31/2018    MCH 21 6 (L) 10/31/2018    MCHC 29 8 (L) 10/31/2018    RDW 26 8 (H) 10/31/2018    NRBC 0 10/31/2018   , CMP:   Lab Results   Component Value Date     (L) 10/31/2018    K 3 8 10/31/2018     10/31/2018    CO2 26 10/31/2018    BUN 5 10/31/2018    CREATININE 0 85 10/31/2018    CALCIUM 8 2 (L) 10/31/2018    AST 44 10/31/2018    ALT 52 10/31/2018    ALKPHOS 93 10/31/2018    EGFR 107 10/31/2018   , Coagulation: No results found for: PT, INR, APTT, Urinalysis: No results found for: COLORU, CLARITYU, SPECGRAV, PHUR, LEUKOCYTESUR, NITRITE, PROTEINUA, GLUCOSEU, KETONESU, BILIRUBINUR, BLOODU, Amylase: No results found for: AMYLASE, Lipase: No results found for: LIPASE  VTE Pharmacologic Prophylaxis: Heparin  VTE Mechanical Prophylaxis: sequential compression device

## 2018-11-02 ENCOUNTER — HOSPITAL ENCOUNTER (OUTPATIENT)
Dept: INFUSION CENTER | Facility: HOSPITAL | Age: 43
Discharge: HOME | End: 2018-11-02

## 2018-11-02 ENCOUNTER — TRANSITIONAL CARE MANAGEMENT (OUTPATIENT)
Dept: FAMILY MEDICINE CLINIC | Facility: CLINIC | Age: 43
End: 2018-11-02

## 2018-11-02 NOTE — TELEPHONE ENCOUNTER
Advise him that since he just filled his Morphine script yesterday, they are not going to fill another script for something else today  Have him try taking the MS IR 15 mg, 1 5 tablets every 4-6 hours to see if that helps over the weekend  He can also try to take Tylenol 500-1000 mg QID in between as well and put ICE on his abdomen to help with the pain  If he is still having issues or starts to exhibit any s/s of infection, he should call our office or go to the ER for re-evaluation  Otherwise he should call us back on Monday to give us an update  Thank you

## 2018-11-02 NOTE — TELEPHONE ENCOUNTER
S/w pt, states he has been taking MS IR 15 mg 1 tab po q 5-6 x daily with no relief  Pt states he was given dilaudid in the hospital w/ + relief  Questioned something different for pain as the current rx is not helping  Pt confirmed + bm - pain is not s/t constipation  Advised pt, will d/w DG and cb to advise  Pt verbalized understandign and appreciation

## 2018-11-02 NOTE — TELEPHONE ENCOUNTER
Pt is calling stating that the Morphine Sulfate that was prescribed is not helping at all with the pain, pt stated that he took it over an hour ago and has not kicked in  Pt is requesting something stronger for the pain and that he can have his daughter  script if need be   Pt can be reached at [phone]

## 2018-11-05 ENCOUNTER — OFFICE VISIT (OUTPATIENT)
Dept: BEHAVIORAL/MENTAL HEALTH CLINIC | Facility: CLINIC | Age: 43
End: 2018-11-05
Payer: MEDICARE

## 2018-11-05 DIAGNOSIS — F41.8 DEPRESSION WITH ANXIETY: Primary | ICD-10-CM

## 2018-11-05 PROCEDURE — 90832 PSYTX W PT 30 MINUTES: CPT | Performed by: SOCIAL WORKER

## 2018-11-05 NOTE — PATIENT INSTRUCTIONS
Processed stressors during session- support provided  Reviewed coping strategies for his stress and anxiety issues  Will consult with his PCP regarding his increase in nightmares and affect on sleep on nightly basis  Will continue to see him on a PRN basis

## 2018-11-05 NOTE — Clinical Note
Patient asking about potential meds options for his recurring nightmares and affect on sleep  Let e know what you think   Cristofer Rojo

## 2018-11-05 NOTE — PSYCH
Assessment/Plan: Manage depression and anxiety     There are no diagnoses linked to this encounter  Subjective: Has been dealing with more stress and anxiety secondary to his health issues  Health issues with partner as well as daughter's struggles with her mother  Has been having more nightmares almost nightly  Seems to have triggered PTSD issues  Patient ID: Cosme Lombard is a 37 y o  male  Hawkins Jey continues to report issues with anxiety and sleep  Nightmares have become a more consistent  issues  Denies any acute depressive symptoms and very pleased with his relationship with his daughter  Review of Systems   Psychiatric/Behavioral: The patient is nervous/anxious  Objective: Christaldilipaleida Bairon reports improving situation with his daughter although daughter had to file PFA from mother  Has created stress and anxiety  Has had some residual stress from his own physical health issues and stress within workplace  Having more nightmares and flashbacks  Not sleeping well on a nightly basis  Physical Exam   Psychiatric: His speech is normal and behavior is normal  Judgment and thought content normal  His mood appears anxious   Cognition and memory are normal    No SI

## 2018-11-09 NOTE — TELEPHONE ENCOUNTER
S/w pt, states he has #2 pills on hand  Has to leave at 0800 tomorrow morning to go to [facility] tomorrow with his fiancee  Would like to fill his rx today so he can go with her  Advised pt per DG, ok to fill today however, in the future the pt will have to make his rx last until he can get it filled, DG cannot continue to adjust rx's  Pt verbalized understanding and appreciation  S/w Nely Wei at [facility] [id number]  Advised of above  Nely Wei verbalized understanding  Will prepare the rx for pu today

## 2018-11-09 NOTE — TELEPHONE ENCOUNTER
Spoke with pharmacy pt is requesting refill today for morphine (MSIR) 15 mg tablet, date on refill 11/10/18   [place] call pharmacy on [phone]    Uses: [id number], [address], [place]

## 2018-11-13 ENCOUNTER — OFFICE VISIT (OUTPATIENT)
Dept: SURGERY | Facility: CLINIC | Age: 43
End: 2018-11-13

## 2018-11-13 VITALS
BODY MASS INDEX: 24.87 KG/M2 | WEIGHT: 183.6 LBS | TEMPERATURE: 96.4 F | DIASTOLIC BLOOD PRESSURE: 68 MMHG | HEIGHT: 72 IN | SYSTOLIC BLOOD PRESSURE: 122 MMHG

## 2018-11-13 DIAGNOSIS — Z90.49 STATUS POST LAPAROSCOPIC CHOLECYSTECTOMY: Primary | ICD-10-CM

## 2018-11-13 PROBLEM — K80.50 CHOLEDOCHOLITHIASIS: Status: RESOLVED | Noted: 2018-10-29 | Resolved: 2018-11-13

## 2018-11-13 PROBLEM — K81.9 CHOLECYSTITIS: Status: RESOLVED | Noted: 2018-10-27 | Resolved: 2018-11-13

## 2018-11-13 PROCEDURE — 99024 POSTOP FOLLOW-UP VISIT: CPT | Performed by: SURGERY

## 2018-11-13 NOTE — ASSESSMENT & PLAN NOTE
Recovering from surgery fairly well  Tolerating diet  Allow him activity and diet as tolerated  See back if needed  I told him he may have some continued drainage from the 2 incisions a but this should gradually improve  If there is any questions he is asked to call us    See back if needed

## 2018-11-13 NOTE — PROGRESS NOTES
Office Visit - General Surgery  Martha Schwarz MRN: [id number]  Encounter: [id number]    Assessment and Plan    Problem List Items Addressed This Visit        Other    Status post laparoscopic cholecystectomy - Primary     Recovering from surgery fairly well  Tolerating diet  Allow him activity and diet as tolerated  See back if needed  I told him he may have some continued drainage from the 2 incisions a but this should gradually improve  If there is any questions he is asked to call us  See back if needed               Chief Complaint:  Martha Schwarz is a 37 y o  male who presents for Post-op (p/o lap assisted ercp & lap ally)    Subjective  55-year-old male status post laparoscopic-assisted ERCP and lap choly  Still having some discomfort at the incision sites but gradually getting better  He noted some clearish drainage from the epigastric incision site and bloody drainage from the umbilical incision    Past Medical History  Past Medical History:   Diagnosis Date    Anxiety     Arthritis     Bipolar disorder (HCC)     Chronic left lumbar radiculopathy     Chronic low back pain     Chronic pain syndrome     Chronic right shoulder pain     Depression with anxiety     Fatigue     GERD (gastroesophageal reflux disease)     Hydronephrosis     Iron deficiency anemia     Kidney stone     Lytic bone lesions on xray     Nephrolithiasis     PONV (postoperative nausea and vomiting)     Right elbow pain     Right lateral epicondylitis        Past Surgical History  Past Surgical History:   Procedure Laterality Date    ADENOIDECTOMY Bilateral     APPENDECTOMY      BACK SURGERY      CHOLECYSTECTOMY LAPAROSCOPIC N/A 10/30/2018    Procedure: CHOLECYSTECTOMY WITH GASTROTOMY LAPAROSCOPIC;  Surgeon: Gabriel Snyder MD;  [facility];  Service: General    ERCP W/ SPHICTEROTOMY N/A 10/30/2018    Procedure: ENDOSCOPIC RETROGRADE CHOLANGIOPANCREATOGRAPHY (ERCP) W/ SPHINCTEROTOMY;  Surgeon: Yumiko Kelley MD;  [facility];  Service: Gastroenterology    GASTRIC BYPASS      2009    OTHER SURGICAL HISTORY      fusion/refusion of vertebrae, 2010 and 2011 l5-s1 and l4-l5    VA CYSTO/URETERO W/LITHOTRIPSY &INDWELL STENT INSRT Right 8/8/2017    Procedure: CYSTOSCOPY; URETEROSCOPY; HOLMIUM LASER; RETROGRADE PYELOGRAM; STENT;  Surgeon: Neno Sevilla MD;  [facility];  Service: Urology    VA IMPLANT SPINAL NEUROSTIM/ Right 11/14/2017    Procedure: REMOVAL LOWER MEDIAL BUTTOCK SPINAL CORD STIMULATOR GENERATOR; PLACEMENT OF NEW BUTTOCK SPINAL CORD STIMULATOR THROUGH SEPERATE INCISION;  Surgeon: Kasey Celis MD;  [facility];  Service: Neurosurgery    RIK-EN-Y PROCEDURE  2003    SPINAL CORD STIMULATOR IMPLANT  11/14/2017    dr Karina Becker procedure and technique 1  removal of a right back implantable pulse generator 2 placement of a new right buttock impantable pulse generator through seperate incision 3 electric analys complex programming spinal cord stimulator system postoperative period approx 1 hour    TONSILLECTOMY         Family History  Family History   Problem Relation Age of Onset    Cancer Mother     Arthritis Mother     Stomach cancer Mother     Cancer Father     Esophageal cancer Father     Other Father         brain tumor    No Known Problems Sister     No Known Problems Sister     No Known Problems Sister        Medications  Current Outpatient Prescriptions on File Prior to Visit   Medication Sig Dispense Refill    acetaminophen (TYLENOL) 325 mg tablet Take 3 tablets (975 mg total) by mouth every 8 (eight) hours 30 tablet 0    ALPRAZolam (XANAX) 0 5 mg tablet Take 1 tablet (0 5 mg total) by mouth every 4 (four) hours as needed for anxiety 120 tablet 2    buPROPion (WELLBUTRIN) 75 mg tablet Take 1 tablet (75 mg total) by mouth 2 (two) times a day 60 tablet 1    morphine (MSIR) 15 mg tablet Take 0 5-1 tablets (7 5-15 mg total) by mouth every 4 (four) hours as needed for moderate pain or severe pain Max Daily Amount: 90 mg 30 tablet 0    omeprazole (PRILOSEC OTC) 20 MG tablet Take 20 mg by mouth as needed        QUEtiapine (SEROquel) 100 mg tablet TAKE 1 TABLET (100 MG TOTAL) BY MOUTH DAILY AT BEDTIME 30 tablet 2    dicyclomine (BENTYL) 20 mg tablet Take 1 tablet (20 mg total) by mouth every 6 (six) hours as needed (abdominal cramps) for up to 7 days 28 tablet 0    docusate sodium (COLACE) 100 mg capsule Take 1 capsule (100 mg total) by mouth 2 (two) times a day for 7 days 14 capsule 0    methocarbamol (ROBAXIN) 750 mg tablet Take 1 tablet (750 mg total) by mouth every 6 (six) hours for 5 days 20 tablet 0    senna (SENOKOT) 8 6 mg Take 1 tablet (8 6 mg total) by mouth daily at bedtime as needed for constipation (Patient not taking: Reported on 11/13/2018 ) 120 each 0    simethicone (MYLICON) 80 mg chewable tablet Chew 1 tablet (80 mg total) every 6 (six) hours as needed for flatulence (Patient not taking: Reported on 11/13/2018 ) 30 tablet 0     Current Facility-Administered Medications on File Prior to Visit   Medication Dose Route Frequency Provider Last Rate Last Dose    cyanocobalamin injection 1,000 mcg  1,000 mcg Intramuscular Q30 Days Jesusita Reyes MD   1,000 mcg at 10/09/18 1049       Allergies  Allergies   Allergen Reactions    Ampicillin GI Intolerance     Vomiting    Aspirin GI Intolerance     vomiting      Penicillins GI Intolerance     Vomit       Review of Systems    Objective  Vitals:    11/13/18 0943   BP: 122/68   Temp: (!) 96 4 °F (35 8 °C)       Physical Exam   Abdomen:  Ecchymosis in the lower abdomen  The epigastric incision skin is slightly  with minimal redness  The infraumbilical incision is healing with some surrounding ecchymosis  The other incisions are healing well    Abdomen soft nontender

## 2018-11-13 NOTE — LETTER
November 13, 2018     Cammy Rich MD  [address] [facility] [address]    Patient: Idalmis Perez   YOB: 1975   Date of Visit: 11/13/2018       Dear Dr Janel Joshi: Thank you for referring Idalmis Perez to me for evaluation  Below are my notes for this consultation  If you have questions, please do not hesitate to call me  I look forward to following your patient along with you  Sincerely,        Caron Nolan MD        CC: MD Caron Jaramillo MD  11/13/2018 10:02 AM  Sign at close encounter  Office Visit - [address] MRN: [id number]  Encounter: [id number]    Assessment and Plan    Problem List Items Addressed This Visit        Other    Status post laparoscopic cholecystectomy - Primary     Recovering from surgery fairly well  Tolerating diet  Allow him activity and diet as tolerated  See back if needed  I told him he may have some continued drainage from the 2 incisions a but this should gradually improve  If there is any questions he is asked to call us  See back if needed               Chief Complaint:  Idalmis Perez is a 37 y o  male who presents for Post-op (p/o lap assisted ercp & lap ally)    Subjective  59-year-old male status post laparoscopic-assisted ERCP and lap choly  Still having some discomfort at the incision sites but gradually getting better  He noted some clearish drainage from the epigastric incision site and bloody drainage from the umbilical incision    Past Medical History  Past Medical History:   Diagnosis Date    Anxiety     Arthritis     Bipolar disorder (HCC)     Chronic left lumbar radiculopathy     Chronic low back pain     Chronic pain syndrome     Chronic right shoulder pain     Depression with anxiety     Fatigue     GERD (gastroesophageal reflux disease)     Hydronephrosis     Iron deficiency anemia     Kidney stone     Lytic bone lesions on xray     Nephrolithiasis  PONV (postoperative nausea and vomiting)     Right elbow pain     Right lateral epicondylitis        Past Surgical History  Past Surgical History:   Procedure Laterality Date    ADENOIDECTOMY Bilateral     APPENDECTOMY      BACK SURGERY      CHOLECYSTECTOMY LAPAROSCOPIC N/A 10/30/2018    Procedure: CHOLECYSTECTOMY WITH GASTROTOMY LAPAROSCOPIC;  Surgeon: Emilia Felix MD;  [facility];  Service: General    ERCP W/ SPHICTEROTOMY N/A 10/30/2018    Procedure: ENDOSCOPIC RETROGRADE CHOLANGIOPANCREATOGRAPHY (ERCP) W/ SPHINCTEROTOMY;  Surgeon: Shereen Castillo MD;  [facility];  Service: Gastroenterology    GASTRIC BYPASS      2009    OTHER SURGICAL HISTORY      fusion/refusion of vertebrae, 2010 and 2011 l5-s1 and l4-l5    [place] CYSTO/URETERO W/LITHOTRIPSY &INDWELL STENT INSRT Right 8/8/2017    Procedure: CYSTOSCOPY; URETEROSCOPY; HOLMIUM LASER; RETROGRADE PYELOGRAM; STENT;  Surgeon: Linda Pool MD;  [facility];  Service: Urology    [place] IMPLANT SPINAL NEUROSTIM/ Right 11/14/2017    Procedure: REMOVAL LOWER MEDIAL BUTTOCK SPINAL CORD STIMULATOR GENERATOR; PLACEMENT OF NEW BUTTOCK SPINAL CORD [facility];  Surgeon: Edilberto Morfin MD;  [facility];  Service: Neurosurgery    RIK-EN-Y PROCEDURE  2003    SPINAL CORD STIMULATOR IMPLANT  11/14/2017    dr Buck Gordon procedure and technique 1  removal of a right back implantable pulse generator 2 placement of a new right buttock impantable pulse generator through seperate incision 3 electric analys complex programming spinal cord stimulator system postoperative period approx 1 hour    TONSILLECTOMY         Family History  Family History   Problem Relation Age of Onset    Cancer Mother     Arthritis Mother     Stomach cancer Mother     Cancer Father     Esophageal cancer Father     Other Father         brain tumor    No Known Problems Sister     No Known Problems Sister     No Known Problems Sister        Medications  Current Outpatient Prescriptions on File Prior to Visit   Medication Sig Dispense Refill    acetaminophen (TYLENOL) 325 mg tablet Take 3 tablets (975 mg total) by mouth every 8 (eight) hours 30 tablet 0    ALPRAZolam (XANAX) 0 5 mg tablet Take 1 tablet (0 5 mg total) by mouth every 4 (four) hours as needed for anxiety 120 tablet 2    buPROPion (WELLBUTRIN) 75 mg tablet Take 1 tablet (75 mg total) by mouth 2 (two) times a day 60 tablet 1    morphine (MSIR) 15 mg tablet Take 0 5-1 tablets (7 5-15 mg total) by mouth every 4 (four) hours as needed for moderate pain or severe pain Max Daily Amount: 90 mg 30 tablet 0    omeprazole (PRILOSEC OTC) 20 MG tablet Take 20 mg by mouth as needed        QUEtiapine (SEROquel) 100 mg tablet TAKE 1 TABLET (100 MG TOTAL) BY MOUTH DAILY AT BEDTIME 30 tablet 2    dicyclomine (BENTYL) 20 mg tablet Take 1 tablet (20 mg total) by mouth every 6 (six) hours as needed (abdominal cramps) for up to 7 days 28 tablet 0    docusate sodium (COLACE) 100 mg capsule Take 1 capsule (100 mg total) by mouth 2 (two) times a day for 7 days 14 capsule 0    methocarbamol (ROBAXIN) 750 mg tablet Take 1 tablet (750 mg total) by mouth every 6 (six) hours for 5 days 20 tablet 0    senna (SENOKOT) 8 6 mg Take 1 tablet (8 6 mg total) by mouth daily at bedtime as needed for constipation (Patient not taking: Reported on 11/13/2018 ) 120 each 0    simethicone (MYLICON) 80 mg chewable tablet Chew 1 tablet (80 mg total) every 6 (six) hours as needed for flatulence (Patient not taking: Reported on 11/13/2018 ) 30 tablet 0     Current Facility-Administered Medications on File Prior to Visit   Medication Dose Route Frequency Provider Last Rate Last Dose    cyanocobalamin injection 1,000 mcg  1,000 mcg Intramuscular Q30 Days Tiffany Conley MD   1,000 mcg at 10/09/18 1049       Allergies  Allergies   Allergen Reactions    Ampicillin GI Intolerance     Vomiting    Aspirin GI Intolerance     vomiting      Penicillins GI Intolerance     Vomit       Review of Systems    Objective  Vitals:    11/13/18 0943   BP: 122/68   Temp: (!) 96 4 °F (35 8 °C)       Physical Exam   Abdomen:  Ecchymosis in the lower abdomen  The epigastric incision skin is slightly  with minimal redness  The infraumbilical incision is healing with some surrounding ecchymosis  The other incisions are healing well    Abdomen soft nontender

## 2018-11-14 RX ORDER — SODIUM CHLORIDE 9 MG/ML
20 INJECTION, SOLUTION INTRAVENOUS ONCE
Status: COMPLETED | OUTPATIENT
Start: 2018-11-15 | End: 2018-11-15

## 2018-11-15 ENCOUNTER — HOSPITAL ENCOUNTER (OUTPATIENT)
Dept: INFUSION CENTER | Facility: HOSPITAL | Age: 43
Discharge: HOME | End: 2018-11-15
Payer: MEDICARE

## 2018-11-15 VITALS
HEART RATE: 76 BPM | DIASTOLIC BLOOD PRESSURE: 60 MMHG | SYSTOLIC BLOOD PRESSURE: 120 MMHG | TEMPERATURE: 96.3 F | RESPIRATION RATE: 18 BRPM

## 2018-11-15 PROCEDURE — 96365 THER/PROPH/DIAG IV INF INIT: CPT

## 2018-11-15 PROCEDURE — 96366 THER/PROPH/DIAG IV INF ADDON: CPT

## 2018-11-15 RX ADMIN — IRON SUCROSE 300 MG: 20 INJECTION, SOLUTION INTRAVENOUS at 09:29

## 2018-11-15 RX ADMIN — SODIUM CHLORIDE 20 ML/HR: 0.9 INJECTION, SOLUTION INTRAVENOUS at 09:29

## 2018-11-16 DIAGNOSIS — F33.2 MDD (MAJOR DEPRESSIVE DISORDER), RECURRENT SEVERE, WITHOUT PSYCHOSIS (HCC): ICD-10-CM

## 2018-11-16 RX ORDER — QUETIAPINE FUMARATE 100 MG/1
100 TABLET, FILM COATED ORAL
Qty: 30 TABLET | Refills: 0 | Status: SHIPPED | OUTPATIENT
Start: 2018-11-16 | End: 2018-12-07 | Stop reason: SDUPTHER

## 2018-11-16 RX ORDER — QUETIAPINE FUMARATE 100 MG/1
100 TABLET, FILM COATED ORAL
Qty: 30 TABLET | Refills: 2 | OUTPATIENT
Start: 2018-11-16

## 2018-12-07 DIAGNOSIS — F33.2 MDD (MAJOR DEPRESSIVE DISORDER), RECURRENT SEVERE, WITHOUT PSYCHOSIS (HCC): ICD-10-CM

## 2018-12-07 RX ORDER — QUETIAPINE FUMARATE 100 MG/1
100 TABLET, FILM COATED ORAL
Qty: 30 TABLET | Refills: 2 | Status: SHIPPED | OUTPATIENT
Start: 2018-12-07 | End: 2019-02-08 | Stop reason: SDUPTHER

## 2018-12-12 DIAGNOSIS — F31.81 BIPOLAR 2 DISORDER, MAJOR DEPRESSIVE EPISODE (HCC): Chronic | ICD-10-CM

## 2018-12-14 RX ORDER — BUPROPION HYDROCHLORIDE 75 MG/1
TABLET ORAL
Qty: 60 TABLET | Refills: 0 | Status: SHIPPED | OUTPATIENT
Start: 2018-12-14 | End: 2019-01-07 | Stop reason: SDUPTHER

## 2018-12-17 ENCOUNTER — OFFICE VISIT (OUTPATIENT)
Dept: FAMILY MEDICINE CLINIC | Facility: CLINIC | Age: 43
End: 2018-12-17
Payer: MEDICARE

## 2018-12-17 VITALS
RESPIRATION RATE: 14 BRPM | DIASTOLIC BLOOD PRESSURE: 74 MMHG | SYSTOLIC BLOOD PRESSURE: 112 MMHG | BODY MASS INDEX: 24.24 KG/M2 | TEMPERATURE: 97 F | WEIGHT: 179 LBS | HEIGHT: 72 IN | HEART RATE: 68 BPM

## 2018-12-17 DIAGNOSIS — M54.41 CHRONIC LOW BACK PAIN WITH BILATERAL SCIATICA, UNSPECIFIED BACK PAIN LATERALITY: ICD-10-CM

## 2018-12-17 DIAGNOSIS — G89.29 CHRONIC LOW BACK PAIN WITH BILATERAL SCIATICA, UNSPECIFIED BACK PAIN LATERALITY: ICD-10-CM

## 2018-12-17 DIAGNOSIS — Z11.59 NEED FOR HEPATITIS B SCREENING TEST: ICD-10-CM

## 2018-12-17 DIAGNOSIS — F41.8 DEPRESSION WITH ANXIETY: Chronic | ICD-10-CM

## 2018-12-17 DIAGNOSIS — D64.9 CHRONIC ANEMIA: Chronic | ICD-10-CM

## 2018-12-17 DIAGNOSIS — K91.2 POSTGASTRECTOMY MALABSORPTION: ICD-10-CM

## 2018-12-17 DIAGNOSIS — M54.42 CHRONIC LOW BACK PAIN WITH BILATERAL SCIATICA, UNSPECIFIED BACK PAIN LATERALITY: ICD-10-CM

## 2018-12-17 DIAGNOSIS — F31.81 BIPOLAR 2 DISORDER, MAJOR DEPRESSIVE EPISODE (HCC): Chronic | ICD-10-CM

## 2018-12-17 DIAGNOSIS — M96.1 LUMBAR POST-LAMINECTOMY SYNDROME: Primary | ICD-10-CM

## 2018-12-17 DIAGNOSIS — Z72.0 TOBACCO ABUSE: ICD-10-CM

## 2018-12-17 DIAGNOSIS — Z90.3 POSTGASTRECTOMY MALABSORPTION: ICD-10-CM

## 2018-12-17 PROCEDURE — 99214 OFFICE O/P EST MOD 30 MIN: CPT | Performed by: FAMILY MEDICINE

## 2018-12-17 RX ORDER — PREDNISONE 10 MG/1
TABLET ORAL
Qty: 20 TABLET | Refills: 0 | Status: SHIPPED | OUTPATIENT
Start: 2018-12-17 | End: 2019-01-14

## 2018-12-17 NOTE — PROGRESS NOTES
FAMILY PRACTICE OFFICE VISIT       NAME: Silvia Hall  AGE: 37 y o  SEX: male       : 1975        MRN: [id number]        Assessment and Plan     Problem List Items Addressed This Visit     Chronic low back pain    Bipolar 2 disorder, major depressive episode (HCC) (Chronic)    Depression with anxiety (Chronic)    Chronic anemia (Chronic)    Relevant Orders    Hepatitis B surface antibody    CBC    Vitamin B12    Vitamin D 25 hydroxy    Iron, TIBC and Ferritin Panel    Lumbar post-laminectomy syndrome - Primary    Relevant Medications    predniSONE 10 mg tablet    Postgastrectomy malabsorption    Relevant Orders    Hepatitis B surface antibody    CBC    Vitamin B12    Vitamin D 25 hydroxy    Iron, TIBC and Ferritin Panel    Tobacco abuse      Other Visit Diagnoses     Need for hepatitis B screening test        Relevant Orders    Vitamin B12       Patient presents for follow-up  Depression  Symptoms have improved significantly with start of Wellbutrin 75 mg twice a day  Patient denies any adverse side effects  He remains on Seroquel 100 mg at bedtime that was originally started by Psychiatry due to history of bipolar disorder  Will continue same medication regimen, patient will contact me with any changes in his symptoms or concerns  Chronic low back pain  Postlaminectomy syndrome  Patient is under care of [facility]  Unfortunately, he reports daily persistent back pain, patient states that injection was minimally effective  His current med regimen includes Butrans, baclofen and p r n  Morphine sulfate  Patient is not a candidate for anti-inflammatories due to history of gastric bypass surgery as well as history of aspirin allergy  Patient does recall some relief of his symptoms after completing Medrol Dosepak  I will prescribe him prednisone for 8 days  Patient will follow up with pain management otherwise  Longstanding symptoms of iron and vitamin B12 deficiency    Patient is currently treated by [facility] with iron infusions but has stopped vitamin B12 supplements  Will proceed with blood work as outlined above  Patient also would like to be screened for hepatitis B immunity  If hepatitis-B surface antibody titer is low-he will need to restart vaccination  Tobacco use:  Patient is interested in cessation  We discussed possibility of Chantix use and possible benefits and side effects  Patient will look into this medication and will get back to me  If he is willing to try-we can start him on Chantix as long as patient and family members are observing for signs and symptoms of depression  I have spent 25 minutes with Patient  today in which greater than 50% of this time was spent in counseling/coordination of care regarding Diagnostic results, Prognosis, Risks and benefits of tx options, Intructions for management, Patient and family education, Importance of tx compliance, Risk factor reductions and Impressions  There are no Patient Instructions on file for this visit  [employer] software was used to dictate this note  It may contain errors with dictating incorrect words/spelling  Please contact provider directly with any questions  Chief Complaint     Chief Complaint   Patient presents with    Follow-up    Medication Refill    Back Pain       History of Present Illness     Patient presents for follow-up  He has been using Wellbutrin 75 mg twice a day for symptoms of depression, patient stated medication works very well for his symptoms and causes no adverse side effects  Patient denies symptoms of insomnia or anxiety  He is compliant with medication and would like to continue on the same dose  He remains on Seroquel 100 mg at bedtime that was originally started by Psychiatry  Patient remains under care of [facility]  He is complaining of persistent low back pain  Patient remains on Butrans patch and p r n  Baclofen  Unfortunately, he did not experience any relief of his symptoms with gabapentin  Patient had 1 spine injection that unfortunately was not helpful as well, but patient did notice improvement of his low back pain after completing Medrol Dosepak that I have prescribed a while ago  He is currently using morphine sulfate as needed  Patient is not a candidate for anti-inflammatories due to history of gastric bypass surgery  Post gastrectomy malabsorption  Chronic iron and vitamin B12 deficiency anemia  Patient remains under care of [facility] and is being treated with iron infusions  Patient reports that he has stopped vitamin B12 injections since he presume that iron infusions will replace all other supplements  Patient is a current smoker and would like to quit  He is here to explore possibility of Chantix use  Patient is concerned that he is not a candidate for medication due to history of depression  Medication Refill     Back Pain         Review of Systems   Review of Systems   Constitutional: Negative  HENT: Negative  Respiratory: Negative  Cardiovascular: Negative  Gastrointestinal: Negative  Genitourinary: Negative  Musculoskeletal: Positive for back pain  Neurological: Negative  Psychiatric/Behavioral: Negative for dysphoric mood and sleep disturbance          As outlined in HPI       Active Problem List     Patient Active Problem List   Diagnosis    Chronic low back pain    Chronic left lumbar radiculopathy    Bipolar 2 disorder, major depressive episode (HCC)    Chronic right shoulder pain    Depression with anxiety    Fatigue    Chronic anemia    Nephrolithiasis    Pain syndrome, chronic    Right elbow pain    Right lateral epicondylitis    Uncomplicated opioid dependence (HCC)    Thumb pain    Lumbar post-laminectomy syndrome    Myofascial pain syndrome    Lumbar radiculopathy    Spinal stenosis of lumbar region with neurogenic claudication    BPH with obstruction/lower urinary tract symptoms    Iron deficiency anemia    Vitamin B deficiency    Postgastrectomy malabsorption    History of Parth-en-Y gastric bypass    Tobacco abuse    Status post laparoscopic cholecystectomy       Past Medical History:  Past Medical History:   Diagnosis Date    Anxiety     Arthritis     Bipolar disorder (HCC)     Chronic left lumbar radiculopathy     Chronic low back pain     Chronic pain syndrome     Chronic right shoulder pain     Depression with anxiety     Fatigue     GERD (gastroesophageal reflux disease)     Hydronephrosis     Iron deficiency anemia     Kidney stone     Lytic bone lesions on xray     Nephrolithiasis     PONV (postoperative nausea and vomiting)     Right elbow pain     Right lateral epicondylitis        Past Surgical History:  Past Surgical History:   Procedure Laterality Date    ADENOIDECTOMY Bilateral     APPENDECTOMY      BACK SURGERY      CHOLECYSTECTOMY LAPAROSCOPIC N/A 10/30/2018    Procedure: CHOLECYSTECTOMY WITH GASTROTOMY LAPAROSCOPIC;  Surgeon: Brionna Cuello MD;  [facility];  Service: General    ERCP W/ SPHICTEROTOMY N/A 10/30/2018    Procedure: ENDOSCOPIC RETROGRADE CHOLANGIOPANCREATOGRAPHY (ERCP) W/ SPHINCTEROTOMY;  Surgeon: Maurilio Gandhi MD;  [facility];  Service: Gastroenterology    GASTRIC BYPASS      2009    OTHER SURGICAL HISTORY      fusion/refusion of vertebrae, 2010 and 2011 l5-s1 and l4-l5    KS CYSTO/URETERO W/LITHOTRIPSY &INDWELL STENT INSRT Right 8/8/2017    Procedure: CYSTOSCOPY; URETEROSCOPY; HOLMIUM LASER; RETROGRADE PYELOGRAM; STENT;  Surgeon: Batsheva Ramirez MD;  [facility];  Service: Urology    KS IMPLANT SPINAL NEUROSTIM/ Right 11/14/2017    Procedure: REMOVAL LOWER MEDIAL BUTTOCK SPINAL CORD STIMULATOR GENERATOR; PLACEMENT OF NEW BUTTOCK SPINAL CORD STIMULATOR THROUGH SEPERATE INCISION;  Surgeon: Chester Wayne MD;  Location[facility];  Service: Neurosurgery    RIK-EN-Y PROCEDURE  2003    SPINAL CORD STIMULATOR IMPLANT  11/14/2017    dr Bob Kent procedure and technique 1  removal of a right back implantable pulse generator 2 placement of a new right buttock impantable pulse generator through seperate incision 3 electric analys complex programming spinal cord stimulator system postoperative period approx 1 hour    TONSILLECTOMY         Family History:  Family History   Problem Relation Age of Onset    Cancer Mother     Arthritis Mother     Stomach cancer Mother     Cancer Father     Esophageal cancer Father     Other Father         brain tumor    No Known Problems Sister     No Known Problems Sister     No Known Problems Sister        Social History:  Social History     Social History    Marital status:      Spouse name: N/A    Number of children: N/A    Years of education: GED     Occupational History    Not on file  Social History Main Topics    Smoking status: Current Every Day Smoker     Packs/day: 1 00     Types: Cigarettes    Smokeless tobacco: Never Used    Alcohol use No      Comment: quit drinking, social    Drug use: No    Sexual activity: Not on file     Other Topics Concern    Not on file     Social History Narrative    Chooses not to have children    Drinks caffienated tea    Lives with spouse                   Objective     Vitals:    12/17/18 1625   BP: 112/74   Pulse: 68   Resp: 14   Temp: (!) 97 °F (36 1 °C)   TempSrc: Tympanic   Weight: 81 2 kg (179 lb)   Height: 6' (1 829 m)     Wt Readings from Last 3 Encounters:   12/17/18 81 2 kg (179 lb)   11/13/18 83 3 kg (183 lb 9 6 oz)   10/27/18 82 3 kg (181 lb 7 oz)       Physical Exam   Constitutional: He appears well-developed and well-nourished  HENT:   Head: Normocephalic and atraumatic  Cardiovascular: Normal rate and normal heart sounds  Pulmonary/Chest: Effort normal and breath sounds normal  No respiratory distress  He has no wheezes  Musculoskeletal:   Paraspinal spasm and tenderness at L4-L5-L5-S1 bilaterally, no tenderness at SI joints, no tenderness with palpation of hips  Psychiatric: He has a normal mood and affect  His behavior is normal  Thought content normal    Nursing note and vitals reviewed  Tobacco Cessation Counseling: Tobacco cessation counseling and education was provided  The patient is sincerely urged to quit consumption of tobacco  He is ready to quit tobacco  The numerous health risks of tobacco consumption were discussed  If he decides to quit, there are  anumber of helpful adjunctive aids, and he can see me to discuss nicotine replacement therapy, chantix, or bupropion anytime in the future        Pertinent Laboratory/Diagnostic Studies:  Lab Results   Component Value Date    BUN 6 11/01/2018    CREATININE 0 79 11/01/2018    CALCIUM 8 4 11/01/2018    K 4 1 11/01/2018    CO2 28 11/01/2018     11/01/2018     Lab Results   Component Value Date    ALT 39 11/01/2018    AST 27 11/01/2018    ALKPHOS 75 11/01/2018       Lab Results   Component Value Date    WBC 6 33 11/01/2018    HGB 10 9 (L) 11/01/2018    HCT 36 9 11/01/2018    MCV 73 (L) 11/01/2018     (L) 11/01/2018       No results found for: TSH    No results found for: CHOL  Lab Results   Component Value Date    TRIG 80 08/04/2017     Lab Results   Component Value Date    HDL 47 08/04/2017     Lab Results   Component Value Date    LDLCALC 66 08/04/2017     Lab Results   Component Value Date    HGBA1C 6 0 11/02/2017       Results for orders placed or performed during the hospital encounter of 10/27/18   CBC and differential   Result Value Ref Range    WBC 9 29 4 31 - 10 16 Thousand/uL    RBC 6 45 (H) 3 88 - 5 62 Million/uL    Hemoglobin 13 4 12 0 - 17 0 g/dL    Hematocrit 45 5 36 5 - 49 3 %    MCV 71 (L) 82 - 98 fL    MCH 20 8 (L) 26 8 - 34 3 pg    MCHC 29 5 (L) 31 4 - 37 4 g/dL    RDW 27 0 (H) 11 6 - 15 1 %    Platelets 152 493 - 938 Thousands/uL    nRBC 0 /100 WBCs    Neutrophils Relative 71 43 - 75 %    Immat GRANS % 0 0 - 2 %    Lymphocytes Relative 18 14 - 44 %    Monocytes Relative 9 4 - 12 %    Eosinophils Relative 1 0 - 6 %    Basophils Relative 1 0 - 1 %    Neutrophils Absolute 6 61 1 85 - 7 62 Thousands/µL    Immature Grans Absolute 0 04 0 00 - 0 20 Thousand/uL    Lymphocytes Absolute 1 65 0 60 - 4 47 Thousands/µL    Monocytes Absolute 0 85 0 17 - 1 22 Thousand/µL    Eosinophils Absolute 0 08 0 00 - 0 61 Thousand/µL    Basophils Absolute 0 06 0 00 - 0 10 Thousands/µL   Comprehensive metabolic panel   Result Value Ref Range    Sodium 138 136 - 145 mmol/L    Potassium 3 8 3 5 - 5 3 mmol/L    Chloride 103 100 - 108 mmol/L    CO2 27 21 - 32 mmol/L    ANION GAP 8 4 - 13 mmol/L    BUN 13 5 - 25 mg/dL    Creatinine 1 23 0 60 - 1 30 mg/dL    Glucose 105 65 - 140 mg/dL    Calcium 9 1 8 3 - 10 1 mg/dL     (H) 5 - 45 U/L    ALT 91 (H) 12 - 78 U/L    Alkaline Phosphatase 132 (H) 46 - 116 U/L    Total Protein 8 2 6 4 - 8 2 g/dL    Albumin 4 2 3 5 - 5 0 g/dL    Total Bilirubin 0 82 0 20 - 1 00 mg/dL    eGFR 71 ml/min/1 73sq m   Comprehensive metabolic panel   Result Value Ref Range    Sodium 138 136 - 145 mmol/L    Potassium 4 1 3 5 - 5 3 mmol/L    Chloride 106 100 - 108 mmol/L    CO2 27 21 - 32 mmol/L    ANION GAP 5 4 - 13 mmol/L    BUN 8 5 - 25 mg/dL    Creatinine 0 98 0 60 - 1 30 mg/dL    Glucose 105 65 - 140 mg/dL    Calcium 8 1 (L) 8 3 - 10 1 mg/dL    AST 47 (H) 5 - 45 U/L    ALT 86 (H) 12 - 78 U/L    Alkaline Phosphatase 97 46 - 116 U/L    Total Protein 5 8 (L) 6 4 - 8 2 g/dL    Albumin 3 0 (L) 3 5 - 5 0 g/dL    Total Bilirubin 0 33 0 20 - 1 00 mg/dL    eGFR 94 ml/min/1 73sq m   CBC and differential   Result Value Ref Range    WBC 3 64 (L) 4 31 - 10 16 Thousand/uL    RBC 5 33 3 88 - 5 62 Million/uL    Hemoglobin 11 2 (L) 12 0 - 17 0 g/dL    Hematocrit 38 7 36 5 - 49 3 %    MCV 73 (L) 82 - 98 fL    MCH 21 0 (L) 26 8 - 34 3 pg    MCHC 28 9 (L) 31 4 - 37 4 g/dL RDW 26 7 (H) 11 6 - 15 1 %    MPV 10 7 8 9 - 12 7 fL    Platelets 000 564 - 862 Thousands/uL    nRBC 0 /100 WBCs    Neutrophils Relative 38 (L) 43 - 75 %    Immat GRANS % 0 0 - 2 %    Lymphocytes Relative 46 (H) 14 - 44 %    Monocytes Relative 9 4 - 12 %    Eosinophils Relative 5 0 - 6 %    Basophils Relative 2 (H) 0 - 1 %    Neutrophils Absolute 1 37 (L) 1 85 - 7 62 Thousands/µL    Immature Grans Absolute 0 01 0 00 - 0 20 Thousand/uL    Lymphocytes Absolute 1 72 0 60 - 4 47 Thousands/µL    Monocytes Absolute 0 31 0 17 - 1 22 Thousand/µL    Eosinophils Absolute 0 17 0 00 - 0 61 Thousand/µL    Basophils Absolute 0 06 0 00 - 0 10 Thousands/µL   Magnesium   Result Value Ref Range    Magnesium 2 0 1 6 - 2 6 mg/dL   CBC and differential   Result Value Ref Range    WBC 4 01 (L) 4 31 - 10 16 Thousand/uL    RBC 5 86 (H) 3 88 - 5 62 Million/uL    Hemoglobin 12 3 12 0 - 17 0 g/dL    Hematocrit 43 0 36 5 - 49 3 %    MCV 73 (L) 82 - 98 fL    MCH 21 0 (L) 26 8 - 34 3 pg    MCHC 28 6 (L) 31 4 - 37 4 g/dL    RDW 27 4 (H) 11 6 - 15 1 %    Platelets 328 571 - 962 Thousands/uL    nRBC 0 /100 WBCs    Neutrophils Relative 32 (L) 43 - 75 %    Immat GRANS % 0 0 - 2 %    Lymphocytes Relative 55 (H) 14 - 44 %    Monocytes Relative 8 4 - 12 %    Eosinophils Relative 4 0 - 6 %    Basophils Relative 1 0 - 1 %    Neutrophils Absolute 1 28 (L) 1 85 - 7 62 Thousands/µL    Immature Grans Absolute 0 01 0 00 - 0 20 Thousand/uL    Lymphocytes Absolute 2 19 0 60 - 4 47 Thousands/µL    Monocytes Absolute 0 32 0 17 - 1 22 Thousand/µL    Eosinophils Absolute 0 16 0 00 - 0 61 Thousand/µL    Basophils Absolute 0 05 0 00 - 0 10 Thousands/µL   Comprehensive metabolic panel   Result Value Ref Range    Sodium 141 136 - 145 mmol/L    Potassium 4 7 3 5 - 5 3 mmol/L    Chloride 107 100 - 108 mmol/L    CO2 30 21 - 32 mmol/L    ANION GAP 4 4 - 13 mmol/L    BUN 5 5 - 25 mg/dL    Creatinine 0 90 0 60 - 1 30 mg/dL    Glucose 91 65 - 140 mg/dL    Calcium 8 6 8 3 - 10 1 mg/dL    AST 27 5 - 45 U/L    ALT 59 12 - 78 U/L    Alkaline Phosphatase 102 46 - 116 U/L    Total Protein 6 6 6 4 - 8 2 g/dL    Albumin 3 4 (L) 3 5 - 5 0 g/dL    Total Bilirubin 0 30 0 20 - 1 00 mg/dL    eGFR 104 ml/min/1 73sq m   Comprehensive metabolic panel   Result Value Ref Range    Sodium 138 136 - 145 mmol/L    Potassium 3 6 3 5 - 5 3 mmol/L    Chloride 104 100 - 108 mmol/L    CO2 30 21 - 32 mmol/L    ANION GAP 4 4 - 13 mmol/L    BUN 5 5 - 25 mg/dL    Creatinine 0 99 0 60 - 1 30 mg/dL    Glucose 88 65 - 140 mg/dL    Calcium 8 2 (L) 8 3 - 10 1 mg/dL    AST 20 5 - 45 U/L    ALT 38 12 - 78 U/L    Alkaline Phosphatase 87 46 - 116 U/L    Total Protein 5 8 (L) 6 4 - 8 2 g/dL    Albumin 3 0 (L) 3 5 - 5 0 g/dL    Total Bilirubin 0 24 0 20 - 1 00 mg/dL    eGFR 93 ml/min/1 73sq m   Comprehensive metabolic panel   Result Value Ref Range    Sodium 135 (L) 136 - 145 mmol/L    Potassium 3 8 3 5 - 5 3 mmol/L    Chloride 103 100 - 108 mmol/L    CO2 26 21 - 32 mmol/L    ANION GAP 6 4 - 13 mmol/L    BUN 5 5 - 25 mg/dL    Creatinine 0 85 0 60 - 1 30 mg/dL    Glucose 101 65 - 140 mg/dL    Calcium 8 2 (L) 8 3 - 10 1 mg/dL    AST 44 5 - 45 U/L    ALT 52 12 - 78 U/L    Alkaline Phosphatase 93 46 - 116 U/L    Total Protein 7 0 6 4 - 8 2 g/dL    Albumin 3 5 3 5 - 5 0 g/dL    Total Bilirubin 0 52 0 20 - 1 00 mg/dL    eGFR 107 ml/min/1 73sq m   CBC and differential   Result Value Ref Range    WBC 9 71 4 31 - 10 16 Thousand/uL    RBC 5 55 3 88 - 5 62 Million/uL    Hemoglobin 12 0 12 0 - 17 0 g/dL    Hematocrit 40 3 36 5 - 49 3 %    MCV 73 (L) 82 - 98 fL    MCH 21 6 (L) 26 8 - 34 3 pg    MCHC 29 8 (L) 31 4 - 37 4 g/dL    RDW 26 8 (H) 11 6 - 15 1 %    Platelets 283 013 - 425 Thousands/uL    nRBC 0 /100 WBCs    Neutrophils Relative 82 (H) 43 - 75 %    Immat GRANS % 0 0 - 2 %    Lymphocytes Relative 10 (L) 14 - 44 %    Monocytes Relative 8 4 - 12 %    Eosinophils Relative 0 0 - 6 %    Basophils Relative 0 0 - 1 %    Neutrophils Absolute 7 88 (H) 1 85 - 7 62 Thousands/µL    Immature Grans Absolute 0 03 0 00 - 0 20 Thousand/uL    Lymphocytes Absolute 0 99 0 60 - 4 47 Thousands/µL    Monocytes Absolute 0 76 0 17 - 1 22 Thousand/µL    Eosinophils Absolute 0 03 0 00 - 0 61 Thousand/µL    Basophils Absolute 0 02 0 00 - 0 10 Thousands/µL   Basic metabolic panel   Result Value Ref Range    Sodium 139 136 - 145 mmol/L    Potassium 4 1 3 5 - 5 3 mmol/L    Chloride 107 100 - 108 mmol/L    CO2 28 21 - 32 mmol/L    ANION GAP 4 4 - 13 mmol/L    BUN 6 5 - 25 mg/dL    Creatinine 0 79 0 60 - 1 30 mg/dL    Glucose 89 65 - 140 mg/dL    Calcium 8 4 8 3 - 10 1 mg/dL    eGFR 110 ml/min/1 73sq m   CBC and differential   Result Value Ref Range    WBC 6 33 4 31 - 10 16 Thousand/uL    RBC 5 05 3 88 - 5 62 Million/uL    Hemoglobin 10 9 (L) 12 0 - 17 0 g/dL    Hematocrit 36 9 36 5 - 49 3 %    MCV 73 (L) 82 - 98 fL    MCH 21 6 (L) 26 8 - 34 3 pg    MCHC 29 5 (L) 31 4 - 37 4 g/dL    RDW 26 8 (H) 11 6 - 15 1 %    Platelets 811 (L) 944 - 390 Thousands/uL    nRBC 0 /100 WBCs    Neutrophils Relative 60 43 - 75 %    Immat GRANS % 1 0 - 2 %    Lymphocytes Relative 28 14 - 44 %    Monocytes Relative 9 4 - 12 %    Eosinophils Relative 2 0 - 6 %    Basophils Relative 0 0 - 1 %    Neutrophils Absolute 3 87 1 85 - 7 62 Thousands/µL    Immature Grans Absolute 0 03 0 00 - 0 20 Thousand/uL    Lymphocytes Absolute 1 74 0 60 - 4 47 Thousands/µL    Monocytes Absolute 0 54 0 17 - 1 22 Thousand/µL    Eosinophils Absolute 0 13 0 00 - 0 61 Thousand/µL    Basophils Absolute 0 02 0 00 - 0 10 Thousands/µL   Hepatic function panel   Result Value Ref Range    Total Bilirubin 0 39 0 20 - 1 00 mg/dL    Bilirubin, Direct 0 14 0 00 - 0 20 mg/dL    Alkaline Phosphatase 75 46 - 116 U/L    AST 27 5 - 45 U/L    ALT 39 12 - 78 U/L    Total Protein 5 7 (L) 6 4 - 8 2 g/dL    Albumin 2 9 (L) 3 5 - 5 0 g/dL   Type and screen   Result Value Ref Range    ABO Grouping A     Rh Factor Positive     Antibody Screen Negative     Specimen Expiration Date [id number]    Tissue Exam   Result Value Ref Range    Case Report       Surgical Pathology Report                         Case: [id number]                                   Authorizing Provider:  Flavio Corley MD        Collected:           10/30/2018 1511              Ordering Location:     [address] [place] [address]      Received:            10/30/2018 [address] Operating Room                                                      Pathologist:           Yahir Gonzalez MD                                                                Specimens:   A) - Stomach, portion of stomach                                                                    B) - Gallbladder                                                                           Addendum       Part A:  Helicobacter immunostain with rare possible organisms (correlate clinically)  Final Diagnosis       A  Portion of stomach (gastrotomy):     - Portion of gastric body tissue with mild acute and chronic inflammation      - Helicobacter study pending      - No malignancy identified  B  Gallbladder (cholecystectomy):     - Minimal chronic cholecystitis and cholelithiasis  - One (1) lymph node, negative for carcinoma  Note       Interpretation performed at [place] [address]  Additional Information       All controls performed with the immunohistochemical stains reported above reacted appropriately  These tests were developed and their performance characteristics determined by [facility] or [facility]  They may not be cleared or approved by the [place]  Food and Drug Administration  The FDA has determined that such clearance or approval is not necessary  These tests are used for clinical purposes  They should not be regarded as investigational or for research   This laboratory has been approved by [facility], designated as a high-complexity laboratory and is qualified to perform these tests  Gross Description       A  The specimen is received in formalin, labeled with the patient's name and hospital number, and is designated "portion of stomach  The specimen consists of a stomach wedge, with staple line, which measures 5 5 x 2 0 x 1 4 cm  The central portion of the specimen is received previously opened  The serosa is unremarkable  The staple line is removed, and the resultant exposed margin is inked blue  The specimen is sectioned, and the mucosa exhibits unremarkable rugal folds; no abnormalities identified  Representative sections  Three cassettes, with previous opened area in cassettes A1, A2     B  The specimen is received in formalin, labeled with the patient's name and hospital number, and is designated "gallbladder  The specimen consists of a gallbladder measuring 10 7 x 4 0 x 2 9 cm  The serosa is tan-pink to purple, smooth, glistening, and exhibits 2 transmural defects measuring 0 3 cm and 0 4 cm in greatest dimension  The proximal cystic duct margin is inked blue  Adjacent to the cystic duct, is a tan-pink dense nodule consistent with lymph node 1 0 cm in greatest dimension  The specimen is opened, and the lumen contains viscous and liquid bile, and multiple tan green-yellow stony fragments measuring in aggregate 9 7 x 6 4 x 2 1 cm  The wall measures 0 1 cm in thickness  The mucosa is the tan to tan-green and velvety  Representative sections  One cassette, including lymph node  Note: The estimated total formalin fixation time based upon information provided by the submitting clinician and the standard processing schedule is under 72 hours  [place]       Clinical Information       Cholecystitis  History of Parth-en-Y gastric bypass     ED Urine Macroscopic   Result Value Ref Range    Color, UA Lizzy     Clarity, UA Clear     pH, UA 6 0 4 5 - 8 0    Leukocytes, UA Negative Negative Nitrite, UA Negative Negative    Protein, UA Negative Negative mg/dl    Glucose, UA Negative Negative mg/dl    Ketones, UA Negative Negative mg/dl    Urobilinogen, UA 0 2 0 2, 1 0 E U /dl E U /dl    Bilirubin, UA Interference- unable to analyze (A) Negative    Blood, UA Negative Negative    Specific Gravity, UA 1 025 1 003 - 1 030       Orders Placed This Encounter   Procedures    Hepatitis B surface antibody    CBC    Vitamin B12    Vitamin D 25 hydroxy    Iron, TIBC and Ferritin Panel       ALLERGIES:  Allergies   Allergen Reactions    Ampicillin GI Intolerance     Vomiting    Aspirin GI Intolerance     vomiting      Penicillins GI Intolerance     Vomit       Current Medications     Current Outpatient Prescriptions   Medication Sig Dispense Refill    acetaminophen (TYLENOL) 325 mg tablet Take 3 tablets (975 mg total) by mouth every 8 (eight) hours 30 tablet 0    ALPRAZolam (XANAX) 0 5 mg tablet Take 1 tablet (0 5 mg total) by mouth every 4 (four) hours as needed for anxiety 120 tablet 2    baclofen 10 mg tablet Take 10 mg by mouth 3 (three) times a day  2    buPROPion (WELLBUTRIN) 75 mg tablet TAKE 1 TABLET BY MOUTH TWICE A DAY 60 tablet 0    BUTRANS 10 MCG/HR PTWK APPLY ON PATCH TO SKIN EVERY 7 DAYS  2    morphine (MSIR) 15 mg tablet Take 0 5-1 tablets (7 5-15 mg total) by mouth every 4 (four) hours as needed for moderate pain or severe pain Max Daily Amount: 90 mg 30 tablet 0    QUEtiapine (SEROquel) 100 mg tablet Take 1 tablet (100 mg total) by mouth daily at bedtime 30 tablet 2    predniSONE 10 mg tablet Take 40 mg (4 tabs) daily x 2 days; then 30 mg (3 tabs) daily x 2 days then 20 mg (2 tabs) daily x 2 days then 10 mg ( 1 tab) dailyx 2 days 20 tablet 0     Current Facility-Administered Medications   Medication Dose Route Frequency Provider Last Rate Last Dose    cyanocobalamin injection 1,000 mcg  1,000 mcg Intramuscular Q30 Days Sandra Sutton MD   1,000 mcg at 10/09/18 2849 Facility-Administered Medications Ordered in Other Visits   Medication Dose Route Frequency Provider Last Rate Last Dose    iron sucrose (VENOFER) 300 mg in sodium chloride 0 9 % 250 mL IVPB  300 mg Intravenous Once Spring Tinsley MD        sodium chloride 0 9 % infusion  20 mL/hr Intravenous Once Spring Tinsley MD             Health Maintenance     Health Maintenance   Topic Date Due    Pneumococcal PPSV23 Highest Risk Adult (1 of 3 - PCV13) 08/22/1994    DTaP,Tdap,and Td Vaccines (1 - Tdap) 08/13/2019 (Originally 8/22/1996)    Medicare Annual Wellness Visit (AWV)  09/13/2019    INFLUENZA VACCINE  Completed     Immunization History   Administered Date(s) Administered    Influenza 12/15/2018       Mike Rueda MD

## 2018-12-17 NOTE — TELEPHONE ENCOUNTER
Caller:  patient  Call back number: [phone]    Patient would like to discuss getting break through pain medication  Per patient he is taking morphine (MSIR) 1 tablet every 4 (four) hours and 3 tylenol

## 2018-12-17 NOTE — Clinical Note
Isreal Spivey, I saw this patient for routine checkup few days ago  He is complaining of persistent low back pain  Patient states that Medrol Dosepak that I have prescribed a while ago was actually very helpful  I took a liberty and provided him with prescription for prednisone taper for 8 days    Thank you

## 2018-12-18 NOTE — TELEPHONE ENCOUNTER
So his script for MS IR is for BID dosing  I would suggest he try taking it BID and continue with the Butrans patch as prescribed  He also needs to try rest, ice, and heat and continue the OTC tylenol as well  We can f/u at his OV as scheduled

## 2018-12-18 NOTE — TELEPHONE ENCOUNTER
S/w pt, advised of above  Pt questioned if any rx's have been sent to the pharmacy  States he has ghulam a few left  Advised pt, at his ov in october rx's w/  Do Not Fill date(s) of November 10, 2018, December 8, 2018, and January 5, 2019 were sent  Advised pt, hs rx from surgery may skew these fill dates - pt will need to d/w his pharmacy  Advised pt to cb if there is any question or concerns  Pt verbalized understandign and appreciation

## 2018-12-18 NOTE — TELEPHONE ENCOUNTER
Pt returned missed call from [facility]  He asked to please call back before 4pm today   C/b #[phone]

## 2018-12-18 NOTE — TELEPHONE ENCOUNTER
S/w pt, stated that he is not getting relief with his current pain medication regimen; MSIR 15 mg 1 tab / day, tylenol arthritis 650 mg 2 pills tid, and butrans 10 mcg  Pt states he started butrans x 2 weeks ago  Pt denies se's or issues with any of these medications  Pt denies strenuous activities or injury  Advised pt, will d/w DG and cb to advise   Pt verbalized understanding and appreciation

## 2018-12-20 PROBLEM — M89.9 LYTIC BONE LESIONS ON XRAY: Status: RESOLVED | Noted: 2017-08-04 | Resolved: 2018-12-20

## 2018-12-20 PROBLEM — N13.30 HYDRONEPHROSIS: Status: RESOLVED | Noted: 2017-08-03 | Resolved: 2018-12-20

## 2018-12-20 PROBLEM — N17.9 ACUTE KIDNEY INJURY (HCC): Status: RESOLVED | Noted: 2018-10-27 | Resolved: 2018-12-20

## 2018-12-20 PROBLEM — F43.10 PTSD (POST-TRAUMATIC STRESS DISORDER): Chronic | Status: RESOLVED | Noted: 2018-08-08 | Resolved: 2018-12-20

## 2018-12-20 PROBLEM — M89.8X9 LYTIC BONE LESIONS ON XRAY: Status: RESOLVED | Noted: 2017-08-04 | Resolved: 2018-12-20

## 2018-12-21 ENCOUNTER — TELEPHONE (OUTPATIENT)
Dept: OBGYN CLINIC | Facility: HOSPITAL | Age: 43
End: 2018-12-21

## 2018-12-21 DIAGNOSIS — M79.18 MYOFASCIAL PAIN SYNDROME: Primary | ICD-10-CM

## 2018-12-21 DIAGNOSIS — M79.18 MYOFASCIAL PAIN SYNDROME: ICD-10-CM

## 2018-12-21 RX ORDER — BACLOFEN 10 MG/1
TABLET ORAL
Qty: 75 TABLET | Refills: 2 | OUTPATIENT
Start: 2018-12-21

## 2018-12-21 NOTE — TELEPHONE ENCOUNTER
Anjelica Salas from [facility] called that the did see patient today  She was wondering what patient can be prescribed  I saw that request for Baclofen 10mg came thru for refill but declined  Anjelica Salas wanted to know why this would not be refilled, she states she would have refilled it for him, she thought it was going to be filled by us  Patient visit information will be faxed to [facility] for your information    Anjelica Saals in [facility] office [phone]

## 2018-12-21 NOTE — TELEPHONE ENCOUNTER
S/w pt, he said the Morphine is not helping his pain at all  Pt was taking 3 tabs daily with no relief, he spoke with Radha on Tuesday and she said he should be taking 30 mg daily max  Pt said he has none left and is not due to refill till 1/5  Pt is asking for something different or to increase it to 20 mg  Pt is continuing Baclofen and Butran's  Please advise

## 2018-12-21 NOTE — TELEPHONE ENCOUNTER
Patient called to speak with Radha in regards to pain medication not helping he is taking morphine (MSIR) 15 mg tablet two tablets every four hours  Patient also stated he doesn't have enough to last until 01/05/19  Please advise thanks   Cb# [phone]

## 2018-12-21 NOTE — TELEPHONE ENCOUNTER
At this point I don't think it makes sense to try more morphine, especially since when he took more than prescribed, it wasn't helping  He should continue the Butrnas patch and he was just put on high prednisone taper form his PCP  He should try the Prednisone Taper and follow up at his next office visit  Thank you

## 2018-12-22 DIAGNOSIS — M79.18 MYOFASCIAL PAIN SYNDROME: ICD-10-CM

## 2018-12-22 NOTE — TELEPHONE ENCOUNTER
The patient never requested a refill and we do not honor refill requests from pharmacies  We had talked to him earlier in the day and he did not mention the need for a Baclofen refill or we would have      Patric

## 2018-12-23 RX ORDER — BACLOFEN 10 MG/1
TABLET ORAL
Qty: 75 TABLET | Refills: 2 | OUTPATIENT
Start: 2018-12-23

## 2018-12-24 NOTE — TELEPHONE ENCOUNTER
Marjorie Pozo from [facility]  Reviewed DG notes with her  Donald Mcmahan states she faxed over a copy of notes from the urgent care visit for DG to review

## 2018-12-26 NOTE — TELEPHONE ENCOUNTER
S/w pt, advised of above  Pt requested a refill of baclofen  States he does not have the bottle and is unsure of the strength  States he was taking 1 pill qid w/ "some" relief, no se's  Advised pt, will d/w DG  Anticipate a refill will be sent to the pharmacy tomorrow  This office will cb if there is any change in the plan as discussed  Pt verbalized understanding and appreciation

## 2018-12-26 NOTE — TELEPHONE ENCOUNTER
Patient is calling because he is asking what happened to the Baclofen  He states that the doctor in the urgent care was told that it was called in but he received a message that it was denied   He is asking for a call back at #[phone]

## 2018-12-27 ENCOUNTER — TELEPHONE (OUTPATIENT)
Dept: PAIN MEDICINE | Facility: CLINIC | Age: 43
End: 2018-12-27

## 2018-12-27 RX ORDER — BACLOFEN 10 MG/1
10 TABLET ORAL 3 TIMES DAILY
Qty: 90 TABLET | Refills: 0 | Status: SHIPPED | OUTPATIENT
Start: 2018-12-27 | End: 2019-01-14 | Stop reason: SDUPTHER

## 2018-12-27 NOTE — TELEPHONE ENCOUNTER
I e-scribed a refill for his Baclofen to the [facility] pharmacy listed in his chart  Thank you

## 2018-12-27 NOTE — TELEPHONE ENCOUNTER
S/w pt, advised of above  Advised pt, qid dosing was discussed yesterday, please be aware, this rx and the previous rx are tid dosing  Please cb if that is not covering your pain or if questions / concerns arise  Pt verbalized understanding and appreciation

## 2019-01-07 DIAGNOSIS — F31.81 BIPOLAR 2 DISORDER, MAJOR DEPRESSIVE EPISODE (HCC): Chronic | ICD-10-CM

## 2019-01-07 RX ORDER — BUPROPION HYDROCHLORIDE 75 MG/1
75 TABLET ORAL 2 TIMES DAILY
Qty: 180 TABLET | Refills: 0 | Status: SHIPPED | OUTPATIENT
Start: 2019-01-07 | End: 2019-03-01 | Stop reason: HOSPADM

## 2019-01-14 ENCOUNTER — OFFICE VISIT (OUTPATIENT)
Dept: PAIN MEDICINE | Facility: CLINIC | Age: 44
End: 2019-01-14
Payer: MEDICARE

## 2019-01-14 VITALS
BODY MASS INDEX: 23.16 KG/M2 | HEART RATE: 83 BPM | HEIGHT: 72 IN | SYSTOLIC BLOOD PRESSURE: 128 MMHG | WEIGHT: 171 LBS | DIASTOLIC BLOOD PRESSURE: 78 MMHG

## 2019-01-14 DIAGNOSIS — G89.29 CHRONIC RIGHT SHOULDER PAIN: ICD-10-CM

## 2019-01-14 DIAGNOSIS — F11.20 UNCOMPLICATED OPIOID DEPENDENCE (HCC): ICD-10-CM

## 2019-01-14 DIAGNOSIS — G89.4 PAIN SYNDROME, CHRONIC: ICD-10-CM

## 2019-01-14 DIAGNOSIS — M48.062 SPINAL STENOSIS OF LUMBAR REGION WITH NEUROGENIC CLAUDICATION: ICD-10-CM

## 2019-01-14 DIAGNOSIS — Z79.891 ENCOUNTER FOR LONG-TERM USE OF OPIATE ANALGESIC: ICD-10-CM

## 2019-01-14 DIAGNOSIS — M96.1 LUMBAR POST-LAMINECTOMY SYNDROME: ICD-10-CM

## 2019-01-14 DIAGNOSIS — G89.29 CHRONIC LOW BACK PAIN WITH BILATERAL SCIATICA, UNSPECIFIED BACK PAIN LATERALITY: Primary | ICD-10-CM

## 2019-01-14 DIAGNOSIS — M54.41 CHRONIC LOW BACK PAIN WITH BILATERAL SCIATICA, UNSPECIFIED BACK PAIN LATERALITY: Primary | ICD-10-CM

## 2019-01-14 DIAGNOSIS — M54.16 LUMBAR RADICULOPATHY: ICD-10-CM

## 2019-01-14 DIAGNOSIS — M79.18 MYOFASCIAL PAIN SYNDROME: ICD-10-CM

## 2019-01-14 DIAGNOSIS — M54.42 CHRONIC LOW BACK PAIN WITH BILATERAL SCIATICA, UNSPECIFIED BACK PAIN LATERALITY: Primary | ICD-10-CM

## 2019-01-14 DIAGNOSIS — M25.511 CHRONIC RIGHT SHOULDER PAIN: ICD-10-CM

## 2019-01-14 PROCEDURE — 99214 OFFICE O/P EST MOD 30 MIN: CPT | Performed by: NURSE PRACTITIONER

## 2019-01-14 PROCEDURE — 80305 DRUG TEST PRSMV DIR OPT OBS: CPT | Performed by: NURSE PRACTITIONER

## 2019-01-14 RX ORDER — BUPRENORPHINE 15 UG/H
PATCH TRANSDERMAL
Qty: 4 PATCH | Refills: 2 | Status: SHIPPED | OUTPATIENT
Start: 2019-01-14 | End: 2019-04-25 | Stop reason: SDUPTHER

## 2019-01-14 RX ORDER — BACLOFEN 10 MG/1
10 TABLET ORAL 3 TIMES DAILY
Qty: 90 TABLET | Refills: 2 | Status: SHIPPED | OUTPATIENT
Start: 2019-01-14 | End: 2019-04-30 | Stop reason: SDUPTHER

## 2019-01-14 NOTE — PROGRESS NOTES
Assessment:  1  Chronic low back pain with bilateral sciatica, unspecified back pain laterality    2  Chronic right shoulder pain    3  Lumbar post-laminectomy syndrome    4  Spinal stenosis of lumbar region with neurogenic claudication    5  Lumbar radiculopathy    6  Myofascial pain syndrome        Plan:  While the patient was in the office today, I did have a thorough conversation with the patient regarding his medication regimen and treatment plan  I explained to the patient at this point time I feel it would be in his best interest not to be prescribe any kind of oral breakthrough medications, but instead we will increase the Butrans patch to 15 mcg and he can change the patch every 7 days and continue the baclofen as prescribed  I explained to the patient that with 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, I do not feel that oral pain medications would be appropriate  I advised the patient that if they experience any side effects or issues with the changes in their medication regiment, they should give our office a call to discuss  I also advised the patient not to drive or operate machinery until they see how the changes in the medication regimen affects them  The patient was agreeable and verbalized an understanding  [place] Prescription Drug Monitoring Program report was reviewed and was appropriate      While the patient was in the office today, an annual review of the opioid contract/agreement was conducted, the patient was agreeable to continuing the contract as previously agreed upon and signed for this calendar year  A urine drug screen was collected at today's office visit as part of our medication management protocol  The point of care testing results were appropriate for what was being prescribed  The specimen will be sent for confirmatory testing   The drug screen is medically necessary because the patient is either dependent on opioid medication or is being considered for opioid medication therapy and the results could impact ongoing or future treatment  The drug screen is to evaluate for the presences or absence of prescribed, non-prescribed, and/or illicit drugs/substances  The patient did not have their opioid medications available for confirmation or counting while in the office today  I reviewed with the patient that as per the opioid contract, they are to bring in the last filled prescription for all of their opioid medications, with what they have left to every office visit  I advised the patient that if they would continue to not bring in their prescriptions as discussed, we may not be able to continue prescribing opioid medications in the future  The patient was agreeable and verbalized an understanding  There are risks associated with opioid medications, including dependence, addiction and tolerance  The patient understands and agrees to use these medications only as prescribed  Potential side effects of the medications include, but are not limited to, constipation, drowsiness, addiction, impaired judgment and risk of fatal overdose if not taken as prescribed  The patient was warned against driving while taking sedation medications  Sharing medications is a felony  At this point in time, the patient is showing no signs of addiction, abuse, diversion or suicidal ideation  The patient will follow-up in 12 weeks for medication prescription refill and reevaluation  The patient was advised to contact the office should their symptoms worsen in the interim  The patient was agreeable and verbalized an understanding  History of Present Illness: The patient is a 37 y o  male last seen on 10/22/18 who presents for a follow up office visit in regards to chronic pain secondary to lumbar post-laminectomy syndrome    The patient currently reports that overall his pain has slightly worsened, but is stable at this point would like to discuss just discontinuing the morphine sulfate IR as it does not seem to be helpful and he had a run out early because he was using more than prescribed and would prefer not to take it since it isn't helping  He reports that he continues to use his [employer] spinal cord stimulator which does help the pain and has not met anyone for any reprogramming recently but is still doing well  He reports that he is working as a  at [employer] in [place] and although he he has to be more active as he stands and walks for long periods time, he feels it is good for him and that he has been able to do it  He presents today to discuss his medication regimen and treatment plan  Current pain medications includes:    The patient reports that this regimen is providing 50% pain relief  The patient is reporting no side effects from this pain medication regimen  Pain Contract Signed: 1/14/19  Last Urine Drug Screen: 1/14/19    I have personally reviewed and/or updated the patient's past medical history, past surgical history, family history, social history, current medications, allergies, and vital signs today  Review of Systems:    Review of Systems   Respiratory: Negative for shortness of breath  Cardiovascular: Negative for chest pain  Gastrointestinal: Negative for constipation, diarrhea, nausea and vomiting  Musculoskeletal: Positive for joint swelling (joint stiffness)  Negative for arthralgias, gait problem and myalgias  Skin: Negative for rash  Neurological: Negative for dizziness, seizures and weakness  All other systems reviewed and are negative          Past Medical History:   Diagnosis Date    Anxiety     Arthritis     Bipolar disorder (HCC)     Chronic left lumbar radiculopathy     Chronic low back pain     Chronic pain syndrome     Chronic right shoulder pain     Depression with anxiety     Fatigue     GERD (gastroesophageal reflux disease)     Hydronephrosis     Iron deficiency anemia     Kidney stone     Lytic bone lesions on xray     Nephrolithiasis     PONV (postoperative nausea and vomiting)     Right elbow pain     Right lateral epicondylitis        Past Surgical History:   Procedure Laterality Date    ADENOIDECTOMY Bilateral     APPENDECTOMY      BACK SURGERY      CHOLECYSTECTOMY LAPAROSCOPIC N/A 10/30/2018    Procedure: CHOLECYSTECTOMY WITH GASTROTOMY LAPAROSCOPIC;  Surgeon: Caron Nolan MD;  [facility];  Service: General    ERCP W/ SPHICTEROTOMY N/A 10/30/2018    Procedure: ENDOSCOPIC RETROGRADE CHOLANGIOPANCREATOGRAPHY (ERCP) W/ SPHINCTEROTOMY;  Surgeon: Edwige Morgan MD;  [facility];  Service: Gastroenterology    GASTRIC BYPASS      2009    OTHER SURGICAL HISTORY      fusion/refusion of vertebrae, 2010 and 2011 l5-s1 and l4-l5    FL CYSTO/URETERO W/LITHOTRIPSY &INDWELL STENT INSRT Right 8/8/2017    Procedure: CYSTOSCOPY; URETEROSCOPY; HOLMIUM LASER; RETROGRADE PYELOGRAM; STENT;  Surgeon: Skylar Power MD;  [facility];  Service: Urology    FL IMPLANT SPINAL NEUROSTIM/ Right 11/14/2017    Procedure: REMOVAL LOWER MEDIAL BUTTOCK SPINAL CORD STIMULATOR GENERATOR; PLACEMENT OF NEW BUTTOCK SPINAL CORD [facility];  Surgeon: Dennis Osei MD;  Location[facility];  Service: Neurosurgery    RIK-EN-Y PROCEDURE  2003    SPINAL CORD STIMULATOR IMPLANT  11/14/2017    dr Steffen Hayes procedure and technique 1  removal of a right back implantable pulse generator 2 placement of a new right buttock impantable pulse generator through seperate incision 3 electric analys complex programming spinal cord stimulator system postoperative period approx 1 hour    TONSILLECTOMY         Family History   Problem Relation Age of Onset    Cancer Mother     Arthritis Mother     Stomach cancer Mother     Cancer Father     Esophageal cancer Father     Other Father         brain tumor    No Known Problems Sister     No Known Problems Sister     No Known Problems Sister        Social History     Occupational History    Not on file  Social History Main Topics    Smoking status: Current Every Day Smoker     Packs/day: 1 00     Types: Cigarettes    Smokeless tobacco: Never Used    Alcohol use No      Comment: quit drinking, social    Drug use: No    Sexual activity: Not on file         Current Outpatient Prescriptions:     acetaminophen (TYLENOL) 325 mg tablet, Take 3 tablets (975 mg total) by mouth every 8 (eight) hours, Disp: 30 tablet, Rfl: 0    ALPRAZolam (XANAX) 0 5 mg tablet, Take 1 tablet (0 5 mg total) by mouth every 4 (four) hours as needed for anxiety, Disp: 120 tablet, Rfl: 2    baclofen 10 mg tablet, Take 1 tablet (10 mg total) by mouth 3 (three) times a day, Disp: 90 tablet, Rfl: 0    buPROPion (WELLBUTRIN) 75 mg tablet, Take 1 tablet (75 mg total) by mouth 2 (two) times a day, Disp: 180 tablet, Rfl: 0    BUTRANS 10 MCG/HR PTWK, APPLY ON PATCH TO SKIN EVERY 7 DAYS, Disp: , Rfl: 2    morphine (MSIR) 15 mg tablet, Take 0 5-1 tablets (7 5-15 mg total) by mouth every 4 (four) hours as needed for moderate pain or severe pain Max Daily Amount: 90 mg, Disp: 30 tablet, Rfl: 0    predniSONE 10 mg tablet, Take 40 mg (4 tabs) daily x 2 days; then 30 mg (3 tabs) daily x 2 days then 20 mg (2 tabs) daily x 2 days then 10 mg ( 1 tab) dailyx 2 days, Disp: 20 tablet, Rfl: 0    QUEtiapine (SEROquel) 100 mg tablet, Take 1 tablet (100 mg total) by mouth daily at bedtime, Disp: 30 tablet, Rfl: 2    Current Facility-Administered Medications:     cyanocobalamin injection 1,000 mcg, 1,000 mcg, Intramuscular, Q30 Days, Mike Rueda MD, 1,000 mcg at 10/09/18 1049    Allergies   Allergen Reactions    Ampicillin GI Intolerance     Vomiting    Aspirin GI Intolerance     vomiting      Penicillins GI Intolerance     Vomit       Physical Exam:    There were no vitals taken for this visit      Constitutional:normal, well developed, well nourished, alert, in no distress and non-toxic and no overt pain behavior  Eyes:anicteric  HEENT:grossly intact  Neck:supple, symmetric, trachea midline and no masses   Pulmonary:even and unlabored  Cardiovascular:No edema or pitting edema present  Skin:Normal without rashes or lesions and well hydrated  Psychiatric:Mood and affect appropriate  Neurologic:Cranial Nerves II-XII grossly intact  Musculoskeletal:Slightly antalgic, but steady gait without the use of any assistive devices  Imaging  No orders to display         No orders of the defined types were placed in this encounter

## 2019-01-15 NOTE — PROGRESS NOTES
How Severe Is It?: mild Progress Note - General Surgery   Lucas Appiah 37 y o  male MRN: [id number]  Unit/Bed#: [facility] 626-01 Encounter: [id number]    Assessment:  38 y/o M w/ hx of gastric bypass, chronic pain w/ spinal cord stimulator in place, p/w choledocholithiasis, s/p laparoscopic-assisted ERCP, cholecystectomy    Plan:  --Regular diet  --d/c IVF  --Pain control, add scheduled Robaxin for muscle spasms, Lidocaine patch  --d/c Abx  --f/u AM labs  --OOB, ambulate  --DVT ppx    Subjective/Objective     Subjective:    Overnight, pt c/o severe L-sided abdominal pain requiring PRN doses of Dilaudid, Baclofen, and Valium  This morning, pt still c/o 10/10 L flank pain and muscle spasms, near one of his incision sites  Tolerating liquids  Denies any N/V/D  + bowel movement  Objective:     Blood pressure 113/56, pulse 68, temperature 98 6 °F (37 °C), resp  rate 18, height 6' (1 829 m), weight 82 3 kg (181 lb 7 oz), SpO2 100 %  ,Body mass index is 24 61 kg/m²  I/O       10/29 0701 - 10/30 0700 10/30 0701 - 10/31 0700    P  O  0 180    I V  (mL/kg) 2000 (24 3) 2975 (36 1)    IV Piggyback 300     Total Intake(mL/kg) 2300 (27 9) 3155 (38 3)    Urine (mL/kg/hr) 3225 (1 6) 1575 (0 8)    Blood  50    Total Output 3225 1625    Net -925 +1530          Unmeasured Stool Occurrence 1 x             Invasive Devices     Peripheral Intravenous Line            Peripheral IV 10/27/18 Left Antecubital 3 days    Peripheral IV 10/30/18 Right Arm less than 1 day                Physical Exam:     GEN: NAD  HEENT: MMM  CV: RRR  Lung: normal effort  Ab: Soft, ND, +incisional tenderness  Extrem: No CCE  Neuro:  A+Ox3, motor and sensation grossly intact    Lab, Imaging and other studies:  CBC: No results found for: WBC, HGB, HCT, MCV, PLT, ADJUSTEDWBC, MCH, MCHC, RDW, MPV, NRBC, CMP:   Lab Results   Component Value Date     10/30/2018    K 3 6 10/30/2018     10/30/2018    CO2 30 10/30/2018    BUN 5 10/30/2018    CREATININE 0 99 10/30/2018    CALCIUM 8 2 (L) 10/30/2018    AST 20 10/30/2018    ALT 38 10/30/2018    ALKPHOS 87 10/30/2018    EGFR 93 10/30/2018   , Coagulation: No results found for: PT, INR, APTT, Urinalysis: No results found for: COLORU, CLARITYU, SPECGRAV, PHUR, LEUKOCYTESUR, NITRITE, PROTEINUA, GLUCOSEU, KETONESU, BILIRUBINUR, BLOODU, Amylase: No results found for: AMYLASE, Lipase: No results found for: LIPASE  VTE Pharmacologic Prophylaxis: Heparin  VTE Mechanical Prophylaxis: sequential compression device Is This A New Presentation, Or A Follow-Up?: Aging Face Additional History: Past txâment Dysport

## 2019-01-19 ENCOUNTER — TELEPHONE (OUTPATIENT)
Dept: PAIN MEDICINE | Facility: CLINIC | Age: 44
End: 2019-01-19

## 2019-01-19 DIAGNOSIS — F33.2 MDD (MAJOR DEPRESSIVE DISORDER), RECURRENT SEVERE, WITHOUT PSYCHOSIS (HCC): ICD-10-CM

## 2019-01-19 NOTE — TELEPHONE ENCOUNTER
Can you please call the patient and advise him that we received a letter from his insurance that his Butrans patch requires pre-auth, but when I try to do the pre-auth online the address we have on file does not match  Can you please confirm his address, date of birth and proper name spelling so I can complete the pre-auth  Thank you

## 2019-01-21 ENCOUNTER — TELEPHONE (OUTPATIENT)
Dept: HEMATOLOGY ONCOLOGY | Facility: CLINIC | Age: 44
End: 2019-01-21

## 2019-01-21 NOTE — TELEPHONE ENCOUNTER
Attempted to call the patient and left a detailed mom in regards to the previous task per DG  Encouraged the patient to [facility] with information

## 2019-01-21 NOTE — TELEPHONE ENCOUNTER
I was able to get the system to find his correct information and his Butrans patch was APPROVED until 1/21/20

## 2019-01-21 NOTE — TELEPHONE ENCOUNTER
Attempted to contact pt  Left a detailed message on machine as per release of info on file advising of above  Provided cb number and office hours

## 2019-01-21 NOTE — TELEPHONE ENCOUNTER
Attempted to reach pt to remind him of his appt tomorrow and ask if labs were completed elsewhere   I left a message for pt to call us back [phone]

## 2019-01-22 RX ORDER — ALPRAZOLAM 0.5 MG/1
TABLET ORAL
Qty: 120 TABLET | Refills: 0 | Status: SHIPPED | OUTPATIENT
Start: 2019-01-22 | End: 2019-05-09 | Stop reason: SDUPTHER

## 2019-01-24 ENCOUNTER — OFFICE VISIT (OUTPATIENT)
Dept: FAMILY MEDICINE CLINIC | Facility: CLINIC | Age: 44
End: 2019-01-24
Payer: MEDICARE

## 2019-01-24 VITALS
WEIGHT: 171.8 LBS | HEART RATE: 80 BPM | TEMPERATURE: 95.1 F | SYSTOLIC BLOOD PRESSURE: 110 MMHG | RESPIRATION RATE: 16 BRPM | HEIGHT: 72 IN | BODY MASS INDEX: 23.27 KG/M2 | DIASTOLIC BLOOD PRESSURE: 80 MMHG

## 2019-01-24 DIAGNOSIS — J20.9 ACUTE BRONCHITIS, UNSPECIFIED ORGANISM: Primary | ICD-10-CM

## 2019-01-24 PROCEDURE — 99213 OFFICE O/P EST LOW 20 MIN: CPT | Performed by: FAMILY MEDICINE

## 2019-01-24 RX ORDER — BENZONATATE 100 MG/1
100 CAPSULE ORAL 3 TIMES DAILY PRN
Qty: 30 CAPSULE | Refills: 0 | Status: SHIPPED | OUTPATIENT
Start: 2019-01-24 | End: 2019-02-06

## 2019-01-24 RX ORDER — AZITHROMYCIN 500 MG/1
500 TABLET, FILM COATED ORAL DAILY
Qty: 5 TABLET | Refills: 0 | Status: SHIPPED | OUTPATIENT
Start: 2019-01-24 | End: 2019-01-29

## 2019-01-24 RX ORDER — ALBUTEROL SULFATE 90 UG/1
2 AEROSOL, METERED RESPIRATORY (INHALATION) EVERY 4 HOURS PRN
Qty: 1 INHALER | Refills: 1 | Status: SHIPPED | OUTPATIENT
Start: 2019-01-24 | End: 2020-03-25

## 2019-01-24 NOTE — PROGRESS NOTES
FAMILY PRACTICE OFFICE VISIT       NAME: Austin Savage  AGE: 37 y o  SEX: male       : 1975        MRN: [id number]        Assessment and Plan     Problem List Items Addressed This Visit     None      Visit Diagnoses     Acute bronchitis, unspecified organism    -  Primary    Relevant Medications    azithromycin (ZITHROMAX) 500 MG tablet    albuterol (PROVENTIL HFA,VENTOLIN HFA) 90 mcg/act inhaler    benzonatate (TESSALON PERLES) 100 mg capsule        Patient presents for evaluation of upper respiratory symptoms that have been persistent for 2 weeks  Exam is consistent with acute bronchitis/ sinusitis  He will treat with Zithromax 500 mg daily for 5 days, patient will use albuterol inhaler 2 puffs every 4-6 hours as needed and Tessalon Perles 100 mg t i d  P r n  Cough  Advised rest, fluids, patient will contact me in a few days if his symptoms are not improving significantly  There are no Patient Instructions on file for this visit  M*Modal software was used to dictate this note  It may contain errors with dictating incorrect words/spelling  Please contact provider directly with any questions  Chief Complaint     Chief Complaint   Patient presents with    Cough     Patient is here for a cough and chest congestion x 2 weeks  History of Present Illness     Patient presents for evaluation of cold symptoms for over 2 weeks  He feels fatigued, denies fever  Nasal  Congestion, sinus pressure    Cough  and chest tightness, no sore throat or earache    No wheezing    No shortness of breath     Patient has been using Mucinex and and Dayquil and Nyquil without significant improvement   h/o asthma  Patient does not smoke          Cough   Associated symptoms include postnasal drip  Pertinent negatives include no shortness of breath or wheezing  Review of Systems   Review of Systems   Constitutional: Positive for fatigue     HENT: Positive for congestion, postnasal drip and sinus pressure  Eyes: Negative  Respiratory: Positive for cough and chest tightness  Negative for shortness of breath and wheezing  Cardiovascular: Negative  Neurological: Negative  Psychiatric/Behavioral: Negative          Active Problem List     Patient Active Problem List   Diagnosis    Chronic low back pain    Bipolar 2 disorder, major depressive episode ([address] )    Chronic right shoulder pain    Depression with anxiety    Fatigue    Chronic anemia    Nephrolithiasis    Pain syndrome, chronic    Right elbow pain    Right lateral epicondylitis    Uncomplicated opioid dependence (HCC)    Thumb pain    Lumbar post-laminectomy syndrome    Myofascial pain syndrome    Lumbar radiculopathy    Spinal stenosis of lumbar region with neurogenic claudication    BPH with obstruction/lower urinary tract symptoms    Iron deficiency anemia    Vitamin B deficiency    Postgastrectomy malabsorption    History of Parth-en-Y gastric bypass    Tobacco abuse    Status post laparoscopic cholecystectomy       Past Medical History:  Past Medical History:   Diagnosis Date    Anxiety     Arthritis     Bipolar disorder (HCC)     Chronic left lumbar radiculopathy     Chronic low back pain     Chronic pain syndrome     Chronic right shoulder pain     Depression with anxiety     Fatigue     GERD (gastroesophageal reflux disease)     Hydronephrosis     Iron deficiency anemia     Kidney stone     Lytic bone lesions on xray     Nephrolithiasis     PONV (postoperative nausea and vomiting)     Right elbow pain     Right lateral epicondylitis        Past Surgical History:  Past Surgical History:   Procedure Laterality Date    ADENOIDECTOMY Bilateral     APPENDECTOMY      BACK SURGERY      CHOLECYSTECTOMY LAPAROSCOPIC N/A 10/30/2018    Procedure: CHOLECYSTECTOMY WITH GASTROTOMY LAPAROSCOPIC;  Surgeon: Pat Cruz MD;  [facility];  Service: General    ERCP W/ SPHICTEROTOMY N/A 10/30/2018 Procedure: ENDOSCOPIC RETROGRADE CHOLANGIOPANCREATOGRAPHY (ERCP) W/ SPHINCTEROTOMY;  Surgeon: Dasia Rae MD;  [facility];  Service: Gastroenterology    GASTRIC BYPASS      2009    OTHER SURGICAL HISTORY      fusion/refusion of vertebrae, 2010 and 2011 l5-s1 and l4-l5    ME CYSTO/URETERO W/LITHOTRIPSY &INDWELL STENT INSRT Right 8/8/2017    Procedure: CYSTOSCOPY; URETEROSCOPY; HOLMIUM LASER; RETROGRADE PYELOGRAM; STENT;  Surgeon: Jeremías Basilio MD;  [facility];  Service: Urology    ME IMPLANT SPINAL NEUROSTIM/ Right 11/14/2017    Procedure: REMOVAL LOWER MEDIAL BUTTOCK SPINAL CORD STIMULATOR GENERATOR; PLACEMENT OF NEW BUTTOCK SPINAL CORD [facility];  Surgeon: Whit Moya MD;  Location[facility];  Service: Neurosurgery    RIK-EN-Y PROCEDURE  2003    SPINAL CORD STIMULATOR IMPLANT  11/14/2017    dr Jacob Bartholomew procedure and technique 1  removal of a right back implantable pulse generator 2 placement of a new right buttock impantable pulse generator through seperate incision 3 electric analys complex programming spinal cord stimulator system postoperative period approx 1 hour    TONSILLECTOMY         Family History:  Family History   Problem Relation Age of Onset    Cancer Mother     Arthritis Mother     Stomach cancer Mother     Cancer Father     Esophageal cancer Father     Other Father         brain tumor    No Known Problems Sister     No Known Problems Sister     No Known Problems Sister        Social History:  Social History     Social History    Marital status:      Spouse name: N/A    Number of children: N/A    Years of education: GED     Occupational History    Not on file       Social History Main Topics    Smoking status: Current Every Day Smoker     Packs/day: 1 00     Types: Cigarettes    Smokeless tobacco: Never Used    Alcohol use No      Comment: quit drinking, social    Drug use: No    Sexual activity: Not on file     Other Topics Concern    Not on file     Social History Narrative    Chooses not to have children    Drinks caffienated tea    Lives with spouse                     Objective     Vitals:    01/24/19 1528   BP: 110/80   Pulse: 80   Resp: 16   Temp: (!) 95 1 °F (35 1 °C)   TempSrc: Tympanic   Weight: 77 9 kg (171 lb 12 8 oz)   Height: 6' (1 829 m)     Wt Readings from Last 3 Encounters:   01/24/19 77 9 kg (171 lb 12 8 oz)   01/14/19 77 6 kg (171 lb)   12/17/18 81 2 kg (179 lb)       Physical Exam   Constitutional: He is oriented to person, place, and time  He appears well-developed and well-nourished  Fatigued   HENT:   Head: Normocephalic and atraumatic  Right Ear: Tympanic membrane and ear canal normal    Left Ear: Tympanic membrane and ear canal normal    Nose: Mucosal edema present  Mouth/Throat: No oropharyngeal exudate (Erythema of oropharynx, copious postnasal drip)  Eyes: Conjunctivae are normal    Neck: Neck supple  Cardiovascular: Normal rate, regular rhythm and normal heart sounds  No murmur heard  Pulmonary/Chest: Effort normal  No respiratory distress  He has no wheezes  He has no rales  Increased expiratory phase, few coarse sounds bilaterally, no rhonchi, good air entry   Musculoskeletal: Normal range of motion  Neurological: He is alert and oriented to person, place, and time  No cranial nerve deficit  Psychiatric: He has a normal mood and affect  His behavior is normal    Nursing note and vitals reviewed        Pertinent Laboratory/Diagnostic Studies:  Lab Results   Component Value Date    BUN 6 11/01/2018    CREATININE 0 79 11/01/2018    CALCIUM 8 4 11/01/2018    K 4 1 11/01/2018    CO2 28 11/01/2018     11/01/2018     Lab Results   Component Value Date    ALT 39 11/01/2018    AST 27 11/01/2018    ALKPHOS 75 11/01/2018       Lab Results   Component Value Date    WBC 6 33 11/01/2018    HGB 10 9 (L) 11/01/2018    HCT 36 9 11/01/2018    MCV 73 (L) 11/01/2018     (L) 11/01/2018       No results found for: TSH    No results found for: CHOL  Lab Results   Component Value Date    TRIG 80 08/04/2017     Lab Results   Component Value Date    HDL 47 08/04/2017     Lab Results   Component Value Date    LDLCALC 66 08/04/2017     Lab Results   Component Value Date    HGBA1C 6 0 11/02/2017       Results for orders placed or performed during the hospital encounter of 10/27/18   CBC and differential   Result Value Ref Range    WBC 9 29 4 31 - 10 16 Thousand/uL    RBC 6 45 (H) 3 88 - 5 62 Million/uL    Hemoglobin 13 4 12 0 - 17 0 g/dL    Hematocrit 45 5 36 5 - 49 3 %    MCV 71 (L) 82 - 98 fL    MCH 20 8 (L) 26 8 - 34 3 pg    MCHC 29 5 (L) 31 4 - 37 4 g/dL    RDW 27 0 (H) 11 6 - 15 1 %    Platelets 568 778 - 091 Thousands/uL    nRBC 0 /100 WBCs    Neutrophils Relative 71 43 - 75 %    Immat GRANS % 0 0 - 2 %    Lymphocytes Relative 18 14 - 44 %    Monocytes Relative 9 4 - 12 %    Eosinophils Relative 1 0 - 6 %    Basophils Relative 1 0 - 1 %    Neutrophils Absolute 6 61 1 85 - 7 62 Thousands/µL    Immature Grans Absolute 0 04 0 00 - 0 20 Thousand/uL    Lymphocytes Absolute 1 65 0 60 - 4 47 Thousands/µL    Monocytes Absolute 0 85 0 17 - 1 22 Thousand/µL    Eosinophils Absolute 0 08 0 00 - 0 61 Thousand/µL    Basophils Absolute 0 06 0 00 - 0 10 Thousands/µL   Comprehensive metabolic panel   Result Value Ref Range    Sodium 138 136 - 145 mmol/L    Potassium 3 8 3 5 - 5 3 mmol/L    Chloride 103 100 - 108 mmol/L    CO2 27 21 - 32 mmol/L    ANION GAP 8 4 - 13 mmol/L    BUN 13 5 - 25 mg/dL    Creatinine 1 23 0 60 - 1 30 mg/dL    Glucose 105 65 - 140 mg/dL    Calcium 9 1 8 3 - 10 1 mg/dL     (H) 5 - 45 U/L    ALT 91 (H) 12 - 78 U/L    Alkaline Phosphatase 132 (H) 46 - 116 U/L    Total Protein 8 2 6 4 - 8 2 g/dL    Albumin 4 2 3 5 - 5 0 g/dL    Total Bilirubin 0 82 0 20 - 1 00 mg/dL    eGFR 71 ml/min/1 73sq m   Comprehensive metabolic panel   Result Value Ref Range    Sodium 138 136 - 145 mmol/L    Potassium 4 1 3 5 - 5 3 mmol/L    Chloride 106 100 - 108 mmol/L    CO2 27 21 - 32 mmol/L    ANION GAP 5 4 - 13 mmol/L    BUN 8 5 - 25 mg/dL    Creatinine 0 98 0 60 - 1 30 mg/dL    Glucose 105 65 - 140 mg/dL    Calcium 8 1 (L) 8 3 - 10 1 mg/dL    AST 47 (H) 5 - 45 U/L    ALT 86 (H) 12 - 78 U/L    Alkaline Phosphatase 97 46 - 116 U/L    Total Protein 5 8 (L) 6 4 - 8 2 g/dL    Albumin 3 0 (L) 3 5 - 5 0 g/dL    Total Bilirubin 0 33 0 20 - 1 00 mg/dL    eGFR 94 ml/min/1 73sq m   CBC and differential   Result Value Ref Range    WBC 3 64 (L) 4 31 - 10 16 Thousand/uL    RBC 5 33 3 88 - 5 62 Million/uL    Hemoglobin 11 2 (L) 12 0 - 17 0 g/dL    Hematocrit 38 7 36 5 - 49 3 %    MCV 73 (L) 82 - 98 fL    MCH 21 0 (L) 26 8 - 34 3 pg    MCHC 28 9 (L) 31 4 - 37 4 g/dL    RDW 26 7 (H) 11 6 - 15 1 %    MPV 10 7 8 9 - 12 7 fL    Platelets 657 645 - 704 Thousands/uL    nRBC 0 /100 WBCs    Neutrophils Relative 38 (L) 43 - 75 %    Immat GRANS % 0 0 - 2 %    Lymphocytes Relative 46 (H) 14 - 44 %    Monocytes Relative 9 4 - 12 %    Eosinophils Relative 5 0 - 6 %    Basophils Relative 2 (H) 0 - 1 %    Neutrophils Absolute 1 37 (L) 1 85 - 7 62 Thousands/µL    Immature Grans Absolute 0 01 0 00 - 0 20 Thousand/uL    Lymphocytes Absolute 1 72 0 60 - 4 47 Thousands/µL    Monocytes Absolute 0 31 0 17 - 1 22 Thousand/µL    Eosinophils Absolute 0 17 0 00 - 0 61 Thousand/µL    Basophils Absolute 0 06 0 00 - 0 10 Thousands/µL   Magnesium   Result Value Ref Range    Magnesium 2 0 1 6 - 2 6 mg/dL   CBC and differential   Result Value Ref Range    WBC 4 01 (L) 4 31 - 10 16 Thousand/uL    RBC 5 86 (H) 3 88 - 5 62 Million/uL    Hemoglobin 12 3 12 0 - 17 0 g/dL    Hematocrit 43 0 36 5 - 49 3 %    MCV 73 (L) 82 - 98 fL    MCH 21 0 (L) 26 8 - 34 3 pg    MCHC 28 6 (L) 31 4 - 37 4 g/dL    RDW 27 4 (H) 11 6 - 15 1 %    Platelets 011 859 - 590 Thousands/uL    nRBC 0 /100 WBCs    Neutrophils Relative 32 (L) 43 - 75 %    Immat GRANS % 0 0 - 2 % Lymphocytes Relative 55 (H) 14 - 44 %    Monocytes Relative 8 4 - 12 %    Eosinophils Relative 4 0 - 6 %    Basophils Relative 1 0 - 1 %    Neutrophils Absolute 1 28 (L) 1 85 - 7 62 Thousands/µL    Immature Grans Absolute 0 01 0 00 - 0 20 Thousand/uL    Lymphocytes Absolute 2 19 0 60 - 4 47 Thousands/µL    Monocytes Absolute 0 32 0 17 - 1 22 Thousand/µL    Eosinophils Absolute 0 16 0 00 - 0 61 Thousand/µL    Basophils Absolute 0 05 0 00 - 0 10 Thousands/µL   Comprehensive metabolic panel   Result Value Ref Range    Sodium 141 136 - 145 mmol/L    Potassium 4 7 3 5 - 5 3 mmol/L    Chloride 107 100 - 108 mmol/L    CO2 30 21 - 32 mmol/L    ANION GAP 4 4 - 13 mmol/L    BUN 5 5 - 25 mg/dL    Creatinine 0 90 0 60 - 1 30 mg/dL    Glucose 91 65 - 140 mg/dL    Calcium 8 6 8 3 - 10 1 mg/dL    AST 27 5 - 45 U/L    ALT 59 12 - 78 U/L    Alkaline Phosphatase 102 46 - 116 U/L    Total Protein 6 6 6 4 - 8 2 g/dL    Albumin 3 4 (L) 3 5 - 5 0 g/dL    Total Bilirubin 0 30 0 20 - 1 00 mg/dL    eGFR 104 ml/min/1 73sq m   Comprehensive metabolic panel   Result Value Ref Range    Sodium 138 136 - 145 mmol/L    Potassium 3 6 3 5 - 5 3 mmol/L    Chloride 104 100 - 108 mmol/L    CO2 30 21 - 32 mmol/L    ANION GAP 4 4 - 13 mmol/L    BUN 5 5 - 25 mg/dL    Creatinine 0 99 0 60 - 1 30 mg/dL    Glucose 88 65 - 140 mg/dL    Calcium 8 2 (L) 8 3 - 10 1 mg/dL    AST 20 5 - 45 U/L    ALT 38 12 - 78 U/L    Alkaline Phosphatase 87 46 - 116 U/L    Total Protein 5 8 (L) 6 4 - 8 2 g/dL    Albumin 3 0 (L) 3 5 - 5 0 g/dL    Total Bilirubin 0 24 0 20 - 1 00 mg/dL    eGFR 93 ml/min/1 73sq m   Comprehensive metabolic panel   Result Value Ref Range    Sodium 135 (L) 136 - 145 mmol/L    Potassium 3 8 3 5 - 5 3 mmol/L    Chloride 103 100 - 108 mmol/L    CO2 26 21 - 32 mmol/L    ANION GAP 6 4 - 13 mmol/L    BUN 5 5 - 25 mg/dL    Creatinine 0 85 0 60 - 1 30 mg/dL    Glucose 101 65 - 140 mg/dL    Calcium 8 2 (L) 8 3 - 10 1 mg/dL    AST 44 5 - 45 U/L    ALT 52 12 - 78 U/L    Alkaline Phosphatase 93 46 - 116 U/L    Total Protein 7 0 6 4 - 8 2 g/dL    Albumin 3 5 3 5 - 5 0 g/dL    Total Bilirubin 0 52 0 20 - 1 00 mg/dL    eGFR 107 ml/min/1 73sq m   CBC and differential   Result Value Ref Range    WBC 9 71 4 31 - 10 16 Thousand/uL    RBC 5 55 3 88 - 5 62 Million/uL    Hemoglobin 12 0 12 0 - 17 0 g/dL    Hematocrit 40 3 36 5 - 49 3 %    MCV 73 (L) 82 - 98 fL    MCH 21 6 (L) 26 8 - 34 3 pg    MCHC 29 8 (L) 31 4 - 37 4 g/dL    RDW 26 8 (H) 11 6 - 15 1 %    Platelets 011 706 - 138 Thousands/uL    nRBC 0 /100 WBCs    Neutrophils Relative 82 (H) 43 - 75 %    Immat GRANS % 0 0 - 2 %    Lymphocytes Relative 10 (L) 14 - 44 %    Monocytes Relative 8 4 - 12 %    Eosinophils Relative 0 0 - 6 %    Basophils Relative 0 0 - 1 %    Neutrophils Absolute 7 88 (H) 1 85 - 7 62 Thousands/µL    Immature Grans Absolute 0 03 0 00 - 0 20 Thousand/uL    Lymphocytes Absolute 0 99 0 60 - 4 47 Thousands/µL    Monocytes Absolute 0 76 0 17 - 1 22 Thousand/µL    Eosinophils Absolute 0 03 0 00 - 0 61 Thousand/µL    Basophils Absolute 0 02 0 00 - 0 10 Thousands/µL   Basic metabolic panel   Result Value Ref Range    Sodium 139 136 - 145 mmol/L    Potassium 4 1 3 5 - 5 3 mmol/L    Chloride 107 100 - 108 mmol/L    CO2 28 21 - 32 mmol/L    ANION GAP 4 4 - 13 mmol/L    BUN 6 5 - 25 mg/dL    Creatinine 0 79 0 60 - 1 30 mg/dL    Glucose 89 65 - 140 mg/dL    Calcium 8 4 8 3 - 10 1 mg/dL    eGFR 110 ml/min/1 73sq m   CBC and differential   Result Value Ref Range    WBC 6 33 4 31 - 10 16 Thousand/uL    RBC 5 05 3 88 - 5 62 Million/uL    Hemoglobin 10 9 (L) 12 0 - 17 0 g/dL    Hematocrit 36 9 36 5 - 49 3 %    MCV 73 (L) 82 - 98 fL    MCH 21 6 (L) 26 8 - 34 3 pg    MCHC 29 5 (L) 31 4 - 37 4 g/dL    RDW 26 8 (H) 11 6 - 15 1 %    Platelets 317 (L) 504 - 390 Thousands/uL    nRBC 0 /100 WBCs    Neutrophils Relative 60 43 - 75 %    Immat GRANS % 1 0 - 2 %    Lymphocytes Relative 28 14 - 44 %    Monocytes Relative 9 4 - 12 % Eosinophils Relative 2 0 - 6 %    Basophils Relative 0 0 - 1 %    Neutrophils Absolute 3 87 1 85 - 7 62 Thousands/µL    Immature Grans Absolute 0 03 0 00 - 0 20 Thousand/uL    Lymphocytes Absolute 1 74 0 60 - 4 47 Thousands/µL    Monocytes Absolute 0 54 0 17 - 1 22 Thousand/µL    Eosinophils Absolute 0 13 0 00 - 0 61 Thousand/µL    Basophils Absolute 0 02 0 00 - 0 10 Thousands/µL   Hepatic function panel   Result Value Ref Range    Total Bilirubin 0 39 0 20 - 1 00 mg/dL    Bilirubin, Direct 0 14 0 00 - 0 20 mg/dL    Alkaline Phosphatase 75 46 - 116 U/L    AST 27 5 - 45 U/L    ALT 39 12 - 78 U/L    Total Protein 5 7 (L) 6 4 - 8 2 g/dL    Albumin 2 9 (L) 3 5 - 5 0 g/dL   Type and screen   Result Value Ref Range    ABO Grouping A     Rh Factor Positive     Antibody Screen Negative     Specimen Expiration Date [id number]    Tissue Exam   Result Value Ref Range    Case Report       Surgical Pathology Report                         Case: [id number]                                   Authorizing Provider:  Denae Wood MD        Collected:           10/30/2018 1511              Ordering Location:     [address] [place] [address]      Received:            10/30/2018 [address] Operating Room                                                      Pathologist:           Micah Wild MD                                                                Specimens:   A) - Stomach, portion of stomach                                                                    B) - Gallbladder                                                                           Addendum       Part A:  Helicobacter immunostain with rare possible organisms (correlate clinically)  Final Diagnosis       A  Portion of stomach (gastrotomy):     - Portion of gastric body tissue with mild acute and chronic inflammation      - Helicobacter study pending      - No malignancy identified      B  Gallbladder (cholecystectomy):     - Minimal chronic cholecystitis and cholelithiasis  - One (1) lymph node, negative for carcinoma  Note       Interpretation performed at [facility], [address] [place] [address]  Additional Information       All controls performed with the immunohistochemical stains reported above reacted appropriately  These tests were developed and their performance characteristics determined by [address] [place] [address] or [facility]  They may not be cleared or approved by the [place]  Food and Drug Administration  The FDA has determined that such clearance or approval is not necessary  These tests are used for clinical purposes  They should not be regarded as investigational or for research  This laboratory has been approved by CLIA 88, designated as a high-complexity laboratory and is qualified to perform these tests  Gross Description       A  The specimen is received in formalin, labeled with the patient's name and hospital number, and is designated "portion of stomach  The specimen consists of a stomach wedge, with staple line, which measures 5 5 x 2 0 x 1 4 cm  The central portion of the specimen is received previously opened  The serosa is unremarkable  The staple line is removed, and the resultant exposed margin is inked blue  The specimen is sectioned, and the mucosa exhibits unremarkable rugal folds; no abnormalities identified  Representative sections  Three cassettes, with previous opened area in cassettes A1, A2     B  The specimen is received in formalin, labeled with the patient's name and hospital number, and is designated "gallbladder  The specimen consists of a gallbladder measuring 10 7 x 4 0 x 2 9 cm  The serosa is tan-pink to purple, smooth, glistening, and exhibits 2 transmural defects measuring 0 3 cm and 0 4 cm in greatest dimension  The proximal cystic duct margin is inked blue    Adjacent to the cystic duct, is a tan-pink dense nodule consistent with lymph node 1 0 cm in greatest dimension  The specimen is opened, and the lumen contains viscous and liquid bile, and multiple tan green-yellow stony fragments measuring in aggregate 9 7 x 6 4 x 2 1 cm  The wall measures 0 1 cm in thickness  The mucosa is the tan to tan-green and velvety  Representative sections  One cassette, including lymph node  Note: The estimated total formalin fixation time based upon information provided by the submitting clinician and the standard processing schedule is under 72 hours  [place]       Clinical Information       Cholecystitis  History of Parth-en-Y gastric bypass  ED Urine Macroscopic   Result Value Ref Range    Color, UA Lizzy     Clarity, UA Clear     pH, UA 6 0 4 5 - 8 0    Leukocytes, UA Negative Negative    Nitrite, UA Negative Negative    Protein, UA Negative Negative mg/dl    Glucose, UA Negative Negative mg/dl    Ketones, UA Negative Negative mg/dl    Urobilinogen, UA 0 2 0 2, 1 0 E U /dl E U /dl    Bilirubin, UA Interference- unable to analyze (A) Negative    Blood, UA Negative Negative    Specific Gravity, UA 1 025 1 003 - 1 030       No orders of the defined types were placed in this encounter  ALLERGIES:  Allergies   Allergen Reactions    Ampicillin GI Intolerance     Vomiting    Aspirin GI Intolerance     vomiting      Penicillins GI Intolerance     Vomit       Current Medications     Current Outpatient Prescriptions   Medication Sig Dispense Refill    acetaminophen (TYLENOL) 325 mg tablet Take 3 tablets (975 mg total) by mouth every 8 (eight) hours 30 tablet 0    ALPRAZolam (XANAX) 0 5 mg tablet TAKE 1 TABLET EVERY 4 HOURS AS NEEDED FOR ANXIETY 120 tablet 0    baclofen 10 mg tablet Take 1 tablet (10 mg total) by mouth 3 (three) times a day 90 tablet 2    Buprenorphine (BUTRANS) 15 MCG/HR PTWK Apply 1 patch TD every 7 days   4 patch 2    buPROPion (WELLBUTRIN) 75 mg tablet Take 1 tablet (75 mg total) by mouth 2 (two) times a day 180 tablet 0    QUEtiapine (SEROquel) 100 mg tablet Take 1 tablet (100 mg total) by mouth daily at bedtime 30 tablet 2    albuterol (PROVENTIL HFA,VENTOLIN HFA) 90 mcg/act inhaler Inhale 2 puffs every 4 (four) hours as needed for wheezing or shortness of breath (cough) 1 Inhaler 1    azithromycin (ZITHROMAX) 500 MG tablet Take 1 tablet (500 mg total) by mouth daily for 5 days 5 tablet 0    benzonatate (TESSALON PERLES) 100 mg capsule Take 1 capsule (100 mg total) by mouth 3 (three) times a day as needed for cough 30 capsule 0     Current Facility-Administered Medications   Medication Dose Route Frequency Provider Last Rate Last Dose    cyanocobalamin injection 1,000 mcg  1,000 mcg Intramuscular Q30 Days Harriet Alonso MD   1,000 mcg at 10/09/18 1049         Health Maintenance     Health Maintenance   Topic Date Due    Pneumococcal PPSV23 Highest Risk Adult (1 of 3 - PCV13) 08/22/1994    DTaP,Tdap,and Td Vaccines (1 - Tdap) 08/13/2019 (Originally 8/22/1996)    Medicare Annual Wellness Visit (AWV)  09/13/2019    INFLUENZA VACCINE  Completed     Immunization History   Administered Date(s) Administered    Influenza 12/15/2018       Harriet Alonso MD

## 2019-01-29 ENCOUNTER — TELEPHONE (OUTPATIENT)
Dept: FAMILY MEDICINE CLINIC | Facility: CLINIC | Age: 44
End: 2019-01-29

## 2019-01-29 NOTE — TELEPHONE ENCOUNTER
Patient has completed all the antibiotics but still have the lingering cough & congestion  Wants to know if Dr Dianna Benitez can call in something for him or if he has to come back in  Please call to advise

## 2019-02-06 ENCOUNTER — OFFICE VISIT (OUTPATIENT)
Dept: FAMILY MEDICINE CLINIC | Facility: CLINIC | Age: 44
End: 2019-02-06
Payer: MEDICARE

## 2019-02-06 VITALS
BODY MASS INDEX: 23.03 KG/M2 | DIASTOLIC BLOOD PRESSURE: 62 MMHG | WEIGHT: 170 LBS | TEMPERATURE: 95.6 F | HEIGHT: 72 IN | HEART RATE: 80 BPM | RESPIRATION RATE: 14 BRPM | SYSTOLIC BLOOD PRESSURE: 118 MMHG

## 2019-02-06 DIAGNOSIS — J01.90 ACUTE NON-RECURRENT SINUSITIS, UNSPECIFIED LOCATION: Primary | ICD-10-CM

## 2019-02-06 PROCEDURE — 99213 OFFICE O/P EST LOW 20 MIN: CPT | Performed by: NURSE PRACTITIONER

## 2019-02-06 RX ORDER — DOXYCYCLINE 100 MG/1
100 CAPSULE ORAL 2 TIMES DAILY
Qty: 14 CAPSULE | Refills: 0 | Status: SHIPPED | OUTPATIENT
Start: 2019-02-06 | End: 2019-02-13

## 2019-02-06 NOTE — PROGRESS NOTES
FAMILY PRACTICE OFFICE VISIT       NAME: Devan Qureshi  AGE: 37 y o  SEX: male       : 1975        MRN: [id number]    DATE: 2019    Assessment and Plan     Problem List Items Addressed This Visit     None      Visit Diagnoses     Acute non-recurrent sinusitis, unspecified location    -  Primary    Relevant Medications    doxycycline monohydrate (MONODOX) 100 mg capsule          1  Acute non-recurrent sinusitis, unspecified location  doxycycline monohydrate (MONODOX) 100 mg capsule     This 70-year-old male presents today with symptoms consistent with sinusitis  He was treated in office approximately 2 weeks ago with a Z-Derrick and albuterol inhaler  Wheezing resolved and cough lessened, but nasal congestion, postnasal drip, rhinorrhea persist   Will treat with doxycycline 100 mg twice daily for 7 days  Take this medication with food  Continue supportive care:  Rest, fluids, warm fluids, honey, humidification  If symptoms are not improving over the next few days, instructed to call  Chief Complaint     Chief Complaint   Patient presents with    Cough     x's 2+ wks    Depression       History of Present Illness     This 37year old male presents today with nasal congestion, rhinorrhea, postnasal drip, and persistent cough  He was treated approximately 2 weeks ago in office with a Z-Derrick and albuterol inhaler for bronchitis  Wheezing resolved and cough has lessened, but congestion and postnasal drip continue to be bothersome  He has been using Mucinex with no relief  He denies shortness of breath, chest tightness or wheezing  Denies fevers or chills  Review of Systems   Review of Systems   Constitutional: Positive for fatigue  Negative for chills, diaphoresis and fever  HENT: Positive for congestion, postnasal drip and rhinorrhea  Negative for ear pain and sore throat  Respiratory: Positive for cough  Negative for chest tightness, shortness of breath and wheezing  Cardiovascular: Negative  Gastrointestinal: Negative  Musculoskeletal: Negative for myalgias  Neurological: Negative for dizziness and headaches         Active Problem List     Patient Active Problem List   Diagnosis    Chronic low back pain    Bipolar 2 disorder, major depressive episode ([address] )    Chronic right shoulder pain    Depression with anxiety    Fatigue    Chronic anemia    Nephrolithiasis    Pain syndrome, chronic    Right elbow pain    Right lateral epicondylitis    Uncomplicated opioid dependence (HCC)    Thumb pain    Lumbar post-laminectomy syndrome    Myofascial pain syndrome    Lumbar radiculopathy    Spinal stenosis of lumbar region with neurogenic claudication    BPH with obstruction/lower urinary tract symptoms    Iron deficiency anemia    Vitamin B deficiency    Postgastrectomy malabsorption    History of Parth-en-Y gastric bypass    Tobacco abuse    Status post laparoscopic cholecystectomy       Past Medical History:  Past Medical History:   Diagnosis Date    Anxiety     Arthritis     Bipolar disorder (HCC)     Chronic left lumbar radiculopathy     Chronic low back pain     Chronic pain syndrome     Chronic right shoulder pain     Depression with anxiety     Fatigue     GERD (gastroesophageal reflux disease)     Hydronephrosis     Iron deficiency anemia     Kidney stone     Lytic bone lesions on xray     Nephrolithiasis     PONV (postoperative nausea and vomiting)     Right elbow pain     Right lateral epicondylitis        Past Surgical History:  Past Surgical History:   Procedure Laterality Date    ADENOIDECTOMY Bilateral     APPENDECTOMY      BACK SURGERY      CHOLECYSTECTOMY LAPAROSCOPIC N/A 10/30/2018    Procedure: CHOLECYSTECTOMY WITH GASTROTOMY LAPAROSCOPIC;  Surgeon: Lisa Branham MD;  [facility];  Service: General    ERCP W/ SPHICTEROTOMY N/A 10/30/2018    Procedure: ENDOSCOPIC RETROGRADE CHOLANGIOPANCREATOGRAPHY (ERCP) W/ SPHINCTEROTOMY;  Surgeon: Maurilio Gandhi MD;  [facility];  Service: Gastroenterology    GASTRIC BYPASS      2009    OTHER SURGICAL HISTORY      fusion/refusion of vertebrae, 2010 and 2011 l5-s1 and l4-l5    AK CYSTO/URETERO W/LITHOTRIPSY &INDWELL STENT INSRT Right 8/8/2017    Procedure: CYSTOSCOPY; URETEROSCOPY; HOLMIUM LASER; RETROGRADE PYELOGRAM; STENT;  Surgeon: Batsheva Ramirez MD;  [facility];  Service: Urology    AK IMPLANT SPINAL NEUROSTIM/ Right 11/14/2017    Procedure: REMOVAL LOWER MEDIAL BUTTOCK SPINAL CORD STIMULATOR GENERATOR; PLACEMENT OF NEW BUTTOCK SPINAL CORD STIMULATOR THROUGH SEPERATE INCISION;  Surgeon: Chester Wayne MD;  [facility];  Service: Neurosurgery    RIK-EN-Y PROCEDURE  2003    SPINAL CORD STIMULATOR IMPLANT  11/14/2017    dr Anabelle Villarreal procedure and technique 1  removal of a right back implantable pulse generator 2 placement of a new right buttock impantable pulse generator through seperate incision 3 electric analys complex programming spinal cord stimulator system postoperative period approx 1 hour    TONSILLECTOMY         Family History:  Family History   Problem Relation Age of Onset    Cancer Mother     Arthritis Mother     Stomach cancer Mother     Cancer Father     Esophageal cancer Father     Other Father         brain tumor    No Known Problems Sister     No Known Problems Sister     No Known Problems Sister        Social History:  Social History     Social History    Marital status:      Spouse name: N/A    Number of children: N/A    Years of education: GED     Occupational History    Not on file       Social History Main Topics    Smoking status: Current Every Day Smoker     Packs/day: 1 00     Types: Cigarettes    Smokeless tobacco: Never Used    Alcohol use No      Comment: quit drinking, social    Drug use: No    Sexual activity: Not on file     Other Topics Concern    Not on file     Social History Narrative    Chooses not to have children    Drinks caffienated tea    Lives with spouse                   I have reviewed the patient's medical history in detail; there are no changes to the history as noted in the electronic medical record  Objective     Vitals:    02/06/19 1118   BP: 118/62   Pulse: 80   Resp: 14   Temp: (!) 95 6 °F (35 3 °C)   TempSrc: Tympanic   Weight: 77 1 kg (170 lb)   Height: 6' (1 829 m)     Wt Readings from Last 3 Encounters:   02/06/19 77 1 kg (170 lb)   01/24/19 77 9 kg (171 lb 12 8 oz)   01/14/19 77 6 kg (171 lb)     Body mass index is 23 06 kg/m²  PHQ-9 Depression Screening    PHQ-9:    Frequency of the following problems over the past two weeks:            Physical Exam   Constitutional: He appears well-developed and well-nourished  No distress  HENT:   Head: Normocephalic and atraumatic  Right Ear: Tympanic membrane and ear canal normal    Left Ear: Tympanic membrane and ear canal normal    Nose: Mucosal edema and rhinorrhea present  Mouth/Throat: Posterior oropharyngeal erythema (Mild erythema and postnasal drip) present  No oropharyngeal exudate or posterior oropharyngeal edema  Eyes: Conjunctivae are normal    Neck: Normal range of motion  Neck supple  Cardiovascular: Normal rate, regular rhythm and normal heart sounds  No murmur heard  Pulmonary/Chest: Effort normal and breath sounds normal    Musculoskeletal: He exhibits no edema  Lymphadenopathy:     He has no cervical adenopathy  Psychiatric: He has a normal mood and affect  Nursing note and vitals reviewed        ALLERGIES:  Allergies   Allergen Reactions    Ampicillin GI Intolerance     Vomiting    Aspirin GI Intolerance     vomiting      Penicillins GI Intolerance     Vomit       Current Medications     Current Outpatient Prescriptions   Medication Sig Dispense Refill    acetaminophen (TYLENOL) 325 mg tablet Take 3 tablets (975 mg total) by mouth every 8 (eight) hours 30 tablet 0    albuterol (PROVENTIL HFA,VENTOLIN HFA) 90 mcg/act inhaler Inhale 2 puffs every 4 (four) hours as needed for wheezing or shortness of breath (cough) 1 Inhaler 1    ALPRAZolam (XANAX) 0 5 mg tablet TAKE 1 TABLET EVERY 4 HOURS AS NEEDED FOR ANXIETY 120 tablet 0    baclofen 10 mg tablet Take 1 tablet (10 mg total) by mouth 3 (three) times a day 90 tablet 2    Buprenorphine (BUTRANS) 15 MCG/HR PTWK Apply 1 patch TD every 7 days   4 patch 2    buPROPion (WELLBUTRIN) 75 mg tablet Take 1 tablet (75 mg total) by mouth 2 (two) times a day 180 tablet 0    QUEtiapine (SEROquel) 100 mg tablet Take 1 tablet (100 mg total) by mouth daily at bedtime 30 tablet 2    doxycycline monohydrate (MONODOX) 100 mg capsule Take 1 capsule (100 mg total) by mouth 2 (two) times a day for 7 days 14 capsule 0     Current Facility-Administered Medications   Medication Dose Route Frequency Provider Last Rate Last Dose    cyanocobalamin injection 1,000 mcg  1,000 mcg Intramuscular Q30 Days Ita Goldsmith MD   1,000 mcg at 10/09/18 1049         Health Maintenance     Health Maintenance   Topic Date Due    Pneumococcal PPSV23 Highest Risk Adult (1 of 3 - PCV13) 08/22/1994    DTaP,Tdap,and Td Vaccines (1 - Tdap) 08/13/2019 (Originally 8/22/1996)    Medicare Annual Wellness Visit (AWV)  09/13/2019    INFLUENZA VACCINE  Completed     Immunization History   Administered Date(s) Administered    Influenza 12/15/2018       ANTONIA Cornell

## 2019-02-07 ENCOUNTER — TELEPHONE (OUTPATIENT)
Dept: FAMILY MEDICINE CLINIC | Facility: CLINIC | Age: 44
End: 2019-02-07

## 2019-02-07 NOTE — TELEPHONE ENCOUNTER
Patient calling to see if Lupis got a chance to speak to Dr Victor Manuel Pickard about his medication adjustment  Please call to advise  He works nights & sleeps during the day  If we don't get him, call Candance Condon at [phone]

## 2019-02-08 DIAGNOSIS — F33.2 MDD (MAJOR DEPRESSIVE DISORDER), RECURRENT SEVERE, WITHOUT PSYCHOSIS (HCC): ICD-10-CM

## 2019-02-08 RX ORDER — QUETIAPINE FUMARATE 100 MG/1
TABLET, FILM COATED ORAL
Qty: 45 TABLET | Refills: 1 | Status: SHIPPED | OUTPATIENT
Start: 2019-02-08 | End: 2019-03-01 | Stop reason: HOSPADM

## 2019-02-08 NOTE — PROGRESS NOTES
I spoke with patient tonight  He has notice increased symptoms of suspicious numbness and is concerned that dose of Seroquel needs to be further adjusted  Will increase dose to 150 mg at bedtime  I will reach to patient's psychiatrist to request follow-up consult  Patient understands instructions and agrees  I strongly advised him to decrease dose back to 100 mg if he notices adverse side effects

## 2019-02-23 ENCOUNTER — HOSPITAL ENCOUNTER (EMERGENCY)
Facility: HOSPITAL | Age: 44
Discharge: HOME | End: 2019-02-24
Attending: EMERGENCY MEDICINE
Payer: MEDICARE

## 2019-02-23 VITALS
RESPIRATION RATE: 18 BRPM | HEART RATE: 65 BPM | TEMPERATURE: 98.1 F | DIASTOLIC BLOOD PRESSURE: 76 MMHG | BODY MASS INDEX: 25.73 KG/M2 | WEIGHT: 190 LBS | SYSTOLIC BLOOD PRESSURE: 128 MMHG | HEIGHT: 72 IN | OXYGEN SATURATION: 100 %

## 2019-02-23 DIAGNOSIS — R45.851 SUICIDAL IDEATIONS: Primary | ICD-10-CM

## 2019-02-23 PROCEDURE — 99284 EMERGENCY DEPT VISIT MOD MDM: CPT

## 2019-02-24 LAB — ETHANOL EXG-MCNC: 0 MG/DL

## 2019-02-24 PROCEDURE — 82075 ASSAY OF BREATH ETHANOL: CPT | Performed by: EMERGENCY MEDICINE

## 2019-02-24 NOTE — ED ATTENDING ATTESTATION
Rena Davenport MD, saw and evaluated the patient  I have discussed the patient with the resident/non-physician practitioner and agree with the resident's/non-physician practitioner's findings, Plan of Care, and MDM as documented in the resident's/non-physician practitioner's note, except where noted  All available labs and Radiology studies were reviewed  At this point I agree with the current assessment done in the Emergency Department  I have conducted an independent evaluation of this patient including a focused history of:    Emergency Department Note- Orin Austin 37 y o  male MRN: [id number]    Unit/Bed#: ED 07 Encounter: [id number]    Orin Austin is a 37 y o  male who presents with   Chief Complaint   Patient presents with    Psychiatric Evaluation     Pt states issues at home  family called [employer] due to pt working there and [employer] called EMS  Pt states he has had SI thoughts, earlier in the day, but not currently  History of Present Illness   HPI:  Orin Austin is a 37 y o  male who presents for evaluation of:  Depression and suicidal ideation  Patient had suicidal thoughts earlier in the day but he denies any intent to harm himself currently  Patient denies illicit drug abuse  Patient has a h/o psychiatric illness and has h/o psychiatric admission  Review of Systems   Constitutional: Negative for chills and fever  Neurological: Negative for syncope and headaches  All other systems reviewed and are negative        Historical Information   Past Medical History:   Diagnosis Date    Anxiety     Arthritis     Bipolar disorder (HCC)     Chronic left lumbar radiculopathy     Chronic low back pain     Chronic pain syndrome     Chronic right shoulder pain     Depression with anxiety     Fatigue     GERD (gastroesophageal reflux disease)     Hydronephrosis     Iron deficiency anemia     Kidney stone     Lytic bone lesions on xray     Nephrolithiasis     PONV (postoperative nausea and vomiting)     Right elbow pain     Right lateral epicondylitis      Past Surgical History:   Procedure Laterality Date    ADENOIDECTOMY Bilateral     APPENDECTOMY      BACK SURGERY      CHOLECYSTECTOMY LAPAROSCOPIC N/A 10/30/2018    Procedure: CHOLECYSTECTOMY WITH GASTROTOMY LAPAROSCOPIC;  Surgeon: Rossana Savage MD;  [facility];  Service: General    ERCP W/ SPHICTEROTOMY N/A 10/30/2018    Procedure: ENDOSCOPIC RETROGRADE CHOLANGIOPANCREATOGRAPHY (ERCP) W/ SPHINCTEROTOMY;  Surgeon: Ashwini Barcenas MD;  [facility];  Service: Gastroenterology    GASTRIC BYPASS      2009    OTHER SURGICAL HISTORY      fusion/refusion of vertebrae, 2010 and 2011 l5-s1 and l4-l5    GA CYSTO/URETERO W/LITHOTRIPSY &INDWELL STENT INSRT Right 8/8/2017    Procedure: CYSTOSCOPY; URETEROSCOPY; HOLMIUM LASER; RETROGRADE PYELOGRAM; STENT;  Surgeon: Jacqueline Kerns MD;  [facility];  Service: Urology    GA IMPLANT SPINAL NEUROSTIM/ Right 11/14/2017    Procedure: REMOVAL LOWER MEDIAL BUTTOCK SPINAL CORD STIMULATOR GENERATOR; PLACEMENT OF NEW BUTTOCK SPINAL CORD [facility];  Surgeon: Beto Rodríguez MD;  [facility];  Service: Neurosurgery    RIK-EN-Y PROCEDURE  2003    SPINAL CORD STIMULATOR IMPLANT  11/14/2017    dr Lanny Cabrera procedure and technique 1  removal of a right back implantable pulse generator 2 placement of a new right buttock impantable pulse generator through seperate incision 3 electric analys complex programming spinal cord stimulator system postoperative period approx 1 hour    TONSILLECTOMY       Social History   Social History     Substance and Sexual Activity   Alcohol Use No    Comment: quit drinking, social     Social History     Substance and Sexual Activity   Drug Use No     Social History     Tobacco Use   Smoking Status Current Every Day Smoker    Packs/day: 1 00    Types: Cigarettes Smokeless Tobacco Never Used     Family History: non-contributory    Meds/Allergies   all medications and allergies reviewed  Allergies   Allergen Reactions    Ampicillin GI Intolerance     Vomiting    Aspirin GI Intolerance     vomiting      Penicillins GI Intolerance     Vomit       Objective   First Vitals:   Blood Pressure: 128/76 (190)  Pulse: 65 (19)  Temperature: 98 1 °F (36 7 °C) (19)  Temp Source: Oral (19)  Respirations: 18 (19)  Height: 6' (182 9 cm) (19)  Weight - Scale: 86 2 kg (190 lb) (19)  SpO2: 100 % (19)    Current Vitals:   Blood Pressure: 128/76 (19)  Pulse: 65 (19)  Temperature: 98 1 °F (36 7 °C) (19)  Temp Source: Oral (19)  Respirations: 18 (19)  Height: 6' (182 9 cm) (19)  Weight - Scale: 86 2 kg (190 lb) (19)  SpO2: 100 % (19)    No intake or output data in the 24 hours ending 19 0052    Invasive Devices          None          Physical Exam   Constitutional: He is oriented to person, place, and time  He appears well-developed and well-nourished  HENT:   Head: Normocephalic and atraumatic  Eyes: Pupils are equal, round, and reactive to light  EOM are normal    Cardiovascular: Normal rate and regular rhythm  Pulmonary/Chest: Effort normal and breath sounds normal    Abdominal: Soft  Bowel sounds are normal    Musculoskeletal: Normal range of motion  He exhibits no deformity  Neurological: He is alert and oriented to person, place, and time  Skin: Skin is warm and dry  Psychiatric: He has a normal mood and affect  His behavior is normal  Judgment and thought content normal    Nursing note and vitals reviewed  Medical Decision Makin   Depression and suicidal ideation: patient denies suicidal ideation in the ED    Recent Results (from the past 36 hour(s))   POCT alcohol breath test Collection Time: 02/24/19 12:06 AM   Result Value Ref Range    EXTBreath Alcohol 0 000      No orders to display         Portions of the record may have been created with voice recognition software  Occasional wrong word or "sound a like" substitutions may have occurred due to the inherent limitations of voice recognition software  Read the chart carefully and recognize, using context, where substitutions have occurred

## 2019-02-24 NOTE — ED NOTES
Pt was brought in tonight because he became upset at work after his fiance said she wanted to break up with him because she couldn't "handle" his bipolar  Pt admits to them arguing and increasing racing thoughts that she is cheating on him  Pt states that he has had thoughts of ending his life but states that he would never do anything to cause himself to die  "If I got hit by a car and , ohwell"  Pt feels safe to himself  [facility] Dr Tora Hodgkin  He has been trying to get in to see her for "months" to help adjust his medications but she has rescheduled multiple times  Pt denies SI/HI/AH/VH at this time  Pt states that at times he feels he might lose his temper while at work because he is a   Pt reports increased agitation due to working 12hrs 6 days a week and only getting 5-6hrs a night due to nightmares  Pt is to be [address] home to follow up with OP   Partial referral to be made to [facility]

## 2019-02-24 NOTE — ED NOTES
PATIENT BELONGINGS: tactical boots, uniform top, badge, socks, underwear, undershirt, cellphone clip, headphones   ZONE 5 MED ROOM ON SHELF (2 bags) with patient label     Susana Aaron RN  02/24/19 2785

## 2019-02-24 NOTE — ED PROVIDER NOTES
History  Chief Complaint   Patient presents with    Psychiatric Evaluation     Pt states issues at home  family called [employer] due to pt working there and [employer] called EMS  Pt states he has had SI thoughts, earlier in the day, but not currently  42-year-old male with history of bipolar depression and PTSD presenting to the emergency department for evaluation of suicidal thoughts to the head today  Patient expressed suicidal thoughts to his family after a recent break-up and today while at work they called EMS to bring him to the emergency department  At this time patient denies any suicidal ideation denies any homicidal ideation denies any auditory or visual hallucinations he has no physical complaints at this time  Patient denies any drugs or alcohol this evening or abuse in general he denies any access to firearms or weapons  Patient's remaining review of systems is negative      History provided by:  Patient   used: No    Psychiatric Evaluation   Presenting symptoms: suicidal thoughts    Presenting symptoms: no agitation    Degree of incapacity (severity):  Mild  Onset quality:  Gradual  Timing:  Intermittent  Progression:  Resolved  Chronicity:  Recurrent  Context: stressful life event    Context: not alcohol use, not drug abuse, not noncompliant and not recent medication change    Treatment compliance: All of the time  Relieved by:  Nothing  Worsened by:  Nothing  Ineffective treatments:  None tried  Associated symptoms: no abdominal pain, no chest pain, no fatigue and no headaches    Risk factors: hx of mental illness        Prior to Admission Medications   Prescriptions Last Dose Informant Patient Reported? Taking? ALPRAZolam (XANAX) 0 5 mg tablet   No No   Sig: TAKE 1 TABLET EVERY 4 HOURS AS NEEDED FOR ANXIETY   Buprenorphine (BUTRANS) 15 MCG/HR PTWK   No No   Sig: Apply 1 patch TD every 7 days     QUEtiapine (SEROquel) 100 mg tablet   No No   Sig: Take 1 5 tabs ( 150 mg ) poqhs   acetaminophen (TYLENOL) 325 mg tablet   No No   Sig: Take 3 tablets (975 mg total) by mouth every 8 (eight) hours   albuterol (PROVENTIL HFA,VENTOLIN HFA) 90 mcg/act inhaler   No No   Sig: Inhale 2 puffs every 4 (four) hours as needed for wheezing or shortness of breath (cough)   baclofen 10 mg tablet   No No   Sig: Take 1 tablet (10 mg total) by mouth 3 (three) times a day   buPROPion (WELLBUTRIN) 75 mg tablet   No No   Sig: Take 1 tablet (75 mg total) by mouth 2 (two) times a day      Facility-Administered Medications Last Administration Doses Remaining   cyanocobalamin injection 1,000 mcg 10/9/2018 10:49 AM           Past Medical History:   Diagnosis Date    Anxiety     Arthritis     Bipolar disorder (HCC)     Chronic left lumbar radiculopathy     Chronic low back pain     Chronic pain syndrome     Chronic right shoulder pain     Depression with anxiety     Fatigue     GERD (gastroesophageal reflux disease)     Hydronephrosis     Iron deficiency anemia     Kidney stone     Lytic bone lesions on xray     Nephrolithiasis     PONV (postoperative nausea and vomiting)     Right elbow pain     Right lateral epicondylitis        Past Surgical History:   Procedure Laterality Date    ADENOIDECTOMY Bilateral     APPENDECTOMY      BACK SURGERY      CHOLECYSTECTOMY LAPAROSCOPIC N/A 10/30/2018    Procedure: CHOLECYSTECTOMY WITH GASTROTOMY LAPAROSCOPIC;  Surgeon: Alfredo Arce MD;  [facility];  Service: General    ERCP W/ SPHICTEROTOMY N/A 10/30/2018    Procedure: ENDOSCOPIC RETROGRADE CHOLANGIOPANCREATOGRAPHY (ERCP) W/ SPHINCTEROTOMY;  Surgeon: Jose Ramon Hernandez MD;  [facility];  Service: Gastroenterology    GASTRIC BYPASS      2009    OTHER SURGICAL HISTORY      fusion/refusion of vertebrae, 2010 and 2011 l5-s1 and l4-l5    TX CYSTO/URETERO W/LITHOTRIPSY &INDWELL STENT INSRT Right 8/8/2017    Procedure: CYSTOSCOPY; URETEROSCOPY; HOLMIUM LASER; RETROGRADE PYELOGRAM; STENT;  Surgeon: Yessica Antoine MD;  [facility];  Service: Urology    [place] IMPLANT SPINAL NEUROSTIM/ Right 11/14/2017    Procedure: REMOVAL LOWER MEDIAL BUTTOCK SPINAL CORD STIMULATOR GENERATOR; PLACEMENT OF NEW BUTTOCK SPINAL CORD STIMULATOR THROUGH SEPERATE INCISION;  Surgeon: Bryce Yo MD;  [facility];  Service: Neurosurgery    RIK-EN-Y PROCEDURE  2003    SPINAL CORD STIMULATOR IMPLANT  11/14/2017    dr Ryder Son procedure and technique 1  removal of a right back implantable pulse generator 2 placement of a new right buttock impantable pulse generator through seperate incision 3 electric analys complex programming spinal cord stimulator system postoperative period approx 1 hour    TONSILLECTOMY         Family History   Problem Relation Age of Onset    Cancer Mother     Arthritis Mother     Stomach cancer Mother     Cancer Father     Esophageal cancer Father     Other Father         brain tumor    No Known Problems Sister     No Known Problems Sister     No Known Problems Sister      I have reviewed and agree with the history as documented  Social History     Tobacco Use    Smoking status: Current Every Day Smoker     Packs/day: 1 00     Types: Cigarettes    Smokeless tobacco: Never Used   Substance Use Topics    Alcohol use: No     Comment: quit drinking, social    Drug use: No        Review of Systems   Constitutional: Negative for chills, fatigue and fever  HENT: Negative for sore throat  Eyes: Negative for visual disturbance  Respiratory: Negative for shortness of breath  Cardiovascular: Negative for chest pain  Gastrointestinal: Negative for abdominal pain, constipation, diarrhea, nausea and vomiting  Genitourinary: Negative for difficulty urinating, dysuria and hematuria  Musculoskeletal: Negative for arthralgias  Skin: Negative for rash  Neurological: Negative for syncope, weakness and headaches     Hematological: Negative for adenopathy  Psychiatric/Behavioral: Positive for suicidal ideas  Negative for agitation and behavioral problems  All other systems reviewed and are negative  Physical Exam  ED Triage Vitals [02/23/19 2340]   Temperature Pulse Respirations Blood Pressure SpO2   98 1 °F (36 7 °C) 65 18 128/76 100 %      Temp Source Heart Rate Source Patient Position - Orthostatic VS BP Location FiO2 (%)   Oral -- -- -- --      Pain Score       No Pain           Orthostatic Vital Signs  Vitals:    02/23/19 2340   BP: 128/76   Pulse: 65       Physical Exam   Constitutional: He is oriented to person, place, and time  He appears well-developed and well-nourished  HENT:   Head: Normocephalic and atraumatic  Eyes: Conjunctivae and EOM are normal  No scleral icterus  Neck: Normal range of motion  Neck supple  Cardiovascular: Normal rate and regular rhythm  No murmur heard  Pulmonary/Chest: Effort normal and breath sounds normal    Abdominal: Soft  Bowel sounds are normal  There is no tenderness  Musculoskeletal: Normal range of motion  Neurological: He is alert and oriented to person, place, and time  Skin: Skin is warm and dry  Psychiatric: He has a normal mood and affect  His behavior is normal    Nursing note and vitals reviewed        ED Medications  Medications - No data to display    Diagnostic Studies  Results Reviewed     Procedure Component Value Units Date/Time    POCT alcohol breath test [[id number]]  (Normal) Resulted:  02/24/19 0006    Lab Status:  Final result Updated:  02/24/19 0046     EXTBreath Alcohol 0 000                 No orders to display         Procedures  Procedures      Phone Consults  ED Phone Contact    ED Course                               MDM  Number of Diagnoses or Management Options  Suicidal ideations: new and requires workup  Diagnosis management comments: 49-year-old male presenting with suicidal ideations, will order urine drug screen and breath alcohol, will have patient seen by crisis  Crisis evaluation included patient could be referred to partial program patient is agreeable to that plan  Will discharge patient with this follow-up information  Amount and/or Complexity of Data Reviewed  Clinical lab tests: ordered and reviewed  Tests in the medicine section of CPT®: ordered and reviewed  Review and summarize past medical records: yes  Discuss the patient with other providers: yes        Disposition  Final diagnoses:   Suicidal ideations     Time reflects when diagnosis was documented in both MDM as applicable and the Disposition within this note     Time User Action Codes Description Comment    2/24/2019  1:31 AM Wendy Camacho Add [X00 [id number]] Suicidal ideations       ED Disposition     ED Disposition Condition Date/Time Comment    Discharge Stable Sun Feb 24, 2019  1:31 AM Silvia Santos discharge to home/self care              MD Documentation      Most Recent Value   Sending MD MCKAY      Follow-up Information     Follow up With Specialties Details Why Contact Info Additional Information    Winsome Piedra MD [facility]   [address] [place] [address] Emergency Department Emergency Medicine   [facility] [id number]  [phone] [address] [facility] ED, [address], [address]          Discharge Medication List as of 2/24/2019  1:32 AM      CONTINUE these medications which have NOT CHANGED    Details   acetaminophen (TYLENOL) 325 mg tablet Take 3 tablets (975 mg total) by mouth every 8 (eight) hours, Starting Thu 11/1/2018, Normal      albuterol (PROVENTIL HFA,VENTOLIN HFA) 90 mcg/act inhaler Inhale 2 puffs every 4 (four) hours as needed for wheezing or shortness of breath (cough), Starting Thu 1/24/2019, Normal      ALPRAZolam (XANAX) 0 5 mg tablet TAKE 1 TABLET EVERY 4 HOURS AS NEEDED FOR ANXIETY, Normal      baclofen 10 mg tablet Take 1 tablet (10 mg total) by mouth 3 (three) times a day, Starting Mon 1/14/2019, Normal      Buprenorphine (BUTRANS) 15 MCG/HR PTWK Apply 1 patch TD every 7 days  , Normal      buPROPion (WELLBUTRIN) 75 mg tablet Take 1 tablet (75 mg total) by mouth 2 (two) times a day, Starting Mon 1/7/2019, Normal      QUEtiapine (SEROquel) 100 mg tablet Take 1 5 tabs ( 150 mg ) poqhs, Normal           No discharge procedures on file  ED Provider  Attending physically available and evaluated Jairon Parish I managed the patient along with the ED Attending      Electronically Signed by         Derrick Baig MD  02/24/19 4103

## 2019-02-24 NOTE — ED NOTES
Belongings from med room given back to patient at this time        Miguel Angel Kwok RN  02/24/19 0145

## 2019-02-25 ENCOUNTER — HOSPITAL ENCOUNTER (INPATIENT)
Facility: HOSPITAL | Age: 44
LOS: 4 days | Discharge: HOME | DRG: 885 | End: 2019-03-01
Attending: PSYCHIATRY & NEUROLOGY | Admitting: PSYCHIATRY & NEUROLOGY
Payer: MEDICARE

## 2019-02-25 ENCOUNTER — HOSPITAL ENCOUNTER (EMERGENCY)
Facility: HOSPITAL | Age: 44
End: 2019-02-25
Attending: EMERGENCY MEDICINE | Admitting: EMERGENCY MEDICINE
Payer: MEDICARE

## 2019-02-25 VITALS
WEIGHT: 190 LBS | OXYGEN SATURATION: 100 % | HEIGHT: 72 IN | TEMPERATURE: 97.7 F | DIASTOLIC BLOOD PRESSURE: 57 MMHG | HEART RATE: 56 BPM | SYSTOLIC BLOOD PRESSURE: 99 MMHG | BODY MASS INDEX: 25.73 KG/M2 | RESPIRATION RATE: 18 BRPM

## 2019-02-25 DIAGNOSIS — F31.81 BIPOLAR 2 DISORDER, MAJOR DEPRESSIVE EPISODE (HCC): Primary | Chronic | ICD-10-CM

## 2019-02-25 DIAGNOSIS — F43.10 PTSD (POST-TRAUMATIC STRESS DISORDER): ICD-10-CM

## 2019-02-25 DIAGNOSIS — R45.851 SUICIDAL IDEATION: ICD-10-CM

## 2019-02-25 DIAGNOSIS — E55.9 VITAMIN D DEFICIENCY: ICD-10-CM

## 2019-02-25 LAB
25(OH)D3 SERPL-MCNC: 14.8 NG/ML (ref 30–100)
ALBUMIN SERPL BCP-MCNC: 3.7 G/DL (ref 3.5–5)
ALP SERPL-CCNC: 86 U/L (ref 46–116)
ALT SERPL W P-5'-P-CCNC: 12 U/L (ref 12–78)
ANION GAP SERPL CALCULATED.3IONS-SCNC: 5 MMOL/L (ref 4–13)
APAP SERPL-MCNC: <2 UG/ML (ref 10–30)
AST SERPL W P-5'-P-CCNC: 7 U/L (ref 5–45)
ATRIAL RATE: 58 BPM
BASOPHILS # BLD AUTO: 0.05 THOUSANDS/ΜL (ref 0–0.1)
BASOPHILS NFR BLD AUTO: 1 % (ref 0–1)
BILIRUB SERPL-MCNC: 0.41 MG/DL (ref 0.2–1)
BUN SERPL-MCNC: 10 MG/DL (ref 5–25)
CALCIUM SERPL-MCNC: 9.1 MG/DL (ref 8.3–10.1)
CHLORIDE SERPL-SCNC: 106 MMOL/L (ref 100–108)
CO2 SERPL-SCNC: 28 MMOL/L (ref 21–32)
CREAT SERPL-MCNC: 1 MG/DL (ref 0.6–1.3)
EOSINOPHIL # BLD AUTO: 0.11 THOUSAND/ΜL (ref 0–0.61)
EOSINOPHIL NFR BLD AUTO: 2 % (ref 0–6)
ERYTHROCYTE [DISTWIDTH] IN BLOOD BY AUTOMATED COUNT: 14.8 % (ref 11.6–15.1)
ETHANOL EXG-MCNC: 0 MG/DL
ETHANOL SERPL-MCNC: <3 MG/DL (ref 0–3)
GFR SERPL CREATININE-BSD FRML MDRD: 92 ML/MIN/1.73SQ M
GLUCOSE SERPL-MCNC: 90 MG/DL (ref 65–140)
HCT VFR BLD AUTO: 46.4 % (ref 36.5–49.3)
HGB BLD-MCNC: 14.6 G/DL (ref 12–17)
IMM GRANULOCYTES # BLD AUTO: 0.02 THOUSAND/UL (ref 0–0.2)
IMM GRANULOCYTES NFR BLD AUTO: 0 % (ref 0–2)
LYMPHOCYTES # BLD AUTO: 1.29 THOUSANDS/ΜL (ref 0.6–4.47)
LYMPHOCYTES NFR BLD AUTO: 18 % (ref 14–44)
MAGNESIUM SERPL-MCNC: 2.3 MG/DL (ref 1.6–2.6)
MCH RBC QN AUTO: 26.7 PG (ref 26.8–34.3)
MCHC RBC AUTO-ENTMCNC: 31.5 G/DL (ref 31.4–37.4)
MCV RBC AUTO: 85 FL (ref 82–98)
MONOCYTES # BLD AUTO: 0.44 THOUSAND/ΜL (ref 0.17–1.22)
MONOCYTES NFR BLD AUTO: 6 % (ref 4–12)
NEUTROPHILS # BLD AUTO: 5.25 THOUSANDS/ΜL (ref 1.85–7.62)
NEUTS SEG NFR BLD AUTO: 73 % (ref 43–75)
NRBC BLD AUTO-RTO: 0 /100 WBCS
P AXIS: 78 DEGREES
PLATELET # BLD AUTO: 195 THOUSANDS/UL (ref 149–390)
PMV BLD AUTO: 10.9 FL (ref 8.9–12.7)
POTASSIUM SERPL-SCNC: 4.4 MMOL/L (ref 3.5–5.3)
PR INTERVAL: 160 MS
PROT SERPL-MCNC: 6.8 G/DL (ref 6.4–8.2)
QRS AXIS: 73 DEGREES
QRSD INTERVAL: 94 MS
QT INTERVAL: 416 MS
QTC INTERVAL: 408 MS
RBC # BLD AUTO: 5.46 MILLION/UL (ref 3.88–5.62)
SALICYLATES SERPL-MCNC: <3 MG/DL (ref 3–20)
SODIUM SERPL-SCNC: 139 MMOL/L (ref 136–145)
T WAVE AXIS: 45 DEGREES
TSH SERPL DL<=0.05 MIU/L-ACNC: 0.95 UIU/ML (ref 0.36–3.74)
VENTRICULAR RATE: 58 BPM
WBC # BLD AUTO: 7.16 THOUSAND/UL (ref 4.31–10.16)

## 2019-02-25 PROCEDURE — 36415 COLL VENOUS BLD VENIPUNCTURE: CPT | Performed by: EMERGENCY MEDICINE

## 2019-02-25 PROCEDURE — 82306 VITAMIN D 25 HYDROXY: CPT | Performed by: PSYCHIATRY & NEUROLOGY

## 2019-02-25 PROCEDURE — 84443 ASSAY THYROID STIM HORMONE: CPT | Performed by: PSYCHIATRY & NEUROLOGY

## 2019-02-25 PROCEDURE — 85025 COMPLETE CBC W/AUTO DIFF WBC: CPT | Performed by: EMERGENCY MEDICINE

## 2019-02-25 PROCEDURE — 93010 ELECTROCARDIOGRAM REPORT: CPT | Performed by: INTERNAL MEDICINE

## 2019-02-25 PROCEDURE — 80320 DRUG SCREEN QUANTALCOHOLS: CPT | Performed by: EMERGENCY MEDICINE

## 2019-02-25 PROCEDURE — 93005 ELECTROCARDIOGRAM TRACING: CPT

## 2019-02-25 PROCEDURE — 83735 ASSAY OF MAGNESIUM: CPT | Performed by: PSYCHIATRY & NEUROLOGY

## 2019-02-25 PROCEDURE — 99222 1ST HOSP IP/OBS MODERATE 55: CPT | Performed by: PHYSICIAN ASSISTANT

## 2019-02-25 PROCEDURE — 82075 ASSAY OF BREATH ETHANOL: CPT | Performed by: EMERGENCY MEDICINE

## 2019-02-25 PROCEDURE — 99285 EMERGENCY DEPT VISIT HI MDM: CPT

## 2019-02-25 PROCEDURE — 80329 ANALGESICS NON-OPIOID 1 OR 2: CPT | Performed by: EMERGENCY MEDICINE

## 2019-02-25 PROCEDURE — 80053 COMPREHEN METABOLIC PANEL: CPT | Performed by: EMERGENCY MEDICINE

## 2019-02-25 RX ORDER — TRAZODONE HYDROCHLORIDE 50 MG/1
50 TABLET ORAL
Status: CANCELLED | OUTPATIENT
Start: 2019-02-25

## 2019-02-25 RX ORDER — RISPERIDONE 1 MG/1
1 TABLET, ORALLY DISINTEGRATING ORAL
Status: CANCELLED | OUTPATIENT
Start: 2019-02-25

## 2019-02-25 RX ORDER — LORAZEPAM 2 MG/ML
1 INJECTION INTRAMUSCULAR EVERY 6 HOURS PRN
Status: CANCELLED | OUTPATIENT
Start: 2019-02-25

## 2019-02-25 RX ORDER — TRAMADOL HYDROCHLORIDE 50 MG/1
50 TABLET ORAL EVERY 6 HOURS PRN
Status: CANCELLED | OUTPATIENT
Start: 2019-02-25

## 2019-02-25 RX ORDER — LORAZEPAM 2 MG/ML
1 INJECTION INTRAMUSCULAR EVERY 6 HOURS PRN
Status: DISCONTINUED | OUTPATIENT
Start: 2019-02-25 | End: 2019-03-01 | Stop reason: HOSPADM

## 2019-02-25 RX ORDER — HALOPERIDOL 5 MG/ML
5 INJECTION INTRAMUSCULAR EVERY 6 HOURS PRN
Status: CANCELLED | OUTPATIENT
Start: 2019-02-25

## 2019-02-25 RX ORDER — IBUPROFEN 600 MG/1
600 TABLET ORAL ONCE
Status: COMPLETED | OUTPATIENT
Start: 2019-02-25 | End: 2019-02-25

## 2019-02-25 RX ORDER — ACETAMINOPHEN 325 MG/1
650 TABLET ORAL EVERY 6 HOURS PRN
Status: CANCELLED | OUTPATIENT
Start: 2019-02-25

## 2019-02-25 RX ORDER — LORAZEPAM 1 MG/1
1 TABLET ORAL EVERY 8 HOURS PRN
Status: CANCELLED | OUTPATIENT
Start: 2019-02-25

## 2019-02-25 RX ORDER — RISPERIDONE 1 MG/1
1 TABLET, ORALLY DISINTEGRATING ORAL
Status: DISCONTINUED | OUTPATIENT
Start: 2019-02-25 | End: 2019-03-01 | Stop reason: HOSPADM

## 2019-02-25 RX ORDER — HALOPERIDOL 5 MG
5 TABLET ORAL EVERY 8 HOURS PRN
Status: CANCELLED | OUTPATIENT
Start: 2019-02-25

## 2019-02-25 RX ORDER — BENZTROPINE MESYLATE 1 MG/1
1 TABLET ORAL EVERY 6 HOURS PRN
Status: CANCELLED | OUTPATIENT
Start: 2019-02-25

## 2019-02-25 RX ORDER — OLANZAPINE 10 MG/1
10 TABLET ORAL
Status: CANCELLED | OUTPATIENT
Start: 2019-02-25

## 2019-02-25 RX ORDER — ZOLPIDEM TARTRATE 5 MG/1
5 TABLET ORAL
Status: CANCELLED | OUTPATIENT
Start: 2019-02-25

## 2019-02-25 RX ORDER — BENZTROPINE MESYLATE 1 MG/ML
1 INJECTION INTRAMUSCULAR; INTRAVENOUS EVERY 6 HOURS PRN
Status: DISCONTINUED | OUTPATIENT
Start: 2019-02-25 | End: 2019-03-01 | Stop reason: HOSPADM

## 2019-02-25 RX ORDER — MAGNESIUM HYDROXIDE/ALUMINUM HYDROXICE/SIMETHICONE 120; 1200; 1200 MG/30ML; MG/30ML; MG/30ML
30 SUSPENSION ORAL EVERY 4 HOURS PRN
Status: DISCONTINUED | OUTPATIENT
Start: 2019-02-25 | End: 2019-03-01 | Stop reason: HOSPADM

## 2019-02-25 RX ORDER — ACETAMINOPHEN 325 MG/1
975 TABLET ORAL EVERY 6 HOURS PRN
Status: CANCELLED | OUTPATIENT
Start: 2019-02-25

## 2019-02-25 RX ORDER — HALOPERIDOL 5 MG/ML
5 INJECTION INTRAMUSCULAR EVERY 6 HOURS PRN
Status: DISCONTINUED | OUTPATIENT
Start: 2019-02-25 | End: 2019-03-01 | Stop reason: HOSPADM

## 2019-02-25 RX ORDER — ACETAMINOPHEN 325 MG/1
650 TABLET ORAL EVERY 6 HOURS PRN
Status: DISCONTINUED | OUTPATIENT
Start: 2019-02-25 | End: 2019-03-01 | Stop reason: HOSPADM

## 2019-02-25 RX ORDER — OLANZAPINE 10 MG/1
10 INJECTION, POWDER, LYOPHILIZED, FOR SOLUTION INTRAMUSCULAR
Status: DISCONTINUED | OUTPATIENT
Start: 2019-02-25 | End: 2019-03-01 | Stop reason: HOSPADM

## 2019-02-25 RX ORDER — TRAZODONE HYDROCHLORIDE 50 MG/1
50 TABLET ORAL
Status: DISCONTINUED | OUTPATIENT
Start: 2019-02-25 | End: 2019-03-01 | Stop reason: HOSPADM

## 2019-02-25 RX ORDER — BENZTROPINE MESYLATE 1 MG/1
1 TABLET ORAL EVERY 6 HOURS PRN
Status: DISCONTINUED | OUTPATIENT
Start: 2019-02-25 | End: 2019-03-01 | Stop reason: HOSPADM

## 2019-02-25 RX ORDER — HYDROXYZINE HYDROCHLORIDE 25 MG/1
25 TABLET, FILM COATED ORAL EVERY 6 HOURS PRN
Status: CANCELLED | OUTPATIENT
Start: 2019-02-25

## 2019-02-25 RX ORDER — ACETAMINOPHEN 325 MG/1
650 TABLET ORAL EVERY 4 HOURS PRN
Status: DISCONTINUED | OUTPATIENT
Start: 2019-02-25 | End: 2019-03-01 | Stop reason: HOSPADM

## 2019-02-25 RX ORDER — ACETAMINOPHEN 325 MG/1
975 TABLET ORAL EVERY 6 HOURS PRN
Status: DISCONTINUED | OUTPATIENT
Start: 2019-02-25 | End: 2019-03-01 | Stop reason: HOSPADM

## 2019-02-25 RX ORDER — ALBUTEROL SULFATE 90 UG/1
2 AEROSOL, METERED RESPIRATORY (INHALATION) EVERY 4 HOURS PRN
Status: DISCONTINUED | OUTPATIENT
Start: 2019-02-25 | End: 2019-03-01 | Stop reason: HOSPADM

## 2019-02-25 RX ORDER — ACETAMINOPHEN 325 MG/1
650 TABLET ORAL ONCE
Status: COMPLETED | OUTPATIENT
Start: 2019-02-25 | End: 2019-02-25

## 2019-02-25 RX ORDER — NICOTINE 21 MG/24HR
14 PATCH, TRANSDERMAL 24 HOURS TRANSDERMAL ONCE
Status: DISCONTINUED | OUTPATIENT
Start: 2019-02-25 | End: 2019-02-25 | Stop reason: HOSPADM

## 2019-02-25 RX ORDER — BENZTROPINE MESYLATE 1 MG/ML
1 INJECTION INTRAMUSCULAR; INTRAVENOUS EVERY 6 HOURS PRN
Status: CANCELLED | OUTPATIENT
Start: 2019-02-25

## 2019-02-25 RX ORDER — ACETAMINOPHEN 325 MG/1
650 TABLET ORAL EVERY 4 HOURS PRN
Status: CANCELLED | OUTPATIENT
Start: 2019-02-25

## 2019-02-25 RX ORDER — MAGNESIUM HYDROXIDE/ALUMINUM HYDROXICE/SIMETHICONE 120; 1200; 1200 MG/30ML; MG/30ML; MG/30ML
30 SUSPENSION ORAL EVERY 4 HOURS PRN
Status: CANCELLED | OUTPATIENT
Start: 2019-02-25

## 2019-02-25 RX ORDER — LORAZEPAM 1 MG/1
1 TABLET ORAL EVERY 8 HOURS PRN
Status: DISCONTINUED | OUTPATIENT
Start: 2019-02-25 | End: 2019-03-01 | Stop reason: HOSPADM

## 2019-02-25 RX ORDER — OLANZAPINE 10 MG/1
10 TABLET ORAL
Status: DISCONTINUED | OUTPATIENT
Start: 2019-02-25 | End: 2019-03-01 | Stop reason: HOSPADM

## 2019-02-25 RX ORDER — OLANZAPINE 10 MG/1
10 INJECTION, POWDER, LYOPHILIZED, FOR SOLUTION INTRAMUSCULAR
Status: CANCELLED | OUTPATIENT
Start: 2019-02-25

## 2019-02-25 RX ORDER — BACLOFEN 10 MG/1
10 TABLET ORAL 3 TIMES DAILY
Status: DISCONTINUED | OUTPATIENT
Start: 2019-02-25 | End: 2019-03-01 | Stop reason: HOSPADM

## 2019-02-25 RX ORDER — HALOPERIDOL 5 MG
5 TABLET ORAL EVERY 8 HOURS PRN
Status: DISCONTINUED | OUTPATIENT
Start: 2019-02-25 | End: 2019-03-01 | Stop reason: HOSPADM

## 2019-02-25 RX ORDER — HYDROXYZINE HYDROCHLORIDE 25 MG/1
25 TABLET, FILM COATED ORAL EVERY 6 HOURS PRN
Status: DISCONTINUED | OUTPATIENT
Start: 2019-02-25 | End: 2019-03-01 | Stop reason: HOSPADM

## 2019-02-25 RX ORDER — IBUPROFEN 600 MG/1
600 TABLET ORAL EVERY 8 HOURS PRN
Status: CANCELLED | OUTPATIENT
Start: 2019-02-25

## 2019-02-25 RX ORDER — DIAZEPAM 5 MG/1
5 TABLET ORAL ONCE
Status: COMPLETED | OUTPATIENT
Start: 2019-02-25 | End: 2019-02-25

## 2019-02-25 RX ADMIN — NICOTINE 14 MG: 14 PATCH TRANSDERMAL at 14:33

## 2019-02-25 RX ADMIN — IBUPROFEN 600 MG: 600 TABLET ORAL at 16:05

## 2019-02-25 RX ADMIN — DIAZEPAM 5 MG: 5 TABLET ORAL at 14:32

## 2019-02-25 RX ADMIN — ACETAMINOPHEN 650 MG: 325 TABLET ORAL at 16:05

## 2019-02-25 RX ADMIN — BACLOFEN 10 MG: 10 TABLET ORAL at 23:44

## 2019-02-25 NOTE — ED NOTES
Patient is accepted at [address] [place] [address] [facility]  Patient is accepted by Dr Nery Flores per [facility] (admissions coordinator)      Transportation is arranged with [facility] is scheduled for 02/25/2019 @2030   Patient may go to the floor at 2030        Nurse report is to be called to [phone] prior to patient transfer      [address] [place] [address]

## 2019-02-25 NOTE — ED PROVIDER NOTES
History  Chief Complaint   Patient presents with    Suicidal     Pt reports feeling "suicidal all weekend " Pt was noticeably "weaker, slurring words, does not know when or how many pills he took this morning " Pt has started to not remember things according to family members     This is a 42-year-old male with an extensive psychiatric history who presents with suicidal ideation  The patient states that he has just "had enough and wants to end it"  He has been feeling this way for "a while"  States that he wants to jump off a bridge or drive his car off of a bridge  Denies any homicidal ideation  Denies any auditory/visual hallucinations  No access to firearms  Denies any alcohol or drug abuse  Family is at bedside who state that he had a previous suicide attempt by hanging approximately 6-7 years ago  The patient states that he may have taken 2-3 extra doses of his medications last night  He does not remember taking the medications or which medications he may have taken too many of  Family states the patient was acting erratically this morning, not making any sense  Patient does not remember this  He denies any other complaints  If patient does not want to sign a 201, the family is willing to sign a 302  Denies fever/chills, nausea/vomiting, lightheadedness/dizziness, numbness/weakness, headache, change in vision, URI symptoms, neck pain, chest pain, palpitations, shortness of breath, cough, back pain, flank pain, abdominal pain, diarrhea, hematochezia, melena, dysuria, hematuria       1:23 PM  normal EKG  Undetectable Tylenol with normal LFTs  Normal physical exam   Patient is medically clear for crisis evaluation  Prior to Admission Medications   Prescriptions Last Dose Informant Patient Reported? Taking?    ALPRAZolam (XANAX) 0 5 mg tablet Past Week at Unknown time  No Yes   Sig: TAKE 1 TABLET EVERY 4 HOURS AS NEEDED FOR ANXIETY   Buprenorphine (BUTRANS) 15 MCG/HR PTWK Past Week at Unknown time  No Yes   Sig: Apply 1 patch TD every 7 days     QUEtiapine (SEROquel) 100 mg tablet 2/25/2019 at Unknown time  No Yes   Sig: Take 1 5 tabs ( 150 mg ) poqhs   acetaminophen (TYLENOL) 325 mg tablet Not Taking at Unknown time  No No   Sig: Take 3 tablets (975 mg total) by mouth every 8 (eight) hours   Patient not taking: Reported on 2/25/2019   albuterol (PROVENTIL HFA,VENTOLIN HFA) 90 mcg/act inhaler Past Week at Unknown time  No Yes   Sig: Inhale 2 puffs every 4 (four) hours as needed for wheezing or shortness of breath (cough)   baclofen 10 mg tablet 2/25/2019 at Unknown time  No Yes   Sig: Take 1 tablet (10 mg total) by mouth 3 (three) times a day   buPROPion (WELLBUTRIN) 75 mg tablet 2/25/2019 at Unknown time  No Yes   Sig: Take 1 tablet (75 mg total) by mouth 2 (two) times a day      Facility-Administered Medications Last Administration Doses Remaining   cyanocobalamin injection 1,000 mcg 10/9/2018 10:49 AM           Past Medical History:   Diagnosis Date    Anxiety     Arthritis     Bipolar disorder (HCC)     Chronic left lumbar radiculopathy     Chronic low back pain     Chronic pain syndrome     Chronic right shoulder pain     Depression with anxiety     Fatigue     GERD (gastroesophageal reflux disease)     Hydronephrosis     Iron deficiency anemia     Kidney stone     Lytic bone lesions on xray     Nephrolithiasis     PONV (postoperative nausea and vomiting)     Right elbow pain     Right lateral epicondylitis        Past Surgical History:   Procedure Laterality Date    ADENOIDECTOMY Bilateral     APPENDECTOMY      BACK SURGERY      CHOLECYSTECTOMY LAPAROSCOPIC N/A 10/30/2018    Procedure: CHOLECYSTECTOMY WITH GASTROTOMY LAPAROSCOPIC;  Surgeon: Rakesh Ritter MD;  [facility];  Service: General    ERCP W/ SPHICTEROTOMY N/A 10/30/2018    Procedure: ENDOSCOPIC RETROGRADE CHOLANGIOPANCREATOGRAPHY (ERCP) W/ SPHINCTEROTOMY;  Surgeon: Dasia Rae MD;  [facility];  Service: Gastroenterology    GASTRIC BYPASS      2009    OTHER SURGICAL HISTORY      fusion/refusion of vertebrae, 2010 and 2011 l5-s1 and l4-l5    WY CYSTO/URETERO W/LITHOTRIPSY &INDWELL STENT INSRT Right 8/8/2017    Procedure: CYSTOSCOPY; URETEROSCOPY; HOLMIUM LASER; RETROGRADE PYELOGRAM; STENT;  Surgeon: Tracee Lujan MD;  [facility];  Service: Urology    WY IMPLANT SPINAL NEUROSTIM/ Right 11/14/2017    Procedure: REMOVAL LOWER MEDIAL BUTTOCK SPINAL CORD STIMULATOR GENERATOR; PLACEMENT OF NEW BUTTOCK SPINAL CORD STIMULATOR THROUGH SEPERATE INCISION;  Surgeon: Bobbi Willoughby MD;  [facility];  Service: Neurosurgery    RIK-EN-Y PROCEDURE  2003    SPINAL CORD STIMULATOR IMPLANT  11/14/2017    dr Loreta El procedure and technique 1  removal of a right back implantable pulse generator 2 placement of a new right buttock impantable pulse generator through seperate incision 3 electric analys complex programming spinal cord stimulator system postoperative period approx 1 hour    TONSILLECTOMY         Family History   Problem Relation Age of Onset    Cancer Mother     Arthritis Mother     Stomach cancer Mother     Cancer Father     Esophageal cancer Father     Other Father         brain tumor    No Known Problems Sister     No Known Problems Sister     No Known Problems Sister      I have reviewed and agree with the history as documented  Social History     Tobacco Use    Smoking status: Current Every Day Smoker     Packs/day: 1 00     Types: Cigarettes    Smokeless tobacco: Never Used   Substance Use Topics    Alcohol use: No     Comment: quit drinking, social    Drug use: No        Review of Systems   Constitutional: Negative for chills, fatigue and fever  HENT: Negative for rhinorrhea, sore throat and trouble swallowing  Eyes: Negative for photophobia and visual disturbance  Respiratory: Negative for cough, chest tightness and shortness of breath  Cardiovascular: Negative for chest pain, palpitations and leg swelling  Gastrointestinal: Negative for abdominal pain, blood in stool, diarrhea, nausea and vomiting  Endocrine: Negative for polyuria  Genitourinary: Negative for dysuria, flank pain and hematuria  Musculoskeletal: Negative for back pain and neck pain  Skin: Negative for color change and rash  Allergic/Immunologic: Negative for immunocompromised state  Neurological: Negative for dizziness, weakness, light-headedness, numbness and headaches  Psychiatric/Behavioral: Positive for suicidal ideas  All other systems reviewed and are negative  Physical Exam  ED Triage Vitals [02/25/19 1056]   Temperature Pulse Respirations Blood Pressure SpO2   97 7 °F (36 5 °C) 63 18 115/66 100 %      Temp Source Heart Rate Source Patient Position - Orthostatic VS BP Location FiO2 (%)   Oral Monitor Sitting Left arm --      Pain Score       No Pain           Orthostatic Vital Signs  Vitals:    02/25/19 1056 02/25/19 1528   BP: 115/66 99/57   Pulse: 63 56   Patient Position - Orthostatic VS: Sitting Lying       Physical Exam   Constitutional: Vital signs are normal  He appears well-developed and well-nourished  He is cooperative  No distress  HENT:   Mouth/Throat: Uvula is midline and oropharynx is clear and moist    Eyes: Pupils are equal, round, and reactive to light  Conjunctivae, EOM and lids are normal    Neck: Trachea normal  No thyroid mass and no thyromegaly present  Cardiovascular: Normal rate, regular rhythm, normal heart sounds, intact distal pulses and normal pulses  No murmur heard  Pulmonary/Chest: Effort normal and breath sounds normal    Abdominal: Soft  Normal appearance and bowel sounds are normal  There is no tenderness  There is no rebound, no guarding, no CVA tenderness and negative Moraes's sign  Neurological: He is alert  Reflex Scores:       Patellar reflexes are 2+ on the right side and 2+ on the left side    No clonus or hyperreflexia on exam    Skin: Skin is warm, dry and intact  Psychiatric: He has a normal mood and affect  His speech is normal and behavior is normal  He expresses suicidal ideation  He expresses no homicidal ideation  He expresses suicidal plans  He expresses no homicidal plans  ED Medications  Medications   nicotine (NICODERM CQ) 14 mg/24hr TD 24 hr patch 14 mg (14 mg Transdermal Medication Applied 2/25/19 1433)   diazepam (VALIUM) tablet 5 mg (5 mg Oral Given 2/25/19 1432)   ibuprofen (MOTRIN) tablet 600 mg (600 mg Oral Given 2/25/19 1605)   acetaminophen (TYLENOL) tablet 650 mg (650 mg Oral Given 2/25/19 1605)       Diagnostic Studies  Results Reviewed     Procedure Component Value Units Date/Time    Ethanol [[id number]]  (Normal) Collected:  02/25/19 1239    Lab Status:  Final result Specimen:  Blood from Arm, Left Updated:  02/25/19 1316     Ethanol Lvl <3 mg/dL     Comprehensive metabolic panel [[id number]] Collected:  02/25/19 1239    Lab Status:  Final result Specimen:  Blood from Arm, Left Updated:  02/25/19 1314     Sodium 139 mmol/L      Potassium 4 4 mmol/L      Chloride 106 mmol/L      CO2 28 mmol/L      ANION GAP 5 mmol/L      BUN 10 mg/dL      Creatinine 1 00 mg/dL      Glucose 90 mg/dL      Calcium 9 1 mg/dL      AST 7 U/L      ALT 12 U/L      Alkaline Phosphatase 86 U/L      Total Protein 6 8 g/dL      Albumin 3 7 g/dL      Total Bilirubin 0 41 mg/dL      eGFR 92 ml/min/1 73sq m     Narrative:       National Kidney Disease Education Program recommendations are as follows:  GFR calculation is accurate only with a steady state creatinine  Chronic Kidney disease less than 60 ml/min/1 73 sq  meters  Kidney failure less than 15 ml/min/1 73 sq  meters      Salicylate level [[id number]]  (Abnormal) Collected:  02/25/19 1239    Lab Status:  Final result Specimen:  Blood from Arm, Left Updated:  01/44/28 3333     Salicylate Lvl <3 mg/dL     Acetaminophen level [[id number]]  (Abnormal) Collected:  02/25/19 1239    Lab Status:  Final result Specimen:  Blood from Arm, Left Updated:  02/25/19 1314     Acetaminophen Level <2 ug/mL     CBC and differential [[id number]]  (Abnormal) Collected:  02/25/19 1239    Lab Status:  Final result Specimen:  Blood from Arm, Left Updated:  02/25/19 1254     WBC 7 16 Thousand/uL      RBC 5 46 Million/uL      Hemoglobin 14 6 g/dL      Hematocrit 46 4 %      MCV 85 fL      MCH 26 7 pg      MCHC 31 5 g/dL      RDW 14 8 %      MPV 10 9 fL      Platelets 254 Thousands/uL      nRBC 0 /100 WBCs      Neutrophils Relative 73 %      Immat GRANS % 0 %      Lymphocytes Relative 18 %      Monocytes Relative 6 %      Eosinophils Relative 2 %      Basophils Relative 1 %      Neutrophils Absolute 5 25 Thousands/µL      Immature Grans Absolute 0 02 Thousand/uL      Lymphocytes Absolute 1 29 Thousands/µL      Monocytes Absolute 0 44 Thousand/µL      Eosinophils Absolute 0 11 Thousand/µL      Basophils Absolute 0 05 Thousands/µL     POCT alcohol breath test [[id number]]  (Normal) Resulted:  02/25/19 1207    Lab Status:  Final result Updated:  02/25/19 1208     EXTBreath Alcohol 0 000    Rapid drug screen, urine [[id number]]     Lab Status:  No result Specimen:  Urine                  No orders to display         Procedures  ECG 12 Lead Documentation  Date/Time: 2/25/2019 12:04 PM  Performed by: Cinthya Ray MD  Authorized by: Cinthya Ray MD     ECG reviewed by me, the ED Provider: yes    Patient location:  ED  Previous ECG:     Previous ECG:  Unavailable    Comparison to cardiac monitor: Yes    Interpretation:     Interpretation: normal    Rate:     ECG rate:  58    ECG rate assessment: bradycardic    Rhythm:     Rhythm: sinus rhythm and sinus bradycardia    Ectopy:     Ectopy: none    QRS:     QRS axis:  Normal    QRS intervals:  Normal  Conduction:     Conduction: normal    ST segments:     ST segments:  Normal  T waves:     T waves: normal            Phone Consults  ED Phone Contact    ED Course  ED Course as of Feb 25 1639   Mon Feb 25, 2019   1423 201 signed                                  MDM  Number of Diagnoses or Management Options  Diagnosis management comments: Will check basic lab work with coma panel and EKG  Breathalyzer and rapid urine drug screen  Ultimately, consult crisis  The patient will need to stay for psych evaluation  Disposition  Final diagnoses:   Suicidal ideation     Time reflects when diagnosis was documented in both MDM as applicable and the Disposition within this note     Time User Action Codes Description Comment    2/25/2019  4:21 PM Mliss Patch Add [F31 81] Bipolar 2 disorder, major depressive episode ([facility] [place] [address] )     2/25/2019  4:21 PM Mliss Patch Modify [F31 81] Bipolar 2 disorder, major depressive episode ([facility] [place] [address] )     2/25/2019  4:21 PM Mliss Patch Modify [F31 81] Bipolar 2 disorder, major depressive episode ([facility] [place] [address] )     2/25/2019  4:36 PM Red Nova Add [N59 968] Suicidal ideation       ED Disposition     ED Disposition Condition Date/Time Comment    Transfer to Another [facility] Feb 25, 2019  4:37 PM Lemond Carrel should be transferred out to [facility] under the care of Dr Kiara Mckeon MD Documentation      Most Recent Value   Patient Condition  The patient has been stabilized such that within reasonable medical probability, no material deterioration of the patient condition or the condition of the unborn child(negrito) is likely to result from the transfer   Reason for Transfer  Level of Care needed not available at this facility [Psych]   Risks of Transfer  Potential for delay in receiving treatment, Potential deterioration of medical condition, Increased discomfort during transfer, Possible worsening of condition or death during transfer   Accepting Physician  DR Gallo Vaca Name, [address]   [address] [place], [place]    (Name & Tel number)  Elgin Clifford (CRISIS WORKER) [phone]   Transported by [employer] and Unit #)  Lynda Slaughter [phone]   Sending MD Dr Shanon Sim   Provider Certification  The patient is stable for psychiatric transfer because they are medically stable, and is protected from harming him/herself or others during transport      RN Documentation      Most [facility] Name, [address]  [place], [place]    (Name & Tel number)  Nehal Callejas ([address] [place] [address]) [phone]   Medications Reviewed with Next Provider of Service  Yes   Transport Mode  Ambulance [facility]]   Transported by [employer] and Unit #)  Lynda Slaughter [phone]   Level of Care  Basic life support [facility]]   Copies of Medical Records Sent  Transfer form   Patient Belongings Disposition  Sent with patient      Follow-up Information    None         Patient's Medications   Discharge Prescriptions    No medications on file     No discharge procedures on file  ED Provider  Attending physically available and evaluated Anika Argueta I managed the patient along with the ED Attending      Electronically Signed by         Cole Li MD  02/25/19 7572

## 2019-02-25 NOTE — ED NOTES
Per EVS, patient is not eligible for MA coverage  Patient has Medicare only Insurance coverage       Mercedez Solo, GENI  02/25/19   8248

## 2019-02-25 NOTE — ED NOTES
Pt is a 37 y o  male who was brought to the ED with worsening depression, suicidal ideations with a plan and hopelessness  Patient states that he is having a 'rough time' and has been having suicidal thoughts for the past two months  When asked about a plan, patient said "anything would work", but did tell the doctor that he had a plan to jump or drive off a bridge  Patient denies any past attempts, although last night he did take a few extra doses of his medications  When asked about this he hesistated before stating that he just wanted to relax  Patient reports having mood swings and increased irritability in which he finds himself getting into arguments with everyone  Patient denies changes in appetite but stated that his sleep is off due to his overnight work schedule  Patient's girlfriend did notice that patient has been sleeping a lot more, except on his days off in which he barely sleeps  Patient denies any homicidal ideations and auditory/visual hallucinations  Patient does express some paranoid thoughts in regards to his girlfriend  She reports that since November, whenever they are not together he is constantly calling and texting  She reports that he is constantly accusing her of sleeping around and lying to him  Patient became agitated when she mentioned that her ex- sometimes spends a night at the home to visit the children  Although this was part of their divorce agreement and has always been the situation, patient has recently been more annoyed with this  Two nights ago he called her from work making suicidal threats and he came to the ER but was discharged because he denied any of this  Patient is currently not in any outpatient treatment but his PCP prescribes his pyschiatric medications; he has been encouraged by his PCP to follow up with psychiatry for proper medication management  Patient's girlfriend is willing to petition joanna Larsen if patient would not sign himself in   Patient was reluctant to do so because he doesn't want to lose his job which he believes he will for needing to take off  Patient then became agitated and stated that he's going to be homeless because his girlfriend is kicking him out of the house  Although this is the plan, she stated she would not leave him with no place to go  Patient continues to express that he has nothing to live for and presents with severe hopelessness  Patient is agreeable to sign 201      Chief Complaint   Patient presents with    Suicidal     Pt reports feeling "suicidal all weekend " Pt was noticeably "weaker, slurring words, does not know when or how many pills he took this morning " Pt has started to not remember things according to family members     Intake Assessment completed, Safety risk Assessment completed    GENI Byrnes  02/25/19   1400

## 2019-02-25 NOTE — ED NOTES
This CW is now following up on this case  CW started bed search, CW called [facility] No Beds, CW then called [employer] [facility] and spoke with Eliel (admissions) No Beds, CW then called [facility] and spoke with Ankit Mann (admissions) who informed me to fax clinical, Clinical has been faxed      [address] [place] [address] Worker

## 2019-02-25 NOTE — EMTALA/ACUTE CARE TRANSFER
[address]  [address] [facility] [address]  Dept: [address]  TRANSFER CONSENT    NAME Martha Schwarz                                         1975                              MRN [id number]    I have been informed of my rights regarding examination, treatment, and transfer   by Dr Williams Galvan MD    Benefits:      Risks: Potential for delay in receiving treatment, Potential deterioration of medical condition, Increased discomfort during transfer, Possible worsening of condition or death during transfer      Consent for Transfer:  I acknowledge that my medical condition has been evaluated and explained to me by the emergency department physician or other qualified medical person and/or my attending physician, who has recommended that I be transferred to the service of  Accepting Physician: DR Suzy López at [facility] Name, [address] : [address]  The above potential benefits of such transfer, the potential risks associated with such transfer, and the probable risks of not being transferred have been explained to me, and I fully understand them  The doctor has explained that, in my case, the benefits of transfer outweigh the risks  I agree to be transferred  I authorize the performance of emergency medical procedures and treatments upon me in both transit and upon arrival at the receiving facility  Additionally, I authorize the release of any and all medical records to the receiving facility and request they be transported with me, if possible  I understand that the safest mode of transportation during a medical emergency is an ambulance and that the Hospital advocates the use of this mode of transport   Risks of traveling to the receiving facility by car, including absence of medical control, life sustaining equipment, such as oxygen, and medical personnel has been explained to me and I fully understand them     ([facility])  [x ]  I consent to the stated transfer and to be transported by ambulance/helicopter  [  ]  I consent to the stated transfer, but refuse transportation by ambulance and accept full responsibility for my transportation by car  I understand the risks of non-ambulance transfers and I exonerate the Hospital and its staff from any deterioration in my condition that results from this refusal     X___________________________________________    DATE  19  TIME________  Signature of patient or legally responsible individual signing on patient behalf           RELATIONSHIP TO PATIENT_________________________          Provider Certification    NAME Martha Schwarz                                         1975                              MRN [id number]    A medical screening exam was performed on the above named patient  Based on the examination:    Condition Necessitating Transfer The primary encounter diagnosis was Bipolar 2 disorder, major depressive episode ([facility] [address] )  A diagnosis of Suicidal ideation was also pertinent to this visit  Patient Condition: The patient has been stabilized such that within reasonable medical probability, no material deterioration of the patient condition or the condition of the unborn child(negrito) is likely to result from the transfer    Reason for Transfer: Level of Care needed not available at this facility(Psych)    [facility]ments[facility] [address]  [facility]   · Space available and qualified personnel available for treatment as acknowledged by Carline Dotson ([address] [place] [address]) [phone]  · Agreed to accept transfer and to provide appropriate medical treatment as acknowledged by       DR Suzy López  · Appropriate medical records of the examination and treatment of the patient are provided at the time of transfer   [address] [place] [address], [address] _______  · Transfer will be performed by qualified personnel from [facility] [phone]  and appropriate transfer equipment as required, including the use of necessary and appropriate life support measures  Provider Certification: I have examined the patient and explained the following risks and benefits of being transferred/refusing transfer to the patient/family:  The patient is stable for psychiatric transfer because they are medically stable, and is protected from harming him/herself or others during transport      Based on these reasonable risks and benefits to the patient and/or the unborn child(negrito), and based upon the information available at the time of the patients examination, I certify that the medical benefits reasonably to be expected from the provision of appropriate medical treatments at another medical facility outweigh the increasing risks, if any, to the individuals medical condition, and in the case of labor to the unborn child, from effecting the transfer      X____________________________________________ DATE 02/25/19        TIME_______      ORIGINAL - SEND TO MEDICAL RECORDS   COPY - SEND WITH PATIENT DURING TRANSFER

## 2019-02-25 NOTE — LETTER
Section I - General Information    Name of Patient: Meg Ramon                 : 1975    Medicare #:____________________  Transport Date: 19 (PCS is valid for round trips on this date and for all repetitive trips in the 60-day range as noted below )  Origin: [address]                                                         [facility]________________  Is the pt's stay covered under Medicare Part A (PPS/DRG)     (X) YES  (_) NO  Closest appropriate facility? (X) YES  (_) NO  If no, why is transport to more distant facility required?________________________  If hosp-hosp transfer, describe services needed at 2nd facility not available at 1st facility? _________________________________  If hospice pt, is this transport related to pt's terminal illness? (_) YES (X) NO Describe____________________________________    Section II - Medical Necessity Questionnaire  Ambulance transportation is medically necessary only if other means of transport are contraindicated or would be potentially harmful to the patient  To meet this requirement, the patient must either be "bed confined" or suffer from a condition such that transport by means other than ambulance is contraindicated by the patient's condition   The following questions must be answered by the medical professional signing below for this form to be valid:    1)  Describe the MEDICAL CONDITION (physical and/or mental) of this patient AT [address] [place] [address] that requires the patient to be transported in an ambulance and why transport by other means is contraindicated by the patient's condition:__________________________________________________________________________________________________    2) Is the patient "bed confined" as defined below?     (_) YES  (X) NO  To be "be confined" the patient must satisfy all three of the following conditions: (1) unable to get up from bed without Assistance; AND (2) unable to ambulate; AND (3) unable to sit in a chair or wheelchair  3) Can this patient safely be transported by car or wheelchair Jennifer Perkinswda (i e , seated during transport without a medical attendant or monitoring)?   (_) YES  (X) NO    4) In addition to completing questions 1-3 above, please check any of the following conditions that apply*:  *Note: supporting documentation for any boxes checked must be maintained in the patient's medical records  (_)Contractures   (_)Non-Healed Fractures  (_)Patient is confused (_)Patient is comatose (_)Moderate/severe pain on movement (X)Danger to self/others  (_)IV meds/fluids required (_)Patient is combative(_)Need or possible need for restraints (_)DVT requires elevation of lower extremity  (_)Medical attendant required (_)Requires oxygen-unable to self administer (_)Special handling/isolation/infection control precautions required (_)Unable to tolerate seated position for time needed to transport (_)Hemodynamic monitoring required en route (_)Unable to sit in a chair or wheelchair due to decubitus ulcers or other wounds (_)Cardiac monitoring required en route (_)Morbid obesity requires additional personnel/equipment to safely handle patient (_)Orthopedic device (backboard, halo, pins, traction, brace, wedge, etc,) requiring special handling during transport (_)Other(specify)_______________________________________________    Section III - Signature of Physician or Healthcare Professional  I certify that the above information is true and correct based on my evaluation of this patient, and represent that the patient requires transport by ambulance and that other forms of transport are contraindicated   I understand that this information will be used by the Centers for Medicare and Medicaid Services (CMS) to support the determination of medical necessity for ambulance services, and I represent that I have personal knowledge of the patient's condition at time of transport  (_) If this box is checked, I also certify that the patient is physically or mentally incapable of signing the ambulance service's claim and that the institution with which I am affiliated has furnished care, services, or assistance to the patient  My signature below is made on behalf of the patient pursuant to 42 CFR §424 36(b)(4)  In accordance with 42 CFR §424 37, the specific reason(s) that the patient is physically or mentally incapable of signing the claim form is as follows: _________________________________________________________________________________________________________      Signature of Physician* or Healthcare Professional______________________________________________________________  Signature Date 02/25/19 (For scheduled repetitive transports, this form is not valid for transports performed more than 60 days after this date)    Printed Name & Credentials of Physician or Healthcare Professional (MD, DO, RN, etc )________________________________  *Form must be signed by patient's attending physician for scheduled, repetitive transports   For non-repetitive, unscheduled ambulance transports, if unable to obtain the signature of the attending physician, any of the following may sign (choose appropriate option below)  (_) Physician Assistant (_)  Clinical Nurse Specialist (_)  Registered Nurse  (_)  Nurse Practitioner  (X) Discharge Planner

## 2019-02-26 LAB
AMPHETAMINES SERPL QL SCN: NEGATIVE
BARBITURATES UR QL: NEGATIVE
BENZODIAZ UR QL: POSITIVE
CHOLEST SERPL-MCNC: 100 MG/DL
COCAINE UR QL: NEGATIVE
EST. AVERAGE GLUCOSE BLD GHB EST-MCNC: 128 MG/DL
HBA1C MFR BLD: 6.1 % (ref 4.2–6.3)
HDLC SERPL-MCNC: 37 MG/DL (ref 40–59)
LDLC SERPL CALC-MCNC: 54 MG/DL
METHADONE UR QL: NEGATIVE
NONHDLC SERPL-MCNC: 63 MG/DL
OPIATES UR QL SCN: NEGATIVE
PCP UR QL: NEGATIVE
RPR SER QL: NORMAL
THC UR QL: NEGATIVE
TRIGL SERPL-MCNC: 46 MG/DL

## 2019-02-26 PROCEDURE — 99223 1ST HOSP IP/OBS HIGH 75: CPT | Performed by: PSYCHIATRY & NEUROLOGY

## 2019-02-26 PROCEDURE — 83036 HEMOGLOBIN GLYCOSYLATED A1C: CPT | Performed by: PHYSICIAN ASSISTANT

## 2019-02-26 PROCEDURE — 86592 SYPHILIS TEST NON-TREP QUAL: CPT | Performed by: PSYCHIATRY & NEUROLOGY

## 2019-02-26 PROCEDURE — 80061 LIPID PANEL: CPT | Performed by: PHYSICIAN ASSISTANT

## 2019-02-26 PROCEDURE — 80307 DRUG TEST PRSMV CHEM ANLYZR: CPT | Performed by: PSYCHIATRY & NEUROLOGY

## 2019-02-26 RX ORDER — PRAZOSIN HYDROCHLORIDE 1 MG/1
1 CAPSULE ORAL
Status: DISCONTINUED | OUTPATIENT
Start: 2019-02-26 | End: 2019-03-01 | Stop reason: HOSPADM

## 2019-02-26 RX ORDER — LORAZEPAM 1 MG/1
1 TABLET ORAL
Status: DISCONTINUED | OUTPATIENT
Start: 2019-02-26 | End: 2019-03-01 | Stop reason: HOSPADM

## 2019-02-26 RX ORDER — BUPRENORPHINE 15 UG/H
1 PATCH TRANSDERMAL WEEKLY
Status: DISCONTINUED | OUTPATIENT
Start: 2019-03-02 | End: 2019-03-01 | Stop reason: HOSPADM

## 2019-02-26 RX ORDER — QUETIAPINE FUMARATE 100 MG/1
200 TABLET, FILM COATED ORAL
Status: DISCONTINUED | OUTPATIENT
Start: 2019-02-26 | End: 2019-02-27

## 2019-02-26 RX ORDER — ERGOCALCIFEROL 1.25 MG/1
50000 CAPSULE ORAL WEEKLY
Status: DISCONTINUED | OUTPATIENT
Start: 2019-02-26 | End: 2019-03-01 | Stop reason: HOSPADM

## 2019-02-26 RX ADMIN — HYDROXYZINE HYDROCHLORIDE 25 MG: 25 TABLET ORAL at 05:53

## 2019-02-26 RX ADMIN — LORAZEPAM 1 MG: 1 TABLET ORAL at 16:14

## 2019-02-26 RX ADMIN — BACLOFEN 10 MG: 10 TABLET ORAL at 08:57

## 2019-02-26 RX ADMIN — BACLOFEN 10 MG: 10 TABLET ORAL at 16:13

## 2019-02-26 RX ADMIN — BACLOFEN 10 MG: 10 TABLET ORAL at 21:24

## 2019-02-26 RX ADMIN — ERGOCALCIFEROL 50000 UNITS: 1.25 CAPSULE ORAL at 14:53

## 2019-02-26 RX ADMIN — PRAZOSIN HYDROCHLORIDE 1 MG: 1 CAPSULE ORAL at 21:23

## 2019-02-26 RX ADMIN — ACETAMINOPHEN 975 MG: 325 TABLET ORAL at 09:00

## 2019-02-26 RX ADMIN — QUETIAPINE FUMARATE 200 MG: 100 TABLET ORAL at 21:23

## 2019-02-26 NOTE — ED NOTES
Transport arrived at this time to take pt to [facility]  1:1 d/c'd at this time        [address]  17/23/40 2005

## 2019-02-26 NOTE — ED NOTES
[facility] at bedside to transport pt   Report called and spoke to [facility] RN     Lilia Melchor RN  02/25/19 2013

## 2019-02-27 PROCEDURE — 99232 SBSQ HOSP IP/OBS MODERATE 35: CPT | Performed by: PHYSICIAN ASSISTANT

## 2019-02-27 RX ORDER — DIVALPROEX SODIUM 250 MG/1
250 TABLET, DELAYED RELEASE ORAL 2 TIMES DAILY
Status: DISCONTINUED | OUTPATIENT
Start: 2019-02-27 | End: 2019-02-28

## 2019-02-27 RX ORDER — NICOTINE 21 MG/24HR
1 PATCH, TRANSDERMAL 24 HOURS TRANSDERMAL DAILY
Status: DISCONTINUED | OUTPATIENT
Start: 2019-02-27 | End: 2019-03-01 | Stop reason: HOSPADM

## 2019-02-27 RX ORDER — QUETIAPINE FUMARATE 300 MG/1
300 TABLET, FILM COATED ORAL
Status: DISCONTINUED | OUTPATIENT
Start: 2019-02-27 | End: 2019-03-01 | Stop reason: HOSPADM

## 2019-02-27 RX ADMIN — DIVALPROEX SODIUM 250 MG: 250 TABLET, DELAYED RELEASE ORAL at 17:32

## 2019-02-27 RX ADMIN — QUETIAPINE FUMARATE 300 MG: 300 TABLET ORAL at 21:42

## 2019-02-27 RX ADMIN — BACLOFEN 10 MG: 10 TABLET ORAL at 15:36

## 2019-02-27 RX ADMIN — HYDROXYZINE HYDROCHLORIDE 25 MG: 25 TABLET ORAL at 08:29

## 2019-02-27 RX ADMIN — OLANZAPINE 10 MG: 10 TABLET, FILM COATED ORAL at 15:36

## 2019-02-27 RX ADMIN — LORAZEPAM 1 MG: 1 TABLET ORAL at 12:05

## 2019-02-27 RX ADMIN — BACLOFEN 10 MG: 10 TABLET ORAL at 21:42

## 2019-02-27 RX ADMIN — BACLOFEN 10 MG: 10 TABLET ORAL at 08:21

## 2019-02-27 RX ADMIN — NICOTINE 1 PATCH: 14 PATCH, EXTENDED RELEASE TRANSDERMAL at 11:57

## 2019-02-28 PROCEDURE — 99232 SBSQ HOSP IP/OBS MODERATE 35: CPT | Performed by: PSYCHIATRY & NEUROLOGY

## 2019-02-28 RX ORDER — DIVALPROEX SODIUM 500 MG/1
500 TABLET, DELAYED RELEASE ORAL 2 TIMES DAILY
Status: DISCONTINUED | OUTPATIENT
Start: 2019-02-28 | End: 2019-03-01 | Stop reason: HOSPADM

## 2019-02-28 RX ADMIN — DIVALPROEX SODIUM 500 MG: 500 TABLET, DELAYED RELEASE ORAL at 17:45

## 2019-02-28 RX ADMIN — PRAZOSIN HYDROCHLORIDE 1 MG: 1 CAPSULE ORAL at 21:06

## 2019-02-28 RX ADMIN — ACETAMINOPHEN 975 MG: 325 TABLET ORAL at 21:05

## 2019-02-28 RX ADMIN — QUETIAPINE FUMARATE 300 MG: 300 TABLET ORAL at 21:06

## 2019-02-28 RX ADMIN — HALOPERIDOL 5 MG: 5 TABLET ORAL at 16:57

## 2019-02-28 RX ADMIN — DIVALPROEX SODIUM 250 MG: 250 TABLET, DELAYED RELEASE ORAL at 08:50

## 2019-02-28 RX ADMIN — BACLOFEN 10 MG: 10 TABLET ORAL at 08:50

## 2019-02-28 RX ADMIN — BENZTROPINE MESYLATE 1 MG: 1 TABLET ORAL at 16:58

## 2019-02-28 RX ADMIN — LORAZEPAM 1 MG: 1 TABLET ORAL at 10:02

## 2019-02-28 RX ADMIN — BACLOFEN 10 MG: 10 TABLET ORAL at 16:00

## 2019-02-28 RX ADMIN — NICOTINE 1 PATCH: 14 PATCH, EXTENDED RELEASE TRANSDERMAL at 08:50

## 2019-02-28 RX ADMIN — LORAZEPAM 1 MG: 1 TABLET ORAL at 18:04

## 2019-02-28 RX ADMIN — BACLOFEN 10 MG: 10 TABLET ORAL at 21:06

## 2019-03-01 VITALS
DIASTOLIC BLOOD PRESSURE: 64 MMHG | OXYGEN SATURATION: 99 % | WEIGHT: 160 LBS | TEMPERATURE: 98 F | BODY MASS INDEX: 21.67 KG/M2 | RESPIRATION RATE: 16 BRPM | SYSTOLIC BLOOD PRESSURE: 102 MMHG | HEART RATE: 88 BPM | HEIGHT: 72 IN

## 2019-03-01 PROBLEM — F43.10 PTSD (POST-TRAUMATIC STRESS DISORDER): Status: ACTIVE | Noted: 2019-03-01

## 2019-03-01 PROBLEM — E55.9 VITAMIN D DEFICIENCY: Status: ACTIVE | Noted: 2019-03-01

## 2019-03-01 PROCEDURE — 99239 HOSP IP/OBS DSCHRG MGMT >30: CPT | Performed by: PSYCHIATRY & NEUROLOGY

## 2019-03-01 RX ORDER — PRAZOSIN HYDROCHLORIDE 1 MG/1
1 CAPSULE ORAL
Qty: 30 CAPSULE | Refills: 1 | Status: SHIPPED | OUTPATIENT
Start: 2019-03-01 | End: 2019-05-09 | Stop reason: SDUPTHER

## 2019-03-01 RX ORDER — QUETIAPINE FUMARATE 300 MG/1
300 TABLET, FILM COATED ORAL
Qty: 30 TABLET | Refills: 1 | Status: SHIPPED | OUTPATIENT
Start: 2019-03-01 | End: 2019-03-19 | Stop reason: SDUPTHER

## 2019-03-01 RX ORDER — ERGOCALCIFEROL 1.25 MG/1
50000 CAPSULE ORAL WEEKLY
Qty: 7 CAPSULE | Refills: 0 | Status: SHIPPED | OUTPATIENT
Start: 2019-03-05 | End: 2019-10-02 | Stop reason: ALTCHOICE

## 2019-03-01 RX ORDER — DIVALPROEX SODIUM 500 MG/1
500 TABLET, DELAYED RELEASE ORAL 2 TIMES DAILY
Qty: 60 TABLET | Refills: 1 | Status: SHIPPED | OUTPATIENT
Start: 2019-03-01 | End: 2019-03-19 | Stop reason: SDUPTHER

## 2019-03-01 RX ADMIN — NICOTINE 1 PATCH: 14 PATCH, EXTENDED RELEASE TRANSDERMAL at 08:04

## 2019-03-01 RX ADMIN — DIVALPROEX SODIUM 500 MG: 500 TABLET, DELAYED RELEASE ORAL at 08:04

## 2019-03-01 RX ADMIN — BACLOFEN 10 MG: 10 TABLET ORAL at 08:07

## 2019-03-01 RX ADMIN — LORAZEPAM 1 MG: 1 TABLET ORAL at 08:13

## 2019-03-19 DIAGNOSIS — F31.81 BIPOLAR 2 DISORDER, MAJOR DEPRESSIVE EPISODE (HCC): Chronic | ICD-10-CM

## 2019-03-19 RX ORDER — DIVALPROEX SODIUM 500 MG/1
500 TABLET, DELAYED RELEASE ORAL 2 TIMES DAILY
Qty: 60 TABLET | Refills: 1 | Status: SHIPPED | OUTPATIENT
Start: 2019-03-19 | End: 2019-03-21 | Stop reason: SDUPTHER

## 2019-03-19 RX ORDER — QUETIAPINE FUMARATE 300 MG/1
300 TABLET, FILM COATED ORAL
Qty: 30 TABLET | Refills: 1 | Status: SHIPPED | OUTPATIENT
Start: 2019-03-19 | End: 2019-03-21 | Stop reason: SDUPTHER

## 2019-03-21 DIAGNOSIS — F31.81 BIPOLAR 2 DISORDER, MAJOR DEPRESSIVE EPISODE (HCC): Chronic | ICD-10-CM

## 2019-03-21 RX ORDER — DIVALPROEX SODIUM 500 MG/1
500 TABLET, DELAYED RELEASE ORAL 2 TIMES DAILY
Qty: 60 TABLET | Refills: 2 | Status: SHIPPED | OUTPATIENT
Start: 2019-03-21 | End: 2019-05-09 | Stop reason: SDUPTHER

## 2019-03-21 RX ORDER — QUETIAPINE FUMARATE 300 MG/1
300 TABLET, FILM COATED ORAL
Qty: 30 TABLET | Refills: 2 | Status: SHIPPED | OUTPATIENT
Start: 2019-03-21 | End: 2019-05-09 | Stop reason: SDUPTHER

## 2019-04-08 ENCOUNTER — TELEPHONE (OUTPATIENT)
Dept: PAIN MEDICINE | Facility: CLINIC | Age: 44
End: 2019-04-08

## 2019-04-17 DIAGNOSIS — M79.18 MYOFASCIAL PAIN SYNDROME: ICD-10-CM

## 2019-04-17 RX ORDER — BACLOFEN 10 MG/1
TABLET ORAL
Qty: 90 TABLET | Refills: 2 | OUTPATIENT
Start: 2019-04-17

## 2019-04-25 ENCOUNTER — OFFICE VISIT (OUTPATIENT)
Dept: PAIN MEDICINE | Facility: CLINIC | Age: 44
End: 2019-04-25
Payer: MEDICARE

## 2019-04-25 VITALS
BODY MASS INDEX: 22.48 KG/M2 | HEIGHT: 72 IN | WEIGHT: 166 LBS | DIASTOLIC BLOOD PRESSURE: 60 MMHG | SYSTOLIC BLOOD PRESSURE: 110 MMHG | HEART RATE: 70 BPM

## 2019-04-25 DIAGNOSIS — M54.41 CHRONIC LOW BACK PAIN WITH BILATERAL SCIATICA, UNSPECIFIED BACK PAIN LATERALITY: ICD-10-CM

## 2019-04-25 DIAGNOSIS — M54.42 CHRONIC LOW BACK PAIN WITH BILATERAL SCIATICA, UNSPECIFIED BACK PAIN LATERALITY: ICD-10-CM

## 2019-04-25 DIAGNOSIS — M54.16 LUMBAR RADICULOPATHY: ICD-10-CM

## 2019-04-25 DIAGNOSIS — G89.29 CHRONIC RIGHT SHOULDER PAIN: ICD-10-CM

## 2019-04-25 DIAGNOSIS — M25.511 CHRONIC RIGHT SHOULDER PAIN: ICD-10-CM

## 2019-04-25 DIAGNOSIS — M79.18 MYOFASCIAL PAIN SYNDROME: ICD-10-CM

## 2019-04-25 DIAGNOSIS — M96.1 LUMBAR POST-LAMINECTOMY SYNDROME: ICD-10-CM

## 2019-04-25 DIAGNOSIS — M48.062 SPINAL STENOSIS OF LUMBAR REGION WITH NEUROGENIC CLAUDICATION: ICD-10-CM

## 2019-04-25 DIAGNOSIS — G89.29 CHRONIC LOW BACK PAIN WITH BILATERAL SCIATICA, UNSPECIFIED BACK PAIN LATERALITY: ICD-10-CM

## 2019-04-25 DIAGNOSIS — G89.4 PAIN SYNDROME, CHRONIC: Primary | ICD-10-CM

## 2019-04-25 PROBLEM — M77.11 RIGHT LATERAL EPICONDYLITIS: Status: RESOLVED | Noted: 2018-01-19 | Resolved: 2019-04-25

## 2019-04-25 PROBLEM — Z90.49 STATUS POST LAPAROSCOPIC CHOLECYSTECTOMY: Status: RESOLVED | Noted: 2018-11-13 | Resolved: 2019-04-25

## 2019-04-25 PROCEDURE — 99214 OFFICE O/P EST MOD 30 MIN: CPT | Performed by: NURSE PRACTITIONER

## 2019-04-25 RX ORDER — BUPRENORPHINE 15 UG/H
PATCH TRANSDERMAL
Qty: 4 PATCH | Refills: 2 | Status: SHIPPED | OUTPATIENT
Start: 2019-04-25 | End: 2019-07-18 | Stop reason: SDUPTHER

## 2019-04-30 ENCOUNTER — TELEPHONE (OUTPATIENT)
Dept: OBGYN CLINIC | Facility: HOSPITAL | Age: 44
End: 2019-04-30

## 2019-04-30 DIAGNOSIS — M79.18 MYOFASCIAL PAIN SYNDROME: ICD-10-CM

## 2019-04-30 RX ORDER — BACLOFEN 10 MG/1
10 TABLET ORAL 3 TIMES DAILY
Qty: 90 TABLET | Refills: 2 | Status: SHIPPED | OUTPATIENT
Start: 2019-04-30 | End: 2019-07-18 | Stop reason: SDUPTHER

## 2019-05-03 ENCOUNTER — DOCUMENTATION (OUTPATIENT)
Dept: PSYCHIATRY | Facility: CLINIC | Age: 44
End: 2019-05-03

## 2019-05-08 ENCOUNTER — DOCUMENTATION (OUTPATIENT)
Dept: PSYCHIATRY | Facility: CLINIC | Age: 44
End: 2019-05-08

## 2019-05-09 ENCOUNTER — OFFICE VISIT (OUTPATIENT)
Dept: PSYCHIATRY | Facility: CLINIC | Age: 44
End: 2019-05-09
Payer: MEDICARE

## 2019-05-09 DIAGNOSIS — F43.10 PTSD (POST-TRAUMATIC STRESS DISORDER): ICD-10-CM

## 2019-05-09 DIAGNOSIS — F33.2 MDD (MAJOR DEPRESSIVE DISORDER), RECURRENT SEVERE, WITHOUT PSYCHOSIS (HCC): ICD-10-CM

## 2019-05-09 DIAGNOSIS — F31.81 BIPOLAR 2 DISORDER, MAJOR DEPRESSIVE EPISODE (HCC): Primary | Chronic | ICD-10-CM

## 2019-05-09 PROCEDURE — 99213 OFFICE O/P EST LOW 20 MIN: CPT | Performed by: PSYCHIATRY & NEUROLOGY

## 2019-05-09 RX ORDER — ALPRAZOLAM 0.5 MG/1
0.5 TABLET ORAL EVERY 4 HOURS PRN
Qty: 120 TABLET | Refills: 2 | Status: SHIPPED | OUTPATIENT
Start: 2019-05-09 | End: 2020-01-27 | Stop reason: ALTCHOICE

## 2019-05-09 RX ORDER — DIVALPROEX SODIUM 250 MG/1
250 TABLET, DELAYED RELEASE ORAL EVERY 8 HOURS SCHEDULED
Qty: 30 TABLET | Refills: 2 | Status: SHIPPED | OUTPATIENT
Start: 2019-05-09 | End: 2019-10-02 | Stop reason: ALTCHOICE

## 2019-05-09 RX ORDER — PRAZOSIN HYDROCHLORIDE 1 MG/1
1 CAPSULE ORAL
Qty: 30 CAPSULE | Refills: 1 | Status: SHIPPED | OUTPATIENT
Start: 2019-05-09 | End: 2019-10-02 | Stop reason: ALTCHOICE

## 2019-05-09 RX ORDER — DIVALPROEX SODIUM 500 MG/1
500 TABLET, DELAYED RELEASE ORAL 2 TIMES DAILY
Qty: 60 TABLET | Refills: 2 | Status: SHIPPED | OUTPATIENT
Start: 2019-05-09 | End: 2019-10-02 | Stop reason: ALTCHOICE

## 2019-05-09 RX ORDER — QUETIAPINE FUMARATE 300 MG/1
300 TABLET, FILM COATED ORAL
Qty: 30 TABLET | Refills: 2 | Status: SHIPPED | OUTPATIENT
Start: 2019-05-09 | End: 2019-10-02 | Stop reason: ALTCHOICE

## 2019-05-19 NOTE — TELEPHONE ENCOUNTER
S/w pt, advised of above   Per pt mri of his lumbar spine is scheduled for tomorrow at 2pm      Forwarding to [facility]

## 2019-06-18 ENCOUNTER — DOCUMENTATION (OUTPATIENT)
Dept: PSYCHIATRY | Facility: CLINIC | Age: 44
End: 2019-06-18

## 2019-07-18 ENCOUNTER — OFFICE VISIT (OUTPATIENT)
Dept: PAIN MEDICINE | Facility: CLINIC | Age: 44
End: 2019-07-18
Payer: MEDICARE

## 2019-07-18 VITALS
HEIGHT: 72 IN | WEIGHT: 155 LBS | SYSTOLIC BLOOD PRESSURE: 110 MMHG | HEART RATE: 60 BPM | BODY MASS INDEX: 20.99 KG/M2 | DIASTOLIC BLOOD PRESSURE: 68 MMHG

## 2019-07-18 DIAGNOSIS — M54.41 CHRONIC LOW BACK PAIN WITH BILATERAL SCIATICA, UNSPECIFIED BACK PAIN LATERALITY: ICD-10-CM

## 2019-07-18 DIAGNOSIS — M79.18 MYOFASCIAL PAIN SYNDROME: ICD-10-CM

## 2019-07-18 DIAGNOSIS — M48.062 SPINAL STENOSIS OF LUMBAR REGION WITH NEUROGENIC CLAUDICATION: ICD-10-CM

## 2019-07-18 DIAGNOSIS — M54.42 CHRONIC LOW BACK PAIN WITH BILATERAL SCIATICA, UNSPECIFIED BACK PAIN LATERALITY: ICD-10-CM

## 2019-07-18 DIAGNOSIS — G89.4 PAIN SYNDROME, CHRONIC: Primary | ICD-10-CM

## 2019-07-18 DIAGNOSIS — F11.20 UNCOMPLICATED OPIOID DEPENDENCE (HCC): ICD-10-CM

## 2019-07-18 DIAGNOSIS — M25.511 CHRONIC RIGHT SHOULDER PAIN: ICD-10-CM

## 2019-07-18 DIAGNOSIS — M54.16 LUMBAR RADICULOPATHY: ICD-10-CM

## 2019-07-18 DIAGNOSIS — M96.1 LUMBAR POST-LAMINECTOMY SYNDROME: ICD-10-CM

## 2019-07-18 DIAGNOSIS — G89.29 CHRONIC LOW BACK PAIN WITH BILATERAL SCIATICA, UNSPECIFIED BACK PAIN LATERALITY: ICD-10-CM

## 2019-07-18 DIAGNOSIS — G89.29 CHRONIC RIGHT SHOULDER PAIN: ICD-10-CM

## 2019-07-18 PROCEDURE — 99214 OFFICE O/P EST MOD 30 MIN: CPT | Performed by: NURSE PRACTITIONER

## 2019-07-18 RX ORDER — NALOXONE HYDROCHLORIDE 4 MG/.1ML
SPRAY NASAL
Qty: 2 EACH | Refills: 0 | Status: SHIPPED | OUTPATIENT
Start: 2019-07-18 | End: 2019-10-02 | Stop reason: ALTCHOICE

## 2019-07-18 RX ORDER — BUPRENORPHINE 15 UG/H
PATCH TRANSDERMAL
Qty: 4 PATCH | Refills: 2 | Status: SHIPPED | OUTPATIENT
Start: 2019-07-18 | End: 2019-10-21 | Stop reason: SDUPTHER

## 2019-07-18 RX ORDER — BACLOFEN 10 MG/1
10 TABLET ORAL 3 TIMES DAILY
Qty: 90 TABLET | Refills: 2 | Status: SHIPPED | OUTPATIENT
Start: 2019-07-18 | End: 2020-01-14 | Stop reason: SDUPTHER

## 2019-07-18 NOTE — PROGRESS NOTES
Assessment:  1  Pain syndrome, chronic    2  Chronic low back pain with bilateral sciatica, unspecified back pain laterality    3  Lumbar post-laminectomy syndrome    4  Spinal stenosis of lumbar region with neurogenic claudication    5  Lumbar radiculopathy    6  Myofascial pain syndrome    7  Uncomplicated opioid dependence ([address] )    8  Chronic right shoulder pain        Plan:  While the patient was in the office today, I did have a thorough conversation with the patient regarding his chronic pain syndrome, symptoms, and treatment plan  I explained to the patient that at this point since he is noting significant and stable relief and I feel that his medication regimen is reasonable appropriate for his underlying etiology, we will continue with the Butrans patch in the baclofen as prescribed  The patient was agreeable and verbalized an understanding  [facility] Prescription Drug Monitoring Program report was reviewed and was appropriate     There are risks associated with opioid medications, including dependence, addiction and tolerance  The patient understands and agrees to use these medications only as prescribed  Potential side effects of the medications include, but are not limited to, constipation, drowsiness, addiction, impaired judgment and risk of fatal overdose if not taken as prescribed  The patient was warned against driving while taking sedation medications  Sharing medications is a felony  At this point in time, the patient is showing no signs of addiction, abuse, diversion or suicidal ideation  While the patient was in the office today, I discussed with the patient that at this point they have been identified as a patient who is at significant risk for respiratory depression and/or even death because they are being, appropriately prescribed,  a dosage of opioid pain medication that is equal to or greater than 40 milligram equivalents of Morphine Sulfate   I, again, reviewed the dangers of being on an opioid and at this point since we feel it is appropriate that they continue the opioid medication at this level, for now, we will continue the dosage as prescribed  However, we feel that it is best and safe practice to prescribe narcan nasal spray to be used if the patient is experiencing respiratory depression as a result of suspected intentional or unintentional opioid over dose  I reviewed with the patient how to use the nasal spray and to make sure that they educated a family member on how to use it and that no matter what emergency personnel must be notified as the Narcan nasal spray is only meant to give the patient more time to get to the nearest emergency room for a more thorough evaluation  The patient was agreeable and verbalized an understanding  A signed copy of the patient education sheet reviewing the risks and reasons for prescribing this medication is available in the patient's chart and a copy was also sent home with the patient  The patient will follow-up in 12 weeks for medication prescription refill and reevaluation  The patient was advised to contact the office should their symptoms worsen in the interim  The patient was agreeable and verbalized an understanding  History of Present Illness: The patient is a 37 y o  male last seen on 4/25/19 who presents for a follow up office visit in regards to chronic pain syndrome secondary to lumbar post-laminectomy syndrome  The patient currently reports that since his last office visit his pain symptoms have remained stable and manageable with his current medication regimen and treatment plan  Current pain medications includes:   Butrans patch 15 micro g applying 1 patch transdermally every 7 days and baclofen 10 mg t i d  The patient reports that this regimen is providing 80% pain relief  The patient is reporting no side effects from this pain medication regimen      Pain Contract Signed: 1/14/19  Last Urine Drug Screen: 1/14/19    I have personally reviewed and/or updated the patient's past medical history, past surgical history, family history, social history, current medications, allergies, and vital signs today  Review of Systems:    Review of Systems   Respiratory: Negative for shortness of breath  Cardiovascular: Negative for chest pain  Gastrointestinal: Negative for constipation, diarrhea, nausea and vomiting  Musculoskeletal: Positive for joint swelling (joint stiffness)  Negative for arthralgias, gait problem and myalgias  Skin: Negative for rash  Neurological: Negative for dizziness, seizures and weakness  All other systems reviewed and are negative          Past Medical History:   Diagnosis Date    Anxiety     Arthritis     Bipolar disorder (HCC)     Chronic left lumbar radiculopathy     Chronic low back pain     Chronic pain syndrome     Chronic right shoulder pain     Depression with anxiety     Fatigue     GERD (gastroesophageal reflux disease)     Hydronephrosis     Iron deficiency anemia     Kidney stone     Lytic bone lesions on xray     Nephrolithiasis     PONV (postoperative nausea and vomiting)     Right elbow pain     Right lateral epicondylitis        Past Surgical History:   Procedure Laterality Date    ADENOIDECTOMY Bilateral     APPENDECTOMY      BACK SURGERY      CHOLECYSTECTOMY LAPAROSCOPIC N/A 10/30/2018    Procedure: CHOLECYSTECTOMY WITH GASTROTOMY LAPAROSCOPIC;  Surgeon: Katy Agosto MD;  [facility];  Service: General    ERCP W/ SPHICTEROTOMY N/A 10/30/2018    Procedure: ENDOSCOPIC RETROGRADE CHOLANGIOPANCREATOGRAPHY (ERCP) W/ SPHINCTEROTOMY;  Surgeon: Estrellita Terry MD;  [facility];  Service: Gastroenterology    GASTRIC BYPASS      2009    OTHER SURGICAL HISTORY      fusion/refusion of vertebrae, 2010 and 2011 l5-s1 and l4-l5    KS CYSTO/URETERO W/LITHOTRIPSY &INDWELL STENT INSRT Right 8/8/2017    Procedure: CYSTOSCOPY; URETEROSCOPY; HOLMIUM LASER; RETROGRADE PYELOGRAM; STENT;  Surgeon: Arlene Cross MD;  [facility];  Service: Urology    MO IMPLANT SPINAL NEUROSTIM/ Right 11/14/2017    Procedure: REMOVAL LOWER MEDIAL BUTTOCK SPINAL CORD STIMULATOR GENERATOR; PLACEMENT OF NEW BUTTOCK SPINAL CORD STIMULATOR THROUGH SEPERATE INCISION;  Surgeon: Rikki Runner, MD;  [facility];  Service: Neurosurgery    RIK-EN-Y PROCEDURE  2003    SPINAL CORD STIMULATOR IMPLANT  11/14/2017    dr Anika Fuchs procedure and technique 1  removal of a right back implantable pulse generator 2 placement of a new right buttock impantable pulse generator through seperate incision 3 electric analys complex programming spinal cord stimulator system postoperative period approx 1 hour    TONSILLECTOMY         Family History   Problem Relation Age of Onset    Cancer Mother     Arthritis Mother     Stomach cancer Mother     Cancer Father     Esophageal cancer Father     Other Father         brain tumor    No Known Problems Sister     No Known Problems Sister     No Known Problems Sister        Social History     Occupational History    Not on file   Tobacco Use    Smoking status: Current Every Day Smoker     Packs/day: 1 00     Types: Cigarettes    Smokeless tobacco: Never Used   Substance and Sexual Activity    Alcohol use: No     Comment: quit drinking, social    Drug use: No    Sexual activity: Not on file         Current Outpatient Medications:     acetaminophen (TYLENOL) 325 mg tablet, Take 3 tablets (975 mg total) by mouth every 8 (eight) hours, Disp: 30 tablet, Rfl: 0    albuterol (PROVENTIL HFA,VENTOLIN HFA) 90 mcg/act inhaler, Inhale 2 puffs every 4 (four) hours as needed for wheezing or shortness of breath (cough), Disp: 1 Inhaler, Rfl: 1    ALPRAZolam (XANAX) 0 5 mg tablet, Take 1 tablet (0 5 mg total) by mouth every 4 (four) hours as needed for anxiety, Disp: 120 tablet, Rfl: 2    baclofen 10 mg tablet, Take 1 tablet (10 mg total) by mouth 3 (three) times a day, Disp: 90 tablet, Rfl: 2    Buprenorphine (BUTRANS) 15 MCG/HR PTWK, Apply 1 patch TD every 7 days  , Disp: 4 patch, Rfl: 2    divalproex sodium (DEPAKOTE) 250 mg EC tablet, Take 1 tablet (250 mg total) by mouth every 8 (eight) hours Take together with 500 mg qhs total 750 mg at bedtime, Disp: 30 tablet, Rfl: 2    divalproex sodium (DEPAKOTE) 500 mg EC tablet, Take 1 tablet (500 mg total) by mouth 2 (two) times a day, Disp: 60 tablet, Rfl: 2    ergocalciferol (VITAMIN D2) 50,000 units, Take 1 capsule (50,000 Units total) by mouth once a week for 7 doses Take one dose/week starting on Tuesday 3/5/19, Disp: 7 capsule, Rfl: 0    Naloxone HCl (NARCAN) 4 MG/0 1ML LIQD, Spray 2 sprays into 1 nostril for suspected opioid overdose  May repeat in other nostril  Call 911 , Disp: 2 each, Rfl: 0    prazosin (MINIPRESS) 1 mg capsule, Take 1 capsule (1 mg total) by mouth daily at bedtime for 55 days, Disp: 30 capsule, Rfl: 1    QUEtiapine (SEROquel) 300 mg tablet, Take 1 tablet (300 mg total) by mouth daily at bedtime for 30 days, Disp: 30 tablet, Rfl: 2    Current Facility-Administered Medications:     cyanocobalamin injection 1,000 mcg, 1,000 mcg, Intramuscular, Q30 Days, Diana Ventura MD, 1,000 mcg at 10/09/18 1049    Allergies   Allergen Reactions    Ampicillin GI Intolerance     Vomiting    Aspirin GI Intolerance     vomiting      Penicillins GI Intolerance     Vomit       Physical Exam:    /68   Pulse 60   Ht 6' (1 829 m)   Wt 70 3 kg (155 lb)   BMI 21 02 kg/m²     Constitutional:normal, well developed, well nourished, alert, in no distress and non-toxic and no overt pain behavior    Eyes:anicteric  HEENT:grossly intact  Neck:supple, symmetric, trachea midline and no masses   Pulmonary:even and unlabored  Cardiovascular:No edema or pitting edema present  Skin:Normal without rashes or lesions and well hydrated  Psychiatric:Mood and affect appropriate  Neurologic:Cranial Nerves II-XII grossly intact  Musculoskeletal:The patient's gait was slightly antalgic, but steady without the use of any assistive devices  Imaging  No orders to display         No orders of the defined types were placed in this encounter

## 2019-07-18 NOTE — PATIENT INSTRUCTIONS
Opioid Pain Management   AMBULATORY CARE:   An opioid  is a type of medicine used to treat pain  Examples of opioids are oxycodone, morphine, fentanyl, or codeine  Take opioid medicines as directed, for the condition it is prescribed:  Common problems that may occur when you do not take opioid medicines as directed include the following:  · Health problems  may occur  You may have trouble breathing  You may also develop liver or kidney damage, or stomach bleeding  Any of these health problems can become life-threatening  · Opioid dependence  means your body needs the opioid medicine to keep it from going through withdrawal      · Opioid tolerance  means the opioid does not control pain as well as it used to  You need higher doses of the opioid to get pain relief  · Opioid addiction  means you are not able to control the use of the opioid medicine  You use it when you do not have pain  You crave the opioid medicine  Call 911 or have someone call 911 for any of the following:   · You are breathing slower than normal, or you have trouble breathing  · You cannot be awakened  · You have a seizure  Seek care immediately if:   · Your heart is beating slower than usual     · Your heart feels like it is jumping or fluttering  · You are so sleepy that you cannot stay awake  · You have severe muscle pain or weakness  · You see or hear things that are not real   Contact your healthcare provider if:   · You are too dizzy to stand up  · Your pain gets worse or you have new pain  · You cannot do your usual activities because of side effects from the opioid  · You are constipated or have abdominal pain  · You have questions or concerns about your condition or care  Opioid safety measures:   · Take your medicine as directed  Ask if you need more information on how to take your medicine correctly  Follow up with your healthcare provider regularly  You may need to have your dose adjusted  Do not use opioid medicine if you are pregnant or breastfeeding  · Give your healthcare provider a list of all your medicines  Include any over-the-counter medicines, vitamins, and herbs  It can be dangerous to take opioids with certain other medicines, such as antihistamines  · Keep opioid medicine in a safe place  Store your opioid medicine in a locked cabinet to keep it away from children and others  · Do not drink alcohol while you use opioids  Alcohol use with an opioid medicine can make you sleepy and slow your breathing rate  You may stop breathing completely  · Do not drive or operate heavy machinery after you take opioid medicine  Opioid medicine can make you drowsy and make it hard to concentrate  You may injure yourself or others if you drive or operate heavy machinery while taking your medicine  · Drink fluids and eat high-fiber foods  Fluids and fiber will help prevent constipation  Ask your healthcare provider what fluids are right for you and how much you should drink  Also ask for a list of foods that contain fiber  Follow up with your healthcare provider as directed: You may need to have your dose adjusted  You may be referred to a pain specialist  Write down your questions so you remember to ask them during your visits  © 2017 [address]  Information is for End User's use only and may not be sold, redistributed or otherwise used for commercial purposes  All illustrations and images included in CareNotes® are the copyrighted property of A "[employer]  or [employer] Cruz  The above information is an  only  It is not intended as medical advice for individual conditions or treatments  Talk to your doctor, nurse or pharmacist before following any medical regimen to see if it is safe and effective for you  Naloxone (Into the nose)   Naloxone (nal-OX-one)  Treats opioid overdose in an emergency situation   Must be given as soon as possible  Brand Name(s): Narcan   There may be other brand names for this medicine  When This Medicine Should Not Be Used: This medicine is not right for everyone  Do not use it if you had an allergic reaction to naloxone  How to Use This Medicine:   Spray  · Your doctor will tell you how much medicine to use  Do not use more than directed  · This medicine must be given to you (the patient) by someone else  Talk with people close to you so they know what to do in case of an emergency  · Read and follow the patient instructions that come with this medicine  Talk to your doctor or pharmacist if you have any questions  · This medicine is for use only in the nose  Do not get any of it in your eyes or on your skin  If it does get on these areas, rinse it off right away  · To use:   ¨ Each nasal spray contains only one dose of naloxone  Do not prime or test the nasal spray  ¨ Hold the nasal spray with your thumb on the bottom of the plunger and your first and middle fingers on either side of the nozzle  ¨ Lay the patient on his or her back  Support the patient's neck with your hand and let the head tilt back  ¨ Gently insert the tip of the nozzle into one nostril, until your fingers on either side of the nozzle are against the bottom of the patient's nose  ¨ Press the plunger firmly to give the dose  Remove the nasal spray from the patient's nose  ¨ Move the patient on his or her side (recovery position)  Get emergency medical help right away  ¨ Watch the patient closely  If needed, you may give more doses every 2 to 3 minutes until the patient responds  Use a new nasal spray for each dose and spray the medicine into the other nostril each time  · Store the medicine in a closed container at room temperature, away from heat, moisture, and direct light  Do not freeze  · Ask your pharmacist about the best way to dispose of medicine that you do not use    Drugs and Foods to Avoid:      Ask your doctor or pharmacist before using any other medicine, including over-the-counter medicines, vitamins, and herbal products  Warnings While Using This Medicine:   · Tell your doctor if you are pregnant or breastfeeding, or if you have heart or blood vessel disease  · This medicine should be given right away after a suspected or known overdose of an opioid (narcotic) medicine  This will help prevent serious breathing problems and severe sleepiness that can lead to death  · The effects of the opioid medicine may last longer than the effects of the naloxone  This means the breathing problems and sleepiness could come back  Always call for emergency help after the first dose of naloxone  · This medicine could cause withdrawal symptoms from the opioid medicine  · Keep all medicine out of the reach of children  Never share your medicine with anyone  Possible Side Effects While Using This Medicine:   Call your doctor right away if you notice any of these side effects:  · Allergic reaction: Itching or hives, swelling in your face or hands, swelling or tingling in your mouth or throat, chest tightness, trouble breathing  · Crying more than the usual (in babies)  · Diarrhea, nausea, vomiting, stomach cramps  · Fast, pounding, or uneven heartbeat  · Fever, runny nose, sneezing, sweating, yawning  · Ongoing trouble breathing  · Seizure, shaking, or feeling restless, nervous, or irritable  If you notice these less serious side effects, talk with your doctor:   · Headache  · Joint or muscle pain  If you notice other side effects that you think are caused by this medicine, tell your doctor  Call your doctor for medical advice about side effects  You may report side effects to FDA at 5-045-FDA-9043  © 2017 [facility] Information is for End User's use only and may not be sold, redistributed or otherwise used for commercial purposes  The above information is an  only   It is not intended as medical advice for individual conditions or treatments  Talk to your doctor, nurse or pharmacist before following any medical regimen to see if it is safe and effective for you

## 2019-09-23 NOTE — TELEPHONE ENCOUNTER
Pts ant hearn calling back and said that they would like the morphine sulfate to [facility]  Cb# [phone] RTK revision 11/13/19 Luis. Had original RTK done in [place] in 2005. DC plan is home with family. 3 steps no railing into home then ranch. Needs 2ww, has cane. Info mailed to him regarding the [employer] club. [facility] JA 10/11/19. Orders for prehab and labs placed. Patient mailed time frame to get labs done. Role of care coordinator explained.     Hospital readmission risks: prior joint replacement, BMI 31.60 Would like OP PT to start 11/18/19. Order placed.

## 2019-09-30 ENCOUNTER — OFFICE VISIT (OUTPATIENT)
Dept: FAMILY MEDICINE CLINIC | Facility: CLINIC | Age: 44
End: 2019-09-30
Payer: MEDICARE

## 2019-09-30 VITALS
TEMPERATURE: 94.9 F | WEIGHT: 159.38 LBS | OXYGEN SATURATION: 69 % | HEART RATE: 97 BPM | SYSTOLIC BLOOD PRESSURE: 112 MMHG | DIASTOLIC BLOOD PRESSURE: 70 MMHG | RESPIRATION RATE: 16 BRPM | HEIGHT: 72 IN | BODY MASS INDEX: 21.59 KG/M2

## 2019-09-30 DIAGNOSIS — R11.2 NAUSEA AND VOMITING, INTRACTABILITY OF VOMITING NOT SPECIFIED, UNSPECIFIED VOMITING TYPE: Primary | ICD-10-CM

## 2019-09-30 PROBLEM — M46.1 SACROILIITIS, NOT ELSEWHERE CLASSIFIED (HCC): Status: ACTIVE | Noted: 2019-09-30

## 2019-09-30 PROCEDURE — 99213 OFFICE O/P EST LOW 20 MIN: CPT | Performed by: FAMILY MEDICINE

## 2019-09-30 RX ORDER — ONDANSETRON 4 MG/1
4 TABLET, ORALLY DISINTEGRATING ORAL 3 TIMES DAILY PRN
Qty: 20 TABLET | Refills: 0 | Status: SHIPPED | OUTPATIENT
Start: 2019-09-30 | End: 2019-10-02 | Stop reason: ALTCHOICE

## 2019-09-30 NOTE — PROGRESS NOTES
FAMILY PRACTICE OFFICE VISIT       NAME: Long Suarez  AGE: 40 y o  SEX: male       : 1975        MRN: [id number]    DATE: 2019  TIME: 12:27 PM    Assessment and Plan     Problem List Items Addressed This Visit        Digestive    Nausea and vomiting - Primary     Nausea and vomiting  Suspect patient may have contracted a stomach virus  He was given prescription for Zofran 4 mg to take t i d  P r n  He will also start a BRAT diet and advance as tolerated  He was given referral to [facility] ([facility]) for consultation regarding his previous bypass surgery and gastric pouch dilatation         Relevant Medications    ondansetron (ZOFRAN-ODT) 4 mg disintegrating tablet    Other Relevant Orders    Ambulatory referral to Gastroenterology              Chief Complaint     Chief Complaint   Patient presents with    Vomiting     x 2 days     Headache    Fatigue    Diarrhea       History of Present Illness     Patient states yesterday he began with an episode of diarrhea and subsequently began to experience his nausea and vomiting  He has had half a dozen episodes of vomiting since yesterday  He has been trying to not eat very much to avoid feeling nauseous  Does have a history of gastric bypass surgery  He reports he had surgery for gastric bypass many years ago and had been seeing his gastroenterologist in [place] after surgery to have dilation of his stomach pouch since he had been experiencing intermittent nausea and vomiting for several years  He states he went once a month for 6 months to have his pouch dilated which cause decrease in symptoms  Unfortunately his physician in [place] is no longer practicing and his last treatment for this was in   Patient requested referral to local gastroenterologist       Review of Systems   Review of Systems   Constitutional: Positive for fatigue  Negative for fever  HENT: Negative  Respiratory: Negative  Cardiovascular: Negative  Gastrointestinal:        As per HPI   Genitourinary: Negative  Musculoskeletal: Negative          Active Problem List     Patient Active Problem List   Diagnosis    Chronic low back pain    Bipolar 2 disorder, major depressive episode ([facility] [address] )    Depression with anxiety    Fatigue    Chronic anemia    Nephrolithiasis    Pain syndrome, chronic    Right elbow pain    Uncomplicated opioid dependence ([facility] [address] )    Thumb pain    Lumbar post-laminectomy syndrome    Myofascial pain syndrome    Lumbar radiculopathy    Spinal stenosis of lumbar region with neurogenic claudication    BPH with obstruction/lower urinary tract symptoms    Iron deficiency anemia    Vitamin B deficiency    Postgastrectomy malabsorption    History of Parth-en-Y gastric bypass    Tobacco abuse    Vitamin D deficiency    PTSD (post-traumatic stress disorder)    Sacroiliitis, not elsewhere classified ([facility] [address] )    Nausea and vomiting       Past Medical History:  Past Medical History:   Diagnosis Date    Anxiety     Arthritis     Bipolar disorder (HCC)     Chronic left lumbar radiculopathy     Chronic low back pain     Chronic pain syndrome     Chronic right shoulder pain     Depression with anxiety     Fatigue     GERD (gastroesophageal reflux disease)     Hydronephrosis     Iron deficiency anemia     Kidney stone     Lytic bone lesions on xray     Nephrolithiasis     PONV (postoperative nausea and vomiting)     Right elbow pain     Right lateral epicondylitis        Past Surgical History:  Past Surgical History:   Procedure Laterality Date    ADENOIDECTOMY Bilateral     APPENDECTOMY      BACK SURGERY      CHOLECYSTECTOMY LAPAROSCOPIC N/A 10/30/2018    Procedure: CHOLECYSTECTOMY WITH GASTROTOMY LAPAROSCOPIC;  Surgeon: Roe Saldaña MD;  [facility];  Service: General    ERCP W/ SPHICTEROTOMY N/A 10/30/2018    Procedure: ENDOSCOPIC RETROGRADE CHOLANGIOPANCREATOGRAPHY (ERCP) W/ SPHINCTEROTOMY;  Surgeon: Ricky Jack MD;  [facility];  Service: Gastroenterology    GASTRIC BYPASS      2009    OTHER SURGICAL HISTORY      fusion/refusion of vertebrae, 2010 and 2011 l5-s1 and l4-l5    WI CYSTO/URETERO W/LITHOTRIPSY &INDWELL STENT INSRT Right 8/8/2017    Procedure: CYSTOSCOPY; URETEROSCOPY; HOLMIUM LASER; RETROGRADE PYELOGRAM; STENT;  Surgeon: Alo Knowles MD;  [facility];  Service: Urology    WI IMPLANT SPINAL NEUROSTIM/ Right 11/14/2017    Procedure: REMOVAL LOWER MEDIAL BUTTOCK SPINAL CORD STIMULATOR GENERATOR; PLACEMENT OF NEW BUTTOCK SPINAL CORD STIMULATOR THROUGH SEPERATE INCISION;  Surgeon: Lisbet Lawson MD;  [facility];  Service: Neurosurgery    RIK-EN-Y PROCEDURE  2003    SPINAL CORD STIMULATOR IMPLANT  11/14/2017    dr Guicho Nolan procedure and technique 1  removal of a right back implantable pulse generator 2 placement of a new right buttock impantable pulse generator through seperate incision 3 electric analys complex programming spinal cord stimulator system postoperative period approx 1 hour    TONSILLECTOMY         Family History:  Family History   Problem Relation Age of Onset    Cancer Mother     Arthritis Mother     Stomach cancer Mother     Cancer Father     Esophageal cancer Father     Other Father         brain tumor    No Known Problems Sister     No Known Problems Sister     No Known Problems Sister        Social History:  Social History     Socioeconomic History    Marital status:      Spouse name: Not on file    Number of children: Not on file    Years of education: GED    Highest education level: Not on file   Occupational History    Not on file   Social Needs    Financial resource strain: Not on file    Food insecurity:     Worry: Not on file     Inability: Not on file    Transportation needs:     Medical: Not on file     Non-medical: Not on file   Tobacco Use    Smoking status: Current Every Day Smoker     Packs/day: 1 00 Types: Cigarettes    Smokeless tobacco: Never Used   Substance and Sexual Activity    Alcohol use: No     Comment: quit drinking, social    Drug use: No    Sexual activity: Not on file   Lifestyle    Physical activity:     Days per week: Not on file     Minutes per session: Not on file    Stress: Not on file   Relationships    Social connections:     Talks on phone: Not on file     Gets together: Not on file     Attends [employer]ologist service: Not on file     Active member of club or organization: Not on file     Attends meetings of clubs or organizations: Not on file     Relationship status: Not on file    Intimate partner violence:     Fear of current or ex partner: Not on file     Emotionally abused: Not on file     Physically abused: Not on file     Forced sexual activity: Not on file   Other Topics Concern    Not on file   Social History Narrative    Chooses not to have children    Drinks caffienated tea    Lives with spouse               Objective     Vitals:    09/30/19 1035   BP: 112/70   Pulse: 97   Resp: 16   Temp: (!) 94 9 °F (34 9 °C)   SpO2: (!) 69%     Wt Readings from Last 3 Encounters:   09/30/19 72 3 kg (159 lb 6 oz)   07/18/19 70 3 kg (155 lb)   04/25/19 75 3 kg (166 lb)       Physical Exam   Constitutional: He is oriented to person, place, and time  No distress  HENT:   Right Ear: External ear normal    Left Ear: External ear normal    Mouth/Throat: Oropharynx is clear and moist  No oropharyngeal exudate  Tympanic membranes within normal limits bilaterally   Cardiovascular: Normal heart sounds  Regular rate and rhythm with no murmurs   Pulmonary/Chest: Breath sounds normal    Lungs are clear to auscultation without wheezes,rales, or rhonchi   Abdominal:   Patient's abdomen has minimal generalized tenderness to palpation  His abdomen is soft with positive bowel sounds  Negative rebound or guarding  Negative hepatosplenomegaly   Musculoskeletal: He exhibits no edema  Lymphadenopathy:     He has no cervical adenopathy  Neurological: He is alert and oriented to person, place, and time  No cranial nerve deficit         Pertinent Laboratory/Diagnostic Studies:  Lab Results   Component Value Date    BUN 10 02/25/2019    CREATININE 1 00 02/25/2019    CALCIUM 9 1 02/25/2019    K 4 4 02/25/2019    CO2 28 02/25/2019     02/25/2019     Lab Results   Component Value Date    ALT 12 02/25/2019    AST 7 02/25/2019    ALKPHOS 86 02/25/2019       Lab Results   Component Value Date    WBC 7 16 02/25/2019    HGB 14 6 02/25/2019    HCT 46 4 02/25/2019    MCV 85 02/25/2019     02/25/2019       No results found for: TSH    No results found for: CHOL  Lab Results   Component Value Date    TRIG 46 02/26/2019     Lab Results   Component Value Date    HDL 37 (L) 02/26/2019     Lab Results   Component Value Date    LDLCALC 54 02/26/2019     Lab Results   Component Value Date    HGBA1C 6 1 02/26/2019       Results for orders placed or performed during the hospital encounter of 02/25/19   CBC and differential   Result Value Ref Range    WBC 7 16 4 31 - 10 16 Thousand/uL    RBC 5 46 3 88 - 5 62 Million/uL    Hemoglobin 14 6 12 0 - 17 0 g/dL    Hematocrit 46 4 36 5 - 49 3 %    MCV 85 82 - 98 fL    MCH 26 7 (L) 26 8 - 34 3 pg    MCHC 31 5 31 4 - 37 4 g/dL    RDW 14 8 11 6 - 15 1 %    MPV 10 9 8 9 - 12 7 fL    Platelets 563 341 - 940 Thousands/uL    nRBC 0 /100 WBCs    Neutrophils Relative 73 43 - 75 %    Immat GRANS % 0 0 - 2 %    Lymphocytes Relative 18 14 - 44 %    Monocytes Relative 6 4 - 12 %    Eosinophils Relative 2 0 - 6 %    Basophils Relative 1 0 - 1 %    Neutrophils Absolute 5 25 1 85 - 7 62 Thousands/µL    Immature Grans Absolute 0 02 0 00 - 0 20 Thousand/uL    Lymphocytes Absolute 1 29 0 60 - 4 47 Thousands/µL    Monocytes Absolute 0 44 0 17 - 1 22 Thousand/µL    Eosinophils Absolute 0 11 0 00 - 0 61 Thousand/µL    Basophils Absolute 0 05 0 00 - 0 10 Thousands/µL   Comprehensive metabolic panel   Result Value Ref Range    Sodium 139 136 - 145 mmol/L    Potassium 4 4 3 5 - 5 3 mmol/L    Chloride 106 100 - 108 mmol/L    CO2 28 21 - 32 mmol/L    ANION GAP 5 4 - 13 mmol/L    BUN 10 5 - 25 mg/dL    Creatinine 1 00 0 60 - 1 30 mg/dL    Glucose 90 65 - 140 mg/dL    Calcium 9 1 8 3 - 10 1 mg/dL    AST 7 5 - 45 U/L    ALT 12 12 - 78 U/L    Alkaline Phosphatase 86 46 - 116 U/L    Total Protein 6 8 6 4 - 8 2 g/dL    Albumin 3 7 3 5 - 5 0 g/dL    Total Bilirubin 0 41 0 20 - 1 00 mg/dL    eGFR 92 ml/min/1 73sq m   Ethanol   Result Value Ref Range    Ethanol Lvl <3 0 - 3 mg/dL   Salicylate level   Result Value Ref Range    Salicylate Lvl <3 (L) 3 - 20 mg/dL   Acetaminophen level   Result Value Ref Range    Acetaminophen Level <2 (L) 10 - 30 ug/mL   POCT alcohol breath test   Result Value Ref Range    EXTBreath Alcohol 0 000    ECG 12 lead   Result Value Ref Range    Ventricular Rate 58 BPM    Atrial Rate 58 BPM    WY Interval 160 ms    QRSD Interval 94 ms    QT Interval 416 ms    QTC Interval 408 ms    P Axis 78 degrees    QRS Axis 73 degrees    T Wave Axis 45 degrees       Orders Placed This Encounter   Procedures    Ambulatory referral to Gastroenterology       ALLERGIES:  Allergies   Allergen Reactions    Ampicillin GI Intolerance     Vomiting    Aspirin GI Intolerance     vomiting      Penicillins GI Intolerance     Vomit       Current Medications     Current Outpatient Medications   Medication Sig Dispense Refill    acetaminophen (TYLENOL) 325 mg tablet Take 3 tablets (975 mg total) by mouth every 8 (eight) hours 30 tablet 0    albuterol (PROVENTIL HFA,VENTOLIN HFA) 90 mcg/act inhaler Inhale 2 puffs every 4 (four) hours as needed for wheezing or shortness of breath (cough) 1 Inhaler 1    ALPRAZolam (XANAX) 0 5 mg tablet Take 1 tablet (0 5 mg total) by mouth every 4 (four) hours as needed for anxiety 120 tablet 2    baclofen 10 mg tablet Take 1 tablet (10 mg total) by mouth 3 (three) times a day 90 tablet 2    Buprenorphine (BUTRANS) 15 MCG/HR PTWK Apply 1 patch TD every 7 days  4 patch 2    divalproex sodium (DEPAKOTE) 250 mg EC tablet Take 1 tablet (250 mg total) by mouth every 8 (eight) hours Take together with 500 mg qhs total 750 mg at bedtime (Patient not taking: Reported on 9/30/2019) 30 tablet 2    divalproex sodium (DEPAKOTE) 500 mg EC tablet Take 1 tablet (500 mg total) by mouth 2 (two) times a day (Patient not taking: Reported on 9/30/2019) 60 tablet 2    ergocalciferol (VITAMIN D2) 50,000 units Take 1 capsule (50,000 Units total) by mouth once a week for 7 doses Take one dose/week starting on Tuesday 3/5/19 7 capsule 0    Naloxone HCl (NARCAN) 4 MG/0 1ML LIQD Spray 2 sprays into 1 nostril for suspected opioid overdose  May repeat in other nostril  Call 911   (Patient not taking: Reported on 9/30/2019) 2 each 0    ondansetron (ZOFRAN-ODT) 4 mg disintegrating tablet Take 1 tablet (4 mg total) by mouth 3 (three) times a day as needed for nausea or vomiting 20 tablet 0    prazosin (MINIPRESS) 1 mg capsule Take 1 capsule (1 mg total) by mouth daily at bedtime for 55 days 30 capsule 1    QUEtiapine (SEROquel) 300 mg tablet Take 1 tablet (300 mg total) by mouth daily at bedtime for 30 days 30 tablet 2     Current Facility-Administered Medications   Medication Dose Route Frequency Provider Last Rate Last Dose    cyanocobalamin injection 1,000 mcg  1,000 mcg Intramuscular Q30 Days Andrew Holly MD   1,000 mcg at 10/09/18 1049         Health Maintenance     Health Maintenance   Topic Date Due    Pneumococcal Vaccine: Pediatrics (0 to 5 Years) and At-Risk Patients (6 to 64 Years) (1 of 3 - PCV13) 08/22/1981    DTaP,Tdap,and Td Vaccines (1 - Tdap) 08/22/1996    INFLUENZA VACCINE  07/01/2019    Medicare Annual Wellness Visit (AWV)  09/13/2019    BMI: Adult  07/18/2020    Pneumococcal Vaccine: 65+ Years (1 of 2 - PCV13) 08/22/2040    HEPATITIS B VACCINES  Aged [employer] History   Administered Date(s) Administered    INFLUENZA 12/15/2018    Influenza, injectable, quadrivalent, preservative free 0 5 mL 22/42/7831       Silke Rivers MD

## 2019-09-30 NOTE — ASSESSMENT & PLAN NOTE
Nausea and vomiting  Suspect patient may have contracted a stomach virus  He was given prescription for Zofran 4 mg to take t i d  P r n  He will also start a BRAT diet and advance as tolerated    He was given referral to [facility] for consultation regarding his previous bypass surgery and gastric pouch dilatation

## 2019-10-02 ENCOUNTER — OFFICE VISIT (OUTPATIENT)
Dept: FAMILY MEDICINE CLINIC | Facility: CLINIC | Age: 44
End: 2019-10-02
Payer: MEDICARE

## 2019-10-02 VITALS
HEIGHT: 72 IN | RESPIRATION RATE: 16 BRPM | OXYGEN SATURATION: 97 % | BODY MASS INDEX: 21.1 KG/M2 | WEIGHT: 155.8 LBS | HEART RATE: 65 BPM | TEMPERATURE: 96.3 F | DIASTOLIC BLOOD PRESSURE: 60 MMHG | SYSTOLIC BLOOD PRESSURE: 114 MMHG

## 2019-10-02 DIAGNOSIS — E53.9 VITAMIN B DEFICIENCY: ICD-10-CM

## 2019-10-02 DIAGNOSIS — D64.9 CHRONIC ANEMIA: Chronic | ICD-10-CM

## 2019-10-02 DIAGNOSIS — F31.81 BIPOLAR 2 DISORDER, MAJOR DEPRESSIVE EPISODE (HCC): Chronic | ICD-10-CM

## 2019-10-02 DIAGNOSIS — E55.9 VITAMIN D DEFICIENCY: ICD-10-CM

## 2019-10-02 DIAGNOSIS — R55 SYNCOPE, UNSPECIFIED SYNCOPE TYPE: Primary | ICD-10-CM

## 2019-10-02 DIAGNOSIS — Z90.3 POSTGASTRECTOMY MALABSORPTION: ICD-10-CM

## 2019-10-02 DIAGNOSIS — K91.2 POSTGASTRECTOMY MALABSORPTION: ICD-10-CM

## 2019-10-02 PROCEDURE — 99213 OFFICE O/P EST LOW 20 MIN: CPT | Performed by: NURSE PRACTITIONER

## 2019-10-02 NOTE — LETTER
October 2, 2019     Patient: Mylene Bella   YOB: 1975   Date of Visit: 10/2/2019       To Whom it May Concern:    Mylene Bella is under my professional care  He was seen in my office on 10/2/2019  He may not return to work until re-evaluation in office after further testing  If you have any questions or concerns, please don't hesitate to call           Sincerely,          ANTONIA Espana        CC: No Recipients

## 2019-10-02 NOTE — PROGRESS NOTES
FAMILY PRACTICE OFFICE VISIT       NAME: Brook Casas  AGE: 40 y o  SEX: male       : 1975        MRN: [id number]    DATE: 10/2/2019    Assessment and Plan     Problem List Items Addressed This Visit        Digestive    Postgastrectomy malabsorption    Relevant Orders    Echo complete with contrast if indicated    Holter monitor - 24 hour    CBC and differential    Comprehensive metabolic panel    Lipid panel    TSH, 3rd generation    Iron Panel (Includes Ferritin, Iron Sat%, Iron, and TIBC)    Vitamin B12    Vitamin D 25 hydroxy    Vitamin B1, whole blood       Other    Bipolar 2 disorder, major depressive episode (HCC) (Chronic)    Relevant Orders    TSH, 3rd generation    Chronic anemia (Chronic)    Relevant Orders    CBC and differential    Iron Panel (Includes Ferritin, Iron Sat%, Iron, and TIBC)    Vitamin B deficiency    Relevant Orders    Vitamin B12    Vitamin D deficiency    Relevant Orders    Vitamin D 25 hydroxy      Other Visit Diagnoses     Syncope, unspecified syncope type    -  Primary    Relevant Orders    Echo complete with contrast if indicated    Holter monitor - 24 hour    CBC and differential    Comprehensive metabolic panel    Lipid panel    TSH, 3rd generation    Iron Panel (Includes Ferritin, Iron Sat%, Iron, and TIBC)    Vitamin B12    Vitamin D 25 hydroxy        1  Syncope, unspecified syncope type  Echo complete with contrast if indicated    Holter monitor - 24 hour    CBC and differential    Comprehensive metabolic panel    Lipid panel    TSH, 3rd generation    Iron Panel (Includes Ferritin, Iron Sat%, Iron, and TIBC)    Vitamin B12    Vitamin D 25 hydroxy   2   Postgastrectomy malabsorption  Echo complete with contrast if indicated    Holter monitor - 24 hour    CBC and differential    Comprehensive metabolic panel    Lipid panel    TSH, 3rd generation    Iron Panel (Includes Ferritin, Iron Sat%, Iron, and TIBC)    Vitamin B12    Vitamin D 25 hydroxy    Vitamin B1, whole blood   3  Vitamin D deficiency  Vitamin D 25 hydroxy   4  Vitamin B deficiency  Vitamin B12   5  Chronic anemia  CBC and differential    Iron Panel (Includes Ferritin, Iron Sat%, Iron, and TIBC)   6  Bipolar 2 disorder, major depressive episode (HCC)  TSH, 3rd generation     This 80-year-old male presents today for an episode of syncope that occurred yesterday  He was recently seen in office on 09/30 for nausea, vomiting, and diarrhea with suspected gastroenteritis  Symptoms resolved yesterday  He has not returned was full diet yet  He is drinking mostly coffee and sweet tea, little else in the way of fluids  For the past few days he has been feeling intermittently dizzy and foggy  He has a past history gastric bypass, with significant persistent GI complaints since surgery  He will be scheduling with Gastroenterology in the near future  Suspect syncopal episode was caused by dehydration from recent viral gastroenteritis with inadequate fluid replacement  Advised to push fluids, especially water  Limit caffeine  In office ECG, with sinus bradycardia, HR 55, no ischemic changes, no significant changes compared to 2/25/19  History of gastric bypass surgery with subsequent malabsorption, and iron deficiency anemia  Will check blood work as noted  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, he is afraid to return to work, for fear he may pass out again  Will check blood work as noted, will check echocardiogram and Holter monitor  Advised patient not to return to work until testing is completed  Follow-up in office once all testing is completed  If he should pass out again, advised to go to the emergency room immediately        Chief Complaint     Chief Complaint   Patient presents with    Loss of Consciousness      X's yesterday, + SOB, + dizziness, feels foggy, Denies CP       History of Present Illness     Kathleen Arnold is a 80-year-old male presenting today for an episode of syncope yesterday  He was seen in office on 09/30 for nausea, vomiting, and diarrhea, with suspected gastroenteritis  Symptoms resolved yesterday, has not returned to full regular diet yet  For the past few days has felt foggy, with intermittent dizziness  Yesterday, he was picking up sticks in the yard, bent over to  a stick and next thing he knew he woke up on the ground  He does not recall what happened  He was home alone  He has been drinking coffee and sweet tea for fluids  Decreased appetite  Smoking approximately 1 pack per day  Not ready to quit  Denies any vision changes  Does have a history of gastric bypass, which has caused him significant GI issues since surgery, he is planning to schedule with Gastroenterology  Is have a history of iron deficiency anemia, with iron infusions in the past     Denies any vision changes, numbness, tingling, chest pain, or palpitations  Occasional shortness of breath with activity  Denies fevers or chills  He has had night sweats over the past week when he was sick  Review of Systems   Review of Systems   Constitutional: Positive for appetite change ( decreased) and diaphoresis  Negative for chills (Night sweats as noted in HPI), fatigue and fever  HENT: Negative  Respiratory: Positive for shortness of breath ( occasionally with exertion)  Negative for cough, chest tightness and wheezing  Cardiovascular: Negative for chest pain, palpitations and leg swelling  Gastrointestinal: Negative  Genitourinary: Negative  Musculoskeletal: Negative  Skin: Negative for rash  Neurological: Positive for dizziness and syncope  Negative for tremors, seizures, facial asymmetry, speech difficulty, weakness, light-headedness, numbness and headaches         Active Problem List     Patient Active Problem List   Diagnosis    Chronic low back pain    Bipolar 2 disorder, major depressive episode ([facility] [address] )    Depression with anxiety    Fatigue  Chronic anemia    Nephrolithiasis    Pain syndrome, chronic    Right elbow pain    Uncomplicated opioid dependence (HCC)    Thumb pain    Lumbar post-laminectomy syndrome    Myofascial pain syndrome    Lumbar radiculopathy    Spinal stenosis of lumbar region with neurogenic claudication    BPH with obstruction/lower urinary tract symptoms    Iron deficiency anemia    Vitamin B deficiency    Postgastrectomy malabsorption    History of Parth-en-Y gastric bypass    Tobacco abuse    Vitamin D deficiency    PTSD (post-traumatic stress disorder)    Sacroiliitis, not elsewhere classified ([address] )    Nausea and vomiting       Past Medical History:  Past Medical History:   Diagnosis Date    Anxiety     Arthritis     Bipolar disorder (HCC)     Chronic left lumbar radiculopathy     Chronic low back pain     Chronic pain syndrome     Chronic right shoulder pain     Depression with anxiety     Fatigue     GERD (gastroesophageal reflux disease)     Hydronephrosis     Iron deficiency anemia     Kidney stone     Lytic bone lesions on xray     Nephrolithiasis     PONV (postoperative nausea and vomiting)     Right elbow pain     Right lateral epicondylitis        Past Surgical History:  Past Surgical History:   Procedure Laterality Date    ADENOIDECTOMY Bilateral     APPENDECTOMY      BACK SURGERY      CHOLECYSTECTOMY LAPAROSCOPIC N/A 10/30/2018    Procedure: CHOLECYSTECTOMY WITH GASTROTOMY LAPAROSCOPIC;  Surgeon: Judith Mckeon MD;  [facility];  Service: General    ERCP W/ SPHICTEROTOMY N/A 10/30/2018    Procedure: ENDOSCOPIC RETROGRADE CHOLANGIOPANCREATOGRAPHY (ERCP) W/ SPHINCTEROTOMY;  Surgeon: Kay Lennox, MD;  [facility];  Service: Gastroenterology    GASTRIC BYPASS      2009    OTHER SURGICAL HISTORY      fusion/refusion of vertebrae, 2010 and 2011 l5-s1 and l4-l5    MA CYSTO/URETERO W/LITHOTRIPSY &INDWELL STENT INSRT Right 8/8/2017    Procedure: CYSTOSCOPY; URETEROSCOPY; HOLMIUM LASER; RETROGRADE PYELOGRAM; STENT;  Surgeon: Jimmie Miller MD;  [facility];  Service: Urology    [place] IMPLANT SPINAL NEUROSTIM/ Right 11/14/2017    Procedure: REMOVAL LOWER MEDIAL BUTTOCK SPINAL CORD STIMULATOR GENERATOR; PLACEMENT OF NEW BUTTOCK SPINAL CORD STIMULATOR THROUGH SEPERATE INCISION;  Surgeon: Chantelle Garner MD;  [facility];  Service: Neurosurgery    RIK-EN-Y PROCEDURE  2003    SPINAL CORD STIMULATOR IMPLANT  11/14/2017    dr Jennifer Valdez procedure and technique 1  removal of a right back implantable pulse generator 2 placement of a new right buttock impantable pulse generator through seperate incision 3 electric analys complex programming spinal cord stimulator system postoperative period approx 1 hour    TONSILLECTOMY         Family History:  Family History   Problem Relation Age of Onset    Cancer Mother     Arthritis Mother     Stomach cancer Mother     Cancer Father     Esophageal cancer Father     Other Father         brain tumor    No Known Problems Sister     No Known Problems Sister     No Known Problems Sister        Social History:  Social History     Socioeconomic History    Marital status:      Spouse name: Not on file    Number of children: Not on file    Years of education: GED    Highest education level: Not on file   Occupational History    Not on file   Social Needs    Financial resource strain: Not on file    Food insecurity:     Worry: Not on file     Inability: Not on file    Transportation needs:     Medical: Not on file     Non-medical: Not on file   Tobacco Use    Smoking status: Current Every Day Smoker     Packs/day: 1 00     Types: Cigarettes    Smokeless tobacco: Never Used   Substance and Sexual Activity    Alcohol use: No     Comment: quit drinking, social    Drug use: No    Sexual activity: Not on file   Lifestyle    Physical activity:     Days per week: Not on file     Minutes per session: Not on file    Stress: Not on file   Relationships    Social connections:     Talks on phone: Not on file     Gets together: Not on file     Attends [employer] service: Not on file     Active member of club or organization: Not on file     Attends meetings of clubs or organizations: Not on file     Relationship status: Not on file    Intimate partner violence:     Fear of current or ex partner: Not on file     Emotionally abused: Not on file     Physically abused: Not on file     Forced sexual activity: Not on file   Other Topics Concern    Not on file   Social History Narrative    Chooses not to have children    Drinks caffienated tea    Lives with spouse               I have reviewed the patient's medical history in detail; there are no changes to the history as noted in the electronic medical record  Objective     Vitals:    10/02/19 0849 10/02/19 0944   BP: 114/60    BP Location: Right arm    Patient Position: Sitting    Cuff Size: Standard    Pulse: 86 65   Resp: 16    Temp: (!) 96 3 °F (35 7 °C)    TempSrc: Tympanic    SpO2: (!) 89% 97%   Weight: 70 7 kg (155 lb 12 8 oz)    Height: 6' (1 829 m)      Wt Readings from Last 3 Encounters:   10/02/19 70 7 kg (155 lb 12 8 oz)   09/30/19 72 3 kg (159 lb 6 oz)   07/18/19 70 3 kg (155 lb)     Body mass index is 21 13 kg/m²  PHQ-9 Depression Screening    PHQ-9:    Frequency of the following problems over the past two weeks:            Physical Exam   Constitutional: He is oriented to person, place, and time  He appears well-developed and well-nourished  He does not appear ill  No distress  HENT:   Head: Normocephalic and atraumatic  Right Ear: Tympanic membrane, external ear and ear canal normal    Left Ear: Tympanic membrane, external ear and ear canal normal    Mouth/Throat: Uvula is midline and oropharynx is clear and moist    Eyes: Pupils are equal, round, and reactive to light  Conjunctivae and EOM are normal    Neck: Normal range of motion  Neck supple  Carotid bruit is not present  No thyromegaly present  Cardiovascular: Normal rate and regular rhythm  No murmur heard  Pulmonary/Chest: Effort normal and breath sounds normal    Abdominal: Soft  Bowel sounds are normal    Musculoskeletal: He exhibits no edema or deformity  Lymphadenopathy:     He has no cervical adenopathy  Neurological: He is alert and oriented to person, place, and time  No cranial nerve deficit  He exhibits normal muscle tone  Coordination normal    Skin: No rash noted  Psychiatric: He has a normal mood and affect  Nursing note and vitals reviewed  ALLERGIES:  Allergies   Allergen Reactions    Ampicillin GI Intolerance     Vomiting    Aspirin GI Intolerance     vomiting      Penicillins GI Intolerance     Vomit       Current Medications     Current Outpatient Medications   Medication Sig Dispense Refill    acetaminophen (TYLENOL) 325 mg tablet Take 3 tablets (975 mg total) by mouth every 8 (eight) hours 30 tablet 0    albuterol (PROVENTIL HFA,VENTOLIN HFA) 90 mcg/act inhaler Inhale 2 puffs every 4 (four) hours as needed for wheezing or shortness of breath (cough) 1 Inhaler 1    ALPRAZolam (XANAX) 0 5 mg tablet Take 1 tablet (0 5 mg total) by mouth every 4 (four) hours as needed for anxiety 120 tablet 2    baclofen 10 mg tablet Take 1 tablet (10 mg total) by mouth 3 (three) times a day 90 tablet 2    Buprenorphine (BUTRANS) 15 MCG/HR PTWK Apply 1 patch TD every 7 days   4 patch 2     Current Facility-Administered Medications   Medication Dose Route Frequency Provider Last Rate Last Dose    cyanocobalamin injection 1,000 mcg  1,000 mcg Intramuscular Q30 Days Phoebe Sorensen MD   1,000 mcg at 10/09/18 1049         Health Maintenance     Health Maintenance   Topic Date Due    Pneumococcal Vaccine: Pediatrics (0 to 5 Years) and At-Risk Patients (6 to 64 Years) (1 of 3 - PCV13) 08/22/1981    DTaP,Tdap,and Td Vaccines (1 - Tdap) 08/22/1996    INFLUENZA VACCINE 07/01/2019    Medicare Annual Wellness Visit (AWV)  09/13/2019    BMI: Adult  10/02/2020    Pneumococcal Vaccine: 65+ Years (1 of 2 - PCV13) 08/22/2040    HEPATITIS B VACCINES  Aged Out     Immunization History   Administered Date(s) Administered    INFLUENZA 12/15/2018    Influenza, injectable, quadrivalent, preservative free 0 5 mL 12/15/2018       ANTONIA Douglas

## 2019-10-08 ENCOUNTER — HOSPITAL ENCOUNTER (OUTPATIENT)
Dept: NON INVASIVE DIAGNOSTICS | Facility: CLINIC | Age: 44
Discharge: HOME | End: 2019-10-08
Payer: MEDICARE

## 2019-10-08 DIAGNOSIS — K91.2 POSTGASTRECTOMY MALABSORPTION: ICD-10-CM

## 2019-10-08 DIAGNOSIS — Z90.3 POSTGASTRECTOMY MALABSORPTION: ICD-10-CM

## 2019-10-08 DIAGNOSIS — R55 SYNCOPE, UNSPECIFIED SYNCOPE TYPE: ICD-10-CM

## 2019-10-08 PROCEDURE — 93226 XTRNL ECG REC<48 HR SCAN A/R: CPT

## 2019-10-08 PROCEDURE — 93306 TTE W/DOPPLER COMPLETE: CPT | Performed by: INTERNAL MEDICINE

## 2019-10-08 PROCEDURE — 93225 XTRNL ECG REC<48 HRS REC: CPT

## 2019-10-08 PROCEDURE — 93306 TTE W/DOPPLER COMPLETE: CPT

## 2019-10-09 ENCOUNTER — LAB (OUTPATIENT)
Dept: LAB | Facility: HOSPITAL | Age: 44
End: 2019-10-09
Payer: MEDICARE

## 2019-10-09 DIAGNOSIS — R55 SYNCOPE, UNSPECIFIED SYNCOPE TYPE: ICD-10-CM

## 2019-10-09 DIAGNOSIS — F31.81 BIPOLAR 2 DISORDER, MAJOR DEPRESSIVE EPISODE (HCC): Chronic | ICD-10-CM

## 2019-10-09 DIAGNOSIS — E53.9 VITAMIN B DEFICIENCY: ICD-10-CM

## 2019-10-09 DIAGNOSIS — D64.9 CHRONIC ANEMIA: ICD-10-CM

## 2019-10-09 DIAGNOSIS — K91.2 POSTGASTRECTOMY MALABSORPTION: ICD-10-CM

## 2019-10-09 DIAGNOSIS — Z90.3 POSTGASTRECTOMY MALABSORPTION: ICD-10-CM

## 2019-10-09 DIAGNOSIS — E55.9 VITAMIN D DEFICIENCY: ICD-10-CM

## 2019-10-09 LAB
25(OH)D3 SERPL-MCNC: 27.8 NG/ML (ref 30–100)
ALBUMIN SERPL BCP-MCNC: 3.5 G/DL (ref 3.5–5)
ALP SERPL-CCNC: 74 U/L (ref 46–116)
ALT SERPL W P-5'-P-CCNC: 21 U/L (ref 12–78)
ANION GAP SERPL CALCULATED.3IONS-SCNC: 5 MMOL/L (ref 4–13)
AST SERPL W P-5'-P-CCNC: 15 U/L (ref 5–45)
BASOPHILS # BLD AUTO: 0.05 THOUSANDS/ΜL (ref 0–0.1)
BASOPHILS NFR BLD AUTO: 1 % (ref 0–1)
BILIRUB SERPL-MCNC: 0.36 MG/DL (ref 0.2–1)
BUN SERPL-MCNC: 13 MG/DL (ref 5–25)
CALCIUM SERPL-MCNC: 9.2 MG/DL (ref 8.3–10.1)
CHLORIDE SERPL-SCNC: 105 MMOL/L (ref 100–108)
CHOLEST SERPL-MCNC: 153 MG/DL (ref 50–200)
CO2 SERPL-SCNC: 28 MMOL/L (ref 21–32)
CREAT SERPL-MCNC: 0.92 MG/DL (ref 0.6–1.3)
EOSINOPHIL # BLD AUTO: 0.15 THOUSAND/ΜL (ref 0–0.61)
EOSINOPHIL NFR BLD AUTO: 3 % (ref 0–6)
ERYTHROCYTE [DISTWIDTH] IN BLOOD BY AUTOMATED COUNT: 13.3 % (ref 11.6–15.1)
FERRITIN SERPL-MCNC: 16 NG/ML (ref 8–388)
GFR SERPL CREATININE-BSD FRML MDRD: 101 ML/MIN/1.73SQ M
GLUCOSE P FAST SERPL-MCNC: 64 MG/DL (ref 65–99)
HCT VFR BLD AUTO: 50.2 % (ref 36.5–49.3)
HDLC SERPL-MCNC: 53 MG/DL (ref 40–60)
HGB BLD-MCNC: 15.9 G/DL (ref 12–17)
IMM GRANULOCYTES # BLD AUTO: 0.02 THOUSAND/UL (ref 0–0.2)
IMM GRANULOCYTES NFR BLD AUTO: 0 % (ref 0–2)
IRON SATN MFR SERPL: 27 %
IRON SERPL-MCNC: 87 UG/DL (ref 65–175)
LDLC SERPL CALC-MCNC: 82 MG/DL (ref 0–100)
LYMPHOCYTES # BLD AUTO: 1.6 THOUSANDS/ΜL (ref 0.6–4.47)
LYMPHOCYTES NFR BLD AUTO: 36 % (ref 14–44)
MCH RBC QN AUTO: 27.5 PG (ref 26.8–34.3)
MCHC RBC AUTO-ENTMCNC: 31.7 G/DL (ref 31.4–37.4)
MCV RBC AUTO: 87 FL (ref 82–98)
MONOCYTES # BLD AUTO: 0.33 THOUSAND/ΜL (ref 0.17–1.22)
MONOCYTES NFR BLD AUTO: 7 % (ref 4–12)
NEUTROPHILS # BLD AUTO: 2.31 THOUSANDS/ΜL (ref 1.85–7.62)
NEUTS SEG NFR BLD AUTO: 53 % (ref 43–75)
NONHDLC SERPL-MCNC: 100 MG/DL
NRBC BLD AUTO-RTO: 0 /100 WBCS
PMV BLD AUTO: 11 FL (ref 8.9–12.7)
POTASSIUM SERPL-SCNC: 4.1 MMOL/L (ref 3.5–5.3)
PROT SERPL-MCNC: 7 G/DL (ref 6.4–8.2)
RBC # BLD AUTO: 5.78 MILLION/UL (ref 3.88–5.62)
SODIUM SERPL-SCNC: 138 MMOL/L (ref 136–145)
TIBC SERPL-MCNC: 328 UG/DL (ref 250–450)
TRIGL SERPL-MCNC: 91 MG/DL
TSH SERPL DL<=0.05 MIU/L-ACNC: 1.18 UIU/ML (ref 0.36–3.74)
VIT B12 SERPL-MCNC: 265 PG/ML (ref 100–900)
WBC # BLD AUTO: 4.46 THOUSAND/UL (ref 4.31–10.16)

## 2019-10-09 PROCEDURE — 80061 LIPID PANEL: CPT

## 2019-10-09 PROCEDURE — 82306 VITAMIN D 25 HYDROXY: CPT

## 2019-10-09 PROCEDURE — 83540 ASSAY OF IRON: CPT

## 2019-10-09 PROCEDURE — 85025 COMPLETE CBC W/AUTO DIFF WBC: CPT

## 2019-10-09 PROCEDURE — 84443 ASSAY THYROID STIM HORMONE: CPT

## 2019-10-09 PROCEDURE — 83550 IRON BINDING TEST: CPT

## 2019-10-09 PROCEDURE — 36415 COLL VENOUS BLD VENIPUNCTURE: CPT

## 2019-10-09 PROCEDURE — 82607 VITAMIN B-12: CPT

## 2019-10-09 PROCEDURE — 80053 COMPREHEN METABOLIC PANEL: CPT

## 2019-10-09 PROCEDURE — 84425 ASSAY OF VITAMIN B-1: CPT

## 2019-10-09 PROCEDURE — 82728 ASSAY OF FERRITIN: CPT

## 2019-10-10 ENCOUNTER — TELEPHONE (OUTPATIENT)
Dept: PAIN MEDICINE | Facility: CLINIC | Age: 44
End: 2019-10-10

## 2019-10-10 DIAGNOSIS — E53.9 VITAMIN B DEFICIENCY: ICD-10-CM

## 2019-10-10 DIAGNOSIS — E55.9 VITAMIN D DEFICIENCY: ICD-10-CM

## 2019-10-10 DIAGNOSIS — Z90.3 POSTGASTRECTOMY MALABSORPTION: Primary | ICD-10-CM

## 2019-10-10 DIAGNOSIS — K91.2 POSTGASTRECTOMY MALABSORPTION: Primary | ICD-10-CM

## 2019-10-10 DIAGNOSIS — R71.8 ELEVATED HEMATOCRIT: ICD-10-CM

## 2019-10-10 PROCEDURE — 93227 XTRNL ECG REC<48 HR R&I: CPT | Performed by: INTERNAL MEDICINE

## 2019-10-10 NOTE — RESULT ENCOUNTER NOTE
Please let patient know results of his blood work  --Vitamin-D level is a little bit low at 27 8  Recommend taking an over-the-counter vitamin-D3 supplement, 2000 international units daily  --Vitamin B12 level is low  It looks like he was getting monthly vitamin B12 injections in the past   Please inquire if he would like to resume these injections, if so, please schedule  If not he may try taking an over-the-counter sublingual vitamin-B12 supplement, 1000 mcg daily  This is a supplement that is dissolved under the tongue  --Repeat vitamin-D level and vitamin B12 level in 3 months  --Blood count was mildly elevated, recommend repeating blood count in 2-3 weeks, be sure to be well hydrated before this blood work

## 2019-10-10 NOTE — TELEPHONE ENCOUNTER
Pt is stating that he did not have a vehicle to get to his appointment and wanted to schedule for a Friday  Informed pt that you are not in the [facility] office on Friday's and patient wants to know if he can see you on a Friday wherever you are being that he will only have a vehicle on Friday's  Please advise

## 2019-10-11 ENCOUNTER — TELEPHONE (OUTPATIENT)
Dept: FAMILY MEDICINE CLINIC | Facility: CLINIC | Age: 44
End: 2019-10-11

## 2019-10-11 NOTE — TELEPHONE ENCOUNTER
----- Message from [address] sent at 10/10/2019 12:38 PM EDT -----  Please let patient know results of his blood work  --Vitamin-D level is a little bit low at 27 8  Recommend taking an over-the-counter vitamin-D3 supplement, 2000 international units daily  --Vitamin B12 level is low  It looks like he was getting monthly vitamin B12 injections in the past   Please inquire if he would like to resume these injections, if so, please schedule  If not he may try taking an over-the-counter sublingual vitamin-B12 supplement, 1000 mcg daily  This is a supplement that is dissolved under the tongue  --Repeat vitamin-D level and vitamin B12 level in 3 months  --Blood count was mildly elevated, recommend repeating blood count in 2-3 weeks, be sure to be well hydrated before this blood work

## 2019-10-11 NOTE — TELEPHONE ENCOUNTER
Patient states he has had everything done & have gotten the results back  His job needs a letter stating it's ok for him to come back to work on tomorrow, Sat , 10/12/2019  Please call when letter is ready

## 2019-10-14 LAB — VIT B1 BLD-SCNC: 81.9 NMOL/L (ref 66.5–200)

## 2019-10-17 ENCOUNTER — TELEPHONE (OUTPATIENT)
Dept: FAMILY MEDICINE CLINIC | Facility: CLINIC | Age: 44
End: 2019-10-17

## 2019-10-17 ENCOUNTER — ANESTHESIA EVENT (OUTPATIENT)
Dept: GASTROENTEROLOGY | Facility: HOSPITAL | Age: 44
End: 2019-10-17

## 2019-10-17 ENCOUNTER — HOSPITAL ENCOUNTER (OUTPATIENT)
Dept: GASTROENTEROLOGY | Facility: HOSPITAL | Age: 44
Setting detail: OUTPATIENT SURGERY
Discharge: HOME | End: 2019-10-17
Attending: INTERNAL MEDICINE | Admitting: INTERNAL MEDICINE
Payer: MEDICARE

## 2019-10-17 ENCOUNTER — ANESTHESIA (OUTPATIENT)
Dept: GASTROENTEROLOGY | Facility: HOSPITAL | Age: 44
End: 2019-10-17

## 2019-10-17 VITALS
WEIGHT: 145 LBS | BODY MASS INDEX: 19.64 KG/M2 | SYSTOLIC BLOOD PRESSURE: 108 MMHG | HEIGHT: 72 IN | OXYGEN SATURATION: 98 % | DIASTOLIC BLOOD PRESSURE: 62 MMHG | RESPIRATION RATE: 16 BRPM | TEMPERATURE: 98.2 F | HEART RATE: 55 BPM

## 2019-10-17 DIAGNOSIS — R13.10 DYSPHAGIA, UNSPECIFIED: ICD-10-CM

## 2019-10-17 PROCEDURE — 88305 TISSUE EXAM BY PATHOLOGIST: CPT | Performed by: PATHOLOGY

## 2019-10-17 PROCEDURE — C1726 CATH, BAL DIL, NON-VASCULAR: HCPCS

## 2019-10-17 RX ORDER — SODIUM CHLORIDE 9 MG/ML
125 INJECTION, SOLUTION INTRAVENOUS CONTINUOUS
Status: CANCELLED | OUTPATIENT
Start: 2019-10-17

## 2019-10-17 RX ORDER — SODIUM CHLORIDE 9 MG/ML
INJECTION, SOLUTION INTRAVENOUS CONTINUOUS PRN
Status: DISCONTINUED | OUTPATIENT
Start: 2019-10-17 | End: 2019-10-17 | Stop reason: SURG

## 2019-10-17 RX ORDER — PROPOFOL 10 MG/ML
INJECTION, EMULSION INTRAVENOUS CONTINUOUS PRN
Status: DISCONTINUED | OUTPATIENT
Start: 2019-10-17 | End: 2019-10-17 | Stop reason: SURG

## 2019-10-17 RX ORDER — ONDANSETRON 2 MG/ML
4 INJECTION INTRAMUSCULAR; INTRAVENOUS ONCE AS NEEDED
Status: CANCELLED | OUTPATIENT
Start: 2019-10-17

## 2019-10-17 RX ORDER — PROPOFOL 10 MG/ML
INJECTION, EMULSION INTRAVENOUS AS NEEDED
Status: DISCONTINUED | OUTPATIENT
Start: 2019-10-17 | End: 2019-10-17 | Stop reason: SURG

## 2019-10-17 RX ORDER — SODIUM CHLORIDE 9 MG/ML
20 INJECTION, SOLUTION INTRAVENOUS CONTINUOUS
Status: CANCELLED | OUTPATIENT
Start: 2019-10-17

## 2019-10-17 RX ADMIN — PROPOFOL 20 MG: 10 INJECTION, EMULSION INTRAVENOUS at 10:59

## 2019-10-17 RX ADMIN — PROPOFOL 20 MG: 10 INJECTION, EMULSION INTRAVENOUS at 11:08

## 2019-10-17 RX ADMIN — PROPOFOL 100 MG: 10 INJECTION, EMULSION INTRAVENOUS at 10:57

## 2019-10-17 RX ADMIN — SODIUM CHLORIDE: 9 INJECTION, SOLUTION INTRAVENOUS at 10:50

## 2019-10-17 RX ADMIN — PROPOFOL 30 MG: 10 INJECTION, EMULSION INTRAVENOUS at 11:10

## 2019-10-17 RX ADMIN — PROPOFOL 130 MCG/KG/MIN: 10 INJECTION, EMULSION INTRAVENOUS at 10:57

## 2019-10-17 RX ADMIN — PROPOFOL 20 MG: 10 INJECTION, EMULSION INTRAVENOUS at 11:02

## 2019-10-17 RX ADMIN — PROPOFOL 20 MG: 10 INJECTION, EMULSION INTRAVENOUS at 11:05

## 2019-10-17 RX ADMIN — PROPOFOL 30 MG: 10 INJECTION, EMULSION INTRAVENOUS at 11:13

## 2019-10-17 RX ADMIN — PROPOFOL 30 MG: 10 INJECTION, EMULSION INTRAVENOUS at 11:00

## 2019-10-17 NOTE — H&P
History and Physical - [facility]  Susan Solis 40 y o  male MRN: [id number]                  HPI: Susan Solis is a 40y o  year old male who presents for the evaluation of dysphagia  The patient is status post gastric bypass was Parth-en-Y  The surgery was performed in 2004  He presents for an EGD and possible dilatation        REVIEW OF SYSTEMS: Per the HPI, and otherwise unremarkable      Historical Information   Past Medical History:   Diagnosis Date    Anxiety     Arthritis     Bipolar disorder (HCC)     Chronic left lumbar radiculopathy     Chronic low back pain     Chronic pain syndrome     Chronic right shoulder pain     Depression with anxiety     Fatigue     GERD (gastroesophageal reflux disease)     Hydronephrosis     Iron deficiency anemia     Kidney stone     Lytic bone lesions on xray     Nephrolithiasis     PONV (postoperative nausea and vomiting)     Right elbow pain     Right lateral epicondylitis      Past Surgical History:   Procedure Laterality Date    ADENOIDECTOMY Bilateral     APPENDECTOMY      BACK SURGERY      CHOLECYSTECTOMY LAPAROSCOPIC N/A 10/30/2018    Procedure: CHOLECYSTECTOMY WITH GASTROTOMY LAPAROSCOPIC;  Surgeon: Kirk Sin MD;  [facility];  Service: General    ERCP W/ SPHICTEROTOMY N/A 10/30/2018    Procedure: ENDOSCOPIC RETROGRADE CHOLANGIOPANCREATOGRAPHY (ERCP) W/ SPHINCTEROTOMY;  Surgeon: Theodora Griffith MD;  [facility];  Service: Gastroenterology    GASTRIC BYPASS      2009    OTHER SURGICAL HISTORY      fusion/refusion of vertebrae, 2010 and 2011 l5-s1 and l4-l5    [place] CYSTO/URETERO W/LITHOTRIPSY &INDWELL STENT INSRT Right 8/8/2017    Procedure: CYSTOSCOPY; URETEROSCOPY; HOLMIUM LASER; RETROGRADE PYELOGRAM; STENT;  Surgeon: Desmond Hill MD;  [facility];  Service: Urology    [place] IMPLANT SPINAL NEUROSTIM/ Right 11/14/2017    Procedure: REMOVAL LOWER MEDIAL BUTTOCK SPINAL CORD STIMULATOR GENERATOR; PLACEMENT OF NEW BUTTOCK SPINAL CORD STIMULATOR THROUGH SEPERATE INCISION;  Surgeon: Robert Machado MD;  [facility];  Service: Neurosurgery    RIK-EN-Y PROCEDURE  2003    SPINAL CORD STIMULATOR IMPLANT  11/14/2017    dr Jeanne Contreras procedure and technique 1  removal of a right back implantable pulse generator 2 placement of a new right buttock impantable pulse generator through seperate incision 3 electric analys complex programming spinal cord stimulator system postoperative period approx 1 hour    TONSILLECTOMY       Social History   Social History     Substance and Sexual Activity   Alcohol Use No    Comment: quit drinking, social     Social History     Substance and Sexual Activity   Drug Use No     Social History     Tobacco Use   Smoking Status Current Every Day Smoker    Packs/day: 1 00    Types: Cigarettes   Smokeless Tobacco Never Used     Family History   Problem Relation Age of Onset    Cancer Mother     Arthritis Mother     Stomach cancer Mother     Cancer Father     Esophageal cancer Father     Other Father         brain tumor    No Known Problems Sister     No Known Problems Sister     No Known Problems Sister        Meds/Allergies       (Not in a hospital admission)    Allergies   Allergen Reactions    Ampicillin GI Intolerance     Vomiting    Aspirin GI Intolerance     vomiting      Penicillins GI Intolerance     Vomit       Objective     /65   Pulse 57   Temp 98 2 °F (36 8 °C) (Oral)   Resp 20   Ht 6' (1 829 m)   Wt 65 8 kg (145 lb)   SpO2 99%   BMI 19 67 kg/m²       PHYSICAL EXAM    Gen: NAD  CV: RRR  CHEST: Clear  ABD: soft, NT/ND  EXT: no edema      ASSESSMENT/PLAN:  This is a 40y o  year old male here for EGD and dilatation, and he is stable and optimized for his procedure

## 2019-10-17 NOTE — ANESTHESIA PREPROCEDURE EVALUATION
Review of Systems/Medical History  Patient summary reviewed  Chart reviewed  History of anesthetic complications PONV    Cardiovascular  EKG reviewed, Exercise tolerance (METS): >4,     Pulmonary  Smoker cigarette smoker  , COPD mild- PRN medication ,        GI/Hepatic    GERD well controlled,        Kidney stones, Prostatic disorder, benign prostatic hyperplasia       Endo/Other    Comment: S/p bertha-en-y bypass    GYN       Hematology  Anemia ,     Musculoskeletal  Back pain , lumbar pain and spinal stenosis,   Arthritis     Neurology  Negative neurology ROS      Psychology   Anxiety, Depression , bipolar disorder,   Chronic opioid dependence Chronic pain,            Physical Exam    Airway    Mallampati score: I  TM Distance: >3 FB  Neck ROM: full     Dental       Cardiovascular      Pulmonary      Other Findings        Anesthesia Plan  ASA Score- 3     Anesthesia Type- IV sedation with anesthesia with ASA Monitors  Additional Monitors:   Airway Plan:         Plan Factors-    Induction- intravenous  Postoperative Plan-     Informed Consent- Anesthetic plan and risks discussed with patient  I personally reviewed this patient with the CRNA  Discussed and agreed on the Anesthesia Plan with the CRNA  Flavia Maldonado

## 2019-10-17 NOTE — TELEPHONE ENCOUNTER
Patient had EGR done today, and Dr Kaylee Mora said he was okay to go back to work on Tuesday, can you please write a letter releasing him back to work on 10/22/2019  He stated his work is requiring for you to do the letter since you originally took him out of work  Please advise patient when letter is ready for pickup at [phone]

## 2019-10-17 NOTE — ANESTHESIA POSTPROCEDURE EVALUATION
Post-Op Assessment Note    CV Status:  Stable  Pain Score: 0    Pain management: adequate     Mental Status:  Alert and awake   Hydration Status:  Euvolemic   PONV Controlled:  Controlled   Airway Patency:  Patent   Post Op Vitals Reviewed: Yes      Staff: CRNA, Anesthesiologist           BP 96/52 (10/17/19 1127)    Temp      Pulse 72 (10/17/19 1127)   Resp 16 (10/17/19 1127)    SpO2 95 % (10/17/19 1127)

## 2019-10-21 ENCOUNTER — OFFICE VISIT (OUTPATIENT)
Dept: PAIN MEDICINE | Facility: CLINIC | Age: 44
End: 2019-10-21
Payer: MEDICARE

## 2019-10-21 ENCOUNTER — TELEPHONE (OUTPATIENT)
Dept: PAIN MEDICINE | Facility: CLINIC | Age: 44
End: 2019-10-21

## 2019-10-21 ENCOUNTER — OFFICE VISIT (OUTPATIENT)
Dept: FAMILY MEDICINE CLINIC | Facility: CLINIC | Age: 44
End: 2019-10-21
Payer: MEDICARE

## 2019-10-21 VITALS
SYSTOLIC BLOOD PRESSURE: 118 MMHG | HEIGHT: 72 IN | HEART RATE: 60 BPM | WEIGHT: 162 LBS | BODY MASS INDEX: 21.94 KG/M2 | DIASTOLIC BLOOD PRESSURE: 80 MMHG

## 2019-10-21 VITALS
WEIGHT: 161.5 LBS | RESPIRATION RATE: 18 BRPM | TEMPERATURE: 97.4 F | BODY MASS INDEX: 21.88 KG/M2 | OXYGEN SATURATION: 99 % | HEIGHT: 72 IN | HEART RATE: 76 BPM | DIASTOLIC BLOOD PRESSURE: 72 MMHG | SYSTOLIC BLOOD PRESSURE: 110 MMHG

## 2019-10-21 DIAGNOSIS — G89.29 CHRONIC LOW BACK PAIN WITH BILATERAL SCIATICA, UNSPECIFIED BACK PAIN LATERALITY: ICD-10-CM

## 2019-10-21 DIAGNOSIS — M96.1 LUMBAR POST-LAMINECTOMY SYNDROME: ICD-10-CM

## 2019-10-21 DIAGNOSIS — M48.062 SPINAL STENOSIS OF LUMBAR REGION WITH NEUROGENIC CLAUDICATION: ICD-10-CM

## 2019-10-21 DIAGNOSIS — M79.18 MYOFASCIAL PAIN SYNDROME: ICD-10-CM

## 2019-10-21 DIAGNOSIS — M54.42 CHRONIC LOW BACK PAIN WITH BILATERAL SCIATICA, UNSPECIFIED BACK PAIN LATERALITY: ICD-10-CM

## 2019-10-21 DIAGNOSIS — M54.16 LUMBAR RADICULOPATHY: ICD-10-CM

## 2019-10-21 DIAGNOSIS — Z79.891 ENCOUNTER FOR LONG-TERM OPIATE ANALGESIC USE: ICD-10-CM

## 2019-10-21 DIAGNOSIS — G89.29 CHRONIC RIGHT SHOULDER PAIN: ICD-10-CM

## 2019-10-21 DIAGNOSIS — M25.511 CHRONIC RIGHT SHOULDER PAIN: ICD-10-CM

## 2019-10-21 DIAGNOSIS — R55 SYNCOPE, UNSPECIFIED SYNCOPE TYPE: Primary | ICD-10-CM

## 2019-10-21 DIAGNOSIS — F11.20 UNCOMPLICATED OPIOID DEPENDENCE (HCC): ICD-10-CM

## 2019-10-21 DIAGNOSIS — G89.4 CHRONIC PAIN SYNDROME: Primary | ICD-10-CM

## 2019-10-21 DIAGNOSIS — M54.41 CHRONIC LOW BACK PAIN WITH BILATERAL SCIATICA, UNSPECIFIED BACK PAIN LATERALITY: ICD-10-CM

## 2019-10-21 PROCEDURE — 99214 OFFICE O/P EST MOD 30 MIN: CPT | Performed by: NURSE PRACTITIONER

## 2019-10-21 PROCEDURE — 99213 OFFICE O/P EST LOW 20 MIN: CPT | Performed by: NURSE PRACTITIONER

## 2019-10-21 PROCEDURE — 80305 DRUG TEST PRSMV DIR OPT OBS: CPT | Performed by: NURSE PRACTITIONER

## 2019-10-21 RX ORDER — BUPRENORPHINE 15 UG/H
PATCH TRANSDERMAL
Qty: 4 PATCH | Refills: 2 | Status: SHIPPED | OUTPATIENT
Start: 2019-10-21 | End: 2020-01-14 | Stop reason: SDUPTHER

## 2019-10-21 RX ORDER — BUPRENORPHINE 15 UG/H
PATCH TRANSDERMAL
Qty: 4 PATCH | Refills: 2 | Status: SHIPPED | OUTPATIENT
Start: 2019-10-21 | End: 2019-10-21 | Stop reason: SDUPTHER

## 2019-10-21 NOTE — PROGRESS NOTES
Assessment:  1  Chronic pain syndrome    2  Myofascial pain syndrome    3  Chronic low back pain with bilateral sciatica, unspecified back pain laterality    4  Chronic right shoulder pain    5  Lumbar post-laminectomy syndrome    6  Spinal stenosis of lumbar region with neurogenic claudication    7  Lumbar radiculopathy    8  Uncomplicated opioid dependence ([address] )    9  Encounter for long-term opiate analgesic use        Plan:  While the patient was in the office today, I did have a thorough conversation with the patient regarding his chronic pain syndrome, symptoms, medication regimen  I explained to the patient at this point time since he is noting moderate stable relief and I feel that his medication regimen is reasonable appropriate with his underlying etiology, we will continue with the Butrans patch as prescribed for now  The patient was agreeable and verbalized an understanding  [place] Prescription Drug Monitoring Program report was reviewed and was appropriate     A urine drug screen was collected at today's office visit as part of our medication management protocol  The point of care testing results were appropriate for what was being prescribed  The specimen will be sent for confirmatory testing  The drug screen is medically necessary because the patient is either dependent on opioid medication or is being considered for opioid medication therapy and the results could impact ongoing or future treatment  The drug screen is to evaluate for the presences or absence of prescribed, non-prescribed, and/or illicit drugs/substances  The patient did not have their opioid medications available for confirmation or counting while in the office today  I reviewed with the patient that as per the opioid contract, they are to bring in the last filled prescription for all of their opioid medications, with what they have left to every office visit   I advised the patient that if they would continue to not bring in their prescriptions as discussed, we may not be able to continue prescribing opioid medications in the future  The patient was agreeable and verbalized an understanding  There are risks associated with opioid medications, including dependence, addiction and tolerance  The patient understands and agrees to use these medications only as prescribed  Potential side effects of the medications include, but are not limited to, constipation, drowsiness, addiction, impaired judgment and risk of fatal overdose if not taken as prescribed  The patient was warned against driving while taking sedation medications  Sharing medications is a felony  At this point in time, the patient is showing no signs of addiction, abuse, diversion or suicidal ideation  The patient will follow-up in 12 weeks for medication prescription refill and reevaluation  The patient was advised to contact the office should their symptoms worsen in the interim  The patient was agreeable and verbalized an understanding  History of Present Illness: The patient is a 40 y o  male last seen on 7/18/19 who presents for a follow up office visit in regards to chronic pain syndrome secondary to lumbar post-laminectomy syndrome  The patient currently reports that since his last office visit overall his pain symptoms have remained manageable and stable with his current medication regimen  He does report that since his last office visit he did have to have his esophagus dilated as it was making it very difficult for him to swallow and eat  Current pain medications includes:   Butrans Patch, 15 mcg applying 1 patch transdermally every 7 days and baclofen 10 mg t i d  p r n  for pain and spasm   The patient reports that this regimen is providing 50-60% pain relief  The patient is reporting no side effects from this pain medication regimen      Pain Contract Signed: 1/14/19  Last Urine Drug Screen: 10/21/19    I have personally reviewed and/or updated the patient's past medical history, past surgical history, family history, social history, current medications, allergies, and vital signs today  Review of Systems:    Review of Systems   Respiratory: Negative for shortness of breath  Cardiovascular: Negative for chest pain  Gastrointestinal: Negative for constipation, diarrhea, nausea and vomiting  Musculoskeletal: Negative for arthralgias, gait problem (joint stiffness), joint swelling and myalgias  Skin: Negative for rash  Neurological: Negative for dizziness, seizures and weakness  All other systems reviewed and are negative          Past Medical History:   Diagnosis Date    Anxiety     Arthritis     Bipolar disorder (HCC)     Chronic left lumbar radiculopathy     Chronic low back pain     Chronic pain syndrome     Chronic right shoulder pain     Depression with anxiety     Fatigue     GERD (gastroesophageal reflux disease)     Hydronephrosis     Iron deficiency anemia     Kidney stone     Lytic bone lesions on xray     Nephrolithiasis     PONV (postoperative nausea and vomiting)     Right elbow pain     Right lateral epicondylitis        Past Surgical History:   Procedure Laterality Date    ADENOIDECTOMY Bilateral     APPENDECTOMY      BACK SURGERY      CHOLECYSTECTOMY LAPAROSCOPIC N/A 10/30/2018    Procedure: CHOLECYSTECTOMY WITH GASTROTOMY LAPAROSCOPIC;  Surgeon: Judith Mckeon MD;  [facility];  Service: General    ERCP W/ SPHICTEROTOMY N/A 10/30/2018    Procedure: ENDOSCOPIC RETROGRADE CHOLANGIOPANCREATOGRAPHY (ERCP) W/ SPHINCTEROTOMY;  Surgeon: Kay Lennox, MD;  [facility];  Service: Gastroenterology    GASTRIC BYPASS      2009    OTHER SURGICAL HISTORY      fusion/refusion of vertebrae, 2010 and 2011 l5-s1 and l4-l5    [place] CYSTO/URETERO W/LITHOTRIPSY &INDWELL STENT INSRT Right 8/8/2017    Procedure: CYSTOSCOPY; URETEROSCOPY; HOLMIUM LASER; RETROGRADE PYELOGRAM; STENT; Surgeon: Shanon Arvizu MD;  [facility];  Service: Urology    [place] IMPLANT SPINAL NEUROSTIM/ Right 11/14/2017    Procedure: REMOVAL LOWER MEDIAL BUTTOCK SPINAL CORD STIMULATOR GENERATOR; PLACEMENT OF NEW BUTTOCK SPINAL CORD STIMULATOR THROUGH SEPERATE INCISION;  Surgeon: Kaela Odonnell MD;  [facility];  Service: Neurosurgery    RIK-EN-Y PROCEDURE  2003    SPINAL CORD STIMULATOR IMPLANT  11/14/2017    dr Wale Madrid procedure and technique 1  removal of a right back implantable pulse generator 2 placement of a new right buttock impantable pulse generator through seperate incision 3 electric analys complex programming spinal cord stimulator system postoperative period approx 1 hour    TONSILLECTOMY         Family History   Problem Relation Age of Onset    Cancer Mother     Arthritis Mother     Stomach cancer Mother     Cancer Father     Esophageal cancer Father     Other Father         brain tumor    No Known Problems Sister     No Known Problems Sister     No Known Problems Sister        Social History     Occupational History    Not on file   Tobacco Use    Smoking status: Current Every Day Smoker     Packs/day: 1 00     Types: Cigarettes    Smokeless tobacco: Never Used   Substance and Sexual Activity    Alcohol use: No     Comment: quit drinking, social    Drug use: No    Sexual activity: Not on file         Current Outpatient Medications:     acetaminophen (TYLENOL) 325 mg tablet, Take 3 tablets (975 mg total) by mouth every 8 (eight) hours, Disp: 30 tablet, Rfl: 0    albuterol (PROVENTIL HFA,VENTOLIN HFA) 90 mcg/act inhaler, Inhale 2 puffs every 4 (four) hours as needed for wheezing or shortness of breath (cough), Disp: 1 Inhaler, Rfl: 1    ALPRAZolam (XANAX) 0 5 mg tablet, Take 1 tablet (0 5 mg total) by mouth every 4 (four) hours as needed for anxiety, Disp: 120 tablet, Rfl: 2    baclofen 10 mg tablet, Take 1 tablet (10 mg total) by mouth 3 (three) times a day, Disp: 90 tablet, Rfl: 2    Buprenorphine (BUTRANS) 15 MCG/HR PTWK, Apply 1 patch TD every 7 days  BRAND NECESSARY, Disp: 4 patch, Rfl: 2    Current Facility-Administered Medications:     cyanocobalamin injection 1,000 mcg, 1,000 mcg, Intramuscular, Q30 Days, Patti Scott MD, 1,000 mcg at 10/09/18 1049    Allergies   Allergen Reactions    Ampicillin GI Intolerance     Vomiting    Aspirin GI Intolerance     vomiting      Penicillins GI Intolerance     Vomit       Physical Exam:    /80 (BP Location: Left arm, Patient Position: Sitting, Cuff Size: Standard)   Pulse 60   Ht 6' (1 829 m)   Wt 73 5 kg (162 lb)   BMI 21 97 kg/m²     Constitutional:normal, well developed, well nourished, alert, in no distress and non-toxic and no overt pain behavior  and underweight  Eyes:anicteric  HEENT:grossly intact  Neck:supple, symmetric, trachea midline and no masses   Pulmonary:even and unlabored  Cardiovascular:No edema or pitting edema present  Skin:Normal without rashes or lesions and well hydrated  Psychiatric:Mood and affect appropriate  Neurologic:Cranial Nerves II-XII grossly intact  Musculoskeletal:The patient's gait is slightly antalgic, but steady without the use of any assistive devices        Imaging  No orders to display         Orders Placed This Encounter   Procedures    MM ALL_Prescribed Meds and Special Instructions    MM DT_Alprazolam Definitive Test    MM DT_Amphetamine Definitive Test    MM DT_Aripiprazole Definitive Test    MM DT_Bath Salts Definitive Test    MM DT_Buprenorphine Definitive Test    MM DT_Butalbital Definitive Test    MM DT_Clonazepam Definitive Test    MM DT_Cocaine Definitive Test    MM DT_Clozapine Definitive Test    MM DT_Codeine Definitive Test    MM DT_Desipramine Definitive Test    MM DT_Dextromethorphan Definitive Test    MM Diazepam Definitive Test    MM DT_Ethyl Glucuronide/Ethyl Sulfate Definitive Test    MM DT_Fentanyl Screen and Confirm    MM DT_Haloperidol Definitive Test    MM DT_Heroin Definitive Test    MM DT_Hydrocodone Definitive Test    MM DT_Hydromorphone Definitive Test    MM DT_Imipramine Definitive Test    MM DT_Kratom Definitive Test    MM DT_Levorphanol Definitive Test    MM Lorazepam Definitive Test    MM DT_MDMA Definitive Test    MM DT_Meperidine Definitive Test    MM DT_Methadone Definitive Test    MM DT_Methamphetamine Definitive Test    MM DT_Methylphenidate Definitive Test    MM DT_Morphine Definitive Test    MM DT_Olanzapine Definitive Test    MM DT_Oxazepam Definitive Test    MM DT_Oxycodone Definitive Test    MM DT_Oxymorphone Definitive Test    MM DT_Phencyclidine Definitive Test    MM DT_Phenobarbital Definitive Test    MM DT_Phentermine Definitive Test    MM DT_Quetiapine Definitive Test    MM DT_Risperidone Definitive Test    MM DT_Secobarbital Definitive Test    MM DT_Spice Definitive Test    MM DT_Tapentadol Definitive Test    MM DT_Temazapam Definitive Test    MM DT_THC Definitive Test    MM DT_Tramadol Definitive Test

## 2019-10-21 NOTE — TELEPHONE ENCOUNTER
S/w pharmacy, they did not receive the updated rx  Called in butrans - brand medically necessary  Provided pt's dx, last ov and how long was he with the practice  Pharmacist verbalized understanding  Stated that she will call the pt and advise, $170 / month and see if he still wants to fill the rx  DG aware

## 2019-10-21 NOTE — LETTER
October 21, 2019     Patient: Maggie Waddell   YOB: 1975   Date of Visit: 10/21/2019       To Whom it May Concern:    Maggie Waddell is under my professional care  He was seen in my office on 10/21/2019  He may return to work on 10/22/2019  May return to work, regular duty  No restrictions  If you have any questions or concerns, please don't hesitate to call           Sincerely,          ANTONIA Stein        CC: No Recipients

## 2019-10-21 NOTE — PROGRESS NOTES
FAMILY PRACTICE OFFICE VISIT       NAME: Isatu Alfaro  AGE: 40 y o  SEX: male       : 1975        MRN: [id number]    DATE: 10/21/2019    Assessment and Plan     Problem List Items Addressed This Visit     None      Visit Diagnoses     Syncope, unspecified syncope type    -  Primary        1  Syncope, unspecified syncope type       This 26-year-old male presents today for follow-up after syncopal episode earlier this month  He was seen and evaluated in office  Syncopal episode suspected to be related to vasovagal syncope and dehydration, secondary to recent gastroenteritis  His work includes climbing telephone poles  For this reason we did further workup including extensive blood work, echocardiogram and Holter monitor  Testing was unremarkable except for low vitamin-D and B12 levels, in which supplementation was recommended  Exam is unremarkable today  He is cleared to return to work  Note provided  Discussed with patient, if he should experience another syncopal episode, go to the emergency room  Chief Complaint     Chief Complaint   Patient presents with    Follow-up     wants to return to work        History of Present Illness     Isatu Alfaro is a 26-year-old male presenting today for follow-up  On 10/02 he was seen in office after a syncopal episode  Syncopal episode was suspected to be related to vasovagal response and dehydration, with recent bout of gastroenteritis  He has had no further syncopal episodes  Denies dizziness, palpitations, shortness of breath, chest pain, lightheadedness or visual changes  He completed testing as directed  He is feeling well and would like to return to work  Review of Systems   Review of Systems   Constitutional: Negative  HENT: Negative  Eyes: Negative  Respiratory: Negative  Cardiovascular: Negative  Gastrointestinal: Negative  Endocrine: Negative  Genitourinary: Negative      Musculoskeletal: Negative  Skin: Negative  Allergic/Immunologic: Negative  Neurological: Negative  Hematological: Negative  Psychiatric/Behavioral: Negative          Active Problem List     Patient Active Problem List   Diagnosis    Chronic low back pain    [facility], [facility]    Depression with anxiety    Fatigue    Chronic anemia    Nephrolithiasis    Pain syndrome, chronic    Right elbow pain    Uncomplicated opioid dependence ([facility] )    Thumb pain    Lumbar post-laminectomy syndrome    Myofascial pain syndrome    Lumbar radiculopathy    Spinal stenosis of lumbar region with neurogenic claudication    BPH with obstruction/lower urinary tract symptoms    Iron deficiency anemia    Vitamin B deficiency    Postgastrectomy malabsorption    History of Parth-en-Y gastric bypass    Tobacco abuse    Vitamin D deficiency    PTSD (post-traumatic stress disorder)    Sacroiliitis, not elsewhere classified ([facility] )    Nausea and vomiting       Past Medical History:  Past Medical History:   Diagnosis Date    Anxiety     Arthritis     Bipolar disorder (HCC)     Chronic left lumbar radiculopathy     Chronic low back pain     Chronic pain syndrome     Chronic right shoulder pain     Depression with anxiety     Fatigue     GERD (gastroesophageal reflux disease)     Hydronephrosis     Iron deficiency anemia     Kidney stone     Lytic bone lesions on xray     Nephrolithiasis     PONV (postoperative nausea and vomiting)     Right elbow pain     Right lateral epicondylitis        Past Surgical History:  Past Surgical History:   Procedure Laterality Date    ADENOIDECTOMY Bilateral     APPENDECTOMY      BACK SURGERY      CHOLECYSTECTOMY LAPAROSCOPIC N/A 10/30/2018    Procedure: CHOLECYSTECTOMY WITH GASTROTOMY LAPAROSCOPIC;  Surgeon: Rakan Nunez MD;  [facility];  Service: General    ERCP W/ SPHICTEROTOMY N/A 10/30/2018    Procedure: ENDOSCOPIC RETROGRADE CHOLANGIOPANCREATOGRAPHY (ERCP) W/ SPHINCTEROTOMY;  Surgeon: Subhash Esparza MD;  [facility];  Service: Gastroenterology    GASTRIC BYPASS      2009    OTHER SURGICAL HISTORY      fusion/refusion of vertebrae, 2010 and 2011 l5-s1 and l4-l5    [place] CYSTO/URETERO W/LITHOTRIPSY &INDWELL STENT INSRT Right 8/8/2017    Procedure: CYSTOSCOPY; URETEROSCOPY; HOLMIUM LASER; RETROGRADE PYELOGRAM; STENT;  Surgeon: Gerard Casillas MD;  [facility];  Service: Urology    [place] IMPLANT SPINAL NEUROSTIM/ Right 11/14/2017    Procedure: REMOVAL LOWER MEDIAL BUTTOCK SPINAL CORD STIMULATOR GENERATOR; PLACEMENT OF NEW BUTTOCK SPINAL CORD [facility];  Surgeon: Saba Brown MD;  [facility];  Service: Neurosurgery    RIK-EN-Y PROCEDURE  2003    SPINAL CORD STIMULATOR IMPLANT  11/14/2017    dr Alex Andrade procedure and technique 1  removal of a right back implantable pulse generator 2 placement of a new right buttock impantable pulse generator through seperate incision 3 electric analys complex programming spinal cord stimulator system postoperative period approx 1 hour    TONSILLECTOMY         Family History:  Family History   Problem Relation Age of Onset    Cancer Mother     Arthritis Mother     Stomach cancer Mother     Cancer Father     Esophageal cancer Father     Other Father         brain tumor    No Known Problems Sister     No Known Problems Sister     No Known Problems Sister        Social History:  Social History     Socioeconomic History    Marital status:      Spouse name: Not on file    Number of children: Not on file    Years of education: GED    Highest education level: Not on file   Occupational History    Not on file   Social Needs    Financial resource strain: Not on file    Food insecurity:     Worry: Not on file     Inability: Not on file    Transportation needs:     Medical: Not on file     Non-medical: Not on file   Tobacco Use    Smoking status: Current Every Day Smoker     Packs/day: 1 00     Types: Cigarettes    Smokeless tobacco: Never Used   Substance and Sexual Activity    Alcohol use: No     Comment: quit drinking, social    Drug use: No    Sexual activity: Not on file   Lifestyle    Physical activity:     Days per week: Not on file     Minutes per session: Not on file    Stress: Not on file   Relationships    Social connections:     Talks on phone: Not on file     Gets together: Not on file     Attends Yarsani service: Not on file     Active member of club or organization: Not on file     Attends meetings of clubs or organizations: Not on file     Relationship status: Not on file    Intimate partner violence:     Fear of current or ex partner: Not on file     Emotionally abused: Not on file     Physically abused: Not on file     Forced sexual activity: Not on file   Other Topics Concern    Not on file   Social History Narrative    Chooses not to have children    Drinks caffienated tea    Lives with spouse               I have reviewed the patient's medical history in detail; there are no changes to the history as noted in the electronic medical record  Objective     Vitals:    10/21/19 1045   BP: 110/72   BP Location: Left arm   Patient Position: Sitting   Cuff Size: Adult   Pulse: 76   Resp: 18   Temp: (!) 97 4 °F (36 3 °C)   TempSrc: Tympanic   SpO2: 99%   Weight: 73 3 kg (161 lb 8 oz)   Height: 6' (1 829 m)     Wt Readings from Last 3 Encounters:   10/21/19 73 3 kg (161 lb 8 oz)   10/21/19 73 5 kg (162 lb)   10/17/19 65 8 kg (145 lb)     Body mass index is 21 9 kg/m²  PHQ-9 Depression Screening    PHQ-9:    Frequency of the following problems over the past two weeks:            Physical Exam   Constitutional: He is oriented to person, place, and time  He appears well-developed and well-nourished  No distress  HENT:   Head: Normocephalic and atraumatic     Right Ear: Tympanic membrane, external ear and ear canal normal    Left Ear: Tympanic membrane, external ear and ear canal normal    Nose: Nose normal    Mouth/Throat: Uvula is midline and oropharynx is clear and moist    Eyes: Pupils are equal, round, and reactive to light  Conjunctivae and EOM are normal    Neck: Normal range of motion  Neck supple  No thyromegaly present  Cardiovascular: Normal rate and regular rhythm  No murmur heard  Pulmonary/Chest: Effort normal and breath sounds normal    Abdominal: Soft  Bowel sounds are normal    Musculoskeletal: Normal range of motion  Lymphadenopathy:     He has no cervical adenopathy  Neurological: He is alert and oriented to person, place, and time  Skin: No rash noted  Psychiatric: He has a normal mood and affect  Nursing note and vitals reviewed  ALLERGIES:  Allergies   Allergen Reactions    Ampicillin GI Intolerance     Vomiting    Aspirin GI Intolerance     vomiting      Penicillins GI Intolerance     Vomit       Current Medications     Current Outpatient Medications   Medication Sig Dispense Refill    acetaminophen (TYLENOL) 325 mg tablet Take 3 tablets (975 mg total) by mouth every 8 (eight) hours 30 tablet 0    albuterol (PROVENTIL HFA,VENTOLIN HFA) 90 mcg/act inhaler Inhale 2 puffs every 4 (four) hours as needed for wheezing or shortness of breath (cough) 1 Inhaler 1    ALPRAZolam (XANAX) 0 5 mg tablet Take 1 tablet (0 5 mg total) by mouth every 4 (four) hours as needed for anxiety 120 tablet 2    baclofen 10 mg tablet Take 1 tablet (10 mg total) by mouth 3 (three) times a day 90 tablet 2    Buprenorphine (BUTRANS) 15 MCG/HR PTWK Apply 1 patch TD every 7 days   BRAND NECESSARY 4 patch 2     Current Facility-Administered Medications   Medication Dose Route Frequency Provider Last Rate Last Dose    cyanocobalamin injection 1,000 mcg  1,000 mcg Intramuscular Q30 Days Juana Hudson MD   1,000 mcg at 10/09/18 1049         Health Maintenance     Health Maintenance   Topic Date Due    Pneumococcal Vaccine: Pediatrics (0 to 5 Years) and At-Risk Patients (6 to 59 Years) (1 of 3 - PCV13) 08/22/1981    DTaP,Tdap,and Td Vaccines (1 - Tdap) 08/22/1996    INFLUENZA VACCINE  07/01/2019    Medicare Annual Wellness Visit (AWV)  09/13/2019    BMI: Adult  10/21/2020    Pneumococcal Vaccine: 65+ Years (1 of 2 - PCV13) 08/22/2040    HEPATITIS B VACCINES  Aged Out     Immunization History   Administered Date(s) Administered    INFLUENZA 12/15/2018    Influenza, injectable, quadrivalent, preservative free 0 5 mL 12/15/2018       ANTONIA Marr

## 2019-10-21 NOTE — TELEPHONE ENCOUNTER
Can you please call the patient's [address] and let them know that I did a pre-auth for the Buphrenorphine patch and according to his insurance, it is only cover if the fill the BRAND NAME ONLY Butrans patch  So I sent a new script that says BRAND NECESSARY  Can you please confirm that they received it and that they can fill it? Thank you

## 2019-10-21 NOTE — PATIENT INSTRUCTIONS
Opioid Pain Management   AMBULATORY CARE:   An opioid  is a type of medicine used to treat pain  Examples of opioids are oxycodone, morphine, fentanyl, or codeine  Take opioid medicines as directed, for the condition it is prescribed:  Common problems that may occur when you do not take opioid medicines as directed include the following:  · Health problems  may occur  You may have trouble breathing  You may also develop liver or kidney damage, or stomach bleeding  Any of these health problems can become life-threatening  · Opioid dependence  means your body needs the opioid medicine to keep it from going through withdrawal      · Opioid tolerance  means the opioid does not control pain as well as it used to  You need higher doses of the opioid to get pain relief  · Opioid addiction  means you are not able to control the use of the opioid medicine  You use it when you do not have pain  You crave the opioid medicine  Call 911 or have someone call 911 for any of the following:   · You are breathing slower than normal, or you have trouble breathing  · You cannot be awakened  · You have a seizure  Seek care immediately if:   · Your heart is beating slower than usual     · Your heart feels like it is jumping or fluttering  · You are so sleepy that you cannot stay awake  · You have severe muscle pain or weakness  · You see or hear things that are not real   Contact your healthcare provider if:   · You are too dizzy to stand up  · Your pain gets worse or you have new pain  · You cannot do your usual activities because of side effects from the opioid  · You are constipated or have abdominal pain  · You have questions or concerns about your condition or care  Opioid safety measures:   · Take your medicine as directed  Ask if you need more information on how to take your medicine correctly  Follow up with your healthcare provider regularly  You may need to have your dose adjusted   Do not use opioid medicine if you are pregnant or breastfeeding  · Give your healthcare provider a list of all your medicines  Include any over-the-counter medicines, vitamins, and herbs  It can be dangerous to take opioids with certain other medicines, such as antihistamines  · Keep opioid medicine in a safe place  Store your opioid medicine in a locked cabinet to keep it away from children and others  · Do not drink alcohol while you use opioids  Alcohol use with an opioid medicine can make you sleepy and slow your breathing rate  You may stop breathing completely  · Do not drive or operate heavy machinery after you take opioid medicine  Opioid medicine can make you drowsy and make it hard to concentrate  You may injure yourself or others if you drive or operate heavy machinery while taking your medicine  · Drink fluids and eat high-fiber foods  Fluids and fiber will help prevent constipation  Ask your healthcare provider what fluids are right for you and how much you should drink  Also ask for a list of foods that contain fiber  Follow up with your healthcare provider as directed: You may need to have your dose adjusted  You may be referred to a pain specialist  Write down your questions so you remember to ask them during your visits  © 2017 [facility] Information is for End User's use only and may not be sold, redistributed or otherwise used for commercial purposes  All illustrations and images included in CareNotes® are the copyrighted property of [employer]  or [employer]  The above information is an  only  It is not intended as medical advice for individual conditions or treatments  Talk to your doctor, nurse or pharmacist before following any medical regimen to see if it is safe and effective for you

## 2019-10-23 LAB
6MAM UR QL CFM: NEGATIVE NG/ML
7-OH-MITRAGYNINE (KRATOM ALKALOID) QUANTIFICATION: NEGATIVE NG/ML
7AMINOCLONAZEPAM UR QL CFM: NEGATIVE NG/ML
A-OH ALPRAZ UR QL CFM: NEGATIVE NG/ML
AMPHET UR QL CFM: NEGATIVE NG/ML
AMPHET UR QL CFM: NEGATIVE NG/ML
B-HCG UR QL: NEGATIVE NG/ML
BUPRENORPHINE UR CFM-MCNC: ABNORMAL NG/ML
BUTALBITAL UR QL CFM: NEGATIVE NG/ML
BZE UR QL CFM: NEGATIVE NG/ML
CODEINE UR QL CFM: NEGATIVE NG/ML
CONFIRM APTT STACLOT: NORMAL
DEPRECATED SCALLOP IGE RAST QL: NEGATIVE NG/ML
DESIPRAMINE UR QL CFM: NEGATIVE NG/ML
DESIPRAMINE UR QL CFM: NEGATIVE NG/ML
EDDP UR QL CFM: NEGATIVE NG/ML
ETHYL GLUCURONIDE UR QL CFM: NEGATIVE NG/ML
ETHYL SULFATE UR QL SCN: NEGATIVE NG/ML
FENTANYL UR QL CFM: NEGATIVE NG/ML
FENTANYL+NORFENTANYL UR QL SCN: NEGATIVE NG/ML
GLUCOSE 30M P 50 G LAC PO SERPL-MCNC: NEGATIVE NG/ML
HYDROCODONE UR QL CFM: NEGATIVE NG/ML
HYDROCODONE UR QL CFM: NEGATIVE NG/ML
HYDROMORPHONE UR QL CFM: NEGATIVE NG/ML
IMIPRAMINE UR QL CFM: NEGATIVE NG/ML
LORAZEPAM UR QL CFM: NEGATIVE NG/ML
M PROTEIN 3 SERPL ELPH-MCNC: NEGATIVE NG/ML
M TB TUBERC IGNF/MITOGEN IGNF CONTROL: NEGATIVE NG/ML
MDMA UR QL CFM: NEGATIVE NG/ML
MDPV UR CFM-MCNC: NEGATIVE NG/ML
ME-PHENIDATE UR QL CFM: NEGATIVE NG/ML
MEPERIDINE UR QL CFM: NEGATIVE NG/ML
MEPHEDRONE UR QL CFM: NEGATIVE NG/ML
METHADONE UR QL CFM: NEGATIVE NG/ML
METHAMPHET UR QL CFM: NEGATIVE NG/ML
MITOCHONDRIA AB TITR SER IF: NEGATIVE NG/ML
MORPHINE UR QL CFM: NEGATIVE NG/ML
MORPHINE UR QL CFM: NEGATIVE NG/ML
NORBUPRENORPHINE UR QL CFM: NEGATIVE NG/ML
NORDIAZEPAM UR QL CFM: NEGATIVE NG/ML
NORFENTANYL UR QL CFM: NEGATIVE NG/ML
NORHYDROCODONE UR QL CFM: NEGATIVE NG/ML
NORHYDROCODONE UR QL CFM: NEGATIVE NG/ML
NORMEPERIDINE UR QL CFM: NEGATIVE NG/ML
NOROXYCODONE UR QL CFM: NEGATIVE NG/ML
OLANZAPINE QUANTIFICATION: NEGATIVE NG/ML
OPC-3373 QUANTIFICATION: NEGATIVE
OXAZEPAM UR QL CFM: NEGATIVE NG/ML
OXYCODONE UR QL CFM: NEGATIVE NG/ML
OXYMORPHONE UR QL CFM: NEGATIVE NG/ML
OXYMORPHONE UR QL CFM: NEGATIVE NG/ML
PCP UR QL CFM: NEGATIVE NG/ML
PHENOBARB UR QL CFM: NEGATIVE NG/ML
PHENTERMINE UR QL CFM: NEGATIVE NG/ML
SECOBARBITAL UR QL CFM: NEGATIVE NG/ML
SL AMB 3-METHYL-FENTANYL QUANTIFICATION: NORMAL NG/ML
SL AMB 4-ANPP QUANTIFICATION: NORMAL NG/ML
SL AMB 4-FIBF QUANTIFICATION: NORMAL NG/ML
SL AMB 5F-ADB-M7 METABOLITE QUANTIFICATION: NEGATIVE NG/ML
SL AMB AB-FUBINACA-M3 METABOLITE QUANTIFICATION: NEGATIVE NG/ML
SL AMB ACETYL FENTANYL QUANTIFICATION: NORMAL NG/ML
SL AMB ACETYL NORFENTANYL QUANTIFICATION: NORMAL NG/ML
SL AMB ACRYL FENTANYL QUANTIFICATION: NORMAL NG/ML
SL AMB BUTRYL FENTANYL QUANTIFICATION: NORMAL NG/ML
SL AMB CARFENTANIL QUANTIFICATION: NORMAL NG/ML
SL AMB CLOZAPINE QUANTIFICATION: NEGATIVE NG/ML
SL AMB CYCLOPROPYL FENTANYL QUANTIFICATION: NORMAL NG/ML
SL AMB DEXTROMETHORPHAN QUANTIFICATION: NEGATIVE NG/ML
SL AMB DEXTRORPHAN (DEXTROMETHORPHAN METABOLITE) QUANT: NEGATIVE NG/ML
SL AMB DEXTRORPHAN (DEXTROMETHORPHAN METABOLITE) QUANT: NEGATIVE NG/ML
SL AMB FURANYL FENTANYL QUANTIFICATION: NORMAL NG/ML
SL AMB HALOPERIDOL  QUANTIFICATION: NEGATIVE NG/ML
SL AMB HALOPERIDOL METABOLITE QUANTIFICATION: NEGATIVE NG/ML
SL AMB JWH073 METABOLITE QUANTIFICATION: NEGATIVE NG/ML
SL AMB MDMB-FUBINACA-M1 METABOLITE QUANTIFICATION: NEGATIVE NG/ML
SL AMB METHOXYACETYL FENTANYL QUANTIFICATION: NORMAL NG/ML
SL AMB N-DESMETHYL U-47700 QUANTIFICATION: NORMAL NG/ML
SL AMB N-DESMETHYLCLOZAPINE QUANTIFICATION: NEGATIVE NG/ML
SL AMB NOROXYCODONE QUANTIFICATION: NEGATIVE NG/ML
SL AMB NORQUETIAPINE QUANTIFICATION: NEGATIVE NG/ML
SL AMB PHENTERMINE QUANTIFICATION-CREATININE NORMALIZED: NEGATIVE NG/ML
SL AMB PHENTERMINE QUANTIFICATION: NEGATIVE NG/ML
SL AMB QUETIAPINE QUANTIFICATION: NEGATIVE NG/ML
SL AMB RISPERIDONE QUANTIFICATION: NEGATIVE NG/ML
SL AMB U-47700 QUANTIFICATION: NORMAL NG/ML
SPECIMEN DRAWN SERPL: NEGATIVE NG/ML
TAPENTADOL UR QL CFM: NEGATIVE NG/ML
TEMAZEPAM UR QL CFM: NEGATIVE NG/ML
TEMAZEPAM UR QL CFM: NEGATIVE NG/ML
TRAMADOL UR QL CFM: NEGATIVE NG/ML
URATE/CREAT 24H UR: NEGATIVE NG/ML

## 2019-11-01 ENCOUNTER — ANESTHESIA EVENT (OUTPATIENT)
Dept: GASTROENTEROLOGY | Facility: HOSPITAL | Age: 44
End: 2019-11-01

## 2019-11-01 ENCOUNTER — ANESTHESIA (OUTPATIENT)
Dept: GASTROENTEROLOGY | Facility: HOSPITAL | Age: 44
End: 2019-11-01

## 2019-11-01 ENCOUNTER — HOSPITAL ENCOUNTER (OUTPATIENT)
Dept: GASTROENTEROLOGY | Facility: HOSPITAL | Age: 44
Setting detail: OUTPATIENT SURGERY
Discharge: HOME | End: 2019-11-01
Attending: INTERNAL MEDICINE | Admitting: INTERNAL MEDICINE
Payer: MEDICARE

## 2019-11-01 VITALS
TEMPERATURE: 98.3 F | DIASTOLIC BLOOD PRESSURE: 64 MMHG | HEIGHT: 72 IN | BODY MASS INDEX: 21.81 KG/M2 | WEIGHT: 161 LBS | RESPIRATION RATE: 20 BRPM | SYSTOLIC BLOOD PRESSURE: 118 MMHG | HEART RATE: 72 BPM | OXYGEN SATURATION: 99 %

## 2019-11-01 DIAGNOSIS — R13.10 DYSPHAGIA, UNSPECIFIED: ICD-10-CM

## 2019-11-01 PROCEDURE — C1726 CATH, BAL DIL, NON-VASCULAR: HCPCS

## 2019-11-01 RX ORDER — LIDOCAINE HYDROCHLORIDE 10 MG/ML
INJECTION, SOLUTION INFILTRATION; PERINEURAL AS NEEDED
Status: DISCONTINUED | OUTPATIENT
Start: 2019-11-01 | End: 2019-11-01 | Stop reason: SURG

## 2019-11-01 RX ORDER — SODIUM CHLORIDE 9 MG/ML
50 INJECTION, SOLUTION INTRAVENOUS CONTINUOUS
Status: DISCONTINUED | OUTPATIENT
Start: 2019-11-01 | End: 2019-11-01

## 2019-11-01 RX ORDER — PROPOFOL 10 MG/ML
INJECTION, EMULSION INTRAVENOUS AS NEEDED
Status: DISCONTINUED | OUTPATIENT
Start: 2019-11-01 | End: 2019-11-01 | Stop reason: SURG

## 2019-11-01 RX ORDER — PROPOFOL 10 MG/ML
INJECTION, EMULSION INTRAVENOUS CONTINUOUS PRN
Status: DISCONTINUED | OUTPATIENT
Start: 2019-11-01 | End: 2019-11-01 | Stop reason: SURG

## 2019-11-01 RX ADMIN — SODIUM CHLORIDE 50 ML/HR: 0.9 INJECTION, SOLUTION INTRAVENOUS at 12:52

## 2019-11-01 RX ADMIN — PROPOFOL 50 MG: 10 INJECTION, EMULSION INTRAVENOUS at 13:42

## 2019-11-01 RX ADMIN — PROPOFOL 100 MG: 10 INJECTION, EMULSION INTRAVENOUS at 13:37

## 2019-11-01 RX ADMIN — PROPOFOL 120 MCG/KG/MIN: 10 INJECTION, EMULSION INTRAVENOUS at 13:37

## 2019-11-01 RX ADMIN — LIDOCAINE HYDROCHLORIDE 50 MG: 10 INJECTION, SOLUTION INFILTRATION; PERINEURAL at 13:37

## 2019-11-01 RX ADMIN — PROPOFOL 50 MG: 10 INJECTION, EMULSION INTRAVENOUS at 13:39

## 2019-11-01 RX ADMIN — PROPOFOL 50 MG: 10 INJECTION, EMULSION INTRAVENOUS at 13:46

## 2019-11-01 NOTE — H&P
History and Physical - [facility]  Jenn Gandara 40 y o  male MRN: [id number]                  HPI: Jenn Gandara is a 40y o  year old male who presents for an EGD and dilatation of the anastomotic stricture  The patient is status gastric bypass with Parth-en-Y  He presented with weight loss and vomiting  EGD from October 10, 2019 showed very tight anastomotic stricture which was dilated with a balloon up to 10 mm  Symptoms slightly improved following the dilatation  He now presents for a follow-up dilatation  REVIEW OF SYSTEMS: Per the HPI, and otherwise unremarkable      Historical Information   Past Medical History:   Diagnosis Date    Anxiety     Arthritis     Asthma     Bipolar disorder (HCC)     Chronic left lumbar radiculopathy     Chronic low back pain     Chronic pain syndrome     Chronic right shoulder pain     Depression with anxiety     Fatigue     GERD (gastroesophageal reflux disease)     Hydronephrosis     Iron deficiency anemia     Kidney stone     Lytic bone lesions on xray     Nephrolithiasis     PONV (postoperative nausea and vomiting)     Right elbow pain     Right lateral epicondylitis      Past Surgical History:   Procedure Laterality Date    ADENOIDECTOMY Bilateral     APPENDECTOMY      BACK SURGERY      CHOLECYSTECTOMY      CHOLECYSTECTOMY LAPAROSCOPIC N/A 10/30/2018    Procedure: CHOLECYSTECTOMY WITH GASTROTOMY LAPAROSCOPIC;  Surgeon: Aristeo Flannery MD;  [facility];  Service: General    ERCP W/ SPHICTEROTOMY N/A 10/30/2018    Procedure: ENDOSCOPIC RETROGRADE CHOLANGIOPANCREATOGRAPHY (ERCP) W/ SPHINCTEROTOMY;  Surgeon: Curtis Lee MD;  [facility];  Service: Gastroenterology    GASTRIC BYPASS      2009    OTHER SURGICAL HISTORY      fusion/refusion of vertebrae, 2010 and 2011 l5-s1 and l4-l5    RI CYSTO/URETERO W/LITHOTRIPSY &INDWELL STENT INSRT Right 8/8/2017    Procedure: CYSTOSCOPY; URETEROSCOPY; HOLMIUM LASER; RETROGRADE PYELOGRAM; STENT;  Surgeon: Crista Roman MD;  [facility];  Service: Urology    NM IMPLANT SPINAL NEUROSTIM/ Right 11/14/2017    Procedure: REMOVAL LOWER MEDIAL BUTTOCK SPINAL CORD STIMULATOR GENERATOR; PLACEMENT OF NEW BUTTOCK SPINAL CORD STIMULATOR THROUGH SEPERATE INCISION;  Surgeon: Saba Brown MD;  Location: [facility];  Service: Neurosurgery    RIK-EN-Y PROCEDURE  2003    SPINAL CORD STIMULATOR IMPLANT  11/14/2017    dr Alex Andrade procedure and technique 1  removal of a right back implantable pulse generator 2 placement of a new right buttock impantable pulse generator through seperate incision 3 electric analys complex programming spinal cord stimulator system postoperative period approx 1 hour    TONSILLECTOMY       Social History   Social History     Substance and Sexual Activity   Alcohol Use No    Comment: quit drinking, social     Social History     Substance and Sexual Activity   Drug Use No     Social History     Tobacco Use   Smoking Status Current Every Day Smoker    Packs/day: 1 00    Types: Cigarettes   Smokeless Tobacco Never Used     Family History   Problem Relation Age of Onset    Cancer Mother     Arthritis Mother     Stomach cancer Mother     Cancer Father     Esophageal cancer Father     Other Father         brain tumor    No Known Problems Sister     No Known Problems Sister     No Known Problems Sister        Meds/Allergies       (Not in a hospital admission)    Allergies   Allergen Reactions    Ampicillin GI Intolerance     Vomiting    Aspirin GI Intolerance     vomiting      Penicillins GI Intolerance     Vomit       Objective     /56   Pulse 55   Temp 98 3 °F (36 8 °C) (Tympanic)   Resp 18   Ht 6' (1 829 m)   Wt 73 kg (161 lb)   SpO2 100%   BMI 21 84 kg/m²       PHYSICAL EXAM    Gen: NAD  CV: RRR  CHEST: Clear  ABD: soft, NT/ND  EXT: no edema      ASSESSMENT/PLAN:  This is a 40y o  year old male here for an EGD and dilatation of the anastomotic stricture, and he is stable and optimized for his procedure

## 2019-11-01 NOTE — ANESTHESIA PREPROCEDURE EVALUATION
Review of Systems/Medical History  Patient summary reviewed  Chart reviewed  History of anesthetic complications PONV    Cardiovascular  EKG reviewed, Exercise tolerance (METS): >4,    Comment:   TTE 10/8/2019   LEFT VENTRICLE:  Systolic function was normal  Ejection fraction was estimated to be 60 %  There were no regional wall motion abnormalities      MITRAL VALVE:  There was trace regurgitation      TRICUSPID VALVE:  There was trace regurgitation  Pulmonary artery systolic pressure was within the normal range      PULMONIC VALVE:      Holter Monitorin  Predominantly sinus rhythm, with an average heart rate of 66 beats per minute  2  No significant pauses or advanced degree heart block,  Pulmonary  Smoker cigarette smoker  , Asthma , seasonal/exercise induced Last rescue: < 6 months ago ,        GI/Hepatic    GERD well controlled,   Comment: H/o gastric bypass           Kidney stones, Prostatic disorder, benign prostatic hyperplasia       Endo/Other    Comment: S/p bertha-en-y bypass    GYN       Hematology   Musculoskeletal  Back pain , lumbar pain and spinal stenosis,   Arthritis     Neurology  Negative neurology ROS      Psychology   Anxiety, Depression , bipolar disorder,   Chronic opioid dependence Chronic pain,            Physical Exam    Airway    Mallampati score: I  TM Distance: >3 FB  Neck ROM: full     Dental   Comment: edentulous, upper dentures and lower dentures,     Cardiovascular  Rhythm: regular, Rate: normal,     Pulmonary  Breath sounds clear to auscultation,     Other Findings        Anesthesia Plan  ASA Score- 3     Anesthesia Type- IV sedation with anesthesia with ASA Monitors  Additional Monitors:   Airway Plan:         Plan Factors- Patient instructed to abstain from smoking on day of procedure  Patient smoked on day of surgery  Induction- intravenous  Postoperative Plan-     Informed Consent- Anesthetic plan and risks discussed with patient    I personally reviewed this patient with the CRNA  Discussed and agreed on the Anesthesia Plan with the CRNA  Kenny Patel

## 2019-11-02 ENCOUNTER — DOCUMENTATION (OUTPATIENT)
Dept: GASTROENTEROLOGY | Facility: CLINIC | Age: 44
End: 2019-11-02

## 2019-11-03 ENCOUNTER — APPOINTMENT (EMERGENCY)
Dept: RADIOLOGY | Facility: HOSPITAL | Age: 44
End: 2019-11-03
Payer: MEDICARE

## 2019-11-03 ENCOUNTER — TELEPHONE (OUTPATIENT)
Dept: GASTROENTEROLOGY | Facility: CLINIC | Age: 44
End: 2019-11-03

## 2019-11-03 ENCOUNTER — HOSPITAL ENCOUNTER (EMERGENCY)
Facility: HOSPITAL | Age: 44
Discharge: HOME | End: 2019-11-03
Attending: EMERGENCY MEDICINE
Payer: MEDICARE

## 2019-11-03 VITALS
OXYGEN SATURATION: 98 % | BODY MASS INDEX: 22.42 KG/M2 | RESPIRATION RATE: 18 BRPM | HEART RATE: 54 BPM | SYSTOLIC BLOOD PRESSURE: 145 MMHG | WEIGHT: 165.34 LBS | DIASTOLIC BLOOD PRESSURE: 66 MMHG | TEMPERATURE: 97.6 F

## 2019-11-03 DIAGNOSIS — Z98.84 STATUS POST BARIATRIC SURGERY: ICD-10-CM

## 2019-11-03 DIAGNOSIS — R10.84 GENERALIZED ABDOMINAL PAIN: Primary | ICD-10-CM

## 2019-11-03 LAB
ALBUMIN SERPL BCP-MCNC: 3.6 G/DL (ref 3.5–5)
ALP SERPL-CCNC: 82 U/L (ref 46–116)
ALT SERPL W P-5'-P-CCNC: 13 U/L (ref 12–78)
ANION GAP SERPL CALCULATED.3IONS-SCNC: 6 MMOL/L (ref 4–13)
AST SERPL W P-5'-P-CCNC: 8 U/L (ref 5–45)
BACTERIA UR QL AUTO: ABNORMAL /HPF
BASOPHILS # BLD AUTO: 0.06 THOUSANDS/ΜL (ref 0–0.1)
BASOPHILS NFR BLD AUTO: 1 % (ref 0–1)
BILIRUB SERPL-MCNC: 0.42 MG/DL (ref 0.2–1)
BILIRUB UR QL STRIP: ABNORMAL
BUN SERPL-MCNC: 10 MG/DL (ref 5–25)
CALCIUM SERPL-MCNC: 8.7 MG/DL (ref 8.3–10.1)
CAOX CRY URNS QL MICRO: ABNORMAL /HPF
CHLORIDE SERPL-SCNC: 104 MMOL/L (ref 100–108)
CLARITY UR: CLEAR
CO2 SERPL-SCNC: 27 MMOL/L (ref 21–32)
COLOR UR: YELLOW
CREAT SERPL-MCNC: 0.87 MG/DL (ref 0.6–1.3)
EOSINOPHIL # BLD AUTO: 0.07 THOUSAND/ΜL (ref 0–0.61)
EOSINOPHIL NFR BLD AUTO: 1 % (ref 0–6)
ERYTHROCYTE [DISTWIDTH] IN BLOOD BY AUTOMATED COUNT: 13.7 % (ref 11.6–15.1)
GFR SERPL CREATININE-BSD FRML MDRD: 105 ML/MIN/1.73SQ M
GLUCOSE SERPL-MCNC: 103 MG/DL (ref 65–140)
GLUCOSE UR STRIP-MCNC: NEGATIVE MG/DL
HCT VFR BLD AUTO: 48 % (ref 36.5–49.3)
HGB BLD-MCNC: 15.8 G/DL (ref 12–17)
HGB UR QL STRIP.AUTO: ABNORMAL
IMM GRANULOCYTES # BLD AUTO: 0.02 THOUSAND/UL (ref 0–0.2)
IMM GRANULOCYTES NFR BLD AUTO: 0 % (ref 0–2)
KETONES UR STRIP-MCNC: ABNORMAL MG/DL
LEUKOCYTE ESTERASE UR QL STRIP: NEGATIVE
LIPASE SERPL-CCNC: 130 U/L (ref 73–393)
LYMPHOCYTES # BLD AUTO: 1.5 THOUSANDS/ΜL (ref 0.6–4.47)
LYMPHOCYTES NFR BLD AUTO: 19 % (ref 14–44)
MCH RBC QN AUTO: 27.5 PG (ref 26.8–34.3)
MCHC RBC AUTO-ENTMCNC: 32.9 G/DL (ref 31.4–37.4)
MCV RBC AUTO: 84 FL (ref 82–98)
MONOCYTES # BLD AUTO: 0.47 THOUSAND/ΜL (ref 0.17–1.22)
MONOCYTES NFR BLD AUTO: 6 % (ref 4–12)
MUCOUS THREADS UR QL AUTO: ABNORMAL
NEUTROPHILS # BLD AUTO: 5.81 THOUSANDS/ΜL (ref 1.85–7.62)
NEUTS SEG NFR BLD AUTO: 73 % (ref 43–75)
NITRITE UR QL STRIP: NEGATIVE
NON-SQ EPI CELLS URNS QL MICRO: ABNORMAL /HPF
NRBC BLD AUTO-RTO: 0 /100 WBCS
PH UR STRIP.AUTO: 5.5 [PH] (ref 4.5–8)
PLATELET # BLD AUTO: 146 THOUSANDS/UL (ref 149–390)
PMV BLD AUTO: 10.8 FL (ref 8.9–12.7)
POTASSIUM SERPL-SCNC: 3.9 MMOL/L (ref 3.5–5.3)
PROT SERPL-MCNC: 7.3 G/DL (ref 6.4–8.2)
PROT UR STRIP-MCNC: ABNORMAL MG/DL
RBC # BLD AUTO: 5.74 MILLION/UL (ref 3.88–5.62)
RBC #/AREA URNS AUTO: ABNORMAL /HPF
SODIUM SERPL-SCNC: 137 MMOL/L (ref 136–145)
SP GR UR STRIP.AUTO: >=1.03 (ref 1–1.03)
UROBILINOGEN UR QL STRIP.AUTO: 1 E.U./DL
WBC # BLD AUTO: 7.93 THOUSAND/UL (ref 4.31–10.16)
WBC #/AREA URNS AUTO: ABNORMAL /HPF

## 2019-11-03 PROCEDURE — 99285 EMERGENCY DEPT VISIT HI MDM: CPT | Performed by: EMERGENCY MEDICINE

## 2019-11-03 PROCEDURE — 83690 ASSAY OF LIPASE: CPT | Performed by: EMERGENCY MEDICINE

## 2019-11-03 PROCEDURE — 80053 COMPREHEN METABOLIC PANEL: CPT | Performed by: EMERGENCY MEDICINE

## 2019-11-03 PROCEDURE — 81001 URINALYSIS AUTO W/SCOPE: CPT

## 2019-11-03 PROCEDURE — 85025 COMPLETE CBC W/AUTO DIFF WBC: CPT | Performed by: EMERGENCY MEDICINE

## 2019-11-03 PROCEDURE — 74177 CT ABD & PELVIS W/CONTRAST: CPT

## 2019-11-03 PROCEDURE — 99284 EMERGENCY DEPT VISIT MOD MDM: CPT

## 2019-11-03 PROCEDURE — 96376 TX/PRO/DX INJ SAME DRUG ADON: CPT

## 2019-11-03 PROCEDURE — 36415 COLL VENOUS BLD VENIPUNCTURE: CPT | Performed by: EMERGENCY MEDICINE

## 2019-11-03 PROCEDURE — 96374 THER/PROPH/DIAG INJ IV PUSH: CPT

## 2019-11-03 PROCEDURE — 96375 TX/PRO/DX INJ NEW DRUG ADDON: CPT

## 2019-11-03 RX ORDER — ONDANSETRON 2 MG/ML
4 INJECTION INTRAMUSCULAR; INTRAVENOUS ONCE
Status: COMPLETED | OUTPATIENT
Start: 2019-11-03 | End: 2019-11-03

## 2019-11-03 RX ORDER — HYDROMORPHONE HCL/PF 1 MG/ML
1 SYRINGE (ML) INJECTION ONCE
Status: COMPLETED | OUTPATIENT
Start: 2019-11-03 | End: 2019-11-03

## 2019-11-03 RX ORDER — HYDROMORPHONE HCL/PF 1 MG/ML
0.5 SYRINGE (ML) INJECTION ONCE
Status: COMPLETED | OUTPATIENT
Start: 2019-11-03 | End: 2019-11-03

## 2019-11-03 RX ORDER — DICYCLOMINE HCL 20 MG
20 TABLET ORAL 2 TIMES DAILY
Qty: 20 TABLET | Refills: 0 | Status: SHIPPED | OUTPATIENT
Start: 2019-11-03 | End: 2020-01-27 | Stop reason: ALTCHOICE

## 2019-11-03 RX ADMIN — IOHEXOL 50 ML: 240 INJECTION, SOLUTION INTRATHECAL; INTRAVASCULAR; INTRAVENOUS; ORAL at 12:07

## 2019-11-03 RX ADMIN — HYDROMORPHONE HYDROCHLORIDE 1 MG: 1 INJECTION, SOLUTION INTRAMUSCULAR; INTRAVENOUS; SUBCUTANEOUS at 12:14

## 2019-11-03 RX ADMIN — HYDROMORPHONE HYDROCHLORIDE 1 MG: 1 INJECTION, SOLUTION INTRAMUSCULAR; INTRAVENOUS; SUBCUTANEOUS at 11:10

## 2019-11-03 RX ADMIN — IOHEXOL 100 ML: 350 INJECTION, SOLUTION INTRAVENOUS at 12:40

## 2019-11-03 RX ADMIN — ONDANSETRON 4 MG: 2 INJECTION INTRAMUSCULAR; INTRAVENOUS at 11:10

## 2019-11-03 RX ADMIN — HYDROMORPHONE HYDROCHLORIDE 0.5 MG: 1 INJECTION, SOLUTION INTRAMUSCULAR; INTRAVENOUS; SUBCUTANEOUS at 15:47

## 2019-11-03 NOTE — TELEPHONE ENCOUNTER
Patient called the answering service on 11/2 at 5:58 PM complaining about abdominal pain after EGD with dilation, patient was requesting pain medications, he was explained that he was required to be seen at the ER to rule out perforation after EGD, patient verbalized understanding

## 2019-11-03 NOTE — ED PROVIDER NOTES
History  Chief Complaint   Patient presents with    Abdominal Pain     patient had endoscopy with stomache stretching done on friday  abdominal pain today getting worse  HPI   68-year-old gentleman with history of Parth-en-Y gastric bypass in 2003 in [place] presenting with abdominal pain  Patient's bariatric surgery complicated by recurrent gastroesophageal junction strictures requiring balloon dilatation via EGD  Review of his chart shows previous EGD in October 10 with dilatation up to 10 mm  Patient had repeat EGD done 11/1 with further dilatation as he was still symptomatic  Patient however says that the pain that he is experiencing today is different than the pain that he normally has pre/post dilatation  It is a sharp cramping pain that he feels throughout the entire abdomen  He has not been having anorexia but has a very limited diet at baseline ever since his Parth-en-Y  There was apparently some disagreement between patient's previous bariatric surgeon and his current GI doctor regarding plan for patient's recurring balloon dilatations  He has yet to establish care with a bariatric surgeon in [place]  He continues to have normal bowel movements, no constipation or diarrhea or bleeding  He has not been vomiting  No fever  No chest pain or shortness of breath  Prior to Admission Medications   Prescriptions Last Dose Informant Patient Reported? Taking? ALPRAZolam (XANAX) 0 5 mg tablet More than a month at Unknown time  No No   Sig: Take 1 tablet (0 5 mg total) by mouth every 4 (four) hours as needed for anxiety   Buprenorphine (BUTRANS) 15 MCG/HR PTWK Past Week at Unknown time  No Yes   Sig: Apply 1 patch TD every 7 days   BRAND NECESSARY   acetaminophen (TYLENOL) 325 mg tablet 11/3/2019 at Unknown time  No Yes   Sig: Take 3 tablets (975 mg total) by mouth every 8 (eight) hours   albuterol (PROVENTIL HFA,VENTOLIN HFA) 90 mcg/act inhaler More than a month at Unknown time  No No Sig: Inhale 2 puffs every 4 (four) hours as needed for wheezing or shortness of breath (cough)   baclofen 10 mg tablet 11/3/2019 at Unknown time  No Yes   Sig: Take 1 tablet (10 mg total) by mouth 3 (three) times a day      Facility-Administered Medications: None       Past Medical History:   Diagnosis Date    Anxiety     Arthritis     Asthma     Bipolar disorder ([facility] [address] )     Chronic left lumbar radiculopathy     Chronic low back pain     Chronic pain syndrome     Chronic right shoulder pain     Depression with anxiety     Fatigue     GERD (gastroesophageal reflux disease)     Hydronephrosis     Iron deficiency anemia     Kidney stone     Lytic bone lesions on xray     Nephrolithiasis     PONV (postoperative nausea and vomiting)     Right elbow pain     Right lateral epicondylitis        Past Surgical History:   Procedure Laterality Date    ADENOIDECTOMY Bilateral     APPENDECTOMY      BACK SURGERY      CHOLECYSTECTOMY      CHOLECYSTECTOMY LAPAROSCOPIC N/A 10/30/2018    Procedure: CHOLECYSTECTOMY WITH GASTROTOMY LAPAROSCOPIC;  Surgeon: Daren Frausto MD;  [facility];  Service: General    ERCP W/ SPHICTEROTOMY N/A 10/30/2018    Procedure: ENDOSCOPIC RETROGRADE CHOLANGIOPANCREATOGRAPHY (ERCP) W/ SPHINCTEROTOMY;  Surgeon: Juan eDlgado MD;  [facility];  Service: Gastroenterology    GASTRIC BYPASS      2009    OTHER SURGICAL HISTORY      fusion/refusion of vertebrae, 2010 and 2011 l5-s1 and l4-l5    HI CYSTO/URETERO W/LITHOTRIPSY &INDWELL STENT INSRT Right 8/8/2017    Procedure: CYSTOSCOPY; URETEROSCOPY; HOLMIUM LASER; RETROGRADE PYELOGRAM; STENT;  Surgeon: Rachel Wooten MD;  [facility];  Service: Urology    HI IMPLANT SPINAL NEUROSTIM/ Right 11/14/2017    Procedure: REMOVAL LOWER MEDIAL BUTTOCK SPINAL CORD STIMULATOR GENERATOR; PLACEMENT OF NEW BUTTOCK SPINAL CORD STIMULATOR THROUGH SEPERATE INCISION;  Surgeon: Emi Beavers MD;  [facility]; Service: Neurosurgery    RIK-EN-Y PROCEDURE  2003    SPINAL CORD STIMULATOR IMPLANT  11/14/2017    dr Aleida Mayo procedure and technique 1  removal of a right back implantable pulse generator 2 placement of a new right buttock impantable pulse generator through seperate incision 3 electric analys complex programming spinal cord stimulator system postoperative period approx 1 hour    TONSILLECTOMY         Family History   Problem Relation Age of Onset    Cancer Mother     Arthritis Mother     Stomach cancer Mother     Cancer Father     Esophageal cancer Father     Other Father         brain tumor    No Known Problems Sister     No Known Problems Sister     No Known Problems Sister      I have reviewed and agree with the history as documented  Social History     Tobacco Use    Smoking status: Current Every Day Smoker     Packs/day: 1 00     Types: Cigarettes    Smokeless tobacco: Never Used   Substance Use Topics    Alcohol use: No     Comment: quit drinking, social    Drug use: No        Review of Systems   Constitutional: Negative for chills and fever  Respiratory: Negative for shortness of breath  Cardiovascular: Negative for chest pain  Gastrointestinal: Positive for abdominal pain  Negative for blood in stool, constipation, diarrhea, nausea and vomiting  All other systems reviewed and are negative        Physical Exam  ED Triage Vitals   Temperature Pulse Respirations Blood Pressure SpO2   11/03/19 0949 11/03/19 0949 11/03/19 0949 11/03/19 0949 11/03/19 0949   97 6 °F (36 4 °C) 69 16 137/86 99 %      Temp Source Heart Rate Source Patient Position - Orthostatic VS BP Location FiO2 (%)   11/03/19 0949 11/03/19 0949 11/03/19 0949 11/03/19 1120 --   Oral Monitor Sitting Left arm       Pain Score       11/03/19 0949       9             Orthostatic Vital Signs  Vitals:    11/03/19 1313 11/03/19 1415 11/03/19 1430 11/03/19 1530   BP: 134/77 129/66 146/69 145/66   Pulse: 56 85 (!) 46 (!) 54 Patient Position - Orthostatic VS: Lying Lying Sitting        Physical Exam   Constitutional: He is oriented to person, place, and time  He appears well-developed and well-nourished  No distress  HENT:   Head: Normocephalic and atraumatic  Eyes: Pupils are equal, round, and reactive to light  Conjunctivae and EOM are normal  No scleral icterus  Neck: Normal range of motion  Neck supple  Cardiovascular: Normal rate and regular rhythm  Exam reveals no gallop and no friction rub  No murmur heard  Pulmonary/Chest: Breath sounds normal  He has no wheezes  He has no rales  Abdominal: Soft  He exhibits no distension  There is tenderness  There is no rebound and no guarding  There is diffuse mild abdominal discomfort to palpation throughout the abdomen  Musculoskeletal: Normal range of motion  He exhibits no edema or tenderness  Neurological: He is alert and oriented to person, place, and time  No cranial nerve deficit or sensory deficit  He exhibits normal muscle tone  Skin: Skin is warm and dry  He is not diaphoretic  No erythema  No pallor  Psychiatric: He has a normal mood and affect  His behavior is normal    Nursing note and vitals reviewed        ED Medications  Medications   iohexol (OMNIPAQUE) 240 MG/ML solution 50 mL (50 mL Oral Given 11/3/19 1207)   ondansetron (ZOFRAN) injection 4 mg (4 mg Intravenous Given 11/3/19 1110)   HYDROmorphone (DILAUDID) injection 1 mg (1 mg Intravenous Given 11/3/19 1110)   HYDROmorphone (DILAUDID) injection 1 mg (1 mg Intravenous Given 11/3/19 1214)   iohexol (OMNIPAQUE) 350 MG/ML injection (MULTI-DOSE) 100 mL (100 mL Intravenous Given 11/3/19 1240)   HYDROmorphone (DILAUDID) injection 0 5 mg (0 5 mg Intravenous Given 11/3/19 1547)       Diagnostic Studies  Results Reviewed     Procedure Component Value Units Date/Time    Comprehensive metabolic panel [[id number]] Collected:  11/03/19 1047    Lab Status:  Final result Specimen:  Blood from Arm, Left Updated: 11/03/19 1117     Sodium 137 mmol/L      Potassium 3 9 mmol/L      Chloride 104 mmol/L      CO2 27 mmol/L      ANION GAP 6 mmol/L      BUN 10 mg/dL      Creatinine 0 87 mg/dL      Glucose 103 mg/dL      Calcium 8 7 mg/dL      AST 8 U/L      ALT 13 U/L      Alkaline Phosphatase 82 U/L      Total Protein 7 3 g/dL      Albumin 3 6 g/dL      Total Bilirubin 0 42 mg/dL      eGFR 105 ml/min/1 73sq m     Narrative:       National Kidney Disease Foundation guidelines for Chronic Kidney Disease (CKD):     Stage 1 with normal or high GFR (GFR > 90 mL/min/1 73 square meters)    Stage 2 Mild CKD (GFR = 60-89 mL/min/1 73 square meters)    Stage 3A Moderate CKD (GFR = 45-59 mL/min/1 73 square meters)    Stage 3B Moderate CKD (GFR = 30-44 mL/min/1 73 square meters)    Stage 4 Severe CKD (GFR = 15-29 mL/min/1 73 square meters)    Stage 5 End Stage CKD (GFR <15 mL/min/1 73 square meters)  Note: GFR calculation is accurate only with a steady state creatinine    Lipase [[id number]]  (Normal) Collected:  11/03/19 1047    Lab Status:  Final result Specimen:  Blood from Arm, Left Updated:  11/03/19 1117     Lipase 130 u/L     CBC and differential [[id number]]  (Abnormal) Collected:  11/03/19 1047    Lab Status:  Final result Specimen:  Blood from Arm, Left Updated:  11/03/19 1058     WBC 7 93 Thousand/uL      RBC 5 74 Million/uL      Hemoglobin 15 8 g/dL      Hematocrit 48 0 %      MCV 84 fL      MCH 27 5 pg      MCHC 32 9 g/dL      RDW 13 7 %      MPV 10 8 fL      Platelets 237 Thousands/uL      nRBC 0 /100 WBCs      Neutrophils Relative 73 %      Immat GRANS % 0 %      Lymphocytes Relative 19 %      Monocytes Relative 6 %      Eosinophils Relative 1 %      Basophils Relative 1 %      Neutrophils Absolute 5 81 Thousands/µL      Immature Grans Absolute 0 02 Thousand/uL      Lymphocytes Absolute 1 50 Thousands/µL      Monocytes Absolute 0 47 Thousand/µL      Eosinophils Absolute 0 07 Thousand/µL      Basophils Absolute 0 06 Thousands/µL     Urine Microscopic [[id number]]  (Abnormal) Collected:  11/03/19 1019    Lab Status:  Final result Specimen:  Urine, Clean Catch Updated:  11/03/19 1052     RBC, UA 10-20 /hpf      WBC, UA 4-10 /hpf      Epithelial Cells None Seen /hpf      Bacteria, UA Occasional /hpf      Ca Oxalate Nai, UA Innumerable /hpf      MUCUS THREADS Innumerable    ED Urine Macroscopic [[id number]]  (Abnormal) Collected:  11/03/19 1019    Lab Status:  Final result Specimen:  Urine Updated:  11/03/19 1020     Color, UA Yellow     Clarity, UA Clear     pH, UA 5 5     Leukocytes, UA Negative     Nitrite, UA Negative     Protein, UA 30 (1+) mg/dl      Glucose, UA Negative mg/dl      Ketones, UA Trace mg/dl      Urobilinogen, UA 1 0 E U /dl      Bilirubin, UA Interference- unable to analyze     Blood, UA Moderate     Specific Gravity, UA >=1 030    Narrative:       CLINITEK RESULT                 CT abdomen pelvis with contrast   Final Result by Jose Ramon Leary MD (11/03 1303)         1  Postsurgical changes of Parth-en-Y gastric bypass surgery  There is oral contrast within bypassed portion the stomach, which may indicate reflux or gastric gastric fistula  This can be further evaluated with nonemergent upper GI series if clinically    indicated  No evidence of obstruction  2  Nonobstructing calculi within the right kidney           Workstation performed: [id number]               Procedures  Procedures        ED Course  ED Course as of Nov 03 1942   Sun Nov 03, 2019   1104 WBC: 7 93   1104 Hemoglobin: 15 8         MDM  Number of Diagnoses or Management Options  Generalized abdominal pain: new and requires workup  Status post bariatric surgery: new and requires workup     Amount and/or Complexity of Data Reviewed  Clinical lab tests: ordered and reviewed  Tests in the radiology section of CPT®: ordered and reviewed  Tests in the medicine section of CPT®: ordered and reviewed  Decide to obtain previous medical records or to obtain history from someone other than the patient: yes  Review and summarize past medical records: yes  Discuss the patient with other providers: yes    Patient Progress  Patient progress: improved    77-year-old gentleman status post bariatric surgery presenting for diffuse abdominal pain  Will check abdominal pain labs  CT abdomen/pelvis with p o  and IV contrast   Analgesia and antiemetics  Patient's blood work and CT abdomen/pelvis unremarkable other than reflux of contrast into stomach remnant which per read can be evaluated on non-emergent basis with outpatient upper GI series to rule out gastric-gastric fistula  Spoke with Dr Leoma Gaucher and GI fellow Dr Antonio Frias  Patient would benefit from evaluation by a new local bariatric surgeon - referral info provided  On reassessment his pain is improved  Will discharge with prescription for Bentyl and return precautions should his pain worsen or he develops vomiting, PO intolerance, constipation, etc      Disposition  Final diagnoses:   Generalized abdominal pain   Status post bariatric surgery     Time reflects when diagnosis was documented in both MDM as applicable and the Disposition within this note     Time User Action Codes Description Comment    11/3/2019  3:13 PM Suraj Adames Add [R10 84] Generalized abdominal pain     11/3/2019  3:13 PM Suraj Adames Add [L62 91] Status post bariatric surgery       ED Disposition     ED Disposition Condition Date/Time Comment    Discharge Good Sun Nov 3, 2019  3:13 PM Susan Backbone discharge to home/self care              Follow-up Information     Follow up With Specialties Details Why Contact Info Additional [address], MD General Surgery, Bariatrics Schedule an appointment as soon as possible for a visit  Establish care with new bariatric surgeon [address] [facility] [address]       Godwin Chi MD Gastroenterology Schedule an appointment as soon as possible for a visit  Regarding dilatations [address] [facility]  [phone]       [facility] Emergency Department Emergency Medicine Go to  If symptoms worsen [address]  [phone]  ED, [address], [place], [address]          Discharge Medication List as of 11/3/2019  3:21 PM      CONTINUE these medications which have NOT CHANGED    Details   acetaminophen (TYLENOL) 325 mg tablet Take 3 tablets (975 mg total) by mouth every 8 (eight) hours, Starting Thu 11/1/2018, Normal      baclofen 10 mg tablet Take 1 tablet (10 mg total) by mouth 3 (three) times a day, Starting Thu 7/18/2019, Normal      Buprenorphine (BUTRANS) 15 MCG/HR PTWK Apply 1 patch TD every 7 days  BRAND NECESSARY, Normal      albuterol (PROVENTIL HFA,VENTOLIN HFA) 90 mcg/act inhaler Inhale 2 puffs every 4 (four) hours as needed for wheezing or shortness of breath (cough), Starting Thu 1/24/2019, Normal      ALPRAZolam (XANAX) 0 5 mg tablet Take 1 tablet (0 5 mg total) by mouth every 4 (four) hours as needed for anxiety, Starting Thu 5/9/2019, Normal           No discharge procedures on file  ED Provider  Attending physically available and evaluated Eugene Villanueva I managed the patient along with the ED Attending      Electronically Signed by         Leonard Frost MD  11/03/19 9485

## 2019-11-03 NOTE — ED ATTENDING ATTESTATION
11/3/2019  ICleopatra MD, saw and evaluated the patient  I have discussed the patient with the resident and agree with the resident's findings, Plan of Care, and MDM as documented in the resident's note, unless otherwise documented below  All available labs and Radiology studies were reviewed by myself  I was present for key portions of any procedure(s) performed by the resident and I was immediately available to provide assistance  I agree with the current assessment done in the Emergency Department  I have conducted an independent evaluation of this patient  Briefly, this is a 40 y o  male with past medical history of Parth-en-Y surgery 2743 complicated by recurrent anastomotic strictures necessitating EGD with balloon dilation, most recent 11/01/2019, presenting with abdominal pain  Pain started yesterday but became significantly worse today  It is sharp and cramping  It involves entire abdomen  He has not had pain like this previously  He has been able to eat small amounts of food, however, this is his baseline (has not been able to eat a large meal since original surgery in 2004)  He has not had fevers or chills  He has had normal bowel movements including this morning  No diarrhea, blood in stool  No constipation  No dysuria  Physical Exam  Vitals:    11/03/19 0949   BP: 137/86   TempSrc: Oral   Pulse: 69   Resp: 16   Patient Position - Orthostatic VS: Sitting   Temp: 97 6 °F (36 4 °C)       Constitutional:  Awake, alert, oriented  No acute distress  HEENT:  Normocephalic, atraumatic  Sclera anicteric, conjunctiva not injected  Moist oral mucosa  Cardiac:  Regular rate and rhythm, no murmurs, rubs, or gallops  2+ radial pulses  Respiratory:  Lungs are clear to auscultation bilaterally, no wheezes or rales  Abdomen: Thin, non-distended, bowel sounds present, diffusely tender to palpation throughout abdomen, worsen in LUQ  No rebound or guarding    Extremities:  No deformities, no edema   Integument:  No rashes or exposed areas, cap refill less than 2 seconds  Neurologic:  Awake, alert, and oriented x3    Nonfocal exam   Psychiatric:  Normal affect    ED Course    Labs Reviewed   URINE MICROSCOPIC - Abnormal       Result Value Ref Range Status    RBC, UA 10-20 (*) None Seen, 0-5 /hpf Final    WBC, UA 4-10 (*) None Seen, 0-5, 5-55, 5-65 /hpf Final    Epithelial Cells None Seen  None Seen, Occasional /hpf Final    Bacteria, UA Occasional  None Seen, Occasional /hpf Final    Ca Oxalate Nai, UA Innumerable (*) None Seen /hpf Final    MUCUS THREADS Innumerable (*) None Seen Final   CBC AND DIFFERENTIAL - Abnormal    WBC 7 93  4 31 - 10 16 Thousand/uL Final    RBC 5 74 (*) 3 88 - 5 62 Million/uL Final    Hemoglobin 15 8  12 0 - 17 0 g/dL Final    Hematocrit 48 0  36 5 - 49 3 % Final    MCV 84  82 - 98 fL Final    MCH 27 5  26 8 - 34 3 pg Final    MCHC 32 9  31 4 - 37 4 g/dL Final    RDW 13 7  11 6 - 15 1 % Final    MPV 10 8  8 9 - 12 7 fL Final    Platelets 772 (*) 618 - 390 Thousands/uL Final    nRBC 0  /100 WBCs Final    Neutrophils Relative 73  43 - 75 % Final    Immat GRANS % 0  0 - 2 % Final    Lymphocytes Relative 19  14 - 44 % Final    Monocytes Relative 6  4 - 12 % Final    Eosinophils Relative 1  0 - 6 % Final    Basophils Relative 1  0 - 1 % Final    Neutrophils Absolute 5 81  1 85 - 7 62 Thousands/µL Final    Immature Grans Absolute 0 02  0 00 - 0 20 Thousand/uL Final    Lymphocytes Absolute 1 50  0 60 - 4 47 Thousands/µL Final    Monocytes Absolute 0 47  0 17 - 1 22 Thousand/µL Final    Eosinophils Absolute 0 07  0 00 - 0 61 Thousand/µL Final    Basophils Absolute 0 06  0 00 - 0 10 Thousands/µL Final   ED URINE MACROSCOPIC - Abnormal    Color, UA Yellow   Final    Clarity, UA Clear   Final    pH, UA 5 5  4 5 - 8 0 Final    Leukocytes, UA Negative  Negative Final    Nitrite, UA Negative  Negative Final    Protein, UA 30 (1+) (*) Negative mg/dl Final    Glucose, UA Negative  Negative mg/dl Final    Ketones, UA Trace (*) Negative mg/dl Final    Urobilinogen, UA 1 0  0 2, 1 0 E U /dl E U /dl Final    Bilirubin, UA Interference- unable to analyze (*) Negative Final    Comment: The dipstick result may be falsely positive due to interfering substances  We recommend reliance upon serum bilirubin, liver & kidney function tests to guide patient care if clinically indicated  Blood, UA Moderate (*) Negative Final    Specific Gravity, UA >=1 030  1 003 - 1 030 Final    Narrative:     CLINITEK RESULT   LIPASE - Normal    Lipase 130  73 - 393 u/L Final   COMPREHENSIVE METABOLIC PANEL    Sodium 648  136 - 145 mmol/L Final    Potassium 3 9  3 5 - 5 3 mmol/L Final    Chloride 104  100 - 108 mmol/L Final    CO2 27  21 - 32 mmol/L Final    ANION GAP 6  4 - 13 mmol/L Final    BUN 10  5 - 25 mg/dL Final    Creatinine 0 87  0 60 - 1 30 mg/dL Final    Comment: Standardized to IDMS reference method    Glucose 103  65 - 140 mg/dL Final    Comment:   If the patient is fasting, the ADA then defines impaired fasting glucose as > 100 mg/dL and diabetes as > or equal to 123 mg/dL  Specimen collection should occur prior to Sulfasalazine administration due to the potential for falsely depressed results  Specimen collection should occur prior to Sulfapyridine administration due to the potential for falsely elevated results  Calcium 8 7  8 3 - 10 1 mg/dL Final    AST 8  5 - 45 U/L Final    Comment:   Specimen collection should occur prior to Sulfasalazine administration due to the potential for falsely depressed results  ALT 13  12 - 78 U/L Final    Comment:   Specimen collection should occur prior to Sulfasalazine and/or Sulfapyridine administration due to the potential for falsely depressed results       Alkaline Phosphatase 82  46 - 116 U/L Final    Total Protein 7 3  6 4 - 8 2 g/dL Final    Albumin 3 6  3 5 - 5 0 g/dL Final    Total Bilirubin 0 42  0 20 - 1 00 mg/dL Final    eGFR 105  ml/min/1 73sq m Final Narrative:     National Kidney Disease Foundation guidelines for Chronic Kidney Disease (CKD):     Stage 1 with normal or high GFR (GFR > 90 mL/min/1 73 square meters)    Stage 2 Mild CKD (GFR = 60-89 mL/min/1 73 square meters)    Stage 3A Moderate CKD (GFR = 45-59 mL/min/1 73 square meters)    Stage 3B Moderate CKD (GFR = 30-44 mL/min/1 73 square meters)    Stage 4 Severe CKD (GFR = 15-29 mL/min/1 73 square meters)    Stage 5 End Stage CKD (GFR <15 mL/min/1 73 square meters)  Note: GFR calculation is accurate only with a steady state creatinine     CT abdomen pelvis with contrast   Final Result         1  Postsurgical changes of Parth-en-Y gastric bypass surgery  There is oral contrast within bypassed portion the stomach, which may indicate reflux or gastric gastric fistula  This can be further evaluated with nonemergent upper GI series if clinically    indicated  No evidence of obstruction  2  Nonobstructing calculi within the right kidney  Workstation performed: [id number]           Medications   iohexol (OMNIPAQUE) 240 MG/ML solution 50 mL (50 mL Oral Given 11/3/19 1207)   ondansetron (ZOFRAN) injection 4 mg (4 mg Intravenous Given 11/3/19 1110)   HYDROmorphone (DILAUDID) injection 1 mg (1 mg Intravenous Given 11/3/19 1110)   HYDROmorphone (DILAUDID) injection 1 mg (1 mg Intravenous Given 11/3/19 1214)   iohexol (OMNIPAQUE) 350 MG/ML injection (MULTI-DOSE) 100 mL (100 mL Intravenous Given 11/3/19 1240)   HYDROmorphone (DILAUDID) injection 0 5 mg (0 5 mg Intravenous Given 11/3/19 1547)     40 y o  male presenting with abdominal pain in setting of prior RnY in 2004 and status post anastomotic stricture balloon dilatation on 11/1  VS reviewed, afebrile and WNL  DDx includes post-procedural bowel distension and resultant cramping due to insufflated air, perforation, internal hernia, with other etiologies such as pancreatitis, colitis, enteritis considered  Analgesics administered as above  Labs reveal an unremarkable CMP and lipase, unremarkable CBC  UA with trace ketones and blood but with negative nitrites  Patient denies dysuria  CT reveals no evidence of acute intraabdominal pathology, save for oral contrast in bypassed portion of stomach which is deemed to be a less likely culprit of patient's symptoms today  Resident physician discussed case with GI, from their standpoint, patient deemed safe to discharge home  Patient provided info for local bariatric surgeon as he has not yet established care with one in [place]  Patient discharged to home with recommendations for symptom control, return precautions, and plan for follow up      Clinical Impression  Final diagnoses:   Generalized abdominal pain   Status post bariatric surgery

## 2019-11-18 ENCOUNTER — HOSPITAL ENCOUNTER (EMERGENCY)
Facility: HOSPITAL | Age: 44
Discharge: HOME | End: 2019-11-18
Attending: EMERGENCY MEDICINE | Admitting: EMERGENCY MEDICINE
Payer: MEDICARE

## 2019-11-18 ENCOUNTER — APPOINTMENT (EMERGENCY)
Dept: RADIOLOGY | Facility: HOSPITAL | Age: 44
End: 2019-11-18
Payer: MEDICARE

## 2019-11-18 VITALS
WEIGHT: 180 LBS | OXYGEN SATURATION: 98 % | DIASTOLIC BLOOD PRESSURE: 87 MMHG | BODY MASS INDEX: 24.38 KG/M2 | RESPIRATION RATE: 20 BRPM | HEIGHT: 72 IN | SYSTOLIC BLOOD PRESSURE: 140 MMHG | TEMPERATURE: 97.9 F | HEART RATE: 81 BPM

## 2019-11-18 DIAGNOSIS — R10.9 ABDOMINAL PAIN: Primary | ICD-10-CM

## 2019-11-18 DIAGNOSIS — K59.00 CONSTIPATION: ICD-10-CM

## 2019-11-18 LAB
ALBUMIN SERPL BCP-MCNC: 4.3 G/DL (ref 3.5–5)
ALP SERPL-CCNC: 87 U/L (ref 46–116)
ALT SERPL W P-5'-P-CCNC: 12 U/L (ref 12–78)
ANION GAP SERPL CALCULATED.3IONS-SCNC: 9 MMOL/L (ref 4–13)
AST SERPL W P-5'-P-CCNC: 10 U/L (ref 5–45)
BACTERIA UR QL AUTO: ABNORMAL /HPF
BASOPHILS # BLD AUTO: 0.1 THOUSANDS/ΜL (ref 0–0.1)
BASOPHILS NFR BLD AUTO: 1 % (ref 0–1)
BILIRUB SERPL-MCNC: 0.59 MG/DL (ref 0.2–1)
BILIRUB UR QL STRIP: ABNORMAL
BUN SERPL-MCNC: 11 MG/DL (ref 5–25)
CALCIUM SERPL-MCNC: 9.6 MG/DL (ref 8.3–10.1)
CHLORIDE SERPL-SCNC: 104 MMOL/L (ref 100–108)
CLARITY UR: CLEAR
CLARITY, POC: CLEAR
CO2 SERPL-SCNC: 27 MMOL/L (ref 21–32)
COLOR UR: YELLOW
COLOR, POC: YELLOW
CREAT SERPL-MCNC: 0.96 MG/DL (ref 0.6–1.3)
EOSINOPHIL # BLD AUTO: 0.09 THOUSAND/ΜL (ref 0–0.61)
EOSINOPHIL NFR BLD AUTO: 1 % (ref 0–6)
ERYTHROCYTE [DISTWIDTH] IN BLOOD BY AUTOMATED COUNT: 13.8 % (ref 11.6–15.1)
GFR SERPL CREATININE-BSD FRML MDRD: 96 ML/MIN/1.73SQ M
GLUCOSE SERPL-MCNC: 86 MG/DL (ref 65–140)
GLUCOSE UR STRIP-MCNC: NEGATIVE MG/DL
HCT VFR BLD AUTO: 48.5 % (ref 36.5–49.3)
HGB BLD-MCNC: 15.6 G/DL (ref 12–17)
HGB UR QL STRIP.AUTO: ABNORMAL
HYALINE CASTS #/AREA URNS LPF: ABNORMAL /LPF
IMM GRANULOCYTES # BLD AUTO: 0.02 THOUSAND/UL (ref 0–0.2)
IMM GRANULOCYTES NFR BLD AUTO: 0 % (ref 0–2)
KETONES UR STRIP-MCNC: NEGATIVE MG/DL
LEUKOCYTE ESTERASE UR QL STRIP: NEGATIVE
LIPASE SERPL-CCNC: 108 U/L (ref 73–393)
LYMPHOCYTES # BLD AUTO: 2.67 THOUSANDS/ΜL (ref 0.6–4.47)
LYMPHOCYTES NFR BLD AUTO: 35 % (ref 14–44)
MCH RBC QN AUTO: 27.2 PG (ref 26.8–34.3)
MCHC RBC AUTO-ENTMCNC: 32.2 G/DL (ref 31.4–37.4)
MCV RBC AUTO: 85 FL (ref 82–98)
MONOCYTES # BLD AUTO: 0.64 THOUSAND/ΜL (ref 0.17–1.22)
MONOCYTES NFR BLD AUTO: 9 % (ref 4–12)
NEUTROPHILS # BLD AUTO: 4.05 THOUSANDS/ΜL (ref 1.85–7.62)
NEUTS SEG NFR BLD AUTO: 54 % (ref 43–75)
NITRITE UR QL STRIP: NEGATIVE
NON-SQ EPI CELLS URNS QL MICRO: ABNORMAL /HPF
NRBC BLD AUTO-RTO: 0 /100 WBCS
PH UR STRIP.AUTO: 6 [PH] (ref 4.5–8)
PLATELET # BLD AUTO: 210 THOUSANDS/UL (ref 149–390)
PMV BLD AUTO: 11.8 FL (ref 8.9–12.7)
POTASSIUM SERPL-SCNC: 4.3 MMOL/L (ref 3.5–5.3)
PROT SERPL-MCNC: 8.1 G/DL (ref 6.4–8.2)
PROT UR STRIP-MCNC: ABNORMAL MG/DL
RBC # BLD AUTO: 5.73 MILLION/UL (ref 3.88–5.62)
RBC #/AREA URNS AUTO: ABNORMAL /HPF
SODIUM SERPL-SCNC: 140 MMOL/L (ref 136–145)
SP GR UR STRIP.AUTO: >=1.03 (ref 1–1.03)
UROBILINOGEN UR QL STRIP.AUTO: 1 E.U./DL
WBC # BLD AUTO: 7.57 THOUSAND/UL (ref 4.31–10.16)
WBC #/AREA URNS AUTO: ABNORMAL /HPF

## 2019-11-18 PROCEDURE — 80053 COMPREHEN METABOLIC PANEL: CPT | Performed by: EMERGENCY MEDICINE

## 2019-11-18 PROCEDURE — 36415 COLL VENOUS BLD VENIPUNCTURE: CPT | Performed by: EMERGENCY MEDICINE

## 2019-11-18 PROCEDURE — 99284 EMERGENCY DEPT VISIT MOD MDM: CPT

## 2019-11-18 PROCEDURE — 99284 EMERGENCY DEPT VISIT MOD MDM: CPT | Performed by: EMERGENCY MEDICINE

## 2019-11-18 PROCEDURE — 83690 ASSAY OF LIPASE: CPT | Performed by: EMERGENCY MEDICINE

## 2019-11-18 PROCEDURE — 85025 COMPLETE CBC W/AUTO DIFF WBC: CPT | Performed by: EMERGENCY MEDICINE

## 2019-11-18 PROCEDURE — 81001 URINALYSIS AUTO W/SCOPE: CPT

## 2019-11-18 PROCEDURE — 74177 CT ABD & PELVIS W/CONTRAST: CPT

## 2019-11-18 RX ADMIN — IOHEXOL 100 ML: 350 INJECTION, SOLUTION INTRAVENOUS at 21:47

## 2019-11-19 NOTE — ED ATTENDING ATTESTATION
11/18/2019  I, Kyung Torrez DO, saw and evaluated the patient  I have discussed the patient with the resident/non-physician practitioner and agree with the resident's/non-physician practitioner's findings, Plan of Care, and MDM as documented in the resident's/non-physician practitioner's note, except where noted  All available labs and Radiology studies were reviewed  I was present for key portions of any procedure(s) performed by the resident/non-physician practitioner and I was immediately available to provide assistance  At this point I agree with the current assessment done in the Emergency Department  I have conducted an independent evaluation of this patient a history and physical is as follows:    40-year-old male presents with abdominal pain and constipation for 2 weeks  Patient states has not had a bowel movement for 2 weeks and states his abdominal pain is colicky in nature  Has nausea when he eats and feels full quickly  Did try Mag citrate at home and some fleets enemas without relief  No fevers, no chills, no chest pain or shortness of breath  Denies urinary complaints  On exam-no acute distress, heart regular, lungs clear, abdomen soft with mild diffuse discomfort  Plan-will do abdominal labs and CT abdomen secondary to history of gastric bypass surgery and pain      ED Course         Critical Care Time  Procedures

## 2019-11-19 NOTE — ED PROVIDER NOTES
History  Chief Complaint   Patient presents with    Abdominal Pain     pt had surgery 3 weeks ago to stretch his abdomen after gastric bipass  pt reports very little BM's over the past 2 weeks  pt reports trying mag citrate but still isnt having bowel movements  Maggie Waddell is an 40y o  year old male with PMHx significant for Parth-en-Y in [place] in 2003, who presents to the ED today with constipation and abdominal pain for 2 weeks  He also has not had a bowel movement during this time period  Says that his abdominal pain is colicky in nature, dull/achy, nonradiating, but changing often in location throughout his abdomen  It is currently mild in severity  Says this is much different than the abdominal pain for which she is in the emergency department 2 cm  He has tried over-the-counter oral medicines to try to relieve his constipation base not helped  He also says that when he urinates this exacerbates his abdominal pain  He denies dysuria or urgency  Pain is exacerbated by eating, but unrelated to bowel movements or palpation  Past surgical hx also includes appendectomy and cholecystectomy  He had one episode of non-bilious, non-bloody emesis a few days ago  The patient denies fevers, headaches, lightheadedness or syncope, vision changes, hearing changes, chest pain, shortness of breath, cough, changes with urination, back pain, numbness or tingling, rashes, pain anywhere else in body  The patient has no sick contacts, recent travel history, new or changing medications, or immunocompromise         History provided by:  Patient   used: No    Abdominal Pain   Pain location:  Generalized  Pain quality: aching and dull    Pain radiates to:  Does not radiate  Pain severity:  Mild  Onset quality:  Gradual  Duration:  2 weeks  Timing:  Constant  Progression:  Unchanged  Chronicity:  New  Context: previous surgery    Context: not diet changes and not recent illness    Relieved by:  Nothing  Worsened by:  Eating  Ineffective treatments:  OTC medications  Associated symptoms: anorexia, chills, constipation, nausea and vomiting    Associated symptoms: no chest pain, no cough, no diarrhea, no dysuria, no fatigue, no fever, no hematemesis, no hematochezia, no hematuria, no shortness of breath and no sore throat        Prior to Admission Medications   Prescriptions Last Dose Informant Patient Reported? Taking? ALPRAZolam (XANAX) 0 5 mg tablet   No No   Sig: Take 1 tablet (0 5 mg total) by mouth every 4 (four) hours as needed for anxiety   Buprenorphine (BUTRANS) 15 MCG/HR PTWK   No No   Sig: Apply 1 patch TD every 7 days   BRAND NECESSARY   acetaminophen (TYLENOL) 325 mg tablet   No No   Sig: Take 3 tablets (975 mg total) by mouth every 8 (eight) hours   albuterol (PROVENTIL HFA,VENTOLIN HFA) 90 mcg/act inhaler   No No   Sig: Inhale 2 puffs every 4 (four) hours as needed for wheezing or shortness of breath (cough)   baclofen 10 mg tablet   No No   Sig: Take 1 tablet (10 mg total) by mouth 3 (three) times a day   dicyclomine (BENTYL) 20 mg tablet   No No   Sig: Take 1 tablet (20 mg total) by mouth 2 (two) times a day      Facility-Administered Medications: None       Past Medical History:   Diagnosis Date    Anxiety     Arthritis     Asthma     Bipolar disorder (HCC)     Chronic left lumbar radiculopathy     Chronic low back pain     Chronic pain syndrome     Chronic right shoulder pain     Depression with anxiety     Fatigue     GERD (gastroesophageal reflux disease)     Hydronephrosis     Iron deficiency anemia     Kidney stone     Lytic bone lesions on xray     Nephrolithiasis     PONV (postoperative nausea and vomiting)     Right elbow pain     Right lateral epicondylitis        Past Surgical History:   Procedure Laterality Date    ADENOIDECTOMY Bilateral     APPENDECTOMY      BACK SURGERY      CHOLECYSTECTOMY      CHOLECYSTECTOMY LAPAROSCOPIC N/A 10/30/2018 Procedure: CHOLECYSTECTOMY WITH GASTROTOMY LAPAROSCOPIC;  Surgeon: Kellee Monreal MD;  [facility];  Service: General    ERCP W/ SPHICTEROTOMY N/A 10/30/2018    Procedure: ENDOSCOPIC RETROGRADE CHOLANGIOPANCREATOGRAPHY (ERCP) W/ SPHINCTEROTOMY;  Surgeon: Pop Blum MD;  [facility];  Service: Gastroenterology    GASTRIC BYPASS      2009    OTHER SURGICAL HISTORY      fusion/refusion of vertebrae, 2010 and 2011 l5-s1 and l4-l5    HI CYSTO/URETERO W/LITHOTRIPSY &INDWELL STENT INSRT Right 8/8/2017    Procedure: CYSTOSCOPY; URETEROSCOPY; HOLMIUM LASER; RETROGRADE PYELOGRAM; STENT;  Surgeon: Marimar Shine MD;  [facility];  Service: Urology    HI IMPLANT SPINAL NEUROSTIM/ Right 11/14/2017    Procedure: REMOVAL LOWER MEDIAL BUTTOCK SPINAL CORD STIMULATOR GENERATOR; PLACEMENT OF NEW BUTTOCK SPINAL CORD [facility];  Surgeon: Zeny Rincon MD;  Location: [facility];  Service: Neurosurgery    RIK-EN-Y PROCEDURE  2003    SPINAL CORD STIMULATOR IMPLANT  11/14/2017    dr Ramesh Payan procedure and technique 1  removal of a right back implantable pulse generator 2 placement of a new right buttock impantable pulse generator through seperate incision 3 electric analys complex programming spinal cord stimulator system postoperative period approx 1 hour    TONSILLECTOMY         Family History   Problem Relation Age of Onset    Cancer Mother     Arthritis Mother     Stomach cancer Mother     Cancer Father     Esophageal cancer Father     Other Father         brain tumor    No Known Problems Sister     No Known Problems Sister     No Known Problems Sister      I have reviewed and agree with the history as documented      Social History     Tobacco Use    Smoking status: Current Every Day Smoker     Packs/day: 1 00     Types: Cigarettes    Smokeless tobacco: Never Used   Substance Use Topics    Alcohol use: No     Comment: quit drinking, social    Drug use: No        Review of Systems   Constitutional: Positive for appetite change and chills  Negative for activity change, diaphoresis, fatigue and fever  HENT: Negative for rhinorrhea and sore throat  Eyes: Negative for visual disturbance  Respiratory: Negative for cough and shortness of breath  Cardiovascular: Negative for chest pain, palpitations and leg swelling  Gastrointestinal: Positive for abdominal pain, anorexia, constipation, nausea and vomiting  Negative for abdominal distention, blood in stool, diarrhea, hematemesis and hematochezia  Genitourinary: Negative for decreased urine volume, difficulty urinating, dysuria, flank pain and hematuria  Musculoskeletal: Negative for arthralgias, back pain, myalgias, neck pain and neck stiffness  Skin: Negative for rash and wound  Allergic/Immunologic: Negative for immunocompromised state  Neurological: Negative for dizziness, syncope, weakness, light-headedness, numbness and headaches  Hematological: Does not bruise/bleed easily  Psychiatric/Behavioral: Negative for confusion  All other systems reviewed and are negative  Physical Exam  ED Triage Vitals   Temperature Pulse Respirations Blood Pressure SpO2   11/18/19 1950 11/18/19 1950 11/18/19 1950 11/18/19 1950 11/18/19 1950   97 9 °F (36 6 °C) 67 18 142/78 100 %      Temp src Heart Rate Source Patient Position - Orthostatic VS BP Location FiO2 (%)   -- 11/18/19 2145 11/18/19 2145 11/18/19 2145 --    Monitor Lying Right arm       Pain Score       --                    Orthostatic Vital Signs  Vitals:    11/18/19 1950 11/18/19 2000 11/18/19 2145   BP: 142/78 132/81 140/87   Pulse: 67 60 81   Patient Position - Orthostatic VS:   Lying       Physical Exam   Constitutional: He is oriented to person, place, and time  He appears well-developed and well-nourished  No distress  HENT:   Head: Normocephalic and atraumatic     Mouth/Throat: Oropharynx is clear and moist    Eyes: Conjunctivae are normal  No scleral icterus  Neck: Neck supple  No JVD present  No tracheal deviation present  Cardiovascular: Normal rate, regular rhythm and intact distal pulses  Pulmonary/Chest: Effort normal and breath sounds normal  No respiratory distress  Abdominal: Soft  He exhibits no distension and no mass  There is generalized tenderness (minimal)  There is no guarding  Musculoskeletal: He exhibits no edema or deformity  Neurological: He is alert and oriented to person, place, and time  Moves all extremities   Skin: Skin is warm and dry  Capillary refill takes less than 2 seconds  No rash noted  He is not diaphoretic  Psychiatric: He has a normal mood and affect  His behavior is normal    Nursing note and vitals reviewed        ED Medications  Medications   iohexol (OMNIPAQUE) 350 MG/ML injection (MULTI-DOSE) 100 mL (100 mL Intravenous Given 11/18/19 2147)       Diagnostic Studies  Results Reviewed     Procedure Component Value Units Date/Time    Urine Microscopic [[id number]]  (Abnormal) Collected:  11/18/19 2045    Lab Status:  Final result Specimen:  Urine, Other Updated:  11/18/19 2139     RBC, UA 20-30 /hpf      WBC, UA 4-10 /hpf      Epithelial Cells None Seen /hpf      Bacteria, UA None Seen /hpf      Hyaline Casts, UA 10-25 /lpf     Lipase [[id number]]  (Normal) Collected:  11/18/19 2054    Lab Status:  Final result Specimen:  Blood from Arm, Right Updated:  11/18/19 2128     Lipase 108 u/L     Comprehensive metabolic panel [[id number]] Collected:  11/18/19 2054    Lab Status:  Final result Specimen:  Blood from Arm, Right Updated:  11/18/19 2128     Sodium 140 mmol/L      Potassium 4 3 mmol/L      Chloride 104 mmol/L      CO2 27 mmol/L      ANION GAP 9 mmol/L      BUN 11 mg/dL      Creatinine 0 96 mg/dL      Glucose 86 mg/dL      Calcium 9 6 mg/dL      AST 10 U/L      ALT 12 U/L      Alkaline Phosphatase 87 U/L      Total Protein 8 1 g/dL      Albumin 4 3 g/dL      Total Bilirubin 0 59 mg/dL eGFR 96 ml/min/1 73sq m     Narrative:       [facility] guidelines for Chronic Kidney Disease (CKD):     Stage 1 with normal or high GFR (GFR > 90 mL/min/1 73 square meters)    Stage 2 Mild CKD (GFR = 60-89 mL/min/1 73 square meters)    Stage 3A Moderate CKD (GFR = 45-59 mL/min/1 73 square meters)    Stage 3B Moderate CKD (GFR = 30-44 mL/min/1 73 square meters)    Stage 4 Severe CKD (GFR = 15-29 mL/min/1 73 square meters)    Stage 5 End Stage CKD (GFR <15 mL/min/1 73 square meters)  Note: GFR calculation is accurate only with a steady state creatinine    CBC and differential [[id number]]  (Abnormal) Collected:  11/18/19 2054    Lab Status:  Final result Specimen:  Blood from Arm, Right Updated:  11/18/19 2104     WBC 7 57 Thousand/uL      RBC 5 73 Million/uL      Hemoglobin 15 6 g/dL      Hematocrit 48 5 %      MCV 85 fL      MCH 27 2 pg      MCHC 32 2 g/dL      RDW 13 8 %      MPV 11 8 fL      Platelets 903 Thousands/uL      nRBC 0 /100 WBCs      Neutrophils Relative 54 %      Immat GRANS % 0 %      Lymphocytes Relative 35 %      Monocytes Relative 9 %      Eosinophils Relative 1 %      Basophils Relative 1 %      Neutrophils Absolute 4 05 Thousands/µL      Immature Grans Absolute 0 02 Thousand/uL      Lymphocytes Absolute 2 67 Thousands/µL      Monocytes Absolute 0 64 Thousand/µL      Eosinophils Absolute 0 09 Thousand/µL      Basophils Absolute 0 10 Thousands/µL     POCT urinalysis dipstick [[id number]]  (Normal) Resulted:  11/18/19 2049    Lab Status:  Final result Specimen:  Urine Updated:  11/18/19 2049     Color, UA yellow     Clarity, UA clear    Urine Macroscopic, POC [[id number]]  (Abnormal) Collected:  11/18/19 2045    Lab Status:  Final result Specimen:  Urine Updated:  11/18/19 2046     Color, UA Yellow     Clarity, UA Clear     pH, UA 6 0     Leukocytes, UA Negative     Nitrite, UA Negative     Protein, UA Trace mg/dl      Glucose, UA Negative mg/dl      Ketones, UA Negative mg/dl      Urobilinogen, UA 1 0 E U /dl      Bilirubin, UA Interference- unable to analyze     Blood, UA Moderate     Specific Gravity, UA >=1 030    Narrative:       [facility] RESULT                 CT abdomen pelvis with contrast   Final Result by Fernando Yeboah MD (11/18 2207)      No acute intra-abdominal abnormality  No free air or free fluid  Postoperative changes of prior gastric bypass surgery  No evidence of large or small bowel obstruction  Workstation performed: [id number]               Procedures  Procedures        ED Course                               MDM  Number of Diagnoses or Management Options  Abdominal pain: new and does not require workup  Constipation: new and does not require workup  Diagnosis management comments: Status post Parth-en-Y in 2003 now presenting with colicky, dull abdominal pain associated with constipation, nausea, and 1 episode of vomiting  No fevers but is complaining of chills  Vital signs within normal limits  The patient is status post appendectomy and cholecystectomy in the past     No testicular pain  No peritoneal signs on exam   Patient has no pulsatile abdominal mass, known aneurysm, pulse deficit or asymmetry, tearing or ripping pain  No radiation of pain from flank to groin, CVA tenderness, urinary complains, or history of urolithiasis  No history of abdominal trauma or sickle cell disease  Pt had successful BM after soap suds enema             Amount and/or Complexity of Data Reviewed  Clinical lab tests: ordered and reviewed  Tests in the radiology section of CPT®: ordered and reviewed  Tests in the medicine section of CPT®: ordered and reviewed  Review and summarize past medical records: yes  Discuss the patient with other providers: yes    Risk of Complications, Morbidity, and/or Mortality  Presenting problems: moderate  Diagnostic procedures: low  Management options: low    Patient Progress  Patient progress: stable      Disposition  Final diagnoses:   Abdominal pain   Constipation     Time reflects when diagnosis was documented in both MDM as applicable and the Disposition within this note     Time User Action Codes Description Comment    11/18/2019 10:36 PM Carry Walter Add [R10 9] Abdominal pain     11/18/2019 10:36 PM Carry Walter Add [K59 00] Constipation       ED Disposition     ED Disposition Condition Date/Time Comment    Discharge Stable Mon Nov 18, 2019 10:36 PM Myesha Zieglerciara discharge to home/self care  Return precautions given and questions answered  Follow-up Information     Follow up With Specialties Details Why Contact Info    Piedad Au MD Family Medicine Schedule an appointment as soon as possible for a visit in 1 day To discuss abdominal pain and constipation [address]            Discharge Medication List as of 11/18/2019 11:06 PM      CONTINUE these medications which have NOT CHANGED    Details   acetaminophen (TYLENOL) 325 mg tablet Take 3 tablets (975 mg total) by mouth every 8 (eight) hours, Starting Thu 11/1/2018, Normal      albuterol (PROVENTIL HFA,VENTOLIN HFA) 90 mcg/act inhaler Inhale 2 puffs every 4 (four) hours as needed for wheezing or shortness of breath (cough), Starting Thu 1/24/2019, Normal      ALPRAZolam (XANAX) 0 5 mg tablet Take 1 tablet (0 5 mg total) by mouth every 4 (four) hours as needed for anxiety, Starting Thu 5/9/2019, Normal      baclofen 10 mg tablet Take 1 tablet (10 mg total) by mouth 3 (three) times a day, Starting u 7/18/2019, Normal      Buprenorphine (BUTRANS) 15 MCG/HR PTWK Apply 1 patch TD every 7 days  BRAND NECESSARY, Normal      dicyclomine (BENTYL) 20 mg tablet Take 1 tablet (20 mg total) by mouth 2 (two) times a day, Starting Sun 11/3/2019, Print           No discharge procedures on file  ED Provider  Attending physically available and evaluated Myesha Wharton   I managed the patient along with the ED Attending      Electronically Signed by         Ashley Nguyen DO  11/18/19 1807

## 2019-11-19 NOTE — DISCHARGE INSTRUCTIONS
You were seen today in the Emergency Department for abdominal pain  Your workup included blood tests and a CT scan and revealed moderate to severe constipation  Please use 1 pack of miralax every 6 hours dissolved into water or other beverage until you have a significant bowel movement  Please follow up with your Primary Care Provider in the next 1-2 days to discuss your abdominal pain and constipation  Please return to the Emergency Department if you have fevers, chills, chest pain, shortness of breath, are unable to eat or drink, or have any other concerning symptoms  Please look this over and let your nurse and/or me know if you have any further questions before you leave

## 2019-12-26 ENCOUNTER — APPOINTMENT (OUTPATIENT)
Dept: RADIOLOGY | Age: 44
End: 2019-12-26
Payer: OTHER MISCELLANEOUS

## 2019-12-26 ENCOUNTER — HOSPITAL ENCOUNTER (OUTPATIENT)
Dept: RADIOLOGY | Facility: HOSPITAL | Age: 44
Discharge: HOME | End: 2019-12-26
Attending: PREVENTIVE MEDICINE

## 2019-12-26 ENCOUNTER — APPOINTMENT (OUTPATIENT)
Dept: URGENT CARE | Age: 44
End: 2019-12-26
Payer: OTHER MISCELLANEOUS

## 2019-12-26 DIAGNOSIS — T14.90XA INJURY: Primary | ICD-10-CM

## 2019-12-26 DIAGNOSIS — T14.90XA INJURY: ICD-10-CM

## 2019-12-26 PROCEDURE — 72040 X-RAY EXAM NECK SPINE 2-3 VW: CPT

## 2019-12-26 PROCEDURE — 99283 EMERGENCY DEPT VISIT LOW MDM: CPT | Performed by: PREVENTIVE MEDICINE

## 2019-12-26 PROCEDURE — G0382 LEV 3 HOSP TYPE B ED VISIT: HCPCS | Performed by: PREVENTIVE MEDICINE

## 2019-12-26 PROCEDURE — 72072 X-RAY EXAM THORAC SPINE 3VWS: CPT

## 2020-01-02 ENCOUNTER — TELEPHONE (OUTPATIENT)
Dept: PAIN MEDICINE | Facility: CLINIC | Age: 45
End: 2020-01-02

## 2020-01-02 NOTE — TELEPHONE ENCOUNTER
Called and [facility] for patient to call back to let us know if this is going through regular insurance or WC

## 2020-01-13 ENCOUNTER — TELEPHONE (OUTPATIENT)
Dept: PAIN MEDICINE | Facility: CLINIC | Age: 45
End: 2020-01-13

## 2020-01-13 DIAGNOSIS — G89.29 CHRONIC RIGHT SHOULDER PAIN: ICD-10-CM

## 2020-01-13 DIAGNOSIS — M48.062 SPINAL STENOSIS OF LUMBAR REGION WITH NEUROGENIC CLAUDICATION: ICD-10-CM

## 2020-01-13 DIAGNOSIS — M54.42 CHRONIC LOW BACK PAIN WITH BILATERAL SCIATICA, UNSPECIFIED BACK PAIN LATERALITY: ICD-10-CM

## 2020-01-13 DIAGNOSIS — M96.1 LUMBAR POST-LAMINECTOMY SYNDROME: ICD-10-CM

## 2020-01-13 DIAGNOSIS — M54.41 CHRONIC LOW BACK PAIN WITH BILATERAL SCIATICA, UNSPECIFIED BACK PAIN LATERALITY: ICD-10-CM

## 2020-01-13 DIAGNOSIS — M25.511 CHRONIC RIGHT SHOULDER PAIN: ICD-10-CM

## 2020-01-13 DIAGNOSIS — M54.16 LUMBAR RADICULOPATHY: ICD-10-CM

## 2020-01-13 DIAGNOSIS — M79.18 MYOFASCIAL PAIN SYNDROME: ICD-10-CM

## 2020-01-13 DIAGNOSIS — G89.29 CHRONIC LOW BACK PAIN WITH BILATERAL SCIATICA, UNSPECIFIED BACK PAIN LATERALITY: ICD-10-CM

## 2020-01-13 NOTE — TELEPHONE ENCOUNTER
Called and spoke to patient to r/s appt due to out sick 1-13-20 patient is rescheduled for 2-28-20    Patient has 2 Buprenorphine (BUTRANS) 15 MCG/HR [address]     left    Will need another refill prior    [facility] [address] [facility] [place]     Patient can be contacted   [phone]

## 2020-01-14 RX ORDER — BUPRENORPHINE 15 UG/H
PATCH TRANSDERMAL
Qty: 4 PATCH | Refills: 1 | Status: SHIPPED | OUTPATIENT
Start: 2020-01-14 | End: 2020-01-24

## 2020-01-14 RX ORDER — BUPRENORPHINE 15 UG/H
PATCH TRANSDERMAL
Qty: 4 PATCH | Refills: 1 | Status: SHIPPED | OUTPATIENT
Start: 2020-01-14 | End: 2020-01-14 | Stop reason: SDUPTHER

## 2020-01-14 RX ORDER — BACLOFEN 10 MG/1
10 TABLET ORAL 3 TIMES DAILY
Qty: 90 TABLET | Refills: 1 | Status: SHIPPED | OUTPATIENT
Start: 2020-01-14 | End: 2020-01-24 | Stop reason: SDUPTHER

## 2020-01-14 NOTE — TELEPHONE ENCOUNTER
I sent it again, but it will not even let us send it without a CHARLA selected so can you confirm that they go it and that my CHARLA is on it  I don't know what else to do if not as I have no other way to send it but as we always do and you have to pick a CHARLA or it will not let you send it  Thank you

## 2020-01-14 NOTE — TELEPHONE ENCOUNTER
I sent a 30 day script with a refill for both the Butrans patch and Baclofen so that way this will get him to his OV as scheduled in February  Thank you

## 2020-01-24 DIAGNOSIS — G89.29 CHRONIC LOW BACK PAIN WITH BILATERAL SCIATICA, UNSPECIFIED BACK PAIN LATERALITY: ICD-10-CM

## 2020-01-24 DIAGNOSIS — M25.511 CHRONIC RIGHT SHOULDER PAIN: ICD-10-CM

## 2020-01-24 DIAGNOSIS — M96.1 LUMBAR POST-LAMINECTOMY SYNDROME: ICD-10-CM

## 2020-01-24 DIAGNOSIS — M79.18 MYOFASCIAL PAIN SYNDROME: ICD-10-CM

## 2020-01-24 DIAGNOSIS — G89.29 CHRONIC RIGHT SHOULDER PAIN: ICD-10-CM

## 2020-01-24 DIAGNOSIS — M48.062 SPINAL STENOSIS OF LUMBAR REGION WITH NEUROGENIC CLAUDICATION: ICD-10-CM

## 2020-01-24 DIAGNOSIS — M54.41 CHRONIC LOW BACK PAIN WITH BILATERAL SCIATICA, UNSPECIFIED BACK PAIN LATERALITY: ICD-10-CM

## 2020-01-24 DIAGNOSIS — M54.42 CHRONIC LOW BACK PAIN WITH BILATERAL SCIATICA, UNSPECIFIED BACK PAIN LATERALITY: ICD-10-CM

## 2020-01-24 DIAGNOSIS — M54.16 LUMBAR RADICULOPATHY: ICD-10-CM

## 2020-01-24 RX ORDER — BACLOFEN 10 MG/1
10 TABLET ORAL 3 TIMES DAILY
Qty: 90 TABLET | Refills: 0 | Status: SHIPPED | OUTPATIENT
Start: 2020-01-24 | End: 2020-02-28 | Stop reason: SDUPTHER

## 2020-01-24 RX ORDER — BUPRENORPHINE 15 UG/H
PATCH, EXTENDED RELEASE TRANSDERMAL
Qty: 4 PATCH | Refills: 0 | Status: SHIPPED | OUTPATIENT
Start: 2020-01-24 | End: 2020-02-28 | Stop reason: SDUPTHER

## 2020-01-24 NOTE — TELEPHONE ENCOUNTER
Pt called stating his bupertrin patch and baclofin script went to the wrong pharmacy  It is suppose to go to the  [facility] in [facility]  Please re-send it today      Pt can be reached at [phone]

## 2020-01-24 NOTE — TELEPHONE ENCOUNTER
I re-sent his Butrans and [facility]acl[facility] as requested  Thank you

## 2020-01-27 ENCOUNTER — OFFICE VISIT (OUTPATIENT)
Dept: FAMILY MEDICINE CLINIC | Facility: CLINIC | Age: 45
End: 2020-01-27
Payer: MEDICARE

## 2020-01-27 VITALS
OXYGEN SATURATION: 99 % | WEIGHT: 176.6 LBS | HEART RATE: 60 BPM | HEIGHT: 72 IN | DIASTOLIC BLOOD PRESSURE: 80 MMHG | TEMPERATURE: 96.2 F | RESPIRATION RATE: 16 BRPM | SYSTOLIC BLOOD PRESSURE: 120 MMHG | BODY MASS INDEX: 23.92 KG/M2

## 2020-01-27 DIAGNOSIS — J06.9 UPPER RESPIRATORY TRACT INFECTION, UNSPECIFIED TYPE: Primary | ICD-10-CM

## 2020-01-27 PROCEDURE — 99213 OFFICE O/P EST LOW 20 MIN: CPT | Performed by: NURSE PRACTITIONER

## 2020-01-27 RX ORDER — AZITHROMYCIN 250 MG/1
TABLET, FILM COATED ORAL
Qty: 6 TABLET | Refills: 0 | Status: SHIPPED | OUTPATIENT
Start: 2020-01-27 | End: 2020-02-01

## 2020-01-27 NOTE — LETTER
January 27, 2020     Patient: Lio Corey   YOB: 1975   Date of Visit: 1/27/2020       To Whom it May Concern:    Lio Corey is under my professional care  He was seen in my office on 1/27/2020  He may return to work on 01/28/2020  If you have any questions or concerns, please don't hesitate to call           Sincerely,          ANTONIA Reina        CC: No Recipients

## 2020-01-27 NOTE — PROGRESS NOTES
FAMILY PRACTICE OFFICE VISIT       NAME: Terri Simmonds  AGE: 40 y o  SEX: male       : 1975        MRN: [id number]      Assessment and Plan     Problem List Items Addressed This Visit     None      Visit Diagnoses     Upper respiratory tract infection, unspecified type    -  Primary    Relevant Medications    azithromycin (ZITHROMAX) 250 mg tablet        1  Upper respiratory tract infection, unspecified type  azithromycin (ZITHROMAX) 250 mg tablet     This 70-year-old male presents today with symptoms consistent with an upper respiratory tract infection  Given long history of smoking with continued daily cigarette smoking, will treat with a Z-Derrick  Continue supportive care:  Rest, fluids, warm tea, honey, humidification  May continue to use over-the-counter Mucinex as needed  If symptoms should worsen, if symptoms are not improving over the next few days, he is instructed to call  Tobacco Cessation Counseling: Tobacco cessation counseling and education was provided  The patient is sincerely urged to quit consumption of tobacco  He is not ready to quit tobacco  The numerous health risks of tobacco consumption were discussed  If he decides to quit, there are a number of helpful adjunctive aids, and he can see me to discuss nicotine replacement therapy, chantix, or bupropion anytime in the future  Chief Complaint     Chief Complaint   Patient presents with    Cold Like Symptoms     Pt is here for sore throat, runny nose, sinus congestion, chest 4 + days       History of Present Illness     Terri Simmonds is a 70-year-old male presenting today for cold symptoms that began about 4 days ago  Symptoms include nasal congestion, body aches, sore throat, fatigue, and cough  He felt feverish a few days ago, which chills and sweats but did not take his temperature  Denies headaches or ear pain  Is short of breath at times with exertion, no wheezing    He is currently smoking daily, not ready to quit yet  He has used albuterol inhaler a few times over the past few days  Review of Systems   Review of Systems   Constitutional: Positive for chills, diaphoresis, fatigue and fever  HENT: Positive for congestion, postnasal drip, rhinorrhea, sinus pressure and sore throat  Negative for ear pain  Respiratory: Positive for cough and shortness of breath  Negative for chest tightness and wheezing  Cardiovascular: Negative  Gastrointestinal: Negative  Musculoskeletal: Positive for myalgias  Skin: Negative for rash  Neurological: Negative for dizziness and headaches         Active Problem List     Patient Active Problem List   Diagnosis    Chronic low back pain    Bipolar 2 disorder, major depressive episode ([facility] )    Depression with anxiety    Fatigue    Chronic anemia    Nephrolithiasis    Pain syndrome, chronic    Right elbow pain    Uncomplicated opioid dependence ([facility] )    Thumb pain    Lumbar post-laminectomy syndrome    Myofascial pain syndrome    Lumbar radiculopathy    Spinal stenosis of lumbar region with neurogenic claudication    BPH with obstruction/lower urinary tract symptoms    Iron deficiency anemia    Vitamin B deficiency    Postgastrectomy malabsorption    History of Parth-en-Y gastric bypass    Tobacco abuse    Vitamin D deficiency    PTSD (post-traumatic stress disorder)    Sacroiliitis, not elsewhere classified ([facility]    Nausea and vomiting       Past Medical History:  Past Medical History:   Diagnosis Date    Anxiety     Arthritis     Asthma     Bipolar disorder (HCC)     Chronic left lumbar radiculopathy     Chronic low back pain     Chronic pain syndrome     Chronic right shoulder pain     Depression with anxiety     Fatigue     GERD (gastroesophageal reflux disease)     Hydronephrosis     Iron deficiency anemia     Kidney stone     Lytic bone lesions on xray     Nephrolithiasis     PONV (postoperative nausea and vomiting)     Right elbow pain     Right lateral epicondylitis        Past Surgical History:  Past Surgical History:   Procedure Laterality Date    ADENOIDECTOMY Bilateral     APPENDECTOMY      BACK SURGERY      CHOLECYSTECTOMY      CHOLECYSTECTOMY LAPAROSCOPIC N/A 10/30/2018    Procedure: CHOLECYSTECTOMY WITH GASTROTOMY LAPAROSCOPIC;  Surgeon: Timmy Goemz MD;  [facility];  Service: General    ERCP W/ SPHICTEROTOMY N/A 10/30/2018    Procedure: ENDOSCOPIC RETROGRADE CHOLANGIOPANCREATOGRAPHY (ERCP) W/ SPHINCTEROTOMY;  Surgeon: Ladi Batista MD;  [facility];  Service: Gastroenterology    GASTRIC BYPASS      2009    OTHER SURGICAL HISTORY      fusion/refusion of vertebrae, 2010 and 2011 l5-s1 and l4-l5    OH CYSTO/URETERO W/LITHOTRIPSY &INDWELL STENT INSRT Right 8/8/2017    Procedure: CYSTOSCOPY; URETEROSCOPY; HOLMIUM LASER; RETROGRADE PYELOGRAM; STENT;  Surgeon: Kasi Briggs MD;  [facility];  Service: Urology    OH IMPLANT SPINAL NEUROSTIM/ Right 11/14/2017    Procedure: REMOVAL LOWER MEDIAL BUTTOCK SPINAL CORD STIMULATOR GENERATOR; PLACEMENT OF NEW BUTTOCK SPINAL CORD [facility];  Surgeon: Varghese Montanez MD;  [facility];  Service: Neurosurgery    RIK-EN-Y PROCEDURE  2003    SPINAL CORD STIMULATOR IMPLANT  11/14/2017    dr Marylou Faye procedure and technique 1  removal of a right back implantable pulse generator 2 placement of a new right buttock impantable pulse generator through seperate incision 3 electric analys complex programming spinal cord stimulator system postoperative period approx 1 hour    TONSILLECTOMY         Family History:  Family History   Problem Relation Age of Onset    Cancer Mother     Arthritis Mother     Stomach cancer Mother     Cancer Father     Esophageal cancer Father     Other Father         brain tumor    No Known Problems Sister     No Known Problems Sister     No Known Problems Sister        Social History:  Social History     Socioeconomic History    Marital status:      Spouse name: Not on file    Number of children: Not on file    Years of education: GED    Highest education level: Not on file   Occupational History    Not on file   Social Needs    Financial resource strain: Not on file    Food insecurity:     Worry: Not on file     Inability: Not on file    Transportation needs:     Medical: Not on file     Non-medical: Not on file   Tobacco Use    Smoking status: Current Every Day Smoker     Packs/day: 1 00     Types: Cigarettes    Smokeless tobacco: Never Used   Substance and Sexual Activity    Alcohol use: No     Comment: quit drinking, social    Drug use: No    Sexual activity: Not on file   Lifestyle    Physical activity:     Days per week: Not on file     Minutes per session: Not on file    Stress: Not on file   Relationships    Social connections:     Talks on phone: Not on file     Gets together: Not on file     Attends Yarsani service: Not on file     Active member of club or organization: Not on file     Attends meetings of clubs or organizations: Not on file     Relationship status: Not on file    Intimate partner violence:     Fear of current or ex partner: Not on file     Emotionally abused: Not on file     Physically abused: Not on file     Forced sexual activity: Not on file   Other Topics Concern    Not on file   Social History Narrative    Chooses not to have children    Drinks caffienated tea    Lives with spouse               I have reviewed the patient's medical history in detail; there are no changes to the history as noted in the electronic medical record      Objective     Vitals:    01/27/20 0949   BP: 120/80   Pulse: 60   Resp: 16   Temp: (!) 96 2 °F (35 7 °C)   TempSrc: Tympanic   SpO2: 99%   Weight: 80 1 kg (176 lb 9 6 oz)   Height: 6' (1 829 m)     Wt Readings from Last 3 Encounters:   01/27/20 80 1 kg (176 lb 9 6 oz)   11/18/19 81 6 kg (180 lb) 11/03/19 75 kg (165 lb 5 5 oz)     Physical Exam   Constitutional: He appears well-developed and well-nourished  HENT:   Head: Normocephalic and atraumatic  Right Ear: Tympanic membrane normal    Left Ear: Tympanic membrane normal    Nose: Mucosal edema present  Mouth/Throat: Uvula is midline  No oropharyngeal exudate or posterior oropharyngeal edema  Eyes: Conjunctivae are normal    Neck: Normal range of motion  Neck supple  Cardiovascular: Normal rate, regular rhythm and normal heart sounds  Pulmonary/Chest: Effort normal and breath sounds normal    Lymphadenopathy:     He has no cervical adenopathy  Psychiatric: He has a normal mood and affect  Nursing note and vitals reviewed  ALLERGIES:  Allergies   Allergen Reactions    Ampicillin GI Intolerance     Vomiting    Aspirin GI Intolerance     vomiting      Penicillins GI Intolerance     Vomit       Current Medications     Current Outpatient Medications   Medication Sig Dispense Refill    acetaminophen (TYLENOL) 325 mg tablet Take 3 tablets (975 mg total) by mouth every 8 (eight) hours 30 tablet 0    albuterol (PROVENTIL HFA,VENTOLIN HFA) 90 mcg/act inhaler Inhale 2 puffs every 4 (four) hours as needed for wheezing or shortness of breath (cough) 1 Inhaler 1    baclofen 10 mg tablet Take 1 tablet (10 mg total) by mouth 3 (three) times a day 90 tablet 0    BUTRANS 15 MCG/HR PTWK APPLY 1 PATCH TD EVERY 7 DAYS  4 patch 0    azithromycin (ZITHROMAX) 250 mg tablet Take 2 tablets on day 1 and then take 1 tablet on days 2-5  6 tablet 0     No current facility-administered medications for this visit            Health Maintenance     Health Maintenance   Topic Date Due    Pneumococcal Vaccine: Pediatrics (0 to 5 Years) and At-Risk Patients (6 to 59 Years) (1 of 1 - PPSV23) 08/22/1981    DTaP,Tdap,and Td Vaccines (1 - Tdap) 08/22/1986    HIV Screening  08/22/1990    Medicare Annual Wellness Visit (AWV)  09/13/2019    Influenza Vaccine 02/23/2020 (Originally 7/1/2019)    BMI: Adult  01/27/2021    Pneumococcal Vaccine: 65+ Years (1 of 2 - PCV13) 08/22/2040    HIB Vaccine  Aged Out    Hepatitis B Vaccine  Aged Out    IPV Vaccine  Aged Out    Hepatitis A Vaccine  Aged Out    Meningococcal ACWY Vaccine  Aged Out    HPV Vaccine  Aged [employer] History   Administered Date(s) Administered    INFLUENZA 12/15/2018    Influenza, injectable, quadrivalent, preservative free 0 5 mL 12/15/2018       Lupis Pérez, YOBANINP

## 2020-02-04 NOTE — DISCHARGE SUMMARY
ACT 23   Discharge- Mary Ann Calvillo 1975, 37 y o  male MRN: [id number]    Unit/Bed#: 99 [facility] 626-01 Encounter: [id number]    Primary Care Provider: Grupo Ratliff MD   Date and time admitted to hospital: 10/27/2018  5:28 AM        * Cholecystitis   Assessment & Plan    - Acute cholecystitis status post laparoscopic cholecystectomy on 10/30/2018  - Diet as tolerated  - Continue current oral analgesic regimen and follow up as an outpatient with your chronic pain prescribed  - Stable for discharge on 11/01/2018 with outpatient surgical follow-up in 2 weeks  Choledocholithiasis   Assessment & Plan    - Choledocholithiasis status post laparoscopic assisted ERCP with common bile duct stone extraction on 10/30/2018  - Diet as tolerated  - Outpatient follow-up with GI as needed  Acute kidney injury [facility])   Assessment & Plan    - Acute kidney injury present on admission, now resolved  - Maintain adequate hydration following discharge  - Outpatient follow-up with your primary care provider  Tobacco abuse   Assessment & Plan    - offered patient nicotine patch but he refused     History of Parth-en-Y gastric bypass   Assessment & Plan    - clear liquids today, NPO midnight for possible operation  - if total bilirubin is elevated tomorrow or if MRCP is positive for a bile duct obstruction, we will need to discuss ERCP in conjunction with GI service           Discharge Summary - General Surgery   Mary Ann Calvillo 37 y o  male MRN: [id number]  Unit/Bed#: [facility] 626-01 Encounter: [id number]    Admission Date: 10/27/2018     Discharge Date: 11/1/2018    Admitting Diagnosis: Cholecystitis [K81 9]  RUQ pain [R10 11]  Nausea & vomiting [R11 2]  Flank pain [R10 9]    Discharge Diagnosis: See above  Attending and Service: Dr Murillo [facility]  Consulting Physician(s):     1  Dr Thiago Macdonald Gastroenterology  2  Acute Pain Service  Imaging and Procedures Performed:      Fl Ercp Biliary And Pancreatic    Result Date: 10/31/2018  Impression: Fluoroscopic guidance for ERCP  Please see procedure report for full details  Workstation performed: [id number]     Mri Abdomen Wo Contrast And Mrcp    Result Date: 10/29/2018  Impression: 1  Persistent 7 x 3 mm filling defect in the distal common bile duct, likely a CBD stone  There is associated mild extrahepatic and central intrahepatic biliary ductal dilation  2   Cholelithiasis without findings of acute cholecystitis  The study was marked in [facility] for immediate notification  Workstation performed: [id number]     Us Right Upper Quadrant    Result Date: 10/27/2018  Impression: Cholelithiasis with sonographic findings suggestive of early, acute cholecystitis  If there is continued concern for acute cholecystitis, nuclear medicine HIDA scan should be obtained to assess for cystic duct patency  The study was marked in [facility] for immediate notification  Workstation performed: [id number]     Ct Abdomen Pelvis With Contrast    Result Date: 10/27/2018  Impression: No acute inflammatory stranding Distended gallbladder without surrounding pericholecystic inflammatory change  If concern for gallbladder pathology ultrasound can be performed Dilated common bile duct, more pronounced from the previous study of August 2, 2017  Correlation with liver function tests suggested will need for evaluating with MRCP  On the current study the CBD measures about 14 mm  On the previous study the CBD was measuring about 8 mm Unremarkable pancreas Status post gastric bypass with no evidence of bowel obstruction The excluded the stomach demonstrates air and fluid, unchanged from the previous study of August 2, 1970 Lucencies seen within the right iliac bone and left iliac bone, unchanged from the previous study of August 2, 2017  IF These have not been characterized in the past MRI of the pelvis can be considered    Workstation performed: [id number]     Laparoscopic gastrotomy for laparoscopic-assisted ERCP with sphincterotomy; laparoscopic cholecystectomy on 10/30/2018  Pathology: Final surgical pathology pending at the time of discharge  Hospital Course: Anika Argueta is a 51-year-old male who presented with a one-day history of abdominal pain  He described his pain as similar to an episode of having kidney stones in the past, except that his pain is currently in his right upper abdomen and radiating towards his back  He also notes of prior history of gastric bypass surgery  Additionally, he has chronic pain for which she takes oral medications as well as management via an implanted pain pump in his buttock  He denied any associated nausea or vomiting  On his initial surgical evaluation, he was afebrile with normal vital signs; his abdomen was soft and nondistended, but was tender in the right upper quadrant with a positive Moraes's sign; the remainder of his exam was unremarkable  His initial workup included the above-noted imaging studies  He was admitted to the acute care surgery service with acute cholecystitis and concern for choledocholithiasis  He was kept NPO, started on IV fluids and IV antibiotics, initiated on a multimodal analgesic regimen, and Gastroenterology was consulted to assist with the workup of possible choledocholithiasis  He went on to have an MRCP confirming the presence of choledocholithiasis  After discussion with the patient in the GI service, operative intervention was coordinated with the GI service, the patient agreed to proceed  He went to the OR on 10/30/2018 and had a laparoscopic gastrotomy for laparoscopic-assisted ERCP and sphincterotomy as well as a laparoscopic cholecystectomy  There were no intraoperative complications, the patient tolerated the procedure well  Postoperatively, he did have some uncontrolled pain despite his multimodal analgesic regimen    The Acute Pain Service was consulted and assisted with his analgesic regimen  By 11/01/2018, is able to tolerate a regular diet and has adequate analgesia with oral medications only  He is now deemed stable for discharge  On discharge, the patient is instructed to follow-up with the patient's primary care provider within the next 2 weeks to review the events of the recent hospitalization  The patient is instructed to follow-up with the [facility] as scheduled on 11/13/2018 at 9:45 AM   The patient should maintain his previously scheduled outpatient appointments  The patient is instructed to follow the provided discharge instructions  Condition at Discharge: good     Discharge instructions/Information to patient and family:   See after visit summary for information provided to patient and family  Provisions for Follow-Up Care:  See after visit summary for information related to follow-up care and any pertinent home health orders  Disposition: See After Visit Summary for discharge disposition information  Planned Readmission: No    Discharge Statement   I spent 30 minutes discharging the patient  This time was spent on the day of discharge  I had direct contact with the patient on the day of discharge  Additional documentation is required if more than 30 minutes were spent on discharge  Discharge Medications:  See after visit summary for reconciled discharge medications provided to patient and family  I performed a search on the [address] website on this patient for past prescriptions of controlled substances  I also contacted his chronic pain medication prescriber, Jose Brittle, prior to prescribing his discharge analgesic regimen      Binta Chua PA-C  11/1/2018  12:45 PM

## 2020-02-17 ENCOUNTER — TELEPHONE (OUTPATIENT)
Dept: RADIOLOGY | Facility: CLINIC | Age: 45
End: 2020-02-17

## 2020-02-25 ENCOUNTER — TELEPHONE (OUTPATIENT)
Dept: PAIN MEDICINE | Facility: CLINIC | Age: 45
End: 2020-02-25

## 2020-02-28 ENCOUNTER — OFFICE VISIT (OUTPATIENT)
Dept: PAIN MEDICINE | Facility: CLINIC | Age: 45
End: 2020-02-28
Payer: MEDICARE

## 2020-02-28 VITALS
BODY MASS INDEX: 24.52 KG/M2 | HEIGHT: 72 IN | WEIGHT: 181 LBS | HEART RATE: 78 BPM | SYSTOLIC BLOOD PRESSURE: 128 MMHG | DIASTOLIC BLOOD PRESSURE: 68 MMHG

## 2020-02-28 DIAGNOSIS — M79.18 MYOFASCIAL PAIN SYNDROME: ICD-10-CM

## 2020-02-28 DIAGNOSIS — M48.062 SPINAL STENOSIS OF LUMBAR REGION WITH NEUROGENIC CLAUDICATION: ICD-10-CM

## 2020-02-28 DIAGNOSIS — M54.41 CHRONIC LOW BACK PAIN WITH BILATERAL SCIATICA, UNSPECIFIED BACK PAIN LATERALITY: ICD-10-CM

## 2020-02-28 DIAGNOSIS — F11.20 UNCOMPLICATED OPIOID DEPENDENCE (HCC): ICD-10-CM

## 2020-02-28 DIAGNOSIS — G89.29 CHRONIC LOW BACK PAIN WITH BILATERAL SCIATICA, UNSPECIFIED BACK PAIN LATERALITY: ICD-10-CM

## 2020-02-28 DIAGNOSIS — G89.4 PAIN SYNDROME, CHRONIC: Primary | ICD-10-CM

## 2020-02-28 DIAGNOSIS — Z79.891 ENCOUNTER FOR LONG-TERM OPIATE ANALGESIC USE: ICD-10-CM

## 2020-02-28 DIAGNOSIS — M25.511 CHRONIC RIGHT SHOULDER PAIN: ICD-10-CM

## 2020-02-28 DIAGNOSIS — M54.16 LUMBAR RADICULOPATHY: ICD-10-CM

## 2020-02-28 DIAGNOSIS — M96.1 LUMBAR POST-LAMINECTOMY SYNDROME: ICD-10-CM

## 2020-02-28 DIAGNOSIS — G89.29 CHRONIC RIGHT SHOULDER PAIN: ICD-10-CM

## 2020-02-28 DIAGNOSIS — M54.42 CHRONIC LOW BACK PAIN WITH BILATERAL SCIATICA, UNSPECIFIED BACK PAIN LATERALITY: ICD-10-CM

## 2020-02-28 PROCEDURE — 3008F BODY MASS INDEX DOCD: CPT | Performed by: NURSE PRACTITIONER

## 2020-02-28 PROCEDURE — 4004F PT TOBACCO SCREEN RCVD TLK: CPT | Performed by: NURSE PRACTITIONER

## 2020-02-28 PROCEDURE — 99214 OFFICE O/P EST MOD 30 MIN: CPT | Performed by: NURSE PRACTITIONER

## 2020-02-28 RX ORDER — BUPRENORPHINE 15 UG/H
PATCH, EXTENDED RELEASE TRANSDERMAL
Qty: 4 PATCH | Refills: 2 | Status: SHIPPED | OUTPATIENT
Start: 2020-02-28 | End: 2020-05-22 | Stop reason: SDUPTHER

## 2020-02-28 RX ORDER — BACLOFEN 10 MG/1
10 TABLET ORAL 3 TIMES DAILY
Qty: 90 TABLET | Refills: 0 | Status: SHIPPED | OUTPATIENT
Start: 2020-02-28 | End: 2020-05-22 | Stop reason: SDUPTHER

## 2020-02-28 NOTE — PATIENT INSTRUCTIONS
Opioid Pain Management   AMBULATORY CARE:   An opioid  is a type of medicine used to treat pain  Examples of opioids are oxycodone, morphine, fentanyl, or codeine  Take opioid medicines as directed, for the condition it is prescribed:  Common problems that may occur when you do not take opioid medicines as directed include the following:  · Health problems  may occur  You may have trouble breathing  You may also develop liver or kidney damage, or stomach bleeding  Any of these health problems can become life-threatening  · Opioid dependence  means your body needs the opioid medicine to keep it from going through withdrawal      · Opioid tolerance  means the opioid does not control pain as well as it used to  You need higher doses of the opioid to get pain relief  · Opioid addiction  means you are not able to control the use of the opioid medicine  You use it when you do not have pain  You crave the opioid medicine  Call 911 or have someone call 911 for any of the following:   · You are breathing slower than normal, or you have trouble breathing  · You cannot be awakened  · You have a seizure  Seek care immediately if:   · Your heart is beating slower than usual     · Your heart feels like it is jumping or fluttering  · You are so sleepy that you cannot stay awake  · You have severe muscle pain or weakness  · You see or hear things that are not real   Contact your healthcare provider if:   · You are too dizzy to stand up  · Your pain gets worse or you have new pain  · You cannot do your usual activities because of side effects from the opioid  · You are constipated or have abdominal pain  · You have questions or concerns about your condition or care  Opioid safety measures:   · Take your medicine as directed  Ask if you need more information on how to take your medicine correctly  Follow up with your healthcare provider regularly  You may need to have your dose adjusted   Do not use opioid medicine if you are pregnant or breastfeeding  · Give your healthcare provider a list of all your medicines  Include any over-the-counter medicines, vitamins, and herbs  It can be dangerous to take opioids with certain other medicines, such as antihistamines  · Keep opioid medicine in a safe place  Store your opioid medicine in a locked cabinet to keep it away from children and others  · Do not drink alcohol while you use opioids  Alcohol use with an opioid medicine can make you sleepy and slow your breathing rate  You may stop breathing completely  · Do not drive or operate heavy machinery after you take opioid medicine  Opioid medicine can make you drowsy and make it hard to concentrate  You may injure yourself or others if you drive or operate heavy machinery while taking your medicine  · Drink fluids and eat high-fiber foods  Fluids and fiber will help prevent constipation  Ask your healthcare provider what fluids are right for you and how much you should drink  Also ask for a list of foods that contain fiber  Follow up with your healthcare provider as directed: You may need to have your dose adjusted  You may be referred to a pain specialist  Write down your questions so you remember to ask them during your visits  © 2017 [address] [employer] Information is for End User's use only and may not be sold, redistributed or otherwise used for commercial purposes  All illustrations and images included in CareNotes® are the copyrighted property of [employer]  or [employer]  The above information is an  only  It is not intended as medical advice for individual conditions or treatments  Talk to your doctor, nurse or pharmacist before following any medical regimen to see if it is safe and effective for you

## 2020-02-28 NOTE — PROGRESS NOTES
Assessment:  1  Pain syndrome, chronic    2  Spinal stenosis of lumbar region with neurogenic claudication    3  Lumbar post-laminectomy syndrome    4  Chronic low back pain with bilateral sciatica, unspecified back pain laterality    5  Myofascial pain syndrome    6  Chronic right shoulder pain    7  Lumbar radiculopathy        Plan:  While the patient was in the office today, I did have a thorough conversation with the patient regarding their chronic pain syndrome, symptoms, medication regimen, and treatment plan  At this point time, I discussed the patient that since he is noting significant and stable relief with his current medication regimen and does have supportive underlying etiology, I feel it is reasonable appropriate to continue with the Butrans patch and the baclofen as prescribed  The patient was agreeable and verbalized an understanding  [place] Prescription Drug Monitoring Program report was reviewed and was appropriate     While the patient was in the office today, an annual review of the opioid contract/agreement was conducted, the patient was agreeable to continuing the contract as previously agreed upon and signed for this calendar year  There are risks associated with opioid medications, including dependence, addiction and tolerance  The patient understands and agrees to use these medications only as prescribed  Potential side effects of the medications include, but are not limited to, constipation, drowsiness, addiction, impaired judgment and risk of fatal overdose if not taken as prescribed  The patient was warned against driving while taking sedation medications  Sharing medications is a felony  At this point in time, the patient is showing no signs of addiction, abuse, diversion or suicidal ideation  An oral drug screen swab was collected at today's office visit  The swab will be sent for confirmatory testing   The drug screen is medically necessary because the patient is either dependent on opioid medication or is being considered for opioid medication therapy and the results could impact ongoing or future treatment  The drug screen is to evaluate for the presences or absence of prescribed, non-prescribed, and/or illicit drugs/substances  The patient will follow-up in 12 weeks for medication prescription refill and reevaluation  The patient was advised to contact the office should their symptoms worsen in the interim  The patient was agreeable and verbalized an understanding  History of Present Illness: The patient is a 40 y o  male last seen on 10/21/19 who presents for a follow up office visit in regards to chronic pain syndrome secondary to lumbar post-laminectomy syndrome with lumbar radiculopathy and chronic myofascial pain  The patient currently reports that since his last office visit overall his pain symptoms have remained the same and significantly manageable with his current medication regimen  Current pain medications includes:  Butrans patch 15 micro g applying 1 patch transdermally every 7 days for pain and baclofen 10 mg t i d  P r n  For pain and spasm  The patient reports that this regimen is providing 80% pain relief  The patient is reporting no side effects from this pain medication regimen  Pain Contract Signed: 2/28/20  Last Drug Screen: 2/28/20    I have personally reviewed and/or updated the patient's past medical history, past surgical history, family history, social history, current medications, allergies, and vital signs today         Review of Systems:    Review of Systems      Past Medical History:   Diagnosis Date    Anxiety     Arthritis     Asthma     Bipolar disorder (HCC)     Chronic left lumbar radiculopathy     Chronic low back pain     Chronic pain syndrome     Chronic right shoulder pain     Depression with anxiety     Fatigue     GERD (gastroesophageal reflux disease)     Hydronephrosis     Iron deficiency anemia     Kidney stone     Lytic bone lesions on xray     Nephrolithiasis     PONV (postoperative nausea and vomiting)     Right elbow pain     Right lateral epicondylitis        Past Surgical History:   Procedure Laterality Date    ADENOIDECTOMY Bilateral     APPENDECTOMY      BACK SURGERY      CHOLECYSTECTOMY      CHOLECYSTECTOMY LAPAROSCOPIC N/A 10/30/2018    Procedure: CHOLECYSTECTOMY WITH GASTROTOMY LAPAROSCOPIC;  Surgeon: Carmenza Zapata MD;  [facility];  Service: General    ERCP W/ SPHICTEROTOMY N/A 10/30/2018    Procedure: ENDOSCOPIC RETROGRADE CHOLANGIOPANCREATOGRAPHY (ERCP) W/ SPHINCTEROTOMY;  Surgeon: Betty Bettencourt MD;  [facility];  Service: Gastroenterology    GASTRIC BYPASS      2009    OTHER SURGICAL HISTORY      fusion/refusion of vertebrae, 2010 and 2011 l5-s1 and l4-l5    DE CYSTO/URETERO W/LITHOTRIPSY &INDWELL STENT INSRT Right 8/8/2017    Procedure: CYSTOSCOPY; URETEROSCOPY; HOLMIUM LASER; RETROGRADE PYELOGRAM; STENT;  Surgeon: Jessica White MD;  [facility];  Service: Urology    DE IMPLANT SPINAL NEUROSTIM/ Right 11/14/2017    Procedure: REMOVAL LOWER MEDIAL BUTTOCK SPINAL CORD STIMULATOR GENERATOR; PLACEMENT OF NEW BUTTOCK SPINAL CORD STIMULATOR THROUGH SEPERATE INCISION;  Surgeon: Tremaine Ibanez MD;  [facility];  Service: Neurosurgery    RIK-EN-Y PROCEDURE  2003    SPINAL CORD STIMULATOR IMPLANT  11/14/2017    dr Rj Ornelas procedure and technique 1  removal of a right back implantable pulse generator 2 placement of a new right buttock impantable pulse generator through seperate incision 3 electric analys complex programming spinal cord stimulator system postoperative period approx 1 hour    TONSILLECTOMY         Family History   Problem Relation Age of Onset    Cancer Mother     Arthritis Mother     Stomach cancer Mother     Cancer Father     Esophageal cancer Father     Other Father         brain tumor    No Known Problems Sister     No Known Problems Sister     No Known Problems Sister        Social History     Occupational History    Not on file   Tobacco Use    Smoking status: Current Every Day Smoker     Packs/day: 1 00     Types: Cigarettes    Smokeless tobacco: Never Used   Substance and Sexual Activity    Alcohol use: No     Comment: quit drinking, social    Drug use: No    Sexual activity: Not on file         Current Outpatient Medications:     acetaminophen (TYLENOL) 325 mg tablet, Take 3 tablets (975 mg total) by mouth every 8 (eight) hours, Disp: 30 tablet, Rfl: 0    albuterol (PROVENTIL HFA,VENTOLIN HFA) 90 mcg/act inhaler, Inhale 2 puffs every 4 (four) hours as needed for wheezing or shortness of breath (cough), Disp: 1 Inhaler, Rfl: 1    baclofen 10 mg tablet, Take 1 tablet (10 mg total) by mouth 3 (three) times a day, Disp: 90 tablet, Rfl: 0    BUTRANS 15 MCG/HR, APPLY 1 PATCH TD EVERY 7 DAYS for pain , Disp: 4 patch, Rfl: 2    Allergies   Allergen Reactions    Ampicillin GI Intolerance     Vomiting    Aspirin GI Intolerance     vomiting      Penicillins GI Intolerance     Vomit       Physical Exam:    /68 (BP Location: Left arm, Patient Position: Sitting, Cuff Size: Standard)   Pulse 78   Ht 6' (1 829 m)   Wt 82 1 kg (181 lb)   BMI 24 55 kg/m²     Constitutional:normal, well developed, well nourished, alert, in no distress and non-toxic and no overt pain behavior  Eyes:anicteric  HEENT:grossly intact  Neck:supple, symmetric, trachea midline and no masses   Pulmonary:even and unlabored  Cardiovascular:No edema or pitting edema present  Skin:Normal without rashes or lesions and well hydrated  Psychiatric:Mood and affect appropriate  Neurologic:Cranial Nerves II-XII grossly intact  Musculoskeletal:The patient's gait is slightly antalgic, but steady without the use of any assistive devices        Imaging  No orders to display         No orders of the defined types were placed in this encounter

## 2020-03-03 LAB
AMPHET SAL QL CFM: NEGATIVE NG/ML
BUPRENORPHINE SAL QL SCN: ABNORMAL NG/ML
CARBOXYTHC SAL QL CFM: NEGATIVE NG/ML
CCP IGG SERPL-ACNC: NEGATIVE
COCAINE SAL QL CFM: NEGATIVE NG/ML
CODEINE SAL QL CFM: NEGATIVE NG/ML
EDDP SAL QL CFM: NEGATIVE NG/ML
HYDROCODONE SAL QL CFM: NEGATIVE NG/ML
HYDROCODONE SAL QL CFM: NEGATIVE NG/ML
HYDROMORPHONE SAL QL CFM: NEGATIVE NG/ML
LEUKEMIA MARKERS BLD-IMP: NEGATIVE NG/ML
M PROTEIN 3 UR ELPH-MCNC: NEGATIVE NG/ML
M TB TUBERC IGNF/MITOGEN IGNF CONTROL: NEGATIVE NG/ML
METHADONE SAL QL CFM: NEGATIVE NG/ML
MORPHINE SAL QL CFM: NEGATIVE NG/ML
MORPHINE SAL QL CFM: NEGATIVE NG/ML
OXYMORPHONE SAL QL CFM: NEGATIVE NG/ML
OXYMORPHONE SAL QL CFM: NEGATIVE NG/ML
PCP SAL QL CFM: NEGATIVE NG/ML
RESULT ALL_PRESCRIBED MEDS AND SPECIAL INSTRUCTIONS: NORMAL
SL AMB 6-MAM (HEROIN METABOLITE) QUANTIFICATION: NEGATIVE NG/ML
SL AMB ALPRAZOLAM QUANTIFICATION: NEGATIVE NG/ML
SL AMB ATOMOXETINE METABOLITE QUANTIFICATION: NEGATIVE
SL AMB ATOMOXETINE QUANTIFICATION: NEGATIVE
SL AMB CLONAZEPAM QUANTIFICATION: NEGATIVE NG/ML
SL AMB DIAZEPAM QUANTIFICATION: NEGATIVE NG/ML
SL AMB FENTANYL QUANTIFICATION: NEGATIVE NG/ML
SL AMB MDMA QUANTIFICATION: NEGATIVE NG/ML
SL AMB N-DESMETHYL-TRAMADOL QUANTIFICATION SALIVA: NEGATIVE NG/ML
SL AMB NORBUPRENORPHINE QUANTIFICATION: ABNORMAL NG/ML
SL AMB NORDIAZEPAM QUANTIFICATION: NEGATIVE NG/ML
SL AMB NORFENTANYL QUANTIFICATION: NEGATIVE NG/ML
SL AMB NORHYDROCODONE QUANTIFICATION: NEGATIVE NG/ML
SL AMB NORHYDROCODONE QUANTIFICATION: NEGATIVE NG/ML
SL AMB NORMEPERIDINE QUANTIFICATION: NEGATIVE NG/ML
SL AMB NOROXYCODONE QUANTIFICATION: NEGATIVE NG/ML
SL AMB O-DESMETHYL-TRAMADOL QUANTIFICATION: NEGATIVE NG/ML
SL AMB OXAZEPAM QUANTIFICATION: NEGATIVE NG/ML
SL AMB RITALINIC ACID QUANTIFICATION: NEGATIVE
SL AMB TEMAZEPAM QUANTIFICATION: NEGATIVE NG/ML
SL AMB TEMAZEPAM QUANTIFICATION: NEGATIVE NG/ML
SL AMB TRAMADOL QUANTIFICATION: NEGATIVE NG/ML
SQUAMOUS #/AREA URNS HPF: NEGATIVE NG/ML

## 2020-03-16 ENCOUNTER — OFFICE VISIT (OUTPATIENT)
Dept: FAMILY MEDICINE CLINIC | Facility: CLINIC | Age: 45
End: 2020-03-16
Payer: MEDICARE

## 2020-03-16 ENCOUNTER — OFFICE VISIT (OUTPATIENT)
Dept: URGENT CARE | Facility: CLINIC | Age: 45
End: 2020-03-16
Payer: MEDICARE

## 2020-03-16 VITALS
RESPIRATION RATE: 16 BRPM | OXYGEN SATURATION: 98 % | DIASTOLIC BLOOD PRESSURE: 80 MMHG | TEMPERATURE: 96.2 F | WEIGHT: 176.2 LBS | BODY MASS INDEX: 23.86 KG/M2 | HEART RATE: 83 BPM | SYSTOLIC BLOOD PRESSURE: 110 MMHG | HEIGHT: 72 IN

## 2020-03-16 VITALS — OXYGEN SATURATION: 97 % | HEART RATE: 74 BPM | TEMPERATURE: 97 F

## 2020-03-16 DIAGNOSIS — R07.89 CHEST TIGHTNESS: ICD-10-CM

## 2020-03-16 DIAGNOSIS — R50.9 FEVER, UNSPECIFIED FEVER CAUSE: ICD-10-CM

## 2020-03-16 DIAGNOSIS — R53.83 FATIGUE, UNSPECIFIED TYPE: Primary | ICD-10-CM

## 2020-03-16 DIAGNOSIS — R05.9 COUGH: Primary | ICD-10-CM

## 2020-03-16 DIAGNOSIS — R05.9 COUGH: ICD-10-CM

## 2020-03-16 PROCEDURE — G0463 HOSPITAL OUTPT CLINIC VISIT: HCPCS | Performed by: PHYSICIAN ASSISTANT

## 2020-03-16 PROCEDURE — 99213 OFFICE O/P EST LOW 20 MIN: CPT | Performed by: NURSE PRACTITIONER

## 2020-03-16 PROCEDURE — 87631 RESP VIRUS 3-5 TARGETS: CPT | Performed by: PHYSICIAN ASSISTANT

## 2020-03-16 PROCEDURE — 99213 OFFICE O/P EST LOW 20 MIN: CPT | Performed by: PHYSICIAN ASSISTANT

## 2020-03-16 PROCEDURE — 4004F PT TOBACCO SCREEN RCVD TLK: CPT | Performed by: NURSE PRACTITIONER

## 2020-03-16 RX ORDER — PREDNISONE 10 MG/1
TABLET ORAL
Qty: 20 TABLET | Refills: 0 | Status: SHIPPED | OUTPATIENT
Start: 2020-03-16 | End: 2020-05-22

## 2020-03-16 RX ORDER — AZITHROMYCIN 250 MG/1
TABLET, FILM COATED ORAL
Qty: 6 TABLET | Refills: 0 | Status: SHIPPED | OUTPATIENT
Start: 2020-03-16 | End: 2020-03-21

## 2020-03-16 NOTE — PROGRESS NOTES
[facility]        NAME: Brook Casas is a 40 y o  male  : 1975    MRN: [id number]  DATE: 2020  TIME: 5:20 PM    Assessment and Plan   Cough [R05]  1  Cough  MISCELLANEOUS LAB TEST    Influenza A/B and RSV by PCR     COVID-19 swab obtained as well as influenza  Pt informed he must self quarantine until results are back  If symptoms worsen he is to contact family doctor and if needed proceed to ER  Patient Instructions   Patient Instructions   Viral Syndrome   WHAT YOU NEED TO KNOW:   Viral syndrome is a term used for a viral infection that has no clear cause  Viruses are spread easily from person to person through the air and on shared items  DISCHARGE INSTRUCTIONS:   Call 911 for the following:   · You have a seizure  · You cannot be woken  · You have chest pain or trouble breathing  Return to the emergency department if:   · You have a stiff neck, a bad headache, and sensitivity to light  · You feel weak, dizzy, or confused  · You stop urinating or urinate a lot less than normal      · You cough up blood or thick, yellow or green, mucus  · You have severe abdominal pain or your abdomen is larger than usual   Contact your healthcare provider if:   · Your symptoms do not get better with treatment, or get worse, after 3 days  · You have a rash or ear pain  · You have burning when you urinate  · You have questions or concerns about your condition or care  Medicines: You may  need any of the following:  · Acetaminophen  decreases pain and fever  It is available without a doctor's order  Ask how much medicine to take and how often to take it  Follow directions  Acetaminophen can cause liver damage if not taken correctly  · NSAIDs , such as ibuprofen, help decrease swelling, pain, and fever  NSAIDs can cause stomach bleeding or kidney problems in certain people   If you take blood thinner medicine, always ask your healthcare provider if NSAIDs are safe for you  Always read the medicine label and follow directions  · Cold medicine  helps decrease swelling, control a cough, and relieve chest or nasal congestion  · Saline nasal spray  helps decrease nasal congestion  · Take your medicine as directed  Contact your healthcare provider if you think your medicine is not helping or if you have side effects  Tell him of her if you are allergic to any medicine  Keep a list of the medicines, vitamins, and herbs you take  Include the amounts, and when and why you take them  Bring the list or the pill bottles to follow-up visits  Carry your medicine list with you in case of an emergency  Manage your symptoms:   · Drink liquids as directed  to prevent dehydration  Ask how much liquid to drink each day and which liquids are best for you  Ask if you should drink an oral rehydration solution (ORS)  An ORS has the right amounts of water, salts, and sugar you need to replace body fluids  This may help prevent dehydration caused by vomiting or diarrhea  Do not drink liquids with caffeine  Drinks with caffeine can make dehydration worse  · Get plenty of rest  to help your body heal  Take naps throughout the day  Ask your healthcare provider when you can return to work and your normal activities  · Use a cool mist humidifier  to help you breathe easier if you have nasal or chest congestion  Ask your healthcare provider how to use a cool mist humidifier  · Eat honey or use cough drops  to help decrease throat discomfort  Ask your healthcare provider how much honey you should eat each day  Cough drops are available without a doctor's order  Follow directions for taking cough drops  · Do not smoke and stay away from others who smoke  Nicotine and other chemicals in cigarettes and cigars can cause lung damage  Smoking can also delay healing  Ask your healthcare provider for information if you currently smoke and need help to quit   E-cigarettes or smokeless tobacco still contain nicotine  Talk to your healthcare provider before you use these products  · Wash your hands frequently  to prevent the spread of germs to others  Use soap and water  Use gel hand  when soap and water are not available  Wash your hands after you use the bathroom, cough, or sneeze  Wash your hands before you prepare or eat food  Follow up with your healthcare provider as directed:  Write down your questions so you remember to ask them during your visits  © 2017 [address] [employer] Information is for End User's use only and may not be sold, redistributed or otherwise used for commercial purposes  All illustrations and images included in CareNotes® are the copyrighted property of [employer]  or [employer]  The above information is an  only  It is not intended as medical advice for individual conditions or treatments  Talk to your doctor, nurse or pharmacist before following any medical regimen to see if it is safe and effective for you  Proceed to  ER if symptoms worsen  Chief Complaint   No chief complaint on file  History of Present Illness       Pt presents for COVID-19 testing  His primary doctor sent him in for testing  Pt has had low grade fever, cough, sob for the past 3 days  He works for the [employer] so is inside of others houses every day  Hx of exercised induced asthma  Also hx of smoking but has not been smoking due to his symptoms  Reports shortness of breath with exertion recently  Pain in chest with cough  Review of Systems   Review of Systems   Constitutional: Positive for fever  Respiratory: Positive for cough, chest tightness and shortness of breath  Cardiovascular: Negative  Gastrointestinal: Negative  Musculoskeletal: Negative  Neurological: Negative            Current Medications       Current Outpatient Medications:     acetaminophen (TYLENOL) 325 mg tablet, Take 3 tablets (975 mg total) by mouth every 8 (eight) hours, Disp: 30 tablet, Rfl: 0    albuterol (PROVENTIL HFA,VENTOLIN HFA) 90 mcg/act inhaler, Inhale 2 puffs every 4 (four) hours as needed for wheezing or shortness of breath (cough), Disp: 1 Inhaler, Rfl: 1    baclofen 10 mg tablet, Take 1 tablet (10 mg total) by mouth 3 (three) times a day, Disp: 90 tablet, Rfl: 0    BUTRANS 15 MCG/HR, APPLY 1 PATCH TD EVERY 7 DAYS for pain , Disp: 4 patch, Rfl: 2    Current Allergies     Allergies as of 03/16/2020 - Reviewed 03/16/2020   Allergen Reaction Noted    Ampicillin GI Intolerance 11/09/2017    Aspirin GI Intolerance 08/02/2017    Penicillins GI Intolerance 11/09/2017            The following portions of the patient's history were reviewed and updated as appropriate: allergies, current medications, past family history, past medical history, past social history, past surgical history and problem list      Past Medical History:   Diagnosis Date    Anxiety     Arthritis     Asthma     Bipolar disorder ([facility] [address] )     Chronic left lumbar radiculopathy     Chronic low back pain     Chronic pain syndrome     Chronic right shoulder pain     Depression with anxiety     Fatigue     GERD (gastroesophageal reflux disease)     Hydronephrosis     Iron deficiency anemia     Kidney stone     Lytic bone lesions on xray     Nephrolithiasis     PONV (postoperative nausea and vomiting)     Right elbow pain     Right lateral epicondylitis        Past Surgical History:   Procedure Laterality Date    ADENOIDECTOMY Bilateral     APPENDECTOMY      BACK SURGERY      CHOLECYSTECTOMY      CHOLECYSTECTOMY LAPAROSCOPIC N/A 10/30/2018    Procedure: CHOLECYSTECTOMY WITH GASTROTOMY LAPAROSCOPIC;  Surgeon: Micheal Mcallister MD;  [facility];  Service: General    ERCP W/ SPHICTEROTOMY N/A 10/30/2018    Procedure: ENDOSCOPIC RETROGRADE CHOLANGIOPANCREATOGRAPHY (ERCP) W/ SPHINCTEROTOMY;  Surgeon: Rigo Manning MD; [facility];  Service: Gastroenterology    GASTRIC BYPASS      2009    OTHER SURGICAL HISTORY      fusion/refusion of vertebrae, 2010 and 2011 l5-s1 and l4-l5    IN CYSTO/URETERO W/LITHOTRIPSY &INDWELL STENT INSRT Right 8/8/2017    Procedure: CYSTOSCOPY; URETEROSCOPY; HOLMIUM LASER; RETROGRADE PYELOGRAM; STENT;  Surgeon: Sherry Foss MD;  [facility];  Service: Urology    IN IMPLANT SPINAL NEUROSTIM/ Right 11/14/2017    Procedure: REMOVAL LOWER MEDIAL BUTTOCK SPINAL CORD STIMULATOR GENERATOR; PLACEMENT OF NEW BUTTOCK SPINAL CORD STIMULATOR THROUGH SEPERATE INCISION;  Surgeon: Joey Riggs MD;  [facility];  Service: Neurosurgery    RIK-EN-Y PROCEDURE  2003    SPINAL CORD STIMULATOR IMPLANT  11/14/2017    dr Jd Capone procedure and technique 1  removal of a right back implantable pulse generator 2 placement of a new right buttock impantable pulse generator through seperate incision 3 electric analys complex programming spinal cord stimulator system postoperative period approx 1 hour    TONSILLECTOMY         Family History   Problem Relation Age of Onset    Cancer Mother     Arthritis Mother     Stomach cancer Mother     Cancer Father     Esophageal cancer Father     Other Father         brain tumor    No Known Problems Sister     No Known Problems Sister     No Known Problems Sister          Medications have been verified  Objective   Pulse 74   Temp (!) 97 °F (36 1 °C) (Tympanic)   SpO2 97%        Physical Exam     Physical Exam   Constitutional: He is oriented to person, place, and time  He appears well-developed  No distress  Eyes: Conjunctivae are normal    Cardiovascular: Normal rate and regular rhythm  Pulmonary/Chest: Effort normal and breath sounds normal  No respiratory distress  He has no wheezes  Neurological: He is alert and oriented to person, place, and time  Skin: Skin is warm and dry  Vitals reviewed

## 2020-03-16 NOTE — PATIENT INSTRUCTIONS
Viral Syndrome   WHAT YOU NEED TO KNOW:   Viral syndrome is a term used for a viral infection that has no clear cause  Viruses are spread easily from person to person through the air and on shared items  DISCHARGE INSTRUCTIONS:   Call 911 for the following:   · You have a seizure  · You cannot be woken  · You have chest pain or trouble breathing  Return to the emergency department if:   · You have a stiff neck, a bad headache, and sensitivity to light  · You feel weak, dizzy, or confused  · You stop urinating or urinate a lot less than normal      · You cough up blood or thick, yellow or green, mucus  · You have severe abdominal pain or your abdomen is larger than usual   Contact your healthcare provider if:   · Your symptoms do not get better with treatment, or get worse, after 3 days  · You have a rash or ear pain  · You have burning when you urinate  · You have questions or concerns about your condition or care  Medicines: You may  need any of the following:  · Acetaminophen  decreases pain and fever  It is available without a doctor's order  Ask how much medicine to take and how often to take it  Follow directions  Acetaminophen can cause liver damage if not taken correctly  · NSAIDs , such as ibuprofen, help decrease swelling, pain, and fever  NSAIDs can cause stomach bleeding or kidney problems in certain people  If you take blood thinner medicine, always ask your healthcare provider if NSAIDs are safe for you  Always read the medicine label and follow directions  · Cold medicine  helps decrease swelling, control a cough, and relieve chest or nasal congestion  · Saline nasal spray  helps decrease nasal congestion  · Take your medicine as directed  Contact your healthcare provider if you think your medicine is not helping or if you have side effects  Tell him of her if you are allergic to any medicine   Keep a list of the medicines, vitamins, and herbs you take  Include the amounts, and when and why you take them  Bring the list or the pill bottles to follow-up visits  Carry your medicine list with you in case of an emergency  Manage your symptoms:   · Drink liquids as directed  to prevent dehydration  Ask how much liquid to drink each day and which liquids are best for you  Ask if you should drink an oral rehydration solution (ORS)  An ORS has the right amounts of water, salts, and sugar you need to replace body fluids  This may help prevent dehydration caused by vomiting or diarrhea  Do not drink liquids with caffeine  Drinks with caffeine can make dehydration worse  · Get plenty of rest  to help your body heal  Take naps throughout the day  Ask your healthcare provider when you can return to work and your normal activities  · Use a cool mist humidifier  to help you breathe easier if you have nasal or chest congestion  Ask your healthcare provider how to use a cool mist humidifier  · Eat honey or use cough drops  to help decrease throat discomfort  Ask your healthcare provider how much honey you should eat each day  Cough drops are available without a doctor's order  Follow directions for taking cough drops  · Do not smoke and stay away from others who smoke  Nicotine and other chemicals in cigarettes and cigars can cause lung damage  Smoking can also delay healing  Ask your healthcare provider for information if you currently smoke and need help to quit  E-cigarettes or smokeless tobacco still contain nicotine  Talk to your healthcare provider before you use these products  · Wash your hands frequently  to prevent the spread of germs to others  Use soap and water  Use gel hand  when soap and water are not available  Wash your hands after you use the bathroom, cough, or sneeze  Wash your hands before you prepare or eat food    Follow up with your healthcare provider as directed:  Write down your questions so you remember to ask them during your visits  © 2017 [employer] [employer] Information is for End User's use only and may not be sold, redistributed or otherwise used for commercial purposes  All illustrations and images included in CareNotes® are the copyrighted property of [employer]  or [employer]  The above information is an  only  It is not intended as medical advice for individual conditions or treatments  Talk to your doctor, nurse or pharmacist before following any medical regimen to see if it is safe and effective for you

## 2020-03-16 NOTE — PROGRESS NOTES
FAMILY PRACTICE OFFICE VISIT       NAME: Winston Dunlap  AGE: 40 y o  SEX: male       : 1975        MRN: [id number]    Assessment and Plan     Problem List Items Addressed This Visit        Other    Fatigue - Primary    Relevant Orders    Transfer to other facility      Other Visit Diagnoses     Cough        Relevant Medications    azithromycin (ZITHROMAX) 250 mg tablet    predniSONE 10 mg tablet    Other Relevant Orders    Transfer to other facility    Fever, unspecified fever cause        Relevant Medications    azithromycin (ZITHROMAX) 250 mg tablet    predniSONE 10 mg tablet    Other Relevant Orders    Transfer to other facility    Chest tightness        Relevant Orders    Transfer to other facility          1  Fatigue, unspecified type  Transfer to other facility   2  Cough  Transfer to other facility    azithromycin (ZITHROMAX) 250 mg tablet    predniSONE 10 mg tablet   3  Fever, unspecified fever cause  Transfer to other facility    azithromycin (ZITHROMAX) 250 mg tablet    predniSONE 10 mg tablet   4  Chest tightness  Transfer to other facility     This 40year old male presents today for upper respiratory symptoms, fatigue, fever, cough, chest tightness, shortness of breath that began 3 days ago  Currently smoking and reports history of exercise induced asthma  Suspect bronchitis, however given multiple contacts through his job, will send patient for COVID-19 at urgent care  While awaiting COVID-19 swab, will treat for bronchitis, with Prednisone taper and z-maegan  While awaiting COVID-19 he will self-isolate  He is able to isolate in his own room and use his own bathroom  He lives with significant other, who can help care for him  We discussed methods of preventing spread of illness to his significant other  If he has any worsening of symptoms, he is advised to call or go to the emergency room         Chief Complaint     Chief Complaint   Patient presents with    Cold Like Symptoms     Pt is here for fatigue, cough, tightness in chest, fevers 3 + days       History of Present Illness     Pina Floyd is a 40year old male presenting today cold symptoms that began 3 days ago  Symptoms include: cough, shortness of breath, sore throat, rhinorrhea, nasal congestion, slight headaches  Last night had extreme hot and cold flashes, felt feverish, did not take his temperature  Has been taking Tylenol  Has been using DayQuil and NyQuil  Did not work yesterday or today  He works for [employer], is in and out of a lot of homes  Does not know if any one has been ill in those homes  Currently daily smoker  States he has "sport asthma "                Review of Systems   Review of Systems   Constitutional: Positive for chills, diaphoresis, fatigue and fever  HENT: Positive for congestion, postnasal drip, rhinorrhea and sore throat  Negative for ear pain  Respiratory: Positive for cough and shortness of breath  Negative for chest tightness and wheezing  Cardiovascular: Negative  Gastrointestinal: Negative  Neurological: Positive for headaches  Negative for dizziness         Active Problem List     Patient Active Problem List   Diagnosis    Chronic low back pain    Bipolar 2 disorder, major depressive episode ([address] )    Depression with anxiety    Fatigue    Chronic anemia    Nephrolithiasis    Pain syndrome, chronic    Right elbow pain    Uncomplicated opioid dependence ([address] )    Thumb pain    Lumbar post-laminectomy syndrome    Myofascial pain syndrome    Lumbar radiculopathy    Spinal stenosis of lumbar region with neurogenic claudication    BPH with obstruction/lower urinary tract symptoms    Iron deficiency anemia    Vitamin B deficiency    Postgastrectomy malabsorption    History of Parth-en-Y gastric bypass    Tobacco abuse    Vitamin D deficiency    PTSD (post-traumatic stress disorder)    Sacroiliitis, not elsewhere classified (HCC)    Nausea and vomiting Past Medical History:  Past Medical History:   Diagnosis Date    Anxiety     Arthritis     Asthma     Bipolar disorder (HCC)     Chronic left lumbar radiculopathy     Chronic low back pain     Chronic pain syndrome     Chronic right shoulder pain     Depression with anxiety     Fatigue     GERD (gastroesophageal reflux disease)     Hydronephrosis     Iron deficiency anemia     Kidney stone     Lytic bone lesions on xray     Nephrolithiasis     PONV (postoperative nausea and vomiting)     Right elbow pain     Right lateral epicondylitis        Past Surgical History:  Past Surgical History:   Procedure Laterality Date    ADENOIDECTOMY Bilateral     APPENDECTOMY      BACK SURGERY      CHOLECYSTECTOMY      CHOLECYSTECTOMY LAPAROSCOPIC N/A 10/30/2018    Procedure: CHOLECYSTECTOMY WITH GASTROTOMY LAPAROSCOPIC;  Surgeon: Tomas Najjar, MD;  [facility];  Service: General    ERCP W/ SPHICTEROTOMY N/A 10/30/2018    Procedure: ENDOSCOPIC RETROGRADE CHOLANGIOPANCREATOGRAPHY (ERCP) W/ SPHINCTEROTOMY;  Surgeon: Eduardo Sanders MD;  [facility];  Service: Gastroenterology    GASTRIC BYPASS      2009    OTHER SURGICAL HISTORY      fusion/refusion of vertebrae, 2010 and 2011 l5-s1 and l4-l5    CT CYSTO/URETERO W/LITHOTRIPSY &INDWELL STENT INSRT Right 8/8/2017    Procedure: CYSTOSCOPY; URETEROSCOPY; HOLMIUM LASER; RETROGRADE PYELOGRAM; STENT;  Surgeon: Vero Lucia MD;  [facility];  Service: Urology    CT IMPLANT SPINAL NEUROSTIM/ Right 11/14/2017    Procedure: REMOVAL LOWER MEDIAL BUTTOCK SPINAL CORD STIMULATOR GENERATOR; PLACEMENT OF NEW BUTTOCK SPINAL CORD STIMULATOR THROUGH SEPERATE INCISION;  Surgeon: Sebastian Carl MD;  [facility];  Service: Neurosurgery    RIK-EN-Y PROCEDURE  2003    SPINAL CORD STIMULATOR IMPLANT  11/14/2017    dr Barb Love procedure and technique 1  removal of a right back implantable pulse generator 2 placement of a new right buttock impantable pulse generator through seperate incision 3 electric analys complex programming spinal cord stimulator system postoperative period approx 1 hour    TONSILLECTOMY         Family History:  Family History   Problem Relation Age of Onset    Cancer Mother     Arthritis Mother     Stomach cancer Mother     Cancer Father     Esophageal cancer Father     Other Father         brain tumor    No Known Problems Sister     No Known Problems Sister     No Known Problems Sister        Social History:  Social History     Socioeconomic History    Marital status:      Spouse name: Not on file    Number of children: Not on file    Years of education: GED    Highest education level: Not on file   Occupational History    Not on file   Social Needs    Financial resource strain: Not on file    Food insecurity:     Worry: Not on file     Inability: Not on file    Transportation needs:     Medical: Not on file     Non-medical: Not on file   Tobacco Use    Smoking status: Current Every Day Smoker     Packs/day: 1 00     Types: Cigarettes    Smokeless tobacco: Never Used   Substance and Sexual Activity    Alcohol use: No     Comment: quit drinking, social    Drug use: No    Sexual activity: Not on file   Lifestyle    Physical activity:     Days per week: Not on file     Minutes per session: Not on file    Stress: Not on file   Relationships    Social connections:     Talks on phone: Not on file     Gets together: Not on file     Attends Sikhism service: Not on file     Active member of club or organization: Not on file     Attends meetings of clubs or organizations: Not on file     Relationship status: Not on file    Intimate partner violence:     Fear of current or ex partner: Not on file     Emotionally abused: Not on file     Physically abused: Not on file     Forced sexual activity: Not on file   Other Topics Concern    Not on file   Social History Narrative    Chooses not to have children    Drinks caffienated tea    Lives with spouse               I have reviewed the patient's medical history in detail; there are no changes to the history as noted in the electronic medical record  Objective     Vitals:    03/16/20 1408   BP: 110/80   Pulse: 83   Resp: 16   Temp: (!) 96 2 °F (35 7 °C)   TempSrc: Tympanic   SpO2: 98%   Weight: 79 9 kg (176 lb 3 2 oz)   Height: 6' (1 829 m)     Wt Readings from Last 3 Encounters:   03/16/20 79 9 kg (176 lb 3 2 oz)   02/28/20 82 1 kg (181 lb)   01/27/20 80 1 kg (176 lb 9 6 oz)     Physical Exam   Constitutional: He appears well-developed and well-nourished  No distress  HENT:   Head: Normocephalic and atraumatic  Right Ear: Tympanic membrane and ear canal normal    Left Ear: Tympanic membrane and ear canal normal    Nose: Mucosal edema present  Mouth/Throat: Posterior oropharyngeal erythema present  No oropharyngeal exudate or posterior oropharyngeal edema  Eyes: Conjunctivae are normal    Neck: Normal range of motion  Neck supple  Cardiovascular: Normal rate, regular rhythm and normal heart sounds  No murmur heard  Pulmonary/Chest: Effort normal  No respiratory distress  He has wheezes (expiratory wheezes)  Lymphadenopathy:     He has no cervical adenopathy  Psychiatric: He has a normal mood and affect  Nursing note and vitals reviewed           ALLERGIES:  Allergies   Allergen Reactions    Ampicillin GI Intolerance     Vomiting    Aspirin GI Intolerance     vomiting      Penicillins GI Intolerance     Vomit       Current Medications     Current Outpatient Medications   Medication Sig Dispense Refill    acetaminophen (TYLENOL) 325 mg tablet Take 3 tablets (975 mg total) by mouth every 8 (eight) hours 30 tablet 0    albuterol (PROVENTIL HFA,VENTOLIN HFA) 90 mcg/act inhaler Inhale 2 puffs every 4 (four) hours as needed for wheezing or shortness of breath (cough) 1 Inhaler 1    baclofen 10 mg tablet Take 1 tablet (10 mg total) by mouth 3 (three) times a day 90 tablet 0    BUTRANS 15 MCG/HR APPLY 1 PATCH TD EVERY 7 DAYS for pain  4 patch 2    azithromycin (ZITHROMAX) 250 mg tablet Take 2 tablets on day 1 and then take 1 tablet on days 2-5  6 tablet 0    predniSONE 10 mg tablet Take 4 tablets for 2 days, 3 tablets for 2 days, 2 tablets for 2 days, 1 tablet for 2 days  20 tablet 0     No current facility-administered medications for this visit            Health Maintenance     Health Maintenance   Topic Date Due    Pneumococcal Vaccine: Pediatrics (0 to 5 Years) and At-Risk Patients (6 to 59 Years) (1 of 1 - PPSV23) 08/22/1981    DTaP,Tdap,and Td Vaccines (1 - Tdap) 08/22/1986    HIV Screening  08/22/1990    Medicare Annual Wellness Visit (AWV)  09/13/2019    Influenza Vaccine  09/16/2020 (Originally 7/1/2019)    BMI: Adult  03/16/2021    Pneumococcal Vaccine: 65+ Years (1 of 2 - PCV13) 08/22/2040    HIB Vaccine  Aged Out    Hepatitis B Vaccine  Aged Out    IPV Vaccine  Aged Out    Hepatitis A Vaccine  Aged Out    Meningococcal ACWY Vaccine  Aged Out    HPV Vaccine  Aged Out     Immunization History   Administered Date(s) Administered    INFLUENZA 12/15/2018    Influenza, injectable, quadrivalent, preservative free 0 5 mL 12/15/2018       ANTONIA Luis

## 2020-03-16 NOTE — LETTER
March 16, 2020     Patient: Christine Riley   YOB: 1975   Date of Visit: 3/16/2020       To Whom it May Concern:    Christine Riley was seen in my clinic on 3/16/2020  He may not return to work until cleared by physician  If you have any questions or concerns, please don't hesitate to call           Sincerely,          Karolyn Covington PA-C        CC: No Recipients

## 2020-03-17 LAB
FLUAV RNA NPH QL NAA+PROBE: NORMAL
FLUBV RNA NPH QL NAA+PROBE: NORMAL
RSV RNA NPH QL NAA+PROBE: NORMAL

## 2020-03-18 LAB — SARS-COV-2 RNA SPEC QL NAA+PROBE: NOT DETECTED

## 2020-03-20 ENCOUNTER — TELEPHONE (OUTPATIENT)
Dept: URGENT CARE | Facility: CLINIC | Age: 45
End: 2020-03-20

## 2020-03-20 ENCOUNTER — DOCUMENTATION (OUTPATIENT)
Dept: URGENT CARE | Facility: CLINIC | Age: 45
End: 2020-03-20

## 2020-03-24 DIAGNOSIS — J20.9 ACUTE BRONCHITIS, UNSPECIFIED ORGANISM: ICD-10-CM

## 2020-03-25 NOTE — TELEPHONE ENCOUNTER
Left detailed message for patient to let him know his script was refilled and asked him to call us back to let us know how he is feeling

## 2020-03-26 ENCOUNTER — TELEPHONE (OUTPATIENT)
Dept: PAIN MEDICINE | Facility: CLINIC | Age: 45
End: 2020-03-26

## 2020-03-26 NOTE — TELEPHONE ENCOUNTER
Pt called stating he is having a problem getting his butrons patches  Pharmacy is stating it needs a prior authorization   Please reach out to patient , thanks

## 2020-03-29 NOTE — PROGRESS NOTES
Order(s) created erroneously   Erroneous order ID: [id number]   Order moved by: Charmayne Pax   Order move date/time: 03/29/2020 3:14 PM   Source Patient: [id number]   Source Contact: 03/16/2020   Destination Patient: [id number]   Destination Contact: 03/16/2020

## 2020-04-08 ENCOUNTER — TELEPHONE (OUTPATIENT)
Dept: FAMILY MEDICINE CLINIC | Facility: CLINIC | Age: 45
End: 2020-04-08

## 2020-04-30 ENCOUNTER — TELEMEDICINE (OUTPATIENT)
Dept: BARIATRICS | Facility: CLINIC | Age: 45
End: 2020-04-30
Payer: MEDICARE

## 2020-04-30 ENCOUNTER — TELEPHONE (OUTPATIENT)
Dept: BARIATRICS | Facility: CLINIC | Age: 45
End: 2020-04-30

## 2020-04-30 VITALS — BODY MASS INDEX: 24.38 KG/M2 | HEIGHT: 72 IN | WEIGHT: 180 LBS

## 2020-04-30 DIAGNOSIS — K31.1 GASTRIC OUTLET OBSTRUCTION: Primary | ICD-10-CM

## 2020-04-30 PROCEDURE — 99214 OFFICE O/P EST MOD 30 MIN: CPT | Performed by: SURGERY

## 2020-05-22 ENCOUNTER — TELEMEDICINE (OUTPATIENT)
Dept: PAIN MEDICINE | Facility: CLINIC | Age: 45
End: 2020-05-22
Payer: MEDICARE

## 2020-05-22 DIAGNOSIS — M54.42 CHRONIC LOW BACK PAIN WITH BILATERAL SCIATICA, UNSPECIFIED BACK PAIN LATERALITY: ICD-10-CM

## 2020-05-22 DIAGNOSIS — M96.1 LUMBAR POST-LAMINECTOMY SYNDROME: ICD-10-CM

## 2020-05-22 DIAGNOSIS — M46.1 SACROILIITIS, NOT ELSEWHERE CLASSIFIED (HCC): ICD-10-CM

## 2020-05-22 DIAGNOSIS — M54.16 LUMBAR RADICULOPATHY: ICD-10-CM

## 2020-05-22 DIAGNOSIS — M25.511 CHRONIC RIGHT SHOULDER PAIN: ICD-10-CM

## 2020-05-22 DIAGNOSIS — G89.29 CHRONIC RIGHT SHOULDER PAIN: ICD-10-CM

## 2020-05-22 DIAGNOSIS — M79.18 MYOFASCIAL PAIN SYNDROME: ICD-10-CM

## 2020-05-22 DIAGNOSIS — G89.4 PAIN SYNDROME, CHRONIC: Primary | ICD-10-CM

## 2020-05-22 DIAGNOSIS — G89.29 CHRONIC LOW BACK PAIN WITH BILATERAL SCIATICA, UNSPECIFIED BACK PAIN LATERALITY: ICD-10-CM

## 2020-05-22 DIAGNOSIS — M54.41 CHRONIC LOW BACK PAIN WITH BILATERAL SCIATICA, UNSPECIFIED BACK PAIN LATERALITY: ICD-10-CM

## 2020-05-22 DIAGNOSIS — M48.062 SPINAL STENOSIS OF LUMBAR REGION WITH NEUROGENIC CLAUDICATION: ICD-10-CM

## 2020-05-22 PROCEDURE — 99214 OFFICE O/P EST MOD 30 MIN: CPT | Performed by: NURSE PRACTITIONER

## 2020-05-22 RX ORDER — BUPRENORPHINE 15 UG/H
PATCH, EXTENDED RELEASE TRANSDERMAL
Qty: 4 PATCH | Refills: 2 | Status: SHIPPED | OUTPATIENT
Start: 2020-05-22 | End: 2020-08-14 | Stop reason: SDUPTHER

## 2020-05-22 RX ORDER — BACLOFEN 10 MG/1
10 TABLET ORAL 3 TIMES DAILY
Qty: 90 TABLET | Refills: 2 | Status: SHIPPED | OUTPATIENT
Start: 2020-05-22 | End: 2020-08-14 | Stop reason: SDUPTHER

## 2020-06-08 ENCOUNTER — PATIENT MESSAGE (OUTPATIENT)
Dept: BARIATRICS | Facility: CLINIC | Age: 45
End: 2020-06-08

## 2020-06-08 ENCOUNTER — CLINICAL SUPPORT (OUTPATIENT)
Dept: BARIATRICS | Facility: CLINIC | Age: 45
End: 2020-06-08

## 2020-06-08 VITALS — BODY MASS INDEX: 24.38 KG/M2 | WEIGHT: 180 LBS | HEIGHT: 72 IN

## 2020-06-08 DIAGNOSIS — K31.6 GASTROGASTRIC FISTULA: Primary | ICD-10-CM

## 2020-06-08 DIAGNOSIS — Z98.84 HISTORY OF ROUX-EN-Y GASTRIC BYPASS: Primary | ICD-10-CM

## 2020-06-08 PROCEDURE — RECHECK

## 2020-07-30 ENCOUNTER — OFFICE VISIT (OUTPATIENT)
Dept: OBGYN CLINIC | Facility: HOSPITAL | Age: 45
End: 2020-07-30
Payer: OTHER MISCELLANEOUS

## 2020-07-30 ENCOUNTER — HOSPITAL ENCOUNTER (OUTPATIENT)
Dept: RADIOLOGY | Facility: HOSPITAL | Age: 45
Discharge: HOME | End: 2020-07-30
Attending: ORTHOPAEDIC SURGERY
Payer: MEDICARE

## 2020-07-30 VITALS
BODY MASS INDEX: 25.19 KG/M2 | HEART RATE: 81 BPM | WEIGHT: 186 LBS | HEIGHT: 72 IN | DIASTOLIC BLOOD PRESSURE: 79 MMHG | SYSTOLIC BLOOD PRESSURE: 132 MMHG

## 2020-07-30 DIAGNOSIS — S83.005A PATELLAR DISLOCATION, LEFT, INITIAL ENCOUNTER: ICD-10-CM

## 2020-07-30 DIAGNOSIS — M25.562 LEFT KNEE PAIN, UNSPECIFIED CHRONICITY: ICD-10-CM

## 2020-07-30 DIAGNOSIS — M25.562 ACUTE PAIN OF LEFT KNEE: Primary | ICD-10-CM

## 2020-07-30 PROCEDURE — 73560 X-RAY EXAM OF KNEE 1 OR 2: CPT

## 2020-07-30 PROCEDURE — 3008F BODY MASS INDEX DOCD: CPT | Performed by: ORTHOPAEDIC SURGERY

## 2020-07-30 PROCEDURE — 99213 OFFICE O/P EST LOW 20 MIN: CPT | Performed by: ORTHOPAEDIC SURGERY

## 2020-07-30 PROCEDURE — 4004F PT TOBACCO SCREEN RCVD TLK: CPT | Performed by: ORTHOPAEDIC SURGERY

## 2020-07-30 NOTE — LETTER
July 30, 2020     Patient: Anika Argueta   YOB: 1975   Date of Visit: 7/30/2020       To Whom it May Concern:    Anika Argueta is under my professional care  He was seen in my office on 7/30/2020  He is excused from work until further orthopedic evaluation  If you have any questions or concerns, please don't hesitate to call           Sincerely,          Liz Otto MD        CC: No Recipients

## 2020-07-30 NOTE — PROGRESS NOTES
Assessment:   Diagnosis ICD-10-CM Associated Orders   1  Acute pain of left knee M25 562    2  Patellar dislocation, left, initial encounter S83 005A     7/28/2020       Plan:  Diagnostics reviewed and physical exam performed  Diagnosis, treatment options and associated risks were discussed with the patient including no treatment, nonsurgical treatment and potential for surgical intervention  The patient was given the opportunity to ask questions regarding each  Recommendation at this time is to give his left knee a rest for the next 4 weeks with use of his immobilizer  Excuse note for work provided  Weightbearing activities as tolerated  To do next visit:  Return in about 4 weeks (around 8/27/2020) for re-check  The above stated was discussed in layman's terms and the patient expressed understanding  All questions were answered to the patient's satisfaction  Scribe Attestation    I,:    am acting as a scribe while in the presence of the attending physician :        I,:    personally performed the services described in this documentation    as scribed in my presence :              Subjective:   Seda Barnes is a 40 y o  male who presents for initial evaluation due to pain at his left knee  Patient states on 07/28/2020 while cleaning out the back of his than his left foot was planted and fixed into a school wires where he ended up twisting his knee resulting in a lateral dislocation of his kneecap  He did relocate his patella on his own  He went to a [facility] where he was placed into an immobilizer  Patient denies any significant injury or pain complaints at his left knee prior to his onset of symptoms 2 days ago  Patient is a self-employed contractor for a local [employer]        Review of systems negative unless otherwise specified in HPI    Past Medical History:   Diagnosis Date    Anxiety     Arthritis     Asthma     Bipolar disorder ([address] )     Chronic left lumbar radiculopathy     Chronic low back pain     Chronic pain syndrome     Chronic right shoulder pain     Depression with anxiety     Fatigue     GERD (gastroesophageal reflux disease)     Hydronephrosis     Iron deficiency anemia     Kidney stone     Lytic bone lesions on xray     Nephrolithiasis     PONV (postoperative nausea and vomiting)     Right elbow pain     Right lateral epicondylitis        Past Surgical History:   Procedure Laterality Date    ADENOIDECTOMY Bilateral     APPENDECTOMY      BACK SURGERY      CHOLECYSTECTOMY      CHOLECYSTECTOMY LAPAROSCOPIC N/A 10/30/2018    Procedure: CHOLECYSTECTOMY WITH GASTROTOMY LAPAROSCOPIC;  Surgeon: Pat Cruz MD;  [facility];  Service: General    ERCP W/ SPHICTEROTOMY N/A 10/30/2018    Procedure: ENDOSCOPIC RETROGRADE CHOLANGIOPANCREATOGRAPHY (ERCP) W/ SPHINCTEROTOMY;  Surgeon: Neville York MD;  [facility];  Service: Gastroenterology    GASTRIC BYPASS      2009    OTHER SURGICAL HISTORY      fusion/refusion of vertebrae, 2010 and 2011 l5-s1 and l4-l5    ME CYSTO/URETERO W/LITHOTRIPSY &INDWELL STENT INSRT Right 8/8/2017    Procedure: CYSTOSCOPY; URETEROSCOPY; HOLMIUM LASER; RETROGRADE PYELOGRAM; STENT;  Surgeon: John Houston MD;  [facility];  Service: Urology    ME IMPLANT SPINAL NEUROSTIM/ Right 11/14/2017    Procedure: REMOVAL LOWER MEDIAL BUTTOCK SPINAL CORD STIMULATOR GENERATOR; PLACEMENT OF NEW BUTTOCK SPINAL CORD STIMULATOR THROUGH SEPERATE INCISION;  Surgeon: Jian Ford MD;  [facility];  Service: Neurosurgery    RIK-EN-Y PROCEDURE  2003    SPINAL CORD STIMULATOR IMPLANT  11/14/2017    dr Donny Cruz procedure and technique 1  removal of a right back implantable pulse generator 2 placement of a new right buttock impantable pulse generator through seperate incision 3 electric analys complex programming spinal cord stimulator system postoperative period approx 1 hour    TONSILLECTOMY Family History   Problem Relation Age of Onset    Cancer Mother     Arthritis Mother     Stomach cancer Mother     Cancer Father     Esophageal cancer Father     Other Father         brain tumor    No Known Problems Sister     No Known Problems Sister     No Known Problems Sister        Social History     Occupational History    Not on file   Tobacco Use    Smoking status: Current Every Day Smoker     Packs/day: 1 00     Types: Cigarettes    Smokeless tobacco: Never Used   Substance and Sexual Activity    Alcohol use: No     Comment: quit drinking, social    Drug use: No    Sexual activity: Not on file         Current Outpatient Medications:     acetaminophen (TYLENOL) 325 mg tablet, Take 3 tablets (975 mg total) by mouth every 8 (eight) hours, Disp: 30 tablet, Rfl: 0    baclofen 10 mg tablet, Take 1 tablet (10 mg total) by mouth 3 (three) times a day, Disp: 90 tablet, Rfl: 2    BUTRANS 15 MCG/HR, APPLY 1 PATCH TD EVERY 7 DAYS for pain , Disp: 4 patch, Rfl: 2    VENTOLIN  (90 Base) MCG/ACT inhaler, INHALE 2 PUFFS EVERY 4 (FOUR) HOURS AS NEEDED FOR WHEEZING OR SHORTNESS OF BREATH (COUGH), Disp: 18 Inhaler, Rfl: 1    Allergies   Allergen Reactions    Ampicillin GI Intolerance     Vomiting    Aspirin GI Intolerance     vomiting      Penicillins GI Intolerance     Vomit            Vitals:    07/30/20 1428   BP: 132/79   Pulse: 81       Objective:                    Left Knee Exam     Tenderness   The patient is experiencing tenderness in the medial retinaculum  Range of Motion   Extension: 0   Flexion: 110 (limited by guarding and stiffness)     Other   Erythema: absent  Sensation: normal  Swelling: mild  Effusion: effusion (trace) present    Comments:    Mild valgus alignment  Intact extensor mechanism  Mild quadriceps weakness  Calf and thigh are soft and non-tender            Diagnostics, reviewed and taken today if performed as documented:     The attending physician has personally reviewed the pertinent films in PACS and interpretation is as follows:    Left knee x-rays taken and reviewed in the office today and show:  No acute fracture or fracture, otherwise well preserved joint spaces  Procedures, if performed today:    Procedures    None performed      Portions of the record may have been created with voice recognition software  Occasional wrong word or "sound a like" substitutions may have occurred due to the inherent limitations of voice recognition software  Read the chart carefully and recognize, using context, where substitutions have occurred

## 2020-07-31 ENCOUNTER — TELEPHONE (OUTPATIENT)
Dept: PAIN MEDICINE | Facility: CLINIC | Age: 45
End: 2020-07-31

## 2020-07-31 DIAGNOSIS — G89.4 CHRONIC PAIN SYNDROME: Primary | ICD-10-CM

## 2020-07-31 RX ORDER — METHYLPREDNISOLONE 4 MG/1
TABLET ORAL
Qty: 21 TABLET | Refills: 0 | Status: SHIPPED | OUTPATIENT
Start: 2020-07-31 | End: 2020-08-14

## 2020-07-31 NOTE — TELEPHONE ENCOUNTER
S/w pt, stated that he twisted his leg and dislocated his L knee cap on tuesday 7/28  Pt stated that he saw Dr Roxana Dhillon who ordered an immobilizer and 4 weeks of rest  If no improvement, Dr Roxana Dhillon will order imaging and possibly surgery to repair  Per pt, his pain is getting worse  Taking tylenol and naproxen w/ no relief  Questioning something for pain  Pt stated that he has taken pain medication in the past - long ago, cannot recall what he took  Pt stated that he has not had any issue with allergies or adverse reactions to pain medications in the past  Advised pt, will d/w Dr Shelly Dunlap and cb to advise  Pt verbalized understanding and appreciation

## 2020-07-31 NOTE — TELEPHONE ENCOUNTER
S/w pt, advised of above  Pt verbalized agreement  Explained pt should take this medication as indicated on the package  Tylenol is ok - no nsaids with this medication; advil, aleve, ibuprofen, naproxen, etc  cb when complete with an update  Pt verbalized understanding and appreciation

## 2020-07-31 NOTE — TELEPHONE ENCOUNTER
Patient is under [facility] care currently for knee pain, the patch is not working he is asking can you recommend something else? Please advise,     Thank you       [phone]

## 2020-08-03 ENCOUNTER — TELEPHONE (OUTPATIENT)
Dept: PSYCHIATRY | Facility: CLINIC | Age: 45
End: 2020-08-03

## 2020-08-04 ENCOUNTER — TELEPHONE (OUTPATIENT)
Dept: OBGYN CLINIC | Facility: HOSPITAL | Age: 45
End: 2020-08-04

## 2020-08-04 NOTE — TELEPHONE ENCOUNTER
I called patient back and left a detailed msg via voicemail that he should do RICE method, tylenol 1000mg TID and NSAIDS OTC if able to take them    Is out of the office this week and Gifty FOWLER is out of the office as well  Ask him to check back in a few days if no improvement in pain

## 2020-08-07 ENCOUNTER — TELEPHONE (OUTPATIENT)
Dept: OBGYN CLINIC | Facility: HOSPITAL | Age: 45
End: 2020-08-07

## 2020-08-07 NOTE — TELEPHONE ENCOUNTER
Boyd Montanez from [place] [address] is calling for the 07/30/20 OVN and work status/  Faxed to [phone]

## 2020-08-10 ENCOUNTER — TELEPHONE (OUTPATIENT)
Dept: OBGYN CLINIC | Facility: HOSPITAL | Age: 45
End: 2020-08-10

## 2020-08-10 ENCOUNTER — TELEPHONE (OUTPATIENT)
Dept: PSYCHIATRY | Facility: CLINIC | Age: 45
End: 2020-08-10

## 2020-08-10 NOTE — TELEPHONE ENCOUNTER
Patient has been doing the RICE method for his left knee and still has pain  His next f/u visit is on 09/01/20  Please advise      Callback YN#[phone]

## 2020-08-10 NOTE — TELEPHONE ENCOUNTER
Message reviewed  We informed him at his visit 10 days ago that his injury may improve over a course of 4 weeks  Recommend giving it more time as at his office visit he stated he did not wish to proceed with surgical intervention, thus an MRI was not ordered and conservative methods were initiated  He may be informed that PO NSAIDs may take 7-10 days to be beneficial and only if taken regularly         Thank you

## 2020-08-11 NOTE — TELEPHONE ENCOUNTER
Patient called in stating he really would like to get an MRI to see if this injury is going to need surgical intervention or not  Patient does not want to be out for a period of time and then end up doing more damage  Patient states he has not slept due to the pain and wants to know the steps to take in order to get an MRI

## 2020-08-11 NOTE — TELEPHONE ENCOUNTER
I spoke with patient and advised above  He has been taking the knee immobilizer off at bedtime  I advised he should be wearing at all times except for ADL's  He is complaing of a throbbing pain to R inner knee while wearing the brace, worse at night, trouble sleeping  Naproxen 500mg BID and tylenol is not helping  Advised he should try putting padding the area out to avoid friction and adjust straps higher  Please advise

## 2020-08-14 ENCOUNTER — OFFICE VISIT (OUTPATIENT)
Dept: PAIN MEDICINE | Facility: CLINIC | Age: 45
End: 2020-08-14
Payer: MEDICARE

## 2020-08-14 VITALS
BODY MASS INDEX: 26.95 KG/M2 | HEIGHT: 72 IN | HEART RATE: 79 BPM | TEMPERATURE: 98.9 F | DIASTOLIC BLOOD PRESSURE: 82 MMHG | SYSTOLIC BLOOD PRESSURE: 144 MMHG | WEIGHT: 199 LBS

## 2020-08-14 DIAGNOSIS — M96.1 LUMBAR POST-LAMINECTOMY SYNDROME: ICD-10-CM

## 2020-08-14 DIAGNOSIS — M48.062 SPINAL STENOSIS OF LUMBAR REGION WITH NEUROGENIC CLAUDICATION: ICD-10-CM

## 2020-08-14 DIAGNOSIS — M79.18 MYOFASCIAL PAIN SYNDROME: ICD-10-CM

## 2020-08-14 DIAGNOSIS — G89.4 PAIN SYNDROME, CHRONIC: Primary | ICD-10-CM

## 2020-08-14 DIAGNOSIS — M54.42 CHRONIC LOW BACK PAIN WITH BILATERAL SCIATICA, UNSPECIFIED BACK PAIN LATERALITY: ICD-10-CM

## 2020-08-14 DIAGNOSIS — M25.562 ACUTE PAIN OF LEFT KNEE: ICD-10-CM

## 2020-08-14 DIAGNOSIS — M54.16 LUMBAR RADICULOPATHY: ICD-10-CM

## 2020-08-14 DIAGNOSIS — M54.41 CHRONIC LOW BACK PAIN WITH BILATERAL SCIATICA, UNSPECIFIED BACK PAIN LATERALITY: ICD-10-CM

## 2020-08-14 DIAGNOSIS — G89.29 CHRONIC LOW BACK PAIN WITH BILATERAL SCIATICA, UNSPECIFIED BACK PAIN LATERALITY: ICD-10-CM

## 2020-08-14 DIAGNOSIS — M25.511 CHRONIC RIGHT SHOULDER PAIN: ICD-10-CM

## 2020-08-14 DIAGNOSIS — G89.29 CHRONIC RIGHT SHOULDER PAIN: ICD-10-CM

## 2020-08-14 PROCEDURE — 4004F PT TOBACCO SCREEN RCVD TLK: CPT | Performed by: NURSE PRACTITIONER

## 2020-08-14 PROCEDURE — 99214 OFFICE O/P EST MOD 30 MIN: CPT | Performed by: NURSE PRACTITIONER

## 2020-08-14 PROCEDURE — 3008F BODY MASS INDEX DOCD: CPT | Performed by: NURSE PRACTITIONER

## 2020-08-14 RX ORDER — BUPRENORPHINE 15 UG/H
PATCH, EXTENDED RELEASE TRANSDERMAL
Qty: 4 PATCH | Refills: 2 | Status: SHIPPED | OUTPATIENT
Start: 2020-08-14 | End: 2020-10-09 | Stop reason: SDUPTHER

## 2020-08-14 RX ORDER — BACLOFEN 10 MG/1
10 TABLET ORAL 3 TIMES DAILY
Qty: 90 TABLET | Refills: 2 | Status: SHIPPED | OUTPATIENT
Start: 2020-08-14 | End: 2020-10-09 | Stop reason: SDUPTHER

## 2020-08-14 RX ORDER — NAPROXEN 250 MG/1
250 TABLET ORAL 2 TIMES DAILY WITH MEALS
COMMUNITY
End: 2020-09-30 | Stop reason: ALTCHOICE

## 2020-08-14 RX ORDER — PREDNISONE 10 MG/1
TABLET ORAL
Qty: 21 TABLET | Refills: 0 | Status: SHIPPED | OUTPATIENT
Start: 2020-08-14 | End: 2020-09-30 | Stop reason: ALTCHOICE

## 2020-08-14 NOTE — PATIENT INSTRUCTIONS
Prednisone (By mouth)   Prednisone (PRED-ni-sone)  Treats many diseases and conditions, especially problems related to inflammation  This medicine is a corticosteroid  Brand Name(s): Contrast Allergy PreMed Pack, Stu, predniSONE Intensol   There may be other brand names for this medicine  When This Medicine Should Not Be Used: This medicine is not right for everyone  Do not use if you had an allergic reaction to prednisone or if you are pregnant  How to Use This Medicine:   Liquid, Tablet, Delayed Release Tablet  · Take your medicine as directed  Your dose may need to be changed several times to find what works best for you  · It is best to take this medicine with food or milk  · Swallow the delayed-release tablet whole  Do not crush, break, or chew it  · Measure the oral liquid medicine with a marked measuring spoon, oral syringe, or medicine cup  · Missed dose: Take a dose as soon as you remember  If it is almost time for your next dose, wait until then and take a regular dose  Do not take extra medicine to make up for a missed dose  · Store the medicine in a closed container at room temperature, away from heat, moisture, and direct light  Do not freeze the oral liquid  Drugs and Foods to Avoid:   Ask your doctor or pharmacist before using any other medicine, including over-the-counter medicines, vitamins, and herbal products  · Tell your doctor if you use any of the following:  ¨ Aminoglutethimide, amphotericin B, carbamazepine, cholestyramine, cyclosporine, digoxin, isoniazid, ketoconazole, phenobarbital, phenytoin, or rifampin  ¨ Blood thinner, such as warfarin  ¨ NSAID pain or arthritis medicine, such as aspirin, diclofenac, ibuprofen, naproxen, celecoxib  ¨ Diuretic (water pill)  ¨ Diabetes medicine  ¨ Macrolide antibiotic, such as azithromycin, clarithromycin, erythromycin  ¨ Estrogen, including birth control pills or hormone replacement therapy  · This medicine may interfere with vaccines  Ask your doctor before you get a flu shot or any other vaccines  Warnings While Using This Medicine:   · It is not safe to take this medicine during pregnancy  It could harm an unborn baby  Tell your doctor right away if you become pregnant  · Tell your doctor if you are breastfeeding or if you have kidney problems, heart failure, high blood pressure, a recent heart attack, diabetes, glaucoma, osteoporosis, or thyroid problems  Tell your doctor about any infection you have  Also tell your doctor if you have had mental or emotional problems (such as depression) or stomach or bowel problems (such as an ulcer or diverticulitis)  · This medicine may cause the following problems:  ¨ Mood or behavior changes  ¨ Higher blood pressure, retaining water, changes in salt or potassium levels in your body  ¨ Cataracts or glaucoma (with long-term use)  ¨ Weak bones or osteoporosis (with long-term use)  ¨ Slow growth in children (with long-term use)  ¨ Muscle problems (with high doses, especially if you have myasthenia gravis or similar nerve and muscle problems)  · Do not stop using this medicine suddenly  Your doctor will need to slowly decrease your dose before you stop it completely  · This medicine could cause you to get infections more easily  Tell your doctor right away if you are exposed to chicken pox, measles, or other serious infection  Tell your doctor if you had a serious infection in the past, such as tuberculosis or herpes  · Tell your doctor about any extra stress or anxiety in your life  Your dose might need to be changed for a short time  · Tell any doctor or dentist who treats you that you are using this medicine  This medicine may affect certain medical test results  · Keep all medicine out of the reach of children  Never share your medicine with anyone    Possible Side Effects While Using This Medicine:   Call your doctor right away if you notice any of these side effects:  · Allergic reaction: Itching or hives, swelling in your face or hands, swelling or tingling in your mouth or throat, chest tightness, trouble breathing  · Dark freckles, skin color changes, coldness, weakness, tiredness, nausea, vomiting, weight loss  · Depression, unusual thoughts, feelings, or behaviors, trouble sleeping  · Fever, chills, cough, sore throat, and body aches  · Muscle pain or weakness  · Rapid weight gain, swelling in your hands, ankles, or feet  · Severe stomach pain, nausea, vomiting, or red or black stools  · Skin changes or growths  · Trouble seeing, eye pain, headache  If you notice these less serious side effects, talk with your doctor:   · Increased appetite  · Round, puffy face  · Weight gain around your neck, upper back, breast, face, or waist  If you notice other side effects that you think are caused by this medicine, tell your doctor  Call your doctor for medical advice about side effects  You may report side effects to FDA at 5-500-FDA-6925  © 2017 [employer]  Information is for End User's use only and may not be sold, redistributed or otherwise used for commercial purposes  The above information is an  only  It is not intended as medical advice for individual conditions or treatments  Talk to your doctor, nurse or pharmacist before following any medical regimen to see if it is safe and effective for you  Opioid Pain Management   AMBULATORY CARE:   An opioid  is a type of medicine used to treat pain  Examples of opioids are oxycodone, morphine, fentanyl, or codeine  Take opioid medicines as directed, for the condition it is prescribed:  Common problems that may occur when you do not take opioid medicines as directed include the following:  · Health problems  may occur  You may have trouble breathing  You may also develop liver or kidney damage, or stomach bleeding  Any of these health problems can become life-threatening       · Opioid dependence  means your body needs the opioid medicine to keep it from going through withdrawal      · Opioid tolerance  means the opioid does not control pain as well as it used to  You need higher doses of the opioid to get pain relief  · Opioid addiction  means you are not able to control the use of the opioid medicine  You use it when you do not have pain  You crave the opioid medicine  Call 911 or have someone call 911 for any of the following:   · You are breathing slower than normal, or you have trouble breathing  · You cannot be awakened  · You have a seizure  Seek care immediately if:   · Your heart is beating slower than usual     · Your heart feels like it is jumping or fluttering  · You are so sleepy that you cannot stay awake  · You have severe muscle pain or weakness  · You see or hear things that are not real   Contact your healthcare provider if:   · You are too dizzy to stand up  · Your pain gets worse or you have new pain  · You cannot do your usual activities because of side effects from the opioid  · You are constipated or have abdominal pain  · You have questions or concerns about your condition or care  Opioid safety measures:   · Take your medicine as directed  Ask if you need more information on how to take your medicine correctly  Follow up with your healthcare provider regularly  You may need to have your dose adjusted  Do not use opioid medicine if you are pregnant or breastfeeding  · Give your healthcare provider a list of all your medicines  Include any over-the-counter medicines, vitamins, and herbs  It can be dangerous to take opioids with certain other medicines, such as antihistamines  · Keep opioid medicine in a safe place  Store your opioid medicine in a locked cabinet to keep it away from children and others  · Do not drink alcohol while you use opioids  Alcohol use with an opioid medicine can make you sleepy and slow your breathing rate   You may stop breathing completely  · Do not drive or operate heavy machinery after you take opioid medicine  Opioid medicine can make you drowsy and make it hard to concentrate  You may injure yourself or others if you drive or operate heavy machinery while taking your medicine  · Drink fluids and eat high-fiber foods  Fluids and fiber will help prevent constipation  Ask your healthcare provider what fluids are right for you and how much you should drink  Also ask for a list of foods that contain fiber  Follow up with your healthcare provider as directed: You may need to have your dose adjusted  You may be referred to a pain specialist  Write down your questions so you remember to ask them during your visits  © 2017 [employer] Information is for End User's use only and may not be sold, redistributed or otherwise used for commercial purposes  All illustrations and images included in CareNotes® are the copyrighted property of [employer]  or [employer]  The above information is an  only  It is not intended as medical advice for individual conditions or treatments  Talk to your doctor, nurse or pharmacist before following any medical regimen to see if it is safe and effective for you

## 2020-08-14 NOTE — PROGRESS NOTES
Assessment:  1  Pain syndrome, chronic    2  Acute pain of left knee    3  Chronic low back pain with bilateral sciatica, unspecified back pain laterality    4  Chronic right shoulder pain    5  Lumbar radiculopathy    6  Myofascial pain syndrome    7  Lumbar post-laminectomy syndrome    8  Spinal stenosis of lumbar region with neurogenic claudication        Plan:  While the patient was in the office today, I did have a thorough conversation with the patient regarding their chronic pain syndrome, symptoms, medication regimen, and treatment plan  I discussed with the patient that I do feel there is definitely significant inflammatory component and even though of Medrol Dosepak has not been helpful in the past, I feel that putting him on a stronger dose of oral prednisone 10 mg verses the 4 mg pills in a Medrol Dosepak, may be more beneficial  The patient was agreeable and verbalized an understanding  I discussed with the patient that at this point time since I feel that there is a significant inflammatory component to their pain symptoms, that they would benefit from a titrating dose of oral steroids over the next 7 days  I advised the patient that while on the steroids, they should not take any other oral NSAIDs except for acetaminophen or Tylenol until they have completed the steroid taper  I also advised them that once they have completed the steroid taper, they are to give our office a follow up phone call to let us know how they are doing and if there is any improvement  The patient was agreeable and verbalized an understanding  With regards to his chronic pain symptoms since they are still well managed with his current medication regimen, we will continue the Butrans patch and baclofen as prescribed  The patient was agreeable and verbalized an understanding               [place] Prescription Drug Monitoring Program report was reviewed and was appropriate     There are risks associated with opioid medications, including dependence, addiction and tolerance  The patient understands and agrees to use these medications only as prescribed  Potential side effects of the medications include, but are not limited to, constipation, drowsiness, addiction, impaired judgment and risk of fatal overdose if not taken as prescribed  The patient was warned against driving while taking sedation medications  Sharing medications is a felony  At this point in time, the patient is showing no signs of addiction, abuse, diversion or suicidal ideation  The patient will follow-up in 12 weeks for medication prescription refill and reevaluation  The patient was advised to contact the office should their symptoms worsen in the interim  The patient was agreeable and verbalized an understanding  History of Present Illness: The patient is a 40 y o  male last seen on 5/22/2020 who presents for a follow up office visit in regards to chronic pain syndrome secondary to lumbar post-laminectomy syndrome with stenosis, radiculopathy, and myofascial pain  The patient currently reports that his biggest issue is his worsening left knee pain status post injury several weeks ago when he was trying to pull something out of his truck slipped and fell on his left knee  Did follow-up with Dr Matthew Toledo [facility] and although nothing is fractured, they are taking her conservative route and see how he does over the next few weeks with the knee in the brace and then if his pain does not improve they may consider proceeding with an MRI of the left knee  The patient reports that he did not proceed with the Medrol Dosepak as prescribed by Dr Jaun Christianson as he has had for his back pain in the past and did not find it helpful but does report continued swelling and he is taking Advil, Aleve, and Tylenol to try to manage the pain with minimal relief, but has to be careful as he is status post a Parth-en-Y gastric bypass    The patient presents today for re-evaluation and to discuss his medication regimen treatment plan  Current pain medications includes:  Butrans patch 15 micro g, applying 1 patch transdermally every 7 days     The patient reports that this regimen is providing 20% pain relief  The patient is reporting no side effects from this pain medication regimen  Pain Contract Signed: 2/28/2020  Last Urine Drug Screen: 2/28/2020    I have personally reviewed and/or updated the patient's past medical history, past surgical history, family history, social history, current medications, allergies, and vital signs today  Review of Systems:    Review of Systems   Respiratory: Negative for shortness of breath  Cardiovascular: Negative for chest pain  Gastrointestinal: Negative for constipation, diarrhea, nausea and vomiting  Musculoskeletal: Positive for gait problem and joint swelling (joint stiffness)  Negative for arthralgias and myalgias  Skin: Negative for rash  Neurological: Negative for dizziness, seizures and weakness  All other systems reviewed and are negative          Past Medical History:   Diagnosis Date    Anxiety     Arthritis     Asthma     Bipolar disorder (HCC)     Chronic left lumbar radiculopathy     Chronic low back pain     Chronic pain syndrome     Chronic right shoulder pain     Depression with anxiety     Fatigue     GERD (gastroesophageal reflux disease)     Hydronephrosis     Iron deficiency anemia     Kidney stone     Lytic bone lesions on xray     Nephrolithiasis     PONV (postoperative nausea and vomiting)     Right elbow pain     Right lateral epicondylitis        Past Surgical History:   Procedure Laterality Date    ADENOIDECTOMY Bilateral     APPENDECTOMY      BACK SURGERY      CHOLECYSTECTOMY      CHOLECYSTECTOMY LAPAROSCOPIC N/A 10/30/2018    Procedure: CHOLECYSTECTOMY WITH GASTROTOMY LAPAROSCOPIC;  Surgeon: Rossana Savage MD;  [facility];  Service: General    ERCP W/ SPHICTEROTOMY N/A 10/30/2018    Procedure: ENDOSCOPIC RETROGRADE CHOLANGIOPANCREATOGRAPHY (ERCP) W/ SPHINCTEROTOMY;  Surgeon: Edwige Morgan MD;  [facility];  Service: Gastroenterology    GASTRIC BYPASS      2009    OTHER SURGICAL HISTORY      fusion/refusion of vertebrae, 2010 and 2011 l5-s1 and l4-l5    MD CYSTO/URETERO W/LITHOTRIPSY &INDWELL STENT INSRT Right 8/8/2017    Procedure: CYSTOSCOPY; URETEROSCOPY; HOLMIUM LASER; RETROGRADE PYELOGRAM; STENT;  Surgeon: Skylar Power MD;  [facility];  Service: Urology    MD IMPLANT SPINAL NEUROSTIM/ Right 11/14/2017    Procedure: REMOVAL LOWER MEDIAL BUTTOCK SPINAL CORD STIMULATOR GENERATOR; PLACEMENT OF NEW BUTTOCK SPINAL CORD [facility];  Surgeon: Dennis Osei MD;  [facility];  Service: Neurosurgery    RIK-EN-Y PROCEDURE  2003    SPINAL CORD STIMULATOR IMPLANT  11/14/2017    dr Steffen Hayes procedure and technique 1  removal of a right back implantable pulse generator 2 placement of a new right buttock impantable pulse generator through seperate incision 3 electric analys complex programming spinal cord stimulator system postoperative period approx 1 hour    TONSILLECTOMY         Family History   Problem Relation Age of Onset    Cancer Mother     Arthritis Mother     Stomach cancer Mother     Cancer Father     Esophageal cancer Father     Other Father         brain tumor    No Known Problems Sister     No Known Problems Sister     No Known Problems Sister        Social History     Occupational History    Not on file   Tobacco Use    Smoking status: Current Every Day Smoker     Packs/day: 1 00     Types: Cigarettes    Smokeless tobacco: Never Used   Substance and Sexual Activity    Alcohol use: No     Comment: quit drinking, social    Drug use: No    Sexual activity: Not on file         Current Outpatient Medications:     acetaminophen (TYLENOL) 325 mg tablet, Take 3 tablets (975 mg total) by mouth every 8 (eight) hours, Disp: 30 tablet, Rfl: 0    baclofen 10 mg tablet, Take 1 tablet (10 mg total) by mouth 3 (three) times a day, Disp: 90 tablet, Rfl: 2    naproxen (NAPROSYN) 250 mg tablet, Take 250 mg by mouth 2 (two) times a day with meals, Disp: , Rfl:     VENTOLIN  (90 Base) MCG/ACT inhaler, INHALE 2 PUFFS EVERY 4 (FOUR) HOURS AS NEEDED FOR WHEEZING OR SHORTNESS OF BREATH (COUGH), Disp: 18 Inhaler, Rfl: 1    Butrans 15 MCG/HR, APPLY 1 PATCH TD EVERY 7 DAYS for pain , Disp: 4 patch, Rfl: 2    predniSONE 10 mg tablet, Take 2 PO TID or 6 pills on the first day and then decrease by 1 pill daily until gone  Call with an update when finished , Disp: 21 tablet, Rfl: 0    Allergies   Allergen Reactions    Ampicillin GI Intolerance     Vomiting    Aspirin GI Intolerance     vomiting      Penicillins GI Intolerance     Vomit       Physical Exam:    /82 (BP Location: Left arm, Patient Position: Sitting, Cuff Size: Standard)   Pulse 79   Temp 98 9 °F (37 2 °C)   Ht 6' (1 829 m)   Wt 90 3 kg (199 lb)   BMI 26 99 kg/m²     Constitutional:normal, well developed, well nourished, alert, in no distress and non-toxic and no overt pain behavior  Eyes:anicteric  HEENT:grossly intact  Neck:supple, symmetric, trachea midline and no masses   Pulmonary:even and unlabored  Cardiovascular:No edema or pitting edema present  Skin:Normal without rashes or lesions and well hydrated  Psychiatric:Mood and affect appropriate  Neurologic:Cranial Nerves II-XII grossly intact  Musculoskeletal:Patient's gait is limping, antalgic, painful, but steady with the use of a single cane  Imaging  No orders to display         No orders of the defined types were placed in this encounter

## 2020-08-18 ENCOUNTER — TELEPHONE (OUTPATIENT)
Dept: PSYCHIATRY | Facility: CLINIC | Age: 45
End: 2020-08-18

## 2020-08-18 ENCOUNTER — TELEPHONE (OUTPATIENT)
Dept: OTHER | Facility: OTHER | Age: 45
End: 2020-08-18

## 2020-08-18 ENCOUNTER — TELEPHONE (OUTPATIENT)
Dept: PAIN MEDICINE | Facility: CLINIC | Age: 45
End: 2020-08-18

## 2020-08-18 DIAGNOSIS — M79.2 NEUROPATHIC PAIN: Primary | ICD-10-CM

## 2020-08-18 NOTE — TELEPHONE ENCOUNTER
Patient called requesting pain medication for chronic left knee pain  Patient called his Orthopedic Dr Sanford & they recommended he call us for pain medication   Please advise, thx    Call back# [phone]

## 2020-08-18 NOTE — TELEPHONE ENCOUNTER
NOTE:    Per 8/14 fu w/ DG: The patient currently reports that his biggest issue is his worsening left knee pain status post injury several weeks ago when he was trying to pull something out of his truck slipped and fell on his left knee  Did follow-up with Dr Enrique Kidd of [facility] and although nothing is fractured, they are taking her conservative route and see how he does over the next few weeks with the knee in the brace and then if his pain does not improve they may consider proceeding with an MRI of the left knee  The patient reports that he did not proceed with the Medrol Dosepak as prescribed by Dr Saint Ditto as he has had for his back pain in the past and did not find it helpful but does report continued swelling and he is taking Advil, Aleve, and Tylenol to try to manage the pain with minimal relief, but has to be careful as he is status post a Parth-en-Y gastric bypass        Prednisone 10 mg taper prescribed on 8/14/2020

## 2020-08-18 NOTE — TELEPHONE ENCOUNTER
Your message reviewed, chart reviewed, pain management practitioner notes reviewed    Please note that this patient has a chronic pain management team     Also note that he has reached out to them August 14th and today as well    His medication needs should come from 1 office, their office, to avoid polypharmacy    You may review that with the patient    Thank you    ROSELINE

## 2020-08-18 NOTE — TELEPHONE ENCOUNTER
Patient made aware that even the NSAIDS should be requested from Pain Mgt  Patient verbalized understanding

## 2020-08-18 NOTE — TELEPHONE ENCOUNTER
S/w pt, stated that he has no improvement in his L knee pain with oral steroids prescribed by DG on 8/14  Per pt, he is unable to sleep s/t pain and steroids  Pt stated that he s/w Dr Brian Paulino who advised the pt to fu in the office on 9/1 for re evaluation - will discuss possible MRI at that time  Advised pt, this is a long healing process  Pt stated that he is taking 3 naproxen and 3 tylenol q 6h with no improvement  Pt stated that he is also taking a muscle relaxer and continues w/ brian  Pt stated that he takes something to help his stomach - gi has discussed a revision to his gastric bypass surgery - which cannot be done at this time  Advised pt, do not take naproxen with oral steroids - especially if it is not helping  Advised pt, will d/w Dr Kimberly Canales and cb to advise  Pt verbalized understanding and appreciation

## 2020-08-18 NOTE — TELEPHONE ENCOUNTER
Patient and fiance calling back to let Dr Jay Jones know that he is not having any improvement in his pain level  Pain Mgt ordered medrol dose pack for pain but this has not worked  Can patient be ordered an MRI or brought in for sooner appt than 9/1?

## 2020-08-18 NOTE — TELEPHONE ENCOUNTER
Patient given above information and verbalized understanding  Would he be able to get a prescription strength NSAID sent to his pharmacy?

## 2020-08-18 NOTE — TELEPHONE ENCOUNTER
Please call patient back ASAP about decision made if he could have medication for his left knee pain

## 2020-08-18 NOTE — TELEPHONE ENCOUNTER
My recommendations would be for this patient to continue his current course of management which is the knee immobilizer    At his next office follow-up he will be examined and a determination will be made about the next step in his treatment  He does not need to have this appointment moved up, as I do not think it will give me any information upon which I can used to make further treatment recommendations  Injuries like this do require time to heal

## 2020-08-19 RX ORDER — GABAPENTIN 300 MG/1
CAPSULE ORAL
Qty: 90 CAPSULE | Refills: 0 | Status: SHIPPED | OUTPATIENT
Start: 2020-08-19 | End: 2020-09-30 | Stop reason: ALTCHOICE

## 2020-08-19 NOTE — TELEPHONE ENCOUNTER
Titrating dose of gabapentin sent to pharmacy in [facility], please move up patient's evaluation with DG to end of September

## 2020-08-19 NOTE — TELEPHONE ENCOUNTER
S/w pt, advised of above  Pt verbalized agreement  Advised pt, gabapentin is a maintenance medication and should be taken at the same time, every day  Do not stop it abruptly  This medication will be started at a low dose and increased gradually to get to a therapeutic level  This medication commonly causes drowsines, which should improve as your body adjusts to this medication  This office will cb to review instructions  Anticipate a rx to [facility]  Pt verbalized understanding and appreciation

## 2020-08-19 NOTE — TELEPHONE ENCOUNTER
S/w pt, advised of above  Do not drive or operate machinery until you are familiar with this medication  cb if se's or questions present  Se's include but are not limited to dizziness, drowsiness, lightheadedness, gi upset, swelling of hands / feet, strange thoughts / dreams, seizure  Rescheduled fu ov for 10/9 - call if sooner  Pt verbalized understanding and appreciation

## 2020-08-21 NOTE — TELEPHONE ENCOUNTER
S/w pt and advised the same  Pt verbalized understanding and has stim rep # to contact them  Quality 130: Documentation Of Current Medications In The Medical Record: Current Medications Documented Quality 402: Tobacco Use And Help With Quitting Among Adolescents: Patient screened for tobacco and never smoked Detail Level: Detailed Quality 431: Preventive Care And Screening: Unhealthy Alcohol Use - Screening: Patient screened for unhealthy alcohol use using a single question and scores less than 2 times per year

## 2020-09-01 ENCOUNTER — OFFICE VISIT (OUTPATIENT)
Dept: OBGYN CLINIC | Facility: HOSPITAL | Age: 45
End: 2020-09-01
Payer: OTHER MISCELLANEOUS

## 2020-09-01 ENCOUNTER — TELEPHONE (OUTPATIENT)
Dept: OBGYN CLINIC | Facility: MEDICAL CENTER | Age: 45
End: 2020-09-01

## 2020-09-01 VITALS
HEART RATE: 81 BPM | WEIGHT: 195.2 LBS | SYSTOLIC BLOOD PRESSURE: 129 MMHG | BODY MASS INDEX: 26.44 KG/M2 | HEIGHT: 72 IN | DIASTOLIC BLOOD PRESSURE: 81 MMHG

## 2020-09-01 DIAGNOSIS — S83.005D PATELLAR DISLOCATION, LEFT, SUBSEQUENT ENCOUNTER: Primary | ICD-10-CM

## 2020-09-01 PROCEDURE — 99213 OFFICE O/P EST LOW 20 MIN: CPT | Performed by: ORTHOPAEDIC SURGERY

## 2020-09-01 NOTE — TELEPHONE ENCOUNTER
Patient sees Dr Huy Hancock at [facility]  [facility]/C calling requesting the PT Script to be faxed so they can schedule the Physical Therapy Visits  Faxed to [phone]  Contact no is [phone]  Fax completed

## 2020-09-01 NOTE — LETTER
September 1, 2020     Patient: Idalmis Perez   YOB: 1975   Date of Visit: 9/1/2020       To Whom it May Concern:    Idalmis Perez is under my professional care  He was seen in my office on 9/1/2020  He is being referred for formal physical therapy  He will be seen for follow-up evaluation in 4 weeks  Until that time, he is to refrain from work related activity  If you have any questions or concerns, please don't hesitate to call           Sincerely,          Susan Daly MD        CC: No Recipients

## 2020-09-01 NOTE — PROGRESS NOTES
Assessment/Plan:  Assessment/Plan   Diagnoses and all orders for this visit:    Patellar dislocation, left, subsequent encounter  -     Ambulatory referral to Physical Therapy; Future    Discussed with patient that today's physical exam demonstrates improvement s/p left patellar dislocation/subluxation  He is instructed to continue use of the straight leg knee immobilizer when out and about, but at home and in controlled environments he can start weaning use of the brace  He can continue NSAIDs/Tylenol and ice and/or heat as needed for alleviation of pain and soreness  He is also being provided a referral to formal physical therapy to address pain, range of motion, and strength deficits  He has been provided a note to remain out of work until he can be rehabbed and re-evaluated  He will be seen for follow-up in 4 weeks  Subjective:   Patient ID: Meg Mesa is a 39 y o  male  HPI  Patient returns today for follow-up evaluation of left knee injury sustained 7/28/2020  He is now 5 weeks post injury  He reports that he has been consistent with use of the provided knee immobilizer, only removing it for hygiene purposes  He continues to complain of medial knee pain with weight-bearing activity  He denies any recent bruising, swelling, numbness, tingling, feelings of instability, or mechanical symptoms  He states he is currently taking over-the-counter naproxen and Tylenol on an alternating basis for relief of pain, which she says is not helpful  He also says that he was prescribed gabapentin to his occupational medicine doctor, but he says this is also not doing anything to alleviate his symptoms  Patient has remained out of work up to this point      The following portions of the patient's history were reviewed and updated as appropriate: allergies, current medications, past family history, past medical history, past social history, past surgical history and problem list     Past Medical History: Diagnosis Date    Anxiety     Arthritis     Asthma     Bipolar disorder (HCC)     Chronic left lumbar radiculopathy     Chronic low back pain     Chronic pain syndrome     Chronic right shoulder pain     Depression with anxiety     Fatigue     GERD (gastroesophageal reflux disease)     Hydronephrosis     Iron deficiency anemia     Kidney stone     Lytic bone lesions on xray     Nephrolithiasis     PONV (postoperative nausea and vomiting)     Right elbow pain     Right lateral epicondylitis      Past Surgical History:   Procedure Laterality Date    ADENOIDECTOMY Bilateral     APPENDECTOMY      BACK SURGERY      CHOLECYSTECTOMY      CHOLECYSTECTOMY LAPAROSCOPIC N/A 10/30/2018    Procedure: CHOLECYSTECTOMY WITH GASTROTOMY LAPAROSCOPIC;  Surgeon: Caron Nolan MD;  [facility];  Service: General    ERCP W/ SPHICTEROTOMY N/A 10/30/2018    Procedure: ENDOSCOPIC RETROGRADE CHOLANGIOPANCREATOGRAPHY (ERCP) W/ SPHINCTEROTOMY;  Surgeon: Edwige Morgan MD;  [facility];  Service: Gastroenterology    GASTRIC BYPASS      2009    OTHER SURGICAL HISTORY      fusion/refusion of vertebrae, 2010 and 2011 l5-s1 and l4-l5    DC CYSTO/URETERO W/LITHOTRIPSY &INDWELL STENT INSRT Right 8/8/2017    Procedure: CYSTOSCOPY; URETEROSCOPY; HOLMIUM LASER; RETROGRADE PYELOGRAM; STENT;  Surgeon: Skylar Power MD;  [facility];  Service: Urology    DC IMPLANT SPINAL NEUROSTIM/ Right 11/14/2017    Procedure: REMOVAL LOWER MEDIAL BUTTOCK SPINAL CORD STIMULATOR GENERATOR; PLACEMENT OF NEW BUTTOCK SPINAL CORD STIMULATOR THROUGH SEPERATE INCISION;  Surgeon: Dennis Osei MD;  [facility];  Service: Neurosurgery    RIK-EN-Y PROCEDURE  2003    SPINAL CORD STIMULATOR IMPLANT  11/14/2017    dr Steffen Hayes procedure and technique 1  removal of a right back implantable pulse generator 2 placement of a new right buttock impantable pulse generator through seperate incision 3 electric analys complex programming spinal cord stimulator system postoperative period approx 1 hour    TONSILLECTOMY       Family History   Problem Relation Age of Onset    Cancer Mother     Arthritis Mother     Stomach cancer Mother     Cancer Father     Esophageal cancer Father     Other Father         brain tumor    No Known Problems Sister     No Known Problems Sister     No Known Problems Sister      Social History     Socioeconomic History    Marital status:      Spouse name: None    Number of children: None    Years of education: GED    Highest education level: None   Occupational History    None   Social Needs    Financial resource strain: None    Food insecurity     Worry: None     Inability: None    Transportation needs     Medical: None     Non-medical: None   Tobacco Use    Smoking status: Current Every Day Smoker     Packs/day: 1 00     Types: Cigarettes    Smokeless tobacco: Never Used   Substance and Sexual Activity    Alcohol use: No     Comment: quit drinking, social    Drug use: No    Sexual activity: None   Lifestyle    Physical activity     Days per week: None     Minutes per session: None    Stress: None   Relationships    Social connections     Talks on phone: None     Gets together: None     Attends [employer] service: None     Active member of club or organization: None     Attends meetings of clubs or organizations: None     Relationship status: None    Intimate partner violence     Fear of current or ex partner: None     Emotionally abused: None     Physically abused: None     Forced sexual activity: None   Other Topics Concern    None   Social History Narrative    Chooses not to have children    Drinks caffienated tea    Lives with spouse               Current Outpatient Medications:     acetaminophen (TYLENOL) 325 mg tablet, Take 3 tablets (975 mg total) by mouth every 8 (eight) hours, Disp: 30 tablet, Rfl: 0    baclofen 10 mg tablet, Take 1 tablet (10 mg total) by mouth 3 (three) times a day, Disp: 90 tablet, Rfl: 2    Butrans 15 MCG/HR, APPLY 1 PATCH TD EVERY 7 DAYS for pain , Disp: 4 patch, Rfl: 2    gabapentin (NEURONTIN) 300 mg capsule, Take on tablet at bedtime for 5 days, then one tablet twice daily for 5 days then one tablet three times a day, Disp: 90 capsule, Rfl: 0    naproxen (NAPROSYN) 250 mg tablet, Take 250 mg by mouth 2 (two) times a day with meals, Disp: , Rfl:     predniSONE 10 mg tablet, Take 2 PO TID or 6 pills on the first day and then decrease by 1 pill daily until gone  Call with an update when finished , Disp: 21 tablet, Rfl: 0    VENTOLIN  (90 Base) MCG/ACT inhaler, INHALE 2 PUFFS EVERY 4 (FOUR) HOURS AS NEEDED FOR WHEEZING OR SHORTNESS OF BREATH (COUGH), Disp: 18 Inhaler, Rfl: 1    Allergies   Allergen Reactions    Ampicillin GI Intolerance     Vomiting    Aspirin GI Intolerance     vomiting      Penicillins GI Intolerance     Vomit       Review of Systems   Constitutional: Negative for chills, fever and unexpected weight change  HENT: Negative for hearing loss, nosebleeds and sore throat  Eyes: Negative for pain, redness and visual disturbance  Respiratory: Negative for cough, shortness of breath and wheezing  Cardiovascular: Negative for chest pain, palpitations and leg swelling  Gastrointestinal: Negative for abdominal pain, nausea and vomiting  Endocrine: Negative for polydipsia and polyuria  Genitourinary: Negative for dysuria and hematuria  Musculoskeletal:        As noted in HPI   Skin: Negative for rash and wound  Neurological: Negative for dizziness, numbness and headaches  Psychiatric/Behavioral: Negative for decreased concentration and suicidal ideas  The patient is not nervous/anxious          Objective:  /81   Pulse 81   Ht 6' (1 829 m)   Wt 88 5 kg (195 lb 3 2 oz)   BMI 26 47 kg/m²     Ortho Exam   Left knee -   Patient is wearing straight leg knee immobilizer correctly at time of visit, removed without difficulty  No obvious anatomical deformity noted  No quad atrophy  Skin is warm and dry to touch with no signs of erythema, ecchymosis, or infection  TTP over medial femoral condyle  TTP over medial patellar facet  TTP over MPFL  Patella tracks centrally, 2 quadrant translation medially and laterally equal to opposing lower extremity  ROM 0°-125°  Knee is stable to varus, valgus, anterior, posterior stress  No popliteal fullness  Negative Homans sign  Neurovascularly intact distally    Physical Exam    No new imaging reviewed this visit     Scribe Attestation    I,:   Leonarda Randall am acting as a scribe while in the presence of the attending physician :        I,:   Naila Jama MD personally performed the services described in this documentation    as scribed in my presence :

## 2020-09-14 ENCOUNTER — TELEPHONE (OUTPATIENT)
Dept: OBGYN CLINIC | Facility: MEDICAL CENTER | Age: 45
End: 2020-09-14

## 2020-09-14 NOTE — TELEPHONE ENCOUNTER
Patient sees Dr Shireen Ortiz    Patient is having problems with his knee, it keeps popping out and caused him to fall  Physical Therapy will not see him until he gets an MRI  He is looking for a call back       # [phone]

## 2020-09-14 NOTE — TELEPHONE ENCOUNTER
I left patient the above detailed msg on his voicemail  Asked him to call back to schedule appt for this week

## 2020-09-14 NOTE — TELEPHONE ENCOUNTER
[facility]   Ph [phone]    Reports when patient came in today his knee cap was dislocated   He did not proceed with PT

## 2020-09-15 ENCOUNTER — OFFICE VISIT (OUTPATIENT)
Dept: OBGYN CLINIC | Facility: HOSPITAL | Age: 45
End: 2020-09-15
Payer: OTHER MISCELLANEOUS

## 2020-09-15 VITALS
BODY MASS INDEX: 26.41 KG/M2 | DIASTOLIC BLOOD PRESSURE: 72 MMHG | HEART RATE: 63 BPM | HEIGHT: 72 IN | SYSTOLIC BLOOD PRESSURE: 109 MMHG | WEIGHT: 195 LBS

## 2020-09-15 DIAGNOSIS — M25.562 ACUTE PAIN OF LEFT KNEE: Primary | ICD-10-CM

## 2020-09-15 PROCEDURE — 99213 OFFICE O/P EST LOW 20 MIN: CPT | Performed by: ORTHOPAEDIC SURGERY

## 2020-09-15 NOTE — PROGRESS NOTES
39 y o male with lateral patella dislocation of the left knee who presents for follow up  The patient reports that his knee immobilizer broke 1 week ago and the patient has been wearing a neoprene sleeve since  and that he had two events of patellar instability on Saturday and yesterday during which the patella self reduced   Given the instability events, the physical therapy requested surgeon evaluation prior to restarting physical therapy    Review of Systems  Review of systems negative unless otherwise specified in HPI    Past Medical History  Past Medical History:   Diagnosis Date    Anxiety     Arthritis     Asthma     Bipolar disorder ([address] )     Chronic left lumbar radiculopathy     Chronic low back pain     Chronic pain syndrome     Chronic right shoulder pain     Depression with anxiety     Fatigue     GERD (gastroesophageal reflux disease)     Hydronephrosis     Iron deficiency anemia     Kidney stone     Lytic bone lesions on xray     Nephrolithiasis     PONV (postoperative nausea and vomiting)     Right elbow pain     Right lateral epicondylitis        Past Surgical History  Past Surgical History:   Procedure Laterality Date    ADENOIDECTOMY Bilateral     APPENDECTOMY      BACK SURGERY      CHOLECYSTECTOMY      CHOLECYSTECTOMY LAPAROSCOPIC N/A 10/30/2018    Procedure: CHOLECYSTECTOMY WITH GASTROTOMY LAPAROSCOPIC;  Surgeon: Rolf Nino MD;  [facility];  Service: General    ERCP W/ SPHICTEROTOMY N/A 10/30/2018    Procedure: ENDOSCOPIC RETROGRADE CHOLANGIOPANCREATOGRAPHY (ERCP) W/ SPHINCTEROTOMY;  Surgeon: Guy Tran MD;  [facility];  Service: Gastroenterology    GASTRIC BYPASS      2009    OTHER SURGICAL HISTORY      fusion/refusion of vertebrae, 2010 and 2011 l5-s1 and l4-l5    [place] CYSTO/URETERO W/LITHOTRIPSY &INDWELL STENT INSRT Right 8/8/2017    Procedure: CYSTOSCOPY; URETEROSCOPY; HOLMIUM LASER; RETROGRADE PYELOGRAM; STENT;  Surgeon: Yolanda Tsang MD; [facility];  Service: Urology    RI IMPLANT SPINAL NEUROSTIM/ Right 11/14/2017    Procedure: REMOVAL LOWER MEDIAL BUTTOCK SPINAL CORD STIMULATOR GENERATOR; PLACEMENT OF NEW BUTTOCK SPINAL CORD STIMULATOR THROUGH SEPERATE INCISION;  Surgeon: Quinn Hatchet, MD;  [facility];  Service: Neurosurgery    RIK-EN-Y PROCEDURE  2003    SPINAL CORD STIMULATOR IMPLANT  11/14/2017    dr Orin Terrell procedure and technique 1  removal of a right back implantable pulse generator 2 placement of a new right buttock impantable pulse generator through seperate incision 3 electric analys complex programming spinal cord stimulator system postoperative period approx 1 hour    TONSILLECTOMY         Current Medications  Current Outpatient Medications on File Prior to Visit   Medication Sig Dispense Refill    acetaminophen (TYLENOL) 325 mg tablet Take 3 tablets (975 mg total) by mouth every 8 (eight) hours 30 tablet 0    baclofen 10 mg tablet Take 1 tablet (10 mg total) by mouth 3 (three) times a day 90 tablet 2    Butrans 15 MCG/HR APPLY 1 PATCH TD EVERY 7 DAYS for pain  4 patch 2    gabapentin (NEURONTIN) 300 mg capsule Take on tablet at bedtime for 5 days, then one tablet twice daily for 5 days then one tablet three times a day 90 capsule 0    naproxen (NAPROSYN) 250 mg tablet Take 250 mg by mouth 2 (two) times a day with meals      predniSONE 10 mg tablet Take 2 PO TID or 6 pills on the first day and then decrease by 1 pill daily until gone  Call with an update when finished  21 tablet 0    VENTOLIN  (90 Base) MCG/ACT inhaler INHALE 2 PUFFS EVERY 4 (FOUR) HOURS AS NEEDED FOR WHEEZING OR SHORTNESS OF BREATH (COUGH) 18 Inhaler 1     No current facility-administered medications on file prior to visit          Recent Labs [facility])  0   Lab Value Date/Time    HCT 48 5 11/18/2019 2054    HGB 15 6 11/18/2019 2054    WBC 7 57 11/18/2019 2054    INR 1 09 11/02/2017 1019    ESR 7 08/09/2018 1030    CRP <3 0 08/09/2018 1030    HGBA1C 6 1 02/26/2019 0600         Physical exam  · General: Awake, Alert, Oriented  · Eyes: Pupils equal, round and reactive to light  · Heart: regular rate and rhythm  · Lungs: No audible wheezing  · Abdomen: soft  LLE  - Skin intact, no effusion of the knee  - TTP medial distal femur  - Patella tracking through full ROM of flexion/extension  - Motor and sensation intact L3-S1  - Extremity warm and adequately perfused      Assessment/Plan:   45 y o male with left knee lateral patella dislocation now 7 weeks from index event   Patient may continue with physical therapy for quad strengthening and WBAT in a knee immobilizer    WBAT LLE in [place] [address]  Return to clinic as scheduled

## 2020-09-15 NOTE — PATIENT INSTRUCTIONS
Weight bearing as tolerated to the left lower extremity in a knee immobilizer  Continue physical therapy

## 2020-09-28 ENCOUNTER — TELEPHONE (OUTPATIENT)
Dept: OBGYN CLINIC | Facility: HOSPITAL | Age: 45
End: 2020-09-28

## 2020-09-28 ENCOUNTER — DOCUMENTATION (OUTPATIENT)
Dept: URGENT CARE | Age: 45
End: 2020-09-28

## 2020-09-28 ENCOUNTER — TELEMEDICINE (OUTPATIENT)
Dept: FAMILY MEDICINE CLINIC | Facility: CLINIC | Age: 45
End: 2020-09-28
Payer: MEDICARE

## 2020-09-28 ENCOUNTER — APPOINTMENT (OUTPATIENT)
Dept: URGENT CARE | Age: 45
End: 2020-09-28
Payer: MEDICARE

## 2020-09-28 VITALS — BODY MASS INDEX: 26.41 KG/M2 | WEIGHT: 195 LBS | HEIGHT: 72 IN

## 2020-09-28 DIAGNOSIS — Z20.828 EXPOSURE TO SARS-ASSOCIATED CORONAVIRUS: ICD-10-CM

## 2020-09-28 DIAGNOSIS — Z20.828 EXPOSURE TO SARS-ASSOCIATED CORONAVIRUS: Primary | ICD-10-CM

## 2020-09-28 PROCEDURE — U0003 INFECTIOUS AGENT DETECTION BY NUCLEIC ACID (DNA OR RNA); SEVERE ACUTE RESPIRATORY SYNDROME CORONAVIRUS 2 (SARS-COV-2) (CORONAVIRUS DISEASE [COVID-19]), AMPLIFIED PROBE TECHNIQUE, MAKING USE OF HIGH THROUGHPUT TECHNOLOGIES AS DESCRIBED BY CMS-2020-01-R: HCPCS

## 2020-09-28 PROCEDURE — 99441 PR PHYS/QHP TELEPHONE EVALUATION 5-10 MIN: CPT | Performed by: NURSE PRACTITIONER

## 2020-09-28 NOTE — PROGRESS NOTES
COVID-19 Virtual Visit     Assessment/Plan:    Problem List Items Addressed This Visit     None      Visit Diagnoses     Exposure to SARS-associated coronavirus    -  Primary    Relevant Orders    Novel Coronavirus ([id number]), PCR [facility] - Collected at [facility] or [facility]        This virtual check-in was done via telephone  Disposition: This 75-year-old male presents today for symptoms likely of viral gastroenteritis  Symptoms started on 09/25 and lasted 2 days  Symptoms have resolved at this time  Cannot completely exclude COVID-19  Will send for COVID-19 swab, prior to sending him back to work  He will stay home and self isolate until swab results return  He will call in the interim if symptoms should return  I referred Ismael Soni to one of our centralized sites for a COVID-19 swab    I spent 10 minutes directly with the patient during this visit    Encounter provider ANTONIA Lopez    Provider located at [address] [place] [address]    Recent Visits  No visits were found meeting these conditions  Showing recent visits within past 7 days and meeting all other requirements     Today's Visits  Date Type Provider Dept   09/28/20 Telemedicine Lupis Webster, [address] [place] [address] today's visits and meeting all other requirements     Future Appointments  No visits were found meeting these conditions  Showing future appointments within next 150 days and meeting all other requirements        Patient agrees to participate in a virtual check in via telephone or video visit instead of presenting to the office to address urgent/immediate medical needs  Patient is aware this is a billable service  After connecting through [email or web address], the patient was identified by name and date of birth  Mary Ann Calvillo was informed that this was a telemedicine visit and that the exam was being conducted confidentially over secure lines    My office door was closed  No one else was in the room  Devin Cueto acknowledged consent and understanding of privacy and security of the telemedicine visit  I informed the patient that I have reviewed his record in Epic and presented the opportunity for him to ask any questions regarding the visit today  The patient agreed to participate  Subjective  Devin Cueto is a 39 y o  male who is concerned about COVID-19  He reports fever and vomiting  He has not experienced cough and shortness of breath He has not had contact with a person who is under investigation for or who is positive for COVID-19 within the last 14 days  He has not been hospitalized recently for fever and/or lower respiratory symptoms  Symptoms began Friday, 9/25  Symptoms include vomiting and fever of 101  The next day fever broke and vomited less frequently  Yesterday and today he feels much better, no fever no vomiting  Mild rhinorrhea, no cough  No sore throat  Denies headaches, diarrhea, loss of sense of taste or smell  Denies cough for shortness of breath  No known sick contacts  No known COVID-19 contacts      Past Medical History:   Diagnosis Date    Anxiety     Arthritis     Asthma     Bipolar disorder (HCC)     Chronic left lumbar radiculopathy     Chronic low back pain     Chronic pain syndrome     Chronic right shoulder pain     Depression with anxiety     Fatigue     GERD (gastroesophageal reflux disease)     Hydronephrosis     Iron deficiency anemia     Kidney stone     Lytic bone lesions on xray     Nephrolithiasis     PONV (postoperative nausea and vomiting)     Right elbow pain     Right lateral epicondylitis        Past Surgical History:   Procedure Laterality Date    ADENOIDECTOMY Bilateral     APPENDECTOMY      BACK SURGERY      CHOLECYSTECTOMY      CHOLECYSTECTOMY LAPAROSCOPIC N/A 10/30/2018    Procedure: CHOLECYSTECTOMY WITH GASTROTOMY LAPAROSCOPIC;  Surgeon: Sohan Samuels Bere Wang MD;  [facility];  Service: General    ERCP W/ SPHICTEROTOMY N/A 10/30/2018    Procedure: ENDOSCOPIC RETROGRADE CHOLANGIOPANCREATOGRAPHY (ERCP) W/ SPHINCTEROTOMY;  Surgeon: Bree Newell MD;  [facility];  Service: Gastroenterology    GASTRIC BYPASS      2009    OTHER SURGICAL HISTORY      fusion/refusion of vertebrae, 2010 and 2011 l5-s1 and l4-l5    GA CYSTO/URETERO W/LITHOTRIPSY &INDWELL STENT INSRT Right 8/8/2017    Procedure: CYSTOSCOPY; URETEROSCOPY; HOLMIUM LASER; RETROGRADE PYELOGRAM; STENT;  Surgeon: Adolfo Gusman MD;  [facility];  Service: Urology    GA IMPLANT SPINAL NEUROSTIM/ Right 11/14/2017    Procedure: REMOVAL LOWER MEDIAL BUTTOCK SPINAL CORD STIMULATOR GENERATOR; PLACEMENT OF NEW BUTTOCK SPINAL CORD [facility];  Surgeon: Eloy Gonzalez MD;  [facility];  Service: Neurosurgery    RIK-EN-Y PROCEDURE  2003    SPINAL CORD STIMULATOR IMPLANT  11/14/2017    dr Jef De La Cruz procedure and technique 1  removal of a right back implantable pulse generator 2 placement of a new right buttock impantable pulse generator through seperate incision 3 electric analys complex programming spinal cord stimulator system postoperative period approx 1 hour    TONSILLECTOMY         Current Outpatient Medications   Medication Sig Dispense Refill    acetaminophen (TYLENOL) 325 mg tablet Take 3 tablets (975 mg total) by mouth every 8 (eight) hours 30 tablet 0    baclofen 10 mg tablet Take 1 tablet (10 mg total) by mouth 3 (three) times a day 90 tablet 2    Butrans 15 MCG/HR APPLY 1 PATCH TD EVERY 7 DAYS for pain   4 patch 2    VENTOLIN  (90 Base) MCG/ACT inhaler INHALE 2 PUFFS EVERY 4 (FOUR) HOURS AS NEEDED FOR WHEEZING OR SHORTNESS OF BREATH (COUGH) 18 Inhaler 1    gabapentin (NEURONTIN) 300 mg capsule Take on tablet at bedtime for 5 days, then one tablet twice daily for 5 days then one tablet three times a day (Patient not taking: Reported on 9/28/2020) 90 capsule 0    naproxen (NAPROSYN) 250 mg tablet Take 250 mg by mouth 2 (two) times a day with meals      predniSONE 10 mg tablet Take 2 PO TID or 6 pills on the first day and then decrease by 1 pill daily until gone  Call with an update when finished  (Patient not taking: Reported on 9/28/2020) 21 tablet 0     No current facility-administered medications for this visit  Allergies   Allergen Reactions    Ampicillin GI Intolerance     Vomiting    Aspirin GI Intolerance     vomiting      Penicillins GI Intolerance     Vomit       Review of Systems   Constitutional: Positive for fatigue and fever  Negative for chills and diaphoresis  Respiratory: Negative for cough, chest tightness, shortness of breath and wheezing  Cardiovascular: Negative  Gastrointestinal: Positive for nausea and vomiting  Negative for abdominal pain and diarrhea  Neurological: Negative for dizziness and headaches  Video Exam    Vitals:    09/28/20 1546   Weight: 88 5 kg (195 lb)   Height: 6' (1 829 m)         Arturo Weber appears healthy, alert, no distress  Physical Exam  Vitals signs and nursing note reviewed  Constitutional:       General: He is not in acute distress  Appearance: Normal appearance  He is not ill-appearing, toxic-appearing or diaphoretic  HENT:      Head: Atraumatic  Eyes:      Conjunctiva/sclera: Conjunctivae normal    Pulmonary:      Effort: Pulmonary effort is normal  No respiratory distress  Neurological:      Mental Status: He is alert and oriented to person, place, and time  Psychiatric:         Mood and Affect: Mood normal          Behavior: Behavior normal           VIRTUAL VISIT DISCLAIMER    Seda Cameron acknowledges that he has consented to an online visit or consultation   He understands that the online visit is based solely on information provided by him, and that, in the absence of a face-to-face physical evaluation by the physician, the diagnosis he receives is both limited and provisional in terms of accuracy and completeness  This is not intended to replace a full medical face-to-face evaluation by the physician  Buzz Silva understands and accepts these terms

## 2020-09-28 NOTE — TELEPHONE ENCOUNTER
DR Jada Carpio   Re: Covid screen  NX#[phone]     Patient was returning a call to the office  Per patient, on Friday, he had a fever of 101 accompanied by vomiting and a sore throat    Patient states he no longer has a fever but still has a sore throat    Please advise how we should move forward   Patient has an appt tomorrow, 09 29

## 2020-09-28 NOTE — TELEPHONE ENCOUNTER
And I are in the operating room    Patient should be COVID screened,     If he passes,   He is expected to wear mask at all times in the office  If he does not pass the exclusion criteria, his appointment can be pushed out another week or 2 weeks without detriment      I am rerouting this to appropriate clinical staff

## 2020-09-29 ENCOUNTER — TELEPHONE (OUTPATIENT)
Dept: FAMILY MEDICINE CLINIC | Facility: CLINIC | Age: 45
End: 2020-09-29

## 2020-09-29 NOTE — TELEPHONE ENCOUNTER
The testing has changed  They no longer go all the way up the nose  The most current testing is swabbing only the front portion of your nose

## 2020-09-29 NOTE — TELEPHONE ENCOUNTER
Patient called stating he went for his Covid Test and he was not happy with the way they performed the test   He stated the girl swabbed just the bottom of his nose, he went for this previously and they went all the way up the nose  He is concerned that there is going to be a false reading  He wants to talk to you about this at [phone]

## 2020-09-30 ENCOUNTER — TELEPHONE (OUTPATIENT)
Dept: OBGYN CLINIC | Facility: CLINIC | Age: 45
End: 2020-09-30

## 2020-09-30 LAB — SARS-COV-2 RNA SPEC QL NAA+PROBE: NOT DETECTED

## 2020-09-30 NOTE — TELEPHONE ENCOUNTER
Covid testing negative  Message left for pt to contact office to get back on Dr Kareem Chirinos schedule, possibly tomorrow

## 2020-10-01 ENCOUNTER — OFFICE VISIT (OUTPATIENT)
Dept: OBGYN CLINIC | Facility: HOSPITAL | Age: 45
End: 2020-10-01
Payer: OTHER MISCELLANEOUS

## 2020-10-01 VITALS
SYSTOLIC BLOOD PRESSURE: 117 MMHG | HEIGHT: 72 IN | WEIGHT: 200 LBS | BODY MASS INDEX: 27.09 KG/M2 | DIASTOLIC BLOOD PRESSURE: 79 MMHG | HEART RATE: 66 BPM

## 2020-10-01 DIAGNOSIS — M22.12 PATELLOFEMORAL INSTABILITY, LEFT: Primary | ICD-10-CM

## 2020-10-01 PROCEDURE — 99212 OFFICE O/P EST SF 10 MIN: CPT | Performed by: ORTHOPAEDIC SURGERY

## 2020-10-07 ENCOUNTER — TELEPHONE (OUTPATIENT)
Dept: OBGYN CLINIC | Facility: HOSPITAL | Age: 45
End: 2020-10-07

## 2020-10-09 ENCOUNTER — OFFICE VISIT (OUTPATIENT)
Dept: PAIN MEDICINE | Facility: CLINIC | Age: 45
End: 2020-10-09
Payer: MEDICARE

## 2020-10-09 VITALS
SYSTOLIC BLOOD PRESSURE: 112 MMHG | HEART RATE: 64 BPM | TEMPERATURE: 98.7 F | DIASTOLIC BLOOD PRESSURE: 68 MMHG | BODY MASS INDEX: 26.82 KG/M2 | WEIGHT: 198 LBS | HEIGHT: 72 IN

## 2020-10-09 DIAGNOSIS — M79.18 MYOFASCIAL PAIN SYNDROME: ICD-10-CM

## 2020-10-09 DIAGNOSIS — M54.41 CHRONIC LOW BACK PAIN WITH BILATERAL SCIATICA, UNSPECIFIED BACK PAIN LATERALITY: ICD-10-CM

## 2020-10-09 DIAGNOSIS — M46.1 SACROILIITIS, NOT ELSEWHERE CLASSIFIED (HCC): ICD-10-CM

## 2020-10-09 DIAGNOSIS — G89.4 PAIN SYNDROME, CHRONIC: Primary | ICD-10-CM

## 2020-10-09 DIAGNOSIS — Z79.891 LONG-TERM CURRENT USE OF OPIATE ANALGESIC: ICD-10-CM

## 2020-10-09 DIAGNOSIS — M25.511 CHRONIC RIGHT SHOULDER PAIN: ICD-10-CM

## 2020-10-09 DIAGNOSIS — F11.20 UNCOMPLICATED OPIOID DEPENDENCE (HCC): ICD-10-CM

## 2020-10-09 DIAGNOSIS — M54.16 LUMBAR RADICULOPATHY: ICD-10-CM

## 2020-10-09 DIAGNOSIS — G89.29 CHRONIC LOW BACK PAIN WITH BILATERAL SCIATICA, UNSPECIFIED BACK PAIN LATERALITY: ICD-10-CM

## 2020-10-09 DIAGNOSIS — M54.42 CHRONIC LOW BACK PAIN WITH BILATERAL SCIATICA, UNSPECIFIED BACK PAIN LATERALITY: ICD-10-CM

## 2020-10-09 DIAGNOSIS — M25.521 RIGHT ELBOW PAIN: ICD-10-CM

## 2020-10-09 DIAGNOSIS — G89.29 CHRONIC RIGHT SHOULDER PAIN: ICD-10-CM

## 2020-10-09 DIAGNOSIS — M48.062 SPINAL STENOSIS OF LUMBAR REGION WITH NEUROGENIC CLAUDICATION: ICD-10-CM

## 2020-10-09 DIAGNOSIS — M96.1 LUMBAR POST-LAMINECTOMY SYNDROME: ICD-10-CM

## 2020-10-09 PROCEDURE — 99214 OFFICE O/P EST MOD 30 MIN: CPT | Performed by: NURSE PRACTITIONER

## 2020-10-09 RX ORDER — BACLOFEN 10 MG/1
10 TABLET ORAL 3 TIMES DAILY
Qty: 90 TABLET | Refills: 3 | Status: SHIPPED | OUTPATIENT
Start: 2020-10-09 | End: 2021-02-01 | Stop reason: SDUPTHER

## 2020-10-09 RX ORDER — BUPRENORPHINE 15 UG/H
PATCH, EXTENDED RELEASE TRANSDERMAL
Qty: 4 PATCH | Refills: 3 | Status: SHIPPED | OUTPATIENT
Start: 2020-10-09 | End: 2021-02-01 | Stop reason: SDUPTHER

## 2020-10-09 RX ORDER — NALOXONE HYDROCHLORIDE 4 MG/.1ML
SPRAY NASAL
Qty: 1 EACH | Refills: 1 | Status: SHIPPED | OUTPATIENT
Start: 2020-10-09 | End: 2022-06-20 | Stop reason: ALTCHOICE

## 2020-10-13 LAB
AMPHET SAL QL CFM: NEGATIVE NG/ML
BUPRENORPHINE SAL QL SCN: NEGATIVE NG/ML
CARBOXYTHC SAL QL CFM: NEGATIVE NG/ML
CCP IGG SERPL-ACNC: NEGATIVE
COCAINE SAL QL CFM: NEGATIVE NG/ML
CODEINE SAL QL CFM: NEGATIVE NG/ML
EDDP SAL QL CFM: NEGATIVE NG/ML
HYDROCODONE SAL QL CFM: NEGATIVE NG/ML
HYDROCODONE SAL QL CFM: NEGATIVE NG/ML
HYDROMORPHONE SAL QL CFM: NEGATIVE NG/ML
LEUKEMIA MARKERS BLD-IMP: NEGATIVE NG/ML
M PROTEIN 3 UR ELPH-MCNC: ABNORMAL NG/ML
M TB TUBERC IGNF/MITOGEN IGNF CONTROL: NEGATIVE NG/ML
METHADONE SAL QL CFM: NEGATIVE NG/ML
MORPHINE SAL QL CFM: NEGATIVE NG/ML
MORPHINE SAL QL CFM: NEGATIVE NG/ML
OXYMORPHONE SAL QL CFM: NEGATIVE NG/ML
OXYMORPHONE SAL QL CFM: NEGATIVE NG/ML
PCP SAL QL CFM: NEGATIVE NG/ML
RESULT ALL_PRESCRIBED MEDS AND SPECIAL INSTRUCTIONS: NORMAL
SL AMB 6-MAM (HEROIN METABOLITE) QUANTIFICATION: NEGATIVE NG/ML
SL AMB ALPRAZOLAM QUANTIFICATION: NEGATIVE NG/ML
SL AMB ATOMOXETINE METABOLITE QUANTIFICATION: NEGATIVE
SL AMB ATOMOXETINE QUANTIFICATION: NEGATIVE
SL AMB CLONAZEPAM QUANTIFICATION: NEGATIVE NG/ML
SL AMB DIAZEPAM QUANTIFICATION: NEGATIVE NG/ML
SL AMB FENTANYL QUANTIFICATION: NEGATIVE NG/ML
SL AMB MDMA QUANTIFICATION: NEGATIVE NG/ML
SL AMB N-DESMETHYL-TRAMADOL QUANTIFICATION SALIVA: NEGATIVE NG/ML
SL AMB NORBUPRENORPHINE QUANTIFICATION: NEGATIVE NG/ML
SL AMB NORDIAZEPAM QUANTIFICATION: NEGATIVE NG/ML
SL AMB NORFENTANYL QUANTIFICATION: NEGATIVE NG/ML
SL AMB NORHYDROCODONE QUANTIFICATION: NEGATIVE NG/ML
SL AMB NORHYDROCODONE QUANTIFICATION: NEGATIVE NG/ML
SL AMB NORMEPERIDINE QUANTIFICATION: NEGATIVE NG/ML
SL AMB NOROXYCODONE QUANTIFICATION: ABNORMAL NG/ML
SL AMB O-DESMETHYL-TRAMADOL QUANTIFICATION: NEGATIVE NG/ML
SL AMB OXAZEPAM QUANTIFICATION: NEGATIVE NG/ML
SL AMB RITALINIC ACID QUANTIFICATION: NEGATIVE
SL AMB TEMAZEPAM QUANTIFICATION: NEGATIVE NG/ML
SL AMB TEMAZEPAM QUANTIFICATION: NEGATIVE NG/ML
SL AMB TRAMADOL QUANTIFICATION: NEGATIVE NG/ML
SQUAMOUS #/AREA URNS HPF: NEGATIVE NG/ML

## 2020-10-14 ENCOUNTER — TELEPHONE (OUTPATIENT)
Dept: OBGYN CLINIC | Facility: HOSPITAL | Age: 45
End: 2020-10-14

## 2020-11-03 ENCOUNTER — OFFICE VISIT (OUTPATIENT)
Dept: OBGYN CLINIC | Facility: HOSPITAL | Age: 45
End: 2020-11-03
Payer: OTHER MISCELLANEOUS

## 2020-11-03 VITALS
HEART RATE: 108 BPM | WEIGHT: 201 LBS | HEIGHT: 72 IN | BODY MASS INDEX: 27.22 KG/M2 | SYSTOLIC BLOOD PRESSURE: 164 MMHG | DIASTOLIC BLOOD PRESSURE: 78 MMHG

## 2020-11-03 DIAGNOSIS — M23.92 INTERNAL DERANGEMENT OF LEFT KNEE: ICD-10-CM

## 2020-11-03 DIAGNOSIS — M25.562 ACUTE PAIN OF LEFT KNEE: ICD-10-CM

## 2020-11-03 DIAGNOSIS — M22.12 PATELLOFEMORAL INSTABILITY, LEFT: Primary | ICD-10-CM

## 2020-11-03 PROCEDURE — 99213 OFFICE O/P EST LOW 20 MIN: CPT | Performed by: ORTHOPAEDIC SURGERY

## 2020-11-04 ENCOUNTER — TELEPHONE (OUTPATIENT)
Dept: OBGYN CLINIC | Facility: HOSPITAL | Age: 45
End: 2020-11-04

## 2020-11-07 ENCOUNTER — HOSPITAL ENCOUNTER (EMERGENCY)
Facility: HOSPITAL | Age: 45
Discharge: HOME | End: 2020-11-07
Attending: EMERGENCY MEDICINE
Payer: OTHER MISCELLANEOUS

## 2020-11-07 VITALS
WEIGHT: 200 LBS | OXYGEN SATURATION: 97 % | BODY MASS INDEX: 27.09 KG/M2 | HEART RATE: 83 BPM | TEMPERATURE: 98.1 F | DIASTOLIC BLOOD PRESSURE: 90 MMHG | SYSTOLIC BLOOD PRESSURE: 112 MMHG | RESPIRATION RATE: 18 BRPM | HEIGHT: 72 IN

## 2020-11-07 DIAGNOSIS — T31.0 BURN (ANY DEGREE) INVOLVING LESS THAN 10% OF BODY SURFACE: Primary | ICD-10-CM

## 2020-11-07 PROCEDURE — 99283 EMERGENCY DEPT VISIT LOW MDM: CPT

## 2020-11-07 PROCEDURE — 99284 EMERGENCY DEPT VISIT MOD MDM: CPT | Performed by: EMERGENCY MEDICINE

## 2020-11-07 RX ORDER — BACITRACIN, NEOMYCIN, POLYMYXIN B 400; 3.5; 5 [USP'U]/G; MG/G; [USP'U]/G
1 OINTMENT TOPICAL ONCE
Status: COMPLETED | OUTPATIENT
Start: 2020-11-07 | End: 2020-11-07

## 2020-11-07 RX ORDER — NAPROXEN 500 MG/1
500 TABLET ORAL 2 TIMES DAILY WITH MEALS
Qty: 30 TABLET | Refills: 0 | Status: SHIPPED | OUTPATIENT
Start: 2020-11-07 | End: 2021-02-01

## 2020-11-07 RX ORDER — IBUPROFEN 600 MG/1
600 TABLET ORAL ONCE
Status: COMPLETED | OUTPATIENT
Start: 2020-11-07 | End: 2020-11-07

## 2020-11-07 RX ORDER — OXYCODONE HYDROCHLORIDE 5 MG/1
5 TABLET ORAL ONCE
Status: COMPLETED | OUTPATIENT
Start: 2020-11-07 | End: 2020-11-07

## 2020-11-07 RX ORDER — ACETAMINOPHEN 325 MG/1
975 TABLET ORAL ONCE
Status: COMPLETED | OUTPATIENT
Start: 2020-11-07 | End: 2020-11-07

## 2020-11-07 RX ADMIN — BACITRACIN ZINC, NEOMYCIN, POLYMYXIN B 1 LARGE APPLICATION: 400; 3.5; 5 OINTMENT TOPICAL at 13:50

## 2020-11-07 RX ADMIN — ACETAMINOPHEN 975 MG: 325 TABLET, FILM COATED ORAL at 13:49

## 2020-11-07 RX ADMIN — OXYCODONE HYDROCHLORIDE 5 MG: 5 TABLET ORAL at 13:50

## 2020-11-07 RX ADMIN — IBUPROFEN 600 MG: 600 TABLET, FILM COATED ORAL at 13:50

## 2020-11-10 ENCOUNTER — TELEPHONE (OUTPATIENT)
Dept: OBGYN CLINIC | Facility: HOSPITAL | Age: 45
End: 2020-11-10

## 2020-11-11 ENCOUNTER — TELEPHONE (OUTPATIENT)
Dept: OBGYN CLINIC | Facility: HOSPITAL | Age: 45
End: 2020-11-11

## 2020-11-12 ENCOUNTER — TELEPHONE (OUTPATIENT)
Dept: OBGYN CLINIC | Facility: MEDICAL CENTER | Age: 45
End: 2020-11-12

## 2020-11-12 ENCOUNTER — OFFICE VISIT (OUTPATIENT)
Dept: OBGYN CLINIC | Facility: HOSPITAL | Age: 45
End: 2020-11-12
Payer: OTHER MISCELLANEOUS

## 2020-11-12 ENCOUNTER — TELEPHONE (OUTPATIENT)
Dept: OBGYN CLINIC | Facility: HOSPITAL | Age: 45
End: 2020-11-12

## 2020-11-12 VITALS
WEIGHT: 203 LBS | HEIGHT: 72 IN | HEART RATE: 102 BPM | SYSTOLIC BLOOD PRESSURE: 151 MMHG | BODY MASS INDEX: 27.5 KG/M2 | DIASTOLIC BLOOD PRESSURE: 87 MMHG

## 2020-11-12 DIAGNOSIS — S83.242D OTHER TEAR OF MEDIAL MENISCUS OF LEFT KNEE AS CURRENT INJURY, SUBSEQUENT ENCOUNTER: ICD-10-CM

## 2020-11-12 DIAGNOSIS — S83.8X2D SPRAIN OF OTHER LIGAMENT OF LEFT KNEE, SUBSEQUENT ENCOUNTER: ICD-10-CM

## 2020-11-12 DIAGNOSIS — S83.005D PATELLAR DISLOCATION, LEFT, SUBSEQUENT ENCOUNTER: ICD-10-CM

## 2020-11-12 DIAGNOSIS — M22.12 PATELLOFEMORAL INSTABILITY, LEFT: Primary | ICD-10-CM

## 2020-11-12 PROCEDURE — 99213 OFFICE O/P EST LOW 20 MIN: CPT | Performed by: ORTHOPAEDIC SURGERY

## 2020-11-16 ENCOUNTER — TELEPHONE (OUTPATIENT)
Dept: OBGYN CLINIC | Facility: HOSPITAL | Age: 45
End: 2020-11-16

## 2020-11-20 ENCOUNTER — OFFICE VISIT (OUTPATIENT)
Dept: OBGYN CLINIC | Facility: CLINIC | Age: 45
End: 2020-11-20
Payer: OTHER MISCELLANEOUS

## 2020-11-20 VITALS
HEIGHT: 72 IN | WEIGHT: 203 LBS | DIASTOLIC BLOOD PRESSURE: 70 MMHG | BODY MASS INDEX: 27.5 KG/M2 | HEART RATE: 77 BPM | TEMPERATURE: 98.5 F | SYSTOLIC BLOOD PRESSURE: 110 MMHG

## 2020-11-20 DIAGNOSIS — S83.005D PATELLAR DISLOCATION, LEFT, SUBSEQUENT ENCOUNTER: ICD-10-CM

## 2020-11-20 DIAGNOSIS — S83.8X2D SPRAIN OF OTHER LIGAMENT OF LEFT KNEE, SUBSEQUENT ENCOUNTER: ICD-10-CM

## 2020-11-20 DIAGNOSIS — S83.242D OTHER TEAR OF MEDIAL MENISCUS OF LEFT KNEE AS CURRENT INJURY, SUBSEQUENT ENCOUNTER: ICD-10-CM

## 2020-11-20 DIAGNOSIS — S83.32XA TEAR OF ARTICULAR CARTILAGE OF KNEE AS CURRENT INJURY, LEFT, INITIAL ENCOUNTER: Primary | ICD-10-CM

## 2020-11-20 DIAGNOSIS — M22.12 PATELLOFEMORAL INSTABILITY, LEFT: ICD-10-CM

## 2020-11-20 PROCEDURE — 99214 OFFICE O/P EST MOD 30 MIN: CPT | Performed by: ORTHOPAEDIC SURGERY

## 2020-11-20 PROCEDURE — 20610 DRAIN/INJ JOINT/BURSA W/O US: CPT | Performed by: ORTHOPAEDIC SURGERY

## 2020-11-20 RX ORDER — LIDOCAINE HYDROCHLORIDE 10 MG/ML
3 INJECTION, SOLUTION INFILTRATION; PERINEURAL
Status: COMPLETED | OUTPATIENT
Start: 2020-11-20 | End: 2020-11-20

## 2020-11-20 RX ORDER — METHYLPREDNISOLONE ACETATE 40 MG/ML
1 INJECTION, SUSPENSION INTRA-ARTICULAR; INTRALESIONAL; INTRAMUSCULAR; SOFT TISSUE
Status: COMPLETED | OUTPATIENT
Start: 2020-11-20 | End: 2020-11-20

## 2020-11-20 RX ADMIN — LIDOCAINE HYDROCHLORIDE 3 ML: 10 INJECTION, SOLUTION INFILTRATION; PERINEURAL at 11:17

## 2020-11-20 RX ADMIN — METHYLPREDNISOLONE ACETATE 1 ML: 40 INJECTION, SUSPENSION INTRA-ARTICULAR; INTRALESIONAL; INTRAMUSCULAR; SOFT TISSUE at 11:17

## 2020-11-23 ENCOUNTER — TELEPHONE (OUTPATIENT)
Dept: OBGYN CLINIC | Facility: CLINIC | Age: 45
End: 2020-11-23

## 2020-11-23 ENCOUNTER — TELEPHONE (OUTPATIENT)
Dept: OBGYN CLINIC | Facility: HOSPITAL | Age: 45
End: 2020-11-23

## 2020-11-24 ENCOUNTER — TELEPHONE (OUTPATIENT)
Dept: PAIN MEDICINE | Facility: CLINIC | Age: 45
End: 2020-11-24

## 2020-12-01 ENCOUNTER — TELEPHONE (OUTPATIENT)
Dept: OBGYN CLINIC | Facility: HOSPITAL | Age: 45
End: 2020-12-01

## 2020-12-04 ENCOUNTER — TELEMEDICINE (OUTPATIENT)
Dept: FAMILY MEDICINE CLINIC | Facility: CLINIC | Age: 45
End: 2020-12-04
Payer: OTHER GOVERNMENT

## 2020-12-04 DIAGNOSIS — Z20.822 EXPOSURE TO COVID-19 VIRUS: ICD-10-CM

## 2020-12-04 DIAGNOSIS — J20.9 ACUTE BRONCHITIS, UNSPECIFIED ORGANISM: ICD-10-CM

## 2020-12-04 DIAGNOSIS — B34.9 VIRAL INFECTION, UNSPECIFIED: ICD-10-CM

## 2020-12-04 PROCEDURE — 99214 OFFICE O/P EST MOD 30 MIN: CPT | Performed by: NURSE PRACTITIONER

## 2020-12-04 PROCEDURE — U0003 INFECTIOUS AGENT DETECTION BY NUCLEIC ACID (DNA OR RNA); SEVERE ACUTE RESPIRATORY SYNDROME CORONAVIRUS 2 (SARS-COV-2) (CORONAVIRUS DISEASE [COVID-19]), AMPLIFIED PROBE TECHNIQUE, MAKING USE OF HIGH THROUGHPUT TECHNOLOGIES AS DESCRIBED BY CMS-2020-01-R: HCPCS | Performed by: NURSE PRACTITIONER

## 2020-12-04 RX ORDER — ALBUTEROL SULFATE 90 UG/1
2 AEROSOL, METERED RESPIRATORY (INHALATION) EVERY 4 HOURS PRN
Qty: 18 INHALER | Refills: 1 | Status: SHIPPED | OUTPATIENT
Start: 2020-12-04 | End: 2021-03-01 | Stop reason: SDUPTHER

## 2020-12-05 LAB — SARS-COV-2 RNA SPEC QL NAA+PROBE: NOT DETECTED

## 2020-12-06 VITALS — TEMPERATURE: 98 F

## 2020-12-07 ENCOUNTER — TELEPHONE (OUTPATIENT)
Dept: FAMILY MEDICINE CLINIC | Facility: CLINIC | Age: 45
End: 2020-12-07

## 2020-12-18 ENCOUNTER — TELEPHONE (OUTPATIENT)
Dept: OBGYN CLINIC | Facility: HOSPITAL | Age: 45
End: 2020-12-18

## 2020-12-18 ENCOUNTER — OFFICE VISIT (OUTPATIENT)
Dept: OBGYN CLINIC | Facility: CLINIC | Age: 45
End: 2020-12-18
Payer: OTHER MISCELLANEOUS

## 2020-12-18 VITALS
TEMPERATURE: 98 F | SYSTOLIC BLOOD PRESSURE: 110 MMHG | WEIGHT: 203 LBS | HEART RATE: 103 BPM | HEIGHT: 72 IN | BODY MASS INDEX: 27.5 KG/M2 | DIASTOLIC BLOOD PRESSURE: 74 MMHG

## 2020-12-18 DIAGNOSIS — S83.32XA TEAR OF ARTICULAR CARTILAGE OF KNEE AS CURRENT INJURY, LEFT, INITIAL ENCOUNTER: ICD-10-CM

## 2020-12-18 DIAGNOSIS — M17.12 OSTEOARTHRITIS OF LEFT PATELLOFEMORAL JOINT: Primary | ICD-10-CM

## 2020-12-18 PROCEDURE — 99213 OFFICE O/P EST LOW 20 MIN: CPT | Performed by: ORTHOPAEDIC SURGERY

## 2020-12-21 ENCOUNTER — TELEPHONE (OUTPATIENT)
Dept: OBGYN CLINIC | Facility: HOSPITAL | Age: 45
End: 2020-12-21

## 2020-12-24 ENCOUNTER — OFFICE VISIT (OUTPATIENT)
Dept: OBGYN CLINIC | Facility: MEDICAL CENTER | Age: 45
End: 2020-12-24
Payer: OTHER MISCELLANEOUS

## 2020-12-24 ENCOUNTER — PREP FOR PROCEDURE (OUTPATIENT)
Dept: OBGYN CLINIC | Facility: MEDICAL CENTER | Age: 45
End: 2020-12-24

## 2020-12-24 VITALS
WEIGHT: 203 LBS | HEART RATE: 85 BPM | BODY MASS INDEX: 27.53 KG/M2 | DIASTOLIC BLOOD PRESSURE: 86 MMHG | SYSTOLIC BLOOD PRESSURE: 144 MMHG

## 2020-12-24 DIAGNOSIS — M24.10 ACQUIRED DEFECT OF ARTICULAR CARTILAGE: ICD-10-CM

## 2020-12-24 DIAGNOSIS — M25.362 PATELLAR INSTABILITY OF LEFT KNEE: Primary | ICD-10-CM

## 2020-12-24 DIAGNOSIS — Z01.818 PRE-OP TESTING: ICD-10-CM

## 2020-12-24 PROCEDURE — 99214 OFFICE O/P EST MOD 30 MIN: CPT | Performed by: ORTHOPAEDIC SURGERY

## 2020-12-24 RX ORDER — ACETAMINOPHEN 325 MG/1
650 TABLET ORAL EVERY 6 HOURS PRN
Status: CANCELLED | OUTPATIENT
Start: 2020-12-24

## 2020-12-24 RX ORDER — CLINDAMYCIN PHOSPHATE 900 MG/50ML
900 INJECTION INTRAVENOUS ONCE
Status: CANCELLED | OUTPATIENT
Start: 2021-01-06 | End: 2020-12-24

## 2020-12-24 RX ORDER — TRAMADOL HYDROCHLORIDE 50 MG/1
50 TABLET ORAL EVERY 6 HOURS SCHEDULED
Status: CANCELLED | OUTPATIENT
Start: 2020-12-24

## 2020-12-24 NOTE — H&P (VIEW-ONLY)
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    Plan:    Conservative treatment:    Ice to knee for 20 minutes at least 1-2 times daily  Crutches order  TROM brace given  Post-op PT written    Imaging: All imaging from today was reviewed by myself and explained to the patient  Injection:    No Injection planned at this time  Surgery: All of the risks and benefits of operative treatment were explained to the patient, as well as the risks and benefits of any alternative treatment options, including nonoperative care  The risks of surgical treatement include, but are not limited to, infection, bleeding, blood clot, neurovascular damage, need for further surgery, continued pain, cardiovascular risk, and anesthesia risk  The patient understood this and elects to proceed forward with surgical intervention  We will proceed forward with surgical arthroscopy of the knee with lateral release and chondroplasty of the patella with open MPFL allograft reconstruction    Follow up:    Return for 1 weke post-op  Chief Complaint   Patient presents with    Left Knee - Follow-up       History of Present Illness: The patient is returns for follow up of Left knee pain  Since the prior visit, He reports no improvement  Patient states that knee cap instability is more his primary symptom opposed to pain  Patient states that he frequently feels as though the kneecap is slipping out to the side are going to fully dislocate  Patient again notes that kneecap dislocated laterally out to the side that had to be reduced by physician  Patient states that he is unable to be in any slightly bent position or twisting or turning motion without feeling is that the kneecap is going to dislocate  He does state that he has pain in the anterior aspect of the knee  Pain is located anterior       Pain is improved by rest  Pain is aggravated by stairs, squatting and pivoting on a planted foot  Symptoms include clicking, catching and swelling  The patient has tried rest, ice, NSAIDS, physical therapy and injection            Knee Surgical History:  None    Past Medical, Social and Family History:  Past Medical History:   Diagnosis Date    Anxiety     Arthritis     Asthma     Bipolar disorder ([address] )     Chronic left lumbar radiculopathy     Chronic low back pain     Chronic pain syndrome     Chronic right shoulder pain     Depression with anxiety     Fatigue     GERD (gastroesophageal reflux disease)     Hydronephrosis     Iron deficiency anemia     Kidney stone     Lytic bone lesions on xray     Nephrolithiasis     PONV (postoperative nausea and vomiting)     Right elbow pain     Right lateral epicondylitis      Past Surgical History:   Procedure Laterality Date    ADENOIDECTOMY Bilateral     APPENDECTOMY      BACK SURGERY      CHOLECYSTECTOMY      CHOLECYSTECTOMY LAPAROSCOPIC N/A 10/30/2018    Procedure: CHOLECYSTECTOMY WITH GASTROTOMY LAPAROSCOPIC;  Surgeon: Santiago Woods MD;  [facility];  Service: General    ERCP W/ SPHICTEROTOMY N/A 10/30/2018    Procedure: ENDOSCOPIC RETROGRADE CHOLANGIOPANCREATOGRAPHY (ERCP) W/ SPHINCTEROTOMY;  Surgeon: Efren Cranker, MD;  [facility];  Service: Gastroenterology    GASTRIC BYPASS      2009    OTHER SURGICAL HISTORY      fusion/refusion of vertebrae, 2010 and 2011 l5-s1 and l4-l5    NC CYSTO/URETERO W/LITHOTRIPSY &INDWELL STENT INSRT Right 8/8/2017    Procedure: CYSTOSCOPY; URETEROSCOPY; HOLMIUM LASER; RETROGRADE PYELOGRAM; STENT;  Surgeon: Didi Rosado MD;  [facility];  Service: Urology    NC IMPLANT SPINAL NEUROSTIM/ Right 11/14/2017    Procedure: REMOVAL LOWER MEDIAL BUTTOCK SPINAL CORD STIMULATOR GENERATOR; PLACEMENT OF NEW BUTTOCK SPINAL CORD STIMULATOR THROUGH SEPERATE INCISION;  Surgeon: Robert Machado MD;  [facility];  Service: Neurosurgery    RIK-EN-Y PROCEDURE  2003    SPINAL CORD STIMULATOR IMPLANT  11/14/2017    dr Luci Boas procedure and technique 1  removal of a right back implantable pulse generator 2 placement of a new right buttock impantable pulse generator through seperate incision 3 electric analys complex programming spinal cord stimulator system postoperative period approx 1 hour    TONSILLECTOMY       Allergies   Allergen Reactions    Ampicillin GI Intolerance     Vomiting    Aspirin GI Intolerance     vomiting      Penicillins GI Intolerance     Vomit     Current Outpatient Medications on File Prior to Visit   Medication Sig Dispense Refill    acetaminophen (TYLENOL) 325 mg tablet Take 3 tablets (975 mg total) by mouth every 8 (eight) hours 30 tablet 0    albuterol (Ventolin HFA) 90 mcg/act inhaler Inhale 2 puffs every 4 (four) hours as needed for wheezing or shortness of breath Cough 18 Inhaler 1    baclofen 10 mg tablet Take 1 tablet (10 mg total) by mouth 3 (three) times a day 90 tablet 3    Butrans 15 MCG/HR APPLY 1 PATCH TD EVERY 7 DAYS for pain  4 patch 3    naloxone (NARCAN) 4 mg/0 1 mL nasal spray Administer 1 spray into a nostril  If breathing does not return to normal or if breathing difficulty resumes after 2-3 minutes, give another dose in the other nostril using a new spray  1 each 1    naproxen (NAPROSYN) 500 mg tablet Take 1 tablet (500 mg total) by mouth 2 (two) times a day with meals 30 tablet 0     No current facility-administered medications on file prior to visit        Social History     Socioeconomic History    Marital status:      Spouse name: Not on file    Number of children: Not on file    Years of education: GED    Highest education level: Not on file   Occupational History    Not on file   Social Needs    Financial resource strain: Not on file    Food insecurity     Worry: Not on file     Inability: Not on file    Transportation needs     Medical: Not on file     Non-medical: Not on file   Tobacco Use    Smoking status: Current Every Day Smoker     Packs/day: 1 00     Types: Cigarettes    Smokeless tobacco: Never Used   Substance and Sexual Activity    Alcohol use: No     Comment: quit drinking, social    Drug use: No    Sexual activity: Not on file   Lifestyle    Physical activity     Days per week: Not on file     Minutes per session: Not on file    Stress: Not on file   Relationships    Social connections     Talks on phone: Not on file     Gets together: Not on file     Attends Christian service: Not on file     Active member of club or organization: Not on file     Attends meetings of clubs or organizations: Not on file     Relationship status: Not on file    Intimate partner violence     Fear of current or ex partner: Not on file     Emotionally abused: Not on file     Physically abused: Not on file     Forced sexual activity: Not on file   Other Topics Concern    Not on file   Social History Narrative    Chooses not to have children    Drinks caffienated tea    Lives with spouse                 I have reviewed the past medical, surgical, social and family history, medications and allergies as documented in the EMR  Review of systems: ROS is negative other than that noted in the HPI  Constitutional: Negative for fatigue and fever  Physical Exam:    Blood pressure 144/86, pulse 85, weight 92 1 kg (203 lb)  General/Constitutional: NAD, well developed, well nourished  HENT: Normocephalic, atraumatic  CV: Intact distal pulses, regular rate  Resp: No respiratory distress or labored breathing  Lymphatic: No lymphadenopathy palpated  Neuro: Alert and Oriented x 3, no focal deficits  Psych: Normal mood, normal affect, normal judgement, normal behavior  Skin: Warm, dry, no rashes, no erythema      Knee Exam (focused):                RIGHT LEFT   ROM:   0-130 0-130   Palpation: Effusion negative negative     MJL tenderness Negative Negative LJL tenderness Negative Negative   Meniscus:  Felicita Negative Negative    Apley's Compression Negative Negative   Instability: Varus stable stable     Valgus stable stable   Special Tests: Lachman Negative Negative     Posterior drawer Negative Negative     Anterior drawer Negative Negative     Pivot shift not tested not tested     Dial not tested not tested   Patella: Palpation no tenderness ttp medial facet     Mobility 1/4 1/3     Apprehension Negative Positive   Other: Single leg 1/4 squat not tested not tested           LE NV Exam: +2 DP/PT pulses bilaterally  Sensation intact to light touch L2-S1 bilaterally    No calf tenderness to palpation bilaterally      Knee Imaging    No imaging was performed today

## 2020-12-28 ENCOUNTER — ANESTHESIA EVENT (OUTPATIENT)
Dept: PERIOP | Facility: HOSPITAL | Age: 45
End: 2020-12-28
Payer: OTHER MISCELLANEOUS

## 2020-12-28 NOTE — PRE-PROCEDURE INSTRUCTIONS
Pre-Surgery Instructions:   Medication Instructions    acetaminophen (TYLENOL) 325 mg tablet Instructed patient per Anesthesia Guidelines   albuterol (Ventolin HFA) 90 mcg/act inhaler Instructed patient per Anesthesia Guidelines   baclofen 10 mg tablet Instructed patient per Anesthesia Guidelines   Butrans 15 MCG/HR Instructed patient per Anesthesia Guidelines   naloxone (NARCAN) 4 mg/0 1 mL nasal spray Instructed patient per Anesthesia Guidelines  Acetaminophen Med Class    Continue to take this medication on your normal schedule  If this is an oral medication and you take it in the morning, then you may take this medicine with a sip of water  Buprenorphine Med Class    Stop taking this medication at least 3 days prior to the planned surgical procedure that requires inpatient admission and contact surgical service to provid a bridging opioid  [If this is outpatient surgery then continue to take this heart medication on your normal schedule  If this is an oral medication and you take it in the morning, then you may take this medicine with a sip of water ]  Inhalational Med Class    Continue to take these inhaler medications on your normal schedule up to and including the day of surgery  NSAID Med Class    Stop taking this medication at least 3 days prior to surgery/procedure  Opioid Med Class    Continue to take this medication on your normal schedule  If this is an oral medication and you take it in the morning, then you may take this medicine with a sip of water  You will receive a phone call from hospital for arrival time  Please call surgeons office if any changes in your condition  Wear easy on/off clothing; consider type of surgery;  valuables and jewelry please keep at home  **COVID-19  education done  Please: No contacts or eye make up or artificial eyelashes    Please bring special ordered sling or braces if needed for  Your particular surgery   Patient will bring brace dos     Please secure transportation     Follow pre surgery showering or cleaning instructions as  Reviewed by nurse or surgeons office      Questions answered and concerns addressed

## 2020-12-30 ENCOUNTER — LAB (OUTPATIENT)
Dept: LAB | Facility: CLINIC | Age: 45
End: 2020-12-30
Payer: OTHER MISCELLANEOUS

## 2020-12-30 ENCOUNTER — TRANSCRIBE ORDERS (OUTPATIENT)
Dept: LAB | Facility: CLINIC | Age: 45
End: 2020-12-30

## 2020-12-30 DIAGNOSIS — Z01.818 PRE-OP TESTING: ICD-10-CM

## 2020-12-30 LAB
ANION GAP SERPL CALCULATED.3IONS-SCNC: 7 MMOL/L (ref 4–13)
BASOPHILS # BLD AUTO: 0.07 THOUSANDS/ΜL (ref 0–0.1)
BASOPHILS NFR BLD AUTO: 1 % (ref 0–1)
BUN SERPL-MCNC: 14 MG/DL (ref 5–25)
CALCIUM SERPL-MCNC: 9.2 MG/DL (ref 8.3–10.1)
CHLORIDE SERPL-SCNC: 102 MMOL/L (ref 100–108)
CO2 SERPL-SCNC: 29 MMOL/L (ref 21–32)
CREAT SERPL-MCNC: 1.1 MG/DL (ref 0.6–1.3)
EOSINOPHIL # BLD AUTO: 0.11 THOUSAND/ΜL (ref 0–0.61)
EOSINOPHIL NFR BLD AUTO: 2 % (ref 0–6)
ERYTHROCYTE [DISTWIDTH] IN BLOOD BY AUTOMATED COUNT: 12.7 % (ref 11.6–15.1)
GFR SERPL CREATININE-BSD FRML MDRD: 81 ML/MIN/1.73SQ M
GLUCOSE SERPL-MCNC: 98 MG/DL (ref 65–140)
HCT VFR BLD AUTO: 49.8 % (ref 36.5–49.3)
HGB BLD-MCNC: 16.1 G/DL (ref 12–17)
IMM GRANULOCYTES # BLD AUTO: 0.01 THOUSAND/UL (ref 0–0.2)
IMM GRANULOCYTES NFR BLD AUTO: 0 % (ref 0–2)
LYMPHOCYTES # BLD AUTO: 1.99 THOUSANDS/ΜL (ref 0.6–4.47)
LYMPHOCYTES NFR BLD AUTO: 40 % (ref 14–44)
MCH RBC QN AUTO: 27.5 PG (ref 26.8–34.3)
MCHC RBC AUTO-ENTMCNC: 32.3 G/DL (ref 31.4–37.4)
MCV RBC AUTO: 85 FL (ref 82–98)
MONOCYTES # BLD AUTO: 0.42 THOUSAND/ΜL (ref 0.17–1.22)
MONOCYTES NFR BLD AUTO: 9 % (ref 4–12)
NEUTROPHILS # BLD AUTO: 2.37 THOUSANDS/ΜL (ref 1.85–7.62)
NEUTS SEG NFR BLD AUTO: 48 % (ref 43–75)
NRBC BLD AUTO-RTO: 0 /100 WBCS
PLATELET # BLD AUTO: 243 THOUSANDS/UL (ref 149–390)
PMV BLD AUTO: 11.4 FL (ref 8.9–12.7)
POTASSIUM SERPL-SCNC: 4.6 MMOL/L (ref 3.5–5.3)
RBC # BLD AUTO: 5.85 MILLION/UL (ref 3.88–5.62)
SODIUM SERPL-SCNC: 138 MMOL/L (ref 136–145)
WBC # BLD AUTO: 4.97 THOUSAND/UL (ref 4.31–10.16)

## 2020-12-30 PROCEDURE — U0003 INFECTIOUS AGENT DETECTION BY NUCLEIC ACID (DNA OR RNA); SEVERE ACUTE RESPIRATORY SYNDROME CORONAVIRUS 2 (SARS-COV-2) (CORONAVIRUS DISEASE [COVID-19]), AMPLIFIED PROBE TECHNIQUE, MAKING USE OF HIGH THROUGHPUT TECHNOLOGIES AS DESCRIBED BY CMS-2020-01-R: HCPCS | Performed by: PHYSICIAN ASSISTANT

## 2020-12-30 PROCEDURE — 80048 BASIC METABOLIC PNL TOTAL CA: CPT

## 2020-12-30 PROCEDURE — 85025 COMPLETE CBC W/AUTO DIFF WBC: CPT

## 2020-12-30 PROCEDURE — 36415 COLL VENOUS BLD VENIPUNCTURE: CPT

## 2020-12-31 LAB — SARS-COV-2 RNA SPEC QL NAA+PROBE: NOT DETECTED

## 2021-01-05 ENCOUNTER — TELEPHONE (OUTPATIENT)
Dept: PAIN MEDICINE | Facility: CLINIC | Age: 46
End: 2021-01-05

## 2021-01-05 NOTE — TELEPHONE ENCOUNTER
Patient calling to make you aware he will have Surgery tomorrow and he signs paperwork for pain medication he wanted to be sure you are aware       Thank you    [phone]

## 2021-01-05 NOTE — TELEPHONE ENCOUNTER
Provider aware  He is to continue the Butrans patch as prescribed and can use whatever medications Dr Claudia Hou prescribes  Please thank him for making us aware  Thank you

## 2021-01-05 NOTE — TELEPHONE ENCOUNTER
S/w pt, stated that he is having knee surgery tomorrow w/ Dr Shahram Crawford at [facility], will be released the same day  Pt stated that he signed papers w/ Dr Owen Botello office stating that rx's will be provided to the pt - he is not to sell / share / abuse these medications  Confirmed pt is taking butrans as prescribed by this office  Advised pt, will make DG aware and cb with his comments  Pt verbalized understanding and appreciation

## 2021-01-06 ENCOUNTER — HOSPITAL ENCOUNTER (OUTPATIENT)
Facility: HOSPITAL | Age: 46
Setting detail: OUTPATIENT SURGERY
Discharge: HOME | End: 2021-01-06
Attending: ORTHOPAEDIC SURGERY | Admitting: ORTHOPAEDIC SURGERY
Payer: OTHER MISCELLANEOUS

## 2021-01-06 ENCOUNTER — ANESTHESIA (OUTPATIENT)
Dept: PERIOP | Facility: HOSPITAL | Age: 46
End: 2021-01-06
Payer: OTHER MISCELLANEOUS

## 2021-01-06 VITALS
HEIGHT: 72 IN | SYSTOLIC BLOOD PRESSURE: 120 MMHG | TEMPERATURE: 99 F | HEART RATE: 76 BPM | OXYGEN SATURATION: 96 % | RESPIRATION RATE: 22 BRPM | WEIGHT: 205.8 LBS | BODY MASS INDEX: 27.87 KG/M2 | DIASTOLIC BLOOD PRESSURE: 72 MMHG

## 2021-01-06 VITALS — HEART RATE: 84 BPM

## 2021-01-06 DIAGNOSIS — Z98.890 S/P ARTHROSCOPIC SURGERY OF LEFT KNEE: Primary | ICD-10-CM

## 2021-01-06 PROBLEM — F17.200 SMOKING: Status: ACTIVE | Noted: 2021-01-06

## 2021-01-06 PROBLEM — IMO0001 SMOKING: Status: ACTIVE | Noted: 2021-01-06

## 2021-01-06 PROBLEM — R11.2 PONV (POSTOPERATIVE NAUSEA AND VOMITING): Status: ACTIVE | Noted: 2021-01-06

## 2021-01-06 PROBLEM — J45.909 ASTHMA: Status: ACTIVE | Noted: 2021-01-06

## 2021-01-06 PROCEDURE — G0289 ARTHRO, LOOSE BODY + CHONDRO: HCPCS | Performed by: PHYSICIAN ASSISTANT

## 2021-01-06 PROCEDURE — 29873 ARTHRS KNEE SURG W/LAT RLS: CPT | Performed by: ORTHOPAEDIC SURGERY

## 2021-01-06 PROCEDURE — 27427 RECONSTRUCTION KNEE: CPT | Performed by: PHYSICIAN ASSISTANT

## 2021-01-06 PROCEDURE — 27427 RECONSTRUCTION KNEE: CPT | Performed by: ORTHOPAEDIC SURGERY

## 2021-01-06 PROCEDURE — 29873 ARTHRS KNEE SURG W/LAT RLS: CPT | Performed by: PHYSICIAN ASSISTANT

## 2021-01-06 PROCEDURE — C1713 ANCHOR/SCREW BN/BN,TIS/BN: HCPCS | Performed by: ORTHOPAEDIC SURGERY

## 2021-01-06 PROCEDURE — G0289 ARTHRO, LOOSE BODY + CHONDRO: HCPCS | Performed by: ORTHOPAEDIC SURGERY

## 2021-01-06 DEVICE — SUTR ANCH,BIO-COMP S-TAK
Type: IMPLANTABLE DEVICE | Site: KNEE | Status: FUNCTIONAL
Brand: ARTHREX®

## 2021-01-06 DEVICE — TENDON GRAFT GRACILIS: Type: IMPLANTABLE DEVICE | Site: KNEE | Status: FUNCTIONAL

## 2021-01-06 DEVICE — BIO-COMP INTERFSCRW W/DISP SHTH
Type: IMPLANTABLE DEVICE | Site: KNEE | Status: FUNCTIONAL
Brand: ARTHREX®

## 2021-01-06 RX ORDER — ONDANSETRON 2 MG/ML
4 INJECTION INTRAMUSCULAR; INTRAVENOUS ONCE AS NEEDED
Status: DISCONTINUED | OUTPATIENT
Start: 2021-01-06 | End: 2021-01-06 | Stop reason: HOSPADM

## 2021-01-06 RX ORDER — HYDROMORPHONE HCL/PF 1 MG/ML
0.2 SYRINGE (ML) INJECTION
Status: DISCONTINUED | OUTPATIENT
Start: 2021-01-06 | End: 2021-01-06 | Stop reason: HOSPADM

## 2021-01-06 RX ORDER — LABETALOL 20 MG/4 ML (5 MG/ML) INTRAVENOUS SYRINGE
5
Status: DISCONTINUED | OUTPATIENT
Start: 2021-01-06 | End: 2021-01-06 | Stop reason: HOSPADM

## 2021-01-06 RX ORDER — FENTANYL CITRATE 50 UG/ML
INJECTION, SOLUTION INTRAMUSCULAR; INTRAVENOUS AS NEEDED
Status: DISCONTINUED | OUTPATIENT
Start: 2021-01-06 | End: 2021-01-06

## 2021-01-06 RX ORDER — PROMETHAZINE HYDROCHLORIDE 25 MG/ML
6.25 INJECTION, SOLUTION INTRAMUSCULAR; INTRAVENOUS ONCE AS NEEDED
Status: DISCONTINUED | OUTPATIENT
Start: 2021-01-06 | End: 2021-01-06 | Stop reason: HOSPADM

## 2021-01-06 RX ORDER — ROPIVACAINE HYDROCHLORIDE 5 MG/ML
INJECTION, SOLUTION EPIDURAL; INFILTRATION; PERINEURAL
Status: COMPLETED | OUTPATIENT
Start: 2021-01-06 | End: 2021-01-06

## 2021-01-06 RX ORDER — CLINDAMYCIN PHOSPHATE 900 MG/50ML
900 INJECTION INTRAVENOUS ONCE
Status: COMPLETED | OUTPATIENT
Start: 2021-01-06 | End: 2021-01-06

## 2021-01-06 RX ORDER — ONDANSETRON 2 MG/ML
INJECTION INTRAMUSCULAR; INTRAVENOUS AS NEEDED
Status: DISCONTINUED | OUTPATIENT
Start: 2021-01-06 | End: 2021-01-06

## 2021-01-06 RX ORDER — MEPERIDINE HYDROCHLORIDE 25 MG/ML
12.5 INJECTION INTRAMUSCULAR; INTRAVENOUS; SUBCUTANEOUS
Status: DISCONTINUED | OUTPATIENT
Start: 2021-01-06 | End: 2021-01-06 | Stop reason: HOSPADM

## 2021-01-06 RX ORDER — HYDROMORPHONE HCL/PF 1 MG/ML
0.5 SYRINGE (ML) INJECTION
Status: DISCONTINUED | OUTPATIENT
Start: 2021-01-06 | End: 2021-01-06 | Stop reason: HOSPADM

## 2021-01-06 RX ORDER — OXYCODONE HYDROCHLORIDE 5 MG/1
5 TABLET ORAL ONCE
Status: COMPLETED | OUTPATIENT
Start: 2021-01-06 | End: 2021-01-06

## 2021-01-06 RX ORDER — KETOROLAC TROMETHAMINE 30 MG/ML
INJECTION, SOLUTION INTRAMUSCULAR; INTRAVENOUS AS NEEDED
Status: DISCONTINUED | OUTPATIENT
Start: 2021-01-06 | End: 2021-01-06

## 2021-01-06 RX ORDER — PROPOFOL 10 MG/ML
INJECTION, EMULSION INTRAVENOUS AS NEEDED
Status: DISCONTINUED | OUTPATIENT
Start: 2021-01-06 | End: 2021-01-06

## 2021-01-06 RX ORDER — METOCLOPRAMIDE HYDROCHLORIDE 5 MG/ML
10 INJECTION INTRAMUSCULAR; INTRAVENOUS ONCE AS NEEDED
Status: DISCONTINUED | OUTPATIENT
Start: 2021-01-06 | End: 2021-01-06 | Stop reason: HOSPADM

## 2021-01-06 RX ORDER — OXYCODONE HYDROCHLORIDE 5 MG/1
5 TABLET ORAL EVERY 4 HOURS PRN
Qty: 15 TABLET | Refills: 0 | Status: SHIPPED | OUTPATIENT
Start: 2021-01-06 | End: 2021-01-09 | Stop reason: SDUPTHER

## 2021-01-06 RX ORDER — ASPIRIN 325 MG
325 TABLET, DELAYED RELEASE (ENTERIC COATED) ORAL 2 TIMES DAILY
Qty: 84 TABLET | Refills: 0 | Status: SHIPPED | OUTPATIENT
Start: 2021-01-06 | End: 2021-12-21 | Stop reason: ALTCHOICE

## 2021-01-06 RX ORDER — MIDAZOLAM HYDROCHLORIDE 2 MG/2ML
INJECTION, SOLUTION INTRAMUSCULAR; INTRAVENOUS
Status: COMPLETED | OUTPATIENT
Start: 2021-01-06 | End: 2021-01-06

## 2021-01-06 RX ORDER — HYDRALAZINE HYDROCHLORIDE 20 MG/ML
5 INJECTION INTRAMUSCULAR; INTRAVENOUS
Status: DISCONTINUED | OUTPATIENT
Start: 2021-01-06 | End: 2021-01-06 | Stop reason: HOSPADM

## 2021-01-06 RX ORDER — DEXAMETHASONE SODIUM PHOSPHATE 4 MG/ML
INJECTION, SOLUTION INTRA-ARTICULAR; INTRALESIONAL; INTRAMUSCULAR; INTRAVENOUS; SOFT TISSUE AS NEEDED
Status: DISCONTINUED | OUTPATIENT
Start: 2021-01-06 | End: 2021-01-06

## 2021-01-06 RX ORDER — SODIUM CHLORIDE, SODIUM LACTATE, POTASSIUM CHLORIDE, CALCIUM CHLORIDE 600; 310; 30; 20 MG/100ML; MG/100ML; MG/100ML; MG/100ML
75 INJECTION, SOLUTION INTRAVENOUS CONTINUOUS
Status: DISCONTINUED | OUTPATIENT
Start: 2021-01-06 | End: 2021-01-06 | Stop reason: HOSPADM

## 2021-01-06 RX ORDER — FENTANYL CITRATE/PF 50 MCG/ML
25 SYRINGE (ML) INJECTION
Status: DISCONTINUED | OUTPATIENT
Start: 2021-01-06 | End: 2021-01-06 | Stop reason: HOSPADM

## 2021-01-06 RX ORDER — TRAMADOL HYDROCHLORIDE 50 MG/1
50 TABLET ORAL EVERY 6 HOURS SCHEDULED
Status: DISCONTINUED | OUTPATIENT
Start: 2021-01-06 | End: 2021-01-06 | Stop reason: HOSPADM

## 2021-01-06 RX ORDER — ACETAMINOPHEN 325 MG/1
650 TABLET ORAL EVERY 6 HOURS PRN
Status: DISCONTINUED | OUTPATIENT
Start: 2021-01-06 | End: 2021-01-06 | Stop reason: HOSPADM

## 2021-01-06 RX ORDER — MAGNESIUM HYDROXIDE 1200 MG/15ML
LIQUID ORAL AS NEEDED
Status: DISCONTINUED | OUTPATIENT
Start: 2021-01-06 | End: 2021-01-06 | Stop reason: HOSPADM

## 2021-01-06 RX ORDER — LIDOCAINE HYDROCHLORIDE 10 MG/ML
INJECTION, SOLUTION EPIDURAL; INFILTRATION; INTRACAUDAL; PERINEURAL AS NEEDED
Status: DISCONTINUED | OUTPATIENT
Start: 2021-01-06 | End: 2021-01-06

## 2021-01-06 RX ADMIN — ROPIVACAINE HYDROCHLORIDE 25 ML: 5 INJECTION, SOLUTION EPIDURAL; INFILTRATION; PERINEURAL at 10:43

## 2021-01-06 RX ADMIN — LIDOCAINE HYDROCHLORIDE 50 MG: 10 INJECTION, SOLUTION EPIDURAL; INFILTRATION; INTRACAUDAL; PERINEURAL at 11:36

## 2021-01-06 RX ADMIN — MIDAZOLAM 2 MG: 1 INJECTION INTRAMUSCULAR; INTRAVENOUS at 10:43

## 2021-01-06 RX ADMIN — ONDANSETRON 4 MG: 2 INJECTION INTRAMUSCULAR; INTRAVENOUS at 12:42

## 2021-01-06 RX ADMIN — FENTANYL CITRATE 25 MCG: 50 INJECTION, SOLUTION INTRAMUSCULAR; INTRAVENOUS at 13:52

## 2021-01-06 RX ADMIN — FENTANYL CITRATE 25 MCG: 50 INJECTION, SOLUTION INTRAMUSCULAR; INTRAVENOUS at 13:09

## 2021-01-06 RX ADMIN — FENTANYL CITRATE 25 MCG: 50 INJECTION, SOLUTION INTRAMUSCULAR; INTRAVENOUS at 13:34

## 2021-01-06 RX ADMIN — CLINDAMYCIN IN 5 PERCENT DEXTROSE 900 MG: 18 INJECTION, SOLUTION INTRAVENOUS at 11:38

## 2021-01-06 RX ADMIN — KETOROLAC TROMETHAMINE 15 MG: 30 INJECTION, SOLUTION INTRAMUSCULAR; INTRAVENOUS at 12:42

## 2021-01-06 RX ADMIN — PROPOFOL 150 MG: 10 INJECTION, EMULSION INTRAVENOUS at 11:37

## 2021-01-06 RX ADMIN — FENTANYL CITRATE 25 MCG: 50 INJECTION, SOLUTION INTRAMUSCULAR; INTRAVENOUS at 13:44

## 2021-01-06 RX ADMIN — FENTANYL CITRATE 25 MCG: 50 INJECTION, SOLUTION INTRAMUSCULAR; INTRAVENOUS at 13:27

## 2021-01-06 RX ADMIN — DEXAMETHASONE SODIUM PHOSPHATE 4 MG: 4 INJECTION, SOLUTION INTRAMUSCULAR; INTRAVENOUS at 11:36

## 2021-01-06 RX ADMIN — OXYCODONE HYDROCHLORIDE 5 MG: 5 TABLET ORAL at 14:10

## 2021-01-06 RX ADMIN — SODIUM CHLORIDE, SODIUM LACTATE, POTASSIUM CHLORIDE, AND CALCIUM CHLORIDE 75 ML/HR: .6; .31; .03; .02 INJECTION, SOLUTION INTRAVENOUS at 10:10

## 2021-01-06 RX ADMIN — FENTANYL CITRATE 25 MCG: 50 INJECTION, SOLUTION INTRAMUSCULAR; INTRAVENOUS at 12:12

## 2021-01-06 RX ADMIN — FENTANYL CITRATE 50 MCG: 50 INJECTION, SOLUTION INTRAMUSCULAR; INTRAVENOUS at 11:36

## 2021-01-06 NOTE — DISCHARGE INSTRUCTIONS
Instruction Sheet Following Medial Patellofemoral Ligament Reconstruction    Crutches:  Use the crutches when walking as you were taught at the surgical center  Put as much weight on your leg as you can tolerate  When you feel comfortable walking without your crutches you may do so  This usually takes about 1-2 weeks  Brace: The hinged knee brace given to you immediately after surgery must by worn at all times, including while walking and sleeping  You may loosen or tighten the brace straps as necessary, but it should be snug  Dressing:   Remove all cotton and yellow gauze 4 days after your surgery  There will be steri-strips (white paper strips) on your wound leave them in place until you see the doctor  Reapply ACE bandage  You do not need to place a new dressing on your knee  Showering: You may shower 4 days after surgery once the dressing has been removed, however you must place a plastic bag over the knee immobilizer while showering or you have the option to take off the brace to shower  Whatever you decide to do please use CAUTION!! Be careful not to slip, twist, or fall  A stool placed in the shower so you can sit is a great idea so you can stabilize your knee  Do not take the knee immobilizer off until after you are seated, and make sure to keep the leg straight  Do not soak in a bath tub, hot tub, or pool until the doctor tells you it is O K  to do so  Once you are done showering pat the wound dry  Elevation:   When you are not walking your leg should be straight with a pillow under your foot or ankle (not behind your knee)  Try to elevate knee as much as possible to reduce swelling  Ice:   You should ice the knee as often as possible to reduce swelling and discomfort  Do not ice the knee more than 20 minutes at a time  Let the knee warm up before reapplication  Avoid getting your wound wet       Pain Control:  Please take Advil 400 mg orally every 6 hours, as well as Tylenol 650 mg orally every 6 hours  You can alternate these medications, taking either Advil or Tylenol every 3 hours for 4 days  This will give you a baseline level of pain control and lessen the need for narcotics  You have been given a narcotic pain medicine, which can be used in addition to the Advil and Tylenol on an as needed basis  Aspirin:  Unless otherwise noted, take one 325mg aspirin every 12 hours for the first 6 weeks following surgery  This is to help decrease the risk of blood clots  Follow-up visit: You need to see the doctor about one week following surgery for your first post-op visit  At that time your sutures (stitches) will be removed  You will be given a physical therapy prescription to begin physical therapy if you were not already given one  Common concerns: 1  Numbness around the incision site on the outside part of the knee is a result of a disruption of a superficial nerve during the operative procedure  Most of this will resolve over time but a small area the size of a quarter usually remains numb  This is unavoidable because of the proximity of the nerve to the incision  2  A sudden rush or feeling of fullness with pain when going from a sitting to a standing position in the knee is common after surgery  3  Bruising and/or swelling of the shin and ankle is common after surgery  This usually occurs 3-4 days after surgery  This is caused by bleeding from the bone (which is cut during surgery) into the area just below the skin  To relieve this discomfort it is best to ice the leg  If at any time you have discomfort, swelling, or redness in the calf (behind the leg between the knee and the ankle) please call the doctor immediately  Please call if:     1  If at any time you have discomfort, swelling, or redness in the calf (behind the leg between the knee and the ankle) please call the doctor immediately      2  Any oozing or redness of the wound, fevers (>101 3 degrees F), or chills  3  Any difficulty breathing or heaviness in the chest      REMEMBER - these are only guidelines for what to expect   If you have any questions or concerns, please do not hesitate to call the office  [phone]

## 2021-01-06 NOTE — ANESTHESIA PREPROCEDURE EVALUATION
Procedure:  REALIGNMENT PATELLA with chondroplasty of patella (Left Knee)  RELEASE RETINACULAR (Left Knee)    Relevant Problems   ANESTHESIA   (+) PONV (postoperative nausea and vomiting)      CARDIO (within normal limits)      ENDO (within normal limits)      GI/HEPATIC (within normal limits)      /RENAL   (+) BPH with obstruction/lower urinary tract symptoms   (+) Nephrolithiasis      GYN (within normal limits)      HEMATOLOGY   (+) Chronic anemia   (+) Iron deficiency anemia      MUSCULOSKELETAL   (+) Chronic low back pain   (+) Myofascial pain syndrome   (+) Sacroiliitis, not elsewhere classified (HCC)      NEURO/PSYCH   (+) Depression with anxiety   (+) Myofascial pain syndrome   (+) PTSD (post-traumatic stress disorder)   (+) Pain syndrome, chronic      PULMONARY   (+) Asthma   (+) Smoking      Other   (+) Bipolar 2 disorder, major depressive episode (HCC)   (+) History of Parth-en-Y gastric bypass   (+) Uncomplicated opioid dependence (HCC)        Physical Exam    Airway    Mallampati score: II  TM Distance: >3 FB  Neck ROM: full     Dental   upper dentures and lower dentures,     Cardiovascular  Rhythm: regular, Rate: normal, Cardiovascular exam normal    Pulmonary  Pulmonary exam normal Breath sounds clear to auscultation,     Other Findings        Anesthesia Plan  ASA Score- 3     Anesthesia Type- general and regional with ASA Monitors  Additional Monitors:   Airway Plan: LMA  Plan Factors-Exercise tolerance (METS): >4 METS  Chart reviewed  Existing labs reviewed  Patient summary reviewed  Patient is a current smoker  Patient instructed to abstain from smoking on day of procedure  Patient did not smoke on day of surgery  Induction- intravenous  Postoperative Plan- Plan for postoperative opioid use  Informed Consent- Anesthetic plan and risks discussed with patient  I personally reviewed this patient with the CRNA   Discussed and agreed on the Anesthesia Plan with the CRNA  Robinson Pipe

## 2021-01-06 NOTE — ANESTHESIA POSTPROCEDURE EVALUATION
Post-Op Assessment Note    CV Status:  Stable  Pain Score: 10    Pain management: inadequate     Mental Status:  Alert and awake (responds to questions approp )   Hydration Status:  Euvolemic   PONV Controlled:  Controlled   Airway Patency:  Patent      Post Op Vitals Reviewed: Yes      Staff: Anesthesiologist, CRNA   Comments: VSS, reflexes intact, PACU nurse offering pain medication  No complications documented      /87 (01/06/21 1315)    Temp      Pulse 82 (01/06/21 1315)   Resp 16 (01/06/21 1315)    SpO2 99 % (01/06/21 1315)

## 2021-01-06 NOTE — ANESTHESIA PROCEDURE NOTES
Peripheral Block    Patient location during procedure: pre-op  Start time: 1/6/2021 10:43 AM  Reason for block: at surgeon's request and post-op pain management  Staffing  Anesthesiologist: Matilda Sprague MD  Performed: anesthesiologist   Preanesthetic Checklist  Completed: patient identified, site marked, surgical consent, pre-op evaluation, timeout performed, IV checked, risks and benefits discussed and monitors and equipment checked  Peripheral Block  Patient position: supine  Prep: Betadine  Patient monitoring: cardiac monitor, continuous pulse ox, heart rate and frequent blood pressure checks  Block type: adductor canal block  Laterality: left  Injection technique: single-shot  Procedures: ultrasound guided, Ultrasound guidance required for the procedure to increase accuracy and safety of medication placement and decrease risk of complications    Ultrasound permanent image savedropivacaine (NAROPIN) 0 5 % perineural infiltration, 25 mL  midazolam (VERSED) 2 mg/2 mL IV, 2 mg  Needle  Needle type: Stimuplex   Needle gauge: 21 G  Needle length: 10 cm  Needle localization: anatomical landmarks and ultrasound guidance  Test dose: negative  Assessment  Injection assessment: incremental injection, local visualized surrounding nerve on ultrasound, negative aspiration for CSF, negative aspiration for heme and no paresthesia on injection  Paresthesia pain: none  Heart rate change: no  Slow fractionated injection: yes  Post-procedure:  site cleaned  patient tolerated the procedure well with no immediate complications

## 2021-01-07 ENCOUNTER — TELEPHONE (OUTPATIENT)
Dept: OBGYN CLINIC | Facility: HOSPITAL | Age: 46
End: 2021-01-07

## 2021-01-07 NOTE — TELEPHONE ENCOUNTER
Patient sees Dr Zaki Sanchez at [place] [address] calling to confirm if surgery took place for patient   Marilyn Ruffin  Confirmed

## 2021-01-07 NOTE — TELEPHONE ENCOUNTER
Dr Lennon called and spoke with this patient  Patient is making an appointment to see Dr El Days show either tomorrow or Monday   Thank you

## 2021-01-07 NOTE — TELEPHONE ENCOUNTER
Patient is calling because the brace that we gave him unclipped & came lose  Patient re-clipped it  Patient stated that prior to that he had a lot of tightness & now the tightness is gone but he has a lot of "blood pooling" under skin  This is on the inside of the lt knee  Patient states that prior to that loosening up he could not walk without the cruch but after he could  I let him know to follow all instructions that he received on his discharge until someone called him back        [phone]

## 2021-01-07 NOTE — OP NOTE
OPERATIVE REPORT  PATIENT NAME: Tony Sharp    :  1975  MRN: [id number]  Pt Location: [facility] OR ROOM 03    SURGERY DATE: 2021    Surgeon(s) and Role:     * Rogers Lennon,  - Primary     * Mikal Orozco - Assisting     * Linda Savage PA-C - Assisting    Preop Diagnosis:  Patellar instability of left knee [M25 362]  Acquired defect of articular cartilage [M24 10]    Post-Op Diagnosis Codes:     * Patellar instability of left knee [M25 362]     * Acquired defect of articular cartilage [M24 10]    Procedure(s) (LRB):  REALIGNMENT PATELLA with chondroplasty of patella, open medial patellofemoral ligament reconstruction with allograft (Left)  RELEASE RETINACULAR (Left)  ARTHROSCOPY KNEE (Left)    Specimen(s):  * No specimens in log *    Estimated Blood Loss:   Minimal    Drains:  * No LDAs found *    Anesthesia Type:   General    Operative Indications:  Patellar instability of left knee [M25 362]  Acquired defect of articular cartilage [T52 66]    Complications:   None    Procedure and Technique:    PREOPERATIVE DIAGNOSIS:  Left knee Patellar maltracking   Left knee Patellar Instability  Left knee Chondromalacia of the patella     POSTOPERATIVE DIAGNOSIS:  Left knee Patellar maltracking   Left knee Patellar Instability  Left knee Chondromalacia of the patella     PROCEDURE:  1  Left knee MPFL reconstruction with gracilis allograft   2  Surgical arthroscopy of the left knee with chondroplasty of the patella   3  Surgical arthroscopy of the left knee with lateral release     ANESTHESIA:  General      ESTIMATED BLOOD LOSS:  minimal      COMPLICATIONS:  None      DRAINS:  None      TOURNIQUET:  20 minutes     OPERATIVE INDICATIONS:     Based on MRI and sxs referable to recurrent patella instability and maltracking, as well as chondromalacia of the patella, and having failed nonop measures, we recommended surgical intervention   We discussed the risks and benefits of surgery along with the probabilities of each with the patient  The risks include but are not limited to: infection (superficial or deep), bleeding, nerve damage (direct or indirect), post operative stiffness, blood clots, pulmonary embolus, death from anesthesia  They elect to proceed with MPFL reconstruction using hamstring allograft, as well as arthroscopy with chondroplasty of the patella      FINDINGS:  Examination under anesthesia revealed a range of motion in the left knee from about -5 to 135 degrees of flexion  The right knee had -5 degrees of hyperextension to 135 degrees of flexion  In the left knee, there was a Negative lachman and negative pivot shift  The knee was stable to valgus and varus stress  Negative posterior drawer  Negative posterolateral drawer  There was significant left patellar instability with translation of 3/4 distance of the patellar width         DESCRIPTION OF PROCEDURE:           Informed consent and initialing of the left knee in preoperative holding  General with LMA  anesthesia  Patient supine  Sterile prep and drape of the left lower extremity  Antibiotics given within 1 hour of skin incision  Time-out prior to the skin incision          Findings:      Arthroscopic evaluation of the knee revealed the following:   Medial meniscus: No tears  Medial femoral condyle:Grade 0 chondral defects  Medial tibial plateau: Grade 0 chondral defects  Anterior cruciate ligament: No tears    Posterior cruciate ligament: Normal appearance  Lateral meniscus: No tears  Lateral femoral condyle: Grade 0 chondral defects  Lateral tibial plateau: Grade 0 chondral defects  Medial and lateral gutters: No loose bodies  Patella: Grade IV chondral defects  Trochlea: Grade 0 chondral defects  Medial plica: No significant plica was present         Procedure:  In the pre-operative holding area, the patient identified the correct operative extremity and I marked that extremity with my initials, using a permanent marker  The patient was then brought to the operating room and positioned supine  Following satisfactory induction of anesthesia, the  knee was prepped and draped in the usual sterile fashion for surgical arthroscopy of the left knee  Before any surgical instrumentation was passed to me by the surgical technician, a formalized time-out occurred, which involves the surgeon, circulating nurse, and anesthesia staff all verifying the correct operative extremity  My initials were visible on the prepped and draped operative field       The anatomic landmarks of the anteromedial and anterolateral portals were marked  The anterolateral portal was established with a scalpel  The arthroscope was introduced through this portal  Under direct visualization, the anteromedial portal was established with a localizing needle followed by a scalpel  A probe was then introduced into the anteromedial portal  A systematic diagnostic arthroscopy evaluated the following: medial compartment, notch, lateral compartment, patellofemoral compartment, medial gutter, and lateral gutter       Patella lateral maltracking and lateral tilting were confirmed arthroscopically  Consequently, a release of the lateral retinaculum was performed using the electrocautery tool  Subsequently, the patella tracked more centrally  Tilting resolved       There was also significant grade IV chondromalacia of the patella  A motorized shaver was used to perform  A chondroplasty of the patellar cartilage, which resulted in a smooth chondral surface with no fraying or fragmentation       At this time, the point at the junction between the proximal one third and the middle one third of the patella was identified and was verified fluoroscopically  This was to be the patellar origin of the MPFL graft   3cm incision made -- interval between layer 2 and 3 located   A small rongeur and a handheld bur was used to make a small trough approximately 3 mm in width and 2 cm long at the planned location of the patellar fixation of our MPFL graft  2×3 0mm Arthrex suturetak anchors were placed into this spot approximately 1-1/2 cm apart  A gracilis allograft 260 mm in length was doubled and the looped end was secured to this point on the patella using 1 limb of each anchor through the looped end of the graft, then tied together  The graft was drawn down into the trough made into the patella by pulling on the remaining limbs, passing those remaining limbs through the graft, and tying the remaining 2 limbs together on top resulting in 2 knots securing the gracilis graft  This provided a very secure fixation of the looped end of the gracilis allograft on the patella  The gracilis allograft was wrapped in saline and protected with a towel while attention was turned to the femoral side of the MPFL reconstruction      At this time, fluoroscopy was used to guide an incision based on the medial epicondyle of the femur  A 3 cm incision was made over this area and blunt dissection was taken down to the fascia  15 blade was used to cut the fascia longitudinally approximately 3 cm  Blunt dissection was taken down to the abductor tubercle and the medial epicondyle  The small valley intervening these 2 structures was identified  A Beath pin was placed provisionally in the spot and was verified fluoroscopically as being an adequate placement for the femoral attachment of the MPFL graft that we were to place  Once fluoroscopically confirmed, the Beath pin was inserted into the medial femoral condyle and out the lateral cortex and lateral skin at the anterior one third of the iliotibial band  Blunt retraction was used throughout this procedure to protect all surrounding structures       A blunt tonsil was used to deliver a suture shuttle from the patella down to this location   A loop was placed on the patella side and the 2 free ends of the gracilis allograft were delivered in the interval between layer 2 and 3 down into our medial thigh incision  Care was taken to tension the graft down to the pin while fluoroscopy was used to provide a perfect AP of the knee in 60° of flexion  This was done to ensure that we had the appropriate length going into the planned tunnel, that the patella was centrally tracking, and that we were not going to over constrain the patella  In addition, the free ends of the graft were looped around the Beath pin and the knee was taken through a full range of motion to ensure optimal graft isometry prior to reaming  Once the appropriate length was decided, the gracilis allograft free ends were whipstitched together from that point meeting the aperture of the tunnel to approximately 25mm past this point  Approximately 2 cm of extra tendon was removed       A 7 mm reamer was placed over this pin, and this was taken approximately 5 cm into the medial femoral condyle  The ends of the allograft were placed into the Beath pin eyelet and brought out laterally  Again, tension was carefully applied to ensure central tracking of the patella without overconstraining the patella  At this juncture, a 7 x 23 mm Arthrex Bio-Tenodesis screw was brought to the field and was then delivered into the tunnel along with the stitched end of the gracilis allograft  The screw was delivered smoothly into the tunnel that was created until flush with bone       The knee was taken through a full range of motion, and there was a good endpoint with lateral patellar displacement   The patella was tracking centrally throughout the range      Wounds were copiously irrigated using sterile saline  Meticulous hemostasis was obtained  Deep subcutaneous layer closed with 0-vicyl in an inverted interrupted fashion   This was followed by 2-0 vicryl interrupted for superficial subcutaneous tissue and 3-0 monocryl in a buried fashion     Arthroscopic portals were closed with 3-0 monocryl sutures   A sterile dressing was applied   A brace was applied  The patient tolerated the procedure well was taken to the recovery room stable condition       IMPLANTS USED:  2 x 3 0mm PEEK suturetak anchors  1 x 7x23 PEEK biotenodesis screw  1 x Gracilis allograft     POSTOPERATIVE PLAN:  The patient will be WBAT with brace locked in extension (about 6-8 weeks)          I was present for the entire procedure, A qualified resident physician was not available and    A physician assistant was necessary for the procedure for assistance with minimally invasive arthroscopic techniques for  mpfl reconstruction with lateral release as well as assistance with utilization of the camera and the shaver and the biter    Patient Disposition:  PACU     SIGNATURE: Santiago Boxer, DO  DATE: January 6, 2021  TIME: 10:15 PM

## 2021-01-07 NOTE — TELEPHONE ENCOUNTER
Patient is calling again stating that someone from the hospital called him & said that he should call us if no one has called him by 12pm   Patient would like to speak to someone asap  I told him that someone would call him as soon as the doctor was available & said that was not good enough          [phone]

## 2021-01-08 ENCOUNTER — OFFICE VISIT (OUTPATIENT)
Dept: OBGYN CLINIC | Facility: CLINIC | Age: 46
End: 2021-01-08

## 2021-01-08 ENCOUNTER — ANESTHESIA (OUTPATIENT)
Dept: PERIOP | Facility: HOSPITAL | Age: 46
End: 2021-01-08
Payer: OTHER MISCELLANEOUS

## 2021-01-08 ENCOUNTER — HOSPITAL ENCOUNTER (OUTPATIENT)
Facility: HOSPITAL | Age: 46
Setting detail: OUTPATIENT SURGERY
Discharge: HOME | End: 2021-01-08
Attending: ORTHOPAEDIC SURGERY | Admitting: ORTHOPAEDIC SURGERY
Payer: OTHER MISCELLANEOUS

## 2021-01-08 ENCOUNTER — ANESTHESIA EVENT (OUTPATIENT)
Dept: PERIOP | Facility: HOSPITAL | Age: 46
End: 2021-01-08
Payer: OTHER MISCELLANEOUS

## 2021-01-08 ENCOUNTER — APPOINTMENT (OUTPATIENT)
Dept: RADIOLOGY | Facility: HOSPITAL | Age: 46
End: 2021-01-08
Payer: OTHER MISCELLANEOUS

## 2021-01-08 VITALS
DIASTOLIC BLOOD PRESSURE: 82 MMHG | WEIGHT: 205 LBS | BODY MASS INDEX: 27.77 KG/M2 | SYSTOLIC BLOOD PRESSURE: 120 MMHG | HEIGHT: 72 IN | TEMPERATURE: 97.6 F

## 2021-01-08 VITALS
RESPIRATION RATE: 18 BRPM | SYSTOLIC BLOOD PRESSURE: 163 MMHG | DIASTOLIC BLOOD PRESSURE: 95 MMHG | HEIGHT: 72 IN | OXYGEN SATURATION: 99 % | BODY MASS INDEX: 27.77 KG/M2 | WEIGHT: 205 LBS | TEMPERATURE: 99 F | HEART RATE: 77 BPM

## 2021-01-08 VITALS — HEART RATE: 63 BPM

## 2021-01-08 DIAGNOSIS — M25.462 EFFUSION OF LEFT KNEE: Primary | ICD-10-CM

## 2021-01-08 DIAGNOSIS — Z98.890 S/P LEFT KNEE SURGERY: ICD-10-CM

## 2021-01-08 DIAGNOSIS — T14.8XXA HEMATOMA: ICD-10-CM

## 2021-01-08 DIAGNOSIS — Z98.890 S/P LEFT KNEE SURGERY: Primary | ICD-10-CM

## 2021-01-08 PROCEDURE — 10140 I&D HMTMA SEROMA/FLUID COLLJ: CPT | Performed by: PHYSICIAN ASSISTANT

## 2021-01-08 PROCEDURE — 99024 POSTOP FOLLOW-UP VISIT: CPT | Performed by: ORTHOPAEDIC SURGERY

## 2021-01-08 PROCEDURE — 10140 I&D HMTMA SEROMA/FLUID COLLJ: CPT | Performed by: ORTHOPAEDIC SURGERY

## 2021-01-08 RX ORDER — HYDRALAZINE HYDROCHLORIDE 20 MG/ML
5 INJECTION INTRAMUSCULAR; INTRAVENOUS
Status: DISCONTINUED | OUTPATIENT
Start: 2021-01-08 | End: 2021-01-08 | Stop reason: HOSPADM

## 2021-01-08 RX ORDER — DEXAMETHASONE SODIUM PHOSPHATE 4 MG/ML
INJECTION, SOLUTION INTRA-ARTICULAR; INTRALESIONAL; INTRAMUSCULAR; INTRAVENOUS; SOFT TISSUE AS NEEDED
Status: DISCONTINUED | OUTPATIENT
Start: 2021-01-08 | End: 2021-01-08

## 2021-01-08 RX ORDER — FENTANYL CITRATE/PF 50 MCG/ML
25 SYRINGE (ML) INJECTION
Status: DISCONTINUED | OUTPATIENT
Start: 2021-01-08 | End: 2021-01-08 | Stop reason: HOSPADM

## 2021-01-08 RX ORDER — ONDANSETRON 2 MG/ML
4 INJECTION INTRAMUSCULAR; INTRAVENOUS ONCE AS NEEDED
Status: DISCONTINUED | OUTPATIENT
Start: 2021-01-08 | End: 2021-01-08 | Stop reason: HOSPADM

## 2021-01-08 RX ORDER — ONDANSETRON 2 MG/ML
INJECTION INTRAMUSCULAR; INTRAVENOUS AS NEEDED
Status: DISCONTINUED | OUTPATIENT
Start: 2021-01-08 | End: 2021-01-08

## 2021-01-08 RX ORDER — MIDAZOLAM HYDROCHLORIDE 2 MG/2ML
INJECTION, SOLUTION INTRAMUSCULAR; INTRAVENOUS AS NEEDED
Status: DISCONTINUED | OUTPATIENT
Start: 2021-01-08 | End: 2021-01-08

## 2021-01-08 RX ORDER — CLINDAMYCIN PHOSPHATE 900 MG/50ML
900 INJECTION INTRAVENOUS ONCE
Status: COMPLETED | OUTPATIENT
Start: 2021-01-08 | End: 2021-01-08

## 2021-01-08 RX ORDER — OXYCODONE HYDROCHLORIDE AND ACETAMINOPHEN 5; 325 MG/1; MG/1
1 TABLET ORAL EVERY 4 HOURS PRN
Status: DISCONTINUED | OUTPATIENT
Start: 2021-01-08 | End: 2021-01-08 | Stop reason: HOSPADM

## 2021-01-08 RX ORDER — TRAMADOL HYDROCHLORIDE 50 MG/1
50 TABLET ORAL EVERY 6 HOURS SCHEDULED
Status: DISCONTINUED | OUTPATIENT
Start: 2021-01-08 | End: 2021-01-08 | Stop reason: HOSPADM

## 2021-01-08 RX ORDER — LABETALOL 20 MG/4 ML (5 MG/ML) INTRAVENOUS SYRINGE
5
Status: DISCONTINUED | OUTPATIENT
Start: 2021-01-08 | End: 2021-01-08 | Stop reason: HOSPADM

## 2021-01-08 RX ORDER — PROPOFOL 10 MG/ML
INJECTION, EMULSION INTRAVENOUS AS NEEDED
Status: DISCONTINUED | OUTPATIENT
Start: 2021-01-08 | End: 2021-01-08

## 2021-01-08 RX ORDER — METOCLOPRAMIDE HYDROCHLORIDE 5 MG/ML
10 INJECTION INTRAMUSCULAR; INTRAVENOUS ONCE AS NEEDED
Status: DISCONTINUED | OUTPATIENT
Start: 2021-01-08 | End: 2021-01-08 | Stop reason: HOSPADM

## 2021-01-08 RX ORDER — MAGNESIUM HYDROXIDE 1200 MG/15ML
LIQUID ORAL AS NEEDED
Status: DISCONTINUED | OUTPATIENT
Start: 2021-01-08 | End: 2021-01-08 | Stop reason: HOSPADM

## 2021-01-08 RX ORDER — HYDROMORPHONE HCL/PF 1 MG/ML
0.5 SYRINGE (ML) INJECTION
Status: DISCONTINUED | OUTPATIENT
Start: 2021-01-08 | End: 2021-01-08 | Stop reason: HOSPADM

## 2021-01-08 RX ORDER — SODIUM CHLORIDE, SODIUM LACTATE, POTASSIUM CHLORIDE, CALCIUM CHLORIDE 600; 310; 30; 20 MG/100ML; MG/100ML; MG/100ML; MG/100ML
INJECTION, SOLUTION INTRAVENOUS CONTINUOUS PRN
Status: DISCONTINUED | OUTPATIENT
Start: 2021-01-08 | End: 2021-01-08

## 2021-01-08 RX ORDER — KETOROLAC TROMETHAMINE 30 MG/ML
INJECTION, SOLUTION INTRAMUSCULAR; INTRAVENOUS AS NEEDED
Status: DISCONTINUED | OUTPATIENT
Start: 2021-01-08 | End: 2021-01-08

## 2021-01-08 RX ORDER — PROMETHAZINE HYDROCHLORIDE 25 MG/ML
6.25 INJECTION, SOLUTION INTRAMUSCULAR; INTRAVENOUS ONCE AS NEEDED
Status: DISCONTINUED | OUTPATIENT
Start: 2021-01-08 | End: 2021-01-08 | Stop reason: HOSPADM

## 2021-01-08 RX ORDER — CEPHALEXIN 500 MG/1
500 CAPSULE ORAL EVERY 6 HOURS SCHEDULED
Qty: 20 CAPSULE | Refills: 0 | Status: SHIPPED | OUTPATIENT
Start: 2021-01-08 | End: 2021-01-13

## 2021-01-08 RX ORDER — ACETAMINOPHEN 325 MG/1
650 TABLET ORAL EVERY 6 HOURS PRN
Status: CANCELLED | OUTPATIENT
Start: 2021-01-08

## 2021-01-08 RX ORDER — MEPERIDINE HYDROCHLORIDE 25 MG/ML
12.5 INJECTION INTRAMUSCULAR; INTRAVENOUS; SUBCUTANEOUS
Status: DISCONTINUED | OUTPATIENT
Start: 2021-01-08 | End: 2021-01-08 | Stop reason: HOSPADM

## 2021-01-08 RX ORDER — ACETAMINOPHEN 325 MG/1
650 TABLET ORAL EVERY 6 HOURS PRN
Status: DISCONTINUED | OUTPATIENT
Start: 2021-01-08 | End: 2021-01-08 | Stop reason: HOSPADM

## 2021-01-08 RX ORDER — FENTANYL CITRATE 50 UG/ML
INJECTION, SOLUTION INTRAMUSCULAR; INTRAVENOUS AS NEEDED
Status: DISCONTINUED | OUTPATIENT
Start: 2021-01-08 | End: 2021-01-08

## 2021-01-08 RX ORDER — HYDROMORPHONE HCL/PF 1 MG/ML
0.2 SYRINGE (ML) INJECTION
Status: DISCONTINUED | OUTPATIENT
Start: 2021-01-08 | End: 2021-01-08 | Stop reason: HOSPADM

## 2021-01-08 RX ORDER — HYDROMORPHONE HCL/PF 1 MG/ML
SYRINGE (ML) INJECTION AS NEEDED
Status: DISCONTINUED | OUTPATIENT
Start: 2021-01-08 | End: 2021-01-08

## 2021-01-08 RX ORDER — TRAMADOL HYDROCHLORIDE 50 MG/1
50 TABLET ORAL EVERY 6 HOURS SCHEDULED
Status: CANCELLED | OUTPATIENT
Start: 2021-01-08

## 2021-01-08 RX ADMIN — ONDANSETRON 4 MG: 2 INJECTION INTRAMUSCULAR; INTRAVENOUS at 17:31

## 2021-01-08 RX ADMIN — FENTANYL CITRATE 50 MCG: 50 INJECTION, SOLUTION INTRAMUSCULAR; INTRAVENOUS at 18:08

## 2021-01-08 RX ADMIN — FENTANYL CITRATE 25 MCG: 50 INJECTION, SOLUTION INTRAMUSCULAR; INTRAVENOUS at 18:26

## 2021-01-08 RX ADMIN — OXYCODONE HYDROCHLORIDE AND ACETAMINOPHEN 1 TABLET: 5; 325 TABLET ORAL at 18:43

## 2021-01-08 RX ADMIN — FENTANYL CITRATE 25 MCG: 50 INJECTION, SOLUTION INTRAMUSCULAR; INTRAVENOUS at 18:14

## 2021-01-08 RX ADMIN — HYDROMORPHONE HYDROCHLORIDE 0.5 MG: 1 INJECTION, SOLUTION INTRAMUSCULAR; INTRAVENOUS; SUBCUTANEOUS at 17:47

## 2021-01-08 RX ADMIN — HYDROMORPHONE HYDROCHLORIDE 0.5 MG: 1 INJECTION, SOLUTION INTRAMUSCULAR; INTRAVENOUS; SUBCUTANEOUS at 18:31

## 2021-01-08 RX ADMIN — DEXAMETHASONE SODIUM PHOSPHATE 4 MG: 4 INJECTION, SOLUTION INTRAMUSCULAR; INTRAVENOUS at 17:08

## 2021-01-08 RX ADMIN — FENTANYL CITRATE 50 MCG: 50 INJECTION, SOLUTION INTRAMUSCULAR; INTRAVENOUS at 17:17

## 2021-01-08 RX ADMIN — SODIUM CHLORIDE, SODIUM LACTATE, POTASSIUM CHLORIDE, AND CALCIUM CHLORIDE: .6; .31; .03; .02 INJECTION, SOLUTION INTRAVENOUS at 16:20

## 2021-01-08 RX ADMIN — FENTANYL CITRATE 25 MCG: 50 INJECTION, SOLUTION INTRAMUSCULAR; INTRAVENOUS at 18:18

## 2021-01-08 RX ADMIN — HYDROMORPHONE HYDROCHLORIDE 0.5 MG: 1 INJECTION, SOLUTION INTRAMUSCULAR; INTRAVENOUS; SUBCUTANEOUS at 18:52

## 2021-01-08 RX ADMIN — FENTANYL CITRATE 25 MCG: 50 INJECTION, SOLUTION INTRAMUSCULAR; INTRAVENOUS at 18:22

## 2021-01-08 RX ADMIN — KETOROLAC TROMETHAMINE 30 MG: 30 INJECTION, SOLUTION INTRAMUSCULAR; INTRAVENOUS at 17:31

## 2021-01-08 RX ADMIN — MIDAZOLAM 2 MG: 1 INJECTION INTRAMUSCULAR; INTRAVENOUS at 16:52

## 2021-01-08 RX ADMIN — HYDROMORPHONE HYDROCHLORIDE 0.5 MG: 1 INJECTION, SOLUTION INTRAMUSCULAR; INTRAVENOUS; SUBCUTANEOUS at 17:31

## 2021-01-08 RX ADMIN — PROPOFOL 150 MG: 10 INJECTION, EMULSION INTRAVENOUS at 16:57

## 2021-01-08 RX ADMIN — CLINDAMYCIN IN 5 PERCENT DEXTROSE 900 MG: 18 INJECTION, SOLUTION INTRAVENOUS at 17:00

## 2021-01-08 RX ADMIN — FENTANYL CITRATE 50 MCG: 50 INJECTION, SOLUTION INTRAMUSCULAR; INTRAVENOUS at 16:20

## 2021-01-08 RX ADMIN — FENTANYL CITRATE 50 MCG: 50 INJECTION, SOLUTION INTRAMUSCULAR; INTRAVENOUS at 17:13

## 2021-01-08 NOTE — H&P (VIEW-ONLY)
Knee Post Operative Visit     Assesment:     39 y o  male 2 days s/p surgical arthroscopy of the left knee with lateral lease and open MPFL reconstruction with allograft, DOS: 1/6/2021 with Left knee acute onset postoperative hematoma     Plan:    Discussion was had with the patient considering taking him to the OR today for incision and drainage of the left knee verses potentially allowing knee to come down with a follow-up appointment on Monday for reconsideration of surgical intervention  I discussed with the patient that I feel as though if we wait we will still likely have to take him to the OR eventually for evacuation of the hematoma and evaluations if MPFL reconstruction revision is necessary, although I doubt that clinically today as his knee feels stable with translation to me  Instructed that due to the hematoma that has accumulated and the skin being under tension that I would recommend taking him to the OR today to decrease the chances of the hematoma becoming infected or having skin or wound breakdown  I stated that there is a possibility that this bleeding is either coming from a vein that is still actively bleeding or the possibility of screw or graft pullout  I stated that I am encouraged to the fact that the kneecap does feel stable and isn't translating laterally, which would make it unlikely that the screw or graft pulled out but this will be further evaluated while in the OR with the possibility of MPFL allograft reconstruction revision  Surgical:    All of the risks and benefits of operative treatment were explained to the patient, as well as the risks and benefits of any alternative treatment options, including nonoperative care  This risks of surgery include, but are not limited to, infection, bleeding, blood clot, damage to nerves/arteries, need for further surgyer, cardiovascular risk, anesthesia risk, and continued pain    The patient understood this and elects to proceed forward with surgical intervention  We will proceed forward with incision and drainage of the left knee and possible MPFL revision  Patient will be taken to the OR today at 4:30 p m  For this surgical procedure  Patient ate around 8:00 a m  Which was a cup of coffee  He did take his 1st dose of aspirin 325 mg this morning  Postoperative dressing was removed to visualize the knee and knee was redressed with Steri-Strips, Xeroform, 4x4s, gauze, and Ace wrap with knee brace reapplied  Weight bearing:  as tolerated     ROM:  0-60    Brace:  Wear brace at all times other than for showers    DVT Prophylaxis:  Ambulation    Follow up:   1 week          Chief Complaint   Patient presents with    Left Knee - Post-op       History of Present Illness: The patient is a 39 y o  male who is being evaluated post operatively 2 days  status post surgical arthroscopy of the left knee with lateral lease and open MPFL reconstruction with allograft, DOS: 1/6/2021  Patient states that he is having difficulty with his hinged knee brace  Patient states that the brace does not remain lock  States that yesterday he woke up with a knee brace on locked and his knee bent  His knee was only bent to 60 which is the flexion parameter on his brace  He states that then started to have blood pulling on the medial aspect of the knee  Patient states that his knee became very swollen and increased pain from his initial postoperative day  Patient states that since yesterday the knee has gradually becoming more and more swollen  He presents to the office today ambulating with the use of crutches with a bloody dressing  Patient states that he did have coffee this morning around 8:00 a m  [facility] Patient also took his morning dose of aspirin 325 mg  The patient denies any fevers, chills, calf pain, chest pain/shortness of breath, redness or drainage from the incision           I have reviewed the past medical, surgical, social and family history, medications and allergies as documented in the EMR  Review of systems: ROS is negative other than that noted in the HPI  Constitutional: Negative for fatigue and fever  Physical Exam:    Blood pressure 120/82, temperature 97 6 °F (36 4 °C), height 6' (1 829 m), weight 93 kg (205 lb)  General/Constitutional: NAD, well developed, well nourished  HENT: Normocephalic, atraumatic  CV: Intact distal pulses, regular rate  Resp: No respiratory distress or labored breathing  Lymphatic: No lymphadenopathy palpated  Neuro: Alert and Oriented x 3, no focal deficits  Psych: Normal mood, normal affect, normal judgement, normal behavior  Skin: Warm, dry, no rashes, no erythema      Knee Exam (focused): RIGHT LEFT   ROM:   0-130 0-60   Palpation: Effusion negative large     MJL tenderness Negative Negative     LJL tenderness Negative Negative   Instability: Varus stable stable     Valgus stable stable   Special Tests: Lachman Negative Negative     Posterior drawer Negative Negative     Anterior drawer Negative Negative     Pivot shift not tested not tested     Dial not tested not tested   Patella: Palpation no tenderness no tenderness     Mobility 1/4 1/4     Apprehension Negative Negative   Other: Single leg 1/4 squat not tested not tested      Left knee:   Knee is moderately swollen with circumferential ecchymosis of the knee with effusion and soft tissue hematoma  Skin does appear to be under mild tension about the incisions  There is fluctuance about the knee       LE NV Exam: +2 DP/PT pulses bilaterally  Sensation intact to light touch L2-S1 bilaterally     Bilateral hip ROM demonstrates no pain actively or passively    No calf tenderness to palpation bilaterally

## 2021-01-08 NOTE — PROGRESS NOTES
Knee Post Operative Visit     Assesment:     39 y o  male 2 days s/p surgical arthroscopy of the left knee with lateral lease and open MPFL reconstruction with allograft, DOS: 1/6/2021 with Left knee acute onset postoperative hematoma     Plan:    Discussion was had with the patient considering taking him to the OR today for incision and drainage of the left knee verses potentially allowing knee to come down with a follow-up appointment on Monday for reconsideration of surgical intervention  I discussed with the patient that I feel as though if we wait we will still likely have to take him to the OR eventually for evacuation of the hematoma and evaluations if MPFL reconstruction revision is necessary, although I doubt that clinically today as his knee feels stable with translation to me  Instructed that due to the hematoma that has accumulated and the skin being under tension that I would recommend taking him to the OR today to decrease the chances of the hematoma becoming infected or having skin or wound breakdown  I stated that there is a possibility that this bleeding is either coming from a vein that is still actively bleeding or the possibility of screw or graft pullout  I stated that I am encouraged to the fact that the kneecap does feel stable and isn't translating laterally, which would make it unlikely that the screw or graft pulled out but this will be further evaluated while in the OR with the possibility of MPFL allograft reconstruction revision  Surgical:    All of the risks and benefits of operative treatment were explained to the patient, as well as the risks and benefits of any alternative treatment options, including nonoperative care  This risks of surgery include, but are not limited to, infection, bleeding, blood clot, damage to nerves/arteries, need for further surgyer, cardiovascular risk, anesthesia risk, and continued pain    The patient understood this and elects to proceed forward with surgical intervention  We will proceed forward with incision and drainage of the left knee and possible MPFL revision  Patient will be taken to the OR today at 4:30 p m  For this surgical procedure  Patient ate around 8:00 a m  Which was a cup of coffee  He did take his 1st dose of aspirin 325 mg this morning  Postoperative dressing was removed to visualize the knee and knee was redressed with Steri-Strips, Xeroform, 4x4s, gauze, and Ace wrap with knee brace reapplied  Weight bearing:  as tolerated     ROM:  0-60    Brace:  Wear brace at all times other than for showers    DVT Prophylaxis:  Ambulation    Follow up:   1 week          Chief Complaint   Patient presents with    Left Knee - Post-op       History of Present Illness: The patient is a 39 y o  male who is being evaluated post operatively 2 days  status post surgical arthroscopy of the left knee with lateral lease and open MPFL reconstruction with allograft, DOS: 1/6/2021  Patient states that he is having difficulty with his hinged knee brace  Patient states that the brace does not remain lock  States that yesterday he woke up with a knee brace on locked and his knee bent  His knee was only bent to 60 which is the flexion parameter on his brace  He states that then started to have blood pulling on the medial aspect of the knee  Patient states that his knee became very swollen and increased pain from his initial postoperative day  Patient states that since yesterday the knee has gradually becoming more and more swollen  He presents to the office today ambulating with the use of crutches with a bloody dressing  Patient states that he did have coffee this morning around 8:00 a m  Marilyn Ruffin Patient also took his morning dose of aspirin 325 mg  The patient denies any fevers, chills, calf pain, chest pain/shortness of breath, redness or drainage from the incision           I have reviewed the past medical, surgical, social and family history, medications and allergies as documented in the EMR  Review of systems: ROS is negative other than that noted in the HPI  Constitutional: Negative for fatigue and fever  Physical Exam:    Blood pressure 120/82, temperature 97 6 °F (36 4 °C), height 6' (1 829 m), weight 93 kg (205 lb)  General/Constitutional: NAD, well developed, well nourished  HENT: Normocephalic, atraumatic  CV: Intact distal pulses, regular rate  Resp: No respiratory distress or labored breathing  Lymphatic: No lymphadenopathy palpated  Neuro: Alert and Oriented x 3, no focal deficits  Psych: Normal mood, normal affect, normal judgement, normal behavior  Skin: Warm, dry, no rashes, no erythema      Knee Exam (focused): RIGHT LEFT   ROM:   0-130 0-60   Palpation: Effusion negative large     MJL tenderness Negative Negative     LJL tenderness Negative Negative   Instability: Varus stable stable     Valgus stable stable   Special Tests: Lachman Negative Negative     Posterior drawer Negative Negative     Anterior drawer Negative Negative     Pivot shift not tested not tested     Dial not tested not tested   Patella: Palpation no tenderness no tenderness     Mobility 1/4 1/4     Apprehension Negative Negative   Other: Single leg 1/4 squat not tested not tested      Left knee:   Knee is moderately swollen with circumferential ecchymosis of the knee with effusion and soft tissue hematoma  Skin does appear to be under mild tension about the incisions  There is fluctuance about the knee       LE NV Exam: +2 DP/PT pulses bilaterally  Sensation intact to light touch L2-S1 bilaterally     Bilateral hip ROM demonstrates no pain actively or passively    No calf tenderness to palpation bilaterally

## 2021-01-08 NOTE — ANESTHESIA POSTPROCEDURE EVALUATION
Post-Op Assessment Note    CV Status:  Stable  Pain Score: 2    Pain management: adequate  Multimodal analgesia used between 6 hours prior to anesthesia start to PACU discharge    Mental Status:  Awake   Hydration Status:  Stable   PONV Controlled:  None   Airway Patency:  Patent and adequate      Post Op Vitals Reviewed: Yes      Staff: Anesthesiologist         No complications documented      BP      Temp (P) 98 7 °F (37 1 °C) (01/08/21 1800)    Pulse     Resp (P) 12 (01/08/21 1800)    SpO2

## 2021-01-08 NOTE — ANESTHESIA PREPROCEDURE EVALUATION
Procedure:  INCISION AND DRAINAGE (I&D) EXTREMITY- of left knee s/p MPFL reconstruction, I&D if the left knee with possible MPFL revision (Left Knee)  REALIGNMENT PATELLA (Left Knee)    Relevant Problems   ANESTHESIA   (+) PONV (postoperative nausea and vomiting)      /RENAL   (+) BPH with obstruction/lower urinary tract symptoms   (+) Nephrolithiasis      HEMATOLOGY   (+) Chronic anemia   (+) Iron deficiency anemia      MUSCULOSKELETAL   (+) Chronic low back pain   (+) Myofascial pain syndrome   (+) Sacroiliitis, not elsewhere classified (HCC)      NEURO/PSYCH   (+) Depression with anxiety   (+) Myofascial pain syndrome   (+) PTSD (post-traumatic stress disorder)   (+) Pain syndrome, chronic      PULMONARY   (+) Asthma   (+) Smoking      Other   (+) Bipolar 2 disorder, major depressive episode (HCC)   (+) History of Parth-en-Y gastric bypass   (+) Uncomplicated opioid dependence ([address] )        Physical Exam    Airway    Mallampati score: II  TM Distance: >3 FB  Neck ROM: full     Dental   upper dentures and lower dentures,     Cardiovascular  Rhythm: regular, Rate: normal, Cardiovascular exam normal    Pulmonary  Pulmonary exam normal Breath sounds clear to auscultation,     Other Findings        Anesthesia Plan  ASA Score- 3     Anesthesia Type- general with ASA Monitors  Additional Monitors:   Airway Plan: LMA  Plan Factors-Exercise tolerance (METS): >4 METS  Chart reviewed  Existing labs reviewed  Patient summary reviewed  Patient is a current smoker  Patient instructed to abstain from smoking on day of procedure  Patient smoked on day of surgery  Induction- intravenous  Postoperative Plan- Plan for postoperative opioid use  Informed Consent- Anesthetic plan and risks discussed with patient

## 2021-01-08 NOTE — DISCHARGE INSTRUCTIONS
Instruction Sheet Following Medial Patellofemoral Ligament Reconstruction    Crutches:  Use the crutches when walking as you were taught at the surgical center  Put as much weight on your leg as you can tolerate  When you feel comfortable walking without your crutches you may do so  This usually takes about 1-2 weeks  Brace: The hinged knee brace given to you immediately after surgery must by worn at all times, including while walking and sleeping  You may loosen or tighten the brace straps as necessary, but it should be snug  Dressing:   Remove all cotton and yellow gauze 4 days after your surgery  There will be steri-strips (white paper strips) on your wound leave them in place until you see the doctor  Reapply ACE bandage  You do not need to place a new dressing on your knee  Showering: You may shower 4 days after surgery once the dressing has been removed, however you must place a plastic bag over the knee immobilizer while showering or you have the option to take off the brace to shower  Whatever you decide to do please use CAUTION!! Be careful not to slip, twist, or fall  A stool placed in the shower so you can sit is a great idea so you can stabilize your knee  Do not take the knee immobilizer off until after you are seated, and make sure to keep the leg straight  Do not soak in a bath tub, hot tub, or pool until the doctor tells you it is O K  to do so  Once you are done showering pat the wound dry  Elevation:   When you are not walking your leg should be straight with a pillow under your foot or ankle (not behind your knee)  Try to elevate knee as much as possible to reduce swelling  Ice:   You should ice the knee as often as possible to reduce swelling and discomfort  Do not ice the knee more than 20 minutes at a time  Let the knee warm up before reapplication  Avoid getting your wound wet       Pain Control:  Please take Advil 400 mg orally every 6 hours, as well as Tylenol 650 mg orally every 6 hours  You can alternate these medications, taking either Advil or Tylenol every 3 hours for 4 days  This will give you a baseline level of pain control and lessen the need for narcotics  You have been given a narcotic pain medicine, which can be used in addition to the Advil and Tylenol on an as needed basis  Aspirin:  Unless otherwise noted, take one 325mg aspirin every 12 hours for the first 6 weeks following surgery  This is to help decrease the risk of blood clots  Antibiotic:   You were prescribed an antibiotic to take following surgery to prevent infection  Take one 500mg Keflex tab by mouth every 6 hours for the next 5 days  Take this medication with food to prevent stomach upset  Follow-up visit: You need to see the doctor about one week following surgery for your first post-op visit  At that time your sutures (stitches) will be removed  You will be given a physical therapy prescription to begin physical therapy if you were not already given one  Common concerns: 1  Numbness around the incision site on the outside part of the knee is a result of a disruption of a superficial nerve during the operative procedure  Most of this will resolve over time but a small area the size of a quarter usually remains numb  This is unavoidable because of the proximity of the nerve to the incision  2  A sudden rush or feeling of fullness with pain when going from a sitting to a standing position in the knee is common after surgery  3  Bruising and/or swelling of the shin and ankle is common after surgery  This usually occurs 3-4 days after surgery  This is caused by bleeding from the bone (which is cut during surgery) into the area just below the skin  To relieve this discomfort it is best to ice the leg  If at any time you have discomfort, swelling, or redness in the calf (behind the leg between the knee and the ankle) please call the doctor immediately       Please call if: 1  If at any time you have discomfort, swelling, or redness in the calf (behind the leg between the knee and the ankle) please call the doctor immediately  2  Any oozing or redness of the wound, fevers (>101 3 degrees F), or chills  3  Any difficulty breathing or heaviness in the chest      REMEMBER - these are only guidelines for what to expect   If you have any questions or concerns, please do not hesitate to call the office  [phone]

## 2021-01-09 ENCOUNTER — TELEPHONE (OUTPATIENT)
Dept: OBGYN CLINIC | Facility: HOSPITAL | Age: 46
End: 2021-01-09

## 2021-01-09 DIAGNOSIS — Z98.890 S/P ARTHROSCOPIC SURGERY OF LEFT KNEE: ICD-10-CM

## 2021-01-09 RX ORDER — OXYCODONE HYDROCHLORIDE 5 MG/1
5 TABLET ORAL EVERY 4 HOURS PRN
Qty: 15 TABLET | Refills: 0 | Status: SHIPPED | OUTPATIENT
Start: 2021-01-09 | End: 2021-01-12 | Stop reason: SDUPTHER

## 2021-01-09 NOTE — OP NOTE
OPERATIVE REPORT  PATIENT NAME: Isatu Alfaro    :  1975  MRN: [id number]  Pt Location: [facility] OR ROOM 02    SURGERY DATE: 2021    Surgeon(s) and Role:     * Rogers Lennon DO - Primary     * Kelsea Vega PA-C - Assisting    Preop Diagnosis:  Effusion of left knee [M25 462]  Hematoma [T14  8XXA]  S/P left knee surgery [Z98 890]    Post-Op Diagnosis Codes:     * Effusion of left knee [M25 462]     * Hematoma [T14  8XXA]     * S/P left knee surgery [Z98 890]    Procedure(s) (LRB):  INCISION AND DRAINAGE (I&D) EXTREMITY- of left knee s/p MPFL reconstruction, I&D if the left knee with possible MPFL revision (Left)    Specimen(s):  * No specimens in log *    Estimated Blood Loss:   Minimal    Drains:  * No LDAs found *    Anesthesia Type:   Choice    Operative Indications:  Effusion of left knee [M25 462]  Hematoma [T14  8XXA]  S/P left knee surgery [H56 605]      Complications:   None    Procedure and Technique:        Informed consent and initialing of the left knee in preoperative holding  General with LMA  anesthesia  Patient supine  Sterile prep and drape of the left lower extremity  Antibiotics given within 1 hour of skin incision  Time-out prior to the skin incision         Procedure: In the pre-operative holding area, the patient identified the correct operative extremity and I marked that extremity with my initials, using a permanent marker  The patient was then brought to the operating room and positioned supine  Following satisfactory induction of anesthesia, the  knee was prepped and draped in the usual sterile fashion for surgical arthroscopy of the left knee  Before any surgical instrumentation was passed to me by the surgical technician, a formalized time-out occurred, which involves the surgeon, circulating nurse, and anesthesia staff all verifying the correct operative extremity  My initials were visible on the prepped and draped operative field         The medial incision off of the patella was opened and extended to connect to the medial portal incision  The medial femoral epicondyle incision was also opened up the all sutures were removed from both incisions  A hematoma was encountered in the anterior knee and throughout the leg lateral aspect of the subcutaneously which was evacuated and irrigated copiously  The MPFL allograft was intact on both the patellar and femoral sides  There was a venous lumen that was bleeding in the medial patellar incision which was coagulated  The tourniquet was never inflated during this procedure in order to make sure all bleeders were coagulated  This was done with electrocautery  The wound was once again copiously irrigated and meticulously was inspected to confirm no further bleeding or oozing  There was significantly less swelling and no more tension on the skin after the procedure  The deep layers of fascia were closed with 0 Vicryl and the subcutaneous skin was closed with interrupted 2-0 Vicryl buried stitches  The skin was closed with interrupted horizontal mattress 3-0 nylon stitches  The skin was cleansed and a sterile dressing with Xeroform 4x4s Webril and Ace bandage in a brace was placed  The patient will follow up in the office next week in order to have a wound check and make sure there is no hematoma reaccumulation  This completed the irrigation and debridement and incision and drainage of the knee           I was present for the entire procedure, A qualified resident physician was not available and A physician assistant was required during the procedure for retraction tissue handling,dissection and suturing    Patient Disposition:  PACU     SIGNATURE: Katerin Corey DO  DATE: January 8, 2021  TIME: 10:22 PM

## 2021-01-09 NOTE — TELEPHONE ENCOUNTER
Patient sees Dr Osiris Araya  Patient is calling in wanting to get a medication refill on Oxycodone 5 mg tabs, he currently has 3 tabs remaining  He is asking if this can be refilled and sent to [facility] pharmacy in [facility] [place] and for a call once this is completed  Information forwarded over to on Call  to assist further           Call back# [phone]

## 2021-01-10 ENCOUNTER — TELEPHONE (OUTPATIENT)
Dept: OTHER | Facility: OTHER | Age: 46
End: 2021-01-10

## 2021-01-10 NOTE — TELEPHONE ENCOUNTER
[phone] PT Megan Burnett [birth year]  [facility] PT HAD SURGERY ON 1/7/21 PT SAID HIS LEGS FELLS LIKE COLD WATER ARE RUNNING THRU THEM   THEY ARE HOT TO [facility]

## 2021-01-12 ENCOUNTER — TELEPHONE (OUTPATIENT)
Dept: OBGYN CLINIC | Facility: CLINIC | Age: 46
End: 2021-01-12

## 2021-01-12 DIAGNOSIS — Z98.890 S/P ARTHROSCOPIC SURGERY OF LEFT KNEE: ICD-10-CM

## 2021-01-12 RX ORDER — OXYCODONE HYDROCHLORIDE 5 MG/1
5 TABLET ORAL EVERY 4 HOURS PRN
Qty: 10 TABLET | Refills: 0 | Status: SHIPPED | OUTPATIENT
Start: 2021-01-12 | End: 2021-02-01

## 2021-01-12 NOTE — TELEPHONE ENCOUNTER
Pt contacted Call Center requested refill of their medication  Medication Name: oxyCODONE (ROXICODONE)       Dosage of Med: 5mg      Frequency of Med: Take 1 tablet (5 mg total) by mouth every 4 (four) hours as needed for moderate pain       Remaining Medication: 3 left      Pharmacy and Location: [facility] pharmacy         Pt  Preferred Callback Phone Number: [phone]      Thank you  * PLEASE ADVISE PATIENTS:    REFILL REQUESTS WILL BE PROCESSED WITHIN 24-48 HOURS  *

## 2021-01-14 ENCOUNTER — TELEPHONE (OUTPATIENT)
Dept: OBGYN CLINIC | Facility: HOSPITAL | Age: 46
End: 2021-01-14

## 2021-01-14 ENCOUNTER — OFFICE VISIT (OUTPATIENT)
Dept: OBGYN CLINIC | Facility: MEDICAL CENTER | Age: 46
End: 2021-01-14

## 2021-01-14 VITALS
DIASTOLIC BLOOD PRESSURE: 83 MMHG | WEIGHT: 205 LBS | BODY MASS INDEX: 27.8 KG/M2 | SYSTOLIC BLOOD PRESSURE: 123 MMHG | HEART RATE: 79 BPM

## 2021-01-14 DIAGNOSIS — Z98.890 S/P ARTHROSCOPIC SURGERY OF LEFT KNEE: Primary | ICD-10-CM

## 2021-01-14 DIAGNOSIS — Z98.890 S/P LEFT KNEE SURGERY: Primary | ICD-10-CM

## 2021-01-14 PROCEDURE — 99024 POSTOP FOLLOW-UP VISIT: CPT | Performed by: ORTHOPAEDIC SURGERY

## 2021-01-14 RX ORDER — OXYCODONE HYDROCHLORIDE 5 MG/1
5 TABLET ORAL EVERY 4 HOURS PRN
Qty: 10 TABLET | Refills: 0 | Status: SHIPPED | OUTPATIENT
Start: 2021-01-14 | End: 2021-02-01

## 2021-01-14 NOTE — TELEPHONE ENCOUNTER
Dr Rober Mortimer    Patient is asking for a refill of the Oxycodone 5 mg,  He said the pain is intense and his leg is swollen          # [phone]      Medication  oxyCODONE (ROXICODONE) 5 mg immediate release tablet [[id number]]  oxyCODONE (ROXICODONE) 5 mg immediate release tablet [[id number]]     Order Details  Dose: 5 mg Route: Oral Frequency: Every 4 hours PRN for moderate pain   Dispense Quantity: 10 tablet Refills: 0 Fills remaining: --           Sig: Take 1 tablet (5 mg total) by mouth every 4 (four) hours as needed for moderate pain for up to 10 dosesMax Daily Amount: 30 mg          Written Date: 01/12/21 Expiration Date: 03/13/21     Start Date: 01/12/21 End Date: No end date specified (ordered for 10 doses)     Earliest Fill Date: 01/12/21             Ordering Provider: -- Phone:  -- Fax:  --    Address:  -- CHARLA #:  -- NPI:  --           Authorizing Provider: Kati Leroy PA-C Phone:  [phone] Fax:  [phone]    Address:  [address] [place] [address] CHARLA #:  [id number] NPI:  [id number]           Supervising Provider: Jazzmine Bynum DO Phone:  [phone] Fax:  [phone]    Address:  [place] [address] CHARLA #:  [id number] NPI:  [id number]           Ordering User:  Kati Leroy PA-C            Diagnosis Association: S/P arthroscopic surgery of left knee (C58 258)      Original Order:  oxyCODONE (ROXICODONE) 5 mg immediate release tablet [[id number]]      Pharmacy:  [facility]   Phone:  [phone]   Fax:  [phone]   Address:  [address] [place] [address] [place] [address]   CHARLA #:  [id number]   Pharmacy Comments

## 2021-01-14 NOTE — PROGRESS NOTES
Knee Post Operative Visit     Assesment:     39 y o  male 1 week  s/p surgical arthroscopy of the left knee with lateral release and MPFL allograft reconstruction with subsequent surgery for left knee open I and D and washout of postoperative hematoma, DOS: 1/8/2021    Plan:    Post-Operative treatment:    Ice to knee for 20 minutes at least 1-2 times daily  OTC NSAIDS prn for pain  Advised to hold off on therapy but instructed to work on motion at home with brace unlocked     Hematoma is significantly down from last Friday and after surgery so we will continue to observe this clinically in 1 more week and plan to remove sutures then    Imaging: All imaging from today was reviewed by myself and explained to the patient  Weight bearing:  as tolerated     ROM:  0-60 week 0-3, then 0-90 week 3-6    Brace:  Keep brace locked in extension for ambulation and May unlock brace for ROM exercises    DVT Prophylaxis:  Ambulation    Follow up:   1 week for suture removal      Patient was advised that if they have any fevers, chills, chest pain, shortness of breath, redness or drainage from the incision, please let our office know immediately  Chief Complaint   Patient presents with    Left Knee - Post-op       History of Present Illness: The patient is a 39 y o  male who is being evaluated post operatively 1 week  status post surgical arthroscopy of the left knee with lateral release and MPFL allograft reconstruction with subsequent surgery for left knee open I and D and washout of postoperative hematoma, DOS: 1/8/2021  Since the prior visit, He reports  improvement  He still experiencing knee swelling but states that this is significantly less compared to when we saw him prior to the knee washout  He also states that it is not as painful  Pain is well controlled  The patient is using ice to control swelling  They have not started physical therapy  The patient Ambulation for DVT ppx    The patient has been ambulating with crutches  The patient has been ambulating with a brace  The patient denies any fevers, chills, calf pain, chest pain/shortness of breath, redness or drainage from the incision  I have reviewed the past medical, surgical, social and family history, medications and allergies as documented in the EMR  Review of systems: ROS is negative other than that noted in the HPI  Constitutional: Negative for fatigue and fever  Physical Exam:    Blood pressure 123/83, pulse 79, weight 93 kg (205 lb)  General/Constitutional: NAD, well developed, well nourished  HENT: Normocephalic, atraumatic  CV: Intact distal pulses, regular rate  Resp: No respiratory distress or labored breathing  Lymphatic: No lymphadenopathy palpated  Neuro: Alert and Oriented x 3, no focal deficits  Psych: Normal mood, normal affect, normal judgement, normal behavior  Skin: Warm, dry, no rashes, no erythema      Knee Exam (focused): RIGHT LEFT   ROM:   0-130 0-60   Palpation: Effusion negative moderate     MJL tenderness Negative Negative     LJL tenderness Negative Negative   Instability: Varus stable stable     Valgus stable stable   Special Tests: Lachman Negative Negative     Posterior drawer Negative Negative     Anterior drawer Negative Negative     Pivot shift not tested not tested     Dial not tested not tested   Patella: Palpation no tenderness no tenderness     Mobility 1/4 1/4     Apprehension Negative Negative   Other: Single leg 1/4 squat not tested not tested      Left knee: Incisions are intact with horizontal sutures in place  No surrounding erythema or drainage     Moderate knee joint effusion with ecchymosis surrounding     LE NV Exam: +2 DP/PT pulses bilaterally  Sensation intact to light touch L2-S1 bilaterally     Bilateral hip ROM demonstrates no pain actively or passively    No calf tenderness to palpation bilaterally

## 2021-01-14 NOTE — TELEPHONE ENCOUNTER
[phone]  Patient saw Dr Shahram Crawford this morning, said to try without pain meds, if it gets too intense, to call for pain meds  He called twice after 2pm today  Aware Dr Shahram Crawford left at 12pm today  He asked for a doc to page him for meds  Sent the message above to Dr Cristi Buckley, on call

## 2021-01-15 NOTE — TELEPHONE ENCOUNTER
I called and spoke to the patient    I will send 10 tabs of oxycodone for his pain, and he will wean from this over the next several days to tylenol and advil

## 2021-01-21 ENCOUNTER — OFFICE VISIT (OUTPATIENT)
Dept: OBGYN CLINIC | Facility: MEDICAL CENTER | Age: 46
End: 2021-01-21

## 2021-01-21 ENCOUNTER — TELEPHONE (OUTPATIENT)
Dept: OBGYN CLINIC | Facility: OTHER | Age: 46
End: 2021-01-21

## 2021-01-21 VITALS
SYSTOLIC BLOOD PRESSURE: 153 MMHG | WEIGHT: 205 LBS | HEART RATE: 86 BPM | BODY MASS INDEX: 27.8 KG/M2 | DIASTOLIC BLOOD PRESSURE: 70 MMHG

## 2021-01-21 DIAGNOSIS — Z98.890 S/P LEFT KNEE SURGERY: ICD-10-CM

## 2021-01-21 DIAGNOSIS — Z98.890 S/P ARTHROSCOPIC SURGERY OF LEFT KNEE: Primary | ICD-10-CM

## 2021-01-21 PROCEDURE — 99024 POSTOP FOLLOW-UP VISIT: CPT | Performed by: ORTHOPAEDIC SURGERY

## 2021-01-21 NOTE — TELEPHONE ENCOUNTER
Nurse  Teresa yanez   Requesting the office notes from 01/14, and today's visit     As well as Both Op Notes from 01/06 and 01/08  Please fax  Fax [phone]  Attn : Stefano Bella

## 2021-01-21 NOTE — PROGRESS NOTES
Knee Post Operative Visit     Assesment:     39 y o  male 13 days s/p surgical arthroscopy of the left knee with lateral release and MPFL allograft reconstruction with subsequent surgery for left knee open I and D and washout of postoperative hematoma, DOS: 1/8/202  Doing very well at this time with hematoma resolving    Plan:    Post-Operative treatment:    Ice to knee for 20 minutes at least 1-2 times daily  Advised that he may begin PT for ROM/strengthening to knee, hip and core  Imaging:    No imaging was available for review today  Weight bearing:  as tolerated     ROM:  0-90    Brace:  Wear brace at all times other than for showers, Keep brace locked in extension for ambulation and May unlock brace for ROM exercises    DVT Prophylaxis:  Aspirin 325 mg oral twice daily x 6 weeks    Follow up:   6 weeks    Patient was advised that if they have any fevers, chills, chest pain, shortness of breath, redness or drainage from the incision, please let our office know immediately  Chief Complaint   Patient presents with    Left Knee - Follow-up     History of Present Illness: The patient is a 39 y o  male who is being evaluated post operatively 13 days s/p surgical arthroscopy of the left knee with lateral release and MPFL allograft reconstruction with subsequent surgery for left knee open I and D and washout of postoperative hematoma, DOS: 1/8/2021  Since the prior visit, He reports significant improvement  Patient states that his knee has some mild soreness however he has been able to work on range of motion  Pain is well controlled  The patient is using ice to control swelling  They have not started physical therapy  The patient Aspirin 325 mg oral twice daily x 6 weeks for DVT ppx  The patient has been ambulating with crutches  The patient has been ambulating with a brace      The patient denies any fevers, chills, calf pain, chest pain/shortness of breath, redness or drainage Telephone Encounter by Brian Ellis DO at 04/24/17 12:48 PM     Author:  Brian Ellis DO Service:  (none) Author Type:  Physician     Filed:  04/24/17 12:50 PM Encounter Date:  4/24/2017 Status:  Signed     :  Brian Ellis DO (Physician)            Please see lab result from today and now add invokana 100 mg daily and repeat a hgba1c and urine microalbumin in 3 months as well as lipid panel and cmp and cbc then too for HLD, DM2, Hypertension.[RK1.1M]        Revision History        User Key Date/Time User Provider Type Action    > RK1.1 04/24/17 12:50 PM Brian Ellis DO Physician Sign    M - Manual             from the incision  I have reviewed the past medical, surgical, social and family history, medications and allergies as documented in the EMR  Review of systems: ROS is negative other than that noted in the HPI  Constitutional: Negative for fatigue and fever  Physical Exam:    Weight 93 kg (205 lb)  General/Constitutional: NAD, well developed, well nourished  HENT: Normocephalic, atraumatic  CV: Intact distal pulses, regular rate  Resp: No respiratory distress or labored breathing  Lymphatic: No lymphadenopathy palpated  Neuro: Alert and Oriented x 3, no focal deficits  Psych: Normal mood, normal affect, normal judgement, normal behavior  Skin: Warm, dry, no rashes, no erythema    Knee Exam (focused):              RIGHT LEFT   ROM:   0-130 0-90   Palpation: Effusion negative mild     MJL tenderness Negative Negative     LJL tenderness Negative Negative   Instability: Varus stable stable     Valgus stable stable   Special Tests: Lachman Negative Negative     Posterior drawer Negative Negative     Anterior drawer Negative Negative     Pivot shift not tested not tested     Dial not tested not tested   Patella: Palpation no tenderness no tenderness     Mobility 1/4 1/4     Apprehension Negative Negative   Other: Single leg 1/4 squat not tested not tested      Incisions show no erythema, no drainage    LE NV Exam: +2 DP/PT pulses bilaterally  Sensation intact to light touch L2-S1 bilaterally     Bilateral hip ROM demonstrates no pain actively or passively    No calf tenderness to palpation bilaterally    Scribe Attestation    I,:  Alvarez Boogie am acting as a scribe while in the presence of the attending physician :       I,:  Dominique Lennon,  personally performed the services described in this documentation    as scribed in my presence :

## 2021-01-21 NOTE — LETTER
January 21, 2021     Patient: Jobie Gottron   YOB: 1975   Date of Visit: 1/21/2021       To Whom it May Concern:    Jobie Gottron is under my professional care  He was seen in my office on 1/21/2021  He {Return to school/sport/work:[id number]}  If you have any questions or concerns, please don't hesitate to call           Sincerely,          Yung Vila DO        CC: No Recipients

## 2021-01-22 ENCOUNTER — TELEPHONE (OUTPATIENT)
Dept: OBGYN CLINIC | Facility: HOSPITAL | Age: 46
End: 2021-01-22

## 2021-01-22 DIAGNOSIS — Z98.890 S/P ARTHROSCOPIC SURGERY OF LEFT KNEE: Primary | ICD-10-CM

## 2021-01-22 NOTE — TELEPHONE ENCOUNTER
Kareem Jones is calling from the Physical Therapy and [facility] requesting that the left knee PT order be faxed to them at [phone]

## 2021-01-26 ENCOUNTER — TELEPHONE (OUTPATIENT)
Dept: OBGYN CLINIC | Facility: HOSPITAL | Age: 46
End: 2021-01-26

## 2021-01-26 NOTE — TELEPHONE ENCOUNTER
DR Hira Gillette; Sx 01 08  RE: Pt protocol  CB# Shady Garcia [id number] called with clinical questions on patients PT protocol:      1) can patient unlock brace when going up the stairs     2) can patient use body weight support treadmill  If yes, should patients brace be on or off? If more convenient, Dr Hira Gillette can email Ingridaron Hastings at:    [email or web address]

## 2021-01-27 NOTE — TELEPHONE ENCOUNTER
I called Ciera Esparza and instructed that he may unlock the brace to go up and down the stairs and may use the treadmill but with the brace on and unlocked   Thank you

## 2021-01-29 ENCOUNTER — TELEPHONE (OUTPATIENT)
Dept: OBGYN CLINIC | Facility: HOSPITAL | Age: 46
End: 2021-01-29

## 2021-01-29 ENCOUNTER — TELEPHONE (OUTPATIENT)
Dept: PAIN MEDICINE | Facility: MEDICAL CENTER | Age: 46
End: 2021-01-29

## 2021-01-29 NOTE — TELEPHONE ENCOUNTER
Can you please let the patient know that as per his request I switched his OV on Monday 2/1/21 to a virtual visit  I will be contacting him at or around his scheduled OV time  Thank you
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
## 2021-02-01 ENCOUNTER — TELEMEDICINE (OUTPATIENT)
Dept: PAIN MEDICINE | Facility: CLINIC | Age: 46
End: 2021-02-01
Payer: COMMERCIAL

## 2021-02-01 DIAGNOSIS — M48.062 SPINAL STENOSIS OF LUMBAR REGION WITH NEUROGENIC CLAUDICATION: ICD-10-CM

## 2021-02-01 DIAGNOSIS — M54.16 LUMBAR RADICULOPATHY: ICD-10-CM

## 2021-02-01 DIAGNOSIS — M54.42 CHRONIC LOW BACK PAIN WITH BILATERAL SCIATICA, UNSPECIFIED BACK PAIN LATERALITY: ICD-10-CM

## 2021-02-01 DIAGNOSIS — M79.18 MYOFASCIAL PAIN SYNDROME: ICD-10-CM

## 2021-02-01 DIAGNOSIS — M96.1 LUMBAR POST-LAMINECTOMY SYNDROME: ICD-10-CM

## 2021-02-01 DIAGNOSIS — G89.29 CHRONIC LOW BACK PAIN WITH BILATERAL SCIATICA, UNSPECIFIED BACK PAIN LATERALITY: ICD-10-CM

## 2021-02-01 DIAGNOSIS — G89.4 PAIN SYNDROME, CHRONIC: Primary | ICD-10-CM

## 2021-02-01 DIAGNOSIS — G89.29 CHRONIC RIGHT SHOULDER PAIN: ICD-10-CM

## 2021-02-01 DIAGNOSIS — M54.41 CHRONIC LOW BACK PAIN WITH BILATERAL SCIATICA, UNSPECIFIED BACK PAIN LATERALITY: ICD-10-CM

## 2021-02-01 DIAGNOSIS — M25.511 CHRONIC RIGHT SHOULDER PAIN: ICD-10-CM

## 2021-02-01 PROCEDURE — 99214 OFFICE O/P EST MOD 30 MIN: CPT | Performed by: NURSE PRACTITIONER

## 2021-02-01 PROCEDURE — 4004F PT TOBACCO SCREEN RCVD TLK: CPT | Performed by: NURSE PRACTITIONER

## 2021-02-01 RX ORDER — BACLOFEN 10 MG/1
10 TABLET ORAL 3 TIMES DAILY
Qty: 90 TABLET | Refills: 2 | Status: SHIPPED | OUTPATIENT
Start: 2021-02-01 | End: 2021-04-26 | Stop reason: SDUPTHER

## 2021-02-01 RX ORDER — BUPRENORPHINE 15 UG/H
PATCH, EXTENDED RELEASE TRANSDERMAL
Qty: 4 PATCH | Refills: 2 | Status: SHIPPED | OUTPATIENT
Start: 2021-02-01 | End: 2021-02-23

## 2021-02-02 ENCOUNTER — TELEPHONE (OUTPATIENT)
Dept: OBGYN CLINIC | Facility: HOSPITAL | Age: 46
End: 2021-02-02

## 2021-02-02 NOTE — TELEPHONE ENCOUNTER
Edwina Boland, nurse ([phone]) is calling for the OVN and next appointment   Faxed to [phone]        Please provide an update work status and fax to Edwina Boland at [phone]

## 2021-02-02 NOTE — PATIENT INSTRUCTIONS
Assessment/Plan:  1  I encouraged the patient continue to use his [employer]tron[employer] spinal cord stimulator and to [facility] in the future  The patient was agreeable and verbalized an understanding  2    I discussed with the patient at this point time since he is noting moderate stable overall relief and I feel that his medication regimen is reasonable and appropriate with his underlying etiology, we will continue the Butrans patch and baclofen as prescribed  The patient was agreeable and verbalized an understanding  3    The patient is to follow-up as scheduled in 12 weeks on April 26, 2021 to arrive at 7:45 a m  for an 8:00 a m  office visit  The patient was agreeable and verbalized an understanding  Opioid Safety   AMBULATORY CARE:   Opioid safety  includes the correct use, storage, and disposal of opioids  Examples of opioid medicines to treat pain include oxycodone, morphine, fentanyl, and codeine  Call your local emergency number (911 in the [address] [place] [address],[address]), or have someone else call if:   · You have a seizure  · You cannot be woken  · You have trouble staying awake and your breathing is slow or shallow  · Your speech is slurred, or you are confused  · You are dizzy or stumble when you walk  Call your doctor, or have someone close to you call if:   · You are extremely drowsy, or you have trouble staying awake or speaking  · You have pale or clammy skin  · You have blue fingernails or lips  · Your heartbeat is slower than normal     · You cannot stop vomiting  · You have questions or concerns about your condition or care  Use opioids safely:   · Take prescribed opioids exactly as directed  Opioids come with directions based on the kind of opioid and how it is given  Do not take more than the recommended amount, or for longer than needed  · Do not give opioids to others or take opioids that belong to someone else    Misuse of opioids can lead to an addiction or overdose  · Do not mix opioids with other medicines or alcohol  The combination can cause an overdose, or lead to a coma  · Do not drive or operate heavy machinery after you take the opioid  Your provider or pharmacist can tell you how long to wait after a dose before you do these activities  · Talk to your healthcare provider if you have any side effects  He or she can help you prevent or relieve side effects  Side effects include nausea, sleepiness, itching, and trouble thinking clearly  Manage constipation:  Constipation is the most common side effect of opioid medicine  Constipation is when you have hard, dry bowel movements, or you go longer than usual between bowel movements  Tell your healthcare provider about all changes in your bowel movements while you are taking opioids  He or she may recommend laxative medicine to help you have a bowel movement  He or she may also change the kind of opioid you are taking, or change when you take it  The following are more ways you can prevent or relieve constipation:  · Drink liquids as directed  You may need to drink extra liquids to help soften and move your bowels  Ask how much liquid to drink each day and which liquids are best for you  · Eat high-fiber foods  This may help decrease constipation by adding bulk to your bowel movements  High-fiber foods include fruits, vegetables, whole-grain breads and cereals, and beans  Your healthcare provider or dietitian can help you create a high-fiber meal plan  Your provider may also recommend a fiber supplement if you cannot get enough fiber from food  · Exercise regularly  Regular physical activity can help stimulate your intestines  Walking is a good exercise to prevent or relieve constipation  Ask which exercises are best for you  · Schedule a time each day to have a bowel movement  This may help train your body to have regular bowel movements  Bend forward while you are on the toilet to help move the bowel movement out  Sit on the toilet for at least 10 minutes, even if you do not have a bowel movement  Store opioids safely:   · Store opioids where others cannot easily get them  Keep them in a locked cabinet or secure area  Do not  keep them in a purse or other bag you carry with you  A person may be looking for something else and find the opioids  · Make sure opioids are stored out of the reach of children  A child can easily overdose on opioids  Opioids may look like candy to a small child  The best way to dispose of opioids: The laws vary by country and area  In the [place], the best way is to return the opioids through a take-back program  This program is offered by the [employer]  The following are options for using the program:  · Take the opioids to a CHARLA collection site  The site is often a law enforcement center  Call your local law enforcement center for scheduled take-back days in your area  You will be given information on where to go if the collection site is in a different location  · Take the opioids to an approved pharmacy or hospital   A pharmacy or hospital may be set up as a collection site  You will need to ask if it is a CHARLA collection site if you were not directed there  A pharmacy or doctor's office may not be able to take back opioids unless it is a CHARLA site  · Use a mail-back system  This means you are given containers to put the opioids into  You will then mail them in the containers  · Use a take-back drop box  This is a place to leave the opioids at any time  People and animals will not be able to get into the box  Your local law enforcement agency can tell you where to find a drop box in your area  Other safe ways to dispose of opioids: The medicine may come with disposal instructions  The instructions may vary depending on the brand of medicine you are using  Instructions may come in a Medication Guide, but not every medicine has one  You may instead get instructions from your pharmacy or doctor  Follow instructions carefully  The following are general guidelines to follow:  · Find out if you can flush the opioid  Some opioids can be flushed down the toilet or poured into the sink  You will need to contact authorities in your area to see if this is an option for you  The FDA also offers a list of medicines that are safe to flush down the toilet  You can check the list if you cannot get the information for your local area  · Ask your waste management company about rules for putting opioids in the trash  The company will be able to give you specific directions  Scratch out personal information on the original medicine label so it cannot be read  Then put it in the trash  Do not label the trash or put any information on it about the opioids  It should look like regular household trash so no one is tempted to look for the opioids  Keep the trash out of the reach of children and animals  Always make sure trash is secure  · Talk to officials if you live in a facility  If you live in a nursing home or assisted living center, talk to an official  The person will know the rules for your area  Other ways to manage pain:   · Ask your healthcare provider about non-opioid medicines to control pain  Nonprescription medicines include NSAIDs (such as ibuprofen) and acetaminophen  Prescription medicines include muscle relaxers, antidepressants, and steroids  · Pain may be managed without any medicines  Some ways to relieve pain include massage, aromatherapy, or meditation  Physical or occupational therapy may also help  For more information:   · Drug Enforcement Administration  [address] [facility] [address]  Phone: 8- 138 - 988-3407  Web Address: [facility]/drug_disposal/    · [address] and Drug Administration  [address] [facility] [address] , [address] [facility]  Phone: 0- 069 - [phone]  Web Address: http://[employer]/  Follow up with your doctor or pain specialist as directed: You may need to have your dose adjusted  Your doctor or pain specialist can also help you find ways to manage pain without opioids  Write down your questions so you remember to ask them during your visits  © Copyright [address] Information is for End User's use only and may not be sold, redistributed or otherwise used for commercial purposes  All illustrations and images included in CareNotes® are the copyrighted property of A D A M , Inc  or [address] [place], [place] [address]efrem   The above information is an  only  It is not intended as medical advice for individual conditions or treatments  Talk to your doctor, nurse or pharmacist before following any medical regimen to see if it is safe and effective for you

## 2021-02-02 NOTE — PROGRESS NOTES
Virtual Brief Visit    Assessment/Plan:  1  I encouraged the patient continue to use his Medtronics spinal cord stimulator and [facility] in the future  The patient was agreeable and verbalized an understanding  2    I discussed with the patient at this point time since he is noting moderate stable overall relief and I feel that his medication regimen is reasonable and appropriate with his underlying etiology, we will continue the Butrans patch and baclofen as prescribed  The patient was agreeable and verbalized an understanding  3    The patient is to follow-up as scheduled in 12 weeks on April 26, 2021 to arrive at 7:45 a m  for an 8:00 a m  office visit  The patient was agreeable and verbalized an understanding  While the patient was in the office today, I did review the patient's report on the [address] [place] [address] web site and found it to be appropriate for what is being prescribed and I reviewed it for any inconsistencies or evidence of multiple prescribers/drug diversion  A copy of the patient's report can be found in their chart  The risk of opioid medications, including dependence, addiction and tolerance were explained to the patient  The patient understands and agrees to use these medications only as prescribed  I have fully discussed the potential side effects of the medication with the patient, which include, but are not limited to, constipation, drowsiness, addiction, impaired judgment and risk of fatal overdose as not taken as prescribed  I have warned the patient that sharing medications is a felony  I warned against driving while taking sedating medications  At this point in time, the patient is showing no signs of addiction, abuse, diversion or suicidal ideation                Problem List Items Addressed This Visit        Nervous and Auditory    Lumbar radiculopathy    Relevant Medications Butrans 15 MCG/HR       Musculoskeletal and Integument    Myofascial pain syndrome    Relevant Medications    baclofen 10 mg tablet    Butrans 15 MCG/HR       Other    Chronic low back pain    Relevant Medications    Butrans 15 MCG/HR    Pain syndrome, chronic - Primary    Lumbar post-laminectomy syndrome    Relevant Medications    Butrans 15 MCG/HR    Spinal stenosis of lumbar region with neurogenic claudication    Relevant Medications    Butrans 15 MCG/HR      Other Visit Diagnoses     Chronic right shoulder pain        Relevant Medications    Butrans 15 MCG/HR                Reason for visit is   Chief Complaint   Patient presents with    Virtual Brief Visit        Encounter provider Jamel Lefort, CRNP    Provider located at [address]  [address] [place] [address]  [phone]    Recent Visits  Date Type Provider Dept   01/29/21 Telephone Jamel Lefort, [address] [place] [address] [facility] [place]   Showing recent visits within past 7 days and meeting all other requirements     Today's Visits  Date Type Provider Dept   02/01/21 Telemedicine Jamel Lefort, CRNP  [facility] [place]   Showing today's visits and meeting all other requirements     Future Appointments  No visits were found meeting these conditions  Showing future appointments within next 150 days and meeting all other requirements      It was my intent to perform this visit via video technology but the patient was not able to do a video connection so the visit was completed via audio telephone only  After connecting through telephone, the patient was identified by name and date of birth  Pina Floyd was informed that this is a telemedicine visit and that the visit is being conducted through telephone  My office door was closed  No one else was in the room  He acknowledged consent and understanding of privacy and security of the platform   The patient has agreed to participate and understands he can discontinue the visit at any time  Patient is aware this is a billable service  Subjective    Yosef Hammonds is a 39 y o  male Who currently is concerned with inclement weather and our office is physically closed so we decided to proceed with a virtual visit today  The patient is currently being treated for his chronic pain syndrome secondary to lumbar post-laminectomy syndrome with spinal stenosis and lumbar radiculopathy as well as chronic right shoulder pain and diffuse myofascial pain  Since his last office visit the patient did have a left knee arthroscopy on January 6, 2021 with Dr Dmitriy Cook and then due to an infection had an I and D on January 8, 2021 as well  The patient reports that his knee pain is improving but with the weather changes is uncomfortable but is managing it with his current medication regimen as he is currently managing his pain with the Butrans patch 15 micro g applying 1 patch transdermally every 7 days for pain and baclofen 10 mg t i d  p r n  for pain and spasms as well as p r n  over-the-counter Tylenol  The patient reports that his current medication regimen is providing moderate stable overall relief of his pain symptoms, without any significant side effects or issues  The patient also continues to use his thoracic lead Medtronics spinal cord stimulator and does not feel that he needs any reprogramming at this time  Presently he rates his pain as a 6 to 7/10 and would like to continue his current medication regimen as prescribed      HPI     Past Medical History:   Diagnosis Date    Anxiety     Arthritis     Asthma     Bipolar disorder (HCC)     Chronic left lumbar radiculopathy     Chronic low back pain     Chronic pain syndrome     Chronic right shoulder pain     Depression with anxiety     Fatigue     GERD (gastroesophageal reflux disease)     Hydronephrosis     Iron deficiency anemia     Kidney stone     Lytic bone lesions on xray     Nephrolithiasis     PONV (postoperative nausea and vomiting)     Right elbow pain     Right lateral epicondylitis        Past Surgical History:   Procedure Laterality Date    ADENOIDECTOMY Bilateral     APPENDECTOMY      BACK SURGERY      CHOLECYSTECTOMY      CHOLECYSTECTOMY LAPAROSCOPIC N/A 10/30/2018    Procedure: CHOLECYSTECTOMY WITH GASTROTOMY LAPAROSCOPIC;  Surgeon: Valentin Lanza MD;  [facility];  Service: General    ERCP W/ SPHICTEROTOMY N/A 10/30/2018    Procedure: ENDOSCOPIC RETROGRADE CHOLANGIOPANCREATOGRAPHY (ERCP) W/ SPHINCTEROTOMY;  Surgeon: Thomas Rossi MD;  [facility];  Service: Gastroenterology    GASTRIC BYPASS      2009    OTHER SURGICAL HISTORY      fusion/refusion of vertebrae, 2010 and 2011 l5-s1 and l4-l5    VA CYSTO/URETERO W/LITHOTRIPSY &INDWELL STENT INSRT Right 8/8/2017    Procedure: CYSTOSCOPY; URETEROSCOPY; HOLMIUM LASER; RETROGRADE PYELOGRAM; STENT;  Surgeon: Rosan Sandifer, MD;  [facility];  Service: Urology    VA [address] [place] [address] Left 7/8/6997    Procedure: INCISION AND DRAINAGE (I&D) EXTREMITY- of left knee s/p MPFL reconstruction, I&D if the left knee with possible MPFL revision;  Surgeon: Bela Cheney DO;  [facility];  Service: Orthopedics    VA FIX UNSTABLE PATELLA,EXTEN REALIGN Left 1/6/2021    Procedure: REALIGNMENT PATELLA with chondroplasty of patella, open medial patellofemoral ligament reconstruction with allograft;  Surgeon: Bela Cheney DO;  [facility];  Service: Orthopedics    VA IMPLANT SPINAL NEUROSTIM/ Right 11/14/2017    Procedure: REMOVAL LOWER MEDIAL BUTTOCK SPINAL CORD STIMULATOR GENERATOR; PLACEMENT OF NEW BUTTOCK SPINAL [address];  Surgeon: Justin Lange MD;  Location[facility];  Service: Neurosurgery    VA KNEE SCOPE, W/LATERAL RELEASE Left 1/6/2021    Procedure: RELEASE RETINACULAR;  Surgeon: Bela Cheney DO;  [facility]; Service: Orthopedics    HI KNEE SCOPE,SHAVE ARTICULAR CART Left 1/6/2021    Procedure: ARTHROSCOPY KNEE;  Surgeon: Katerin Corey DO;  Location:  MAIN OR;  Service: Orthopedics    RIK-EN-Y PROCEDURE  2003    SPINAL CORD STIMULATOR IMPLANT  11/14/2017    dr Dwight Alejo procedure and technique 1  removal of a right back implantable pulse generator 2 placement of a new right buttock impantable pulse generator through seperate incision 3 electric analys complex programming spinal cord stimulator system postoperative period approx 1 hour    TONSILLECTOMY         Current Outpatient Medications   Medication Sig Dispense Refill    albuterol (Ventolin HFA) 90 mcg/act inhaler Inhale 2 puffs every 4 (four) hours as needed for wheezing or shortness of breath Cough 18 Inhaler 1    aspirin (ECOTRIN) 325 mg EC tablet Take 1 tablet (325 mg total) by mouth 2 (two) times a day 84 tablet 0    baclofen 10 mg tablet Take 1 tablet (10 mg total) by mouth 3 (three) times a day 90 tablet 2    Butrans 15 MCG/HR APPLY 1 PATCH TD EVERY 7 DAYS for pain  4 patch 2    LANSOPRAZOLE PO Take by mouth      naloxone (NARCAN) 4 mg/0 1 mL nasal spray Administer 1 spray into a nostril  If breathing does not return to normal or if breathing difficulty resumes after 2-3 minutes, give another dose in the other nostril using a new spray  1 each 1     No current facility-administered medications for this visit  Allergies   Allergen Reactions    Ampicillin GI Intolerance     Vomiting    Aspirin GI Intolerance     vomiting      Penicillins GI Intolerance     Vomit       Review of Systems    There were no vitals filed for this visit  I spent 6 minutes discussing the patients past medical/surgical history, current pain symptoms, and medication regiment/treatment plan with the patient today during the virtual visit       Normal OV: [id number]    VIRTUAL VISIT DISCLAIMER    Isatu Alfaro acknowledges that he has consented to an online visit or consultation  He understands that the online visit is based solely on information provided by him, and that, in the absence of a face-to-face physical evaluation by the physician, the diagnosis he receives is both limited and provisional in terms of accuracy and completeness  This is not intended to replace a full medical face-to-face evaluation by the physician  Winston Dunlap understands and accepts these terms

## 2021-02-03 NOTE — TELEPHONE ENCOUNTER
I have placed a new work note  Please fax over the last office visit note and the new work status note   Thank you

## 2021-02-14 NOTE — INTERVAL H&P NOTE
H&P reviewed  After examining the patient I find no changes in the patients condition since the H&P had been written  There were no vitals filed for this visit  No

## 2021-02-19 ENCOUNTER — TELEPHONE (OUTPATIENT)
Dept: PAIN MEDICINE | Facility: CLINIC | Age: 46
End: 2021-02-19

## 2021-02-19 DIAGNOSIS — M48.062 SPINAL STENOSIS OF LUMBAR REGION WITH NEUROGENIC CLAUDICATION: ICD-10-CM

## 2021-02-19 DIAGNOSIS — M79.18 MYOFASCIAL PAIN SYNDROME: ICD-10-CM

## 2021-02-19 DIAGNOSIS — G89.29 CHRONIC LOW BACK PAIN WITH BILATERAL SCIATICA, UNSPECIFIED BACK PAIN LATERALITY: ICD-10-CM

## 2021-02-19 DIAGNOSIS — M96.1 LUMBAR POST-LAMINECTOMY SYNDROME: ICD-10-CM

## 2021-02-19 DIAGNOSIS — M25.511 CHRONIC RIGHT SHOULDER PAIN: ICD-10-CM

## 2021-02-19 DIAGNOSIS — M54.41 CHRONIC LOW BACK PAIN WITH BILATERAL SCIATICA, UNSPECIFIED BACK PAIN LATERALITY: ICD-10-CM

## 2021-02-19 DIAGNOSIS — M54.42 CHRONIC LOW BACK PAIN WITH BILATERAL SCIATICA, UNSPECIFIED BACK PAIN LATERALITY: ICD-10-CM

## 2021-02-19 DIAGNOSIS — G89.29 CHRONIC RIGHT SHOULDER PAIN: ICD-10-CM

## 2021-02-19 DIAGNOSIS — M54.16 LUMBAR RADICULOPATHY: ICD-10-CM

## 2021-02-19 NOTE — TELEPHONE ENCOUNTER
Pt needs prior auth butrans 15  Mcg per patch      Pt has new insurance:    [employer] -[id number]   ID # [id number]

## 2021-02-23 RX ORDER — BUPRENORPHINE 15 UG/H
PATCH TRANSDERMAL
Qty: 4 PATCH | Refills: 2 | Status: SHIPPED | OUTPATIENT
Start: 2021-02-23 | End: 2021-02-26 | Stop reason: SDUPTHER

## 2021-02-23 NOTE — TELEPHONE ENCOUNTER
Patient called stating his medical insurance will only cover generic Butran pain patch, which is significantly cheaper   Patient is requesting a script for generic pain patch; please advise, fabio    Call back# [phone]

## 2021-02-23 NOTE — TELEPHONE ENCOUNTER
Halina Candelaria ( [facility]) called in regard to the medication script Butrans 15 MCG/HR   He stated that the script has toe say( Substitute permissible ) on the script in order for the script to be processed  Please be advised thank you      Halina Candelaria at [address] [place] [address] #   [phone]

## 2021-02-23 NOTE — TELEPHONE ENCOUNTER
S/w [facility] - no record of this pt  S/w pt, advised of above  Pt stated that he is under Gu[place] at the pharmacy  Advised pt, please fu w/ pharmacy re: info as discussed  Cb if there is any question or issue  Pt verbalized understanding and appreciation

## 2021-02-23 NOTE — TELEPHONE ENCOUNTER
His script does not say BRAND ONLY so they should automatically fill it with the generic  So it is ordered as generic

## 2021-02-26 RX ORDER — BUPRENORPHINE 15 UG/H
PATCH TRANSDERMAL
Qty: 4 PATCH | Refills: 2 | Status: SHIPPED | OUTPATIENT
Start: 2021-02-26 | End: 2021-04-26 | Stop reason: SDUPTHER

## 2021-02-26 NOTE — TELEPHONE ENCOUNTER
Pt said [facility] will not have the patch available until the end of march  Pt is asking for patch to be sent to [facility] on [address]  They said they will have it in 2 days for him

## 2021-03-01 ENCOUNTER — OFFICE VISIT (OUTPATIENT)
Dept: URGENT CARE | Age: 46
End: 2021-03-01
Payer: COMMERCIAL

## 2021-03-01 VITALS — TEMPERATURE: 96.9 F | RESPIRATION RATE: 18 BRPM | OXYGEN SATURATION: 98 % | HEART RATE: 84 BPM

## 2021-03-01 DIAGNOSIS — Z11.59 SCREENING FOR VIRAL DISEASE: Primary | ICD-10-CM

## 2021-03-01 DIAGNOSIS — J20.9 ACUTE BRONCHITIS, UNSPECIFIED ORGANISM: ICD-10-CM

## 2021-03-01 PROCEDURE — U0003 INFECTIOUS AGENT DETECTION BY NUCLEIC ACID (DNA OR RNA); SEVERE ACUTE RESPIRATORY SYNDROME CORONAVIRUS 2 (SARS-COV-2) (CORONAVIRUS DISEASE [COVID-19]), AMPLIFIED PROBE TECHNIQUE, MAKING USE OF HIGH THROUGHPUT TECHNOLOGIES AS DESCRIBED BY CMS-2020-01-R: HCPCS | Performed by: PHYSICIAN ASSISTANT

## 2021-03-01 PROCEDURE — 99213 OFFICE O/P EST LOW 20 MIN: CPT | Performed by: PHYSICIAN ASSISTANT

## 2021-03-01 PROCEDURE — U0005 INFEC AGEN DETEC AMPLI PROBE: HCPCS | Performed by: PHYSICIAN ASSISTANT

## 2021-03-01 NOTE — PATIENT INSTRUCTIONS
Coronavirus testing done today  Please stay self isolated to results return  Results typically take anywhere from 2-5 days  Use over-the-counter pain relief as needed to help with aches and pains, fever  Begin Vitamin D3 2000 IU daily, Vitamin C 1g twice a day,   Multi-vitamin daily  Follow-up with primary care physician 3-5 days via telephone for continued symptoms  Proceed to emergency room with any worsening of symptoms, shortness of breath, chest pain, difficulties breathing, difficulties tolerating oral intake  [address]    Your healthcare provider and/or public health staff have evaluated you and have determined that you do not need to remain in the hospital at this time  At this time you can be isolated at home where you will be monitored by staff from your local or state health department  You should carefully follow the prevention and isolation steps below until a healthcare provider or local or state health department says that you can return to your normal activities  Stay home except to get medical care    People who are mildly ill with COVID-19 are able to isolate at home during their illness  You should restrict activities outside your home, except for getting medical care  Do not go to work, school, or public areas  Avoid using public transportation, ride-sharing, or taxis  Separate yourself from other people and animals in your home    People: As much as possible, you should stay in a specific room and away from other people in your home  Also, you should use a separate bathroom, if available  Animals: You should restrict contact with pets and other animals while you are sick with COVID-19, just like you would around other people  Although there have not been reports of pets or other animals becoming sick with COVID-19, it is still recommended that people sick with COVID-19 limit contact with animals until more information is known about the virus   When possible, have another member of your household care for your animals while you are sick  If you are sick with COVID-19, avoid contact with your pet, including petting, snuggling, being kissed or licked, and sharing food  If you must care for your pet or be around animals while you are sick, wash your hands before and after you interact with pets and wear a facemask  See COVID-19 and Animals for more information  Call ahead before visiting your doctor    If you have a medical appointment, call the healthcare provider and tell them that you have or may have COVID-19  This will help the healthcare providers office take steps to keep other people from getting infected or exposed  Wear a facemask    You should wear a facemask when you are around other people (e g , sharing a room or vehicle) or pets and before you enter a healthcare providers office  If you are not able to wear a facemask (for example, because it causes trouble breathing), then people who live with you should not stay in the same room with you, or they should wear a facemask if they enter your room  Cover your coughs and sneezes    Cover your mouth and nose with a tissue when you cough or sneeze  Throw used tissues in a lined trash can  Immediately wash your hands with soap and water for at least 20 seconds or, if soap and water are not available, clean your hands with an alcohol-based hand  that contains at least 60% alcohol  Clean your hands often    Wash your hands often with soap and water for at least 20 seconds, especially after blowing your nose, coughing, or sneezing; going to the bathroom; and before eating or preparing food  If soap and water are not readily available, use an alcohol-based hand  with at least 60% alcohol, covering all surfaces of your hands and rubbing them together until they feel dry  Soap and water are the best option if hands are visibly dirty   Avoid touching your eyes, nose, and mouth with unwashed hands  Avoid sharing personal household items    You should not share dishes, drinking glasses, cups, eating utensils, towels, or bedding with other people or pets in your home  After using these items, they should be washed thoroughly with soap and water  Clean all high-touch surfaces everyday    High touch surfaces include counters, tabletops, doorknobs, bathroom fixtures, toilets, phones, keyboards, tablets, and bedside tables  Also, clean any surfaces that may have blood, stool, or body fluids on them  Use a household cleaning spray or wipe, according to the label instructions  Labels contain instructions for safe and effective use of the cleaning product including precautions you should take when applying the product, such as wearing gloves and making sure you have good ventilation during use of the product  Monitor your symptoms    Seek prompt medical attention if your illness is worsening (e g , difficulty breathing)  Before seeking care, call your healthcare provider and tell them that you have, or are being evaluated for, COVID-19  Put on a facemask before you enter the facility  These steps will help the healthcare providers office to keep other people in the office or waiting room from getting infected or exposed  Ask your healthcare provider to call the local or [facility] health department  Persons who are placed under active monitoring or facilitated self-monitoring should follow instructions provided by their local health department or occupational health professionals, as appropriate  If you have a medical emergency and need to call 911, notify the dispatch personnel that you have, or are being evaluated for COVID-19  If possible, put on a facemask before emergency medical services arrive      Discontinuing home isolation    Patients with confirmed COVID-19 should remain under home isolation precautions until the following conditions are met:   - They have had no fever for at least 24 hours (that is one full day of no fever without the use medicine that reduces fevers)  AND  - other symptoms have improved (for example, when their cough or shortness of breath have improved)  AND  - at least 10 days have passed since their symptoms first appeared  Patients with confirmed COVID-19 should also notify close contacts (including their workplace) and ask that they self-quarantine  Currently, close contact is defined as being within 6 feet for 10 minutes or more from the period 48 hours before symptom onset to the time at which the patient went into isolation  Close contacts of patients diagnosed with COVID-19 should be instructed by the patient to self-quarantine for 14 days from the last time of their last contact with the patient       Source: "[employer]"Clebrittany fi

## 2021-03-01 NOTE — PROGRESS NOTES
[facility]        NAME: Jaime Tomas is a 39 y o  male  : 1975    MRN: [id number]  DATE: 2021  TIME: 1:42 PM    Assessment and Plan   Screening for viral disease [Z11 59]  1  Screening for viral disease  Novel Coronavirus (Covid-19),PCR [facility] - Office Collection         Patient Instructions     Coronavirus testing done today  Please stay self isolated to results return  Results typically take anywhere from 2-5 days  Use over-the-counter pain relief as needed to help with aches and pains, fever  Begin Vitamin D3 2000 IU daily, Vitamin C 1g twice a day,   Multi-vitamin daily  Follow-up with primary care physician 3-5 days via telephone for continued symptoms  Proceed to emergency room with any worsening of symptoms, shortness of breath, chest pain, difficulties breathing, difficulties tolerating oral intake  Follow up with PCP in 3-5 days  Proceed to  ER if symptoms worsen  Chief Complaint     Chief Complaint   Patient presents with    Fever     lightheaded, and sligh loss of taste     Cough     chest tighness         History of Present Illness       39year old male presents to the office for c/o  Fever, congestion, postnasal drip, sinus pressure, alterations in taste, cough, shortness of breath  And potential coronavirus exposure  Patient notes that he was at physical therapy where 1 of the staff members had 103 fever  He denies any other exposures as far as he is aware  No sick contacts in the household  Patient's T-max fever was 102 5  He has been using over-the-counter medication to help with this  Patient  Is a current smoker of 1 pack per day for the last 4-5 years  He also has sports induced asthma  He has been using his albuterol inhaler without much relief  Patient currently not working as he is currently on worker's compensation for injury to his knee which required surgery at the beginning of January  Fever  This is a new problem   The current episode started in the past 7 days  The problem occurs daily  The problem has been waxing and waning  Associated symptoms include congestion, coughing, a fever, headaches, myalgias, nausea, neck pain and vomiting (x 5 since saturday )  Pertinent negatives include no abdominal pain, rash or sore throat  Nothing aggravates the symptoms  Treatments tried: Mucinex  Cough  This is a new problem  The current episode started in the past 7 days  The problem has been unchanged  The problem occurs every few minutes  Associated symptoms include a fever, headaches, myalgias, postnasal drip and shortness of breath  Pertinent negatives include no rash or sore throat  Risk factors for lung disease include smoking/tobacco exposure (+ exposure with 103 fever )  His past medical history is significant for asthma  Review of Systems   Review of Systems   Constitutional: Positive for fever  HENT: Positive for congestion, postnasal drip, sinus pressure and sinus pain  Negative for sore throat and trouble swallowing  Respiratory: Positive for cough, chest tightness and shortness of breath  Cardiovascular: Positive for leg swelling (due to surgery on left  lower )  Negative for palpitations  Gastrointestinal: Positive for nausea and vomiting (x 5 since saturday )  Negative for abdominal pain and diarrhea  Musculoskeletal: Positive for myalgias and neck pain  Skin: Negative for color change and rash  Neurological: Positive for light-headedness and headaches           Current Medications       Current Outpatient Medications:     albuterol (Ventolin HFA) 90 mcg/act inhaler, Inhale 2 puffs every 4 (four) hours as needed for wheezing or shortness of breath Cough, Disp: 18 Inhaler, Rfl: 1    aspirin (ECOTRIN) 325 mg EC tablet, Take 1 tablet (325 mg total) by mouth 2 (two) times a day, Disp: 84 tablet, Rfl: 0    baclofen 10 mg tablet, Take 1 tablet (10 mg total) by mouth 3 (three) times a day, Disp: 90 tablet, Rfl: 2    buprenorphine (BUTRANS) 15 mcg/hr, Apply 1 patch TD every 7 days for pain  Substitute permissible , Disp: 4 patch, Rfl: 2    LANSOPRAZOLE PO, Take by mouth, Disp: , Rfl:     naloxone (NARCAN) 4 mg/0 1 mL nasal spray, Administer 1 spray into a nostril   If breathing does not return to normal or if breathing difficulty resumes after 2-3 minutes, give another dose in the other nostril using a new spray , Disp: 1 each, Rfl: 1    Current Allergies     Allergies as of 03/01/2021 - Reviewed 03/01/2021   Allergen Reaction Noted    Ampicillin GI Intolerance 11/09/2017    Penicillins GI Intolerance 11/09/2017            The following portions of the patient's history were reviewed and updated as appropriate: allergies, current medications, past family history, past medical history, past social history, past surgical history and problem list      Past Medical History:   Diagnosis Date    Anxiety     Arthritis     Asthma     Bipolar disorder ([facility] [address] )     Chronic left lumbar radiculopathy     Chronic low back pain     Chronic pain syndrome     Chronic right shoulder pain     Depression with anxiety     Fatigue     GERD (gastroesophageal reflux disease)     Hydronephrosis     Iron deficiency anemia     Kidney stone     Lytic bone lesions on xray     Nephrolithiasis     PONV (postoperative nausea and vomiting)     Right elbow pain     Right lateral epicondylitis        Past Surgical History:   Procedure Laterality Date    ADENOIDECTOMY Bilateral     APPENDECTOMY      BACK SURGERY      CHOLECYSTECTOMY      CHOLECYSTECTOMY LAPAROSCOPIC N/A 10/30/2018    Procedure: CHOLECYSTECTOMY WITH GASTROTOMY LAPAROSCOPIC;  Surgeon: Angela Claudio MD;  [facility];  Service: General    ERCP W/ SPHICTEROTOMY N/A 10/30/2018    Procedure: ENDOSCOPIC RETROGRADE CHOLANGIOPANCREATOGRAPHY (ERCP) W/ SPHINCTEROTOMY;  Surgeon: Giovanna Chand MD;  [facility];  Service: Gastroenterology    GASTRIC BYPASS      2009    OTHER SURGICAL HISTORY      fusion/refusion of vertebrae, 2010 and 2011 l5-s1 and l4-l5    DC CYSTO/URETERO W/LITHOTRIPSY &INDWELL STENT INSRT Right 8/8/2017    Procedure: CYSTOSCOPY; URETEROSCOPY; HOLMIUM LASER; RETROGRADE PYELOGRAM; STENT;  Surgeon: Jeramie Ghotra MD;  [facility];  Service: Urology    DC [address] [facility] Left 7/2/9700    Procedure: INCISION AND DRAINAGE (I&D) EXTREMITY- of left knee s/p MPFL reconstruction, I&D if the left knee with possible MPFL revision;  Surgeon: Sanya Diaz DO;  [facility];  Service: Orthopedics    DC FIX UNSTABLE PATELLA,EXTEN REALIGN Left 1/6/2021    Procedure: REALIGNMENT PATELLA with chondroplasty of patella, open medial patellofemoral ligament reconstruction with allograft;  Surgeon: Sanya Diaz DO;  [facility];  Service: Orthopedics    DC IMPLANT SPINAL NEUROSTIM/ Right 11/14/2017    Procedure: REMOVAL LOWER MEDIAL BUTTOCK SPINAL CORD STIMULATOR GENERATOR; PLACEMENT OF NEW BUTTOCK SPINAL [address];  Surgeon: Karen Lazcano MD;  [facility];  Service: Neurosurgery    DC KNEE SCOPE, W/LATERAL RELEASE Left 1/6/2021    Procedure: RELEASE RETINACULAR;  Surgeon: Sanya Diaz DO;  [facility];  Service: Orthopedics    [address] [place] [address] Left 1/6/2021    Procedure: ARTHROSCOPY KNEE;  Surgeon: Sanya Diaz DO;  [facility];  Service: Orthopedics    RIK-EN-Y PROCEDURE  2003    SPINAL CORD STIMULATOR IMPLANT  11/14/2017    dr Mavis Berry procedure and technique 1  removal of a right back implantable pulse generator 2 placement of a new right buttock impantable pulse generator through seperate incision 3 electric analys complex programming spinal cord stimulator system postoperative period approx 1 hour    TONSILLECTOMY         Family History   Problem Relation Age of Onset    Cancer Mother     Arthritis Mother    [facility] Stomach cancer Mother     Cancer Father     Esophageal cancer Father     Other Father         brain tumor    No Known Problems Sister     No Known Problems Sister     No Known Problems Sister          Medications have been verified  Objective   Pulse 84   Temp (!) 96 9 °F (36 1 °C)   Resp 18   SpO2 98%   No LMP for male patient  Physical Exam     Physical Exam  Constitutional:       General: He is not in acute distress  Appearance: He is well-developed  He is not ill-appearing or diaphoretic  HENT:      Head: Normocephalic and atraumatic  Right Ear: Hearing, ear canal and external ear normal  A middle ear effusion is present  Left Ear: Hearing, ear canal and external ear normal  A middle ear effusion is present  Nose: Mucosal edema and congestion present  No rhinorrhea  Mouth/Throat:      Lips: Pink  Mouth: Mucous membranes are moist       Pharynx: Uvula midline  No oropharyngeal exudate, posterior oropharyngeal erythema or uvula swelling  Eyes:      General: Lids are normal       Conjunctiva/sclera: Conjunctivae normal       Pupils: Pupils are equal, round, and reactive to light  Neck:      Musculoskeletal: Neck supple  Cardiovascular:      Rate and Rhythm: Normal rate and regular rhythm  Heart sounds: Normal heart sounds, S1 normal and S2 normal  No murmur  Pulmonary:      Effort: Pulmonary effort is normal  No respiratory distress  Breath sounds: Normal breath sounds  No stridor  No decreased breath sounds, wheezing, rhonchi or rales  Comments: There is no conversational dyspnea on exam   Patient is able to talk in full sentences without difficulty  Abdominal:      General: Bowel sounds are normal       Palpations: Abdomen is soft  Tenderness: There is no abdominal tenderness  Musculoskeletal:      Comments:   Knee brace on left knee  There is no edema bilaterally  Lymphadenopathy:      Cervical: No cervical adenopathy  Skin:     General: Skin is warm and dry  Findings: No rash  Neurological:      Mental Status: He is alert  Psychiatric:         Behavior: Behavior is cooperative

## 2021-03-02 DIAGNOSIS — J20.9 ACUTE BRONCHITIS, UNSPECIFIED ORGANISM: ICD-10-CM

## 2021-03-02 LAB — SARS-COV-2 RNA RESP QL NAA+PROBE: NEGATIVE

## 2021-03-02 RX ORDER — ALBUTEROL SULFATE 90 UG/1
2 AEROSOL, METERED RESPIRATORY (INHALATION) EVERY 4 HOURS PRN
Qty: 18 INHALER | Refills: 0 | Status: SHIPPED | OUTPATIENT
Start: 2021-03-02 | End: 2021-03-02

## 2021-03-02 NOTE — TELEPHONE ENCOUNTER
Please let patient know I refilled his albuterol inhaler  He is due for a checkup in the office with Dr Ange Hamm  Please have him schedule at his convenience in the next 1-2 months

## 2021-03-08 ENCOUNTER — OFFICE VISIT (OUTPATIENT)
Dept: OBGYN CLINIC | Facility: MEDICAL CENTER | Age: 46
End: 2021-03-08

## 2021-03-08 VITALS
DIASTOLIC BLOOD PRESSURE: 88 MMHG | TEMPERATURE: 98.5 F | SYSTOLIC BLOOD PRESSURE: 143 MMHG | WEIGHT: 215 LBS | BODY MASS INDEX: 29.12 KG/M2 | HEART RATE: 94 BPM | HEIGHT: 72 IN

## 2021-03-08 DIAGNOSIS — Z98.890 S/P ARTHROSCOPIC SURGERY OF LEFT KNEE: Primary | ICD-10-CM

## 2021-03-08 PROCEDURE — 99024 POSTOP FOLLOW-UP VISIT: CPT | Performed by: ORTHOPAEDIC SURGERY

## 2021-03-08 PROCEDURE — 3008F BODY MASS INDEX DOCD: CPT | Performed by: NURSE PRACTITIONER

## 2021-03-08 NOTE — LETTER
March 8, 2021     Patient: Yosef Hammonds   YOB: 1975   Date of Visit: 3/8/2021       To Whom it May Concern:    Yosef Hammonds is under my professional care  He was seen in my office on 3/8/2021  He is unable to return to work at this time  He will be seen back in the office in 6 weeks in which his work status will be re-evaluated  If you have any questions or concerns, please don't hesitate to call           Sincerely,          Dayna Schwarz DO        CC: Jose Allen

## 2021-03-08 NOTE — PROGRESS NOTES
Knee Post Operative Visit     Assesment:     39 y o  male 2 months s/p surgical arthroscopy of the left knee with lateral release and MPFL allograft reconstruction with subsequent surgery for left knee open I and D and washout of postoperative hematoma, DOS: 1/8/202    Plan:    Post-Operative treatment:    Ice to knee for 20 minutes at least 1-2 times daily  PT for ROM/strengthening to knee, hip and core  Instructed to work on quad strengthening     Imaging: All imaging from today was reviewed by myself and explained to the patient  Weight bearing:  as tolerated     ROM:  Progress to Full motion, brace unlocked for ambulation     Brace:  Wean from brace over the next several days under the direction of a physical therapist    DVT Prophylaxis:  Ambulation    Follow up:   6 weeks     If cracking sensation does not improve will get an MRI to r/o abnormality      Patient was advised that if they have any fevers, chills, chest pain, shortness of breath, redness or drainage from the incision, please let our office know immediately  Chief Complaint   Patient presents with    Left Knee - Post-op       History of Present Illness: The patient is a 39 y o  male who is being evaluated post operatively 2 months  status post surgical arthroscopy of the left knee with lateral release and MPFL allograft reconstruction with subsequent surgery for left knee open I and D and washout of postoperative hematoma, DOS: 1/8/202  Since the prior visit, He reports significant improvement  Patient continues to go to physical therapy and is making improvements with his range of motion  He states that the kneecap does feel stable and denies that feeling as though it wants to pop out since his last appointment  Patient is using stairs but does have pain underneath the kneecap with this  Patient also notes that if he is lying in bed and goes to turn over he will get a painful crack sensation under the kneecap      Pain is well controlled  The patient is using ice to control swelling  They have started physical therapy  The patient Ambulation for DVT ppx  The patient has been ambulating without crutches  The patient has been ambulating with a brace  The patient denies any fevers, chills, calf pain, chest pain/shortness of breath, redness or drainage from the incision  I have reviewed the past medical, surgical, social and family history, medications and allergies as documented in the EMR  Review of systems: ROS is negative other than that noted in the HPI  Constitutional: Negative for fatigue and fever  Physical Exam:    Blood pressure 143/88, pulse 94, temperature 98 5 °F (36 9 °C), height 6' (1 829 m), weight 97 5 kg (215 lb)  General/Constitutional: NAD, well developed, well nourished  HENT: Normocephalic, atraumatic  CV: Intact distal pulses, regular rate  Resp: No respiratory distress or labored breathing  Lymphatic: No lymphadenopathy palpated  Neuro: Alert and Oriented x 3, no focal deficits  Psych: Normal mood, normal affect, normal judgement, normal behavior  Skin: Warm, dry, no rashes, no erythema      Knee Exam (focused):                   RIGHT LEFT   ROM:   0-130 0-120 active   Palpation: Effusion negative small     MJL tenderness Negative Negative     LJL tenderness Negative Negative   Instability: Varus stable stable     Valgus stable stable   Special Tests: Lachman Negative Negative     Posterior drawer Negative Negative     Anterior drawer Negative Negative     Pivot shift not tested not tested     Dial not tested not tested   Patella: Palpation no tenderness no tenderness     Mobility 1/4 1/4     Apprehension Negative Negative   Other: Single leg 1/4 squat not tested not tested      Incisions are well healed    LE NV Exam: +2 DP/PT pulses bilaterally  Sensation intact to light touch L2-S1 bilaterally     Bilateral hip ROM demonstrates no pain actively or passively    No calf tenderness to palpation bilaterally

## 2021-03-29 DIAGNOSIS — J20.9 ACUTE BRONCHITIS, UNSPECIFIED ORGANISM: ICD-10-CM

## 2021-03-30 RX ORDER — ALBUTEROL SULFATE 90 UG/1
AEROSOL, METERED RESPIRATORY (INHALATION)
Qty: 18 INHALER | Refills: 1 | OUTPATIENT
Start: 2021-03-30

## 2021-04-12 ENCOUNTER — TELEPHONE (OUTPATIENT)
Dept: OBGYN CLINIC | Facility: HOSPITAL | Age: 46
End: 2021-04-12

## 2021-04-12 NOTE — TELEPHONE ENCOUNTER
Dr Jada Valles    [phone]    Patient had surgery on 1/6 on his left knee  On Saturday he slipped and heard a pop  It is swollen and painful  Please advise

## 2021-04-12 NOTE — TELEPHONE ENCOUNTER
Please call patient and instruct that we would like to see him in the office to evaluate   Thank you

## 2021-04-12 NOTE — TELEPHONE ENCOUNTER
Spoke to patient and scheduled him for a sooner appointment this week  Patient has been advised to take it easy on that knee until seen  Stated his physical therapist saw him and didn't feel like anything was out of place but advised that he call us to report it  Patient still WB on the knee

## 2021-04-15 ENCOUNTER — OFFICE VISIT (OUTPATIENT)
Dept: OBGYN CLINIC | Facility: MEDICAL CENTER | Age: 46
End: 2021-04-15
Payer: OTHER MISCELLANEOUS

## 2021-04-15 ENCOUNTER — APPOINTMENT (OUTPATIENT)
Dept: RADIOLOGY | Facility: MEDICAL CENTER | Age: 46
End: 2021-04-15
Payer: OTHER MISCELLANEOUS

## 2021-04-15 ENCOUNTER — TELEPHONE (OUTPATIENT)
Dept: OBGYN CLINIC | Facility: HOSPITAL | Age: 46
End: 2021-04-15

## 2021-04-15 VITALS
HEART RATE: 109 BPM | DIASTOLIC BLOOD PRESSURE: 85 MMHG | WEIGHT: 215 LBS | SYSTOLIC BLOOD PRESSURE: 119 MMHG | BODY MASS INDEX: 29.12 KG/M2 | HEIGHT: 72 IN

## 2021-04-15 DIAGNOSIS — Z98.890 S/P ARTHROSCOPIC SURGERY OF LEFT KNEE: ICD-10-CM

## 2021-04-15 DIAGNOSIS — S83.242A OTHER TEAR OF MEDIAL MENISCUS, CURRENT INJURY, LEFT KNEE, INITIAL ENCOUNTER: Primary | ICD-10-CM

## 2021-04-15 PROCEDURE — 73564 X-RAY EXAM KNEE 4 OR MORE: CPT

## 2021-04-15 PROCEDURE — 99213 OFFICE O/P EST LOW 20 MIN: CPT | Performed by: ORTHOPAEDIC SURGERY

## 2021-04-15 NOTE — LETTER
April 15, 2021     Patient: Fritz Scott   YOB: 1975   Date of Visit: 4/15/2021       To Whom it May Concern:    Fritz Scott is under my professional care  He was seen in my office on 4/15/2021  He is unable to return to work at this time  Patient sustained a new injury over the weekend and has to get MRI imaging to evaluate  Patient will be seen back in the office 1 week after the MRI in which his work status will be re-evaluated  If you have any questions or concerns, please don't hesitate to call           Sincerely,          Suha Julio DO        CC: Katherine Elaine

## 2021-04-15 NOTE — PROGRESS NOTES
Ortho Sports Medicine Knee Follow Up Visit     Assesment:     39 y o  male 3 months s/p surgical arthroscopy of the left knee with lateral release and MPFL allograft reconstruction with subsequent surgery for left knee open I and D and washout of postoperative hematoma, DOS: 1/8/202, with possible medial meniscus tear    Plan:    Conservative treatment:    Ice to knee for 20 minutes at least 1-2 times daily  OTC NSAIDS prn for pain  Work note written    Imaging: All imaging from today was reviewed by myself and explained to the patient  We will obtain an MRI of the knee to rule out medial meniscus tear  Injection:    No Injection planned at this time  Surgery:     No surgery is recommended at this point, continue with conservative treatment plan as noted  Follow up:    No follow-ups on file  Chief Complaint   Patient presents with    Left Knee - Follow-up       History of Present Illness: The patient is returns for follow up of 3 months s/p surgical arthroscopy of the left knee with lateral release and MPFL allograft reconstruction with subsequent surgery for left knee open I and D and washout of postoperative hematoma, DOS: 1/8/202, with new left knee injury  patient sustained a new injury on 04/10/2021  Patient states he was walking his dog when his dog pulled any had a valgus type strain to the left knee with his knee out to the side  Patient states he heard and felt a pop on the lateral aspect of the knee  Patient states that the knee that became very swollen had difficulty with ambulation and had to limp  Patient states that since this point he does feel some instability about the knee that he did not feel prior  Patient did limit to the office today  Pain is located lateral, medial      Pain is improved by rest   Pain is aggravated by stairs, squatting, weight bearing and pivoting on a planted foot  Symptoms include clicking, catching and swelling       The patient has tried rest, ice and NSAIDS            Knee Surgical History:  surgical arthroscopy of the left knee with lateral release and MPFL allograft reconstruction with subsequent surgery for left knee open I and D and washout of postoperative hematoma, DOS: 1/8/202,    Past Medical, Social and Family History:  Past Medical History:   Diagnosis Date    Anxiety     Arthritis     Asthma     Bipolar disorder ([address] )     Chronic left lumbar radiculopathy     Chronic low back pain     Chronic pain syndrome     Chronic right shoulder pain     Depression with anxiety     Fatigue     GERD (gastroesophageal reflux disease)     Hydronephrosis     Iron deficiency anemia     Kidney stone     Lytic bone lesions on xray     Nephrolithiasis     PONV (postoperative nausea and vomiting)     Right elbow pain     Right lateral epicondylitis      Past Surgical History:   Procedure Laterality Date    ADENOIDECTOMY Bilateral     APPENDECTOMY      BACK SURGERY      CHOLECYSTECTOMY      CHOLECYSTECTOMY LAPAROSCOPIC N/A 10/30/2018    Procedure: CHOLECYSTECTOMY WITH GASTROTOMY LAPAROSCOPIC;  Surgeon: Parke Fothergill, MD;  [facility];  Service: General    ERCP W/ SPHICTEROTOMY N/A 10/30/2018    Procedure: ENDOSCOPIC RETROGRADE CHOLANGIOPANCREATOGRAPHY (ERCP) W/ SPHINCTEROTOMY;  Surgeon: Pietro Ochoa MD;  [facility];  Service: Gastroenterology    GASTRIC BYPASS      2009    OTHER SURGICAL HISTORY      fusion/refusion of vertebrae, 2010 and 2011 l5-s1 and l4-l5    PA CYSTO/URETERO W/LITHOTRIPSY &INDWELL STENT INSRT Right 8/8/2017    Procedure: CYSTOSCOPY; URETEROSCOPY; HOLMIUM LASER; RETROGRADE PYELOGRAM; STENT;  Surgeon: Adrianna Lazar MD;  [facility];  Service: Urology    PA DRAIN SKIN ABSCESS Abigail Paz Left 6/7/7642    Procedure: INCISION AND DRAINAGE (I&D) EXTREMITY- of left knee s/p MPFL reconstruction, I&D if the left knee with possible MPFL revision;  Surgeon: Meng Blackburn DO;  [facility];  Service: Orthopedics    AZ FIX UNSTABLE PATELLA,EXTEN REALIGN Left 1/6/2021    Procedure: REALIGNMENT PATELLA with chondroplasty of patella, open medial patellofemoral ligament reconstruction with allograft;  Surgeon: Meng Blackburn DO;  [facility];  Service: Orthopedics    AZ IMPLANT SPINAL NEUROSTIM/ Right 11/14/2017    Procedure: REMOVAL LOWER MEDIAL BUTTOCK SPINAL CORD STIMULATOR GENERATOR; PLACEMENT OF NEW BUTTOCK SPINAL CORD STIMULATOR THROUGH SEPERATE INCISION;  Surgeon: Daniela Grant MD;  [facility];  Service: Neurosurgery    AZ KNEE SCOPE, W/LATERAL RELEASE Left 1/6/2021    Procedure: RELEASE RETINACULAR;  Surgeon: Meng Blackburn DO;  [facility];  Service: Orthopedics    [address] [place] [address] Left 1/6/2021    Procedure: ARTHROSCOPY KNEE;  Surgeon: Meng Blackburn DO;  [facility];  Service: Orthopedics    RIK-EN-Y PROCEDURE  2003    SPINAL CORD STIMULATOR IMPLANT  11/14/2017    dr Tan Yeh procedure and technique 1  removal of a right back implantable pulse generator 2 placement of a new right buttock impantable pulse generator through seperate incision 3 electric analys complex programming spinal cord stimulator system postoperative period approx 1 hour    TONSILLECTOMY       Allergies   Allergen Reactions    Ampicillin GI Intolerance     Vomiting    Penicillins GI Intolerance     Vomit     Current Outpatient Medications on File Prior to Visit   Medication Sig Dispense Refill    aspirin (ECOTRIN) 325 mg EC tablet Take 1 tablet (325 mg total) by mouth 2 (two) times a day 84 tablet 0    baclofen 10 mg tablet Take 1 tablet (10 mg total) by mouth 3 (three) times a day 90 tablet 2    buprenorphine (BUTRANS) 15 mcg/hr Apply 1 patch TD every 7 days for pain  Substitute permissible  4 patch 2    LANSOPRAZOLE PO Take by mouth      naloxone (NARCAN) 4 mg/0 1 mL nasal spray Administer 1 spray into a nostril   If breathing does not return to normal or if breathing difficulty resumes after 2-3 minutes, give another dose in the other nostril using a new spray  1 each 1    Ventolin  (90 Base) MCG/ACT inhaler INHALE 2 PUFFS EVERY 4 (FOUR) HOURS AS NEEDED FOR WHEEZING OR SHORTNESS OF BREATH COUGH 18 Inhaler 1     No current facility-administered medications on file prior to visit        Social History     Socioeconomic History    Marital status:      Spouse name: Not on file    Number of children: Not on file    Years of education: GED    Highest education level: Not on file   Occupational History    Not on file   Social Needs    Financial resource strain: Not on file    Food insecurity     Worry: Not on file     Inability: Not on file   [employer] needs     Medical: Not on file     Non-medical: Not on file   Tobacco Use    Smoking status: Current Every Day Smoker     Packs/day: 1 00     Types: Cigarettes    Smokeless tobacco: Never Used   Substance and Sexual Activity    Alcohol use: No     Comment: quit drinking, social, very rarely, no etoh in > 1 year    Drug use: No    Sexual activity: Not on file   Lifestyle    Physical activity     Days per week: Not on file     Minutes per session: Not on file    Stress: Not on file   Relationships    Social connections     Talks on phone: Not on file     Gets together: Not on file     Attends [employer] service: Not on file     Active member of club or organization: Not on file     Attends meetings of clubs or organizations: Not on file     Relationship status: Not on file    Intimate partner violence     Fear of current or ex partner: Not on file     Emotionally abused: Not on file     Physically abused: Not on file     Forced sexual activity: Not on file   Other Topics Concern    Not on file   Social History Narrative    Chooses not to have children    Drinks caffienated tea    Lives with spouse                 I have reviewed the past medical, surgical, social and family history, medications and allergies as documented in the EMR  Review of systems: ROS is negative other than that noted in the HPI  Constitutional: Negative for fatigue and fever  Physical Exam:    Blood pressure 119/85, pulse (!) 109, height 6' (1 829 m), weight 97 5 kg (215 lb)  General/Constitutional: NAD, well developed, well nourished  HENT: Normocephalic, atraumatic  CV: Intact distal pulses, regular rate  Resp: No respiratory distress or labored breathing  Lymphatic: No lymphadenopathy palpated  Neuro: Alert and Oriented x 3, no focal deficits  Psych: Normal mood, normal affect, normal judgement, normal behavior  Skin: Warm, dry, no rashes, no erythema      Knee Exam (focused): RIGHT LEFT   ROM:   0-130 0-130   Palpation: Effusion negative negative     MJL tenderness Negative Positive     LJL tenderness Negative Negative   Meniscus: Felicita Negative Positive    Apley's Compression Negative Positive   Instability: Varus stable stable     Valgus stable stable   Special Tests: Lachman Negative Negative     Posterior drawer Negative Negative     Anterior drawer Negative Negative     Pivot shift not tested not tested     Dial not tested not tested   Patella: Palpation no tenderness no tenderness     Mobility 1/4 1/4     Apprehension Negative Negative   Other: Single leg 1/4 squat not tested not tested           LE NV Exam: +2 DP/PT pulses bilaterally  Sensation intact to light touch L2-S1 bilaterally    No calf tenderness to palpation bilaterally      Knee Imaging     x-ray imaging of the left knee was reviewed and demonstrates no acute fracture or osseous abnormality  I do not currently have radiologist report to review but will review when it is available

## 2021-04-15 NOTE — TELEPHONE ENCOUNTER
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

## 2021-04-25 ENCOUNTER — TELEPHONE (OUTPATIENT)
Dept: OTHER | Facility: OTHER | Age: 46
End: 2021-04-25

## 2021-04-25 NOTE — TELEPHONE ENCOUNTER
Patient called to let the office know he doesn't feel well and wanted to switch his Appointment to a Virtual Appointment  His Appointment time is Monday, April 26, 2021 at 8:00am  Please f/u with Patient Monday Am   Thank You

## 2021-04-26 ENCOUNTER — TELEMEDICINE (OUTPATIENT)
Dept: PAIN MEDICINE | Facility: CLINIC | Age: 46
End: 2021-04-26
Payer: OTHER MISCELLANEOUS

## 2021-04-26 ENCOUNTER — TELEPHONE (OUTPATIENT)
Dept: OBGYN CLINIC | Facility: HOSPITAL | Age: 46
End: 2021-04-26

## 2021-04-26 DIAGNOSIS — M48.062 SPINAL STENOSIS OF LUMBAR REGION WITH NEUROGENIC CLAUDICATION: ICD-10-CM

## 2021-04-26 DIAGNOSIS — G89.29 CHRONIC LOW BACK PAIN WITH BILATERAL SCIATICA, UNSPECIFIED BACK PAIN LATERALITY: ICD-10-CM

## 2021-04-26 DIAGNOSIS — S83.242A OTHER TEAR OF MEDIAL MENISCUS, CURRENT INJURY, LEFT KNEE, INITIAL ENCOUNTER: Primary | ICD-10-CM

## 2021-04-26 DIAGNOSIS — M54.16 LUMBAR RADICULOPATHY: ICD-10-CM

## 2021-04-26 DIAGNOSIS — M54.41 CHRONIC LOW BACK PAIN WITH BILATERAL SCIATICA, UNSPECIFIED BACK PAIN LATERALITY: ICD-10-CM

## 2021-04-26 DIAGNOSIS — M25.511 CHRONIC RIGHT SHOULDER PAIN: ICD-10-CM

## 2021-04-26 DIAGNOSIS — M79.18 MYOFASCIAL PAIN SYNDROME: ICD-10-CM

## 2021-04-26 DIAGNOSIS — M96.1 LUMBAR POST-LAMINECTOMY SYNDROME: ICD-10-CM

## 2021-04-26 DIAGNOSIS — G89.29 CHRONIC RIGHT SHOULDER PAIN: ICD-10-CM

## 2021-04-26 DIAGNOSIS — M54.42 CHRONIC LOW BACK PAIN WITH BILATERAL SCIATICA, UNSPECIFIED BACK PAIN LATERALITY: ICD-10-CM

## 2021-04-26 PROCEDURE — 99214 OFFICE O/P EST MOD 30 MIN: CPT | Performed by: NURSE PRACTITIONER

## 2021-04-26 RX ORDER — BACLOFEN 10 MG/1
10 TABLET ORAL 3 TIMES DAILY
Qty: 90 TABLET | Refills: 2 | Status: SHIPPED | OUTPATIENT
Start: 2021-04-26 | End: 2021-07-19 | Stop reason: SDUPTHER

## 2021-04-26 RX ORDER — MELOXICAM 15 MG/1
15 TABLET ORAL DAILY
Qty: 30 TABLET | Refills: 0 | Status: SHIPPED | OUTPATIENT
Start: 2021-04-26 | End: 2021-07-19

## 2021-04-26 RX ORDER — BUPRENORPHINE 15 UG/H
PATCH TRANSDERMAL
Qty: 4 PATCH | Refills: 2 | Status: SHIPPED | OUTPATIENT
Start: 2021-04-26 | End: 2021-07-19 | Stop reason: SDUPTHER

## 2021-04-26 RX ORDER — CELECOXIB 200 MG/1
200 CAPSULE ORAL 2 TIMES DAILY
Qty: 28 CAPSULE | Refills: 0 | Status: SHIPPED | OUTPATIENT
Start: 2021-04-26 | End: 2021-04-26 | Stop reason: CLARIF

## 2021-04-26 NOTE — PATIENT INSTRUCTIONS
Assessment/Plan: 1  I encouraged the patient to follow up with Orthopedics regarding his left knee pain, upcoming MRI, and possible repeat surgery  The patient was agreeable and verbalized an understanding  2   I encouraged him to continue to use his [employer] thoracic lead spinal cord stimulator and to follow-up with the [address] [place] [address] Dr team for any reprogramming needs  The patient was agreeable and verbalized an understanding  3   With regards to his medication regimen since it is providing at least moderate stable overall relief of his normal chronic pain symptoms, without side effects, and I feel it is reasonable and appropriate with his underlying etiology, we will continue the Butrans patch and the baclofen as prescribed  The patient was agreeable and verbalized an understanding  4   The patient is to follow-up as scheduled in 12 weeks on July 19, 2021 to arrive at 9:15 a m  for an 9:30 a m  office visit  The patient was agreeable and verbalized an understanding  Opioid Safety   AMBULATORY CARE:   Opioid safety  includes the correct use, storage, and disposal of opioids  Examples of opioid medicines to treat pain include oxycodone, morphine, fentanyl, and codeine  Call your local emergency number (911 in the [address] [place] [address],[address]), or have someone else call if:   · You have a seizure  · You cannot be woken  · You have trouble staying awake and your breathing is slow or shallow  · Your speech is slurred, or you are confused  · You are dizzy or stumble when you walk  Call your doctor, or have someone close to you call if:   · You are extremely drowsy, or you have trouble staying awake or speaking  · You have pale or clammy skin  · You have blue fingernails or lips  · Your heartbeat is slower than normal     · You cannot stop vomiting  · You have questions or concerns about your condition or care  Use opioids safely:   · Take prescribed opioids exactly as directed    Opioids come with directions based on the kind of opioid and how it is given  Do not take more than the recommended amount, or for longer than needed  · Do not give opioids to others or take opioids that belong to someone else  Misuse of opioids can lead to an addiction or overdose  · Do not mix opioids with other medicines or alcohol  The combination can cause an overdose, or lead to a coma  · Do not drive or operate heavy machinery after you take the opioid  Your provider or pharmacist can tell you how long to wait after a dose before you do these activities  · Talk to your healthcare provider if you have any side effects  He or she can help you prevent or relieve side effects  Side effects include nausea, sleepiness, itching, and trouble thinking clearly  Manage constipation:  Constipation is the most common side effect of opioid medicine  Constipation is when you have hard, dry bowel movements, or you go longer than usual between bowel movements  Tell your healthcare provider about all changes in your bowel movements while you are taking opioids  He or she may recommend laxative medicine to help you have a bowel movement  He or she may also change the kind of opioid you are taking, or change when you take it  The following are more ways you can prevent or relieve constipation:  · Drink liquids as directed  You may need to drink extra liquids to help soften and move your bowels  Ask how much liquid to drink each day and which liquids are best for you  · Eat high-fiber foods  This may help decrease constipation by adding bulk to your bowel movements  High-fiber foods include fruits, vegetables, whole-grain breads and cereals, and beans  Your healthcare provider or dietitian can help you create a high-fiber meal plan  Your provider may also recommend a fiber supplement if you cannot get enough fiber from food  · Exercise regularly  Regular physical activity can help stimulate your intestines   Walking is a good exercise to prevent or relieve constipation  Ask which exercises are best for you  · Schedule a time each day to have a bowel movement  This may help train your body to have regular bowel movements  Bend forward while you are on the toilet to help move the bowel movement out  Sit on the toilet for at least 10 minutes, even if you do not have a bowel movement  Store opioids safely:   · Store opioids where others cannot easily get them  Keep them in a locked cabinet or secure area  Do not  keep them in a purse or other bag you carry with you  A person may be looking for something else and find the opioids  · Make sure opioids are stored out of the reach of children  A child can easily overdose on opioids  Opioids may look like candy to a small child  The best way to dispose of opioids: The laws vary by country and area  In the [place], the best way is to return the opioids through a take-back program  This program is offered by the [employer]  The following are options for using the program:  · Take the opioids to a CHARLA collection site  The site is often a law enforcement center  Call your local law enforcement center for scheduled take-back days in your area  You will be given information on where to go if the collection site is in a different location  · Take the opioids to an approved pharmacy or hospital   A pharmacy or hospital may be set up as a collection site  You will need to ask if it is a CHARLA collection site if you were not directed there  A pharmacy or doctor's office may not be able to take back opioids unless it is a CHARLA site  · Use a mail-back system  This means you are given containers to put the opioids into  You will then mail them in the containers  · Use a take-back drop box  This is a place to leave the opioids at any time  People and animals will not be able to get into the box   Your local law enforcement agency can tell you where to find a drop box in your area  Other safe ways to dispose of opioids: The medicine may come with disposal instructions  The instructions may vary depending on the brand of medicine you are using  Instructions may come in a Medication Guide, but not every medicine has one  You may instead get instructions from your pharmacy or doctor  Follow instructions carefully  The following are general guidelines to follow:  · Find out if you can flush the opioid  Some opioids can be flushed down the toilet or poured into the sink  You will need to contact authorities in your area to see if this is an option for you  The FDA also offers a list of medicines that are safe to flush down the toilet  You can check the list if you cannot get the information for your local area  · Ask your waste management company about rules for putting opioids in the trash  The company will be able to give you specific directions  Scratch out personal information on the original medicine label so it cannot be read  Then put it in the trash  Do not label the trash or put any information on it about the opioids  It should look like regular household trash so no one is tempted to look for the opioids  Keep the trash out of the reach of children and animals  Always make sure trash is secure  · Talk to officials if you live in a facility  If you live in a nursing home or assisted living center, talk to an official  The person will know the rules for your area  Other ways to manage pain:   · Ask your healthcare provider about non-opioid medicines to control pain  Nonprescription medicines include NSAIDs (such as ibuprofen) and acetaminophen  Prescription medicines include muscle relaxers, antidepressants, and steroids  · Pain may be managed without any medicines  Some ways to relieve pain include massage, aromatherapy, or meditation  Physical or occupational therapy may also help      For more information:   · Drug Enforcement Administration  [address] [facility]  [address]  Phone: 5- 299 - 609-9389  Web Address: [facility]/drug_disposal/    · [address]  and Drug Administration  [place] , [address]  Phone: 2- 135 - 093-8797  Web Address: http://[employer]/  Follow up with your doctor or pain specialist as directed: You may need to have your dose adjusted  Your doctor or pain specialist can also help you find ways to manage pain without opioids  Write down your questions so you remember to ask them during your visits  © Copyright [address] [place] [address] Drive Information is for End User's use only and may not be sold, redistributed or otherwise used for commercial purposes  All illustrations and images included in CareNotes® are the copyrighted property of [employer]  or [facility] Rosa Elena Viera   The above information is an  only  It is not intended as medical advice for individual conditions or treatments  Talk to your doctor, nurse or pharmacist before following any medical regimen to see if it is safe and effective for you

## 2021-04-26 NOTE — TELEPHONE ENCOUNTER
Patient is calling back  He states that he has taken Celebrex before and it never helped for his back pain  Can you please call something different in for the pain?     [facility] [phone]

## 2021-04-26 NOTE — TELEPHONE ENCOUNTER
Called central scheduling to see if this is something that can be moved up and rep stated that  The patient has some sort of stimulator so she is unable to reschedule  It is scheduled out to be sure that the device is compatible with MRI  Unsure if there is someone in the office that works on this or not

## 2021-04-26 NOTE — TELEPHONE ENCOUNTER
Patient is calling because he is not having his mri until 5/20/21 but he is in a lot of pain & he doesn't know what he should do until then for the pain that he is in  Please call patient to advise if he should come in prior to the mri or if he has to wait until after the mri  He has tried 3 naproxen & tylenol/advil but it is not helping         [phone]

## 2021-04-26 NOTE — PROGRESS NOTES
Virtual Regular Visit      Assessment/Plan: 1  I encouraged the patient to follow up with Orthopedics regarding his left knee pain, upcoming MRI, and possible repeat surgery  The patient was agreeable and verbalized an understanding  2   I encouraged him to continue to use his [employer] thoracic lead spinal cord stimulator and to follow-up with the [address] [place] [address]  team for any reprogramming needs  The patient was agreeable and verbalized an understanding  3   With regards to his medication regimen since it is providing at least moderate stable overall relief of his normal chronic pain symptoms, without side effects, and I feel it is reasonable and appropriate with his underlying etiology, we will continue the Butrans patch and the baclofen as prescribed  The patient was agreeable and verbalized an understanding  4   The patient is to follow-up as scheduled in 12 weeks on July 19, 2021 to arrive at 9:15 a m  for an 9:30 a m  office visit  The patient was agreeable and verbalized an understanding  While the patient was in the office today, I did review the patient's report on the [facility] web site and found it to be appropriate for what is being prescribed and I reviewed it for any inconsistencies or evidence of multiple prescribers/drug diversion  A copy of the patient's report can be found in their chart  The risk of opioid medications, including dependence, addiction and tolerance were explained to the patient  The patient understands and agrees to use these medications only as prescribed  I have fully discussed the potential side effects of the medication with the patient, which include, but are not limited to, constipation, drowsiness, addiction, impaired judgment and risk of fatal overdose as not taken as prescribed  I have warned the patient that sharing medications is a felony  I warned against driving while taking sedating medications   At this point in time, the patient is showing no signs of addiction, abuse, diversion or suicidal ideation  Problem List Items Addressed This Visit        Nervous and Auditory    Lumbar radiculopathy    Relevant Medications    buprenorphine (BUTRANS) 15 mcg/hr       Musculoskeletal and Integument    Myofascial pain syndrome    Relevant Medications    buprenorphine (BUTRANS) 15 mcg/hr    baclofen 10 mg tablet       Other    Chronic low back pain    Relevant Medications    buprenorphine (BUTRANS) 15 mcg/hr    Lumbar post-laminectomy syndrome    Relevant Medications    buprenorphine (BUTRANS) 15 mcg/hr    Spinal stenosis of lumbar region with neurogenic claudication    Relevant Medications    buprenorphine (BUTRANS) 15 mcg/hr      Other Visit Diagnoses     Chronic right shoulder pain        Relevant Medications    buprenorphine (BUTRANS) 15 mcg/hr               Reason for visit is   Chief Complaint   Patient presents with    Virtual Regular Visit        Encounter provider ANTONIA Ribeiro    Provider located at [facility]  [address]  [phone]      Recent Visits  No visits were found meeting these conditions  Showing recent visits within past 7 days and meeting all other requirements     Today's Visits  Date Type Provider Dept   04/26/21 Telemedicine ANTONIA [facility] [place]   Showing today's visits and meeting all other requirements     Future Appointments  No visits were found meeting these conditions  Showing future appointments within next 150 days and meeting all other requirements        The patient was identified by name and date of birth  Orin Austin was informed that this is a telemedicine visit and that the visit is being conducted through [address] [place] [address] Now and patient was informed that this is a secure, HIPAA-compliant platform  He agrees to proceed     My office door was closed   No one else was in the room   He acknowledged consent and understanding of privacy and security of the video platform  The patient has agreed to participate and understands they can discontinue the visit at any time  Patient is aware this is a billable service  Subjective  Katrin Archer is a 39 y o  male who is currently concerned about the risks of COVID-19  The patient currently denies any of the signs or symptoms of COVID-19, but because of age or other comorbidities, medical history, and/or fear of exposure to COVID-19, the patient preferred to proceed with a virtual visit today  The patient is currently being treated for his chronic pain syndrome secondary to lumbar post-laminectomy syndrome with stenosis radiculopathy and chronic myofascial pain as well as chronic right shoulder pain  Since his last office visit he did proceed with his left knee surgery and then had to have a repeat surgery several days later because of complications and reports that over the past few weeks he did have an incident where he slipped and felt a popping noise and is going to be going for a new MRI of the knee as they feel he now tore his meniscus and may require more surgery on his knee in the future  The patient reports that between his spinal cord stimulator and his medication regimen his low back pain has remained stable and manageable and his knee pain is what it is  He is currently managing his pain symptoms with the Butrans patch 15 micro g, applying 1 patch transdermally every 7 days for pain and baclofen 10 mg t i d  p r n  for pain and spasms  He denies any side effects or issues with his current medication regimen and overall rates his current pain an 8 to 9/10 but would just prefer to continue his current medication regimen as prescribed      HPI     Past Medical History:   Diagnosis Date    Anxiety     Arthritis     Asthma     Bipolar disorder (HCC)     Chronic left lumbar radiculopathy     Chronic low back pain     Chronic pain syndrome     Chronic right shoulder pain     Depression with anxiety     Fatigue     GERD (gastroesophageal reflux disease)     Hydronephrosis     Iron deficiency anemia     Kidney stone     Lytic bone lesions on xray     Nephrolithiasis     PONV (postoperative nausea and vomiting)     Right elbow pain     Right lateral epicondylitis        Past Surgical History:   Procedure Laterality Date    ADENOIDECTOMY Bilateral     APPENDECTOMY      BACK SURGERY      CHOLECYSTECTOMY      CHOLECYSTECTOMY LAPAROSCOPIC N/A 10/30/2018    Procedure: CHOLECYSTECTOMY WITH GASTROTOMY LAPAROSCOPIC;  Surgeon: Flavio Corley MD;  [facility];  Service: General    ERCP W/ SPHICTEROTOMY N/A 10/30/2018    Procedure: ENDOSCOPIC RETROGRADE CHOLANGIOPANCREATOGRAPHY (ERCP) W/ SPHINCTEROTOMY;  Surgeon: Cl Ludwig MD;  [facility];  Service: Gastroenterology    GASTRIC BYPASS      2009    OTHER SURGICAL HISTORY      fusion/refusion of vertebrae, 2010 and 2011 l5-s1 and l4-l5    IA CYSTO/URETERO W/LITHOTRIPSY &INDWELL STENT INSRT Right 8/8/2017    Procedure: CYSTOSCOPY; URETEROSCOPY; HOLMIUM LASER; RETROGRADE PYELOGRAM; STENT;  Surgeon: Marques Cantor MD;  [facility];  Service: Urology    [place] [address] [place] [address] Left 5/9/3577    Procedure: INCISION AND DRAINAGE (I&D) EXTREMITY- of left knee s/p MPFL reconstruction, I&D if the left knee with possible MPFL revision;  Surgeon: Maxime Francisco DO;  [facility];  Service: Orthopedics    IA FIX UNSTABLE PATELLA,EXTEN REALIGN Left 1/6/2021    Procedure: REALIGNMENT PATELLA with chondroplasty of patella, open medial patellofemoral ligament reconstruction with allograft;  Surgeon: Maxime Francisco DO;  [facility];  Service: Orthopedics    IA IMPLANT SPINAL NEUROSTIM/ Right 11/14/2017    Procedure: REMOVAL LOWER MEDIAL BUTTOCK SPINAL CORD STIMULATOR GENERATOR; PLACEMENT OF NEW BUTTOCK SPINAL CORD STIMULATOR THROUGH SEPERATE INCISION;  Surgeon: Whit Moya MD;  [facility];  Service: Neurosurgery    DC KNEE SCOPE, W/LATERAL RELEASE Left 1/6/2021    Procedure: RELEASE RETINACULAR;  Surgeon: Annamarie Chavez DO;  [facility];  Service: Orthopedics    [address] CART Left 1/6/2021    Procedure: ARTHROSCOPY KNEE;  Surgeon: Annamarie Chavez DO;  [facility];  Service: Orthopedics    RIK-EN-Y PROCEDURE  2003    SPINAL CORD STIMULATOR IMPLANT  11/14/2017    dr James Proper procedure and technique 1  removal of a right back implantable pulse generator 2 placement of a new right buttock impantable pulse generator through seperate incision 3 electric analys complex programming spinal cord stimulator system postoperative period approx 1 hour    TONSILLECTOMY         Current Outpatient Medications   Medication Sig Dispense Refill    aspirin (ECOTRIN) 325 mg EC tablet Take 1 tablet (325 mg total) by mouth 2 (two) times a day 84 tablet 0    baclofen 10 mg tablet Take 1 tablet (10 mg total) by mouth 3 (three) times a day 90 tablet 2    buprenorphine (BUTRANS) 15 mcg/hr Apply 1 patch TD every 7 days for pain  Substitute permissible  4 patch 2    LANSOPRAZOLE PO Take by mouth      naloxone (NARCAN) 4 mg/0 1 mL nasal spray Administer 1 spray into a nostril  If breathing does not return to normal or if breathing difficulty resumes after 2-3 minutes, give another dose in the other nostril using a new spray  1 each 1    Ventolin  (90 Base) MCG/ACT inhaler INHALE 2 PUFFS EVERY 4 (FOUR) HOURS AS NEEDED FOR WHEEZING OR SHORTNESS OF BREATH COUGH 18 Inhaler 1     No current facility-administered medications for this visit  Allergies   Allergen Reactions    Ampicillin GI Intolerance     Vomiting    Penicillins GI Intolerance     Vomit       Review of Systems   Musculoskeletal: Positive for arthralgias, back pain, gait problem, joint swelling and myalgias     Neurological: Positive for weakness and numbness  All other systems reviewed and are negative  Video Exam    There were no vitals filed for this visit  Physical Exam  Constitutional:       Appearance: Normal appearance  He is normal weight  HENT:      Head: Normocephalic and atraumatic  Pulmonary:      Effort: Pulmonary effort is normal    Neurological:      General: No focal deficit present  Mental Status: He is alert and oriented to person, place, and time  Mental status is at baseline  Psychiatric:         Mood and Affect: Mood normal          Behavior: Behavior normal          Thought Content: Thought content normal          Judgment: Judgment normal             I spent 8 minutes discussing the patients past medical/surgical history, current pain symptoms, and medication regiment/treatment plan with the patient today during the virtual visit  Normal OV: [id number]      VIRTUAL VISIT DISCLAIMER    Michel Guy acknowledges that he has consented to an online visit or consultation  He understands that the online visit is based solely on information provided by him, and that, in the absence of a face-to-face physical evaluation by the physician, the diagnosis he receives is both limited and provisional in terms of accuracy and completeness  This is not intended to replace a full medical face-to-face evaluation by the physician  Michel Guy understands and accepts these terms

## 2021-04-26 NOTE — TELEPHONE ENCOUNTER
Called patient left a voicemail  I instructed that I will send a different medication to his pharmacy to try this medication is twice a day he should continue to use Tylenol and icing the knee 20 minutes on 20 minutes off  I did instructed did try to call MRI to move up his MRI of his knee we may be able to get 1 week earlier  Patient may call back if he has questions

## 2021-04-27 ENCOUNTER — TELEPHONE (OUTPATIENT)
Dept: OBGYN CLINIC | Facility: MEDICAL CENTER | Age: 46
End: 2021-04-27

## 2021-04-27 NOTE — TELEPHONE ENCOUNTER
Patient returned call to nurse, sent message to return call     [place] [facility] [phone]

## 2021-04-27 NOTE — TELEPHONE ENCOUNTER
Please call patient make aware that I have sent him a different prescription to his pharmacy  This medication is to be taken daily recommend taking in the morning and to take with food to prevent stomach upset  I have sent in Mobic   Thank you

## 2021-04-27 NOTE — TELEPHONE ENCOUNTER
Spoke to patient  Advised of Mobic order  Advised he is not to take this with any NSAIDs even OTC  Do not take Celebrex  No Aleve, Advil, Ibuprofen or Motrin  Advised his to take it as prescribed with food to prevent stomach upset  Encouraged tylenol as recommended on the bottle and ice 20 mins on 20 mins off  Verbalized understanding

## 2021-05-20 ENCOUNTER — HOSPITAL ENCOUNTER (OUTPATIENT)
Dept: RADIOLOGY | Facility: HOSPITAL | Age: 46
Discharge: HOME | End: 2021-05-20
Payer: OTHER MISCELLANEOUS

## 2021-05-20 DIAGNOSIS — S83.242A OTHER TEAR OF MEDIAL MENISCUS, CURRENT INJURY, LEFT KNEE, INITIAL ENCOUNTER: ICD-10-CM

## 2021-05-20 PROCEDURE — G1004 CDSM NDSC: HCPCS

## 2021-05-20 PROCEDURE — 73721 MRI JNT OF LWR EXTRE W/O DYE: CPT

## 2021-05-21 ENCOUNTER — OFFICE VISIT (OUTPATIENT)
Dept: OBGYN CLINIC | Facility: CLINIC | Age: 46
End: 2021-05-21
Payer: OTHER MISCELLANEOUS

## 2021-05-21 VITALS
HEART RATE: 88 BPM | HEIGHT: 72 IN | BODY MASS INDEX: 28.44 KG/M2 | SYSTOLIC BLOOD PRESSURE: 148 MMHG | TEMPERATURE: 97.2 F | DIASTOLIC BLOOD PRESSURE: 92 MMHG | WEIGHT: 210 LBS

## 2021-05-21 DIAGNOSIS — M76.892 HAMSTRING TENDONITIS OF LEFT THIGH: Primary | ICD-10-CM

## 2021-05-21 DIAGNOSIS — Z98.890 S/P ARTHROSCOPIC SURGERY OF LEFT KNEE: ICD-10-CM

## 2021-05-21 PROCEDURE — 99214 OFFICE O/P EST MOD 30 MIN: CPT | Performed by: ORTHOPAEDIC SURGERY

## 2021-05-21 NOTE — PROGRESS NOTES
Ortho Sports Medicine Knee Follow Up Visit     Assesment:     39 y o  male left s/p surgical arthroscopy of the left knee with lateral release and MPFL allograft reconstruction with subsequent surgery for left knee open I and D and washout of postoperative hematoma, DOS: 1/8/202  Medial hamstring tendinitis    Plan:     Conservative treatment:    Ice to knee for 20 minutes at least 1-2 times daily  Continue home therapy program     Voltaren gel sent to pharmacy     Work note written    Imaging:     MRI from today was reviewed by myself and explained to the patient  Injection:    No Injection planned at this time  Surgery:     No surgery is recommended at this point, continue with conservative treatment plan as noted  Follow up:    Return in about 2 months (around 7/21/2021) for Recheck  Chief Complaint   Patient presents with    Left Knee - Follow-up       History of Present Illness: The patient is returns for follow up of Left knee MRI  Since the prior visit, He reports improvement  He states that the swelling has reduced but he still having pain about the medial posterior aspect of the knee  He denies the knee feeling unstable or the kneecap  He does note that he does have anterior knee pain as well  Pain is located anterior, medial      Pain is improved by rest, ice, NSAIDS and physical therapy  Pain is aggravated by stairs and squatting  Symptoms include swelling  The patient has tried rest, ice, NSAIDS and physical therapy            Knee Surgical History:  surgical arthroscopy of the left knee with lateral release and MPFL allograft reconstruction with subsequent surgery for left knee open I and D and washout of postoperative hematoma, DOS: 1/8/202,    Past Medical, Social and Family History:  Past Medical History:   Diagnosis Date    Anxiety     Arthritis     Asthma     Bipolar disorder ([address] )     Chronic left lumbar radiculopathy     Chronic low back pain     Chronic pain syndrome     Chronic right shoulder pain     Depression with anxiety     Fatigue     GERD (gastroesophageal reflux disease)     Hydronephrosis     Iron deficiency anemia     Kidney stone     Lytic bone lesions on xray     Nephrolithiasis     PONV (postoperative nausea and vomiting)     Right elbow pain     Right lateral epicondylitis      Past Surgical History:   Procedure Laterality Date    ADENOIDECTOMY Bilateral     APPENDECTOMY      BACK SURGERY      CHOLECYSTECTOMY      CHOLECYSTECTOMY LAPAROSCOPIC N/A 10/30/2018    Procedure: CHOLECYSTECTOMY WITH GASTROTOMY LAPAROSCOPIC;  Surgeon: Avis Davenport MD;  [facility];  Service: General    ERCP W/ SPHICTEROTOMY N/A 10/30/2018    Procedure: ENDOSCOPIC RETROGRADE CHOLANGIOPANCREATOGRAPHY (ERCP) W/ SPHINCTEROTOMY;  Surgeon: Ekaterina Jewell MD;  [facility];  Service: Gastroenterology    GASTRIC BYPASS      2009    OTHER SURGICAL HISTORY      fusion/refusion of vertebrae, 2010 and 2011 l5-s1 and l4-l5    MT CYSTO/URETERO W/LITHOTRIPSY &INDWELL STENT INSRT Right 8/8/2017    Procedure: CYSTOSCOPY; URETEROSCOPY; HOLMIUM LASER; RETROGRADE PYELOGRAM; STENT;  Surgeon: Rajiv Guerrero MD;  [facility];  Service: Urology    MT [address] [place] [address] Left 8/2/8375    Procedure: INCISION AND DRAINAGE (I&D) EXTREMITY- of left knee s/p MPFL reconstruction, I&D if the left knee with possible MPFL revision;  Surgeon: Malachi Norman DO;  [facility];  Service: Orthopedics    MT FIX UNSTABLE PATELLA,EXTEN REALIGN Left 1/6/2021    Procedure: REALIGNMENT PATELLA with chondroplasty of patella, open medial patellofemoral ligament reconstruction with allograft;  Surgeon: Malachi Norman DO;  [facility];  Service: Orthopedics    MT IMPLANT SPINAL NEUROSTIM/ Right 11/14/2017    Procedure: REMOVAL LOWER MEDIAL BUTTOCK SPINAL CORD STIMULATOR GENERATOR; PLACEMENT OF NEW BUTTOCK SPINAL CORD STIMULATOR THROUGH SEPERATE INCISION;  Surgeon: Agnes Coats MD;  [facility];  Service: Neurosurgery    SD KNEE SCOPE, W/LATERAL RELEASE Left 1/6/2021    Procedure: RELEASE RETINACULAR;  Surgeon: Malachi Norman DO;  [facility];  Service: Orthopedics    [address] [place]ART Left 1/6/2021    Procedure: ARTHROSCOPY KNEE;  Surgeon: Malachi Norman DO;  [facility];  Service: Orthopedics    RIK-EN-Y PROCEDURE  2003    SPINAL CORD STIMULATOR IMPLANT  11/14/2017    dr Flaquita Joyner procedure and technique 1  removal of a right back implantable pulse generator 2 placement of a new right buttock impantable pulse generator through seperate incision 3 electric analys complex programming spinal cord stimulator system postoperative period approx 1 hour    TONSILLECTOMY       Allergies   Allergen Reactions    Ampicillin GI Intolerance     Vomiting    Penicillins GI Intolerance     Vomit     Current Outpatient Medications on File Prior to Visit   Medication Sig Dispense Refill    baclofen 10 mg tablet Take 1 tablet (10 mg total) by mouth 3 (three) times a day 90 tablet 2    buprenorphine (BUTRANS) 15 mcg/hr Apply 1 patch TD every 7 days for pain  Substitute permissible  4 patch 2    LANSOPRAZOLE PO Take by mouth      meloxicam (MOBIC) 15 mg tablet Take 1 tablet (15 mg total) by mouth daily 30 tablet 0    naloxone (NARCAN) 4 mg/0 1 mL nasal spray Administer 1 spray into a nostril  If breathing does not return to normal or if breathing difficulty resumes after 2-3 minutes, give another dose in the other nostril using a new spray  1 each 1    Ventolin  (90 Base) MCG/ACT inhaler INHALE 2 PUFFS EVERY 4 (FOUR) HOURS AS NEEDED FOR WHEEZING OR SHORTNESS OF BREATH COUGH 18 Inhaler 1    aspirin (ECOTRIN) 325 mg EC tablet Take 1 tablet (325 mg total) by mouth 2 (two) times a day 84 tablet 0     No current facility-administered medications on file prior to visit        Social History Socioeconomic History    Marital status:      Spouse name: Not on file    Number of children: Not on file    Years of education: GED    Highest education level: Not on file   Occupational History    Not on file   Social Needs    Financial resource strain: Not on file    Food insecurity     Worry: Not on file     Inability: Not on file    Transportation needs     Medical: Not on file     Non-medical: Not on file   Tobacco Use    Smoking status: Current Every Day Smoker     Packs/day: 1 00     Types: Cigarettes    Smokeless tobacco: Never Used   Substance and Sexual Activity    Alcohol use: No     Comment: quit drinking, social, very rarely, no etoh in > 1 year    Drug use: No    Sexual activity: Not on file   Lifestyle    Physical activity     Days per week: Not on file     Minutes per session: Not on file    Stress: Not on file   Relationships    Social connections     Talks on phone: Not on file     Gets together: Not on file     Attends Protestant service: Not on file     Active member of club or organization: Not on file     Attends meetings of clubs or organizations: Not on file     Relationship status: Not on file    Intimate partner violence     Fear of current or ex partner: Not on file     Emotionally abused: Not on file     Physically abused: Not on file     Forced sexual activity: Not on file   Other Topics Concern    Not on file   Social History Narrative    Chooses not to have children    Drinks caffienated tea    Lives with spouse                 I have reviewed the past medical, surgical, social and family history, medications and allergies as documented in the EMR  Review of systems: ROS is negative other than that noted in the HPI  Constitutional: Negative for fatigue and fever  Physical Exam:    Blood pressure 148/92, pulse 88, temperature (!) 97 2 °F (36 2 °C), height 6' (1 829 m), weight 95 3 kg (210 lb)      General/Constitutional: NAD, well developed, well nourished  HENT: Normocephalic, atraumatic  CV: Intact distal pulses, regular rate  Resp: No respiratory distress or labored breathing  Lymphatic: No lymphadenopathy palpated  Neuro: Alert and Oriented x 3, no focal deficits  Psych: Normal mood, normal affect, normal judgement, normal behavior  Skin: Warm, dry, no rashes, no erythema      Knee Exam (focused): RIGHT LEFT   ROM:   0-130 0-130   Palpation: Effusion negative negative     MJL tenderness Negative Positive     LJL tenderness Negative Negative   Meniscus: Felicita Negative Negative    Apley's Compression Negative Negative   Instability: Varus stable stable     Valgus stable stable   Special Tests: Lachman Negative Negative     Posterior drawer Negative Negative     Anterior drawer Negative Negative     Pivot shift not tested not tested     Dial not tested not tested   Patella: Palpation no tenderness ttp medial facet and medial femoral epicondyle     Mobility 1/4 1/4     Apprehension Negative Negative   Other: Single leg 1/4 squat not tested not tested        Left knee: TTP medial hamstring       LE NV Exam: +2 DP/PT pulses bilaterally  Sensation intact to light touch L2-S1 bilaterally    No calf tenderness to palpation bilaterally      Knee Imaging     MRI of the left knee was reviewed and demonstrates no meniscus tears  The MPFL graft is intact  There was no protruding of surgical screws    I have reviewed the radiologist's report and agree with their impression

## 2021-05-21 NOTE — LETTER
May 21, 2021     Patient: Morgan Kessler   YOB: 1975   Date of Visit: 5/21/2021       To Whom it May Concern:    Morgan Kessler is under my professional care  He was seen in my office on 5/21/2021  He may return to work on 6/1/21 on full duty  If you have any questions or concerns, please don't hesitate to call           Sincerely,          Mariaelena Fabian DO        CC: Ova [place]

## 2021-06-10 ENCOUNTER — OFFICE VISIT (OUTPATIENT)
Dept: OBGYN CLINIC | Facility: MEDICAL CENTER | Age: 46
End: 2021-06-10
Payer: OTHER MISCELLANEOUS

## 2021-06-10 VITALS
WEIGHT: 215 LBS | BODY MASS INDEX: 29.12 KG/M2 | HEART RATE: 85 BPM | SYSTOLIC BLOOD PRESSURE: 135 MMHG | DIASTOLIC BLOOD PRESSURE: 83 MMHG | HEIGHT: 72 IN

## 2021-06-10 DIAGNOSIS — Z98.890 S/P ARTHROSCOPIC SURGERY OF LEFT KNEE: Primary | ICD-10-CM

## 2021-06-10 DIAGNOSIS — S83.242A OTHER TEAR OF MEDIAL MENISCUS, CURRENT INJURY, LEFT KNEE, INITIAL ENCOUNTER: ICD-10-CM

## 2021-06-10 DIAGNOSIS — M76.892 HAMSTRING TENDONITIS OF LEFT THIGH: ICD-10-CM

## 2021-06-10 DIAGNOSIS — Z98.890 S/P LEFT KNEE SURGERY: ICD-10-CM

## 2021-06-10 PROCEDURE — 99213 OFFICE O/P EST LOW 20 MIN: CPT | Performed by: ORTHOPAEDIC SURGERY

## 2021-06-10 PROCEDURE — 20610 DRAIN/INJ JOINT/BURSA W/O US: CPT | Performed by: ORTHOPAEDIC SURGERY

## 2021-06-10 RX ORDER — LIDOCAINE HYDROCHLORIDE 10 MG/ML
3 INJECTION, SOLUTION INFILTRATION; PERINEURAL
Status: COMPLETED | OUTPATIENT
Start: 2021-06-10 | End: 2021-06-10

## 2021-06-10 RX ORDER — METHYLPREDNISOLONE ACETATE 40 MG/ML
1 INJECTION, SUSPENSION INTRA-ARTICULAR; INTRALESIONAL; INTRAMUSCULAR; SOFT TISSUE
Status: COMPLETED | OUTPATIENT
Start: 2021-06-10 | End: 2021-06-10

## 2021-06-10 RX ADMIN — LIDOCAINE HYDROCHLORIDE 3 ML: 10 INJECTION, SOLUTION INFILTRATION; PERINEURAL at 08:25

## 2021-06-10 RX ADMIN — METHYLPREDNISOLONE ACETATE 1 ML: 40 INJECTION, SUSPENSION INTRA-ARTICULAR; INTRALESIONAL; INTRAMUSCULAR; SOFT TISSUE at 08:25

## 2021-06-10 NOTE — PROGRESS NOTES
Ortho Sports Medicine Knee Follow Up Visit     Assessment:    39 y o  male left s/p surgical arthroscopy of the left knee with lateral release and MPFL allograft reconstruction with subsequent surgery for left knee open I and D and washout of postoperative hematoma, DOS: 1/8/202  Medial hamstring tendinitis    Plan:    Conservative treatment:    Ice to knee for 20 minutes at least 1-2 times daily  Let pain guide gradual return activities  Imaging:    No imaging was available for review today  Injection:    The risks and benefits of the injection (which include but are not limited to: infection, bleeding,damage to nerve/artery, need for further intervention), as well as the risks and benefits of all alternative treatments were explained and understood  The patient elected to proceed with injection  The procedure was done with aseptic technique, and the patient tolerated the procedure well with no complications  A corticosteroid injection was performed  The patient should take 1-2 days off of activity, with gradual return to activity as tolerated  Ice to the knee 1-2 times daily for 20 minutes, for next 24-48 hrs  Surgery:     No surgery is recommended at this point, continue with conservative treatment plan as noted  Follow up:    No follow-ups on file  Chief Complaint   Patient presents with    Left Knee - Follow-up     History of Present Illness: The patient is returns for follow up of  left s/p surgical arthroscopy of the left knee with lateral release and MPFL allograft reconstruction with subsequent surgery for left knee open I and D and washout of postoperative hematoma, DOS: 1/8/202     Since the prior visit, He reports minimal improvement  He notes that his has been having a lot of pain medially aspect of the knee and it has been keeping him up at night       Pain is located anterior, medial      Pain is improved by rest   Pain is aggravated by stairs, weight bearing and walking  Symptoms include clicking  The patient has tried rest, ice, NSAIDS and physical therapy            Knee Surgical History:  See previous notes    Past Medical, Social and Family History:  Past Medical History:   Diagnosis Date    Anxiety     Arthritis     Asthma     Bipolar disorder ([address] )     Chronic left lumbar radiculopathy     Chronic low back pain     Chronic pain syndrome     Chronic right shoulder pain     Depression with anxiety     Fatigue     GERD (gastroesophageal reflux disease)     Hydronephrosis     Iron deficiency anemia     Kidney stone     Lytic bone lesions on xray     Nephrolithiasis     PONV (postoperative nausea and vomiting)     Right elbow pain     Right lateral epicondylitis      Past Surgical History:   Procedure Laterality Date    ADENOIDECTOMY Bilateral     APPENDECTOMY      BACK SURGERY      CHOLECYSTECTOMY      CHOLECYSTECTOMY LAPAROSCOPIC N/A 10/30/2018    Procedure: CHOLECYSTECTOMY WITH GASTROTOMY LAPAROSCOPIC;  Surgeon: Dina Olguin MD;  [facility];  Service: General    ERCP W/ SPHICTEROTOMY N/A 10/30/2018    Procedure: ENDOSCOPIC RETROGRADE CHOLANGIOPANCREATOGRAPHY (ERCP) W/ SPHINCTEROTOMY;  Surgeon: Dayton Miller MD;  [facility];  Service: Gastroenterology    GASTRIC BYPASS      2009    OTHER SURGICAL HISTORY      fusion/refusion of vertebrae, 2010 and 2011 l5-s1 and l4-l5    ND CYSTO/URETERO W/LITHOTRIPSY &INDWELL STENT INSRT Right 8/8/2017    Procedure: CYSTOSCOPY; URETEROSCOPY; HOLMIUM LASER; RETROGRADE PYELOGRAM; STENT;  Surgeon: Arabella Kilpatrick MD;  [facility];  Service: Urology    ND [address] [place] [address] Left 2/6/6238    Procedure: INCISION AND DRAINAGE (I&D) EXTREMITY- of left knee s/p MPFL reconstruction, I&D if the left knee with possible MPFL revision;  Surgeon: Nicholas St DO;  [facility];  Service: Orthopedics    ND FIX UNSTABLE PATELLA,EXTEN REALIGN Left 1/6/2021    Procedure: REALIGNMENT PATELLA with chondroplasty of patella, open medial patellofemoral ligament reconstruction with allograft;  Surgeon: Elizabeth Gomez DO;  [facility];  Service: Orthopedics    [place] IMPLANT SPINAL NEUROSTIM/ Right 11/14/2017    Procedure: REMOVAL LOWER MEDIAL BUTTOCK SPINAL CORD STIMULATOR GENERATOR; PLACEMENT OF NEW BUTTOCK SPINAL CORD STIMULATOR THROUGH SEPERATE INCISION;  Surgeon: Howard Toth MD;  [facility];  Service: Neurosurgery    [place] KNEE SCOPE, W/LATERAL RELEASE Left 1/6/2021    Procedure: RELEASE RETINACULAR;  Surgeon: Elizabeth Gomez DO;  [facility];  Service: Orthopedics    [id number] Left 1/6/2021    Procedure: ARTHROSCOPY KNEE;  Surgeon: Elizabeth Gomez DO;  [facility];  Service: Orthopedics    RIK-EN-Y PROCEDURE  2003    SPINAL CORD STIMULATOR IMPLANT  11/14/2017    dr Kranthi Mariee procedure and technique 1  removal of a right back implantable pulse generator 2 placement of a new right buttock impantable pulse generator through seperate incision 3 electric analys complex programming spinal cord stimulator system postoperative period approx 1 hour    TONSILLECTOMY       Allergies   Allergen Reactions    Ampicillin GI Intolerance     Vomiting    Penicillins GI Intolerance     Vomit     Current Outpatient Medications on File Prior to Visit   Medication Sig Dispense Refill    baclofen 10 mg tablet Take 1 tablet (10 mg total) by mouth 3 (three) times a day 90 tablet 2    buprenorphine (BUTRANS) 15 mcg/hr Apply 1 patch TD every 7 days for pain  Substitute permissible  4 patch 2    Diclofenac Sodium (VOLTAREN) 1 % Apply 2 g topically 4 (four) times a day 150 g 2    LANSOPRAZOLE PO Take by mouth      meloxicam (MOBIC) 15 mg tablet Take 1 tablet (15 mg total) by mouth daily 30 tablet 0    naloxone (NARCAN) 4 mg/0 1 mL nasal spray Administer 1 spray into a nostril   If breathing does not return to normal or if breathing difficulty resumes after 2-3 minutes, give another dose in the other nostril using a new spray  1 each 1    Ventolin  (90 Base) MCG/ACT inhaler INHALE 2 PUFFS EVERY 4 (FOUR) HOURS AS NEEDED FOR WHEEZING OR SHORTNESS OF BREATH COUGH 18 Inhaler 1    aspirin (ECOTRIN) 325 mg EC tablet Take 1 tablet (325 mg total) by mouth 2 (two) times a day 84 tablet 0     No current facility-administered medications on file prior to visit        Social History     Socioeconomic History    Marital status:      Spouse name: Not on file    Number of children: Not on file    Years of education: GED    Highest education level: Not on file   Occupational History    Not on file   Social Needs    Financial resource strain: Not on file    Food insecurity     Worry: Not on file     Inability: Not on file   [employer] needs     Medical: Not on file     Non-medical: Not on file   Tobacco Use    Smoking status: Current Every Day Smoker     Packs/day: 1 00     Types: Cigarettes    Smokeless tobacco: Never Used   Substance and Sexual Activity    Alcohol use: No     Comment: quit drinking, social, very rarely, no etoh in > 1 year    Drug use: No    Sexual activity: Not on file   Lifestyle    Physical activity     Days per week: Not on file     Minutes per session: Not on file    Stress: Not on file   Relationships    Social connections     Talks on phone: Not on file     Gets together: Not on file     Attends [employer] service: Not on file     Active member of club or organization: Not on file     Attends meetings of clubs or organizations: Not on file     Relationship status: Not on file    Intimate partner violence     Fear of current or ex partner: Not on file     Emotionally abused: Not on file     Physically abused: Not on file     Forced sexual activity: Not on file   Other Topics Concern    Not on file   Social History Narrative    Chooses not to have children    Drinks caffienated tea    Lives with spouse                 I have reviewed the past medical, surgical, social and family history, medications and allergies as documented in the EMR  Review of systems: ROS is negative other than that noted in the HPI  Constitutional: Negative for fatigue and fever  Physical Exam:    Blood pressure 135/83, pulse 85, height 6' (1 829 m), weight 97 5 kg (215 lb)  General/Constitutional: NAD, well developed, well nourished  HENT: Normocephalic, atraumatic  CV: Intact distal pulses, regular rate  Resp: No respiratory distress or labored breathing  Lymphatic: No lymphadenopathy palpated  Neuro: Alert and Oriented x 3, no focal deficits  Psych: Normal mood, normal affect, normal judgement, normal behavior  Skin: Warm, dry, no rashes, no erythema      Knee Exam (focused): RIGHT LEFT   ROM:   0-130 0-130   Palpation: Effusion negative negative     MJL tenderness Negative Negative     LJL tenderness Negative Negative   Meniscus: Felicita Negative Negative    Apley's Compression Negative Negative   Instability: Varus stable stable     Valgus stable stable   Special Tests: Lachman Negative Negative     Posterior drawer Negative Negative     Anterior drawer Negative Negative     Pivot shift not tested not tested     Dial not tested not tested   Patella: Palpation no tenderness no tenderness     Mobility 1/4 1/4     Apprehension Negative Negative   Other: Single leg 1/4 squat not tested not tested        Large joint arthrocentesis: L knee  Universal Protocol:  Consent: Verbal consent obtained  Risks and benefits: risks, benefits and alternatives were discussed  Consent given by: patient and parent  Time out: Immediately prior to procedure a "time out" was called to verify the correct patient, procedure, equipment, support staff and site/side marked as required    Patient understanding: patient states understanding of the procedure being performed  Patient consent: the patient's understanding of the procedure matches consent given  Procedure consent: procedure consent matches procedure scheduled  Relevant documents: relevant documents present and verified  Test results: test results available and properly labeled  Site marked: the operative site was marked  Radiology Images displayed and confirmed   If images not available, report reviewed: imaging studies available  Patient identity confirmed: verbally with patient    Supporting Documentation  Indications: pain and joint swelling   Procedure Details  Location: knee - L knee  Needle size: 22 G  Ultrasound guidance: no  Approach: anterolateral  Medications administered: 3 mL lidocaine 1 %; 1 mL methylPREDNISolone acetate 40 mg/mL    Patient tolerance: patient tolerated the procedure well with no immediate complications  Dressing:  Sterile dressing applied             LE NV Exam: +2 DP/PT pulses bilaterally  Sensation intact to light touch L2-S1 bilaterally    No calf tenderness to palpation bilaterally      Knee Imaging    No imaging was performed today      Scribe Attestation    I,:  Genesis Hoang am acting as a scribe while in the presence of the attending physician :       I,:  Hassell Leyden Doroshow, DO personally performed the services described in this documentation    as scribed in my presence :

## 2021-06-16 ENCOUNTER — TELEPHONE (OUTPATIENT)
Dept: OBGYN CLINIC | Facility: CLINIC | Age: 46
End: 2021-06-16

## 2021-06-16 NOTE — TELEPHONE ENCOUNTER
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

## 2021-07-19 ENCOUNTER — OFFICE VISIT (OUTPATIENT)
Dept: PAIN MEDICINE | Facility: CLINIC | Age: 46
End: 2021-07-19
Payer: COMMERCIAL

## 2021-07-19 VITALS
TEMPERATURE: 98.8 F | HEIGHT: 72 IN | DIASTOLIC BLOOD PRESSURE: 80 MMHG | HEART RATE: 78 BPM | WEIGHT: 212 LBS | SYSTOLIC BLOOD PRESSURE: 130 MMHG | BODY MASS INDEX: 28.71 KG/M2

## 2021-07-19 DIAGNOSIS — M48.062 SPINAL STENOSIS OF LUMBAR REGION WITH NEUROGENIC CLAUDICATION: ICD-10-CM

## 2021-07-19 DIAGNOSIS — M79.18 MYOFASCIAL PAIN SYNDROME: ICD-10-CM

## 2021-07-19 DIAGNOSIS — M25.511 CHRONIC RIGHT SHOULDER PAIN: ICD-10-CM

## 2021-07-19 DIAGNOSIS — Z79.891 LONG-TERM CURRENT USE OF OPIATE ANALGESIC: ICD-10-CM

## 2021-07-19 DIAGNOSIS — M54.42 CHRONIC LOW BACK PAIN WITH BILATERAL SCIATICA, UNSPECIFIED BACK PAIN LATERALITY: ICD-10-CM

## 2021-07-19 DIAGNOSIS — G89.29 CHRONIC RIGHT SHOULDER PAIN: ICD-10-CM

## 2021-07-19 DIAGNOSIS — G89.29 CHRONIC LOW BACK PAIN WITH BILATERAL SCIATICA, UNSPECIFIED BACK PAIN LATERALITY: ICD-10-CM

## 2021-07-19 DIAGNOSIS — M54.16 LUMBAR RADICULOPATHY: ICD-10-CM

## 2021-07-19 DIAGNOSIS — G89.4 PAIN SYNDROME, CHRONIC: Primary | ICD-10-CM

## 2021-07-19 DIAGNOSIS — M96.1 LUMBAR POST-LAMINECTOMY SYNDROME: ICD-10-CM

## 2021-07-19 DIAGNOSIS — M54.41 CHRONIC LOW BACK PAIN WITH BILATERAL SCIATICA, UNSPECIFIED BACK PAIN LATERALITY: ICD-10-CM

## 2021-07-19 PROCEDURE — 4004F PT TOBACCO SCREEN RCVD TLK: CPT | Performed by: NURSE PRACTITIONER

## 2021-07-19 PROCEDURE — 99214 OFFICE O/P EST MOD 30 MIN: CPT | Performed by: NURSE PRACTITIONER

## 2021-07-19 RX ORDER — BUPRENORPHINE 15 UG/H
PATCH TRANSDERMAL
Qty: 4 PATCH | Refills: 2 | Status: SHIPPED | OUTPATIENT
Start: 2021-07-19 | End: 2021-12-10

## 2021-07-19 RX ORDER — BACLOFEN 10 MG/1
10 TABLET ORAL 3 TIMES DAILY
Qty: 60 TABLET | Refills: 2 | Status: SHIPPED | OUTPATIENT
Start: 2021-07-19 | End: 2021-12-10 | Stop reason: SDUPTHER

## 2021-07-19 NOTE — PROGRESS NOTES
Assessment:  1  Pain syndrome, chronic    2  Chronic low back pain with bilateral sciatica, unspecified back pain laterality    3  Chronic right shoulder pain    4  Lumbar radiculopathy    5  Myofascial pain syndrome    6  Lumbar post-laminectomy syndrome    7  Spinal stenosis of lumbar region with neurogenic claudication    8  Long-term current use of opiate analgesic        Plan:   While the patient was in the office today, I did have a thorough conversation with the patient regarding their chronic pain syndrome, symptoms, medication regimen, and treatment plan  I discussed with the patient that at this point time since he is noting moderate and stable overall relief of his chronic pain symptoms, without any significant side effects and it is allowing him to get back to work, I feel it is reasonable and appropriate with his underlying etiology and the fact that he has tried and failed every other alternative medication regimen treatment plan, including a spinal cord stimulator which she continues to use, we will continue the Butrans patch in the baclofen as prescribed  The patient was agreeable and verbalized an understanding  I encouraged the patient continue to use his spinal cord stimulator and to follow up with the [facility] medical team as needed in the future for any reprogramming needs  The patient was agreeable and verbalized an understanding  [place] Prescription Drug Monitoring Program report was reviewed and was appropriate      While the patient was in the office today, an annual review of the opioid contract/agreement was conducted, the patient was agreeable to continuing the contract as previously agreed upon and signed for this calendar year  The patient did not have their opioid medications available for confirmation or counting while in the office today   I reviewed with the patient that as per the opioid contract, they are to bring in the last filled prescription for all of their opioid medications, with what they have left to every office visit  I advised the patient that if they would continue to not bring in their prescriptions as discussed, we may not be able to continue prescribing opioid medications in the future  The patient was agreeable and verbalized an understanding  There are risks associated with opioid medications, including dependence, addiction and tolerance  The patient understands and agrees to use these medications only as prescribed  Potential side effects of the medications include, but are not limited to, constipation, drowsiness, addiction, impaired judgment and risk of fatal overdose if not taken as prescribed  The patient was warned against driving while taking sedation medications  Sharing medications is a felony  At this point in time, the patient is showing no signs of addiction, abuse, diversion or suicidal ideation  An oral drug screen swab was collected at today's office visit  The swab will be sent for confirmatory testing  The drug screen is medically necessary because the patient is either dependent on opioid medication or is being considered for opioid medication therapy and the results could impact ongoing or future treatment  The drug screen is to evaluate for the presences or absence of prescribed, non-prescribed, and/or illicit drugs/substances  The patient will follow-up in 13 weeks for medication prescription refill and reevaluation  The patient was advised to contact the office should their symptoms worsen in the interim  The patient was agreeable and verbalized an understanding  History of Present Illness: The patient is a 39 y o  male last seen on 4/26/2021 who presents for a follow up office visit in regards to Chronic pain syndrome secondary to lumbar post-laminectomy syndrome with stenosis, radiculopathy, myofascial pain and chronic shoulder pain    The patient currently reports that since his last office visit his pain symptoms have remained relatively stable and manageable and although he does have some days where his pain is worse as he is now back to work full-time, it is still manageable and tolerable with his current medication regimen and treatment plan  The patient presents today for regular medication follow-up visit  Current pain medications includes:   Butrans patch 15 micro g applying 1 patch transdermally every 7 days for pain and baclofen 10 mg t i d  p r n  for pain and spasm  The patient reports that this regimen is providing 60% pain relief  The patient is reporting no side effects from this pain medication regimen  Pain Contract Signed: 7/19/2021  Last Urine Drug Screen: 7/19/2021    I have personally reviewed and/or updated the patient's past medical history, past surgical history, family history, social history, current medications, allergies, and vital signs today  Review of Systems:    Review of Systems   Constitutional: Negative for fever  Respiratory: Negative for shortness of breath  Cardiovascular: Negative for chest pain  Gastrointestinal: Negative for abdominal pain, constipation, diarrhea, nausea and vomiting  Genitourinary: Negative for dysuria  Musculoskeletal: Positive for back pain  Negative for arthralgias, gait problem, joint swelling (joint stiffness) and myalgias  Skin: Negative for rash  Neurological: Positive for weakness and numbness  Negative for dizziness, seizures and headaches  All other systems reviewed and are negative          Past Medical History:   Diagnosis Date    Anxiety     Arthritis     Asthma     Bipolar disorder (HCC)     Chronic left lumbar radiculopathy     Chronic low back pain     Chronic pain syndrome     Chronic right shoulder pain     Depression with anxiety     Fatigue     GERD (gastroesophageal reflux disease)     Hydronephrosis     Iron deficiency anemia     Kidney stone     Lytic bone lesions on xray     Nephrolithiasis     PONV (postoperative nausea and vomiting)     Right elbow pain     Right lateral epicondylitis        Past Surgical History:   Procedure Laterality Date    ADENOIDECTOMY Bilateral     APPENDECTOMY      BACK SURGERY      CHOLECYSTECTOMY      CHOLECYSTECTOMY LAPAROSCOPIC N/A 10/30/2018    Procedure: CHOLECYSTECTOMY WITH GASTROTOMY LAPAROSCOPIC;  Surgeon: Grzegorz Cherry MD;  [facility];  Service: General    ERCP W/ SPHICTEROTOMY N/A 10/30/2018    Procedure: ENDOSCOPIC RETROGRADE CHOLANGIOPANCREATOGRAPHY (ERCP) W/ SPHINCTEROTOMY;  Surgeon: Kari Major MD;  [facility];  Service: Gastroenterology    GASTRIC BYPASS      2009    OTHER SURGICAL HISTORY      fusion/refusion of vertebrae, 2010 and 2011 l5-s1 and l4-l5    [place] CYSTO/URETERO W/LITHOTRIPSY &INDWELL STENT INSRT Right 8/8/2017    Procedure: CYSTOSCOPY; URETEROSCOPY; HOLMIUM LASER; RETROGRADE PYELOGRAM; STENT;  Surgeon: Vanda Ponce MD;  [facility];  Service: Urology    TN [address] [place] [address] Left 1/5/8980    Procedure: INCISION AND DRAINAGE (I&D) EXTREMITY- of left knee s/p MPFL reconstruction, I&D if the left knee with possible MPFL revision;  Surgeon: Noah Pierce DO;  [facility];  Service: Orthopedics    [place] FIX UNSTABLE PATELLA,EXTEN REALIGN Left 1/6/2021    Procedure: REALIGNMENT PATELLA with chondroplasty of patella, open medial patellofemoral ligament reconstruction with allograft;  Surgeon: Noah Pierce DO;  [facility];  Service: Orthopedics    [place] IMPLANT SPINAL NEUROSTIM/ Right 11/14/2017    Procedure: REMOVAL LOWER MEDIAL BUTTOCK SPINAL CORD STIMULATOR GENERATOR; PLACEMENT OF NEW BUTTOCK SPINAL [address];  Surgeon: Heidi Nagy MD;  [facility];  Service: Neurosurgery    TN KNEE SCOPE, W/LATERAL RELEASE Left 1/6/2021    Procedure: RELEASE RETINACULAR;  Surgeon: Noah Pierce DO;  [facility]; Service: Orthopedics    [place] KNEE SCOPE,SHAVE ARTICULAR CART Left 1/6/2021    Procedure: ARTHROSCOPY KNEE;  Surgeon: Elif Lozada DO;  Location:  MAIN OR;  Service: Orthopedics    RIK-EN-Y PROCEDURE  2003    SPINAL CORD STIMULATOR IMPLANT  11/14/2017    dr Noreen Espinoza procedure and technique 1  removal of a right back implantable pulse generator 2 placement of a new right buttock impantable pulse generator through seperate incision 3 electric analys complex programming spinal cord stimulator system postoperative period approx 1 hour    TONSILLECTOMY         Family History   Problem Relation Age of Onset    Cancer Mother     Arthritis Mother     Stomach cancer Mother     Cancer Father     Esophageal cancer Father     Other Father         brain tumor    No Known Problems Sister     No Known Problems Sister     No Known Problems Sister        Social History     Occupational History    Not on file   Tobacco Use    Smoking status: Current Every Day Smoker     Packs/day: 1 00     Types: Cigarettes    Smokeless tobacco: Never Used   Vaping Use    Vaping Use: Never used   Substance and Sexual Activity    Alcohol use: No     Comment: quit drinking, social, very rarely, no etoh in > 1 year    Drug use: No    Sexual activity: Not on file         Current Outpatient Medications:     aspirin (ECOTRIN) 325 mg EC tablet, Take 1 tablet (325 mg total) by mouth 2 (two) times a day, Disp: 84 tablet, Rfl: 0    baclofen 10 mg tablet, Take 1 tablet (10 mg total) by mouth 3 (three) times a day, Disp: 60 tablet, Rfl: 2    buprenorphine (BUTRANS) 15 mcg/hr, Apply 1 patch TD every 7 days for pain   Substitute permissible , Disp: 4 patch, Rfl: 2    Diclofenac Sodium (VOLTAREN) 1 %, Apply 2 g topically 4 (four) times a day, Disp: 150 g, Rfl: 2    LANSOPRAZOLE PO, Take by mouth, Disp: , Rfl:     Ventolin  (90 Base) MCG/ACT inhaler, INHALE 2 PUFFS EVERY 4 (FOUR) HOURS AS NEEDED FOR WHEEZING OR SHORTNESS OF BREATH COUGH, Disp: 18 Inhaler, Rfl: 1    naloxone (NARCAN) 4 mg/0 1 mL nasal spray, Administer 1 spray into a nostril  If breathing does not return to normal or if breathing difficulty resumes after 2-3 minutes, give another dose in the other nostril using a new spray , Disp: 1 each, Rfl: 1    Allergies   Allergen Reactions    Ampicillin GI Intolerance     Vomiting    Penicillins GI Intolerance     Vomit       Physical Exam:    /80 (BP Location: Left arm, Patient Position: Sitting, Cuff Size: Standard)   Pulse 78   Temp 98 8 °F (37 1 °C)   Ht 6' (1 829 m)   Wt 96 2 kg (212 lb)   BMI 28 75 kg/m²     Constitutional:normal, well developed, well nourished, alert, in no distress and non-toxic and no overt pain behavior    Eyes:anicteric  HEENT:grossly intact  Neck:supple, symmetric, trachea midline and no masses   Pulmonary:even and unlabored  Cardiovascular:No edema or pitting edema present  Skin:Normal without rashes or lesions and well hydrated  Psychiatric:Mood and affect appropriate  Neurologic:Cranial Nerves II-XII grossly intact  Musculoskeletal:normal      Imaging  No orders to display         Orders Placed This Encounter   Procedures    MM OF_Alprazolam Definitive    MM OF_Amphetamine Definitive Test    MM OF_Atomoxetine Definitive Test    MM OF_Buprenorphine Definitive Test    MM OF_Clonazepam Definitive    MM OF_Cocaine Definitive Test    MM OF_Codeine Definitive    MM OF_Diazepam Definitive    MM OF_Fentanyl Definitive Test    MM OF_Heroin Definitive Test    MM OF_Hydrocodone Definitive    MM OF_Hydromorphone Definitive    MM OF_Lorazepam Definitive    MM OF_MDMA Definitive Test    MM OF_Meperidine Definitive Test    MM OF_Methadone Definitive Test    MM OF_Methylphenidate Definitive Test    MM OF_Morphine Definitive    MM OF_Oxazepam Definitive    MM OF_Oxycodone Definitive Test - Oral Fluid    MM OF_Oxymorphone Definitive Test    MM OF_Phencyclidine Definitive Test    MM OF_Temazepam Definitive    MM OF_THC Definitive Test    MM OF_Tramadol Definitive Test    MM ALL_Prescribed Meds and Special Instructions     Answers for HPI/ROS submitted by the patient on 7/18/2021  Chronicity: recurrent  Onset: more than 1 year ago  Frequency: daily  Progression since onset: gradually worsening  Pain location: lumbar spine  Pain quality: aching, shooting, stabbing  Pain - numeric: 7/10  Pain is: worse during the night  Aggravated by: bending, position  Stiffness is present: in the morning  bladder incontinence: No  bowel incontinence: No  leg pain: No  paresis: No  paresthesias: Yes  pelvic pain: No  perianal numbness: No  tingling: Yes  weight loss:  No

## 2021-07-21 DIAGNOSIS — J20.9 ACUTE BRONCHITIS, UNSPECIFIED ORGANISM: ICD-10-CM

## 2021-07-21 RX ORDER — ALBUTEROL SULFATE 90 UG/1
AEROSOL, METERED RESPIRATORY (INHALATION)
Qty: 18 G | Refills: 0 | Status: SHIPPED | OUTPATIENT
Start: 2021-07-21

## 2021-07-21 NOTE — TELEPHONE ENCOUNTER
Requested Prescriptions   Pending Prescriptions Disp Refills    albuterol (PROVENTIL HFA,VENTOLIN HFA) 90 mcg/act inhaler [Pharmacy Med Name: ALBUTEROL HFA (VENTOLIN) INH]       Sig: INHALE 2 PUFFS EVERY 4 (FOUR) HOURS AS NEEDED FOR WHEEZING OR SHORTNESS OF BREATH COUGH       Pulmonology:  Beta Agonists Failed - 7/21/2021  8:00 AM        Failed - Manual Review Message: If patient with asthma requests more than 1 inhaler every 3 months, assess for uncontrolled symptoms and/or notify provider  Passed - Valid encounter within last 12 months     Recent Visits  Date Type Provider Dept   12/04/20 Telemedicine Lupis Francisco, [address]    09/28/20 Telemedicine ANTONIA Washington [facility]   03/16/20 Office Visit Lupis Stubbs, [address]    01/27/20 Office Visit Lupis Stubbs, [address]    10/21/19 Office Visit Lupis Stubbs, [address]    10/02/19 Office Visit Lupis Francisco, [address] [place] [address] Fp   09/30/19 Office Visit Tyler Ramirez MD [facility] [address] [facility] recent visits within past 720 days with a meds authorizing provider and meeting all other requirements  Future Appointments  No visits were found meeting these conditions    Showing future appointments within next 360 days with a meds authorizing provider and meeting all other requirements

## 2021-07-22 ENCOUNTER — APPOINTMENT (OUTPATIENT)
Dept: RADIOLOGY | Age: 46
End: 2021-07-22
Payer: COMMERCIAL

## 2021-07-22 ENCOUNTER — OFFICE VISIT (OUTPATIENT)
Dept: URGENT CARE | Age: 46
End: 2021-07-22
Payer: COMMERCIAL

## 2021-07-22 VITALS — RESPIRATION RATE: 18 BRPM | HEART RATE: 98 BPM | TEMPERATURE: 97.8 F | OXYGEN SATURATION: 98 %

## 2021-07-22 DIAGNOSIS — R06.02 SHORTNESS OF BREATH: Primary | ICD-10-CM

## 2021-07-22 DIAGNOSIS — R06.02 SHORTNESS OF BREATH: ICD-10-CM

## 2021-07-22 LAB
7AMINOCLONAZEPAM SAL QL CFM: NEGATIVE
AMPHET SAL QL CFM: NEGATIVE NG/ML
BUPRENORPHINE SAL QL SCN: ABNORMAL NG/ML
CARBOXYTHC SAL QL CFM: NEGATIVE NG/ML
CCP IGG SERPL-ACNC: NEGATIVE
COCAINE SAL QL CFM: NEGATIVE NG/ML
CODEINE SAL QL CFM: NEGATIVE NG/ML
EDDP SAL QL CFM: NEGATIVE NG/ML
HYDROCODONE SAL QL CFM: NEGATIVE NG/ML
HYDROCODONE SAL QL CFM: NEGATIVE NG/ML
HYDROMORPHONE SAL QL CFM: NEGATIVE NG/ML
LEUKEMIA MARKERS BLD-IMP: NEGATIVE NG/ML
M PROTEIN 3 UR ELPH-MCNC: NEGATIVE NG/ML
M TB TUBERC IGNF/MITOGEN IGNF CONTROL: NEGATIVE NG/ML
METHADONE SAL QL CFM: NEGATIVE NG/ML
MORPHINE SAL QL CFM: NEGATIVE NG/ML
MORPHINE SAL QL CFM: NEGATIVE NG/ML
OXYMORPHONE SAL QL CFM: NEGATIVE NG/ML
OXYMORPHONE SAL QL CFM: NEGATIVE NG/ML
PCP SAL QL CFM: NEGATIVE NG/ML
RESULT ALL_PRESCRIBED MEDS AND SPECIAL INSTRUCTIONS: NORMAL
SARS-COV-2 RNA RESP QL NAA+PROBE: NEGATIVE
SL AMB 6-MAM (HEROIN METABOLITE) QUANTIFICATION: NEGATIVE NG/ML
SL AMB ALPRAZOLAM QUANTIFICATION: NEGATIVE NG/ML
SL AMB ATOMOXETINE QUANTIFICATION: NORMAL
SL AMB CLONAZEPAM QUANTIFICATION: NEGATIVE NG/ML
SL AMB DIAZEPAM QUANTIFICATION: NEGATIVE NG/ML
SL AMB FENTANYL QUANTIFICATION: NEGATIVE NG/ML
SL AMB MDMA QUANTIFICATION: NEGATIVE NG/ML
SL AMB N-DESMETHYL-TRAMADOL QUANTIFICATION SALIVA: NEGATIVE NG/ML
SL AMB NORBUPRENORPHINE QUANTIFICATION: ABNORMAL NG/ML
SL AMB NORDIAZEPAM QUANTIFICATION: NEGATIVE NG/ML
SL AMB NORFENTANYL QUANTIFICATION: NEGATIVE NG/ML
SL AMB NORHYDROCODONE QUANTIFICATION: NEGATIVE NG/ML
SL AMB NORHYDROCODONE QUANTIFICATION: NEGATIVE NG/ML
SL AMB NORMEPERIDINE QUANTIFICATION: NEGATIVE NG/ML
SL AMB NOROXYCODONE QUANTIFICATION: NEGATIVE NG/ML
SL AMB OXAZEPAM QUANTIFICATION: NEGATIVE NG/ML
SL AMB RITALINIC ACID QUANTIFICATION: NEGATIVE
SL AMB TEMAZEPAM QUANTIFICATION: NEGATIVE NG/ML
SL AMB TEMAZEPAM QUANTIFICATION: NEGATIVE NG/ML
SL AMB TRAMADOL QUANTIFICATION: NEGATIVE NG/ML
SQUAMOUS #/AREA URNS HPF: NEGATIVE NG/ML

## 2021-07-22 PROCEDURE — U0005 INFEC AGEN DETEC AMPLI PROBE: HCPCS | Performed by: NURSE PRACTITIONER

## 2021-07-22 PROCEDURE — 71046 X-RAY EXAM CHEST 2 VIEWS: CPT

## 2021-07-22 PROCEDURE — 99213 OFFICE O/P EST LOW 20 MIN: CPT | Performed by: NURSE PRACTITIONER

## 2021-07-22 PROCEDURE — U0003 INFECTIOUS AGENT DETECTION BY NUCLEIC ACID (DNA OR RNA); SEVERE ACUTE RESPIRATORY SYNDROME CORONAVIRUS 2 (SARS-COV-2) (CORONAVIRUS DISEASE [COVID-19]), AMPLIFIED PROBE TECHNIQUE, MAKING USE OF HIGH THROUGHPUT TECHNOLOGIES AS DESCRIBED BY CMS-2020-01-R: HCPCS | Performed by: NURSE PRACTITIONER

## 2021-07-22 RX ORDER — AZITHROMYCIN 250 MG/1
TABLET, FILM COATED ORAL
Qty: 6 TABLET | Refills: 0 | Status: SHIPPED | OUTPATIENT
Start: 2021-07-22 | End: 2021-07-26

## 2021-07-22 RX ORDER — PREDNISONE 20 MG/1
20 TABLET ORAL 2 TIMES DAILY WITH MEALS
Qty: 10 TABLET | Refills: 0 | Status: SHIPPED | OUTPATIENT
Start: 2021-07-22 | End: 2021-07-27

## 2021-07-22 RX ORDER — BENZONATATE 200 MG/1
200 CAPSULE ORAL 3 TIMES DAILY PRN
Qty: 20 CAPSULE | Refills: 0 | Status: SHIPPED | OUTPATIENT
Start: 2021-07-22 | End: 2022-01-06 | Stop reason: ALTCHOICE

## 2021-07-22 NOTE — PATIENT INSTRUCTIONS
Asthma   WHAT YOU NEED TO KNOW:   Asthma is a lung disease that makes breathing difficult  Chronic inflammation and reactions to triggers narrow the airways in the lungs  Asthma can become life-threatening if it is not managed  DISCHARGE INSTRUCTIONS:   Call your local emergency number (911 in the [address]) if:   · You have severe shortness of breath  · The skin around your neck and ribs pulls in with each breath  · Your peak flow numbers are in the red zone of your AAP  Return to the emergency department if:   · You have shortness of breath, even after you take your short-term medicine as directed  · Your lips or nails turn blue or gray  Call your doctor or asthma specialist if:   · You run out of medicine before your next refill is due  · Your symptoms get worse  · You need to take more medicine than usual to control your symptoms  · You have questions or concerns about your condition or care  Medicines:   · Medicines  may be used to decrease inflammation, open airways, and make it easier to breathe  Medicines may be inhaled, taken as a pill, or injected  Short-term medicines relieve your symptoms quickly  Long-term medicines are used to prevent future asthma attacks  Other medicines may be needed if your regular medicines are not able to prevent attacks  You may also need medicine to help control your allergies  Ask your healthcare provider for more information about any medicine you are given and how to take it safely  · Take your medicine as directed  Contact your healthcare provider if you think your medicine is not helping or if you have side effects  Tell him of her if you are allergic to any medicine  Keep a list of the medicines, vitamins, and herbs you take  Include the amounts, and when and why you take them  Bring the list or the pill bottles to follow-up visits  Carry your medicine list with you in case of an emergency      Manage your symptoms and prevent future attacks: · Follow your Asthma Action Plan (AAP)  This is a written plan that you and your healthcare provider create  It explains which medicine you need and when to change doses if necessary  It also explains how you can monitor symptoms and use a peak flow meter  The meter measures how well your lungs are working  · Manage other health conditions , such as allergies, acid reflux, and sleep apnea  · Identify and avoid triggers  These may include pets, dust mites, mold, and cockroaches  · Do not smoke or be around others who smoke  Nicotine and other chemicals in cigarettes and cigars can cause lung damage  Ask your healthcare provider for information if you currently smoke and need help to quit  E-cigarettes or smokeless tobacco still contain nicotine  Talk to your healthcare provider before you use these products  · Ask about the flu vaccine  The flu can make your asthma worse  You may need a yearly flu shot  Follow up with your healthcare provider as directed: You will need to return to make sure your medicine is working and your symptoms are controlled  You may be referred to an asthma or allergy specialist  Joeynena Mckinney may be asked to keep a record of your peak flow values and bring it with you to your appointments  Write down your questions so you remember to ask them during your visits  © Copyright [employer] 2021 Information is for End User's use only and may not be sold, redistributed or otherwise used for commercial purposes  All illustrations and images included in CareNotes® are the copyrighted property of [employer]  or Suri Beckford  The above information is an  only  It is not intended as medical advice for individual conditions or treatments  Talk to your doctor, nurse or pharmacist before following any medical regimen to see if it is safe and effective for you

## 2021-07-22 NOTE — PROGRESS NOTES
[email or web address]        NAME: Rosalind Flores is a 39 y o  male  : 1975    MRN: [id number]  DATE: 2021  TIME: 11:28 AM    Assessment and Plan   Shortness of breath [R06 02]  1  Shortness of breath  XR chest pa & lateral    Novel Coronavirus (Covid-19),PCR [facility] - Office Collection    azithromycin (ZITHROMAX) 250 mg tablet    benzonatate (TESSALON) 200 MG capsule    predniSONE 20 mg tablet         Patient Instructions     Covid tested; results via [email or web address] or can call this clinic for results  Take meds as directed  Follow up with PCP in 3-5 days  Proceed to  ER if symptoms worsen  Chief Complaint     Chief Complaint   Patient presents with    Shortness of Breath     sent by PCP, increased use of albuterol     Cough         History of Present Illness       HPI   Reports SOB and cough, with phlegm  Also some SOB  Has a Hx of asthma and has been using his albuterol inhaler  Not helping  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 [employer]  Has been vaccinated for covid  Has a Hx of asthma  Review of Systems   Review of Systems   Constitutional: Negative for fever  HENT: Positive for congestion  Negative for ear pain, sore throat and trouble swallowing  Respiratory: Positive for cough (sometimes wet cough), chest tightness (sometimes) and shortness of breath  Negative for wheezing  Gastrointestinal: Negative for nausea and vomiting  Neurological: Negative for headaches           Current Medications       Current Outpatient Medications:     albuterol (PROVENTIL HFA,VENTOLIN HFA) 90 mcg/act inhaler, INHALE 2 PUFFS EVERY 4 (FOUR) HOURS AS NEEDED FOR WHEEZING OR SHORTNESS OF BREATH COUGH, Disp: 18 g, Rfl: 0    aspirin (ECOTRIN) 325 mg EC tablet, Take 1 tablet (325 mg total) by mouth 2 (two) times a day, Disp: 84 tablet, Rfl: 0    azithromycin (ZITHROMAX) 250 mg tablet, Take 2 tablets today then 1 tablet daily x 4 days, Disp: 6 tablet, Rfl: 0    baclofen 10 mg tablet, Take 1 tablet (10 mg total) by mouth 3 (three) times a day, Disp: 60 tablet, Rfl: 2    benzonatate (TESSALON) 200 MG capsule, Take 1 capsule (200 mg total) by mouth 3 (three) times a day as needed for cough, Disp: 20 capsule, Rfl: 0    buprenorphine (BUTRANS) 15 mcg/hr, Apply 1 patch TD every 7 days for pain  Substitute permissible , Disp: 4 patch, Rfl: 2    Diclofenac Sodium (VOLTAREN) 1 %, Apply 2 g topically 4 (four) times a day, Disp: 150 g, Rfl: 2    LANSOPRAZOLE PO, Take by mouth, Disp: , Rfl:     naloxone (NARCAN) 4 mg/0 1 mL nasal spray, Administer 1 spray into a nostril   If breathing does not return to normal or if breathing difficulty resumes after 2-3 minutes, give another dose in the other nostril using a new spray , Disp: 1 each, Rfl: 1    predniSONE 20 mg tablet, Take 1 tablet (20 mg total) by mouth 2 (two) times a day with meals for 5 days, Disp: 10 tablet, Rfl: 0    Current Allergies     Allergies as of 07/22/2021 - Reviewed 07/22/2021   Allergen Reaction Noted    Ampicillin GI Intolerance 11/09/2017    Penicillins GI Intolerance 11/09/2017            The following portions of the patient's history were reviewed and updated as appropriate: allergies, current medications, past family history, past medical history, past social history, past surgical history and problem list      Past Medical History:   Diagnosis Date    Anxiety     Arthritis     Asthma     Bipolar disorder ([facility] [address] )     Chronic left lumbar radiculopathy     Chronic low back pain     Chronic pain syndrome     Chronic right shoulder pain     Depression with anxiety     Fatigue     GERD (gastroesophageal reflux disease)     Hydronephrosis     Iron deficiency anemia     Kidney stone     Lytic bone lesions on xray     Nephrolithiasis     PONV (postoperative nausea and vomiting)     Right elbow pain     Right lateral epicondylitis        Past Surgical History:   Procedure Laterality Date    ADENOIDECTOMY Bilateral     APPENDECTOMY      BACK SURGERY      CHOLECYSTECTOMY      CHOLECYSTECTOMY LAPAROSCOPIC N/A 10/30/2018    Procedure: CHOLECYSTECTOMY WITH GASTROTOMY LAPAROSCOPIC;  Surgeon: Leodan Ornelas MD;  [facility];  Service: General    ERCP W/ SPHICTEROTOMY N/A 10/30/2018    Procedure: ENDOSCOPIC RETROGRADE CHOLANGIOPANCREATOGRAPHY (ERCP) W/ SPHINCTEROTOMY;  Surgeon: Chelsey Mcrae MD;  [facility];  Service: Gastroenterology    GASTRIC BYPASS      2009    OTHER SURGICAL HISTORY      fusion/refusion of vertebrae, 2010 and 2011 l5-s1 and l4-l5    WI CYSTO/URETERO W/LITHOTRIPSY &INDWELL STENT INSRT Right 8/8/2017    Procedure: CYSTOSCOPY; URETEROSCOPY; HOLMIUM LASER; RETROGRADE PYELOGRAM; STENT;  Surgeon: Gabriella Ruby MD;  [facility];  Service: Urology    WI [address] [place] [address] Left 4/2/2675    Procedure: INCISION AND DRAINAGE (I&D) EXTREMITY- of left knee s/p MPFL reconstruction, I&D if the left knee with possible MPFL revision;  Surgeon: Kim Sierra DO;  [facility];  Service: Orthopedics    WI FIX UNSTABLE PATELLA,EXTEN REALIGN Left 1/6/2021    Procedure: REALIGNMENT PATELLA with chondroplasty of patella, open medial patellofemoral ligament reconstruction with allograft;  Surgeon: Kim Sierra DO;  [facility];  Service: Orthopedics    WI IMPLANT SPINAL NEUROSTIM/ Right 11/14/2017    Procedure: REMOVAL LOWER MEDIAL BUTTOCK SPINAL CORD STIMULATOR GENERATOR; PLACEMENT OF NEW BUTTOCK SPINAL [address];  Surgeon: Lindsey Colby MD;  [facility];  Service: Neurosurgery    WI KNEE SCOPE, W/LATERAL RELEASE Left 1/6/2021    Procedure: RELEASE RETINACULAR;  Surgeon: Kim Sierra DO;  [facility];  Service: Orthopedics    [address] [place] [address] Left 1/6/2021    Procedure: ARTHROSCOPY KNEE;  Surgeon: Kim Sierra DO;  [facility];  Service: Orthopedics    RIK-EN-Y PROCEDURE  [address] [place] [address] STIMULATOR IMPLANT  11/14/2017    dr Norm Hardy procedure and technique 1  removal of a right back implantable pulse generator 2 placement of a new right buttock impantable pulse generator through seperate incision 3 electric analys complex programming spinal cord stimulator system postoperative period approx 1 hour    TONSILLECTOMY         Family History   Problem Relation Age of Onset    Cancer Mother     Arthritis Mother     Stomach cancer Mother     Cancer Father     Esophageal cancer Father     Other Father         brain tumor    No Known Problems Sister     No Known Problems Sister     No Known Problems Sister          Medications have been verified  Objective   Pulse 98   Temp 97 8 °F (36 6 °C)   Resp 18   SpO2 98%   No LMP for male patient  Physical Exam     Physical Exam  Constitutional:       Appearance: He is ill-appearing  He is not diaphoretic  HENT:      Mouth/Throat:      Mouth: Mucous membranes are moist       Pharynx: No oropharyngeal exudate  Cardiovascular:      Rate and Rhythm: Normal rate and regular rhythm  Pulmonary:      Effort: Pulmonary effort is normal       Breath sounds: No decreased breath sounds or wheezing  Comments: Some congestion in the lungs  Lymphadenopathy:      Cervical: No cervical adenopathy

## 2021-07-23 ENCOUNTER — OFFICE VISIT (OUTPATIENT)
Dept: OBGYN CLINIC | Facility: CLINIC | Age: 46
End: 2021-07-23
Payer: OTHER MISCELLANEOUS

## 2021-07-23 VITALS
SYSTOLIC BLOOD PRESSURE: 125 MMHG | DIASTOLIC BLOOD PRESSURE: 80 MMHG | WEIGHT: 212 LBS | HEIGHT: 72 IN | BODY MASS INDEX: 28.71 KG/M2

## 2021-07-23 DIAGNOSIS — M94.262 CHONDROMALACIA OF KNEE, LEFT: Primary | ICD-10-CM

## 2021-07-23 PROCEDURE — 3008F BODY MASS INDEX DOCD: CPT | Performed by: NURSE PRACTITIONER

## 2021-07-23 PROCEDURE — 99213 OFFICE O/P EST LOW 20 MIN: CPT | Performed by: ORTHOPAEDIC SURGERY

## 2021-07-23 NOTE — PROGRESS NOTES
Ortho Sports Medicine Knee Follow Up Visit     Assesment:     39 y o  male 6 5 months s/p surgical arthroscopy of the left knee with lateral release and MPFL allograft reconstruction with subsequent surgery for left knee open I and D and washout of postoperative hematoma, DOS: 1/8/202    Plan:    Conservative treatment:    Ice to knee for 20 minutes at least 1-2 times daily  OTC NSAIDS prn for pain  Imaging: All imaging from today was reviewed by myself and explained to the patient  Injection:    A viscosupplementation injection was ordered and will be given at a future visit  Surgery:     No surgery is recommended at this point, continue with conservative treatment plan as noted  Follow up:    Return for visco injections  Chief Complaint   Patient presents with    Left Knee - Follow-up       History of Present Illness: The patient is returns for follow up of 6 5 months s/p surgical arthroscopy of the left knee with lateral release and MPFL allograft reconstruction with subsequent surgery for left knee open I and D and washout of postoperative hematoma, DOS: 1/8/202   Since the prior visit, He reports minimal improvement  He did receive a corticosteroid injection at his last appointment and states that it gave him slight relief but not long-lasting  He states that the kneecap does feel stable at this time  But he does note that he has constant pain under the kneecap  He states that he feels like he has a grinding or clicks sensation  He states that the pain gets worse with walking down grades are anything that involves stairs squatting or ladders  Pain is located anterior  Pain is improved by rest   Pain is aggravated by stairs, squatting and walking  Symptoms include clicking and catching  The patient has tried rest, ice, NSAIDS, physical therapy, bracing and injection            Knee Surgical History:  1/8/21-left knee open I and D and washout of postoperative hematoma  1/6/21- surgical arthroscopy of the left knee with lateral lease and open MPFL reconstruction with allograft    Past Medical, Social and Family History:  Past Medical History:   Diagnosis Date    Anxiety     Arthritis     Asthma     Bipolar disorder ([address] )     Chronic left lumbar radiculopathy     Chronic low back pain     Chronic pain syndrome     Chronic right shoulder pain     Depression with anxiety     Fatigue     GERD (gastroesophageal reflux disease)     Hydronephrosis     Iron deficiency anemia     Kidney stone     Lytic bone lesions on xray     Nephrolithiasis     PONV (postoperative nausea and vomiting)     Right elbow pain     Right lateral epicondylitis      Past Surgical History:   Procedure Laterality Date    ADENOIDECTOMY Bilateral     APPENDECTOMY      BACK SURGERY      CHOLECYSTECTOMY      CHOLECYSTECTOMY LAPAROSCOPIC N/A 10/30/2018    Procedure: CHOLECYSTECTOMY WITH GASTROTOMY LAPAROSCOPIC;  Surgeon: Katy Agosto MD;  [facility];  Service: General    ERCP W/ SPHICTEROTOMY N/A 10/30/2018    Procedure: ENDOSCOPIC RETROGRADE CHOLANGIOPANCREATOGRAPHY (ERCP) W/ SPHINCTEROTOMY;  Surgeon: Estrellita Terry MD;  [facility];  Service: Gastroenterology    GASTRIC BYPASS      2009    OTHER SURGICAL HISTORY      fusion/refusion of vertebrae, 2010 and 2011 l5-s1 and l4-l5    OH CYSTO/URETERO W/LITHOTRIPSY &INDWELL STENT INSRT Right 8/8/2017    Procedure: CYSTOSCOPY; URETEROSCOPY; HOLMIUM LASER; RETROGRADE PYELOGRAM; STENT;  Surgeon: Radha Cohen MD;  [facility];  Service: Urology    OH DRAIN SKIN ABSCESS Marleni De Beque Left 8/1/1785    Procedure: INCISION AND DRAINAGE (I&D) EXTREMITY- of left knee s/p MPFL reconstruction, I&D if the left knee with possible MPFL revision;  Surgeon: Dottie Kasper DO;  [facility];  Service: Orthopedics    OH FIX UNSTABLE PATELLA,EXTEN REALIGN Left 1/6/2021    Procedure: REALIGNMENT PATELLA with chondroplasty of patella, open medial patellofemoral ligament reconstruction with allograft;  Surgeon: Eddi Segura DO;  [facility];  Service: Orthopedics    IL IMPLANT SPINAL NEUROSTIM/ Right 11/14/2017    Procedure: REMOVAL LOWER MEDIAL BUTTOCK SPINAL CORD STIMULATOR GENERATOR; PLACEMENT OF NEW BUTTOCK SPINAL CORD STIMULATOR THROUGH SEPERATE INCISION;  Surgeon: Jeanine Boo MD;  [facility];  Service: Neurosurgery    IL KNEE SCOPE, W/LATERAL RELEASE Left 1/6/2021    Procedure: RELEASE RETINACULAR;  Surgeon: Eddi Segura DO;  [facility];  Service: Orthopedics    [address] [place] Left 1/6/2021    Procedure: ARTHROSCOPY KNEE;  Surgeon: Eddi Segura DO;  [facility];  Service: Orthopedics    RIK-EN-Y PROCEDURE  2003    SPINAL CORD STIMULATOR IMPLANT  11/14/2017    dr Kayli Vargas procedure and technique 1  removal of a right back implantable pulse generator 2 placement of a new right buttock impantable pulse generator through seperate incision 3 electric analys complex programming spinal cord stimulator system postoperative period approx 1 hour    TONSILLECTOMY       Allergies   Allergen Reactions    Ampicillin GI Intolerance     Vomiting    Penicillins GI Intolerance     Vomit     Current Outpatient Medications on File Prior to Visit   Medication Sig Dispense Refill    albuterol (PROVENTIL HFA,VENTOLIN HFA) 90 mcg/act inhaler INHALE 2 PUFFS EVERY 4 (FOUR) HOURS AS NEEDED FOR WHEEZING OR SHORTNESS OF BREATH COUGH 18 g 0    azithromycin (ZITHROMAX) 250 mg tablet Take 2 tablets today then 1 tablet daily x 4 days 6 tablet 0    baclofen 10 mg tablet Take 1 tablet (10 mg total) by mouth 3 (three) times a day 60 tablet 2    benzonatate (TESSALON) 200 MG capsule Take 1 capsule (200 mg total) by mouth 3 (three) times a day as needed for cough 20 capsule 0    buprenorphine (BUTRANS) 15 mcg/hr Apply 1 patch TD every 7 days for pain  Substitute permissible  4 patch 2    Diclofenac Sodium (VOLTAREN) 1 % Apply 2 g topically 4 (four) times a day 150 g 2    LANSOPRAZOLE PO Take by mouth      naloxone (NARCAN) 4 mg/0 1 mL nasal spray Administer 1 spray into a nostril  If breathing does not return to normal or if breathing difficulty resumes after 2-3 minutes, give another dose in the other nostril using a new spray  1 each 1    predniSONE 20 mg tablet Take 1 tablet (20 mg total) by mouth 2 (two) times a day with meals for 5 days 10 tablet 0    aspirin (ECOTRIN) 325 mg EC tablet Take 1 tablet (325 mg total) by mouth 2 (two) times a day 84 tablet 0     No current facility-administered medications on file prior to visit  Social History     Socioeconomic History    Marital status:      Spouse name: Not on file    Number of children: Not on file    Years of education: GED    Highest education level: Not on file   Occupational History    Not on file   Tobacco Use    Smoking status: Current Every Day Smoker     Packs/day: 1 00     Types: Cigarettes    Smokeless tobacco: Never Used   Vaping Use    Vaping Use: Never used   Substance and Sexual Activity    Alcohol use: No     Comment: quit drinking, social, very rarely, no etoh in > 1 year    Drug use: No    Sexual activity: Not on file   Other Topics Concern    Not on file   Social History Narrative    Chooses not to have children    Drinks caffienated tea    Lives with spouse             Social Determinants of Health     Financial Resource Strain:     Difficulty of Paying Living Expenses:    Food Insecurity:     Worried About Running Out of Food in the Last Year:     Ran Out of Food in the Last Year:    Transportation Needs:     Lack of Transportation (Medical):      Lack of Transportation (Non-Medical):    Physical Activity:     Days of Exercise per Week:     Minutes of Exercise per Session:    Stress:     Feeling of Stress :    Social Connections:     Frequency of Communication with Friends and Family:     Frequency of Social Gatherings with Friends and Family:     Attends Church Services:     Active Member of Clubs or Organizations:     Attends Club or Organization Meetings:     Marital Status:    Intimate Partner Violence:     Fear of Current or Ex-Partner:     Emotionally Abused:     Physically Abused:     Sexually Abused:          I have reviewed the past medical, surgical, social and family history, medications and allergies as documented in the EMR  Review of systems: ROS is negative other than that noted in the HPI  Constitutional: Negative for fatigue and fever  Physical Exam:    Blood pressure 125/80, height 6' (1 829 m), weight 96 2 kg (212 lb)  General/Constitutional: NAD, well developed, well nourished  HENT: Normocephalic, atraumatic  CV: Intact distal pulses, regular rate  Resp: No respiratory distress or labored breathing  Lymphatic: No lymphadenopathy palpated  Neuro: Alert and Oriented x 3, no focal deficits  Psych: Normal mood, normal affect, normal judgement, normal behavior  Skin: Warm, dry, no rashes, no erythema      Knee Exam (focused): RIGHT LEFT   ROM:   0-130 0-130   Palpation: Effusion negative negative     MJL tenderness Negative Positive     LJL tenderness Negative Negative   Meniscus:  Felicita Negative Negative    Apley's Compression Negative Negative   Instability: Varus stable stable     Valgus stable stable   Special Tests: Lachman Negative Negative     Posterior drawer Negative Negative     Anterior drawer Negative Negative     Pivot shift not tested not tested     Dial not tested not tested   Patella: Palpation no tenderness medial facet ttp and lateral facet ttp     Mobility 1/4 1/4     Apprehension Negative Negative   Other: Single leg 1/4 squat not tested not tested      Left knee: Well healed incisions     LE NV Exam: +2 DP/PT pulses bilaterally  Sensation intact to light touch L2-S1 bilaterally    No calf tenderness to palpation bilaterally      Knee Imaging    No imaging was performed today

## 2021-09-20 ENCOUNTER — PROCEDURE VISIT (OUTPATIENT)
Dept: OBGYN CLINIC | Facility: MEDICAL CENTER | Age: 46
End: 2021-09-20
Payer: OTHER MISCELLANEOUS

## 2021-09-20 VITALS
WEIGHT: 212 LBS | DIASTOLIC BLOOD PRESSURE: 87 MMHG | BODY MASS INDEX: 28.71 KG/M2 | SYSTOLIC BLOOD PRESSURE: 131 MMHG | HEIGHT: 72 IN | HEART RATE: 77 BPM

## 2021-09-20 DIAGNOSIS — M17.12 PRIMARY OSTEOARTHRITIS OF LEFT KNEE: Primary | ICD-10-CM

## 2021-09-20 DIAGNOSIS — M94.262 CHONDROMALACIA OF KNEE, LEFT: ICD-10-CM

## 2021-09-20 PROCEDURE — 20610 DRAIN/INJ JOINT/BURSA W/O US: CPT | Performed by: ORTHOPAEDIC SURGERY

## 2021-09-20 RX ADMIN — Medication 20 MG: at 15:49

## 2021-09-20 NOTE — PROGRESS NOTES
Ortho Sports Medicine Knee Follow Up Visit     Assesment:     55 y o  male left knee chondromalcia of the patella    Plan:    Conservative treatment:    Ice to knee for 20 minutes at least 1-2 times daily  PT for ROM/strengthening to knee, hip and core  OTC NSAIDS prn for pain  Imaging: All imaging from today was reviewed by myself and explained to the patient  Injection:    A viscosupplementation injection was performed  Surgery:     No surgery is recommended at this point, continue with conservative treatment plan as noted  Follow up:    Return in about 1 week (around 9/27/2021) for left knee  Chief Complaint   Patient presents with    Left Knee - Pain       History of Present Illness: The patient is returns for follow up of left knee pain  Since the prior visit, He reports minimal improvement  Has some deep aching in the knee cap  Pain is located anterior  Pain is improved by rest, ice and NSAIDS  Pain is aggravated by weight bearing  The patient has tried rest, ice and NSAIDS            Knee Surgical History:  s/p surgical arthroscopy of the left knee with lateral release and MPFL allograft reconstruction with subsequent surgery for left knee open I and D and washout of postoperative hematoma, DOS: 1/8/2021    Past Medical, Social and Family History:  Past Medical History:   Diagnosis Date    Anxiety     Arthritis     Asthma     Bipolar disorder ([address] )     Chronic left lumbar radiculopathy     Chronic low back pain     Chronic pain syndrome     Chronic right shoulder pain     Depression with anxiety     Fatigue     GERD (gastroesophageal reflux disease)     Hydronephrosis     Iron deficiency anemia     Kidney stone     Lytic bone lesions on xray     Nephrolithiasis     PONV (postoperative nausea and vomiting)     Right elbow pain     Right lateral epicondylitis      Past Surgical History:   Procedure Laterality Date    ADENOIDECTOMY Bilateral  APPENDECTOMY      BACK SURGERY      CHOLECYSTECTOMY      CHOLECYSTECTOMY LAPAROSCOPIC N/A 10/30/2018    Procedure: CHOLECYSTECTOMY WITH GASTROTOMY LAPAROSCOPIC;  Surgeon: Isaiah Trejo MD;  [facility];  Service: General    ERCP W/ SPHICTEROTOMY N/A 10/30/2018    Procedure: ENDOSCOPIC RETROGRADE CHOLANGIOPANCREATOGRAPHY (ERCP) W/ SPHINCTEROTOMY;  Surgeon: Shu Brennan MD;  [facility];  Service: Gastroenterology    GASTRIC BYPASS      2009    OTHER SURGICAL HISTORY      fusion/refusion of vertebrae, 2010 and 2011 l5-s1 and l4-l5    DE CYSTO/URETERO W/LITHOTRIPSY &INDWELL STENT INSRT Right 8/8/2017    Procedure: CYSTOSCOPY; URETEROSCOPY; HOLMIUM LASER; RETROGRADE PYELOGRAM; STENT;  Surgeon: Kelly Cox MD;  [facility];  Service: Urology    DE [facility] [address] [place] [address] Left 2/0/3393    Procedure: INCISION AND DRAINAGE (I&D) EXTREMITY- of left knee s/p MPFL reconstruction, I&D if the left knee with possible MPFL revision;  Surgeon: Meseret Haskins DO;  [facility];  Service: Orthopedics    DE FIX UNSTABLE PATELLA,EXTEN REALIGN Left 1/6/2021    Procedure: REALIGNMENT PATELLA with chondroplasty of patella, open medial patellofemoral ligament reconstruction with allograft;  Surgeon: Meseret Haskins DO;  [facility];  Service: Orthopedics    DE IMPLANT SPINAL NEUROSTIM/ Right 11/14/2017    Procedure: REMOVAL LOWER MEDIAL BUTTOCK SPINAL CORD STIMULATOR GENERATOR; PLACEMENT OF NEW BUTTOCK SPINAL [address];  Surgeon: Val Gerardo MD;  [facility];  Service: Neurosurgery    DE KNEE SCOPE, W/LATERAL RELEASE Left 1/6/2021    Procedure: RELEASE RETINACULAR;  Surgeon: Meseret Haskins DO;  [facility];  Service: Orthopedics    DE KNEE [address] Left 1/6/2021    Procedure: ARTHROSCOPY KNEE;  Surgeon: Meseret Haskins DO;  [facility];  Service: Orthopedics    RIK-EN-Y PROCEDURE 2003    SPINAL CORD STIMULATOR IMPLANT  11/14/2017    dr Mary Wang procedure and technique 1  removal of a right back implantable pulse generator 2 placement of a new right buttock impantable pulse generator through seperate incision 3 electric analys complex programming spinal cord stimulator system postoperative period approx 1 hour    TONSILLECTOMY       Allergies   Allergen Reactions    Ampicillin GI Intolerance     Vomiting    Penicillins GI Intolerance     Vomit     Current Outpatient Medications on File Prior to Visit   Medication Sig Dispense Refill    albuterol (PROVENTIL HFA,VENTOLIN HFA) 90 mcg/act inhaler INHALE 2 PUFFS EVERY 4 (FOUR) HOURS AS NEEDED FOR WHEEZING OR SHORTNESS OF BREATH COUGH 18 g 0    aspirin (ECOTRIN) 325 mg EC tablet Take 1 tablet (325 mg total) by mouth 2 (two) times a day 84 tablet 0    baclofen 10 mg tablet Take 1 tablet (10 mg total) by mouth 3 (three) times a day 60 tablet 2    benzonatate (TESSALON) 200 MG capsule Take 1 capsule (200 mg total) by mouth 3 (three) times a day as needed for cough 20 capsule 0    buprenorphine (BUTRANS) 15 mcg/hr Apply 1 patch TD every 7 days for pain  Substitute permissible  4 patch 2    Diclofenac Sodium (VOLTAREN) 1 % Apply 2 g topically 4 (four) times a day 150 g 2    LANSOPRAZOLE PO Take by mouth      naloxone (NARCAN) 4 mg/0 1 mL nasal spray Administer 1 spray into a nostril  If breathing does not return to normal or if breathing difficulty resumes after 2-3 minutes, give another dose in the other nostril using a new spray  1 each 1     No current facility-administered medications on file prior to visit       Social History     Socioeconomic History    Marital status:      Spouse name: Not on file    Number of children: Not on file    Years of education: GED    Highest education level: Not on file   Occupational History    Not on file   Tobacco Use    Smoking status: Current Every Day Smoker     Packs/day: 1 00 Types: Cigarettes    Smokeless tobacco: Never Used   Vaping Use    Vaping Use: Never used   Substance and Sexual Activity    Alcohol use: No     Comment: quit drinking, social, very rarely, no etoh in > 1 year    Drug use: No    Sexual activity: Not on file   Other Topics Concern    Not on file   Social History Narrative    Chooses not to have children    Drinks caffienated tea    Lives with spouse             Social Determinants of Health     Financial Resource Strain:     Difficulty of Paying Living Expenses:    Food Insecurity:     Worried About Running Out of Food in the Last Year:     Ran Out of Food in the Last Year:    Transportation Needs:     Lack of Transportation (Medical):  Lack of Transportation (Non-Medical):    Physical Activity:     Days of Exercise per Week:     Minutes of Exercise per Session:    Stress:     Feeling of Stress :    Social Connections:     Frequency of Communication with Friends and Family:     Frequency of Social Gatherings with Friends and Family:     Attends [employer] Services:     Active Member of Clubs or Organizations:     Attends Club or Organization Meetings:     Marital Status:    Intimate Partner Violence:     Fear of Current or Ex-Partner:     Emotionally Abused:     Physically Abused:     Sexually Abused:          I have reviewed the past medical, surgical, social and family history, medications and allergies as documented in the EMR  Review of systems: ROS is negative other than that noted in the HPI  Constitutional: Negative for fatigue and fever  Physical Exam:    Blood pressure 131/87, pulse 77, height 6' (1 829 m), weight 96 2 kg (212 lb)      General/Constitutional: NAD, well developed, well nourished  HENT: Normocephalic, atraumatic  CV: Intact distal pulses, regular rate  Resp: No respiratory distress or labored breathing  Lymphatic: No lymphadenopathy palpated  Neuro: Alert and Oriented x 3, no focal deficits  Psych: Normal mood, normal affect, normal judgement, normal behavior  Skin: Warm, dry, no rashes, no erythema      Knee Exam (focused): RIGHT LEFT   ROM:   0-130 0-130   Palpation: Effusion negative negative     MJL tenderness Negative Negative     LJL tenderness Negative Negative   Meniscus: Felicita Negative Negative    Apley's Compression Negative Negative   Instability: Varus stable stable     Valgus stable stable   Special Tests: Lachman Not Applicable Negative     Posterior drawer Not Applicable Not Applicable     Anterior drawer Not Applicable Not Applicable     Pivot shift not tested not tested     Dial not tested not tested   Patella: Palpation no tenderness no tenderness     Mobility 1/4 1/4     Apprehension Negative Negative   Other: Single leg 1/4 squat not tested not tested           LE NV Exam: +2 DP/PT pulses bilaterally  Sensation intact to light touch L2-S1 bilaterally    No calf tenderness to palpation bilaterally    Large joint arthrocentesis: L knee  Universal Protocol:  Consent: Verbal consent obtained  Risks and benefits: risks, benefits and alternatives were discussed  Time out: Immediately prior to procedure a "time out" was called to verify the correct patient, procedure, equipment, support staff and site/side marked as required    Timeout called at: 9/20/2021 3:33 PM   Patient understanding: patient states understanding of the procedure being performed  Site marked: the operative site was marked  Patient identity confirmed: verbally with patient    Supporting Documentation  Indications: pain   Procedure Details  Location: knee - L knee  Needle size: 22 G  Ultrasound guidance: no  Approach: anterolateral  Medications administered: 20 mg Sodium Hyaluronate 20 MG/2ML    Patient tolerance: patient tolerated the procedure well with no immediate complications  Dressing:  Sterile dressing applied      Knee Imaging    No imaging was performed today      Scribdinorah Attestation    I,:  Sade Kam am acting as a scribe while in the presence of the attending physician :       I,:  Leticia Flores, DO personally performed the services described in this documentation    as scribed in my presence :

## 2021-09-21 RX ORDER — HYALURONATE SODIUM 10 MG/ML
20 SYRINGE (ML) INTRAARTICULAR
Status: COMPLETED | OUTPATIENT
Start: 2021-09-20 | End: 2021-09-20

## 2021-09-27 ENCOUNTER — PROCEDURE VISIT (OUTPATIENT)
Dept: OBGYN CLINIC | Facility: MEDICAL CENTER | Age: 46
End: 2021-09-27

## 2021-09-27 VITALS
BODY MASS INDEX: 28.58 KG/M2 | WEIGHT: 211 LBS | HEART RATE: 83 BPM | DIASTOLIC BLOOD PRESSURE: 71 MMHG | SYSTOLIC BLOOD PRESSURE: 118 MMHG | HEIGHT: 72 IN

## 2021-09-27 DIAGNOSIS — M94.262 CHONDROMALACIA OF KNEE, LEFT: Primary | ICD-10-CM

## 2021-09-27 PROCEDURE — 20610 DRAIN/INJ JOINT/BURSA W/O US: CPT | Performed by: PHYSICIAN ASSISTANT

## 2021-09-27 RX ORDER — HYALURONATE SODIUM 10 MG/ML
20 SYRINGE (ML) INTRAARTICULAR
Status: COMPLETED | OUTPATIENT
Start: 2021-09-27 | End: 2021-09-27

## 2021-09-27 RX ADMIN — Medication 20 MG: at 15:21

## 2021-09-27 NOTE — PROGRESS NOTES
Ortho Sports Medicine Knee Visco Injection Visit     Assesment:   55 y o  male left knee chondromalcia of the patella    Plan:    Injection:  The risks and benefits of the injection (which include but are not limited to: infection, bleeding,damage to nerve/artery, need for further intervention), as well as the risks and benefits of all alternative treatments were explained and understood  The patient elected to proceed with injection  The procedure was done with aseptic technique, and the patient tolerated the procedure well with no complications  A viscosupplementation injection was performed  Ice to the knee 1-2 times daily for 20 minutes, for next 24-48 hrs  Follow up:  Return for next week euflexx 3/3  Chief Complaint   Patient presents with    Left Knee - Follow-up       History of Present Illness: The patient is returns for  Euflexxa 2/3 visco supplementation injection  Since the prior visit, He reports no improvement  Patient denies fevers, chills, and sweats  Denies numbness and tingling  Denies chest pain, shortness of breath, calf pain, and warmth to the knee  I have reviewed the past medical, surgical, social and family history, medications and allergies as documented in the EMR  Review of systems: ROS is negative other than that noted in the HPI  Constitutional: Negative for fatigue and fever  Physical Exam:    Blood pressure 118/71, pulse 83, height 6' (1 829 m), weight 95 7 kg (211 lb)      General/Constitutional: NAD, well developed, well nourished  HENT: Normocephalic, atraumatic  CV: Intact distal pulses, regular rate  Resp: No respiratory distress or labored breathing  Lymphatic: No lymphadenopathy palpated  Neuro: Alert and Oriented x 3, no focal deficits  Psych: Normal mood, normal affect, normal judgement, normal behavior  Skin: Warm, dry, no rashes, no erythema    Large joint arthrocentesis: L knee  Universal Protocol:  Consent given by: patient  Time out: Immediately prior to procedure a "time out" was called to verify the correct patient, procedure, equipment, support staff and site/side marked as required    Timeout called at: 9/27/2021 3:21 PM   Patient understanding: patient states understanding of the procedure being performed  Site marked: the operative site was marked  Patient identity confirmed: verbally with patient    Supporting Documentation  Indications: pain   Procedure Details  Location: knee - L knee  Preparation: Patient was prepped and draped in the usual sterile fashion  Needle size: 22 G  Ultrasound guidance: no  Approach: anterolateral  Medications administered: 20 mg Sodium Hyaluronate 20 MG/2ML    Patient tolerance: patient tolerated the procedure well with no immediate complications  Dressing:  Sterile dressing applied

## 2021-10-04 ENCOUNTER — PROCEDURE VISIT (OUTPATIENT)
Dept: OBGYN CLINIC | Facility: MEDICAL CENTER | Age: 46
End: 2021-10-04
Payer: COMMERCIAL

## 2021-10-04 VITALS
DIASTOLIC BLOOD PRESSURE: 74 MMHG | BODY MASS INDEX: 28.58 KG/M2 | HEART RATE: 72 BPM | SYSTOLIC BLOOD PRESSURE: 119 MMHG | HEIGHT: 72 IN | WEIGHT: 211 LBS

## 2021-10-04 DIAGNOSIS — M94.262 CHONDROMALACIA OF KNEE, LEFT: Primary | ICD-10-CM

## 2021-10-04 PROCEDURE — 20610 DRAIN/INJ JOINT/BURSA W/O US: CPT | Performed by: ORTHOPAEDIC SURGERY

## 2021-10-04 RX ORDER — CLINDAMYCIN HYDROCHLORIDE 300 MG/1
300 CAPSULE ORAL 4 TIMES DAILY
COMMUNITY
Start: 2021-09-29 | End: 2021-10-09

## 2021-10-04 RX ORDER — HYALURONATE SODIUM 10 MG/ML
20 SYRINGE (ML) INTRAARTICULAR
Status: COMPLETED | OUTPATIENT
Start: 2021-10-04 | End: 2021-10-04

## 2021-10-04 RX ADMIN — Medication 20 MG: at 15:43

## 2021-10-12 ENCOUNTER — OFFICE VISIT (OUTPATIENT)
Dept: URGENT CARE | Facility: CLINIC | Age: 46
End: 2021-10-12
Payer: COMMERCIAL

## 2021-10-12 VITALS
TEMPERATURE: 98 F | BODY MASS INDEX: 28.58 KG/M2 | WEIGHT: 211 LBS | HEART RATE: 77 BPM | HEIGHT: 72 IN | RESPIRATION RATE: 20 BRPM | OXYGEN SATURATION: 97 %

## 2021-10-12 DIAGNOSIS — Z11.59 SPECIAL SCREENING EXAMINATION FOR VIRAL DISEASE: Primary | ICD-10-CM

## 2021-10-12 LAB — SARS-COV-2 RNA RESP QL NAA+PROBE: NEGATIVE

## 2021-10-12 PROCEDURE — U0005 INFEC AGEN DETEC AMPLI PROBE: HCPCS | Performed by: PHYSICIAN ASSISTANT

## 2021-10-12 PROCEDURE — U0003 INFECTIOUS AGENT DETECTION BY NUCLEIC ACID (DNA OR RNA); SEVERE ACUTE RESPIRATORY SYNDROME CORONAVIRUS 2 (SARS-COV-2) (CORONAVIRUS DISEASE [COVID-19]), AMPLIFIED PROBE TECHNIQUE, MAKING USE OF HIGH THROUGHPUT TECHNOLOGIES AS DESCRIBED BY CMS-2020-01-R: HCPCS | Performed by: PHYSICIAN ASSISTANT

## 2021-10-12 PROCEDURE — 99213 OFFICE O/P EST LOW 20 MIN: CPT | Performed by: PHYSICIAN ASSISTANT

## 2021-10-18 ENCOUNTER — TELEPHONE (OUTPATIENT)
Dept: PAIN MEDICINE | Facility: CLINIC | Age: 46
End: 2021-10-18

## 2021-12-06 ENCOUNTER — TELEPHONE (OUTPATIENT)
Dept: PAIN MEDICINE | Facility: CLINIC | Age: 46
End: 2021-12-06

## 2021-12-06 DIAGNOSIS — M54.42 CHRONIC LOW BACK PAIN WITH BILATERAL SCIATICA, UNSPECIFIED BACK PAIN LATERALITY: ICD-10-CM

## 2021-12-06 DIAGNOSIS — G89.4 PAIN SYNDROME, CHRONIC: ICD-10-CM

## 2021-12-06 DIAGNOSIS — M79.18 MYOFASCIAL PAIN SYNDROME: Primary | ICD-10-CM

## 2021-12-06 DIAGNOSIS — M54.41 CHRONIC LOW BACK PAIN WITH BILATERAL SCIATICA, UNSPECIFIED BACK PAIN LATERALITY: ICD-10-CM

## 2021-12-06 DIAGNOSIS — M54.16 LUMBAR RADICULOPATHY: ICD-10-CM

## 2021-12-06 DIAGNOSIS — M96.1 LUMBAR POST-LAMINECTOMY SYNDROME: ICD-10-CM

## 2021-12-06 DIAGNOSIS — G89.29 CHRONIC LOW BACK PAIN WITH BILATERAL SCIATICA, UNSPECIFIED BACK PAIN LATERALITY: ICD-10-CM

## 2021-12-10 ENCOUNTER — TELEPHONE (OUTPATIENT)
Dept: OTHER | Facility: OTHER | Age: 46
End: 2021-12-10

## 2021-12-10 RX ORDER — BUPRENORPHINE 20 UG/H
PATCH TRANSDERMAL
Qty: 4 PATCH | Refills: 1 | Status: SHIPPED | OUTPATIENT
Start: 2021-12-10 | End: 2021-12-10 | Stop reason: SDUPTHER

## 2021-12-10 RX ORDER — BACLOFEN 10 MG/1
10 TABLET ORAL 4 TIMES DAILY
Qty: 120 TABLET | Refills: 1 | Status: SHIPPED | OUTPATIENT
Start: 2021-12-10 | End: 2021-12-10 | Stop reason: SDUPTHER

## 2021-12-10 RX ORDER — BUPRENORPHINE 20 UG/H
1 PATCH TRANSDERMAL
Status: CANCELLED | OUTPATIENT
Start: 2021-12-10

## 2021-12-10 RX ORDER — BUPRENORPHINE 20 UG/H
PATCH TRANSDERMAL
Qty: 4 PATCH | Refills: 1 | Status: SHIPPED | OUTPATIENT
Start: 2021-12-10 | End: 2022-02-03 | Stop reason: SDUPTHER

## 2021-12-10 RX ORDER — BACLOFEN 10 MG/1
10 TABLET ORAL 4 TIMES DAILY
Qty: 120 TABLET | Refills: 1 | Status: SHIPPED | OUTPATIENT
Start: 2021-12-10 | End: 2021-12-15 | Stop reason: CLARIF

## 2021-12-15 ENCOUNTER — APPOINTMENT (EMERGENCY)
Dept: RADIOLOGY | Facility: HOSPITAL | Age: 46
End: 2021-12-15
Payer: COMMERCIAL

## 2021-12-15 ENCOUNTER — HOSPITAL ENCOUNTER (EMERGENCY)
Facility: HOSPITAL | Age: 46
Discharge: HOME | End: 2021-12-15
Attending: EMERGENCY MEDICINE | Admitting: EMERGENCY MEDICINE
Payer: COMMERCIAL

## 2021-12-15 ENCOUNTER — TELEPHONE (OUTPATIENT)
Dept: PAIN MEDICINE | Facility: CLINIC | Age: 46
End: 2021-12-15

## 2021-12-15 VITALS
HEART RATE: 61 BPM | DIASTOLIC BLOOD PRESSURE: 68 MMHG | SYSTOLIC BLOOD PRESSURE: 134 MMHG | TEMPERATURE: 98.3 F | RESPIRATION RATE: 20 BRPM | OXYGEN SATURATION: 98 %

## 2021-12-15 DIAGNOSIS — G89.29 CHRONIC LEFT-SIDED LOW BACK PAIN WITH LEFT-SIDED SCIATICA: Primary | ICD-10-CM

## 2021-12-15 DIAGNOSIS — M54.42 CHRONIC LEFT-SIDED LOW BACK PAIN WITH LEFT-SIDED SCIATICA: Primary | ICD-10-CM

## 2021-12-15 PROCEDURE — 72100 X-RAY EXAM L-S SPINE 2/3 VWS: CPT

## 2021-12-15 PROCEDURE — 96372 THER/PROPH/DIAG INJ SC/IM: CPT

## 2021-12-15 PROCEDURE — 99283 EMERGENCY DEPT VISIT LOW MDM: CPT

## 2021-12-15 PROCEDURE — 99285 EMERGENCY DEPT VISIT HI MDM: CPT | Performed by: PHYSICIAN ASSISTANT

## 2021-12-15 RX ORDER — ACETAMINOPHEN 325 MG/1
650 TABLET ORAL ONCE
Status: COMPLETED | OUTPATIENT
Start: 2021-12-15 | End: 2021-12-15

## 2021-12-15 RX ORDER — PREDNISONE 20 MG/1
20 TABLET ORAL ONCE
Status: COMPLETED | OUTPATIENT
Start: 2021-12-15 | End: 2021-12-15

## 2021-12-15 RX ORDER — METHOCARBAMOL 500 MG/1
500 TABLET, FILM COATED ORAL 2 TIMES DAILY
Qty: 10 TABLET | Refills: 0 | Status: SHIPPED | OUTPATIENT
Start: 2021-12-15 | End: 2022-01-06 | Stop reason: ALTCHOICE

## 2021-12-15 RX ORDER — KETOROLAC TROMETHAMINE 30 MG/ML
15 INJECTION, SOLUTION INTRAMUSCULAR; INTRAVENOUS ONCE
Status: COMPLETED | OUTPATIENT
Start: 2021-12-15 | End: 2021-12-15

## 2021-12-15 RX ORDER — METHOCARBAMOL 500 MG/1
500 TABLET, FILM COATED ORAL ONCE
Status: COMPLETED | OUTPATIENT
Start: 2021-12-15 | End: 2021-12-15

## 2021-12-15 RX ORDER — LIDOCAINE 50 MG/G
1 PATCH TOPICAL ONCE
Status: DISCONTINUED | OUTPATIENT
Start: 2021-12-15 | End: 2021-12-16 | Stop reason: HOSPADM

## 2021-12-15 RX ORDER — PREDNISONE 20 MG/1
20 TABLET ORAL DAILY
Qty: 4 TABLET | Refills: 0 | Status: SHIPPED | OUTPATIENT
Start: 2021-12-15 | End: 2021-12-19

## 2021-12-15 RX ORDER — SENNOSIDES 8.6 MG
650 CAPSULE ORAL EVERY 8 HOURS PRN
Qty: 9 TABLET | Refills: 0 | Status: SHIPPED | OUTPATIENT
Start: 2021-12-15 | End: 2021-12-18

## 2021-12-15 RX ADMIN — PREDNISONE 20 MG: 20 TABLET ORAL at 22:52

## 2021-12-15 RX ADMIN — LIDOCAINE 5% 1 PATCH: 700 PATCH TOPICAL at 22:52

## 2021-12-15 RX ADMIN — METHOCARBAMOL 500 MG: 500 TABLET ORAL at 22:52

## 2021-12-15 RX ADMIN — KETOROLAC TROMETHAMINE 15 MG: 30 INJECTION, SOLUTION INTRAMUSCULAR at 22:52

## 2021-12-15 RX ADMIN — ACETAMINOPHEN 650 MG: 325 TABLET, FILM COATED ORAL at 22:52

## 2021-12-21 ENCOUNTER — HOSPITAL ENCOUNTER (OUTPATIENT)
Dept: RADIOLOGY | Facility: HOSPITAL | Age: 46
Discharge: HOME | End: 2021-12-21
Attending: STUDENT IN AN ORGANIZED HEALTH CARE EDUCATION/TRAINING PROGRAM
Payer: COMMERCIAL

## 2021-12-21 ENCOUNTER — OFFICE VISIT (OUTPATIENT)
Dept: NEUROSURGERY | Facility: CLINIC | Age: 46
End: 2021-12-21
Payer: COMMERCIAL

## 2021-12-21 VITALS
BODY MASS INDEX: 30.88 KG/M2 | WEIGHT: 228 LBS | HEIGHT: 72 IN | HEART RATE: 75 BPM | RESPIRATION RATE: 16 BRPM | DIASTOLIC BLOOD PRESSURE: 86 MMHG | TEMPERATURE: 97.5 F | SYSTOLIC BLOOD PRESSURE: 142 MMHG

## 2021-12-21 DIAGNOSIS — G89.4 CHRONIC PAIN SYNDROME: ICD-10-CM

## 2021-12-21 DIAGNOSIS — T85.192A SPINAL CORD STIMULATOR DYSFUNCTION (HCC): ICD-10-CM

## 2021-12-21 DIAGNOSIS — T85.192A SPINAL CORD STIMULATOR DYSFUNCTION (HCC): Primary | ICD-10-CM

## 2021-12-21 PROCEDURE — 3008F BODY MASS INDEX DOCD: CPT | Performed by: STUDENT IN AN ORGANIZED HEALTH CARE EDUCATION/TRAINING PROGRAM

## 2021-12-21 PROCEDURE — 99204 OFFICE O/P NEW MOD 45 MIN: CPT | Performed by: STUDENT IN AN ORGANIZED HEALTH CARE EDUCATION/TRAINING PROGRAM

## 2021-12-21 PROCEDURE — 72080 X-RAY EXAM THORACOLMB 2/> VW: CPT

## 2021-12-21 PROCEDURE — 4004F PT TOBACCO SCREEN RCVD TLK: CPT | Performed by: STUDENT IN AN ORGANIZED HEALTH CARE EDUCATION/TRAINING PROGRAM

## 2021-12-21 RX ORDER — SODIUM CHLORIDE 9 MG/ML
125 INJECTION, SOLUTION INTRAVENOUS CONTINUOUS
Status: CANCELLED | OUTPATIENT
Start: 2022-01-12

## 2021-12-21 RX ORDER — CEFAZOLIN SODIUM 2 G/50ML
2000 SOLUTION INTRAVENOUS ONCE
Status: CANCELLED | OUTPATIENT
Start: 2022-01-12 | End: 2021-12-21

## 2021-12-21 RX ORDER — CHLORHEXIDINE GLUCONATE 0.12 MG/ML
15 RINSE ORAL ONCE
Status: CANCELLED | OUTPATIENT
Start: 2021-12-21 | End: 2021-12-21

## 2021-12-30 ENCOUNTER — TELEPHONE (OUTPATIENT)
Dept: OBGYN CLINIC | Facility: HOSPITAL | Age: 46
End: 2021-12-30

## 2022-01-03 ENCOUNTER — TELEPHONE (OUTPATIENT)
Dept: PAIN MEDICINE | Facility: CLINIC | Age: 47
End: 2022-01-03

## 2022-01-03 DIAGNOSIS — M48.062 SPINAL STENOSIS OF LUMBAR REGION WITH NEUROGENIC CLAUDICATION: ICD-10-CM

## 2022-01-03 DIAGNOSIS — M54.41 CHRONIC LOW BACK PAIN WITH BILATERAL SCIATICA, UNSPECIFIED BACK PAIN LATERALITY: ICD-10-CM

## 2022-01-03 DIAGNOSIS — G89.29 CHRONIC LOW BACK PAIN WITH BILATERAL SCIATICA, UNSPECIFIED BACK PAIN LATERALITY: ICD-10-CM

## 2022-01-03 DIAGNOSIS — M96.1 LUMBAR POST-LAMINECTOMY SYNDROME: ICD-10-CM

## 2022-01-03 DIAGNOSIS — M54.42 CHRONIC LOW BACK PAIN WITH BILATERAL SCIATICA, UNSPECIFIED BACK PAIN LATERALITY: ICD-10-CM

## 2022-01-03 DIAGNOSIS — G89.4 PAIN SYNDROME, CHRONIC: ICD-10-CM

## 2022-01-03 DIAGNOSIS — M79.18 MYOFASCIAL PAIN SYNDROME: Primary | ICD-10-CM

## 2022-01-03 NOTE — TELEPHONE ENCOUNTER
Rosemary Nance from [facility]  called in for a refill of :  Name of medication: bacoflen 10 mg    Frequency: Take 1 tablet (10 mg total) by mouth 3 (three) times a day,     Pharmacy: [facility] on file  Best call back # [phone]  Pt aware it can take 24-48 hrs to process refills- thank you

## 2022-01-05 ENCOUNTER — APPOINTMENT (OUTPATIENT)
Dept: LAB | Facility: CLINIC | Age: 47
End: 2022-01-05
Payer: COMMERCIAL

## 2022-01-05 DIAGNOSIS — G89.4 CHRONIC PAIN SYNDROME: ICD-10-CM

## 2022-01-05 DIAGNOSIS — Z13.220 ENCOUNTER FOR SCREENING FOR LIPID DISORDER: ICD-10-CM

## 2022-01-05 DIAGNOSIS — T85.192A SPINAL CORD STIMULATOR DYSFUNCTION (HCC): ICD-10-CM

## 2022-01-05 DIAGNOSIS — R53.83 FATIGUE, UNSPECIFIED TYPE: ICD-10-CM

## 2022-01-05 LAB
ALBUMIN SERPL BCP-MCNC: 3.6 G/DL (ref 3.5–5)
ALP SERPL-CCNC: 87 U/L (ref 46–116)
ALT SERPL W P-5'-P-CCNC: 16 U/L (ref 12–78)
ANION GAP SERPL CALCULATED.3IONS-SCNC: 7 MMOL/L (ref 4–13)
APTT PPP: 37 SECONDS (ref 23–37)
AST SERPL W P-5'-P-CCNC: 9 U/L (ref 5–45)
BASOPHILS # BLD AUTO: 0.07 THOUSANDS/ΜL (ref 0–0.1)
BASOPHILS NFR BLD AUTO: 1 % (ref 0–1)
BILIRUB SERPL-MCNC: 0.45 MG/DL (ref 0.2–1)
BUN SERPL-MCNC: 13 MG/DL (ref 5–25)
CALCIUM SERPL-MCNC: 9 MG/DL (ref 8.3–10.1)
CHLORIDE SERPL-SCNC: 103 MMOL/L (ref 100–108)
CO2 SERPL-SCNC: 29 MMOL/L (ref 21–32)
CREAT SERPL-MCNC: 0.98 MG/DL (ref 0.6–1.3)
EOSINOPHIL # BLD AUTO: 0.14 THOUSAND/ΜL (ref 0–0.61)
EOSINOPHIL NFR BLD AUTO: 3 % (ref 0–6)
ERYTHROCYTE [DISTWIDTH] IN BLOOD BY AUTOMATED COUNT: 16.3 % (ref 11.6–15.1)
GFR SERPL CREATININE-BSD FRML MDRD: 92 ML/MIN/1.73SQ M
GLUCOSE P FAST SERPL-MCNC: 93 MG/DL (ref 65–99)
HCT VFR BLD AUTO: 47.3 % (ref 36.5–49.3)
HGB BLD-MCNC: 15 G/DL (ref 12–17)
IMM GRANULOCYTES # BLD AUTO: 0.02 THOUSAND/UL (ref 0–0.2)
IMM GRANULOCYTES NFR BLD AUTO: 0 % (ref 0–2)
INR PPP: 1.02 (ref 0.84–1.19)
LYMPHOCYTES # BLD AUTO: 1.74 THOUSANDS/ΜL (ref 0.6–4.47)
LYMPHOCYTES NFR BLD AUTO: 31 % (ref 14–44)
MCH RBC QN AUTO: 26.1 PG (ref 26.8–34.3)
MCHC RBC AUTO-ENTMCNC: 31.7 G/DL (ref 31.4–37.4)
MCV RBC AUTO: 82 FL (ref 82–98)
MONOCYTES # BLD AUTO: 0.43 THOUSAND/ΜL (ref 0.17–1.22)
MONOCYTES NFR BLD AUTO: 8 % (ref 4–12)
NEUTROPHILS # BLD AUTO: 3.26 THOUSANDS/ΜL (ref 1.85–7.62)
NEUTS SEG NFR BLD AUTO: 57 % (ref 43–75)
NRBC BLD AUTO-RTO: 0 /100 WBCS
PLATELET # BLD AUTO: 181 THOUSANDS/UL (ref 149–390)
PMV BLD AUTO: 11.9 FL (ref 8.9–12.7)
POTASSIUM SERPL-SCNC: 4.1 MMOL/L (ref 3.5–5.3)
PROT SERPL-MCNC: 7.5 G/DL (ref 6.4–8.2)
PROTHROMBIN TIME: 13.4 SECONDS (ref 11.6–14.5)
RBC # BLD AUTO: 5.74 MILLION/UL (ref 3.88–5.62)
SODIUM SERPL-SCNC: 139 MMOL/L (ref 136–145)
WBC # BLD AUTO: 5.66 THOUSAND/UL (ref 4.31–10.16)

## 2022-01-05 PROCEDURE — 80053 COMPREHEN METABOLIC PANEL: CPT

## 2022-01-05 PROCEDURE — 85730 THROMBOPLASTIN TIME PARTIAL: CPT

## 2022-01-05 PROCEDURE — 36415 COLL VENOUS BLD VENIPUNCTURE: CPT

## 2022-01-05 PROCEDURE — 80061 LIPID PANEL: CPT

## 2022-01-05 PROCEDURE — 85610 PROTHROMBIN TIME: CPT

## 2022-01-05 PROCEDURE — 83036 HEMOGLOBIN GLYCOSYLATED A1C: CPT

## 2022-01-05 PROCEDURE — 84443 ASSAY THYROID STIM HORMONE: CPT

## 2022-01-05 PROCEDURE — 85025 COMPLETE CBC W/AUTO DIFF WBC: CPT

## 2022-01-06 ENCOUNTER — OFFICE VISIT (OUTPATIENT)
Dept: FAMILY MEDICINE CLINIC | Facility: CLINIC | Age: 47
End: 2022-01-06
Payer: COMMERCIAL

## 2022-01-06 VITALS
OXYGEN SATURATION: 98 % | DIASTOLIC BLOOD PRESSURE: 80 MMHG | HEIGHT: 72 IN | RESPIRATION RATE: 18 BRPM | SYSTOLIC BLOOD PRESSURE: 120 MMHG | BODY MASS INDEX: 29.93 KG/M2 | WEIGHT: 221 LBS | TEMPERATURE: 97.8 F | HEART RATE: 82 BPM

## 2022-01-06 DIAGNOSIS — Z00.00 ENCOUNTER FOR WELLNESS EXAMINATION IN ADULT: Primary | ICD-10-CM

## 2022-01-06 DIAGNOSIS — Z01.818 PRE-OP TESTING: ICD-10-CM

## 2022-01-06 DIAGNOSIS — R53.83 FATIGUE, UNSPECIFIED TYPE: ICD-10-CM

## 2022-01-06 DIAGNOSIS — F11.20 UNCOMPLICATED OPIOID DEPENDENCE (HCC): ICD-10-CM

## 2022-01-06 DIAGNOSIS — Z13.220 ENCOUNTER FOR SCREENING FOR LIPID DISORDER: ICD-10-CM

## 2022-01-06 LAB
CHOLEST SERPL-MCNC: 168 MG/DL
HDLC SERPL-MCNC: 47 MG/DL
LDLC SERPL CALC-MCNC: 104 MG/DL (ref 0–100)
TRIGL SERPL-MCNC: 86 MG/DL
TSH SERPL DL<=0.05 MIU/L-ACNC: 0.86 UIU/ML (ref 0.36–3.74)

## 2022-01-06 PROCEDURE — 4004F PT TOBACCO SCREEN RCVD TLK: CPT | Performed by: FAMILY MEDICINE

## 2022-01-06 PROCEDURE — 93000 ELECTROCARDIOGRAM COMPLETE: CPT | Performed by: FAMILY MEDICINE

## 2022-01-06 PROCEDURE — 3008F BODY MASS INDEX DOCD: CPT | Performed by: FAMILY MEDICINE

## 2022-01-06 PROCEDURE — 99396 PREV VISIT EST AGE 40-64: CPT | Performed by: FAMILY MEDICINE

## 2022-01-06 NOTE — PROGRESS NOTES
FAMILY PRACTICE OFFICE VISIT       NAME: Anika Argueta  AGE: 55 y o  SEX: male       : 1975        MRN: [id number]        Assessment and Plan     1  Encounter for wellness examination in adult    2  Pre-op testing  Comments:  Patient is acceptable risk for planned surgery on   Orders:  -     POCT ECG    3  Uncomplicated opioid dependence ([address] )  Assessment & Plan:  On Butrans  PM follows      4  Encounter for screening for lipid disorder  -     Lipid Panel with Direct LDL reflex; Future    5  Fatigue, unspecified type  -     TSH, 3rd generation; Future      BMI Counseling: Body mass index is 29 97 kg/m²  The BMI is above normal  Nutrition recommendations include encouraging healthy choices of fruits and vegetables, consuming healthier snacks, moderation in carbohydrate intake, reducing intake of saturated and trans fat and reducing intake of cholesterol  Exercise recommendations include exercising 3-5 times per week  No pharmacotherapy was ordered  Patient referred to PCP  Rationale for BMI follow-up plan is due to patient being overweight or obese  I  advised patient to proceed with COVID-19 vaccination  Patient is concerned about COVID-19 exposure prior to his surgery a on   Work note provided from  through   There are no Patient Instructions on file for this visit  No follow-ups on file  Discussed with the patient and all questioned fully answered  He will call me if any problems arise  M*Modal software was used to dictate this note  It may contain errors with dictating incorrect words/spelling  Please contact provider directly with any questions  Chief Complaint     Chief Complaint   Patient presents with    Pre-op Exam     Right sided spinal cord stimulator replacement       History of Present Illness      Pre-op evaluation/annual exam  Patient is scheduled for replacement of pain stimulator  Medications updated    No previous problems with anesthesia aside from postop nausea vomiting  Patient is not vaccinated against COVID-19, declines  Right sided spinal cord stimulator pulse generator replacement (Right Buttocks)  Anesthesia type: Conscious Sedation     Patient had blood work as per pre-admission testing orders  Results reviewed  I will add TSH and lipid panel to existing specimen  He denies symptoms of chest pain, palpitations, shortness of breath or dizziness  Normal chest x-ray July 2021  Patient is still unfortunately smoking  Review of Systems   Review of Systems   Constitutional: Negative  HENT: Negative  Eyes: Negative  Respiratory: Negative  Cardiovascular: Negative  Gastrointestinal: Negative  Endocrine: Negative  Musculoskeletal: Positive for arthralgias and back pain  Neurological: Negative  Hematological: Negative  Psychiatric/Behavioral: Negative          Active Problem List     Patient Active Problem List   Diagnosis    Chronic low back pain    Depression with anxiety    Fatigue    Chronic anemia    Nephrolithiasis    Pain syndrome, chronic    Uncomplicated opioid dependence [facility] )    Lumbar post-laminectomy syndrome    Myofascial pain syndrome    Lumbar radiculopathy    Spinal stenosis of lumbar region with neurogenic claudication    BPH with obstruction/lower urinary tract symptoms    Iron deficiency anemia    Vitamin B deficiency    Postgastrectomy malabsorption    History of Parth-en-Y gastric bypass    Tobacco abuse    Vitamin D deficiency    PTSD (post-traumatic stress disorder)    Sacroiliitis, not elsewhere classified ([facility] )    Nausea and vomiting    Acute pain of left knee    Patellofemoral instability, left    Asthma    Smoking    S/P left knee surgery       Past Medical History:  Past Medical History:   Diagnosis Date    Anxiety     Arthritis     Asthma     Bipolar 2 disorder, major depressive episode ([facility] [facility] ) 10/31/2017    Bipolar disorder ([facility] )     Chronic left lumbar radiculopathy     Chronic low back pain     Chronic pain syndrome     Chronic right shoulder pain     Depression with anxiety     Fatigue     GERD (gastroesophageal reflux disease)     Hydronephrosis     Iron deficiency anemia     Kidney stone     Lytic bone lesions on xray     Nephrolithiasis     PONV (postoperative nausea and vomiting)     Right elbow pain     Right lateral epicondylitis        Past Surgical History:  Past Surgical History:   Procedure Laterality Date    ADENOIDECTOMY Bilateral     APPENDECTOMY      BACK SURGERY      CHOLECYSTECTOMY      CHOLECYSTECTOMY LAPAROSCOPIC N/A 10/30/2018    Procedure: CHOLECYSTECTOMY WITH GASTROTOMY LAPAROSCOPIC;  Surgeon: Emilia Felix MD;  [facility];  Service: General    ERCP W/ SPHICTEROTOMY N/A 10/30/2018    Procedure: ENDOSCOPIC RETROGRADE CHOLANGIOPANCREATOGRAPHY (ERCP) W/ SPHINCTEROTOMY;  Surgeon: Shereen Castillo MD;  [facility];  Service: Gastroenterology    GASTRIC BYPASS      2009    OTHER SURGICAL HISTORY      fusion/refusion of vertebrae, 2010 and 2011 l5-s1 and l4-l5    ND CYSTO/URETERO W/LITHOTRIPSY &INDWELL STENT INSRT Right 8/8/2017    Procedure: CYSTOSCOPY; URETEROSCOPY; HOLMIUM LASER; RETROGRADE PYELOGRAM; STENT;  Surgeon: Linda Pool MD;  [facility];  Service: Urology    ND [address] [place] [address] Left 3/9/2695    Procedure: INCISION AND DRAINAGE (I&D) EXTREMITY- of left knee s/p MPFL reconstruction, I&D if the left knee with possible MPFL revision;  Surgeon: Tadeo Booker DO;  [facility];  Service: Orthopedics    ND FIX UNSTABLE PATELLA,EXTEN REALIGN Left 1/6/2021    Procedure: REALIGNMENT PATELLA with chondroplasty of patella, open medial patellofemoral ligament reconstruction with allograft;  Surgeon: Tadeo Booker DO;  [facility];  Service: Orthopedics    ND IMPLANT SPINAL NEUROSTIM/ Right 11/14/2017 Procedure: REMOVAL LOWER MEDIAL BUTTOCK SPINAL CORD STIMULATOR GENERATOR; PLACEMENT OF NEW BUTTOCK SPINAL CORD STIMULATOR THROUGH SEPERATE INCISION;  Surgeon: Eloy Gonzalez MD;  [facility];  Service: Neurosurgery    OR KNEE SCOPE, W/LATERAL RELEASE Left 1/6/2021    Procedure: RELEASE RETINACULAR;  Surgeon: Alisha Causey DO;  [facility];  Service: Orthopedics    [address] Left 1/6/2021    Procedure: ARTHROSCOPY KNEE;  Surgeon: Alisha Causey DO;  [facility];  Service: Orthopedics    RIK-EN-Y PROCEDURE  2003    SPINAL CORD STIMULATOR IMPLANT  11/14/2017    dr Jef De La Cruz procedure and technique 1  removal of a right back implantable pulse generator 2 placement of a new right buttock impantable pulse generator through seperate incision 3 electric analys complex programming spinal cord stimulator system postoperative period approx 1 hour    TONSILLECTOMY         Family History:  Family History   Problem Relation Age of Onset    Cancer Mother     Arthritis Mother     Stomach cancer Mother     Cancer Father     Esophageal cancer Father     Other Father         brain tumor    No Known Problems Sister     No Known Problems Sister     No Known Problems Sister        Social History:  Social History     Socioeconomic History    Marital status:      Spouse name: Not on file    Number of children: Not on file    Years of education: GED    Highest education level: Not on file   Occupational History    Not on file   Tobacco Use    Smoking status: Current Every Day Smoker     Packs/day: 1 00     Types: Cigarettes    Smokeless tobacco: Never Used   Vaping Use    Vaping Use: Never used   Substance and Sexual Activity    Alcohol use: No     Comment: quit drinking, social, very rarely, no etoh in > 1 year    Drug use: No    Sexual activity: Not on file   Other Topics Concern    Not on file   Social History Narrative    Chooses not to have children    Drinks caffienated tea    Lives with spouse             Social Determinants of Health     Financial Resource Strain: Not on file   Food Insecurity: Not on file   Transportation Needs: Not on file   Physical Activity: Not on file   Stress: Not on file   Social Connections: Not on file   Intimate Partner Violence: Not on file   Housing Stability: Not on file         Objective     Vitals:    01/06/22 1550   BP: 120/80   BP Location: Right arm   Patient Position: Sitting   Cuff Size: Large   Pulse: 82   Resp: 18   Temp: 97 8 °F (36 6 °C)   TempSrc: Temporal   SpO2: 98%   Weight: 100 kg (221 lb)   Height: 6' (1 829 m)       Wt Readings from Last 3 Encounters:   01/06/22 100 kg (221 lb)   12/21/21 103 kg (228 lb)   10/12/21 95 7 kg (211 lb)       Physical Exam  Vitals and nursing note reviewed  Constitutional:       Appearance: Normal appearance  He is well-developed  He is not ill-appearing  HENT:      Head: Normocephalic and atraumatic  Eyes:      Conjunctiva/sclera: Conjunctivae normal    Neck:      Vascular: No carotid bruit  Cardiovascular:      Rate and Rhythm: Normal rate and regular rhythm  Heart sounds: Normal heart sounds  No murmur heard  Pulmonary:      Effort: Pulmonary effort is normal  No respiratory distress  Breath sounds: Normal breath sounds  No wheezing or rales  Abdominal:      General: Bowel sounds are normal  There is no distension or abdominal bruit  Musculoskeletal:         General: Normal range of motion  Cervical back: Neck supple  Neurological:      General: No focal deficit present  Mental Status: He is alert and oriented to person, place, and time  Mental status is at baseline  Cranial Nerves: No cranial nerve deficit  Psychiatric:         Mood and Affect: Mood normal          Behavior: Behavior normal          Thought Content:  Thought content normal           Results for orders placed or performed in visit on 01/06/22   POCT ECG Narrative    See results scanned in chart under Media Tab  Impression    Normal sinus rhythm, no acute ischemic changes, 75-81 beats per minute  Pertinent Laboratory/Diagnostic Studies:    Lab Results   Component Value Date    WBC 5 66 01/05/2022    HGB 15 0 01/05/2022    HCT 47 3 01/05/2022    MCV 82 01/05/2022     01/05/2022       No results found for: TSH    No results found for: CHOL  Lab Results   Component Value Date    TRIG 86 01/05/2022     Lab Results   Component Value Date    HDL 47 01/05/2022     Lab Results   Component Value Date    LDLCALC 104 (H) 01/05/2022     Lab Results   Component Value Date    HGBA1C 5 6 01/05/2022     Lab Results   Component Value Date    SODIUM 139 01/05/2022    K 4 1 01/05/2022     01/05/2022    CO2 29 01/05/2022    AGAP 7 01/05/2022    BUN 13 01/05/2022    CREATININE 0 98 01/05/2022    GLUC 98 12/30/2020    GLUF 93 01/05/2022    CALCIUM 9 0 01/05/2022    AST 9 01/05/2022    ALT 16 01/05/2022    ALKPHOS 87 01/05/2022    TP 7 5 01/05/2022    TBILI 0 45 01/05/2022    EGFR 92 01/05/2022       Orders Placed This Encounter   Procedures    Lipid Panel with Direct LDL reflex    TSH, 3rd generation    POCT ECG       ALLERGIES:  Allergies   Allergen Reactions    Ampicillin GI Intolerance     Vomiting    Penicillins GI Intolerance     Vomit       Current Medications     Current Outpatient Medications   Medication Sig Dispense Refill    Acetaminophen (TYLENOL 8 HOUR ARTHRITIS PAIN PO) Take by mouth      albuterol (PROVENTIL HFA,VENTOLIN HFA) 90 mcg/act inhaler INHALE 2 PUFFS EVERY 4 (FOUR) HOURS AS NEEDED FOR WHEEZING OR SHORTNESS OF BREATH COUGH 18 g 0    LANSOPRAZOLE PO Take by mouth daily as needed        naloxone (NARCAN) 4 mg/0 1 mL nasal spray Administer 1 spray into a nostril  If breathing does not return to normal or if breathing difficulty resumes after 2-3 minutes, give another dose in the other nostril using a new spray   1 each 1    transdermal buprenorphine (BUTRANS) 20 mcg/hr PTWK TD patch Apply 1 patch TD every 7 days for pain  Rotate patch application sites to avoid skin irritation  4 patch 1     No current facility-administered medications for this visit  There are no discontinued medications      Health Maintenance     Health Maintenance   Topic Date Due    Hepatitis C Screening  Never done    Pneumococcal Vaccine: Pediatrics (0 to 5 Years) and At-Risk Patients (6 to 59 Years) (1 of 2 - PPSV23) Never done    HIV Screening  Never done    BMI: Followup Plan  Never done    Annual Physical  Never done    DTaP,Tdap,and Td Vaccines (1 - Tdap) Never done    COVID-19 Vaccine (1) 04/06/2022 (Originally 8/22/1980)    Influenza Vaccine (1) 06/30/2022 (Originally 9/1/2021)    BMI: Adult  01/06/2023    HIB Vaccine  Aged Out    Hepatitis B Vaccine  Aged Out    IPV Vaccine  Aged Out    Hepatitis A Vaccine  Aged Out    Meningococcal ACWY Vaccine  Aged Out    HPV Vaccine  Aged Out       Immunization History   Administered Date(s) Administered    INFLUENZA 12/15/2018    Influenza, injectable, quadrivalent, preservative free 0 5 mL 12/15/2018       Jesu Jacob MD

## 2022-01-06 NOTE — PRE-PROCEDURE INSTRUCTIONS
Pre-Surgery Instructions:   Medication Instructions    Acetaminophen (TYLENOL 8 HOUR ARTHRITIS PAIN PO) Instructed patient per Anesthesia Guidelines  prn    albuterol (PROVENTIL HFA,VENTOLIN HFA) 90 mcg/act inhaler Instructed patient per Anesthesia Guidelines  prn    LANSOPRAZOLE PO Instructed patient per Anesthesia Guidelines  prn,may take am of sx    transdermal buprenorphine (BUTRANS) 20 mcg/hr PTWK TD patch Instructed patient per Anesthesia Guidelines  in place    You will receive a phone call from hospital for arrival time  Please call surgeons office if any changes in your condition  Wear easy on/off clothing; consider type of surgery;  Valuables, jewelry, piercing's please keep at home  **COVID-19  education/surgical guidelines  Updated covid    Visitation policy  Please: No contact lenses or eye make up, artificial eyelashes    Please secure transportation     Follow pre surgery showering or cleaning instructions as  Reviewed by nurse or surgeons office      Questions answered and concerns addressed

## 2022-01-07 ENCOUNTER — APPOINTMENT (OUTPATIENT)
Dept: LAB | Facility: HOSPITAL | Age: 47
End: 2022-01-07
Attending: STUDENT IN AN ORGANIZED HEALTH CARE EDUCATION/TRAINING PROGRAM
Payer: COMMERCIAL

## 2022-01-07 PROBLEM — R11.2 PONV (POSTOPERATIVE NAUSEA AND VOMITING): Status: RESOLVED | Noted: 2021-01-06 | Resolved: 2022-01-07

## 2022-01-07 PROBLEM — F31.81 BIPOLAR 2 DISORDER, MAJOR DEPRESSIVE EPISODE (HCC): Chronic | Status: RESOLVED | Noted: 2017-10-31 | Resolved: 2022-01-07

## 2022-01-07 PROBLEM — M79.646 THUMB PAIN: Status: RESOLVED | Noted: 2018-02-16 | Resolved: 2022-01-07

## 2022-01-07 PROBLEM — M25.521 RIGHT ELBOW PAIN: Status: RESOLVED | Noted: 2018-01-19 | Resolved: 2022-01-07

## 2022-01-07 PROBLEM — Z98.890 PONV (POSTOPERATIVE NAUSEA AND VOMITING): Status: RESOLVED | Noted: 2021-01-06 | Resolved: 2022-01-07

## 2022-01-07 LAB
BACTERIA UR QL AUTO: ABNORMAL /HPF
BILIRUB UR QL STRIP: NEGATIVE
CLARITY UR: CLEAR
COLOR UR: YELLOW
EST. AVERAGE GLUCOSE BLD GHB EST-MCNC: 114 MG/DL
GLUCOSE UR STRIP-MCNC: NEGATIVE MG/DL
HBA1C MFR BLD: 5.6 %
HGB UR QL STRIP.AUTO: NEGATIVE
HYALINE CASTS #/AREA URNS LPF: ABNORMAL /LPF
KETONES UR STRIP-MCNC: NEGATIVE MG/DL
LEUKOCYTE ESTERASE UR QL STRIP: ABNORMAL
NITRITE UR QL STRIP: NEGATIVE
NON-SQ EPI CELLS URNS QL MICRO: ABNORMAL /HPF
PH UR STRIP.AUTO: 5.5 [PH]
PROT UR STRIP-MCNC: NEGATIVE MG/DL
RBC #/AREA URNS AUTO: ABNORMAL /HPF
SP GR UR STRIP.AUTO: >=1.03 (ref 1–1.03)
UROBILINOGEN UR QL STRIP.AUTO: 0.2 E.U./DL
WBC #/AREA URNS AUTO: ABNORMAL /HPF

## 2022-01-07 PROCEDURE — 81001 URINALYSIS AUTO W/SCOPE: CPT | Performed by: STUDENT IN AN ORGANIZED HEALTH CARE EDUCATION/TRAINING PROGRAM

## 2022-01-11 ENCOUNTER — DOCUMENTATION (OUTPATIENT)
Dept: NEUROSURGERY | Facility: CLINIC | Age: 47
End: 2022-01-11

## 2022-01-11 ENCOUNTER — ANESTHESIA EVENT (OUTPATIENT)
Dept: PERIOP | Facility: HOSPITAL | Age: 47
End: 2022-01-11
Payer: COMMERCIAL

## 2022-01-12 ENCOUNTER — HOSPITAL ENCOUNTER (OUTPATIENT)
Facility: HOSPITAL | Age: 47
Setting detail: OUTPATIENT SURGERY
Discharge: HOME | End: 2022-01-12
Attending: STUDENT IN AN ORGANIZED HEALTH CARE EDUCATION/TRAINING PROGRAM | Admitting: STUDENT IN AN ORGANIZED HEALTH CARE EDUCATION/TRAINING PROGRAM
Payer: COMMERCIAL

## 2022-01-12 ENCOUNTER — ANESTHESIA (OUTPATIENT)
Dept: PERIOP | Facility: HOSPITAL | Age: 47
End: 2022-01-12
Payer: COMMERCIAL

## 2022-01-12 VITALS
TEMPERATURE: 98.4 F | BODY MASS INDEX: 30.22 KG/M2 | SYSTOLIC BLOOD PRESSURE: 114 MMHG | OXYGEN SATURATION: 98 % | DIASTOLIC BLOOD PRESSURE: 73 MMHG | WEIGHT: 222.8 LBS | RESPIRATION RATE: 28 BRPM | HEART RATE: 58 BPM

## 2022-01-12 DIAGNOSIS — M48.062 SPINAL STENOSIS OF LUMBAR REGION WITH NEUROGENIC CLAUDICATION: Primary | ICD-10-CM

## 2022-01-12 PROCEDURE — C1767 GENERATOR, NEURO NON-RECHARG: HCPCS | Performed by: STUDENT IN AN ORGANIZED HEALTH CARE EDUCATION/TRAINING PROGRAM

## 2022-01-12 PROCEDURE — 63685 INS/RPLC SPI NPG/RCVR POCKET: CPT | Performed by: STUDENT IN AN ORGANIZED HEALTH CARE EDUCATION/TRAINING PROGRAM

## 2022-01-12 PROCEDURE — 63685 INS/RPLC SPI NPG/RCVR POCKET: CPT | Performed by: PHYSICIAN ASSISTANT

## 2022-01-12 PROCEDURE — C1787 PATIENT PROGR, NEUROSTIM: HCPCS | Performed by: STUDENT IN AN ORGANIZED HEALTH CARE EDUCATION/TRAINING PROGRAM

## 2022-01-12 DEVICE — INS 977006 PAIN PC MRI US EMAN
Type: IMPLANTABLE DEVICE | Site: FLANK | Status: FUNCTIONAL
Brand: VANTA™ ADAPTIVESTIM™

## 2022-01-12 DEVICE — ENVELOPE NMRM6133 ABSORB LRG MR
Type: IMPLANTABLE DEVICE | Site: FLANK | Status: FUNCTIONAL
Brand: TYRX™

## 2022-01-12 RX ORDER — BACLOFEN 10 MG/1
TABLET ORAL
Qty: 120 TABLET | Refills: 0 | Status: SHIPPED | OUTPATIENT
Start: 2022-01-12 | End: 2022-02-03 | Stop reason: SDUPTHER

## 2022-01-12 RX ORDER — HYDROMORPHONE HCL/PF 1 MG/ML
0.5 SYRINGE (ML) INJECTION
Status: DISCONTINUED | OUTPATIENT
Start: 2022-01-12 | End: 2022-01-12 | Stop reason: HOSPADM

## 2022-01-12 RX ORDER — LIDOCAINE HYDROCHLORIDE 10 MG/ML
INJECTION, SOLUTION EPIDURAL; INFILTRATION; INTRACAUDAL; PERINEURAL AS NEEDED
Status: DISCONTINUED | OUTPATIENT
Start: 2022-01-12 | End: 2022-01-12

## 2022-01-12 RX ORDER — TRAMADOL HYDROCHLORIDE 50 MG/1
TABLET ORAL
Qty: 21 TABLET | Refills: 0 | Status: SHIPPED | OUTPATIENT
Start: 2022-01-12 | End: 2022-02-03

## 2022-01-12 RX ORDER — MIDAZOLAM HYDROCHLORIDE 2 MG/2ML
INJECTION, SOLUTION INTRAMUSCULAR; INTRAVENOUS AS NEEDED
Status: DISCONTINUED | OUTPATIENT
Start: 2022-01-12 | End: 2022-01-12

## 2022-01-12 RX ORDER — LIDOCAINE HYDROCHLORIDE AND EPINEPHRINE 10; 10 MG/ML; UG/ML
INJECTION, SOLUTION INFILTRATION; PERINEURAL AS NEEDED
Status: DISCONTINUED | OUTPATIENT
Start: 2022-01-12 | End: 2022-01-12 | Stop reason: HOSPADM

## 2022-01-12 RX ORDER — SODIUM CHLORIDE 9 MG/ML
125 INJECTION, SOLUTION INTRAVENOUS CONTINUOUS
Status: DISCONTINUED | OUTPATIENT
Start: 2022-01-12 | End: 2022-01-12 | Stop reason: HOSPADM

## 2022-01-12 RX ORDER — KETAMINE HYDROCHLORIDE 50 MG/ML
INJECTION, SOLUTION, CONCENTRATE INTRAMUSCULAR; INTRAVENOUS AS NEEDED
Status: DISCONTINUED | OUTPATIENT
Start: 2022-01-12 | End: 2022-01-12

## 2022-01-12 RX ORDER — ONDANSETRON 2 MG/ML
4 INJECTION INTRAMUSCULAR; INTRAVENOUS ONCE
Status: DISCONTINUED | OUTPATIENT
Start: 2022-01-12 | End: 2022-01-12 | Stop reason: HOSPADM

## 2022-01-12 RX ORDER — CLINDAMYCIN HYDROCHLORIDE 300 MG/1
300 CAPSULE ORAL 4 TIMES DAILY
Qty: 12 CAPSULE | Refills: 0 | Status: SHIPPED | OUTPATIENT
Start: 2022-01-12 | End: 2022-01-15

## 2022-01-12 RX ORDER — CHLORHEXIDINE GLUCONATE 0.12 MG/ML
15 RINSE ORAL ONCE
Status: COMPLETED | OUTPATIENT
Start: 2022-01-12 | End: 2022-01-12

## 2022-01-12 RX ORDER — DEXAMETHASONE SODIUM PHOSPHATE 10 MG/ML
INJECTION, SOLUTION INTRAMUSCULAR; INTRAVENOUS AS NEEDED
Status: DISCONTINUED | OUTPATIENT
Start: 2022-01-12 | End: 2022-01-12

## 2022-01-12 RX ORDER — FENTANYL CITRATE 50 UG/ML
INJECTION, SOLUTION INTRAMUSCULAR; INTRAVENOUS AS NEEDED
Status: DISCONTINUED | OUTPATIENT
Start: 2022-01-12 | End: 2022-01-12

## 2022-01-12 RX ORDER — GLYCOPYRROLATE 0.2 MG/ML
INJECTION INTRAMUSCULAR; INTRAVENOUS AS NEEDED
Status: DISCONTINUED | OUTPATIENT
Start: 2022-01-12 | End: 2022-01-12

## 2022-01-12 RX ORDER — FENTANYL CITRATE/PF 50 MCG/ML
25 SYRINGE (ML) INJECTION
Status: DISCONTINUED | OUTPATIENT
Start: 2022-01-12 | End: 2022-01-12 | Stop reason: HOSPADM

## 2022-01-12 RX ORDER — ONDANSETRON 2 MG/ML
INJECTION INTRAMUSCULAR; INTRAVENOUS AS NEEDED
Status: DISCONTINUED | OUTPATIENT
Start: 2022-01-12 | End: 2022-01-12

## 2022-01-12 RX ORDER — PROPOFOL 10 MG/ML
INJECTION, EMULSION INTRAVENOUS CONTINUOUS PRN
Status: DISCONTINUED | OUTPATIENT
Start: 2022-01-12 | End: 2022-01-12

## 2022-01-12 RX ORDER — CEFAZOLIN SODIUM 2 G/50ML
2000 SOLUTION INTRAVENOUS ONCE
Status: COMPLETED | OUTPATIENT
Start: 2022-01-12 | End: 2022-01-12

## 2022-01-12 RX ADMIN — FENTANYL CITRATE 50 MCG: 50 INJECTION, SOLUTION INTRAMUSCULAR; INTRAVENOUS at 10:20

## 2022-01-12 RX ADMIN — LIDOCAINE HYDROCHLORIDE 60 MG: 10 INJECTION, SOLUTION EPIDURAL; INFILTRATION; INTRACAUDAL; PERINEURAL at 10:23

## 2022-01-12 RX ADMIN — PROPOFOL 150 MCG/KG/MIN: 10 INJECTION, EMULSION INTRAVENOUS at 10:23

## 2022-01-12 RX ADMIN — CEFAZOLIN SODIUM 2000 MG: 2 SOLUTION INTRAVENOUS at 10:16

## 2022-01-12 RX ADMIN — FENTANYL CITRATE 25 MCG: 50 INJECTION, SOLUTION INTRAMUSCULAR; INTRAVENOUS at 11:58

## 2022-01-12 RX ADMIN — MIDAZOLAM 2 MG: 1 INJECTION INTRAMUSCULAR; INTRAVENOUS at 10:25

## 2022-01-12 RX ADMIN — CHLORHEXIDINE GLUCONATE 0.12% ORAL RINSE 15 ML: 1.2 LIQUID ORAL at 08:40

## 2022-01-12 RX ADMIN — GLYCOPYRROLATE 0.2 MG: 0.2 INJECTION, SOLUTION INTRAMUSCULAR; INTRAVENOUS at 10:28

## 2022-01-12 RX ADMIN — KETAMINE HYDROCHLORIDE 20 MG: 50 INJECTION, SOLUTION INTRAMUSCULAR; INTRAVENOUS at 10:28

## 2022-01-12 RX ADMIN — FENTANYL CITRATE 50 MCG: 50 INJECTION, SOLUTION INTRAMUSCULAR; INTRAVENOUS at 10:23

## 2022-01-12 RX ADMIN — SODIUM CHLORIDE: 0.9 INJECTION, SOLUTION INTRAVENOUS at 09:56

## 2022-01-12 RX ADMIN — DEXAMETHASONE SODIUM PHOSPHATE 8 MG: 10 INJECTION, SOLUTION INTRAMUSCULAR; INTRAVENOUS at 10:23

## 2022-01-12 RX ADMIN — FENTANYL CITRATE 25 MCG: 50 INJECTION, SOLUTION INTRAMUSCULAR; INTRAVENOUS at 12:06

## 2022-01-12 RX ADMIN — MIDAZOLAM 2 MG: 1 INJECTION INTRAMUSCULAR; INTRAVENOUS at 10:16

## 2022-01-12 RX ADMIN — ONDANSETRON 4 MG: 2 INJECTION INTRAMUSCULAR; INTRAVENOUS at 10:23

## 2022-01-12 NOTE — ANESTHESIA POSTPROCEDURE EVALUATION
Post-Op Assessment Note    CV Status:  Stable  Pain Score: 0    Pain management: adequate     Mental Status:  Alert and awake   Hydration Status:  Euvolemic and stable   PONV Controlled:  None   Airway Patency:  Patent      Post Op Vitals Reviewed: Yes      Staff: CRNA         No complications documented      BP 93/55 (01/12/22 1124)    Temp      Pulse 67 (01/12/22 1124)   Resp 19 (01/12/22 1124)    SpO2 97 % (01/12/22 1124)

## 2022-01-12 NOTE — TELEPHONE ENCOUNTER
S/w [facility]  Confirmed all baclofen rx's have been cancelled  Nothing on fie to fill    Attempted to contact pt, lmom to cb  Provided cb number and office hours

## 2022-01-12 NOTE — TELEPHONE ENCOUNTER
I sent a script for the Baclofen  Since he is on the Butrans patch, but did have the SCStim IPG replaced today, I sent a script for Tramadol 50 mg, 1 PO TID PRN for pain, #21 as a 7 day OR more, supply as I feel that should be enough to help with the increased pain from the procedure today

## 2022-01-12 NOTE — DISCHARGE INSTRUCTIONS
Discharge Instructions  Battery (IPG) replacement  Activity:   Do not lift more than 10 pounds for 6 weeks   Avoid bending, lifting and twisting for 6 weeks   No strenuous activities  No driving for 2 weeks   When able to shower, continue to use clean towel and washcloth for 2 weeks post-op   Continue to change bed linens and pajamas more frequently  Wear clean clothes daily  Surgical incision care:   Keep dressing in place for 3 days   Keep incision dry for 3 days   May shower with mild antimicrobial soap after 3 days   After 3 days, incisions may be left open to air, but must remain clean   Do not immerse the incisions in water for 6 weeks   Do not apply any creams or ointments to the incision for 6 weeks, unless otherwise instructed by [facility]   Contact office if increasing redness, drainage, pain or swelling around the incisions  Postoperative medication:   Complete course of antibiotic as directed  [facility] will provide pain medication as coordinated with your pain specialist  All prescriptions must come from a single provider  o Take all medications as prescribed  Call office with any questions/concerns   Please contact office for questions regarding dosage and modifications   No antiplatelet, anticoagulation or Nonsteroidal anti-inflammatory (NSAIDs) medication until cleared by [employer] [facility], unless otherwise instructed   Do not operate heavy machinery or vehicles while taking sedating medications   Use a bowel regimen while on opioids as they induce constipation  Ie  Senokot-S, Miralax, Colace, etc  Increase fiber and water intake

## 2022-01-12 NOTE — OP NOTE
PERATIVE REPORT  PATIENT NAME: Buzz Silva    :  1975  MRN: [id number]  Pt Location:  OR ROOM 04    SURGERY DATE: 2022    Surgeon(s) and Role:     * Reyna Gregorio MD - Primary     * Shelby Leach PA-C - Assisting    Preop Diagnosis:  Chronic pain syndrome [G89 4]  Spinal cord stimulator dysfunction ([address] ) [T85 192A]    Post-Op Diagnosis Codes:     * Chronic pain syndrome [G89 4]     * Spinal cord stimulator dysfunction ([address] ) [T85 192A]    Procedure(s) (LRB):  Right sided spinal cord stimulator pulse generator replacement (Right)   [employer] pulse generator    Specimen(s):  * No specimens in log *    Estimated Blood Loss:   0 mL    Drains:  * No LDAs found *    Anesthesia Type:   Conscious Sedation     Operative Indications:  Chronic pain syndrome [G89 4]  Spinal cord stimulator dysfunction ([address] ) [T85 192A]  Low back pain    Operative Findings:  Pulse generator replaced without any issues    Complications:   None    Procedure and Technique:  After obtaining written informed consent, the patient was brought to the operating room  General endotracheal anesthesia was induced  The patient was placed in lateral decubitus on a stretcher  The prior incision was marked  The patient was then given Ancef 2g as perioperative antibiotic  The patient was prepped and draped in the usual sterile fashion  Lidocaine with epinephrine was injected in the surgical site  Surgical time-out was performed  A 10 blade was used to open the prior right paraspinal incision  Dissection was carried down to the top of the stimulator pulse generator using monopolar cautery  The scarred fascia overlying the generator was cut in a linear fashion to the lateral borders of the generator  The 2-0 silk sutures anchoring the generator were cut releasing the generator from its pocket  The pocket was expanded to allow for a nonrestraint fit of the generator  The screws anchoring the leads were loosened and the leads were removed  A new [employer] Vanta (nonrechargeable) pulse generator was then connected to the leads and final tightened  Impedances were checked and they were within normal limits  The generator was placed into an antibiotic impregnated sleeve and subsequently placed into its pocket  It was well fitted without constraints  2-0 silk sutures were used to secure the generator into the fascia underneath the pocket  The wound was copiously irrigated with antibiotic saline solution  2-0 vicryl sutures were placed in the deep dermal layer  The skin was closed with a 4-0 stratafix suture in a subcuticular fashion  Steri strips were then placed over the incision followed by Bacitracin ointment  Sterile dressing was applied  Surgical sign-out was performed  No qualified resident was available  Julian Torres PA-C was present for the procedure, and provided essential assistance with proper exposure, retraction, hemostasis, stimulator placement, and wound closure, which was necessary secondary to the complex nature of this case        I was present for the entire procedure    Patient Disposition:  PACU       SIGNATURE: Kale Gonzalez MD  DATE: January 12, 2022  TIME: 11:18 AM

## 2022-01-12 NOTE — TELEPHONE ENCOUNTER
Pt contacted Call Center requested refill of their medication  Pt would also like to request pain medication     Medication Name:  Josesito Jordan        Dosage of Med: 10 mg       Frequency of Med: Take 1 tablet (10 mg total) by mouth 3 (three) times a day,          Remaining Medication: 4      Pharmacy and Location:  [facility] on file       Pt  Preferred Callback Phone Number: [phone]      Thank you

## 2022-01-12 NOTE — TELEPHONE ENCOUNTER
S/w pt, stated that he has been taking baclofen 10 mg 4 pills / day as discussed with this office previously  Per pt, + relief, no se's  Advised pt, the writer will d/w FELA  Anticipate rx will be sent to the pharmacy for pu today  This office will cb if there is any question or change in the plan as discussed  Pt stated that he had surgery today and was told to contact this office re: pain medication after his surgery  Pt confirmed - this office was not aware that his surgery was scheduled for today  Advised pt, flynn d/w FELA and cb to advise  Pt verbalized understanding and appreciation

## 2022-01-12 NOTE — ANESTHESIA PREPROCEDURE EVALUATION
Procedure:  Right sided spinal cord stimulator pulse generator replacement (Right Buttocks)    Relevant Problems   /RENAL   (+) BPH with obstruction/lower urinary tract symptoms   (+) Nephrolithiasis      HEMATOLOGY   (+) Chronic anemia   (+) Iron deficiency anemia      MUSCULOSKELETAL   (+) Chronic low back pain   (+) Myofascial pain syndrome   (+) Sacroiliitis, not elsewhere classified (HCC)      NEURO/PSYCH   (+) Depression with anxiety   (+) Myofascial pain syndrome   (+) PTSD (post-traumatic stress disorder)   (+) Pain syndrome, chronic      PULMONARY   (+) Asthma   (+) Smoking        Physical Exam    Airway    Mallampati score: II  TM Distance: >3 FB  Neck ROM: full     Dental   upper dentures and lower dentures,     Cardiovascular  Cardiovascular exam normal    Pulmonary  Pulmonary exam normal     Other Findings        Anesthesia Plan  ASA Score- 2     Anesthesia Type- IV sedation with anesthesia with ASA Monitors  Additional Monitors:   Airway Plan:           Plan Factors-    Chart reviewed  Patient summary reviewed  Patient is a current smoker  Patient smoked on day of surgery  Induction- intravenous  Postoperative Plan- Plan for postoperative opioid use  Informed Consent- Anesthetic plan and risks discussed with patient and spouse  I personally reviewed this patient with the CRNA  Discussed and agreed on the Anesthesia Plan with the CRNA  David Pitts

## 2022-01-13 ENCOUNTER — TELEPHONE (OUTPATIENT)
Dept: NEUROSURGERY | Facility: CLINIC | Age: 47
End: 2022-01-13

## 2022-01-13 NOTE — TELEPHONE ENCOUNTER
Called patient to see how he is doing after surgery  Patient reports he is doing well overall and denies any incisional issues or fevers  Patient is able to ambulate around the house and complete ADLs  Educated the patient about the importance of preventing blood clots and provided measures how to prevent them  Patient has moved his bowels since the surgery  Encouraged patient to take an over the counter stool softener, if he is taking narcotic pain medication  Encouraged fiber intake and fluids  Reviewed incision care with the patient  Advised that after three days he may take a shower and gently wash the surgical site with soap and water  Use clean wash cloth, towels, and clothing  Do not submerge in water until cleared by the surgeon  Do not apply any creams, ointments, or lotions to the site  Patient is aware to call the office if any redness, swelling, drainage, dehiscence of incision, or fever >100 F occurs  Patient is aware to call the office if any concerns or questions may arise  Reminded patient of his upcoming appointment with the date/time/location  Patient was appreciative for the call

## 2022-01-26 ENCOUNTER — CLINICAL SUPPORT (OUTPATIENT)
Dept: NEUROSURGERY | Facility: CLINIC | Age: 47
End: 2022-01-26

## 2022-01-26 VITALS — SYSTOLIC BLOOD PRESSURE: 120 MMHG | TEMPERATURE: 97.9 F | DIASTOLIC BLOOD PRESSURE: 80 MMHG

## 2022-01-26 DIAGNOSIS — Z98.890 POST-OPERATIVE STATE: Primary | ICD-10-CM

## 2022-01-26 PROCEDURE — 99024 POSTOP FOLLOW-UP VISIT: CPT

## 2022-01-26 NOTE — PATIENT INSTRUCTIONS
1  Wash incision gently in the shower  Avoid submerging in tub, hot tub, or pool  2  Leave incision open to air when ever possible, may cover loosely with gauze and paper tape for comfort  Do not seal incision under bandages  3  Do not apply any creams, ointments, or lotions directly to incision  4  Maintain activity restrictions of lifting, pushing, pulling no more than 5-10 pounds for approximately 2 more weeks  5  Ambulate as tolerated  6  Return for follow-up as needed  7  Contact the office with any questions or concerns

## 2022-01-26 NOTE — PROGRESS NOTES
Post-Op Visit- Neurosurgery    Monica Watts 55 y o  male MRN: [id number]    Chief Complaint:  Patient presents post: Right sided spinal cord stimulator pulse generator replacement - Right    History of Present Illness:  Patient presents for 2 week POV for incision check  Arrived alone and ambulated well without assistive device  Report pain is 6/10 and is not taking anything orally for pain at present  He states he does not feel ready to return to work as he does strenuous work and wearing a work belt that rests about the area of his surgery  Requesting work letter  Current Outpatient Medications:     Acetaminophen (TYLENOL 8 HOUR ARTHRITIS PAIN PO), Take by mouth, Disp: , Rfl:     albuterol (PROVENTIL HFA,VENTOLIN HFA) 90 mcg/act inhaler, INHALE 2 PUFFS EVERY 4 (FOUR) HOURS AS NEEDED FOR WHEEZING OR SHORTNESS OF BREATH COUGH, Disp: 18 g, Rfl: 0    baclofen 10 mg tablet, Take 1 PO QID PRN for pain and spasms  , Disp: 120 tablet, Rfl: 0    LANSOPRAZOLE PO, Take by mouth daily as needed  , Disp: , Rfl:     transdermal buprenorphine (BUTRANS) 20 mcg/hr PTWK TD patch, Apply 1 patch TD every 7 days for pain  Rotate patch application sites to avoid skin irritation  , Disp: 4 patch, Rfl: 1    naloxone (NARCAN) 4 mg/0 1 mL nasal spray, Administer 1 spray into a nostril   If breathing does not return to normal or if breathing difficulty resumes after 2-3 minutes, give another dose in the other nostril using a new spray , Disp: 1 each, Rfl: 1    traMADol (Ultram) 50 mg tablet, Take 1 PO TID PRN for post op pain , Disp: 21 tablet, Rfl: 0     Allergies   Allergen Reactions    Ampicillin GI Intolerance     Vomiting    Penicillins GI Intolerance     Vomit          Assessment:    Vitals:    01/26/22 1031   BP: 120/80   Temp: 97 9 °F (36 6 °C)       Wound Exam: Incision is clean, dry, and in tact, well approximated, without redness, swelling, or drainage  See below:         Procedure:  Surgical site assessment  Complications: None  Discussion/Summary  Doing well postoperatively  Reviewed incision care with patient including daily observation for s/s infection including: increased erythema, edema, drainage, dehiscence of incision or fever >101  Should these be observed, he understands that he is to call and/or return immediately for reassessment  Advised patient to continue cleansing area with mild soap and water and pat dry  Not to apply any lotions, creams, or ointments, & not to submerge in any water for 4  more weeks  He is to maintain activity restrictions until cleared by the surgeon  Activity levels were also reviewed with the patient in detail, he is to lift no greater than 10 pounds and ambulation is encouraged as tolerated  He is to follow-up as needed and call the office with any further questions or concerns, or if any incisional issues or fevers would arise  Discussed his request to remain out of work with Dr Lanny Stone directly  He was agreeable to extending this for another 2 weeks  Provided this letter

## 2022-02-03 ENCOUNTER — OFFICE VISIT (OUTPATIENT)
Dept: PAIN MEDICINE | Facility: CLINIC | Age: 47
End: 2022-02-03
Payer: COMMERCIAL

## 2022-02-03 VITALS
TEMPERATURE: 97.9 F | HEART RATE: 88 BPM | WEIGHT: 227 LBS | SYSTOLIC BLOOD PRESSURE: 138 MMHG | DIASTOLIC BLOOD PRESSURE: 82 MMHG | BODY MASS INDEX: 30.75 KG/M2 | HEIGHT: 72 IN

## 2022-02-03 DIAGNOSIS — M54.42 CHRONIC LOW BACK PAIN WITH BILATERAL SCIATICA, UNSPECIFIED BACK PAIN LATERALITY: ICD-10-CM

## 2022-02-03 DIAGNOSIS — F11.20 UNCOMPLICATED OPIOID DEPENDENCE (HCC): ICD-10-CM

## 2022-02-03 DIAGNOSIS — M54.16 LUMBAR RADICULOPATHY: ICD-10-CM

## 2022-02-03 DIAGNOSIS — G89.4 PAIN SYNDROME, CHRONIC: Primary | ICD-10-CM

## 2022-02-03 DIAGNOSIS — M54.41 CHRONIC LOW BACK PAIN WITH BILATERAL SCIATICA, UNSPECIFIED BACK PAIN LATERALITY: ICD-10-CM

## 2022-02-03 DIAGNOSIS — M79.18 MYOFASCIAL PAIN SYNDROME: ICD-10-CM

## 2022-02-03 DIAGNOSIS — G89.29 CHRONIC LOW BACK PAIN WITH BILATERAL SCIATICA, UNSPECIFIED BACK PAIN LATERALITY: ICD-10-CM

## 2022-02-03 DIAGNOSIS — M96.1 LUMBAR POST-LAMINECTOMY SYNDROME: ICD-10-CM

## 2022-02-03 DIAGNOSIS — Z79.891 LONG-TERM CURRENT USE OF OPIATE ANALGESIC: ICD-10-CM

## 2022-02-03 PROCEDURE — 99214 OFFICE O/P EST MOD 30 MIN: CPT | Performed by: NURSE PRACTITIONER

## 2022-02-03 PROCEDURE — 3008F BODY MASS INDEX DOCD: CPT | Performed by: NURSE PRACTITIONER

## 2022-02-03 PROCEDURE — 4004F PT TOBACCO SCREEN RCVD TLK: CPT | Performed by: NURSE PRACTITIONER

## 2022-02-03 RX ORDER — BUPRENORPHINE 20 UG/H
PATCH TRANSDERMAL
Qty: 4 PATCH | Refills: 3 | Status: SHIPPED | OUTPATIENT
Start: 2022-02-03 | End: 2022-06-15 | Stop reason: SDUPTHER

## 2022-02-03 RX ORDER — NALOXONE HYDROCHLORIDE 4 MG/.1ML
SPRAY NASAL
Qty: 1 EACH | Refills: 1 | Status: CANCELLED | OUTPATIENT
Start: 2022-02-03

## 2022-02-03 RX ORDER — BACLOFEN 10 MG/1
TABLET ORAL
Qty: 120 TABLET | Refills: 3 | Status: SHIPPED | OUTPATIENT
Start: 2022-02-03 | End: 2022-06-15 | Stop reason: SDUPTHER

## 2022-02-03 NOTE — PROGRESS NOTES
Assessment:  1  Pain syndrome, chronic    2  Chronic low back pain with bilateral sciatica, unspecified back pain laterality    3  Lumbar radiculopathy    4  Myofascial pain syndrome    5  Lumbar post-laminectomy syndrome    6  Uncomplicated opioid dependence ([address] )    7  Long-term current use of opiate analgesic        Plan:  While the patient was in the office today, I did have a thorough conversation with the patient regarding their chronic pain syndrome, symptoms, medication regimen, and treatment plan  I discussed with the patient that he should continue to follow-up with the neurosurgical team with regards to his release back to work and his healing process from the [address] [place] [address]  spinal cord stimulator IPG reimplantation and to continue to follow-up with the [address] [place] [address]  team for any reprogramming needs  The patient was agreeable and verbalized an understanding  With regards to his medication regimen, I discussed with the patient that since he is noting significant and stable overall relief of his chronic pain symptoms, without any significant side effects or issues, I feel it is reasonable and appropriate to continue the baclofen and that Butrans patch as prescribed  The patient was agreeable and verbalized an understanding  [place] Prescription Drug Monitoring Program report was reviewed and was appropriate     There are risks associated with opioid medications, including dependence, addiction and tolerance  The patient understands and agrees to use these medications only as prescribed  Potential side effects of the medications include, but are not limited to, constipation, drowsiness, addiction, impaired judgment and risk of fatal overdose if not taken as prescribed  The patient was warned against driving while taking sedation medications  Sharing medications is a felony  At this point in time, the patient is showing no signs of addiction, abuse, diversion or suicidal ideation      An oral drug screen swab was collected at today's office visit  The swab will be sent for confirmatory testing  The drug screen is medically necessary because the patient is either dependent on opioid medication or is being considered for opioid medication therapy and the results could impact ongoing or future treatment  The drug screen is to evaluate for the presences or absence of prescribed, non-prescribed, and/or illicit drugs/substances  While the patient was in the office today, I discussed with the patient that at this point they have been identified as a patient who is at significant risk for respiratory depression and/or even death because they are being, appropriately prescribed,  a dosage of opioid pain medication that is equal to or greater than 40 milligram equivalents of Morphine Sulfates, on  a long acting medication, and/or the patient's age and other comorbidities puts them at increased risk for respiratory depression and possibly death  I, again, reviewed the dangers of being on an opioid and at this point since we feel it is appropriate that they continue the opioid medication at this level, for now, we will continue the dosage as prescribed  However, we feel that it is best and safe practice to prescribe narcan nasal spray to be used if the patient is experiencing respiratory depression as a result of suspected intentional or unintentional opioid over dose  I reviewed with the patient how to use the nasal spray and to make sure that they educated a family member on how to use it and that no matter what emergency personnel must be notified as the Narcan nasal spray is only meant to give the patient more time to get to the nearest emergency room for a more thorough evaluation  The patient was agreeable and verbalized an understanding   A signed copy of the patient education sheet reviewing the risks and reasons for prescribing this medication is available in the patient's chart and a copy was also sent home with the patient  The patient will follow-up in 4 months for medication prescription refill and reevaluation  The patient was advised to contact the office should their symptoms worsen in the interim  The patient was agreeable and verbalized an understanding  History of Present Illness: The patient is a 55 y o  male last seen on 2021 who presents for a follow up office visit in regards to chronic pain syndrome, as the patient's pain has been ongoing for greater than a year, secondary to lumbar post-laminectomy syndrome with radiculopathy and myofascial pain  The patient currently reports that since his last office visit although he was having some issues managing his pain because his [employer] thoracic lead spinal cord stimulator battery had  and at this point is status post the IPG battery replacement on 2022 with Dr Tavares Millre and reports that since the stimulator has been turned back on he is again noting very stable and significant improvement between the stimulator in his current medication regimen  He reports he has not yet been released to go back to work and still has a lifting restriction of 10 lbs or less  The patient presents today for regular medication follow-up visit  Current pain medications includes: Butrans patch 20 micro g applying 1 patch transdermally every 7 days for pain as well as baclofen 10 mg q i d  p r n  for pain and spasms  The patient reports that this regimen is providing 80% pain relief  The patient is reporting no side effects from this pain medication regimen  Pain Contract Signed: 2021  Last Urine Drug Screen: 2/3/2022    I have personally reviewed and/or updated the patient's past medical history, past surgical history, family history, social history, current medications, allergies, and vital signs today  Review of Systems:    Review of Systems   Respiratory: Negative for shortness of breath      Cardiovascular: Negative for chest pain  Gastrointestinal: Negative for constipation, diarrhea, nausea and vomiting  Musculoskeletal: Positive for gait problem  Negative for arthralgias, joint swelling (joint stiffness) and myalgias  Skin: Negative for rash  Neurological: Negative for dizziness, seizures and weakness  All other systems reviewed and are negative          Past Medical History:   Diagnosis Date    Anxiety     Arthritis     Asthma     Bipolar 2 disorder, major depressive episode ([facility] [address] ) 10/31/2017    Bipolar disorder (HCC)     Chronic left lumbar radiculopathy     Chronic low back pain     Chronic pain syndrome     Chronic right shoulder pain     Depression with anxiety     Fatigue     GERD (gastroesophageal reflux disease)     Hydronephrosis     Iron deficiency anemia     Kidney stone     Lytic bone lesions on xray     Nephrolithiasis     PONV (postoperative nausea and vomiting)     Right elbow pain     Right lateral epicondylitis        Past Surgical History:   Procedure Laterality Date    ADENOIDECTOMY Bilateral     APPENDECTOMY      BACK SURGERY      CHOLECYSTECTOMY      CHOLECYSTECTOMY LAPAROSCOPIC N/A 10/30/2018    Procedure: CHOLECYSTECTOMY WITH GASTROTOMY LAPAROSCOPIC;  Surgeon: Pravez Ulloa MD;  [facility];  Service: General    ERCP W/ SPHICTEROTOMY N/A 10/30/2018    Procedure: ENDOSCOPIC RETROGRADE CHOLANGIOPANCREATOGRAPHY (ERCP) W/ SPHINCTEROTOMY;  Surgeon: Lizzy Núñez MD;  [facility];  Service: Gastroenterology    GASTRIC BYPASS      2009    OTHER SURGICAL HISTORY      fusion/refusion of vertebrae, 2010 and 2011 l5-s1 and l4-l5    [place] CYSTO/URETERO W/LITHOTRIPSY &INDWELL STENT INSRT Right 8/8/2017    Procedure: CYSTOSCOPY; URETEROSCOPY; HOLMIUM LASER; RETROGRADE PYELOGRAM; STENT;  Surgeon: Annabella Ortiz MD;  [facility];  Service: Urology    [place] [address] Left 5/2/4436    Procedure: INCISION AND DRAINAGE (I&D) EXTREMITY- of left knee s/p MPFL reconstruction, I&D if the left knee with possible MPFL revision;  Surgeon: Abigail Palacios DO;  [facility];  Service: Orthopedics    CA FIX Buffy Fulling REALIGN Left 1/6/2021    Procedure: REALIGNMENT PATELLA with chondroplasty of patella, open medial patellofemoral ligament reconstruction with allograft;  Surgeon: Abigail Palacios DO;  [facility];  Service: Orthopedics    CA IMPLANT SPINAL NEUROSTIM/ Right 11/14/2017    Procedure: REMOVAL LOWER MEDIAL BUTTOCK SPINAL CORD STIMULATOR GENERATOR; PLACEMENT OF NEW BUTTOCK SPINAL CORD [facility];  Surgeon: Rosalba Fish MD;  [facility];  Service: Neurosurgery    CA IMPLANT SPINAL NEUROSTIM/ Right 1/12/2022    Procedure: Right sided spinal cord stimulator pulse generator replacement;  Surgeon: Francisca Chou MD;  [facility];  Service: Neurosurgery    CA KNEE SCOPE, W/LATERAL RELEASE Left 1/6/2021    Procedure: RELEASE RETINACULAR;  Surgeon: Abigail Palacios DO;  [facility];  Service: Orthopedics   [address] [facility] CART Left 1/6/2021    Procedure: ARTHROSCOPY KNEE;  Surgeon: Abigail Palacios DO;  [facility];  Service: Orthopedics    RIK-EN-Y PROCEDURE  2003    SPINAL CORD STIMULATOR IMPLANT  11/14/2017    dr Lauryn Carpio procedure and technique 1  removal of a right back implantable pulse generator 2 placement of a new right buttock impantable pulse generator through seperate incision 3 electric analys complex programming spinal cord stimulator system postoperative period approx 1 hour    TONSILLECTOMY         Family History   Problem Relation Age of Onset    Cancer Mother     Arthritis Mother     Stomach cancer Mother     Cancer Father     Esophageal cancer Father     Other Father         brain tumor    No Known Problems Sister     No Known Problems Sister     No Known Problems Sister        Social History Occupational History    Not on file   Tobacco Use    Smoking status: Current Every Day Smoker     Packs/day: 1 00     Types: Cigarettes    Smokeless tobacco: Never Used   Vaping Use    Vaping Use: Never used   Substance and Sexual Activity    Alcohol use: No     Comment: quit drinking, social, very rarely, no etoh in > 1 year    Drug use: No    Sexual activity: Not on file         Current Outpatient Medications:     Acetaminophen (TYLENOL 8 HOUR ARTHRITIS PAIN PO), Take by mouth, Disp: , Rfl:     albuterol (PROVENTIL HFA,VENTOLIN HFA) 90 mcg/act inhaler, INHALE 2 PUFFS EVERY 4 (FOUR) HOURS AS NEEDED FOR WHEEZING OR SHORTNESS OF BREATH COUGH, Disp: 18 g, Rfl: 0    baclofen 10 mg tablet, Take 1 PO QID PRN for pain and spasms  , Disp: 120 tablet, Rfl: 3    LANSOPRAZOLE PO, Take by mouth daily as needed  , Disp: , Rfl:     naloxone (NARCAN) 4 mg/0 1 mL nasal spray, Administer 1 spray into a nostril  If breathing does not return to normal or if breathing difficulty resumes after 2-3 minutes, give another dose in the other nostril using a new spray , Disp: 1 each, Rfl: 1    transdermal buprenorphine (BUTRANS) 20 mcg/hr PTWK TD patch, Apply 1 patch TD every 7 days for pain  Rotate patch application sites to avoid skin irritation  , Disp: 4 patch, Rfl: 3    Allergies   Allergen Reactions    Ampicillin GI Intolerance     Vomiting    Penicillins GI Intolerance     Vomit       Physical Exam:    /82 (BP Location: Left arm, Patient Position: Sitting, Cuff Size: Standard)   Pulse 88   Temp 97 9 °F (36 6 °C)   Ht 6' (1 829 m)   Wt 103 kg (227 lb)   BMI 30 79 kg/m²     Constitutional:normal, well developed, well nourished, alert, in no distress and non-toxic and no overt pain behavior    Eyes:anicteric  HEENT:grossly intact  Neck:supple, symmetric, trachea midline and no masses   Pulmonary:even and unlabored  Cardiovascular:No edema or pitting edema present  Skin:Normal without rashes or lesions and well hydrated  Psychiatric:Mood and affect appropriate  Neurologic:Cranial Nerves II-XII grossly intact  Musculoskeletal:The patient's gait is slightly antalgic, but steady without the use of any assistive devices      Imaging  No orders to display         Orders Placed This Encounter   Procedures    MM OF_Alprazolam Definitive    MM OF_Amphetamine Definitive Test    MM OF_Atomoxetine Definitive Test    MM OF_Buprenorphine Definitive Test    MM OF_Clonazepam Definitive    MM OF_Cocaine Definitive Test    MM OF_Codeine Definitive    MM OF_Diazepam Definitive    MM OF_Fentanyl Definitive Test    MM OF_Heroin Definitive Test    MM OF_Hydrocodone Definitive    MM OF_Hydromorphone Definitive    MM OF_Lorazepam Definitive    MM OF_MDMA Definitive Test    MM OF_Meperidine Definitive Test    MM OF_Methadone Definitive Test    MM OF_Methylphenidate Definitive Test    MM OF_Morphine Definitive    MM OF_Oxazepam Definitive    MM OF_Oxycodone Definitive Test - Oral Fluid    MM OF_Oxymorphone Definitive Test    MM OF_Phencyclidine Definitive Test    MM OF_Temazepam Definitive    MM OF_THC Definitive Test    MM OF_Tramadol Definitive Test    MM ALL_Prescribed Meds and Special Instructions

## 2022-02-03 NOTE — PATIENT INSTRUCTIONS
Opioid Safety   AMBULATORY CARE:   Opioid safety  includes the correct use, storage, and disposal of opioids  Examples of opioid medicines to treat pain include oxycodone, morphine, fentanyl, and codeine  Call your local emergency number (911 in the [address]), or have someone else call if:   · You have a seizure  · You cannot be woken  · You have trouble staying awake and your breathing is slow or shallow  · Your speech is slurred, or you are confused  · You are dizzy or stumble when you walk  Call your doctor, or have someone close to you call if:   · You are extremely drowsy, or you have trouble staying awake or speaking  · You have pale or clammy skin  · You have blue fingernails or lips  · Your heartbeat is slower than normal     · You cannot stop vomiting  · You have questions or concerns about your condition or care  Use opioids safely:   · Take prescribed opioids exactly as directed  Opioids come with directions based on the kind of opioid and how it is given  Do not take more than the recommended amount, or for longer than needed  · Do not give opioids to others or take opioids that belong to someone else  Misuse of opioids can lead to an addiction or overdose  · Do not mix opioids with other medicines or alcohol  The combination can cause an overdose, or lead to a coma  · Do not drive or operate heavy machinery after you take the opioid  Your provider or pharmacist can tell you how long to wait after a dose before you do these activities  · Talk to your healthcare provider if you have any side effects  He or she can help you prevent or relieve side effects  Side effects include nausea, sleepiness, itching, and trouble thinking clearly  Manage constipation:  Constipation is the most common side effect of opioid medicine  Constipation is when you have hard, dry bowel movements, or you go longer than usual between bowel movements   Tell your healthcare provider about all changes in your bowel movements while you are taking opioids  He or she may recommend laxative medicine to help you have a bowel movement  He or she may also change the kind of opioid you are taking, or change when you take it  The following are more ways you can prevent or relieve constipation:  · Drink liquids as directed  You may need to drink extra liquids to help soften and move your bowels  Ask how much liquid to drink each day and which liquids are best for you  · Eat high-fiber foods  This may help decrease constipation by adding bulk to your bowel movements  High-fiber foods include fruits, vegetables, whole-grain breads and cereals, and beans  Your healthcare provider or dietitian can help you create a high-fiber meal plan  Your provider may also recommend a fiber supplement if you cannot get enough fiber from food  · Exercise regularly  Regular physical activity can help stimulate your intestines  Walking is a good exercise to prevent or relieve constipation  Ask which exercises are best for you  · Schedule a time each day to have a bowel movement  This may help train your body to have regular bowel movements  Bend forward while you are on the toilet to help move the bowel movement out  Sit on the toilet for at least 10 minutes, even if you do not have a bowel movement  Store opioids safely:   · Store opioids where others cannot easily get them  Keep them in a locked cabinet or secure area  Do not  keep them in a purse or other bag you carry with you  A person may be looking for something else and find the opioids  · Make sure opioids are stored out of the reach of children  A child can easily overdose on opioids  Opioids may look like candy to a small child  The best way to dispose of opioids: The laws vary by country and area   In the [place], the best way is to return the opioids through a take-back program  This program is offered by the [employer] ([facility])  The following are options for using the program:  · Take the opioids to a CHARLA collection site  The site is often a law enforcement center  Call your local law enforcement center for scheduled take-back days in your area  You will be given information on where to go if the collection site is in a different location  · Take the opioids to an approved pharmacy or hospital   A pharmacy or hospital may be set up as a collection site  You will need to ask if it is a CHARLA collection site if you were not directed there  A pharmacy or doctor's office may not be able to take back opioids unless it is a CHARLA site  · Use a mail-back system  This means you are given containers to put the opioids into  You will then mail them in the containers  · Use a take-back drop box  This is a place to leave the opioids at any time  People and animals will not be able to get into the box  Your local law enforcement agency can tell you where to find a drop box in your area  Other safe ways to dispose of opioids: The medicine may come with disposal instructions  The instructions may vary depending on the brand of medicine you are using  Instructions may come in a Medication Guide, but not every medicine has one  You may instead get instructions from your pharmacy or doctor  Follow instructions carefully  The following are general guidelines to follow:  · Find out if you can flush the opioid  Some opioids can be flushed down the toilet or poured into the sink  You will need to contact authorities in your area to see if this is an option for you  The FDA also offers a list of medicines that are safe to flush down the toilet  You can check the list if you cannot get the information for your local area  · Ask your waste management company about rules for putting opioids in the trash  The company will be able to give you specific directions   Scratch out personal information on the original medicine label so it cannot be read  Then put it in the trash  Do not label the trash or put any information on it about the opioids  It should look like regular household trash so no one is tempted to look for the opioids  Keep the trash out of the reach of children and animals  Always make sure trash is secure  · Talk to officials if you live in a facility  If you live in a nursing home or assisted living center, talk to an official  The person will know the rules for your area  Other ways to manage pain:   · Ask your healthcare provider about non-opioid medicines to control pain  Nonprescription medicines include NSAIDs (such as ibuprofen) and acetaminophen  Prescription medicines include muscle relaxers, antidepressants, and steroids  · Pain may be managed without any medicines  Some ways to relieve pain include massage, aromatherapy, or meditation  Physical or occupational therapy may also help  For more information:   · Drug Enforcement Administration  [facility] [address]  Phone: 7- 668 - 470-3526  Web Address: [facility]/drug_disposal/    · [address]  and Drug Administration  [facility]  [place] , [address] [place] [address]  Phone: 2- 823 - 091-1129  Web Address: http://"[employer],"/    Follow up with your doctor or pain specialist as directed: You may need to have your dose adjusted  Your doctor or pain specialist can also help you find ways to manage pain without opioids  Write down your questions so you remember to ask them during your visits  © Copyright [employer] 2021 Information is for End User's use only and may not be sold, redistributed or otherwise used for commercial purposes  All illustrations and images included in CareNotes® are the copyrighted property of A D A M , Inc  or Suri Beckford  The above information is an  only  It is not intended as medical advice for individual conditions or treatments   Talk to your doctor, nurse or pharmacist before following any medical regimen to see if it is safe and effective for you

## 2022-02-10 LAB
7AMINOCLONAZEPAM SAL QL CFM: NEGATIVE NG/ML
AMPHET SAL QL CFM: NEGATIVE NG/ML
BUPRENORPHINE SAL QL SCN: ABNORMAL NG/ML
CARBOXYTHC SAL QL CFM: NEGATIVE NG/ML
CCP IGG SERPL-ACNC: NEGATIVE
COCAINE SAL QL CFM: NEGATIVE NG/ML
CODEINE SAL QL CFM: NEGATIVE NG/ML
EDDP SAL QL CFM: NEGATIVE NG/ML
HYDROCODONE SAL QL CFM: NEGATIVE NG/ML
HYDROCODONE SAL QL CFM: NEGATIVE NG/ML
HYDROMORPHONE SAL QL CFM: NEGATIVE NG/ML
LEUKEMIA MARKERS BLD-IMP: NEGATIVE NG/ML
M PROTEIN 3 UR ELPH-MCNC: NEGATIVE NG/ML
M TB TUBERC IGNF/MITOGEN IGNF CONTROL: NEGATIVE NG/ML
METHADONE SAL QL CFM: NEGATIVE NG/ML
MORPHINE SAL QL CFM: NEGATIVE NG/ML
MORPHINE SAL QL CFM: NEGATIVE NG/ML
OXYMORPHONE SAL QL CFM: NEGATIVE NG/ML
OXYMORPHONE SAL QL CFM: NEGATIVE NG/ML
PCP SAL QL CFM: NEGATIVE NG/ML
RESULT ALL_PRESCRIBED MEDS AND SPECIAL INSTRUCTIONS: NORMAL
SL AMB 6-MAM (HEROIN METABOLITE) QUANTIFICATION: NEGATIVE NG/ML
SL AMB ALPRAZOLAM QUANTIFICATION: NEGATIVE NG/ML
SL AMB ATOMOXETINE QUANTIFICATION: NORMAL
SL AMB CLONAZEPAM QUANTIFICATION: NEGATIVE NG/ML
SL AMB DIAZEPAM QUANTIFICATION: NEGATIVE NG/ML
SL AMB FENTANYL QUANTIFICATION: NEGATIVE NG/ML
SL AMB MDMA QUANTIFICATION: NEGATIVE NG/ML
SL AMB N-DESMETHYL-TRAMADOL QUANTIFICATION SALIVA: ABNORMAL NG/ML
SL AMB NORBUPRENORPHINE QUANTIFICATION: ABNORMAL NG/ML
SL AMB NORDIAZEPAM QUANTIFICATION: NEGATIVE NG/ML
SL AMB NORFENTANYL QUANTIFICATION: NEGATIVE NG/ML
SL AMB NORHYDROCODONE QUANTIFICATION: NEGATIVE NG/ML
SL AMB NORHYDROCODONE QUANTIFICATION: NEGATIVE NG/ML
SL AMB NORMEPERIDINE QUANTIFICATION: NEGATIVE NG/ML
SL AMB NOROXYCODONE QUANTIFICATION: NEGATIVE NG/ML
SL AMB OXAZEPAM QUANTIFICATION: NEGATIVE NG/ML
SL AMB RITALINIC ACID QUANTIFICATION: NEGATIVE
SL AMB TEMAZEPAM QUANTIFICATION: NEGATIVE NG/ML
SL AMB TEMAZEPAM QUANTIFICATION: NEGATIVE NG/ML
SL AMB TRAMADOL QUANTIFICATION: ABNORMAL NG/ML
SQUAMOUS #/AREA URNS HPF: NEGATIVE NG/ML

## 2022-03-21 ENCOUNTER — APPOINTMENT (OUTPATIENT)
Dept: RADIOLOGY | Facility: MEDICAL CENTER | Age: 47
End: 2022-03-21
Payer: COMMERCIAL

## 2022-03-21 ENCOUNTER — OFFICE VISIT (OUTPATIENT)
Dept: OBGYN CLINIC | Facility: MEDICAL CENTER | Age: 47
End: 2022-03-21
Payer: COMMERCIAL

## 2022-03-21 VITALS
SYSTOLIC BLOOD PRESSURE: 122 MMHG | BODY MASS INDEX: 30.34 KG/M2 | HEIGHT: 72 IN | DIASTOLIC BLOOD PRESSURE: 74 MMHG | WEIGHT: 224 LBS

## 2022-03-21 DIAGNOSIS — M23.91 INTERNAL DERANGEMENT OF RIGHT KNEE: Primary | ICD-10-CM

## 2022-03-21 DIAGNOSIS — M25.461 EFFUSION OF RIGHT KNEE: ICD-10-CM

## 2022-03-21 DIAGNOSIS — M25.561 ACUTE PAIN OF RIGHT KNEE: ICD-10-CM

## 2022-03-21 PROCEDURE — 73564 X-RAY EXAM KNEE 4 OR MORE: CPT

## 2022-03-21 PROCEDURE — 99214 OFFICE O/P EST MOD 30 MIN: CPT | Performed by: ORTHOPAEDIC SURGERY

## 2022-03-21 PROCEDURE — 3008F BODY MASS INDEX DOCD: CPT | Performed by: ORTHOPAEDIC SURGERY

## 2022-03-21 PROCEDURE — 4004F PT TOBACCO SCREEN RCVD TLK: CPT | Performed by: ORTHOPAEDIC SURGERY

## 2022-03-21 NOTE — LETTER
March 21, 2022     Patient: Atif Reich   YOB: 1975   Date of Visit: 3/21/2022       To Whom it May Concern:    Atif Reich is under my professional care  He was seen in my office on 3/21/2022  He will be out of work until medically cleared  If you have any questions or concerns, please don't hesitate to call           Sincerely,          Vernon Quan DO        CC: No Recipients
same name as above

## 2022-03-21 NOTE — PROGRESS NOTES
Ortho Sports Medicine Knee Visit     Assesment:   right knee acute lateral meniscal tear     Plan:    Conservative treatment:    Ice to knee for 20 minutes at least 1-2 times daily  OTC NSAIDS prn for pain  See back for MRI review to determine future course of treatment     Imaging: All imaging from today was reviewed by myself and explained to the patient  We will obtain an MRI of the right knee to rule out lateral meniscal tear  Injection:    No Injection planned at this time  Surgery:     No surgery is recommended at this point, continue with conservative treatment plan as noted  If patient has lateral meniscal tear then arthroscopic partial lateral meniscectomy vs repair would be recommended  Details of surgery explained and questions were answered  History of Present Illness: The patient is a 55 y o  male whose occupation is , referred to me by themself, seen in clinic for evaluation of right knee pain  Pain is located lateral  The pain has been present for 3 days  The patient sustained an injury on Saturday  Patient was in squatted position with right leg extended out away from body which is not uncommon for him and when he went to get up he felt the knee pop with acute sharp pain, swelling followed  [facility] seen at [facility] and imaging showed nothing  He has difficult time extending and flexing knee  Knee feels locked, instability  The pain is characterized as sharp, stabbing  The pain is present daily  Pain is improved by rest and ice  Pain is aggravated by stairs, squatting, weight bearing, walking and pivoting on a planted foot  Symptoms include clicking and swelling  The patient has tried rest, ice and NSAIDS            Knee Surgical History:  None on right knee    Past Medical, Social and Family History:  Past Medical History:   Diagnosis Date    Anxiety     Arthritis     Asthma     Bipolar 2 disorder, major depressive episode ([address] ) 10/31/2017    Bipolar disorder (HCC)     Chronic left lumbar radiculopathy     Chronic low back pain     Chronic pain syndrome     Chronic right shoulder pain     Depression with anxiety     Fatigue     GERD (gastroesophageal reflux disease)     Hydronephrosis     Iron deficiency anemia     Kidney stone     Lytic bone lesions on xray     Nephrolithiasis     PONV (postoperative nausea and vomiting)     Right elbow pain     Right lateral epicondylitis      Past Surgical History:   Procedure Laterality Date    ADENOIDECTOMY Bilateral     APPENDECTOMY      BACK SURGERY      CHOLECYSTECTOMY      CHOLECYSTECTOMY LAPAROSCOPIC N/A 10/30/2018    Procedure: CHOLECYSTECTOMY WITH GASTROTOMY LAPAROSCOPIC;  Surgeon: Denae Wood MD;  [facility];  Service: General    ERCP W/ SPHICTEROTOMY N/A 10/30/2018    Procedure: ENDOSCOPIC RETROGRADE CHOLANGIOPANCREATOGRAPHY (ERCP) W/ SPHINCTEROTOMY;  Surgeon: Tee Rojas MD;  [facility];  Service: Gastroenterology    GASTRIC BYPASS      2009    OTHER SURGICAL HISTORY      fusion/refusion of vertebrae, 2010 and 2011 l5-s1 and l4-l5    MO CYSTO/URETERO W/LITHOTRIPSY &INDWELL STENT INSRT Right 8/8/2017    Procedure: CYSTOSCOPY; URETEROSCOPY; HOLMIUM LASER; RETROGRADE PYELOGRAM; STENT;  Surgeon: Tracee Lujan MD;  [facility];  Service: Urology    MO [address] [place] [address] Left 8/5/7175    Procedure: INCISION AND DRAINAGE (I&D) EXTREMITY- of left knee s/p MPFL reconstruction, I&D if the left knee with possible MPFL revision;  Surgeon: Jada Long DO;  [facility];  Service: Orthopedics    MO FIX UNSTABLE PATELLA,EXTEN REALIGN Left 1/6/2021    Procedure: REALIGNMENT PATELLA with chondroplasty of patella, open medial patellofemoral ligament reconstruction with allograft;  Surgeon: Jada Long DO;  [facility];  Service: Orthopedics    MO IMPLANT SPINAL NEUROSTIM/ Right 11/14/2017    Procedure: REMOVAL LOWER MEDIAL BUTTOCK SPINAL CORD STIMULATOR GENERATOR; PLACEMENT OF NEW BUTTOCK SPINAL CORD STIMULATOR THROUGH SEPERATE INCISION;  Surgeon: Edilberto Morfin MD;  [facility];  Service: Neurosurgery    KS IMPLANT SPINAL NEUROSTIM/ Right 1/12/2022    Procedure: Right sided spinal cord stimulator pulse generator replacement;  Surgeon: Nora Parham MD;  [facility];  Service: Neurosurgery    KS KNEE SCOPE, W/LATERAL RELEASE Left 1/6/2021    Procedure: RELEASE RETINACULAR;  Surgeon: Tadeo Booker DO;  [facility];  Service: Orthopedics    [address] Left 1/6/2021    Procedure: ARTHROSCOPY KNEE;  Surgeon: Tadeo Booker DO;  [facility];  Service: Orthopedics    RIK-EN-Y PROCEDURE  2003    SPINAL CORD STIMULATOR IMPLANT  11/14/2017    dr Buck Gordon procedure and technique 1  removal of a right back implantable pulse generator 2 placement of a new right buttock impantable pulse generator through seperate incision 3 electric analys complex programming spinal cord stimulator system postoperative period approx 1 hour    TONSILLECTOMY       Allergies   Allergen Reactions    Ampicillin GI Intolerance     Vomiting    Penicillins GI Intolerance     Vomit     Current Outpatient Medications on File Prior to Visit   Medication Sig Dispense Refill    Acetaminophen (TYLENOL 8 HOUR ARTHRITIS PAIN PO) Take by mouth      albuterol (PROVENTIL HFA,VENTOLIN HFA) 90 mcg/act inhaler INHALE 2 PUFFS EVERY 4 (FOUR) HOURS AS NEEDED FOR WHEEZING OR SHORTNESS OF BREATH COUGH 18 g 0    baclofen 10 mg tablet Take 1 PO QID PRN for pain and spasms  120 tablet 3    LANSOPRAZOLE PO Take by mouth daily as needed        naloxone (NARCAN) 4 mg/0 1 mL nasal spray Administer 1 spray into a nostril  If breathing does not return to normal or if breathing difficulty resumes after 2-3 minutes, give another dose in the other nostril using a new spray   1 each 1    transdermal buprenorphine (BUTRANS) 20 mcg/hr PTWK TD patch Apply 1 patch TD every 7 days for pain  Rotate patch application sites to avoid skin irritation  4 patch 3     No current facility-administered medications on file prior to visit  Social History     Socioeconomic History    Marital status:      Spouse name: Not on file    Number of children: Not on file    Years of education: GED    Highest education level: Not on file   Occupational History    Not on file   Tobacco Use    Smoking status: Current Every Day Smoker     Packs/day: 1 00     Types: Cigarettes    Smokeless tobacco: Never Used   Vaping Use    Vaping Use: Never used   Substance and Sexual Activity    Alcohol use: No     Comment: quit drinking, social, very rarely, no etoh in > 1 year    Drug use: No    Sexual activity: Not on file   Other Topics Concern    Not on file   Social History Narrative    Chooses not to have children    Drinks caffienated tea    Lives with spouse             Social Determinants of Health     Financial Resource Strain: Not on file   Food Insecurity: Not on file   Transportation Needs: Not on file   Physical Activity: Not on file   Stress: Not on file   Social Connections: Not on file   Intimate Partner Violence: Not on file   Housing Stability: Not on file         I have reviewed the past medical, surgical, social and family history, medications and allergies as documented in the EMR  Review of systems: ROS is negative other than that noted in the HPI  Constitutional: Negative for fatigue and fever  HENT: Negative for sore throat  Respiratory: Negative for shortness of breath  Cardiovascular: Negative for chest pain  Gastrointestinal: Negative for abdominal pain  Endocrine: Negative for cold intolerance and heat intolerance  Genitourinary: Negative for flank pain  Musculoskeletal: Negative for back pain  Skin: Negative for rash     Allergic/Immunologic: Negative for immunocompromised state  Neurological: Negative for dizziness  Psychiatric/Behavioral: Negative for agitation  Physical Exam:    Blood pressure 122/74, height 6' (1 829 m), weight 102 kg (224 lb)  General/Constitutional: NAD, well developed, well nourished  HENT: Normocephalic, atraumatic  CV: Intact distal pulses, regular rate  Resp: No respiratory distress or labored breathing  Lymphatic: No lymphadenopathy palpated  Neuro: Alert and Oriented x 3, no focal deficits  Psych: Normal mood, normal affect, normal judgement, normal behavior  Skin: Warm, dry, no rashes, no erythema       Knee Exam (focused):                  RIGHT LEFT   ROM:    0-130   Palpation: Effusion moderate negative     MJL tenderness Negative Negative     LJL tenderness Positive Negative   Instability: Varus stable stable     Valgus stable stable   Special Tests: Lachman Negative Negative     Posterior drawer Negative Negative     Anterior drawer Negative Negative     Pivot shift not tested not tested     Dial not tested not tested   Patella: Palpation no tenderness no tenderness     Mobility 1/4 1/4     Apprehension Negative Negative   Other: Single leg 1/4 squat not tested not tested      LE NV Exam: +2 DP/PT pulses bilaterally  Sensation intact to light touch L2-S1 bilaterally     Bilateral hip ROM demonstrates no pain actively or passively    No calf tenderness to palpation bilaterally    Knee Imaging    X-rays of the right knee were reviewed, which demonstrate mild degenerative changes throughout the knee marginal spurring   I have reviewed the radiology report and do not currently have a radiology reading from [facility], but will check the result once the reading is performed        Scribe Attestation    I,:  Chante Escalante am acting as a scribe while in the presence of the attending physician :       I,:  Deanna Casillas, DO personally performed the services described in this documentation    as scribed in my presence :

## 2022-03-31 ENCOUNTER — HOSPITAL ENCOUNTER (OUTPATIENT)
Dept: RADIOLOGY | Facility: HOSPITAL | Age: 47
Discharge: HOME | End: 2022-03-31
Attending: ORTHOPAEDIC SURGERY
Payer: COMMERCIAL

## 2022-03-31 ENCOUNTER — HOSPITAL ENCOUNTER (OUTPATIENT)
Dept: RADIOLOGY | Facility: HOSPITAL | Age: 47
Discharge: HOME | End: 2022-03-31
Payer: COMMERCIAL

## 2022-03-31 DIAGNOSIS — M23.91 INTERNAL DERANGEMENT OF RIGHT KNEE: ICD-10-CM

## 2022-03-31 PROCEDURE — 73721 MRI JNT OF LWR EXTRE W/O DYE: CPT

## 2022-04-04 ENCOUNTER — OFFICE VISIT (OUTPATIENT)
Dept: OBGYN CLINIC | Facility: MEDICAL CENTER | Age: 47
End: 2022-04-04
Payer: COMMERCIAL

## 2022-04-04 VITALS
SYSTOLIC BLOOD PRESSURE: 169 MMHG | DIASTOLIC BLOOD PRESSURE: 116 MMHG | HEIGHT: 72 IN | BODY MASS INDEX: 30.34 KG/M2 | WEIGHT: 224 LBS

## 2022-04-04 DIAGNOSIS — M25.461 EFFUSION OF RIGHT KNEE: ICD-10-CM

## 2022-04-04 DIAGNOSIS — M25.561 ACUTE PAIN OF RIGHT KNEE: ICD-10-CM

## 2022-04-04 DIAGNOSIS — S83.271A COMPLEX TEAR OF LATERAL MENISCUS OF RIGHT KNEE AS CURRENT INJURY, INITIAL ENCOUNTER: Primary | ICD-10-CM

## 2022-04-04 PROCEDURE — 99214 OFFICE O/P EST MOD 30 MIN: CPT | Performed by: ORTHOPAEDIC SURGERY

## 2022-04-04 PROCEDURE — 4004F PT TOBACCO SCREEN RCVD TLK: CPT | Performed by: ORTHOPAEDIC SURGERY

## 2022-04-04 PROCEDURE — 3008F BODY MASS INDEX DOCD: CPT | Performed by: ORTHOPAEDIC SURGERY

## 2022-04-04 RX ORDER — CHLORHEXIDINE GLUCONATE 0.12 MG/ML
15 RINSE ORAL ONCE
Status: CANCELLED | OUTPATIENT
Start: 2022-04-26 | End: 2022-04-04

## 2022-04-04 RX ORDER — CLINDAMYCIN PHOSPHATE 900 MG/50ML
900 INJECTION INTRAVENOUS ONCE
Status: CANCELLED | OUTPATIENT
Start: 2022-04-26 | End: 2022-04-04

## 2022-04-04 NOTE — PROGRESS NOTES
Ortho Sports Medicine Knee Follow Up Visit     Assesment:     55 y o  male right knee lateral meniscus tear    Plan:    Diagnostics reviewed and physical exam performed  Diagnosis, treatment options and associated risks were discussed with the patient including no treatment, nonsurgical treatment and potential for surgical intervention  The patient was given the opportunity to ask questions regarding each  Quality of life decision to pursue elective right knee arthroscopy  Risks and benefits of right knee arthroscopy discussed  Consents obtained  Conservative treatment:    Ice to knee for 20 minutes at least 1-2 times daily  Tylenol for pain  Imaging: All imaging from today was reviewed by myself and explained to the patient  Injection:    No Injection planned at this time, but was offered  Surgery: All of the risks and benefits of operative treatment were explained to the patient, as well as the risks and benefits of any alternative treatment options, including nonoperative care  The risks of surgical treatement include, but are not limited to, infection, bleeding, blood clot, neurovascular damage, need for further surgery, continued pain, cardiovascular risk, and anesthesia risk  The patient understood this and elects to proceed forward with surgical intervention  We will proceed forward with surgical arthroscopy of the right knee with partial lateral meniscectomy    Follow up:    Return for post-op  Chief Complaint   Patient presents with    Right Knee - Follow-up       History of Present Illness: The patient is returns for follow up of his right knee and to discuss his recent MRI  Since the prior visit, He reports no improvement  He finds that his right knee is unstable at times  He had a sudden onset of pain laterally at his right knee, 03/18/2022    He was getting up from a squatting position and felt his knee pop and had an acute onset of sharp pain as well as swelling  Pain is located lateral aspect of right knee  Pain is improved by rest and ice  Pain is aggravated by squatting, weight bearing and pivoting on a planted foot  Symptoms include clicking, catching, popping, cracking, swelling and locking  The patient has tried rest, ice and NSAIDS            Knee Surgical History:  None on right knee    Past Medical, Social and Family History:  Past Medical History:   Diagnosis Date    Anxiety     Arthritis     Asthma     Bipolar 2 disorder, major depressive episode ([facility] ) 10/31/2017    Bipolar disorder ([facility] )     Chronic left lumbar radiculopathy     Chronic low back pain     Chronic pain syndrome     Chronic right shoulder pain     Depression with anxiety     Fatigue     GERD (gastroesophageal reflux disease)     Hydronephrosis     Iron deficiency anemia     Kidney stone     Lytic bone lesions on xray     Nephrolithiasis     PONV (postoperative nausea and vomiting)     Right elbow pain     Right lateral epicondylitis      Past Surgical History:   Procedure Laterality Date    ADENOIDECTOMY Bilateral     APPENDECTOMY      BACK SURGERY      CHOLECYSTECTOMY      CHOLECYSTECTOMY LAPAROSCOPIC N/A 10/30/2018    Procedure: CHOLECYSTECTOMY WITH GASTROTOMY LAPAROSCOPIC;  Surgeon: Miller Covarrubias MD;  [facility];  Service: General    ERCP W/ SPHICTEROTOMY N/A 10/30/2018    Procedure: ENDOSCOPIC RETROGRADE CHOLANGIOPANCREATOGRAPHY (ERCP) W/ SPHINCTEROTOMY;  Surgeon: Ajay Woodson MD;  [facility];  Service: Gastroenterology    GASTRIC BYPASS      2009    OTHER SURGICAL HISTORY      fusion/refusion of vertebrae, 2010 and 2011 l5-s1 and l4-l5    NE CYSTO/URETERO W/LITHOTRIPSY &INDWELL STENT INSRT Right 8/8/2017    Procedure: CYSTOSCOPY; URETEROSCOPY; HOLMIUM LASER; RETROGRADE PYELOGRAM; STENT;  Surgeon: Gopi Anglin MD;  [facility];  Service: Urology    [address] Left 6/7/9728    Procedure: INCISION AND DRAINAGE (I&D) EXTREMITY- of left knee s/p MPFL reconstruction, I&D if the left knee with possible MPFL revision;  Surgeon: Yamilex Rhodes DO;  [facility];  Service: Orthopedics    NV FIX UNSTABLE PATELLA,EXTEN REALIGN Left 1/6/2021    Procedure: REALIGNMENT PATELLA with chondroplasty of patella, open medial patellofemoral ligament reconstruction with allograft;  Surgeon: Yamilex Rhodes DO;  [facility];  Service: Orthopedics    NV IMPLANT SPINAL NEUROSTIM/ Right 11/14/2017    Procedure: REMOVAL LOWER MEDIAL BUTTOCK SPINAL CORD STIMULATOR GENERATOR; PLACEMENT OF NEW BUTTOCK SPINAL CORD [facility];  Surgeon: Orlando Paniagua MD;  [facility];  Service: Neurosurgery    NV IMPLANT SPINAL NEUROSTIM/ Right 1/12/2022    Procedure: Right sided spinal cord stimulator pulse generator replacement;  Surgeon: Daniela Dawson MD;  [facility];  Service: Neurosurgery    NV KNEE SCOPE, W/LATERAL RELEASE Left 1/6/2021    Procedure: RELEASE RETINACULAR;  Surgeon: Yamilex Rhodes DO;  [facility];  Service: Orthopedics    [address] [place] [address] Ne CART Left 1/6/2021    Procedure: ARTHROSCOPY KNEE;  Surgeon: Yamilex Rhodes DO;  [facility];  Service: Orthopedics    RIK-EN-Y PROCEDURE  2003    SPINAL CORD STIMULATOR IMPLANT  11/14/2017    dr Heydi Vilchis procedure and technique 1  removal of a right back implantable pulse generator 2 placement of a new right buttock impantable pulse generator through seperate incision 3 electric analys complex programming spinal cord stimulator system postoperative period approx 1 hour    TONSILLECTOMY       Allergies   Allergen Reactions    Ampicillin GI Intolerance     Vomiting    Penicillins GI Intolerance     Vomit     Current Outpatient Medications on File Prior to Visit   Medication Sig Dispense Refill    Acetaminophen (TYLENOL 8 HOUR ARTHRITIS PAIN PO) Take by mouth  albuterol (PROVENTIL HFA,VENTOLIN HFA) 90 mcg/act inhaler INHALE 2 PUFFS EVERY 4 (FOUR) HOURS AS NEEDED FOR WHEEZING OR SHORTNESS OF BREATH COUGH 18 g 0    baclofen 10 mg tablet Take 1 PO QID PRN for pain and spasms  120 tablet 3    LANSOPRAZOLE PO Take by mouth daily as needed        naloxone (NARCAN) 4 mg/0 1 mL nasal spray Administer 1 spray into a nostril  If breathing does not return to normal or if breathing difficulty resumes after 2-3 minutes, give another dose in the other nostril using a new spray  1 each 1    transdermal buprenorphine (BUTRANS) 20 mcg/hr PTWK TD patch Apply 1 patch TD every 7 days for pain  Rotate patch application sites to avoid skin irritation  4 patch 3     No current facility-administered medications on file prior to visit       Social History     Socioeconomic History    Marital status:      Spouse name: Not on file    Number of children: Not on file    Years of education: GED    Highest education level: Not on file   Occupational History    Not on file   Tobacco Use    Smoking status: Current Every Day Smoker     Packs/day: 1 00     Types: Cigarettes    Smokeless tobacco: Never Used   Vaping Use    Vaping Use: Never used   Substance and Sexual Activity    Alcohol use: No     Comment: quit drinking, social, very rarely, no etoh in > 1 year    Drug use: No    Sexual activity: Not on file   Other Topics Concern    Not on file   Social History Narrative    Chooses not to have children    Drinks caffienated tea    Lives with spouse             Social Determinants of Health     Financial Resource Strain: Not on file   Food Insecurity: Not on file   Transportation Needs: Not on file   Physical Activity: Not on file   Stress: Not on file   Social Connections: Not on file   Intimate Partner Violence: Not on file   Housing Stability: Not on file         I have reviewed the past medical, surgical, social and family history, medications and allergies as documented in the EMR  Review of systems: ROS is negative other than that noted in the HPI  Constitutional: Negative for fatigue and fever  Physical Exam:    Blood pressure (!) 169/116, height 6' (1 829 m), weight 102 kg (224 lb)  General/Constitutional: NAD, well developed, well nourished  HENT: Normocephalic, atraumatic  CV: Intact distal pulses, regular rate  Resp: No respiratory distress or labored breathing  Lymphatic: No lymphadenopathy palpated  Neuro: Alert and Oriented x 3, no focal deficits  Psych: Normal mood, normal affect, normal judgement, normal behavior  Skin: Warm, dry, no rashes, no erythema      Knee Exam (focused): RIGHT    ROM:   , limited by stiffness    Palpation: Effusion moderate      MJL tenderness Negative      LJL tenderness Positive    Meniscus:  Felicita Positive     Apley's Compression Positive    Instability: Varus stable      Valgus stable    Special Tests: Lachman Negative      Posterior drawer Negative      Anterior drawer Negative      Pivot shift not tested      Dial not tested    Patella: Palpation no tenderness      Mobility 1/4      Apprehension Negative    Other: Single leg 1/4 squat not tested            LE NV Exam: +2 DP/PT pulses bilaterally  Sensation intact to light touch L2-S1 bilaterally    No calf tenderness to palpation bilaterally      Knee Imaging  The attending physician has personally reviewed the pertinent films in PACS and interpretation is as follows:  Right knee MRI from 03/31/2022 was reviewed today shows:  Complex tear lateral meniscus      Scribe Attestation    I,:  Lyn Dasilva am acting as a scribe while in the presence of the attending physician :       I,:  Connie Lennon, DO personally performed the services described in this documentation    as scribed in my presence :

## 2022-04-04 NOTE — H&P (VIEW-ONLY)
Ortho Sports Medicine Knee Follow Up Visit     Assesment:     55 y o  male right knee lateral meniscus tear    Plan:    Diagnostics reviewed and physical exam performed  Diagnosis, treatment options and associated risks were discussed with the patient including no treatment, nonsurgical treatment and potential for surgical intervention  The patient was given the opportunity to ask questions regarding each  Quality of life decision to pursue elective right knee arthroscopy  Risks and benefits of right knee arthroscopy discussed  Consents obtained  Conservative treatment:    Ice to knee for 20 minutes at least 1-2 times daily  Tylenol for pain  Imaging: All imaging from today was reviewed by myself and explained to the patient  Injection:    No Injection planned at this time, but was offered  Surgery: All of the risks and benefits of operative treatment were explained to the patient, as well as the risks and benefits of any alternative treatment options, including nonoperative care  The risks of surgical treatement include, but are not limited to, infection, bleeding, blood clot, neurovascular damage, need for further surgery, continued pain, cardiovascular risk, and anesthesia risk  The patient understood this and elects to proceed forward with surgical intervention  We will proceed forward with surgical arthroscopy of the right knee with partial lateral meniscectomy    Follow up:    Return for post-op  Chief Complaint   Patient presents with    Right Knee - Follow-up       History of Present Illness: The patient is returns for follow up of his right knee and to discuss his recent MRI  Since the prior visit, He reports no improvement  He finds that his right knee is unstable at times  He had a sudden onset of pain laterally at his right knee, 03/18/2022    He was getting up from a squatting position and felt his knee pop and had an acute onset of sharp pain as well as swelling  Pain is located lateral aspect of right knee  Pain is improved by rest and ice  Pain is aggravated by squatting, weight bearing and pivoting on a planted foot  Symptoms include clicking, catching, popping, cracking, swelling and locking  The patient has tried rest, ice and NSAIDS            Knee Surgical History:  None on right knee    Past Medical, Social and Family History:  Past Medical History:   Diagnosis Date    Anxiety     Arthritis     Asthma     Bipolar 2 disorder[facility] major depressive episode ([facility] ) 10/31/2017    Bipolar disorder ([facility] )     Chronic left lumbar radiculopathy     Chronic low back pain     Chronic pain syndrome     Chronic right shoulder pain     Depression with anxiety     Fatigue     GERD (gastroesophageal reflux disease)     Hydronephrosis     Iron deficiency anemia     Kidney stone     Lytic bone lesions on xray     Nephrolithiasis     PONV (postoperative nausea and vomiting)     Right elbow pain     Right lateral epicondylitis      Past Surgical History:   Procedure Laterality Date    ADENOIDECTOMY Bilateral     APPENDECTOMY      BACK SURGERY      CHOLECYSTECTOMY      CHOLECYSTECTOMY LAPAROSCOPIC N/A 10/30/2018    Procedure: CHOLECYSTECTOMY WITH GASTROTOMY LAPAROSCOPIC;  Surgeon: Dottie Lopez MD;  [facility];  Service: General    ERCP W/ SPHICTEROTOMY N/A 10/30/2018    Procedure: ENDOSCOPIC RETROGRADE CHOLANGIOPANCREATOGRAPHY (ERCP) W/ SPHINCTEROTOMY;  Surgeon: Pallavi Johnson MD;  [facility];  Service: Gastroenterology    GASTRIC BYPASS      2009    OTHER SURGICAL HISTORY      fusion/refusion of vertebrae, 2010 and 2011 l5-s1 and l4-l5    [place] CYSTO/URETERO W/LITHOTRIPSY &INDWELL STENT INSRT Right 8/8/2017    Procedure: CYSTOSCOPY; URETEROSCOPY; HOLMIUM LASER; RETROGRADE PYELOGRAM; STENT;  Surgeon: Sherry Foss MD;  [facility];  Service: Urology    [place] [address] [place] [address] Left 2/5/8376    Procedure: INCISION AND DRAINAGE (I&D) EXTREMITY- of left knee s/p MPFL reconstruction, I&D if the left knee with possible MPFL revision;  Surgeon: Ann-Marie Juarez DO;  [facility];  Service: Orthopedics    [place] FIX UNSTABLE PATELLA,EXTEN REALIGN Left 1/6/2021    Procedure: REALIGNMENT PATELLA with chondroplasty of patella, open medial patellofemoral ligament reconstruction with allograft;  Surgeon: Ann-Marie Juarez DO;  [facility];  Service: Orthopedics    [place] IMPLANT SPINAL NEUROSTIM/ Right 11/14/2017    Procedure: REMOVAL LOWER MEDIAL BUTTOCK SPINAL CORD STIMULATOR GENERATOR; PLACEMENT OF NEW BUTTOCK SPINAL CORD [facility];  Surgeon: Zell Lanes, MD;  [facility];  Service: Neurosurgery    AK IMPLANT SPINAL NEUROSTIM/ Right 1/12/2022    Procedure: Right sided spinal cord stimulator pulse generator replacement;  Surgeon: Jett Olvera MD;  [facility];  Service: Neurosurgery    [place] KNEE SCOPE, W/LATERAL RELEASE Left 1/6/2021    Procedure: RELEASE RETINACULAR;  Surgeon: Ann-Marie Juarez DO;  [facility];  Service: Orthopedics    [address] [place] [address] Left 1/6/2021    Procedure: ARTHROSCOPY KNEE;  Surgeon: Ann-Marie Juarez DO;  [facility];  Service: Orthopedics    RIK-EN-Y PROCEDURE  2003    SPINAL CORD STIMULATOR IMPLANT  11/14/2017    dr Smith Achilles procedure and technique 1  removal of a right back implantable pulse generator 2 placement of a new right buttock impantable pulse generator through seperate incision 3 electric analys complex programming spinal cord stimulator system postoperative period approx 1 hour    TONSILLECTOMY       Allergies   Allergen Reactions    Ampicillin GI Intolerance     Vomiting    Penicillins GI Intolerance     Vomit     Current Outpatient Medications on File Prior to Visit   Medication Sig Dispense Refill    Acetaminophen (TYLENOL 8 HOUR ARTHRITIS PAIN PO) Take by mouth  albuterol (PROVENTIL HFA,VENTOLIN HFA) 90 mcg/act inhaler INHALE 2 PUFFS EVERY 4 (FOUR) HOURS AS NEEDED FOR WHEEZING OR SHORTNESS OF BREATH COUGH 18 g 0    baclofen 10 mg tablet Take 1 PO QID PRN for pain and spasms  120 tablet 3    LANSOPRAZOLE PO Take by mouth daily as needed        naloxone (NARCAN) 4 mg/0 1 mL nasal spray Administer 1 spray into a nostril  If breathing does not return to normal or if breathing difficulty resumes after 2-3 minutes, give another dose in the other nostril using a new spray  1 each 1    transdermal buprenorphine (BUTRANS) 20 mcg/hr PTWK TD patch Apply 1 patch TD every 7 days for pain  Rotate patch application sites to avoid skin irritation  4 patch 3     No current facility-administered medications on file prior to visit       Social History     Socioeconomic History    Marital status:      Spouse name: Not on file    Number of children: Not on file    Years of education: GED    Highest education level: Not on file   Occupational History    Not on file   Tobacco Use    Smoking status: Current Every Day Smoker     Packs/day: 1 00     Types: Cigarettes    Smokeless tobacco: Never Used   Vaping Use    Vaping Use: Never used   Substance and Sexual Activity    Alcohol use: No     Comment: quit drinking, social, very rarely, no etoh in > 1 year    Drug use: No    Sexual activity: Not on file   Other Topics Concern    Not on file   Social History Narrative    Chooses not to have children    Drinks caffienated tea    Lives with spouse             Social Determinants of Health     Financial Resource Strain: Not on file   Food Insecurity: Not on file   Transportation Needs: Not on file   Physical Activity: Not on file   Stress: Not on file   Social Connections: Not on file   Intimate Partner Violence: Not on file   Housing Stability: Not on file         I have reviewed the past medical, surgical, social and family history, medications and allergies as documented in the EMR  Review of systems: ROS is negative other than that noted in the HPI  Constitutional: Negative for fatigue and fever  Physical Exam:    Blood pressure (!) 169/116, height 6' (1 829 m), weight 102 kg (224 lb)  General/Constitutional: NAD, well developed, well nourished  HENT: Normocephalic, atraumatic  CV: Intact distal pulses, regular rate  Resp: No respiratory distress or labored breathing  Lymphatic: No lymphadenopathy palpated  Neuro: Alert and Oriented x 3, no focal deficits  Psych: Normal mood, normal affect, normal judgement, normal behavior  Skin: Warm, dry, no rashes, no erythema      Knee Exam (focused): RIGHT    ROM:   , limited by stiffness    Palpation: Effusion moderate      MJL tenderness Negative      LJL tenderness Positive    Meniscus:  Felicita Positive     Apley's Compression Positive    Instability: Varus stable      Valgus stable    Special Tests: Lachman Negative      Posterior drawer Negative      Anterior drawer Negative      Pivot shift not tested      Dial not tested    Patella: Palpation no tenderness      Mobility 1/4      Apprehension Negative    Other: Single leg 1/4 squat not tested            LE NV Exam: +2 DP/PT pulses bilaterally  Sensation intact to light touch L2-S1 bilaterally    No calf tenderness to palpation bilaterally      Knee Imaging  The attending physician has personally reviewed the pertinent films in PACS and interpretation is as follows:  Right knee MRI from 03/31/2022 was reviewed today shows:  Complex tear lateral meniscus      Scribe Attestation    I,:  Zaynab Dickensjerrods am acting as a scribe while in the presence of the attending physician :       I,:  Ty Lennon DO personally performed the services described in this documentation    as scribed in my presence :

## 2022-04-04 NOTE — PATIENT INSTRUCTIONS
Diagnosis ICD-10-CM Associated Orders   1  Complex tear of lateral meniscus of right knee as current injury, initial encounter  S83 271A Case request operating room: [address] [place] [address]     Case request operating room: [address] [place] [address]   2  Acute pain of right knee  M25 561 Case request operating room: [address] [place] [address]     Case request operating room: [address] [place] [address]   3  Effusion of right knee  M25 461 Case request operating room: [address] [place] [address]     Case request operating room: [address] [place] [address]         Return for post-op  What to Expect Before and After Knee Arthroscopy  You are being scheduled for an outpatient knee arthroscopy by Dr Naomy Morrison  This surgery is done most commonly to treat meniscal tears; however the technique allows Dr Naomy Morrison to treat a variety of problems inside the knee with small incisions and using a scope/camera instrument  Here is some information which may help to answer questions that you may have  Please do not hesitate to reach out to our team to answer questions not addressed here  Before Surgery  You will be contacted the evening prior to your surgery to confirm the scheduled time of the procedure and when to arrive at the hospital    Do not eat or drink anything after midnight the night before your surgery so that the anesthesia can be performed safely  You typically do not knee any type of brace following the surgery unless specifically instructed by Dr Sourav Horn team   Amandeep Bridges will meet the anesthesiologist the morning of the surgery  The surgery is performed under a general anesthetic (going to sleep) but they will also offer you a regional block (injection in the leg to numb the leg) to help control your post-operative pain  Unfortunately the block will eventually wear off (typically 12-24 hours after the surgery) but can still be very helpful in managing the pain early on    You will also be provided with a prescription for narcotic pain medication for a short period of time (5 day supply) as some patients require this for post-surgical pain control  We typically will not provide more of that medication at the follow up as to not risk causing dependency and the acute pain should be improved by then  After Surgery  The surgery typically takes 20-30 minutes  When surgery is completed, Dr Osiris Araya will update your family and friends on your condition and progress  You will remain in the recovery room for between 30-60 minutes or until the anesthesia has worn off and your blood pressure and pulse are stable  You will then be brought to the post-op area and see your family/friends  You will be discharged home with crutches and should be able to put all the weight on the leg that you can tolerated  The crutches should be able to be discontinued in 48-72 hours but if you need them for support for a little longer that is fine  As each surgery is unique, Dr Osiris Araya and his team will be clear if this is to change for your specific procedure  Ice applied to the knee for 20 minutes on and 20 minutes off is helpful to control pain and swelling for some patients and care should be given to make sure that ice is not in direct contact with the skin for fear of causing frostbite  The day after the surgery it is okay to remove the dressings and shower (it is okay if the incisions get wet, just try not to submerge them like in a bath for about 2 weeks following the surgery)  If you dont feel stable without the crutches to shower you can consider using a shower chair (plastic patio chair works well)  The incisions will have paper strips covering absorbable sutures and the strips will fall off over time    Make sure you dry everything after showering and a clean ace with a dry bandage (gauze from the pharmacy works great) can be placed after showering to prevent abrasion over the incisions but is not necessarily required  Most patients just leave the wounds open to the air and this is safe to do  It is normal to have some clear or even bloody drainage from the incisions but if you notice a foul odor or purulent drainage from your incision or your temperature goes above 101 5 degrees please contact the office to make sure an infection is not occurring  Pain Control    While pain is an individual experience and difficult to predict, a narcotic pain medication is often required to help manage the pain  A prescription for this will be provided but only a limited number of pills will be prescribed to help manage the acute phase of recovery  Outside of the acute phase (5 or so days), this medication will not be indicated  In addition to the narcotic pain medication, it is safe to use an anti-inflammatory (unless the patient has a medical condition that would not allow safe use of this mediation)  This includes the Advil, Motrin, Ibuprofen and Alleve category of medications  Simply follow the over the counter dosing on the package and use as indicated as another adjunct  Importantly since these medications are all very similar, use only one of them  Tylenol is a separate medication that can be utilized as well and can be taken at the same time as the other medication or given in a "staggered" manner  Just make sure that you follow the dosing on the over the counter bottle instructions  Also make sure that the pain medication prescribed by Dr Owen Botello team does not contain acetaminophen (this is found in Percocet and Vicodin)  Typically we do not prescribe those types of pain medications but if for some reason that has been prescribed DO NOT add more Tylenol (acetaminophen) as you could end up taking too much of that medication

## 2022-04-15 ENCOUNTER — ANESTHESIA EVENT (OUTPATIENT)
Dept: PERIOP | Facility: HOSPITAL | Age: 47
End: 2022-04-15
Payer: COMMERCIAL

## 2022-04-19 NOTE — PRE-PROCEDURE INSTRUCTIONS
Pre-Surgery Instructions:   Medication Instructions    Acetaminophen (TYLENOL 8 HOUR ARTHRITIS PAIN PO) Hold day of surgery   albuterol (PROVENTIL HFA,VENTOLIN HFA) 90 mcg/act inhaler Take day of surgery   baclofen 10 mg tablet Take day of surgery   LANSOPRAZOLE PO Take day of surgery   transdermal buprenorphine (BUTRANS) 20 mcg/hr PTWK TD patch Take day of surgery  Pre op and bathing instructions reviewed  Pt has hibiclens  Pt  Verbalized understanding of current visitor restrictions  Pt  Verbalized an understanding of all instructions reviewed and offers no concerns at this time  Instructed to avoid all ASA/NSAIDs and OTC Vit/Supp from now until after surgery per anesthesia guidelines   Tylenol ok prn  DOS meds with a few sips of H2O  Pt instructed to bring SCS  DOS

## 2022-04-26 ENCOUNTER — ANESTHESIA (OUTPATIENT)
Dept: PERIOP | Facility: HOSPITAL | Age: 47
End: 2022-04-26
Payer: COMMERCIAL

## 2022-04-26 ENCOUNTER — HOSPITAL ENCOUNTER (OUTPATIENT)
Facility: HOSPITAL | Age: 47
Setting detail: OUTPATIENT SURGERY
Discharge: HOME | End: 2022-04-26
Attending: ORTHOPAEDIC SURGERY | Admitting: ORTHOPAEDIC SURGERY
Payer: COMMERCIAL

## 2022-04-26 VITALS
WEIGHT: 222 LBS | SYSTOLIC BLOOD PRESSURE: 120 MMHG | HEART RATE: 83 BPM | DIASTOLIC BLOOD PRESSURE: 62 MMHG | HEIGHT: 72 IN | RESPIRATION RATE: 16 BRPM | TEMPERATURE: 97.5 F | OXYGEN SATURATION: 96 % | BODY MASS INDEX: 30.07 KG/M2

## 2022-04-26 DIAGNOSIS — Z98.890 S/P MENISCECTOMY: Primary | ICD-10-CM

## 2022-04-26 PROCEDURE — 29881 ARTHRS KNE SRG MNISECTMY M/L: CPT | Performed by: ORTHOPAEDIC SURGERY

## 2022-04-26 RX ORDER — LIDOCAINE HYDROCHLORIDE 10 MG/ML
INJECTION, SOLUTION EPIDURAL; INFILTRATION; INTRACAUDAL; PERINEURAL AS NEEDED
Status: DISCONTINUED | OUTPATIENT
Start: 2022-04-26 | End: 2022-04-26

## 2022-04-26 RX ORDER — HYDROMORPHONE HCL/PF 1 MG/ML
0.5 SYRINGE (ML) INJECTION ONCE AS NEEDED
Status: COMPLETED | OUTPATIENT
Start: 2022-04-26 | End: 2022-04-26

## 2022-04-26 RX ORDER — LIDOCAINE HYDROCHLORIDE AND EPINEPHRINE 5; 5 MG/ML; UG/ML
INJECTION, SOLUTION INFILTRATION; PERINEURAL AS NEEDED
Status: DISCONTINUED | OUTPATIENT
Start: 2022-04-26 | End: 2022-04-26 | Stop reason: HOSPADM

## 2022-04-26 RX ORDER — LIDOCAINE HYDROCHLORIDE 10 MG/ML
0.5 INJECTION, SOLUTION EPIDURAL; INFILTRATION; INTRACAUDAL; PERINEURAL ONCE AS NEEDED
Status: DISCONTINUED | OUTPATIENT
Start: 2022-04-26 | End: 2022-04-26 | Stop reason: HOSPADM

## 2022-04-26 RX ORDER — PROPOFOL 10 MG/ML
INJECTION, EMULSION INTRAVENOUS AS NEEDED
Status: DISCONTINUED | OUTPATIENT
Start: 2022-04-26 | End: 2022-04-26

## 2022-04-26 RX ORDER — OXYCODONE HYDROCHLORIDE 5 MG/1
5 TABLET ORAL EVERY 4 HOURS PRN
Qty: 15 TABLET | Refills: 0 | Status: SHIPPED | OUTPATIENT
Start: 2022-04-26 | End: 2022-04-29 | Stop reason: SDUPTHER

## 2022-04-26 RX ORDER — DEXAMETHASONE SODIUM PHOSPHATE 4 MG/ML
INJECTION, SOLUTION INTRA-ARTICULAR; INTRALESIONAL; INTRAMUSCULAR; INTRAVENOUS; SOFT TISSUE AS NEEDED
Status: DISCONTINUED | OUTPATIENT
Start: 2022-04-26 | End: 2022-04-26

## 2022-04-26 RX ORDER — FENTANYL CITRATE/PF 50 MCG/ML
50 SYRINGE (ML) INJECTION
Status: DISCONTINUED | OUTPATIENT
Start: 2022-04-26 | End: 2022-04-26 | Stop reason: HOSPADM

## 2022-04-26 RX ORDER — FENTANYL CITRATE 50 UG/ML
INJECTION, SOLUTION INTRAMUSCULAR; INTRAVENOUS AS NEEDED
Status: DISCONTINUED | OUTPATIENT
Start: 2022-04-26 | End: 2022-04-26

## 2022-04-26 RX ORDER — BUPIVACAINE HYDROCHLORIDE 2.5 MG/ML
INJECTION, SOLUTION EPIDURAL; INFILTRATION; INTRACAUDAL AS NEEDED
Status: DISCONTINUED | OUTPATIENT
Start: 2022-04-26 | End: 2022-04-26 | Stop reason: HOSPADM

## 2022-04-26 RX ORDER — SODIUM CHLORIDE, SODIUM LACTATE, POTASSIUM CHLORIDE, CALCIUM CHLORIDE 600; 310; 30; 20 MG/100ML; MG/100ML; MG/100ML; MG/100ML
75 INJECTION, SOLUTION INTRAVENOUS CONTINUOUS
Status: DISCONTINUED | OUTPATIENT
Start: 2022-04-26 | End: 2022-04-26 | Stop reason: HOSPADM

## 2022-04-26 RX ORDER — MIDAZOLAM HYDROCHLORIDE 2 MG/2ML
INJECTION, SOLUTION INTRAMUSCULAR; INTRAVENOUS AS NEEDED
Status: DISCONTINUED | OUTPATIENT
Start: 2022-04-26 | End: 2022-04-26

## 2022-04-26 RX ORDER — SODIUM CHLORIDE, SODIUM LACTATE, POTASSIUM CHLORIDE, CALCIUM CHLORIDE 600; 310; 30; 20 MG/100ML; MG/100ML; MG/100ML; MG/100ML
INJECTION, SOLUTION INTRAVENOUS CONTINUOUS PRN
Status: DISCONTINUED | OUTPATIENT
Start: 2022-04-26 | End: 2022-04-26

## 2022-04-26 RX ORDER — ONDANSETRON 2 MG/ML
INJECTION INTRAMUSCULAR; INTRAVENOUS AS NEEDED
Status: DISCONTINUED | OUTPATIENT
Start: 2022-04-26 | End: 2022-04-26

## 2022-04-26 RX ORDER — ONDANSETRON 2 MG/ML
4 INJECTION INTRAMUSCULAR; INTRAVENOUS ONCE AS NEEDED
Status: DISCONTINUED | OUTPATIENT
Start: 2022-04-26 | End: 2022-04-26 | Stop reason: HOSPADM

## 2022-04-26 RX ORDER — SODIUM CHLORIDE, SODIUM LACTATE, POTASSIUM CHLORIDE, CALCIUM CHLORIDE 600; 310; 30; 20 MG/100ML; MG/100ML; MG/100ML; MG/100ML
125 INJECTION, SOLUTION INTRAVENOUS CONTINUOUS
Status: DISCONTINUED | OUTPATIENT
Start: 2022-04-26 | End: 2022-04-26 | Stop reason: HOSPADM

## 2022-04-26 RX ORDER — ASPIRIN 325 MG
325 TABLET, DELAYED RELEASE (ENTERIC COATED) ORAL DAILY
Qty: 30 TABLET | Refills: 0 | Status: SHIPPED | OUTPATIENT
Start: 2022-04-26 | End: 2022-06-20 | Stop reason: ALTCHOICE

## 2022-04-26 RX ORDER — MAGNESIUM HYDROXIDE 1200 MG/15ML
LIQUID ORAL AS NEEDED
Status: DISCONTINUED | OUTPATIENT
Start: 2022-04-26 | End: 2022-04-26 | Stop reason: HOSPADM

## 2022-04-26 RX ORDER — CLINDAMYCIN PHOSPHATE 900 MG/50ML
900 INJECTION INTRAVENOUS ONCE
Status: COMPLETED | OUTPATIENT
Start: 2022-04-26 | End: 2022-04-26

## 2022-04-26 RX ADMIN — FENTANYL CITRATE 50 MCG: 50 INJECTION, SOLUTION INTRAMUSCULAR; INTRAVENOUS at 10:28

## 2022-04-26 RX ADMIN — HYDROMORPHONE HYDROCHLORIDE 0.5 MG: 1 INJECTION, SOLUTION INTRAMUSCULAR; INTRAVENOUS; SUBCUTANEOUS at 10:47

## 2022-04-26 RX ADMIN — CLINDAMYCIN IN 5 PERCENT DEXTROSE 900 MG: 18 INJECTION, SOLUTION INTRAVENOUS at 08:45

## 2022-04-26 RX ADMIN — PROPOFOL 100 MG: 10 INJECTION, EMULSION INTRAVENOUS at 09:45

## 2022-04-26 RX ADMIN — ONDANSETRON 4 MG: 2 INJECTION INTRAMUSCULAR; INTRAVENOUS at 09:20

## 2022-04-26 RX ADMIN — FENTANYL CITRATE 50 MCG: 50 INJECTION, SOLUTION INTRAMUSCULAR; INTRAVENOUS at 08:46

## 2022-04-26 RX ADMIN — DEXAMETHASONE SODIUM PHOSPHATE 10 MG: 4 INJECTION, SOLUTION INTRAMUSCULAR; INTRAVENOUS at 08:54

## 2022-04-26 RX ADMIN — SODIUM CHLORIDE, SODIUM LACTATE, POTASSIUM CHLORIDE, AND CALCIUM CHLORIDE: .6; .31; .03; .02 INJECTION, SOLUTION INTRAVENOUS at 08:35

## 2022-04-26 RX ADMIN — SODIUM CHLORIDE, SODIUM LACTATE, POTASSIUM CHLORIDE, AND CALCIUM CHLORIDE: .6; .31; .03; .02 INJECTION, SOLUTION INTRAVENOUS at 09:52

## 2022-04-26 RX ADMIN — PROPOFOL 200 MG: 10 INJECTION, EMULSION INTRAVENOUS at 08:54

## 2022-04-26 RX ADMIN — FENTANYL CITRATE 50 MCG: 50 INJECTION, SOLUTION INTRAMUSCULAR; INTRAVENOUS at 09:32

## 2022-04-26 RX ADMIN — FENTANYL CITRATE 50 MCG: 50 INJECTION, SOLUTION INTRAMUSCULAR; INTRAVENOUS at 09:20

## 2022-04-26 RX ADMIN — LIDOCAINE HYDROCHLORIDE 50 MG: 10 INJECTION, SOLUTION EPIDURAL; INFILTRATION; INTRACAUDAL; PERINEURAL at 08:54

## 2022-04-26 RX ADMIN — FENTANYL CITRATE 50 MCG: 50 INJECTION, SOLUTION INTRAMUSCULAR; INTRAVENOUS at 10:34

## 2022-04-26 RX ADMIN — MIDAZOLAM 2 MG: 1 INJECTION INTRAMUSCULAR; INTRAVENOUS at 08:46

## 2022-04-26 RX ADMIN — FENTANYL CITRATE 50 MCG: 50 INJECTION, SOLUTION INTRAMUSCULAR; INTRAVENOUS at 09:42

## 2022-04-26 NOTE — OP NOTE
OPERATIVE REPORT  PATIENT NAME: Jennie Green    :  1975  MRN: [id number]  Pt Location:  OR ROOM 12    SURGERY DATE: 2022    Surgeon(s) and Role:     * Lyle Raymond DO - Primary     * Danette Velasquez [place] - [address], MD - Assisting    Preop Diagnosis:  Complex tear of lateral meniscus of right knee as current injury, initial encounter [S83 271A]  Acute pain of right knee [M25 561]  Effusion of right knee [M25 461]    Post-Op Diagnosis Codes:     * Complex tear of lateral meniscus of right knee as current injury, initial encounter [S83 271A]     * Acute pain of right knee [M25 561]     * Effusion of right knee [M25 461]    Procedure(s) (LRB):  ARTHROSCOPIC MENISCECTOMY LATERAL /MEDIAL (Right)    Specimen(s):  * No specimens in log *    Estimated Blood Loss:   Minimal    Drains:  * No LDAs found *    Anesthesia Type:   General    Operative Indications:  Complex tear of lateral meniscus of right knee as current injury, initial encounter [S83 271A]  Acute pain of right knee [M25 561]  Effusion of right knee [G40 663]    Complications:   None    Procedure and Technique:    Pre-operative Diagnosis: Right knee lateral meniscus complex tear     Post-operative Diagnosis: Right knee lateral meniscus complex tear     Operation:  Surgical arthroscopy of the Right knee with partial lateral meniscectomy, CPT 17555     Operative Modifiers:  None      Anesthesia:  general     Tourniquet Time: 50 min     Blood Loss:  Minimal      Indications: Mr Farzad Neal is a 55 y o  male with a lateral meniscus tear  An MRI was obtained a revealed a tear of the Right lateral meniscus   Due to the patient's MRI findings, active lifestyle, and lack of improvement with a conservative approach, it was recommended that they proceed forward with arthroscopic surgical management of this problem   We reviewed risks and benefits of surgery and a decision was made to proceed with surgery to address the torn lateral meniscus             Findings:       Examination under anesthesia of the operative Right knee revealed a range of motion of 0-130 degrees  Posterior drawer testing was negative  Lachman testing was negative  Pivot shift testing was negative,  Collateral ligament stability testing revealed no laxity with valgus or varus stresses  With respect to posterolateral corner testing, dial testing at 30 and at 90 degrees was symmetric to the contralateral knee  Arthroscopic evaluation of the knee revealed the following:     Medial meniscus: No Tears   Medial femoral condyle:Grade 0 chondral defects  Medial tibial plateau: Grade  0 chondral defects  Anterior cruciate ligament: Normal appearance  Posterior cruciate ligament: Normal appearance  Lateral meniscus: There was a complex, multidirectional tear of the lateral meniscus     Lateral femoral condyle: Grade II chondral defects  Lateral tibial plateau: GradeIII chondral defects     Medial and lateral gutters: No loose bodies  Patella: Grade III chondral defects  Trochlea: Grade II chondral defects  Medial plica: No significant plica was present             Procedure:  In the pre-operative holding area, the patient identified the correct operative extremity and I marked that extremity with my initials, using a permanent marker  The patient was brought to the operating room and positioned supine  Following satisfactory induction of anesthesia, the Right knee was prepped and draped in the usual sterile fashion for surgical arthroscopy of the Right knee  Before any surgical instrumentation was passed to me by the surgical technician, a formalized time-out occurred, which involves the surgeon, circulating nurse, and anesthesia staff all verifying the correct operative extremity  My initials were visible on the prepped and draped operative field        The anatomic landmarks of the anteromedial and anterolateral portals were marked and these portal sites were injected with 1% lidocaine with epinephrine  Subsequently, 40 mL of 1% lidocaine with epinephrine was injected into the knee through a superolateral puncture  The anterolateral portal was established with a scalpel  The arthroscope was introduced through this portal  Under direct visualization, the anteromedial portal was established with a localizing needle followed by a scalpel  A probe was then introduced into the anteromedial portal  A systematic diagnostic arthroscopy evaluated the following:  medial compartment, notch, lateral compartment, patellofemoral compartment, medial gutter, and lateral gutter  A complex lateral meniscus tear of the posterior horn and body that was unstable and causing a bucket handle component type of tear was noted  This tear was associated with meniscal fragments that were grossly unstable to probing  The lateral meniscus tear was debrided to a stable base, using the arthroscopic biters and motorized shaver  There was no additional pathology  All particulate debris was removed  The knee was copiously rinsed and then drained  The portals were closed with an interrupted 4-0 monocryl absorbable suture  The skin was cleansed with sterile saline and dried before Steri-Strips were applied  Finally, a sterile dressing was secured by Webril and an Ace wrap  I was present for all critical portions of the operation, which included the entire diagnostic arthroscopy and partial lateral meniscectomy, and immediately available to return  The patient tolerated the procedure without complication and was transported to the recovery room in stable condition         I was present for the entire procedure    Patient Disposition:  PACU       SIGNATURE: Hannah Callejas DO  DATE: April 26, 2022  TIME: 10:09 AM

## 2022-04-26 NOTE — ANESTHESIA PREPROCEDURE EVALUATION
Procedure:  ARTHROSCOPIC MENISCECTOMY LATERAL /MEDIAL (Right Knee)    Relevant Problems   /RENAL   (+) BPH with obstruction/lower urinary tract symptoms   (+) Nephrolithiasis      HEMATOLOGY   (+) Chronic anemia   (+) Iron deficiency anemia      MUSCULOSKELETAL   (+) Chronic low back pain   (+) Myofascial pain syndrome   (+) Sacroiliitis, not elsewhere classified (HCC)      NEURO/PSYCH   (+) Chronic low back pain   (+) Depression with anxiety   (+) Myofascial pain syndrome   (+) PTSD (post-traumatic stress disorder)   (+) Pain syndrome, chronic      PULMONARY   (+) Asthma   (+) Smoking      Nerve stimulator turned off by patient  Physical Exam    Airway    Mallampati score: II  TM Distance: >3 FB  Neck ROM: full     Dental       Cardiovascular      Pulmonary      Other Findings        Anesthesia Plan  ASA Score- 3     Anesthesia Type- general with ASA Monitors  Additional Monitors:   Airway Plan: LMA  Plan Factors-Exercise tolerance (METS): <4 METS  Chart reviewed  Patient summary reviewed  Patient is a current smoker  Patient smoked on day of surgery  Induction- intravenous  Postoperative Plan- Plan for postoperative opioid use  Planned trial extubation    Informed Consent- Anesthetic plan and risks discussed with patient  I personally reviewed this patient with the CRNA  Discussed and agreed on the Anesthesia Plan with the CRNA  Robinson Jordan

## 2022-04-26 NOTE — ANESTHESIA POSTPROCEDURE EVALUATION
Post-Op Assessment Note    CV Status:  Stable  Pain Score: 8    Pain management: inadequate     Mental Status:  Alert and awake   Hydration Status:  Stable   PONV Controlled:  None   Airway Patency:  Patent      Post Op Vitals Reviewed: Yes      Staff: CRNA         No complications documented      /84 (04/26/22 1018)    Temp 98 9 °F (37 2 °C) (04/26/22 1018)    Pulse 95 (04/26/22 1018)   Resp 19 (04/26/22 1018)    SpO2 96 % (04/26/22 1018)

## 2022-04-26 NOTE — DISCHARGE INSTRUCTIONS
POSTOPERATIVE INSTRUCTIONS following KNEE SURGERY    MEDICATIONS:  · Resume all home medications unless otherwise instructed by your surgeon  · Pain Medication:  Oxycodone 5 mg, 1 tablet every 4 hours as needed  · If you were given a regional anesthetic (nerve block), please begin taking the pain medication as soon as you get home, even if you have minimal or no pain  DO NOT WAIT FOR THE NERVE BLOCK TO WEAR OFF  · Possible side effects include nausea, constipation, and urinary retention  If you experience these side effects, please call our office for assistance  · Pain med refills are authorized only during office hours (8am-4pm, Mon-Fri)  · Anti-Inflammatory:  Ibuprofen 600 mg, 1 tablet every 8 hours for 4 weeks and Tylenol 325 mg, 1-2 tablets every 6 hours for 4 weeks  · Take with food  Stop if you experience nausea, reflux, or stomach pain  · Nausea Medication:  None  · Fill prescription ONLY if you expericnce severe nausea  · Blood Clot Prevention:  Aspirin 325 mg, 1 tablet daily for 3 weeks  · Pump your foot up and down 20 times per hour while you are less mobile  WOUND CARE:  · Keep the dressing clean and dry  Light drainage may occur the first 2 days postop  · You may remove the dressings and get the incision wet in the shower 72 hours after surgery  DO NOT remove steri-strips or sutures  DO NOT immerse the incision under water  Carefully pat the incision dry  If there is wound drainage, re-apply a fresh dry gauze dressing  · Please call our office ([phone]) if you experience either of the following:  · Sudden increase in swelling, redness, or warmth at the surgical site  · Excessive incisional drainage that persists beyond the 3rd day after surgery  · Oral temperature greater than 101 degrees, not relieved with Tylenol  · Shortness of breath, chest pain, nausea, or any other concerning symptoms    SWELLING CONTROL:  · Cold Therapy:   The cold therapy device may be used either continuously or only as needed, according to your preference  Do not let the pad directly touch your skin  Alternatively, apply ice (20 min on, 20 min off) as often as you feel is necessary  · Elevation:  Elevate the entire leg above heart level  Place pillows under your ankle to keep your knee straight  · Compression:  Apply ACE wraps or a thigh-length compression stocking as needed  RANGE OF MOTION:  · You are allowed FULL RANGE OF MOTION as tolerated  IMMOBILIZATION:  · None  You are allowed full range of motion as tolerated  ACTIVITY:   · BEAR FULL WEIGHT AS TOLERATED on the operative leg  Use crutches to assist only as needed  · Using Crutches on Stairs:  Going up, lead with your "good" (nonoperative) leg  Going down, lead with your "bad" (operative) leg  Use a hand rail when available  · Knee Extension:  Place a rolled towel or pillow under your ankle for 20-30 minutes 3-5 times per day  This will help to maintain full knee extension  · Quad Sets:  Sit or lie with your knee straight  Tighten your quadriceps (front thigh) muscle  Hold for 3 seconds, then relax  Repeat 20 times per hour while awake  PHYSICAL THERAPY:  · Begin therapy 3 TO 5 DAYS AFTER SURGERY  You were given a prescription for therapy at your preoperative office visit  If you do not have physical therapy scheduled yet, please call our office for assistance  FOLLOW-UP APPOINTMENT:  · 7-10 days after surgery with:    Dr Savilla Rubinstein, [facility] [address] [facility]  [address] [place] [address], [facility], [address] [facility]  [phone] ([facility])  [phone] (After-Hours)

## 2022-04-29 ENCOUNTER — TELEPHONE (OUTPATIENT)
Dept: OBGYN CLINIC | Facility: HOSPITAL | Age: 47
End: 2022-04-29

## 2022-04-29 NOTE — TELEPHONE ENCOUNTER
Pt contacted Call Center requested refill of their medication  Medication Name: Oxycodone      Dosage of Med: 5mg    Frequency of Med: 1 tab 4 times a day      Remaining Medication: 3      Pharmacy and Location: [address] [place] [address] Preferred Callback Phone Number: [phone]    Thank you  PLEASE ADVISE PATIENTS:    REFILL REQUESTS WILL BE PROCESSED WITHIN 24-48 HOURS

## 2022-05-03 ENCOUNTER — TELEPHONE (OUTPATIENT)
Dept: OBGYN CLINIC | Facility: HOSPITAL | Age: 47
End: 2022-05-03

## 2022-05-03 NOTE — TELEPHONE ENCOUNTER
Pt contacted Call Center requested refill of their medication  Patient has 3 pills left  oxyCODONE (ROXICODONE) 5 immediate release tablet [[id number]]   Order Details   Dose: 5 mg Route: Oral Frequency: Every 4 hours PRN for moderate pain   Dispense Quantity: 15 tablet Refills: 0         Sig: Take 1 tablet (5 mg total) by mouth every 4 (four) hours as needed for moderate pain for up to 15 doses Max Daily Amount: 30 mg        Start Date: 04/29/22 End Date: No end date specified (ordered for 15 doses)   Written Date: 04/29/22 Expiration Date: 06/28/22   Earliest Fill Date: 04/29/22                 Pharmacy  [address] - [address] [place] [address]  [phone]      Thank you  PATIENT ADVISED:    REFILL REQUESTS WILL BE PROCESSED WITHIN 24-48 HOURS

## 2022-05-03 NOTE — TELEPHONE ENCOUNTER
Spoke to patient  Advised alternating tylenol and advil as recommended on the bottle for initial pain control then adding the tylenol if needed after that  Ice 20 mins on 20 mins off and elevate  Understanding was verbalized  No further questions

## 2022-05-03 NOTE — TELEPHONE ENCOUNTER
Please call patient and make aware that I have refilled this script  Please instruct that this will be his last refill  Make sure he is utilizing tylenol, NSAIDs, and frequent icing in addition to oxy for pain control   Thank you

## 2022-05-09 ENCOUNTER — OFFICE VISIT (OUTPATIENT)
Dept: OBGYN CLINIC | Facility: MEDICAL CENTER | Age: 47
End: 2022-05-09

## 2022-05-09 VITALS
DIASTOLIC BLOOD PRESSURE: 87 MMHG | SYSTOLIC BLOOD PRESSURE: 144 MMHG | BODY MASS INDEX: 30.48 KG/M2 | WEIGHT: 225 LBS | HEIGHT: 72 IN

## 2022-05-09 DIAGNOSIS — Z98.890 S/P MENISCECTOMY: Primary | ICD-10-CM

## 2022-05-09 PROCEDURE — 3008F BODY MASS INDEX DOCD: CPT | Performed by: ORTHOPAEDIC SURGERY

## 2022-05-09 PROCEDURE — 99024 POSTOP FOLLOW-UP VISIT: CPT | Performed by: ORTHOPAEDIC SURGERY

## 2022-05-10 NOTE — PROGRESS NOTES
Ortho Sports Medicine Knee Visit     Assesment:   right knee lateral meniscus tear    Plan:    Conservative treatment:    Ice to knee for 20 minutes at least 1-2 times daily  PT for ROM/strengthening to knee, hip and core  F/u 6 weeks      History of Present Illness: The patient is returns for follow up of right knee lateral meniscectomy  Since the prior visit, He reports significant improvement  Pain is located lateral      Pain is improved by rest   Pain is aggravated by weight bearing  Symptoms include swelling  He will go to PT in the coming week  I have reviewed the past medical, surgical, social and family history, medications and allergies as documented in the EMR  Review of systems: ROS is negative other than that noted in the HPI  Constitutional: Negative for fatigue and fever     Cardiovascular: Negative for chest pain  Pulmonary: negative for shortness of breath    PMH/PSH:  Past Medical History:   Diagnosis Date    Anxiety     Arthritis     Asthma     Bipolar 2 disorder, major depressive episode ([facility] [address] ) 10/31/2017    Bipolar disorder (HCC)     Chronic left lumbar radiculopathy     Chronic low back pain     Chronic pain syndrome     Chronic right shoulder pain     Depression with anxiety     Fatigue     GERD (gastroesophageal reflux disease)     Hydronephrosis     Iron deficiency anemia     Kidney stone     Lytic bone lesions on xray     Nephrolithiasis     PONV (postoperative nausea and vomiting)     Right elbow pain     Right lateral epicondylitis      Past Surgical History:   Procedure Laterality Date    ADENOIDECTOMY Bilateral     APPENDECTOMY      BACK SURGERY      CHOLECYSTECTOMY      CHOLECYSTECTOMY LAPAROSCOPIC N/A 10/30/2018    Procedure: CHOLECYSTECTOMY WITH GASTROTOMY LAPAROSCOPIC;  Surgeon: Edgar Pedraza MD;  [facility];  Service: General    ERCP W/ SPHICTEROTOMY N/A 10/30/2018    Procedure: ENDOSCOPIC RETROGRADE CHOLANGIOPANCREATOGRAPHY (ERCP) W/ SPHINCTEROTOMY;  Surgeon: Nusrat Manzano MD;  [facility];  Service: Gastroenterology    GASTRIC BYPASS      2009    OTHER SURGICAL HISTORY      fusion/refusion of vertebrae, 2010 and 2011 l5-s1 and l4-l5    FL CYSTO/URETERO W/LITHOTRIPSY &INDWELL STENT INSRT Right 8/8/2017    Procedure: CYSTOSCOPY; URETEROSCOPY; HOLMIUM LASER; RETROGRADE PYELOGRAM; STENT;  Surgeon: Reza Crooks MD;  [facility];  Service: Urology    [address] [place] [address] Left 0/3/7257    Procedure: INCISION AND DRAINAGE (I&D) EXTREMITY- of left knee s/p MPFL reconstruction, I&D if the left knee with possible MPFL revision;  Surgeon: Laurel Curtis DO;  [facility];  Service: Orthopedics    FL FIX UNSTABLE PATELLA,EXTEN REALIGN Left 1/6/2021    Procedure: REALIGNMENT PATELLA with chondroplasty of patella, open medial patellofemoral ligament reconstruction with allograft;  Surgeon: Laurel Curtis DO;  [facility];  Service: Orthopedics    FL IMPLANT SPINAL NEUROSTIM/ Right 11/14/2017    Procedure: REMOVAL LOWER MEDIAL BUTTOCK SPINAL CORD STIMULATOR GENERATOR; PLACEMENT OF NEW BUTTOCK SPINAL [address];  Surgeon: Dee Dimas MD;  [facility];  Service: Neurosurgery    FL IMPLANT SPINAL NEUROSTIM/ Right 1/12/2022    Procedure: Right sided spinal cord stimulator pulse generator replacement;  Surgeon: Lizette Duarte MD;  [facility];  Service: Neurosurgery    FL KNEE SCOPE, W/LATERAL RELEASE Left 1/6/2021    Procedure: RELEASE RETINACULAR;  Surgeon: Laurel Curtis DO;  [facility];  Service: Orthopedics    FL KNEE SCOPE,MED/LAT MENISECTOMY Right 4/26/2022    Procedure: ARTHROSCOPIC MENISCECTOMY LATERAL;  Surgeon: Laurel Curtis DO;  Location: [facility];  Service: Orthopedics    [address] [place] [address] Left 1/6/2021    Procedure: ARTHROSCOPY KNEE;  Surgeon: Luz Michel DO Saji;  Location:  MAIN OR;  Service: Orthopedics    RIK-EN-Y PROCEDURE  2003    SPINAL CORD STIMULATOR IMPLANT  11/14/2017    dr Kelley Liu procedure and technique 1  removal of a right back implantable pulse generator 2 placement of a new right buttock impantable pulse generator through seperate incision 3 electric analys complex programming spinal cord stimulator system postoperative period approx 1 hour    TONSILLECTOMY          Physical Exam:    Blood pressure 144/87, height 6' (1 829 m), weight 102 kg (225 lb)  General/Constitutional: NAD, well developed, well nourished  HENT: Normocephalic, atraumatic  CV: Intact distal pulses, regular rate  Resp: No respiratory distress or labored breathing  Lymphatic: No lymphadenopathy palpated  Neuro: Alert and Oriented x 3, no focal deficits  Psych: Normal mood, normal affect, normal judgement, normal behavior  Skin: Warm, dry, no rashes, no erythema       Knee Exam (focused):                   RIGHT LEFT   ROM:   0-130 0-130   Palpation: Effusion mild negative     MJL tenderness Negative Negative     LJL tenderness Negative Negative   Instability: Varus stable stable     Valgus stable stable   Special Tests: Lachman Negative Negative     Posterior drawer Negative Negative     Anterior drawer Negative Negative     Pivot shift not tested not tested     Dial not tested not tested   Patella: Palpation no tenderness no tenderness     Mobility 1/4 1/4     Apprehension Negative Negative   Other: Single leg 1/4 squat not tested not tested      LE NV Exam: +2 DP/PT pulses bilaterally  Sensation intact to light touch L2-S1 bilaterally     incisions with no erythema or drainage

## 2022-05-19 ENCOUNTER — TELEPHONE (OUTPATIENT)
Dept: PAIN MEDICINE | Facility: CLINIC | Age: 47
End: 2022-05-19

## 2022-05-19 NOTE — TELEPHONE ENCOUNTER
No Show    Pt stated he didn't get a reminder of his appt    He Rescheduled his appt on 8/1/22 with Patric

## 2022-06-15 ENCOUNTER — TELEPHONE (OUTPATIENT)
Dept: PAIN MEDICINE | Facility: CLINIC | Age: 47
End: 2022-06-15

## 2022-06-15 DIAGNOSIS — M54.42 CHRONIC LOW BACK PAIN WITH BILATERAL SCIATICA, UNSPECIFIED BACK PAIN LATERALITY: ICD-10-CM

## 2022-06-15 DIAGNOSIS — G89.4 PAIN SYNDROME, CHRONIC: ICD-10-CM

## 2022-06-15 DIAGNOSIS — M96.1 LUMBAR POST-LAMINECTOMY SYNDROME: ICD-10-CM

## 2022-06-15 DIAGNOSIS — M54.41 CHRONIC LOW BACK PAIN WITH BILATERAL SCIATICA, UNSPECIFIED BACK PAIN LATERALITY: ICD-10-CM

## 2022-06-15 DIAGNOSIS — M79.18 MYOFASCIAL PAIN SYNDROME: ICD-10-CM

## 2022-06-15 DIAGNOSIS — M54.16 LUMBAR RADICULOPATHY: ICD-10-CM

## 2022-06-15 DIAGNOSIS — G89.29 CHRONIC LOW BACK PAIN WITH BILATERAL SCIATICA, UNSPECIFIED BACK PAIN LATERALITY: ICD-10-CM

## 2022-06-15 RX ORDER — BACLOFEN 10 MG/1
TABLET ORAL
Qty: 120 TABLET | Refills: 3 | OUTPATIENT
Start: 2022-06-15

## 2022-06-15 RX ORDER — BUPRENORPHINE 20 UG/H
PATCH TRANSDERMAL
Qty: 4 PATCH | Refills: 1 | Status: SHIPPED | OUTPATIENT
Start: 2022-06-15 | End: 2022-08-01 | Stop reason: SDUPTHER

## 2022-06-15 RX ORDER — BACLOFEN 10 MG/1
TABLET ORAL
Qty: 120 TABLET | Refills: 1 | Status: SHIPPED | OUTPATIENT
Start: 2022-06-15 | End: 2022-08-01 | Stop reason: SDUPTHER

## 2022-06-15 NOTE — TELEPHONE ENCOUNTER
Patient called in for a refill of :  Name of medication: baclofen 10 mg tablet     Frequency: Take 1 PO QID PRN for pain and spasms      How many left: 6 pills     Pharmacy:  [facility] [address] call back # [phone]    Pt aware it can take 24-48 hrs to process refills- thank you

## 2022-06-15 NOTE — TELEPHONE ENCOUNTER
baclofen 10 mg tablet [[id number]]     Order Details  Dose, Route, Frequency: As Directed   Dispense Quantity: 120 tablet Refills: 3          Sig: Take 1 PO QID PRN for pain and spasms  Start Date: 02/03/22 End Date: --   Written Date: 02/03/22 Expiration Date: 02/03/23       Diagnosis Association: Myofascial pain syndrome (M79 18); Chronic low back pain with bilateral sciatica, unspecified back pain laterality (M54 41 , G89 29 , M54 42);  Pain syndrome, chronic (G89 4)   Original Order:  baclofen 10 mg tablet [[id number]]     Providers    Authorizing Provider:    Florence Henning      8/1/2022

## 2022-06-15 NOTE — TELEPHONE ENCOUNTER
S/w pt, confirmed baclofen qid w/ + relief, no se's  Advised pt, the writer will crys CHAHAL  Anticipate this rx will be sent to the pharmacy for pu later today  This office will cb if there is any question or change in the plan as discussed  Pt verbalized understanding and appreciation  Confirmed 8/1/2022 fu ov

## 2022-06-15 NOTE — TELEPHONE ENCOUNTER
I sent a prescription for the baclofen with a refill to get him to his office visit as scheduled in August

## 2022-06-20 ENCOUNTER — OFFICE VISIT (OUTPATIENT)
Dept: OBGYN CLINIC | Facility: MEDICAL CENTER | Age: 47
End: 2022-06-20

## 2022-06-20 VITALS
SYSTOLIC BLOOD PRESSURE: 126 MMHG | HEIGHT: 72 IN | BODY MASS INDEX: 30.34 KG/M2 | DIASTOLIC BLOOD PRESSURE: 80 MMHG | WEIGHT: 224 LBS

## 2022-06-20 DIAGNOSIS — Z98.890 S/P MENISCECTOMY: Primary | ICD-10-CM

## 2022-06-20 PROCEDURE — 99024 POSTOP FOLLOW-UP VISIT: CPT | Performed by: ORTHOPAEDIC SURGERY

## 2022-06-20 NOTE — PROGRESS NOTES
Knee Post Operative Visit     Assesment:     55 y o  male 8 weeks  s/p surgical arthroscopy of the right knee with lateral meniscectomy, DOS: 4/26/22    Plan:    Post-Operative treatment:    Ice to knee for 20 minutes at least 1-2 times daily  OTC NSAIDS prn for pain  Work note written to return on full duty    Imaging: All imaging from today was reviewed by myself and explained to the patient  Weight bearing:  as tolerated     ROM:  Full    Brace:  No brace needed    DVT Prophylaxis:  Ambulation    Follow up:   6 weeks     Patient was advised that if they have any fevers, chills, chest pain, shortness of breath, redness or drainage from the incision, please let our office know immediately  Chief Complaint   Patient presents with    Right Knee - Follow-up       History of Present Illness: The patient is a 55 y o  male who is being evaluated post operatively 8 weeks  status post surgical arthroscopy of the right knee with lateral meniscectomy, DOS: 4/26/22  Since the prior visit, He reports significant improvement  Patient reports he no longer has any pain or symptoms within the right knee  Patient denies having any clicking or catching  He states that he has returned to all activities of daily living while at home  Patient states he still out of work at this time  Patient reports that he is ready to go back to work at this time on full duty  Pain is well controlled  The patient is using ice to control swelling  They have started physical therapy  The patient Ambulation for DVT ppx  The patient has been ambulating without crutches  The patient has been ambulating without a brace  The patient denies any fevers, chills, calf pain, chest pain/shortness of breath, redness or drainage from the incision  I have reviewed the past medical, surgical, social and family history, medications and allergies as documented in the EMR      Review of systems: ROS is negative other than that noted in the HPI  Constitutional: Negative for fatigue and fever  Physical Exam:    Blood pressure 126/80, height 6' (1 829 m), weight 102 kg (224 lb)  General/Constitutional: NAD, well developed, well nourished  HENT: Normocephalic, atraumatic  CV: Intact distal pulses, regular rate  Resp: No respiratory distress or labored breathing  Lymphatic: No lymphadenopathy palpated  Neuro: Alert and Oriented x 3, no focal deficits  Psych: Normal mood, normal affect, normal judgement, normal behavior  Skin: Warm, dry, no rashes, no erythema      Knee Exam (focused):                   RIGHT LEFT   ROM:   0-130 0-130   Palpation: Effusion negative negative     MJL tenderness Negative Negative     LJL tenderness Negative Negative   Instability: Varus stable stable     Valgus stable stable   Special Tests: Lachman Negative Negative     Posterior drawer Negative Negative     Anterior drawer Negative Negative     Pivot shift not tested not tested     Dial not tested not tested   Patella: Palpation no tenderness no tenderness     Mobility 1/4 1/4     Apprehension Negative Negative   Other: Single leg 1/4 squat not tested not tested      Incisions are well healed without erythema    LE NV Exam: +2 DP/PT pulses bilaterally  Sensation intact to light touch L2-S1 bilaterally     Bilateral hip ROM demonstrates no pain actively or passively    No calf tenderness to palpation bilaterally

## 2022-06-20 NOTE — LETTER
June 20, 2022     Patient: Winston Dunlap  YOB: 1975  Date of Visit: 6/20/2022      To Whom it May Concern:    Winston Dunlap is under my professional care  Valdo Hargrove was seen in my office on 6/20/2022  Valdo Hargrove may return to work on 6/27/22 on full duty without restrictions  If you have any questions or concerns, please don't hesitate to call           Sincerely,          Acosta Allen DO        CC: Nick Navas

## 2022-07-29 NOTE — TELEPHONE ENCOUNTER
CARDIOLOGY CONSULTATION NOTE      CHIEF COMPLAINT  Consultation (New Pt referred by Tim Madrid MD - [facility])       HISTORY OF PRESENT ILLNESS  Ms. Cazares is a 71 year old female, who presents for initial consultation.  She has a known hx of htn, dl, ckd 4, chronic diastolic heart failure, and pulm htn.  Most of these issues were determined in 2017 when she was admitted to hospital due to respiratory failure.  Since then it seems she has not had regular follow up.  Now she is concerned that she is neglecting her medical condition.      Pt eats out once or twice a week.  Had chick marielena-a yesterday.    No tobacco, no etoh, no illicits      MEDICATIONS  The patient's current medications were reviewed.  Current Outpatient Medications   Medication Sig Dispense Refill   • furosemide (LASIX) 20 MG tablet Take 1 tablet every afternoon     • blood glucose (Accu-Chek Guide) test strip Test blood sugar 3 times daily. Diagnosis: E11.65. Meter: Accu-chec Guide 300 strip 3   • Blood Glucose Monitoring Suppl (Accu-Chek Guide) w/Device Kit 1 kit as directed. 1 kit 0   • Accu-Chek FastClix Lancets Misc TEST BLOOD SUGAR THREE TIMES DAILY AS DIRECTED 306 each 3   • Alcohol Swabs (B-D SINGLE USE SWABS REGULAR) Pads Use as directed 300 each 3   • furosemide (LASIX) 40 MG tablet Take 1 tablet every morning 180 tablet 1   • triamcinolone (ARISTOCORT) 0.1 % cream Apply 1 application topically 2 times daily. 45 g 0   • atorvastatin (LIPITOR) 40 MG tablet Take 1 tablet by mouth daily. 90 tablet 1   • ferrous sulfate (FeroSul) 325 (65 FE) MG tablet Take 1 tablet by mouth 2 times daily (with meals). 180 tablet 1   • isosorbide mononitrate (IMDUR) 30 MG 24 hr tablet Take 1 tablet by mouth daily. 90 tablet 1   • hydrALAZINE (APRESOLINE) 50 MG tablet Take 1 tablet by mouth 2 times daily. 180 tablet 1   • losartan (COZAAR) 50 MG tablet Take 1 tablet by mouth daily. 90 tablet 1   • metoPROLOL succinate (TOPROL-XL) 25 MG 24 hr tablet Take 1  First time patellar dislocations typically do not require surgery    The treatment plan I discussed with this patient was 4 weeks of immobilization in a knee immobilizer tablet by mouth daily. 90 tablet 1   • amLODIPine (NORVASC) 10 MG tablet Take 1 tablet by mouth daily. 90 tablet 1   • Insulin Degludec-Liraglutide (Xultophy) 100-3.6 UNIT-MG/ML Solution Pen-injector Inject 20 Units into the skin daily. Replaces soliqua. 30 mL 3   • Baclofen 5 MG tablet Take 1 tablet by mouth 2 times daily. 30 tablet 0   • fluticasone (FLONASE) 50 MCG/ACT nasal spray Spray 2 sprays in each nostril daily. 16 g 3   • Cholecalciferol (vitamin D) 50 mcg (2,000 units) capsule Take 1 capsule by mouth daily. 30 capsule    • Droplet Pen Needles 31G X 5 MM Misc USE TO INJECT INSULIN 1 TIME DAILY. REMOVE NEEDLE COVER TO EXPOSE NEEDLE BEFORE INJECTING. 100 each 11   • aspirin (ECOTRIN) 81 MG EC tablet Take 81 mg by mouth daily.     • estradiol (ESTRACE) 0.1 MG/GM vaginal cream Place 1 g vaginally 3 days a week. 42.5 g 2     No current facility-administered medications for this visit.       ALLERGIES  ALLERGIES:   Allergen Reactions   • Sulfa Antibiotics Other (See Comments)     Unknown          HISTORIES  Past Medical History:   Diagnosis Date   • Chronic kidney disease    • Congestive cardiac failure (CMS/HCC)    • Diabetes mellitus (CMS/HCC)    • Essential (primary) hypertension    • Hepatitis B    • Herpes    • No known problems        Past Surgical History:   Procedure Laterality Date   • Cyst removal      back of head   • Ganglion cyst excision Left     wrist   • No past surgeries         Family History   Problem Relation Age of Onset   • Hypertension Mother    • Diabetes Maternal Grandmother    • Cancer, Breast Neg Hx    • Cancer Neg Hx    • Cancer, Colon Neg Hx    • Cancer, Ovarian Neg Hx        Social History     Socioeconomic History   • Marital status: Single     Spouse name: Not on file   • Number of children: Not on file   • Years of education: Not on file   • Highest education level: Not on file   Occupational History   • Not on file   Tobacco Use   • Smoking status: Never Smoker   • Smokeless  tobacco: Never Used   Substance and Sexual Activity   • Alcohol use: Not Currently   • Drug use: Not Currently   • Sexual activity: Not Currently   Other Topics Concern   • Not on file   Social History Narrative   • Not on file     Social Determinants of Health     Financial Resource Strain: Not on file   Food Insecurity: Not on file   Transportation Needs: Not on file   Physical Activity: Not on file   Stress: Not on file   Social Connections: Not on file   Intimate Partner Violence: Not on file          REVIEW OF SYSTEMS    Review of Systems   Constitutional: Negative.   HENT: Negative.    Eyes: Negative.    Cardiovascular: Negative.    Respiratory: Negative.    Endocrine: Negative.    Skin: Negative.    Musculoskeletal: Negative.    Gastrointestinal: Negative.    Genitourinary: Negative.    Neurological: Negative.    Psychiatric/Behavioral: Negative.    All other systems reviewed and are negative.      PHYSICAL EXAM  Physical Exam  HENT:      Head: Normocephalic and atraumatic.   Eyes:      Conjunctiva/sclera: Conjunctivae normal.      Pupils: Pupils are equal, round, and reactive to light.   Cardiovascular:      Rate and Rhythm: Normal rate and regular rhythm.      Heart sounds: Normal heart sounds.   Pulmonary:      Effort: Pulmonary effort is normal.      Breath sounds: Normal breath sounds.   Abdominal:      General: Bowel sounds are normal.      Palpations: Abdomen is soft.   Musculoskeletal:         General: Normal range of motion.      Cervical back: Normal range of motion and neck supple.      Right lower leg: Edema present.      Left lower leg: Edema present.   Skin:     General: Skin is warm and dry.   Neurological:      Mental Status: She is alert and oriented to person, place, and time.      Gait: Gait is intact.   Psychiatric:         Mood and Affect: Mood and affect normal.         Judgment: Judgment normal.           LABORATORY  Office Visit on 07/28/2022   Component Date Value Ref Range Status   •  Sodium 07/28/2022 142  135 - 145 mmol/L Final   • Potassium 07/28/2022 4.5  3.4 - 5.1 mmol/L Final   • Chloride 07/28/2022 109  97 - 110 mmol/L Final   • Carbon Dioxide 07/28/2022 25  21 - 32 mmol/L Final   • Anion Gap 07/28/2022 13  7 - 19 mmol/L Final   • Glucose 07/28/2022 207 (A) 70 - 99 mg/dL Final   • BUN 07/28/2022 38 (A) 6 - 20 mg/dL Final   • Creatinine 07/28/2022 1.85 (A) 0.51 - 0.95 mg/dL Final   • Glomerular Filtration Rate 07/28/2022 29 (A) >=60 Final   • BUN/ Creatinine Ratio 07/28/2022 21  7 - 25 Final   • Calcium 07/28/2022 9.1  8.4 - 10.2 mg/dL Final   • Magnesium 07/28/2022 2.4  1.7 - 2.4 mg/dL Final   Lab Requisition on 06/24/2022   Component Date Value Ref Range Status   • Sodium 06/24/2022 142  135 - 145 mmol/L Final   • Potassium 06/24/2022 4.7  3.4 - 5.1 mmol/L Final   • Chloride 06/24/2022 107  97 - 110 mmol/L Final   • Carbon Dioxide 06/24/2022 25  21 - 32 mmol/L Final   • Anion Gap 06/24/2022 15  7 - 19 mmol/L Final   • Glucose 06/24/2022 136 (A) 70 - 99 mg/dL Final   • BUN 06/24/2022 40 (A) 6 - 20 mg/dL Final   • Creatinine 06/24/2022 2.05 (A) 0.51 - 0.95 mg/dL Final   • Glomerular Filtration Rate 06/24/2022 25 (A) >=60 Final   • BUN/ Creatinine Ratio 06/24/2022 20  7 - 25 Final   • Calcium 06/24/2022 9.5  8.4 - 10.2 mg/dL Final   • Bilirubin, Total 06/24/2022 0.4  0.2 - 1.0 mg/dL Final   • GOT/AST 06/24/2022 25  <=37 Units/L Final   • GPT/ALT 06/24/2022 31  <64 Units/L Final   • Alkaline Phosphatase 06/24/2022 101  45 - 117 Units/L Final   • Albumin 06/24/2022 3.7  3.6 - 5.1 g/dL Final   • Protein, Total 06/24/2022 6.8  6.4 - 8.2 g/dL Final   • Globulin 06/24/2022 3.1  2.0 - 4.0 g/dL Final   • A/G Ratio 06/24/2022 1.2  1.0 - 2.4 Final   • Microalbumin, Urine 06/24/2022 15.80  mg/dL Final   • Creatinine, Urine 06/24/2022 70.90  mg/dL Final   • Microalbumin/ Creatinine Ratio 06/24/2022 222.8 (A) <30.0 mg/g Final   • Hemoglobin A1C 06/24/2022 7.0 (A) 4.5 - 5.6 % Final   • Vitamin D,  25-Hydroxy 06/24/2022 44.1  30.0 - 100.0 ng/mL Final   Lab Requisition on 04/01/2022   Component Date Value Ref Range Status   • Sodium 04/01/2022 142  135 - 145 mmol/L Final   • Potassium 04/01/2022 5.0  3.4 - 5.1 mmol/L Final   • Chloride 04/01/2022 111 (A) 98 - 107 mmol/L Final   • Carbon Dioxide 04/01/2022 25  21 - 32 mmol/L Final   • Anion Gap 04/01/2022 11  10 - 20 mmol/L Final   • Glucose 04/01/2022 89  70 - 99 mg/dL Final   • BUN 04/01/2022 38 (A) 6 - 20 mg/dL Final   • Creatinine 04/01/2022 1.86 (A) 0.51 - 0.95 mg/dL Final   • Glomerular Filtration Rate 04/01/2022 31 (A) >=60 Final   • BUN/ Creatinine Ratio 04/01/2022 20  7 - 25 Final   • Calcium 04/01/2022 9.1  8.4 - 10.2 mg/dL Final   • Bilirubin, Total 04/01/2022 0.4  0.2 - 1.0 mg/dL Final   • GOT/AST 04/01/2022 18  <=37 Units/L Final   • GPT/ALT 04/01/2022 28  <64 Units/L Final   • Alkaline Phosphatase 04/01/2022 81  45 - 117 Units/L Final   • Albumin 04/01/2022 3.6  3.6 - 5.1 g/dL Final   • Protein, Total 04/01/2022 6.7  6.4 - 8.2 g/dL Final   • Globulin 04/01/2022 3.1  2.0 - 4.0 g/dL Final   • A/G Ratio 04/01/2022 1.2  1.0 - 2.4 Final   • Ferritin 04/01/2022 254 (A) 8 - 252 ng/mL Final   • Hemoglobin A1C 04/01/2022 6.4 (A) 4.5 - 5.6 % Final   • Iron 04/01/2022 63  50 - 170 mcg/dL Final   • Iron Binding Capacity 04/01/2022 205 (A) 250 - 450 mcg/dL Final   • Iron, Percent Saturation 04/01/2022 31  15 - 45 % Final   • Cholesterol 04/01/2022 194  <=199 mg/dL Final   • Triglycerides 04/01/2022 74  <=149 mg/dL Final   • HDL 04/01/2022 100  >=50 mg/dL Final   • LDL 04/01/2022 79  <=129 mg/dL Final   • Non-HDL Cholesterol 04/01/2022 94  mg/dL Final   • Cholesterol/ HDL Ratio 04/01/2022 1.9  <=4.4 Final   • PTH, Intact 04/01/2022 129 (A) 19 - 88 pg/mL Final   • Phosphorus 04/01/2022 5.0 (A) 2.4 - 4.7 mg/dL Final   • Vitamin D, 25-Hydroxy 04/01/2022 31.8  30.0 - 100.0 ng/mL Final   • Microalbumin, Urine 04/01/2022 49.10  mg/dL Final   • Creatinine, Urine  04/01/2022 151.00  mg/dL Final   • Microalbumin/ Creatinine Ratio 04/01/2022 325.2 (A) <30.0 mg/g Final   • WBC 04/01/2022 5.6  4.2 - 11.0 K/mcL Final   • RBC 04/01/2022 3.66 (A) 4.00 - 5.20 mil/mcL Final   • HGB 04/01/2022 10.5 (A) 12.0 - 15.5 g/dL Final   • HCT 04/01/2022 31.9 (A) 36.0 - 46.5 % Final   • MCV 04/01/2022 87.2  78.0 - 100.0 fl Final   • MCH 04/01/2022 28.7  26.0 - 34.0 pg Final   • MCHC 04/01/2022 32.9  32.0 - 36.5 g/dL Final   • RDW-CV 04/01/2022 12.7  11.0 - 15.0 % Final   • RDW-SD 04/01/2022 40.8  39.0 - 50.0 fL Final   • PLT 04/01/2022 312  140 - 450 K/mcL Final   • NRBC 04/01/2022 0  <=0 /100 WBC Final   • Neutrophil, Percent 04/01/2022 68  % Final   • Lymphocytes, Percent 04/01/2022 20  % Final   • Mono, Percent 04/01/2022 9  % Final   • Eosinophils, Percent 04/01/2022 2  % Final   • Basophils, Percent 04/01/2022 1  % Final   • Immature Granulocytes 04/01/2022 0  % Final   • Absolute Neutrophils 04/01/2022 3.8  1.8 - 7.7 K/mcL Final   • Absolute Lymphocytes 04/01/2022 1.1  1.0 - 4.0 K/mcL Final   • Absolute Monocytes 04/01/2022 0.5  0.3 - 0.9 K/mcL Final   • Absolute Eosinophils  04/01/2022 0.1  0.0 - 0.5 K/mcL Final   • Absolute Basophils 04/01/2022 0.1  0.0 - 0.3 K/mcL Final   • Absolute Immmature Granulocytes 04/01/2022 0.0  0.0 - 0.2 K/mcL Final     I have reviewed the pertinent laboratory tests. These are the pertinent findings:  Lab Results   Component Value Date    HGBA1C 7.0 (H) 06/24/2022    HGBA1C 7.4 (H) 11/16/2020    BUN 38 (H) 07/28/2022    BUN 36 (H) 06/22/2020    AST 25 06/24/2022    AST 18 02/15/2019       IMAGING  I have reviewed the pertinent imaging study reports. These are the pertinent findings:  Results for orders placed or performed in visit on 09/22/15   ELECTROCARDIOGRAM 12-LEAD    Narrative    Ordered by an unspecified provider.     MAMMO DIAGNOSTIC LEFT W MOIZ  Narrative: #[id number] - MAMMO DIAGNOSTIC LEFT W MOIZ    UNILATERAL LEFT DIGITAL DIAGNOSTIC MAMMOGRAM  3D/2D WITH MEDIOLATERAL OBLIQUE CRANIOCAUDAL: 4/7/2022    CLINICAL HISTORY:6 month follow up abnormal imaging left breast.      COMPARISON:   Comparison is made to prior exams.      BREAST COMPOSITION: There are scattered fibroglandular elements (ACR Breast Composition Category B) in left breast.      American College of [employer]iology Breast Composition Categories     A - The breast tissue is predominantly fatty.     B - There are scattered fibroglandular elements in the breast tissue.    C - The breast tissue is heterogeneously dense. This may lower the sensitivity of mammography.    D - The breast tissue is extremely dense, which lowers the sensitivity of mammography.       FINDINGS:      There is a stable 2.3 cm architectural distortion in the left breast at 11 o'clock middle depth 4.4 cm from the nipple.    No other significant masses or calcifications are seen in the breast.  Digital Breast Tomosynthesis (DBT) images were obtained and used to assist in the interpretation of this examination.   Impression: MAMMOGRAPHY  PROBABLY BENIGN    The stable 2.3 cm architectural distortion in the left breast is probably benign.  A follow-up in 6 months is recommended.    A follow-up left mammogram and possibly an ultrasound in 6 months is recommended to demonstrate stability.      MAMMOGRAPHY BI-RADS: 3 PROBABLY BENIGN    Electronically Signed by: Donita Morris M.D.            pp/:4/7/2022 13:18:02      Imaging Technologist: RT Luis(R)(M), [facility]  letter sent: Prob Benign    No results found for this or any previous visit.    No results found for this or any previous visit.    ASSESSMENT/PLAN  Problem List Items Addressed This Visit        Cardiac and Vasculature    Dyslipidemia     ldl 79 (2022)  Continue statin           Benign hypertensive heart disease - Primary     bp is poorly controlled in office  Pt counseled on sodium restriction  Will obtain bp diary prior to next appt, and make  adjustments accordingly.           Chronic diastolic heart failure (CMS/HCC)     Will f/u echo.  Unclear if pt truly should be labeled heart failure.  Pt is on amlodipine 10 mg, which could also cause leg swelling.  Will reassess med strategy after echo is done.              Pulmonary and Pneumonias    Pulmonary HTN (CMS/HCC)     F/u echo to confirm findings.                   Ok Guy MD  07/29/22

## 2022-07-31 NOTE — ED ATTENDING ATTESTATION
Drink plenty of fluids, about 64 oz a day    Strain urine.  Submit stone for analysis    Referral placed to the urologist.  They should call you for follow-up    Motrin 3 times a day with food.  Vicodin for severe pain, but no driving    Flomax daily.  This is a smooth muscle relaxer to try to help the stone pass.  This can cause dizziness with standing    Return for fever or worsening    The appendix is seen and is normal   Jeni Reyes MD, saw and evaluated the patient  I have discussed the patient with the resident/non-physician practitioner and agree with the resident's/non-physician practitioner's findings, Plan of Care, and MDM as documented in the resident's/non-physician practitioner's note, except where noted  All available labs and Radiology studies were reviewed  At this point I agree with the current assessment done in the Emergency Department  I have conducted an independent evaluation of this patient a history and physical is as follows: 38 y/o male with SI and plan  Includes jumping or driving car off of bridge  No HI  No AVH  Patient states that he may have taken extra dose of medications  Heart RRR, lungs CTA, abdomen soft, nontender        Critical Care Time  Procedures

## 2022-08-01 ENCOUNTER — OFFICE VISIT (OUTPATIENT)
Dept: PAIN MEDICINE | Facility: CLINIC | Age: 47
End: 2022-08-01
Payer: COMMERCIAL

## 2022-08-01 VITALS
DIASTOLIC BLOOD PRESSURE: 86 MMHG | WEIGHT: 226 LBS | BODY MASS INDEX: 30.61 KG/M2 | HEART RATE: 74 BPM | HEIGHT: 72 IN | SYSTOLIC BLOOD PRESSURE: 132 MMHG | TEMPERATURE: 98.3 F

## 2022-08-01 DIAGNOSIS — M96.1 LUMBAR POST-LAMINECTOMY SYNDROME: ICD-10-CM

## 2022-08-01 DIAGNOSIS — M54.16 LUMBAR RADICULOPATHY: ICD-10-CM

## 2022-08-01 DIAGNOSIS — M54.41 CHRONIC LOW BACK PAIN WITH BILATERAL SCIATICA, UNSPECIFIED BACK PAIN LATERALITY: ICD-10-CM

## 2022-08-01 DIAGNOSIS — G89.29 CHRONIC LOW BACK PAIN WITH BILATERAL SCIATICA, UNSPECIFIED BACK PAIN LATERALITY: ICD-10-CM

## 2022-08-01 DIAGNOSIS — G89.4 PAIN SYNDROME, CHRONIC: Primary | ICD-10-CM

## 2022-08-01 DIAGNOSIS — M54.42 CHRONIC LOW BACK PAIN WITH BILATERAL SCIATICA, UNSPECIFIED BACK PAIN LATERALITY: ICD-10-CM

## 2022-08-01 DIAGNOSIS — M79.18 MYOFASCIAL PAIN SYNDROME: ICD-10-CM

## 2022-08-01 PROCEDURE — 99214 OFFICE O/P EST MOD 30 MIN: CPT | Performed by: NURSE PRACTITIONER

## 2022-08-01 RX ORDER — BACLOFEN 10 MG/1
TABLET ORAL
Qty: 120 TABLET | Refills: 3 | Status: SHIPPED | OUTPATIENT
Start: 2022-08-01

## 2022-08-01 RX ORDER — BUPRENORPHINE 20 UG/H
PATCH TRANSDERMAL
Qty: 4 PATCH | Refills: 3 | Status: SHIPPED | OUTPATIENT
Start: 2022-08-01

## 2022-08-01 NOTE — PATIENT INSTRUCTIONS

## 2022-08-01 NOTE — PROGRESS NOTES
Assessment:  1  Pain syndrome, chronic    2  Chronic low back pain with bilateral sciatica, unspecified back pain laterality    3  Lumbar radiculopathy    4  Myofascial pain syndrome    5  Lumbar post-laminectomy syndrome        Plan:  While the patient was in the office today, I did have a thorough conversation with the patient regarding their chronic pain syndrome, symptoms, medication regimen, and treatment plan  I discussed with the patient at this point time since he is noting significant and stable relief of his chronic pain symptoms with his current medication regimen and I feel it is reasonable and appropriate with his underlying etiology, we will continue the Butrans patch and the baclofen as prescribed  The patient was agreeable and verbalized an understanding  [place] Prescription Drug Monitoring Program report was reviewed and was appropriate     The patient was not picked for a pill count today, but he did bring his medications as required  There are risks associated with opioid medications, including dependence, addiction and tolerance  The patient understands and agrees to use these medications only as prescribed  Potential side effects of the medications include, but are not limited to, constipation, drowsiness, addiction, impaired judgment and risk of fatal overdose if not taken as prescribed  The patient was warned against driving while taking sedation medications  Sharing medications is a felony  At this point in time, the patient is showing no signs of addiction, abuse, diversion or suicidal ideation  The patient will follow-up in 4 months for medication prescription refill and reevaluation  The patient was advised to contact the office should their symptoms worsen in the interim  The patient was agreeable and verbalized an understanding  History of Present Illness:     The patient is a 55 y o  male last seen on 2/3/2022 who presents for a follow up office visit in regards to chronic pain syndrome, as the patient's pain has been ongoing for greater than a year, secondary to lumbar post-laminectomy syndrome with radiculopathy and myofascial pain  The patient currently reports that since his last office visit overall his pain symptoms have remained significantly stable and manageable with his current medication regimen, without any significant side effects or issues  The patient presents today for regular medication follow-up visit  Current pain medications includes:  Butrans patch 20 mcg, applying 1 patch transdermally every 7 days for pain and baclofen 10 mg q i d  The patient reports that this regimen is providing 75% pain relief  The patient is reporting no side effects from this pain medication regimen  Pain Contract Signed: 7/19/2021  Last Urine Drug Screen: 2/3/2022    I have personally reviewed and/or updated the patient's past medical history, past surgical history, family history, social history, current medications, allergies, and vital signs today  Review of Systems:    Review of Systems   Respiratory: Negative for shortness of breath  Cardiovascular: Negative for chest pain  Gastrointestinal: Negative for constipation, diarrhea, nausea and vomiting  Musculoskeletal: Negative for arthralgias, gait problem, joint swelling (joint stiffness) and myalgias  Skin: Negative for rash  Neurological: Negative for dizziness, seizures and weakness  All other systems reviewed and are negative          Past Medical History:   Diagnosis Date    Anxiety     Arthritis     Asthma     [facility], [facility]    [facility] disorder ([facility])     Chronic left lumbar radiculopathy     Chronic low back pain     Chronic pain syndrome     Chronic right shoulder pain     Depression with anxiety     Fatigue     GERD (gastroesophageal reflux disease)     Hydronephrosis     Iron deficiency anemia     Kidney stone     Lytic bone lesions on xray     Nephrolithiasis     PONV (postoperative nausea and vomiting)     Right elbow pain     Right lateral epicondylitis        Past Surgical History:   Procedure Laterality Date    ADENOIDECTOMY Bilateral     APPENDECTOMY      BACK SURGERY      CHOLECYSTECTOMY      CHOLECYSTECTOMY LAPAROSCOPIC N/A 10/30/2018    Procedure: CHOLECYSTECTOMY WITH GASTROTOMY LAPAROSCOPIC;  Surgeon: Garbiel Snyder MD;  [facility];  Service: General    ERCP W/ SPHICTEROTOMY N/A 10/30/2018    Procedure: ENDOSCOPIC RETROGRADE CHOLANGIOPANCREATOGRAPHY (ERCP) W/ SPHINCTEROTOMY;  Surgeon: Darlyn Disla MD;  [facility];  Service: Gastroenterology    GASTRIC BYPASS      2009    OTHER SURGICAL HISTORY      fusion/refusion of vertebrae, 2010 and 2011 l5-s1 and l4-l5    FL CYSTO/URETERO W/LITHOTRIPSY &INDWELL STENT INSRT Right 8/8/2017    Procedure: CYSTOSCOPY; URETEROSCOPY; HOLMIUM LASER; RETROGRADE PYELOGRAM; STENT;  Surgeon: Raymond Landeros MD;  Location[facility];  Service: Urology    FL [address] [place] [address] Left 5/6/4828    Procedure: INCISION AND DRAINAGE (I&D) EXTREMITY- of left knee s/p MPFL reconstruction, I&D if the left knee with possible MPFL revision;  Surgeon: Nevin Lucia DO;  [facility];  Service: Orthopedics    FL FIX UNSTABLE PATELLA,EXTEN REALIGN Left 1/6/2021    Procedure: REALIGNMENT PATELLA with chondroplasty of patella, open medial patellofemoral ligament reconstruction with allograft;  Surgeon: Nevin Lucia DO;  [facility];  Service: Orthopedics    FL IMPLANT SPINAL NEUROSTIM/ Right 11/14/2017    Procedure: REMOVAL LOWER MEDIAL BUTTOCK SPINAL CORD STIMULATOR GENERATOR; PLACEMENT OF NEW BUTTOCK SPINAL CORD [facility];  Surgeon: Adelina Claudio MD;  Location: [facility];  Service: Neurosurgery    FL IMPLANT SPINAL NEUROSTIM/ Right 1/12/2022    Procedure: Right sided spinal cord stimulator pulse generator replacement;  Surgeon: Miquel Corcoran MD;  Location:  MAIN OR;  Service: Neurosurgery    NE KNEE SCOPE, W/LATERAL RELEASE Left 1/6/2021    Procedure: RELEASE RETINACULAR;  Surgeon: Per Ceja DO;  [facility];  Service: Orthopedics    NE KNEE SCOPE,MED/LAT MENISECTOMY Right 4/26/2022    Procedure: ARTHROSCOPIC MENISCECTOMY LATERAL;  Surgeon: Per Ceja DO;  Location: [facility] MA[facility] OR;  Service: Orthopedics    NE KNEE [address] Left 1/6/2021    Procedure: ARTHROSCOPY KNEE;  Surgeon: Per Ceja DO;  Location:  MAIN OR;  Service: Orthopedics    RIK-EN-Y PROCEDURE  2003    SPINAL CORD STIMULATOR IMPLANT  11/14/2017    dr Anabelle Villarreal procedure and technique 1  removal of a right back implantable pulse generator 2 placement of a new right buttock impantable pulse generator through seperate incision 3 electric analys complex programming spinal cord stimulator system postoperative period approx 1 hour    TONSILLECTOMY         Family History   Problem Relation Age of Onset    Cancer Mother     Arthritis Mother     Stomach cancer Mother     Cancer Father     Esophageal cancer Father     Other Father         brain tumor    No Known Problems Sister     No Known Problems Sister     No Known Problems Sister        Social History     Occupational History    Not on file   Tobacco Use    Smoking status: Current Every Day Smoker     Packs/day: 1 00     Types: Cigarettes    Smokeless tobacco: Never Used   Vaping Use    Vaping Use: Never used   Substance and Sexual Activity    Alcohol use: No    Drug use: No    Sexual activity: Not on file         Current Outpatient Medications:     albuterol (PROVENTIL HFA,VENTOLIN HFA) 90 mcg/act inhaler, INHALE 2 PUFFS EVERY 4 (FOUR) HOURS AS NEEDED FOR WHEEZING OR SHORTNESS OF BREATH COUGH, Disp: 18 g, Rfl: 0    baclofen 10 mg tablet, Take 1 PO QID PRN for pain and spasms  , Disp: 120 tablet, Rfl: 3    LANSOPRAZOLE PO, Take 10 mg by mouth if needed  , Disp: , Rfl:     transdermal buprenorphine (BUTRANS) 20 mcg/hr PTWK TD patch, Apply 1 patch TD every 7 days for pain  Rotate patch application sites to avoid skin irritation  , Disp: 4 patch, Rfl: 3    Acetaminophen (TYLENOL 8 HOUR ARTHRITIS PAIN PO), Take by mouth (Patient not taking: Reported on 6/20/2022), Disp: , Rfl:     Allergies   Allergen Reactions    Ampicillin GI Intolerance     Vomiting    Penicillins GI Intolerance     Vomit       Physical Exam:    /86 (BP Location: Left arm, Patient Position: Sitting, Cuff Size: Standard)   Pulse 74   Temp 98 3 °F (36 8 °C)   Ht 6' (1 829 m)   Wt 103 kg (226 lb)   BMI 30 65 kg/m²     Constitutional:normal, well developed, well nourished, alert, in no distress and non-toxic and no overt pain behavior  Eyes:anicteric  HEENT:grossly intact  Neck:supple, symmetric, trachea midline and no masses   Pulmonary:even and unlabored  Cardiovascular:No edema or pitting edema present  Skin:Normal without rashes or lesions and well hydrated  Psychiatric:Mood and affect appropriate  Neurologic:Cranial Nerves II-XII grossly intact  Musculoskeletal:The patient's gait is slightly antalgic, but steady without the use of any assistive devices  Imaging  No orders to display         No orders of the defined types were placed in this encounter

## 2022-08-16 ENCOUNTER — TELEPHONE (OUTPATIENT)
Dept: PAIN MEDICINE | Facility: CLINIC | Age: 47
End: 2022-08-16

## 2022-10-04 ENCOUNTER — OFFICE VISIT (OUTPATIENT)
Dept: FAMILY MEDICINE CLINIC | Facility: CLINIC | Age: 47
End: 2022-10-04
Payer: COMMERCIAL

## 2022-10-04 VITALS
RESPIRATION RATE: 18 BRPM | TEMPERATURE: 98.4 F | OXYGEN SATURATION: 97 % | BODY MASS INDEX: 32.1 KG/M2 | HEIGHT: 72 IN | SYSTOLIC BLOOD PRESSURE: 120 MMHG | HEART RATE: 76 BPM | WEIGHT: 237 LBS | DIASTOLIC BLOOD PRESSURE: 80 MMHG

## 2022-10-04 DIAGNOSIS — B34.9 VIRAL INFECTION, UNSPECIFIED: Primary | ICD-10-CM

## 2022-10-04 PROCEDURE — U0005 INFEC AGEN DETEC AMPLI PROBE: HCPCS | Performed by: NURSE PRACTITIONER

## 2022-10-04 PROCEDURE — U0003 INFECTIOUS AGENT DETECTION BY NUCLEIC ACID (DNA OR RNA); SEVERE ACUTE RESPIRATORY SYNDROME CORONAVIRUS 2 (SARS-COV-2) (CORONAVIRUS DISEASE [COVID-19]), AMPLIFIED PROBE TECHNIQUE, MAKING USE OF HIGH THROUGHPUT TECHNOLOGIES AS DESCRIBED BY CMS-2020-01-R: HCPCS | Performed by: NURSE PRACTITIONER

## 2022-10-04 PROCEDURE — 99213 OFFICE O/P EST LOW 20 MIN: CPT | Performed by: NURSE PRACTITIONER

## 2022-10-04 NOTE — PROGRESS NOTES
FAMILY PRACTICE OFFICE VISIT       NAME: Devin Cueto  AGE: 52 y o  SEX: male       : 1975        MRN: [id number]    Assessment and Plan   1  Viral infection, unspecified  -     COVID Only - Office Collect     COVID-19 swab collected sent for PCR  Will continue supportive care for viral URI, while swab is pending  Fluids, rest, Nyquil/Dayquil, Tylenol or ibuprofen as needed  Albuterol inhaler as needed  Call for persisting or worsening of symptoms  Chief Complaint     Chief Complaint   Patient presents with    Cold Like Symptoms     Sore throat, cough, sinus, no taste or smell 5 + days       History of Present Illness     Devin Cueto is a 52year old male presenting today for URI symptoms  Symptoms started 4 days ago  Friday runny nose and sore throat  Saturday, felt worse  Tuesday last week, was around someone sick  Fiance is sick  Took 2 home COVID-19 tests, which were negative  Fiance home COVID-19 tests negative as well  Has not been going to work due to feeling lightheaded, and afraid to climb ladders  Hot and cold flashes  Has not taken temperature  No nausea, vomiting or diarrhea    +headaches  +fatigue  +body aches  +congestion  Everything tastes terrible  Using Dayquil/Nyqiul, Advil/Tylenol as needed  +cough  No chest tightness, pain  Mild SOB  No wheezing  Albuterol inhaler used once at work last week  Still smoking  Review of Systems   Review of Systems   Constitutional: Positive for chills, diaphoresis and fatigue  HENT: Positive for congestion, postnasal drip, rhinorrhea and sore throat  Negative for ear pain  Respiratory: Positive for cough and shortness of breath  Negative for chest tightness and wheezing  Cardiovascular: Negative for chest pain, palpitations and leg swelling  Gastrointestinal: Negative  Musculoskeletal: Positive for myalgias     Neurological: Positive for dizziness and headaches  I have reviewed the patient's medical history in detail; there are no changes to the history as noted in the electronic medical record  Objective     Vitals:    10/04/22 1048   BP: 120/80   Pulse: 76   Resp: 18   Temp: 98 4 °F (36 9 °C)   TempSrc: Temporal   SpO2: 97%   Weight: 108 kg (237 lb)   Height: 6' (1 829 m)     Wt Readings from Last 3 Encounters:   10/04/22 108 kg (237 lb)   08/01/22 103 kg (226 lb)   06/20/22 102 kg (224 lb)     Physical Exam  Vitals and nursing note reviewed  Constitutional:       General: He is not in acute distress  Appearance: Normal appearance  He is not ill-appearing  HENT:      Head: Atraumatic  Right Ear: Tympanic membrane normal       Left Ear: Tympanic membrane normal       Nose: Congestion and rhinorrhea present  Mouth/Throat:      Mouth: Mucous membranes are moist       Pharynx: Oropharynx is clear  No oropharyngeal exudate or posterior oropharyngeal erythema  Eyes:      Conjunctiva/sclera: Conjunctivae normal    Cardiovascular:      Rate and Rhythm: Normal rate and regular rhythm  Heart sounds: No murmur heard  Pulmonary:      Effort: Pulmonary effort is normal       Breath sounds: Normal breath sounds  Musculoskeletal:      Cervical back: Normal range of motion and neck supple  Neurological:      Mental Status: He is alert  Psychiatric:         Mood and Affect: Mood normal          Tobacco Cessation Counseling: Tobacco cessation counseling was provided   The patient is sincerely urged to quit consumption of tobacco  He is not ready to quit tobacco          ALLERGIES:  Allergies   Allergen Reactions    Ampicillin GI Intolerance     Vomiting    Penicillins GI Intolerance     Vomit       Current Medications     Current Outpatient Medications   Medication Sig Dispense Refill    Acetaminophen (TYLENOL 8 HOUR ARTHRITIS PAIN PO) Take by mouth      albuterol (PROVENTIL HFA,VENTOLIN HFA) 90 mcg/act inhaler INHALE 2 PUFFS EVERY 4 (FOUR) HOURS AS NEEDED FOR WHEEZING OR SHORTNESS OF BREATH COUGH 18 g 0    baclofen 10 mg tablet Take 1 PO QID PRN for pain and spasms  120 tablet 3    LANSOPRAZOLE PO Take 10 mg by mouth if needed        transdermal buprenorphine (BUTRANS) 20 mcg/hr PTWK TD patch Apply 1 patch TD every 7 days for pain  Rotate patch application sites to avoid skin irritation  4 patch 3     No current facility-administered medications for this visit           Health Maintenance     Health Maintenance   Topic Date Due    Hepatitis C Screening  Never done    COVID-19 Vaccine (1) Never done    Pneumococcal Vaccine: Pediatrics (0 to 5 Years) and At-Risk Patients (6 to 59 Years) (1 - PCV) Never done    HIV Screening  Never done    DTaP,Tdap,and Td Vaccines (1 - Tdap) Never done    Colorectal Cancer Screening  Never done    Influenza Vaccine (1) 09/01/2022    BMI: Followup Plan  01/07/2023    Annual Physical  01/06/2023    BMI: Adult  10/04/2023    HIB Vaccine  Aged Out    Hepatitis B Vaccine  Aged Out    IPV Vaccine  Aged Out    Hepatitis A Vaccine  Aged Out    Meningococcal ACWY Vaccine  Aged Out    HPV Vaccine  Aged Out     Immunization History   Administered Date(s) Administered    INFLUENZA 12/15/2018    Influenza, injectable, quadrivalent, preservative free 0 5 mL 12/15/2018       Lupis Cole

## 2022-10-05 ENCOUNTER — TELEPHONE (OUTPATIENT)
Dept: FAMILY MEDICINE CLINIC | Facility: CLINIC | Age: 47
End: 2022-10-05

## 2022-10-05 DIAGNOSIS — J20.9 ACUTE BRONCHITIS, UNSPECIFIED ORGANISM: Primary | ICD-10-CM

## 2022-10-05 LAB — SARS-COV-2 RNA RESP QL NAA+PROBE: NEGATIVE

## 2022-10-05 RX ORDER — AZITHROMYCIN 250 MG/1
TABLET, FILM COATED ORAL
Qty: 6 TABLET | Refills: 0 | Status: SHIPPED | OUTPATIENT
Start: 2022-10-05 | End: 2022-10-10

## 2022-10-05 NOTE — LETTER
Date: 10/05/2022    Lucas Appiah  1975    To whom it may concern,    Please excuse my patient, Lucas Appiah, from work/school due to illness  Excuse Dates: 10/03/2022 through 10/09/2022  Pt may return to work/school on 10/10/2022  Comments/Notes: No Restrictions        Sincerely,   Julieth De, Dr Raleigh Martinez, Dr Nissa Garcia,  A   [facility] Brochure

## 2022-10-05 NOTE — TELEPHONE ENCOUNTER
----- Message from [id number] [employer] sent at 10/5/2022  3:36 PM EDT -----  COVID-19 swab is negative  I sent a prescription for a z-maegan for him  Please provide a work note if needed  Call if not getting better

## 2022-10-05 NOTE — TELEPHONE ENCOUNTER
Spoke with patient  Made aware of results and provider's instructions  Patient verbalized understanding and was agreeable w/ plan  Letter done and sent to pt via My Chart

## 2022-12-01 ENCOUNTER — OFFICE VISIT (OUTPATIENT)
Dept: PAIN MEDICINE | Facility: CLINIC | Age: 47
End: 2022-12-01

## 2022-12-01 VITALS
WEIGHT: 233 LBS | HEART RATE: 68 BPM | HEIGHT: 72 IN | BODY MASS INDEX: 31.56 KG/M2 | DIASTOLIC BLOOD PRESSURE: 70 MMHG | TEMPERATURE: 98.7 F | SYSTOLIC BLOOD PRESSURE: 112 MMHG

## 2022-12-01 DIAGNOSIS — M54.41 CHRONIC LOW BACK PAIN WITH BILATERAL SCIATICA, UNSPECIFIED BACK PAIN LATERALITY: ICD-10-CM

## 2022-12-01 DIAGNOSIS — G89.4 PAIN SYNDROME, CHRONIC: Primary | ICD-10-CM

## 2022-12-01 DIAGNOSIS — M54.16 LUMBAR RADICULOPATHY: ICD-10-CM

## 2022-12-01 DIAGNOSIS — G89.29 CHRONIC LOW BACK PAIN WITH BILATERAL SCIATICA, UNSPECIFIED BACK PAIN LATERALITY: ICD-10-CM

## 2022-12-01 DIAGNOSIS — M54.42 CHRONIC LOW BACK PAIN WITH BILATERAL SCIATICA, UNSPECIFIED BACK PAIN LATERALITY: ICD-10-CM

## 2022-12-01 DIAGNOSIS — M79.18 MYOFASCIAL PAIN SYNDROME: ICD-10-CM

## 2022-12-01 DIAGNOSIS — Z79.891 LONG-TERM CURRENT USE OF OPIATE ANALGESIC: ICD-10-CM

## 2022-12-01 DIAGNOSIS — M96.1 LUMBAR POST-LAMINECTOMY SYNDROME: ICD-10-CM

## 2022-12-01 RX ORDER — BUPRENORPHINE 20 UG/H
PATCH TRANSDERMAL
Qty: 4 PATCH | Refills: 3 | Status: SHIPPED | OUTPATIENT
Start: 2022-12-01

## 2022-12-01 RX ORDER — BACLOFEN 10 MG/1
TABLET ORAL
Qty: 120 TABLET | Refills: 3 | Status: SHIPPED | OUTPATIENT
Start: 2022-12-01

## 2022-12-01 NOTE — PROGRESS NOTES
Assessment:  1  Pain syndrome, chronic    2  Chronic low back pain with bilateral sciatica, unspecified back pain laterality    3  Lumbar radiculopathy    4  Myofascial pain syndrome    5  Lumbar post-laminectomy syndrome        Plan:  While the patient was in the office today, I did have a thorough conversation with the patient regarding their chronic pain syndrome, symptoms, medication regimen, and treatment plan  I discussed with the patient that at this point in time since he is noting moderate and stable overall relief of his chronic pain symptoms and he has tried and failed every other alternative medication regimen treatment plan option and does have supportive underlying etiology, I feel it is reasonable and appropriate to continue the Butrans patch and the baclofen as prescribed  The patient was agreeable and verbalized an understanding  [place] Prescription Drug Monitoring Program report was reviewed and was appropriate     While the patient was in the office today, an annual review of the opioid contract/agreement was conducted, the patient was agreeable to continuing the contract as previously agreed upon and signed for this calendar year  There are risks associated with opioid medications, including dependence, addiction and tolerance  The patient understands and agrees to use these medications only as prescribed  Potential side effects of the medications include, but are not limited to, constipation, drowsiness, addiction, impaired judgment and risk of fatal overdose if not taken as prescribed  The patient was warned against driving while taking sedation medications  Sharing medications is a felony  At this point in time, the patient is showing no signs of addiction, abuse, diversion or suicidal ideation  An oral drug screen swab was collected at today's office visit  The swab will be sent for confirmatory testing   The drug screen is medically necessary because the patient is either dependent on opioid medication or is being considered for opioid medication therapy and the results could impact ongoing or future treatment  The drug screen is to evaluate for the presences or absence of prescribed, non-prescribed, and/or illicit drugs/substances  The patient will follow-up in 4 months for medication prescription refill and reevaluation  The patient was advised to contact the office should their symptoms worsen in the interim  The patient was agreeable and verbalized an understanding  History of Present Illness: The patient is a 52 y o  male last seen on 8/1/2022 who presents for a follow up office visit in regards to chronic pain syndrome, as the patient's pain has been ongoing for greater than a year, secondary to lumbar post-laminectomy syndrome with radiculopathy and myofascial pain  The patient currently reports that recently he is having more pain at times, but he feels it is mostly related to weight gain and he is trying to try to pull off a few lb as he knows that it makes his back pain worse  The patient reports that otherwise his medication regimen is keeping his pain at least manageable and tolerable  Current pain medications includes:  Butrans patch 20 micro g, applying 1 patch transdermally every 7 days for pain as well as baclofen 10 mg q i d  p r n  for pain and spasms  The patient reports that this regimen is providing 60% pain relief  The patient is reporting urinary retantion from this pain medication regimen  Pain Contract Signed: 12/1/2022  Last Urine Drug Screen: 12/1/2022    I have personally reviewed and/or updated the patient's past medical history, past surgical history, family history, social history, current medications, allergies, and vital signs today  Review of Systems:    Review of Systems   Respiratory: Negative for shortness of breath  Cardiovascular: Negative for chest pain     Gastrointestinal: Negative for constipation, diarrhea, nausea and vomiting  Musculoskeletal: Negative for arthralgias, gait problem, joint swelling (joint stiffness) and myalgias  Skin: Negative for rash  Neurological: Negative for dizziness, seizures and weakness  All other systems reviewed and are negative          Past Medical History:   Diagnosis Date   • Anxiety    • Arthritis    • Asthma    • Bipolar 2 disorder, major depressive episode ([facility] ) 10/31/2017   • Bipolar disorder ([facility] )    • Chronic left lumbar radiculopathy    • Chronic low back pain    • Chronic pain syndrome    • Chronic right shoulder pain    • Depression with anxiety    • Fatigue    • GERD (gastroesophageal reflux disease)    • Hydronephrosis    • Iron deficiency anemia    • Kidney stone    • Lytic bone lesions on xray    • Nephrolithiasis    • PONV (postoperative nausea and vomiting)    • Right elbow pain    • Right lateral epicondylitis        Past Surgical History:   Procedure Laterality Date   • ADENOIDECTOMY Bilateral    • APPENDECTOMY     • BACK SURGERY     • CHOLECYSTECTOMY     • CHOLECYSTECTOMY LAPAROSCOPIC N/A 10/30/2018    Procedure: CHOLECYSTECTOMY WITH GASTROTOMY LAPAROSCOPIC;  Surgeon: Ruba Freedman MD;  Location: [facility] MAIN OR;  Service: General   • ERCP W/ SPHICTEROTOMY N/A 10/30/2018    Procedure: ENDOSCOPIC RETROGRADE CHOLANGIOPANCREATOGRAPHY (ERCP) W/ SPHINCTEROTOMY;  Surgeon: Kenzie Marie MD;  Location: [facility] MAIN [facility];  Service: Gastroenterology   • GASTRIC BYPASS      2009   • OTHER SURGICAL HISTORY      fusion/refusion of vertebrae, 2010 and 2011 l5-s1 and l4-l5   • [place] CYSTO/URETERO W/LITHOTRIPSY &INDWELL STENT INSRT Right 8/8/2017    Procedure: CYSTOSCOPY; URETEROSCOPY; HOLMIUM LASER; RETROGRADE PYELOGRAM; STENT;  Surgeon: Jeramie Ghotra MD;  Location: [facility] Main OR;  Service: Urology   • W[place] DRAIN SKIN ABSCESS Nic Real Left 6/0/8940    Procedure: INCISION AND DRAINAGE (I&D) EXTREMITY- of left knee s/p MPFL reconstruction, I&D if the left knee with possible MPFL revision; Surgeon: Bela Cheney DO;  [facility] MAIN OR;  Service: Orthopedics   • MO FIX UNSTABLE PATELLA,EXTEN REALIGN Left 1/6/2021    Procedure: REALIGNMENT PATELLA with chondroplasty of patella, open medial patellofemoral ligament reconstruction with allograft;  Surgeon: Bela Cheney DO;  Location:  MAIN OR;  Service: Orthopedics   • MO IMPLANT SPINAL NEUROSTIM/ Right 11/14/2017    Procedure: REMOVAL LOWER MEDIAL BUTTOCK SPINAL CORD STIMULATOR GENERATOR; PLACEMENT OF NEW BUTTOCK SPINAL CORD STIMULATOR THROUGH SEPERATE INCISION;  Surgeon: Justin Lange MD;  Location:  MAIN OR;  Service: Neurosurgery   • MO IMPLANT SPINAL NEUROSTIM/ Right 1/12/2022    Procedure: Right sided spinal cord stimulator pulse generator replacement;  Surgeon: Judi Ernst MD;  Location:  MAIN OR;  Service: Neurosurgery   • MO KNEE SCOPE, W/LATERAL RELEASE Left 1/6/2021    Procedure: RELEASE RETINACULAR;  Surgeon: Bela Cheney DO;  Location:  MAIN OR;  Service: Orthopedics   • MO KNEE SCOPE,MED/LAT MENISECTOMY Right 4/26/2022    Procedure: ARTHROSCOPIC MENISCECTOMY LATERAL;  Surgeon: Bela Cheney DO;  Location: [address] [place] [address] MAIN OR;  Service: Orthopedics   • MO KNEE [address] CART Left 1/6/2021    Procedure: ARTHROSCOPY KNEE;  Surgeon: Bela Cheney DO;  Location:  MAIN OR;  Service: Orthopedics   • RIK-EN-Y PROCEDURE  2003   • SPINAL CORD STIMULATOR IMPLANT  11/14/2017    dr Mario Storey procedure and technique 1  removal of a right back implantable pulse generator 2 placement of a new right buttock impantable pulse generator through seperate incision 3 electric analys complex programming spinal cord stimulator system postoperative period approx 1 hour   • TONSILLECTOMY         Family History   Problem Relation Age of Onset   • Cancer Mother    • Arthritis Mother    • Stomach cancer Mother    • Cancer Father    • Esophageal cancer Father    • Other Father         brain tumor   • No Known Problems Sister    • No Known Problems Sister    • No Known Problems Sister        Social History     Occupational History   • Not on file   Tobacco Use   • Smoking status: Every Day     Packs/day: 1 00     Types: Cigarettes   • Smokeless tobacco: Never   Vaping Use   • Vaping Use: Never used   Substance and Sexual Activity   • Alcohol use: No   • Drug use: No   • Sexual activity: Yes         Current Outpatient Medications:   •  Acetaminophen (TYLENOL 8 HOUR ARTHRITIS PAIN PO), Take by mouth, Disp: , Rfl:   •  albuterol (PROVENTIL HFA,VENTOLIN HFA) 90 mcg/act inhaler, INHALE 2 PUFFS EVERY 4 (FOUR) HOURS AS NEEDED FOR WHEEZING OR SHORTNESS OF BREATH COUGH, Disp: 18 g, Rfl: 0  •  baclofen 10 mg tablet, Take 1 PO QID PRN for pain and spasms  , Disp: 120 tablet, Rfl: 3  •  LANSOPRAZOLE PO, Take 10 mg by mouth if needed  , Disp: , Rfl:   •  transdermal buprenorphine (BUTRANS) 20 mcg/hr PTWK TD patch, Apply 1 patch TD every 7 days for pain  Rotate patch application sites to avoid skin irritation  , Disp: 4 patch, Rfl: 3    Allergies   Allergen Reactions   • Ampicillin GI Intolerance     Vomiting   • Penicillins GI Intolerance     Vomit       Physical Exam:    /70 (BP Location: Left arm, Patient Position: Sitting, Cuff Size: Standard)   Pulse 68   Temp 98 7 °F (37 1 °C)   Ht 6' (1 829 m)   Wt 106 kg (233 lb)   BMI 31 60 kg/m²     Constitutional:normal, well developed, well nourished, alert, in no distress and non-toxic and no overt pain behavior  Eyes:anicteric  HEENT:grossly intact  Neck:supple, symmetric, trachea midline and no masses   Pulmonary:even and unlabored  Cardiovascular:No edema or pitting edema present  Skin:Normal without rashes or lesions and well hydrated  Psychiatric:Mood and affect appropriate  Neurologic:Cranial Nerves II-XII grossly intact  Musculoskeletal:The patient's gait is slightly antalgic, but steady without the use of any assistive devices        Imaging  No orders to display No orders of the defined types were placed in this encounter

## 2022-12-03 LAB
7AMINOCLONAZEPAM SAL QL CFM: NEGATIVE NG/ML
AMPHET SAL QL CFM: NEGATIVE NG/ML
BUPRENORPHINE SAL QL SCN: ABNORMAL NG/ML
CARBOXYTHC SAL QL CFM: NEGATIVE NG/ML
CCP IGG SERPL-ACNC: NEGATIVE
COCAINE SAL QL CFM: NEGATIVE NG/ML
CODEINE SAL QL CFM: NEGATIVE NG/ML
DXO+LEVORPHANOL SAL QL CFM: NEGATIVE NG/ML
EDDP SAL QL CFM: NEGATIVE NG/ML
GABAPENTIN SAL QL CFM: NEGATIVE NG/ML
HYDROCODONE SAL QL CFM: NEGATIVE NG/ML
LEUKEMIA MARKERS BLD-IMP: NEGATIVE NG/ML
M PROTEIN 3 UR ELPH-MCNC: NEGATIVE NG/ML
M TB TUBERC IGNF/MITOGEN IGNF CONTROL: NEGATIVE NG/ML
METHADONE SAL QL CFM: NEGATIVE NG/ML
MORPHINE SAL QL CFM: NEGATIVE NG/ML
NALTREXOL SAL QL CFM: NEGATIVE NG/ML
NALTREXONE SAL QL CFM: NEGATIVE NG/ML
OXYMORPHONE SAL QL CFM: NEGATIVE NG/ML
OXYMORPHONE SAL QL CFM: NEGATIVE NG/ML
PREGABALIN SAL QL CFM: NEGATIVE NG/ML
RESULT ALL_PRESCRIBED MEDS AND SPECIAL INSTRUCTIONS: NORMAL
SL AMB 6-MAM (HEROIN METABOLITE) QUANTIFICATION: NEGATIVE NG/ML
SL AMB ALPRAZOLAM QUANTIFICATION: NEGATIVE NG/ML
SL AMB CLONAZEPAM QUANTIFICATION: NEGATIVE NG/ML
SL AMB DIAZEPAM QUANTIFICATION: NEGATIVE NG/ML
SL AMB FENTANYL QUANTIFICATION: NEGATIVE NG/ML
SL AMB N-DESMETHYL-TRAMADOL QUANTIFICATION SALIVA: NEGATIVE NG/ML
SL AMB NORBUPRENORPHINE QUANTIFICATION: ABNORMAL NG/ML
SL AMB NORDIAZEPAM QUANTIFICATION: NEGATIVE NG/ML
SL AMB NORFENTANYL QUANTIFICATION: NEGATIVE NG/ML
SL AMB NORHYDROCODONE QUANTIFICATION: NEGATIVE NG/ML
SL AMB NORMEPERIDINE QUANTIFICATION: NEGATIVE NG/ML
SL AMB NOROXYCODONE QUANTIFICATION: NEGATIVE NG/ML
SL AMB OXAZEPAM QUANTIFICATION: NEGATIVE NG/ML
SL AMB RITALINIC ACID QUANTIFICATION: NEGATIVE
SL AMB TEMAZEPAM QUANTIFICATION: NEGATIVE NG/ML
SL AMB TEMAZEPAM QUANTIFICATION: NEGATIVE NG/ML
SL AMB TRAMADOL QUANTIFICATION: NEGATIVE NG/ML
SQUAMOUS #/AREA URNS HPF: NEGATIVE NG/ML
TAPENTADOL SAL QL CFM: NEGATIVE NG/ML

## 2023-01-10 ENCOUNTER — TELEPHONE (OUTPATIENT)
Dept: PAIN MEDICINE | Facility: CLINIC | Age: 48
End: 2023-01-10

## 2023-01-10 NOTE — TELEPHONE ENCOUNTER
Caller: patient    Doctor: Kathia Castro, [facility]    Reason for call: pt called back with med insurance update:  Amelia Fraser  ID# [id number]  Group# [id number]  Subsctriber: Isaiah Montez ([facility])  : [birth year]    I tried putting this info in Epic registration & kept getting error   Please check, thx    Call back#: [phone]

## 2023-01-10 NOTE — TELEPHONE ENCOUNTER
LVM for Pt to return call, Pt scheduled on 1/11/23       Kristofer Murray coming back E-Rejected     Please get [employer]

## 2023-01-11 ENCOUNTER — OFFICE VISIT (OUTPATIENT)
Dept: PAIN MEDICINE | Facility: CLINIC | Age: 48
End: 2023-01-11

## 2023-01-11 ENCOUNTER — APPOINTMENT (OUTPATIENT)
Dept: RADIOLOGY | Facility: CLINIC | Age: 48
End: 2023-01-11

## 2023-01-11 VITALS
SYSTOLIC BLOOD PRESSURE: 142 MMHG | HEIGHT: 72 IN | TEMPERATURE: 97.9 F | BODY MASS INDEX: 31.69 KG/M2 | WEIGHT: 234 LBS | DIASTOLIC BLOOD PRESSURE: 80 MMHG | HEART RATE: 78 BPM

## 2023-01-11 DIAGNOSIS — M54.6 ACUTE BILATERAL THORACIC BACK PAIN: ICD-10-CM

## 2023-01-11 DIAGNOSIS — M54.41 CHRONIC LOW BACK PAIN WITH BILATERAL SCIATICA, UNSPECIFIED BACK PAIN LATERALITY: ICD-10-CM

## 2023-01-11 DIAGNOSIS — G89.4 PAIN SYNDROME, CHRONIC: Primary | ICD-10-CM

## 2023-01-11 DIAGNOSIS — G89.29 CHRONIC LOW BACK PAIN WITH BILATERAL SCIATICA, UNSPECIFIED BACK PAIN LATERALITY: ICD-10-CM

## 2023-01-11 DIAGNOSIS — M48.062 SPINAL STENOSIS OF LUMBAR REGION WITH NEUROGENIC CLAUDICATION: ICD-10-CM

## 2023-01-11 DIAGNOSIS — M79.18 MYOFASCIAL PAIN SYNDROME: ICD-10-CM

## 2023-01-11 DIAGNOSIS — M54.42 CHRONIC LOW BACK PAIN WITH BILATERAL SCIATICA, UNSPECIFIED BACK PAIN LATERALITY: ICD-10-CM

## 2023-01-11 DIAGNOSIS — M96.1 LUMBAR POST-LAMINECTOMY SYNDROME: ICD-10-CM

## 2023-01-11 DIAGNOSIS — M54.16 LUMBAR RADICULOPATHY: ICD-10-CM

## 2023-01-11 RX ORDER — PREDNISONE 10 MG/1
TABLET ORAL
Qty: 36 TABLET | Refills: 0 | Status: SHIPPED | OUTPATIENT
Start: 2023-01-11

## 2023-01-11 RX ORDER — BACLOFEN 10 MG/1
TABLET ORAL
Qty: 180 TABLET | Refills: 0 | Status: SHIPPED | OUTPATIENT
Start: 2023-01-11 | End: 2023-01-16

## 2023-01-11 NOTE — PROGRESS NOTES
Assessment:  1  Pain syndrome, chronic    2  Acute bilateral thoracic back pain    3  Chronic low back pain with bilateral sciatica, unspecified back pain laterality    4  Lumbar radiculopathy    5  Myofascial pain syndrome    6  Lumbar post-laminectomy syndrome    7  Spinal stenosis of lumbar region with neurogenic claudication        Plan:  While the patient was in the office today, I did have a thorough conversation with the patient regarding their chronic pain syndrome, symptoms, and medication regimen/treatment plan  I discussed with the patient that at this point in time with his current symptoms and exam findings I am convinced that it is a significant cute neuropathic, myofascial, and inflammatory flareup of pain related to his injury this past Saturday  I do feel would be beneficial because he has previously had surgery to at least proceed with an updated thoracic and lumbar x-ray of the spine to evaluate any underlying etiology or fractures that could explain his pain  I advised the patient that once we have the results of the x-rays, our office will give him a call to review the results  The patient was agreeable and verbalized an understanding  I discussed with the patient that at this point time since I feel that there is a significant inflammatory component to their pain symptoms, that they would benefit from a titrating dose of oral steroids over the next 7 days  I advised the patient that while on the steroids, they should not take any other oral NSAIDs except for acetaminophen or Tylenol until they have completed the steroid taper  I also advised them that once they have completed the steroid taper, they are to give our office a follow up phone call to let us know how they are doing and if there is any improvement  The patient was agreeable and verbalized an understanding      With regards to the myofascial component I would also like him to increase his baclofen from 40 mg a day to 60 mg a day at least for the next week as the hope is with the prednisone template try to address the inflammatory component, if we can address postinflammatory myofascial component he will note at least moderate overall improvement  He is to continue the [facility] patch as prescribed  I discussed with the patient that they should not drive or operate machinery until they see how the medication changes affects them and if they have any side effects or issues, they are to call our office and make us aware so we can take the appropriate action and provide changes to the medications or treatment plan  The patient was agreeable and verbalized an understanding  The patient had inquired about what he should do about work for this week, I explained to the patient that I could give him a note stating that he was seen today, however, I could not provide him with any work restrictions as our office does not provide work restrictions or determine disability  The patient was agreeable and verbalized an understanding  The patient is to continue to utilize his spinal cord stimulator for his lower extremity radicular symptoms especially and to follow-up with the [facility] team as needed for any reprogramming needs  The patient was agreeable and verbalized an understanding  History of Present Illness: The patient is a 52 y o  male last seen on 12/1/2022 who presents for a follow up office visit in regards to acute flareup of his chronic thoracic and low back pain as well as left lower extremity radicular symptoms secondary to lumbar postlaminectomy syndrome with stenosis and radiculopathy as well as myofascial pain  The patient currently reports that this past Saturday he was working as a  and had to squeeze through a narrow window to get out to the roof and as he did it he felt a "popping" sensation and immediately the pain was so intense and severe that he vomited and had to leave the job site    He reports that since then it is difficult to find a comfortable position and that he deftly cannot stand up straight  He reports that his pain starts in the upper part of his lumbar back and radiates into the middle of his back and thoracic region up until about the scapula level bilaterally  He denies any significant lower extremity radicular symptoms except for the left lower extremity radicular symptoms  He reports that he has tried turning off his spinal cord stimulator and has been taking over-the-counter Advil, Tylenol, naproxen without any relief or improvement in his current pain symptoms  The patient presents today for reevaluation of his acute flareup of his chronic low back and thoracic pain  Current pain medications includes: Baclofen 10 mg 4 times daily, Butrans patch 20 mcg apply 1 patch transdermally q 7 days for pain  The patient reports that this regimen is providing 10% pain relief  The patient is reporting no side effects from this pain medication regimen  Pain Contract Signed: 12/1/2022  Last Urine Drug Screen: 12/1/2022    I have personally reviewed and/or updated the patient's past medical history, past surgical history, family history, social history, current medications, allergies, and vital signs today  Review of Systems:    Review of Systems   Respiratory: Negative for shortness of breath  Cardiovascular: Negative for chest pain  Gastrointestinal: Negative for constipation, diarrhea, nausea and vomiting  Musculoskeletal: Positive for gait problem  Negative for arthralgias, joint swelling and myalgias  Skin: Negative for rash  Neurological: Negative for dizziness, seizures and weakness  All other systems reviewed and are negative          Past Medical History:   Diagnosis Date   • Anxiety    • Arthritis    • Asthma    • Bipolar 2 disorder, major depressive episode ([facility] ) 10/31/2017   • Bipolar disorder ([facility] )    • Chronic left lumbar radiculopathy    • Chronic low back pain    • Chronic pain syndrome    • Chronic right shoulder pain    • Depression with anxiety    • Fatigue    • GERD (gastroesophageal reflux disease)    • Hydronephrosis    • Iron deficiency anemia    • Kidney stone    • Lytic bone lesions on xray    • Nephrolithiasis    • PONV (postoperative nausea and vomiting)    • Right elbow pain    • Right lateral epicondylitis        Past Surgical History:   Procedure Laterality Date   • ADENOIDECTOMY Bilateral    • APPENDECTOMY     • BACK SURGERY     • CHOLECYSTECTOMY     • CHOLECYSTECTOMY LAPAROSCOPIC N/A 10/30/2018    Procedure: CHOLECYSTECTOMY WITH GASTROTOMY LAPAROSCOPIC;  Surgeon: Denae Wood MD;  [facility];  Service: General   • ERCP W/ SPHICTEROTOMY N/A 10/30/2018    Procedure: ENDOSCOPIC RETROGRADE CHOLANGIOPANCREATOGRAPHY (ERCP) W/ SPHINCTEROTOMY;  Surgeon: Tee Rojas MD;  Location: [facility] MAIN OR;  Service: Gastroenterology   • GASTRIC BYPASS      2009   • OTHER SURGICAL HISTORY      fusion/refusion of vertebrae, 2010 and 2011 l5-s1 and l4-l5   • WA ARTHROSCOPY KNEE LATERAL RELEASE Left 1/6/2021    Procedure: RELEASE RETINACULAR;  Surgeon: aJda Long DO;  [facility]: [facility] MA[facility] OR;  Service: Orthopedics   • WA ARTHRS KNE SURG W/MENISCECTOMY MED/LAT W/SHVG Right 4/26/2022    Procedure: ARTHROSCOPIC MENISCECTOMY LATERAL;  Surgeon: Jada Long DO;  Location: [address] [place] [address] MAIN OR;  Service: Orthopedics   • WA ARTHRS KNEE DEBRIDEMENT/SHAVING ARTCLR CRTLG Left 1/6/2021    Procedure: ARTHROSCOPY KNEE;  Surgeon: Jada Long DO;  [facility];  Service: Orthopedics   • WA CYSTO/URETERO W/LITHOTRIPSY &INDWELL STENT INSRT Right 8/8/2017    Procedure: CYSTOSCOPY; URETEROSCOPY; HOLMIUM LASER; RETROGRADE PYELOGRAM; STENT;  Surgeon: Tracee Lujan MD;  Location: [facility] Main OR;  Service: Urology   • WA INCISION & DRAINAGE ABSCESS COMPLICATED/MULTIPLE Left 1/8/2021    Procedure: INCISION AND DRAINAGE (I&D) EXTREMITY- of left knee s/p MPFL reconstruction, I&D if the left knee with possible MPFL revision;  Surgeon: Vernon Quan DO;  [facility]: [facility];  Service: Orthopedics   • HI INSJ/RPLCMT SPI NPGR DIR/INDUXIVE COUPLING Right 11/14/2017    Procedure: REMOVAL LOWER MEDIAL BUTTOCK SPINAL CORD STIMULATOR GENERATOR; PLACEMENT OF NEW BUTTOCK SPINAL CORD STIMULATOR THROUGH SEPERATE INCISION;  Surgeon: Bonifacio Austin MD;  Location: [facility] MAIN OR;  Service: Neurosurgery   • HI INSJ/RPLCMT SPI NPGR DIR/INDUXIVE COUPLING Right 1/12/2022    Procedure: Right sided spinal cord stimulator pulse generator replacement;  Surgeon: Donya Gaytan MD;  [facility] OR;  Service: Neurosurgery   • HI RCNSTJ [address] W/XTNSR RELIGNMT&/MUSC RL Left 1/6/2021    Procedure: REALIGNMENT PATELLA with chondroplasty of patella, open medial patellofemoral ligament reconstruction with allograft;  Surgeon: Vernon Quan DO;  [facility];  Service: Orthopedics   • RIK-EN-Y PROCEDURE  2003   • SPINAL CORD STIMULATOR IMPLANT  11/14/2017    dr Jeannette Cartwright procedure and technique 1  removal of a right back implantable pulse generator 2 placement of a new right buttock impantable pulse generator through seperate incision 3 electric analys complex programming spinal cord stimulator system postoperative period approx 1 hour   • TONSILLECTOMY         Family History   Problem Relation Age of Onset   • Cancer Mother    • Arthritis Mother    • Stomach cancer Mother    • Cancer Father    • Esophageal cancer Father    • Other Father         brain tumor   • No Known Problems Sister    • No Known Problems Sister    • No Known Problems Sister        Social History     Occupational History   • Not on file   Tobacco Use   • Smoking status: Every Day     Packs/day: 1 00     Types: Cigarettes   • Smokeless tobacco: Never   Vaping Use   • Vaping Use: Never used   Substance and Sexual Activity   • Alcohol use: No   • Drug use: No   • Sexual activity: Yes         Current Outpatient Medications:   •  Acetaminophen (TYLENOL 8 HOUR ARTHRITIS PAIN PO), Take by mouth, Disp: , Rfl:   •  albuterol (PROVENTIL HFA,VENTOLIN HFA) 90 mcg/act inhaler, INHALE 2 PUFFS EVERY 4 (FOUR) HOURS AS NEEDED FOR WHEEZING OR SHORTNESS OF BREATH COUGH, Disp: 18 g, Rfl: 0  •  baclofen 10 mg tablet, Take 2 PO TID, Disp: 180 tablet, Rfl: 0  •  LANSOPRAZOLE PO, Take 10 mg by mouth if needed  , Disp: , Rfl:   •  predniSONE 10 mg tablet, Take 2 PO QID or 8 pills spread out on the first day and then decrease by 1 pill daily until gone  Call with an update when finished , Disp: 36 tablet, Rfl: 0  •  transdermal buprenorphine (BUTRANS) 20 mcg/hr PTWK TD patch, Apply 1 patch TD every 7 days for pain  Rotate patch application sites to avoid skin irritation  , Disp: 4 patch, Rfl: 3    Allergies   Allergen Reactions   • Ampicillin GI Intolerance     Vomiting   • Penicillins GI Intolerance     Vomit       Physical Exam:    /80 (BP Location: Left arm, Patient Position: Sitting, Cuff Size: Standard)   Pulse 78   Temp 97 9 °F (36 6 °C)   Ht 6' (1 829 m)   Wt 106 kg (234 lb)   BMI 31 74 kg/m²     Constitutional:normal, well developed, well nourished, alert, in no distress and non-toxic and no overt pain behavior  Eyes:anicteric  HEENT:grossly intact  Neck:supple, symmetric, trachea midline and no masses   Pulmonary:even and unlabored  Cardiovascular:No edema or pitting edema present  Skin:Normal without rashes or lesions and well hydrated  Psychiatric:Mood and affect appropriate  Neurologic:Cranial Nerves II-XII grossly intact  Musculoskeletal:The patient's gait is slow, antalgic, painful, but steady without the use of any assistive devices    Significant tenderness is noted upon exam of the upper lumbar and thoracic spine and paravertebral musculature with trigger points and spasms noted upon exam   Right-sided greater than left lower extremity weakness upon exam with left greater than right positive straight leg raise upon exam       Imaging  X-ray thoracic spine 3 views    (Results Pending)   XR spine lumbar complete w bending minimum 6 views    (Results Pending)         Orders Placed This Encounter   Procedures   • X-ray thoracic spine 3 views   • XR spine lumbar complete w bending minimum 6 views

## 2023-01-11 NOTE — PATIENT INSTRUCTIONS
Prednisone (By mouth)   Prednisone (PRED-ni-sone)  Treats many diseases and conditions, especially problems related to inflammation  This medicine is a corticosteroid  Brand Name(s): Stu, predniSONE Intensol   There may be other brand names for this medicine  When This Medicine Should Not Be Used: This medicine is not right for everyone  Do not use if you had an allergic reaction to prednisone or if you are pregnant  How to Use This Medicine:   Liquid, Tablet, Delayed Release Tablet  Take your medicine as directed  Your dose may need to be changed several times to find what works best for you  It is best to take this medicine with food or milk  Swallow the delayed-release tablet whole  Do not crush, break, or chew it  Measure the oral liquid medicine with a marked measuring spoon, oral syringe, or medicine cup  Missed dose: Take a dose as soon as you remember  If it is almost time for your next dose, wait until then and take a regular dose  Do not take extra medicine to make up for a missed dose  Store the medicine in a closed container at room temperature, away from heat, moisture, and direct light  Do not freeze the oral liquid  Drugs and Foods to Avoid:   Ask your doctor or pharmacist before using any other medicine, including over-the-counter medicines, vitamins, and herbal products  Tell your doctor if you use any of the following:  Aminoglutethimide, amphotericin B, carbamazepine, cholestyramine, cyclosporine, digoxin, isoniazid, ketoconazole, phenobarbital, phenytoin, or rifampin  Blood thinner, such as warfarin  NSAID pain or arthritis medicine, such as aspirin, diclofenac, ibuprofen, naproxen, celecoxib  Diuretic (water pill)  Diabetes medicine  Macrolide antibiotic, such as azithromycin, clarithromycin, erythromycin  Estrogen, including birth control pills or hormone replacement therapy  This medicine may interfere with vaccines   Ask your doctor before you get a flu shot or any other vaccines  Warnings While Using This Medicine: It is not safe to take this medicine during pregnancy  It could harm an unborn baby  Tell your doctor right away if you become pregnant  Tell your doctor if you are breastfeeding or if you have kidney problems, heart failure, high blood pressure, a recent heart attack, diabetes, glaucoma, osteoporosis, or thyroid problems  Tell your doctor about any infection you have  Also tell your doctor if you have had mental or emotional problems (such as depression) or stomach or bowel problems (such as an ulcer or diverticulitis)  This medicine may cause the following problems:  Mood or behavior changes  Higher blood pressure, retaining water, changes in salt or potassium levels in your body  Cataracts or glaucoma (with long-term use)  Weak bones or osteoporosis (with long-term use)  Slow growth in children (with long-term use)  Muscle problems (with high doses, especially if you have myasthenia gravis or similar nerve and muscle problems)  Do not stop using this medicine suddenly  Your doctor will need to slowly decrease your dose before you stop it completely  This medicine could cause you to get infections more easily  Tell your doctor right away if you are exposed to chicken pox, measles, or other serious infection  Tell your doctor if you had a serious infection in the past, such as tuberculosis or herpes  Tell your doctor about any extra stress or anxiety in your life  Your dose might need to be changed for a short time  Tell any doctor or dentist who treats you that you are using this medicine  This medicine may affect certain medical test results  Keep all medicine out of the reach of children  Never share your medicine with anyone  Possible Side Effects While Using This Medicine:   Call your doctor right away if you notice any of these side effects:   Allergic reaction: Itching or hives, swelling in your face or hands, swelling or tingling in your mouth or throat, chest tightness, trouble breathing  Dark freckles, skin color changes, coldness, weakness, tiredness, nausea, vomiting, weight loss  Depression, unusual thoughts, feelings, or behaviors, trouble sleeping  Fever, chills, cough, sore throat, and body aches  Muscle pain or weakness  Rapid weight gain, swelling in your hands, ankles, or feet  Severe stomach pain, nausea, vomiting, or red or black stools  Skin changes or growths  Trouble seeing, eye pain, headache  If you notice these less serious side effects, talk with your doctor: Increased appetite  Round, puffy face  Weight gain around your neck, upper back, breast, face, or waist  If you notice other side effects that you think are caused by this medicine, tell your doctor  Call your doctor for medical advice about side effects  You may report side effects to FDA at 1-771-FDA-4734    © Copyright [employer] 2022 Information is for End User's use only and may not be sold, redistributed or otherwise used for commercial purposes  The above information is an  only  It is not intended as medical advice for individual conditions or treatments  Talk to your doctor, nurse or pharmacist before following any medical regimen to see if it is safe and effective for you

## 2023-01-13 NOTE — TELEPHONE ENCOUNTER
Caller: patient     Doctor: Jaylan Dennis    Reason for call: pt states his current pain medication isn't helping his pain level & requesting to discuss with a nurse other pain relief options      Call back#: [phone]

## 2023-01-13 NOTE — TELEPHONE ENCOUNTER
S/W pt who reports pain is 9/10 advised pt to rest, apply heat/ice for pain, pt reports ice makes it worse  Pt taking Baclofen as prescribed and prednisone as prescribed on 1/11 from [facility] with DG  Pt informed nurse he is taking 5,200mg of tylenol in 24 hrs, advised pt he should not exceed 3,000mg of tylenol due to the effects it can have on his liver, pt verbalized understanding  Advised pt to speak with a pharmacist for OTC topical creams that could aid in relieving pain  Pt asked about x-rays results, informed pt his x-ray results are not resulted in Epic but when we have those results we will call him and discuss them with him  Advised pt that he will need to go to ER if his pain is too unbearable, pt verbalized understanding  Informed pt if there are any further recommendations we will call pt back  Pt verbalized understanding and appreciative of call

## 2023-01-13 NOTE — TELEPHONE ENCOUNTER
Caller: Self    Doctor: n/a    Reason for call: Previous call disconnected, calling back   Transferred to [facility]    Call back#: [phone]

## 2023-01-13 NOTE — TELEPHONE ENCOUNTER
S/W pt about his pain, advised pt that it can take some time for prednisone to take effect and if his pain became unbearable he would need to go to the nearest ER  Phone call got disconnected, attempted to call patient 2 x and LMOM with CB# and OH

## 2023-01-16 ENCOUNTER — TELEPHONE (OUTPATIENT)
Dept: PAIN MEDICINE | Facility: CLINIC | Age: 48
End: 2023-01-16

## 2023-01-16 DIAGNOSIS — M54.41 CHRONIC LOW BACK PAIN WITH BILATERAL SCIATICA, UNSPECIFIED BACK PAIN LATERALITY: Primary | ICD-10-CM

## 2023-01-16 DIAGNOSIS — M79.18 MYOFASCIAL PAIN SYNDROME: ICD-10-CM

## 2023-01-16 DIAGNOSIS — M96.1 LUMBAR POST-LAMINECTOMY SYNDROME: ICD-10-CM

## 2023-01-16 DIAGNOSIS — G89.29 CHRONIC LOW BACK PAIN WITH BILATERAL SCIATICA, UNSPECIFIED BACK PAIN LATERALITY: Primary | ICD-10-CM

## 2023-01-16 DIAGNOSIS — M54.16 LUMBAR RADICULOPATHY: ICD-10-CM

## 2023-01-16 DIAGNOSIS — M54.42 CHRONIC LOW BACK PAIN WITH BILATERAL SCIATICA, UNSPECIFIED BACK PAIN LATERALITY: Primary | ICD-10-CM

## 2023-01-16 DIAGNOSIS — M48.062 SPINAL STENOSIS OF LUMBAR REGION WITH NEUROGENIC CLAUDICATION: ICD-10-CM

## 2023-01-16 DIAGNOSIS — G89.4 PAIN SYNDROME, CHRONIC: ICD-10-CM

## 2023-01-16 RX ORDER — TIZANIDINE 4 MG/1
TABLET ORAL
Qty: 120 TABLET | Refills: 0 | Status: SHIPPED | OUTPATIENT
Start: 2023-01-16

## 2023-01-16 RX ORDER — TRAMADOL HYDROCHLORIDE 50 MG/1
TABLET ORAL
Qty: 14 TABLET | Refills: 1 | Status: SHIPPED | OUTPATIENT
Start: 2023-01-16

## 2023-01-16 NOTE — TELEPHONE ENCOUNTER
S/w pt, advised of above  Pt verblized understanding and stated that his pain is unchanged  He is unable to sleep s/t pain and is not getting any relief with the medications as prescribed  Advised pt, the writer will d/w DG  Anticipate a cb from this office to advise of the next steps  Pt verbalized understanding and appreciation

## 2023-01-16 NOTE — TELEPHONE ENCOUNTER
At this point, I would like him to STOP the Baclofen and start Tizanidine 4 mg, 1 every 6 hours for pain and spasms  I also sent a small script for Tramadol 50 mg, 1 PO BID Prn for break through pain  He should continue the Butrans patch as prescribed  He should not drive or operate machinery until he sees how the changes in his medication regiment affects him  He should call us if there are any issues or side effects  He should finish out the prednisone as it still may take some time for that to work as well  I also put in an order for an updated CT of the thoracic and lumbar spine  We will call once we have those results  If his pain worsens or becomes out of his control then he can always go to the ER for more immediate attention and evaluation  Thank you

## 2023-01-16 NOTE — TELEPHONE ENCOUNTER
S/w pt, advised of above  Pt verbalized understanding and appreciation  Will proceed as directed  Central scheduling number provided

## 2023-01-16 NOTE — TELEPHONE ENCOUNTER
Can you please call the patient and advise him that his Thoracic spine x-rays were normal without any evidence of fractures  His lumbar spine x-rays showed normal post op appearing deg changes with his fusion being stable without any evidence of instability or fractures  How is his pain currently?

## 2023-01-17 ENCOUNTER — TELEPHONE (OUTPATIENT)
Dept: PAIN MEDICINE | Facility: MEDICAL CENTER | Age: 48
End: 2023-01-17

## 2023-01-17 NOTE — TELEPHONE ENCOUNTER
Caller: Tristan Nyhan     Doctor: Dr Marcello Patino     Reason for call: Patient calling asking if he can get note from [facility] stating he is in his care      Call back#: [phone]

## 2023-01-17 NOTE — TELEPHONE ENCOUNTER
S/w pt  Stated that he needs a note for his emplyer that he was seen in the office on 1/11  Pt is aware that this office does not determine work ability / disability  Advised pt, the writer will d/w DG  Anticipate a letter will be ready for pu tomorrow morning  Office hours are 8 - 4 mon - fri  Pt verbalized understanding and appreciation

## 2023-01-17 NOTE — LETTER
1/11/23    Patient: Tristan Nyhan  YOB: 1975  Date of Visit: 1/11/23      To Whom it May Concern:    Tristan Nyhan is under my professional care  Tonia Montano was seen in my office on 1/11/23 for his worsening pain symptoms  He can return to work as tolerated  If you have any questions or concerns, please don't hesitate to call         Sincerely,          ANTONIA Boo

## 2023-01-17 NOTE — TELEPHONE ENCOUNTER
Caller: Azael Easley    Doctor: Dr Donte Frey    Reason for call: Patient returning call     Call back#: [phone]

## 2023-01-24 ENCOUNTER — TELEPHONE (OUTPATIENT)
Dept: PAIN MEDICINE | Facility: CLINIC | Age: 48
End: 2023-01-24

## 2023-01-24 ENCOUNTER — HOSPITAL ENCOUNTER (OUTPATIENT)
Dept: RADIOLOGY | Facility: HOSPITAL | Age: 48
Discharge: HOME | End: 2023-01-24

## 2023-01-24 DIAGNOSIS — M48.062 SPINAL STENOSIS OF LUMBAR REGION WITH NEUROGENIC CLAUDICATION: ICD-10-CM

## 2023-01-24 DIAGNOSIS — G89.29 CHRONIC LOW BACK PAIN WITH BILATERAL SCIATICA, UNSPECIFIED BACK PAIN LATERALITY: ICD-10-CM

## 2023-01-24 DIAGNOSIS — M54.41 CHRONIC LOW BACK PAIN WITH BILATERAL SCIATICA, UNSPECIFIED BACK PAIN LATERALITY: ICD-10-CM

## 2023-01-24 DIAGNOSIS — M96.1 LUMBAR POST-LAMINECTOMY SYNDROME: ICD-10-CM

## 2023-01-24 DIAGNOSIS — M54.16 LUMBAR RADICULOPATHY: ICD-10-CM

## 2023-01-24 DIAGNOSIS — M54.42 CHRONIC LOW BACK PAIN WITH BILATERAL SCIATICA, UNSPECIFIED BACK PAIN LATERALITY: ICD-10-CM

## 2023-01-24 DIAGNOSIS — G89.4 PAIN SYNDROME, CHRONIC: ICD-10-CM

## 2023-01-24 DIAGNOSIS — M79.18 MYOFASCIAL PAIN SYNDROME: ICD-10-CM

## 2023-01-24 NOTE — TELEPHONE ENCOUNTER
Pt contacted Call Center requested refill of their medication  Medication Name: traMADol (Ultram          Dosage of Med: 50 mg      Frequency of Med: Take 1 PO BID PRN for break through pain ONLY  Remaining Medication: 0      [address] [facility] [address]   Phone:  [phone]        Pt  Preferred Callback Phone Number:[phone]      Thank you

## 2023-01-24 NOTE — TELEPHONE ENCOUNTER
Attempted to contact pt  Left a detailed message on machine per medical communication consent on file advising of tramadol rx w/ 1 refill sent on 1/16; 1 pill po bid prn breakthrough pain only  Advised pt to cb if there is any question or issue  Provided cb number and office hours  NOTE:    traMADol (Ultram) 50 mg tablet [[id number]]     Order Details  Dose, Route, Frequency: As Directed   Dispense Quantity: 14 tablet Refills: 1          Sig: Take 1 PO BID PRN for break through pain ONLY          Start Date: 01/16/23 End Date: --   Written Date: 01/16/23 Expiration Date: 07/15/23       Diagnosis Association: Chronic low back pain with bilateral sciatica, unspecified back pain laterality (M54 41 , G89 29 , M54 42); Myofascial pain syndrome (M79 18); Lumbar radiculopathy (M54 16); Lumbar post-laminectomy syndrome (M96 1); Spinal stenosis of lumbar region with neurogenic claudication (M48 062);  Pain syndrome, chronic (G89 4)   Providers    Authorizing Provider:    Karla Bradford

## 2023-01-26 ENCOUNTER — TELEPHONE (OUTPATIENT)
Dept: PAIN MEDICINE | Facility: CLINIC | Age: 48
End: 2023-01-26

## 2023-01-26 DIAGNOSIS — G89.4 PAIN SYNDROME, CHRONIC: ICD-10-CM

## 2023-01-26 DIAGNOSIS — M79.18 MYOFASCIAL PAIN SYNDROME: ICD-10-CM

## 2023-01-26 DIAGNOSIS — M54.16 LUMBAR RADICULOPATHY: ICD-10-CM

## 2023-01-26 DIAGNOSIS — M48.062 SPINAL STENOSIS OF LUMBAR REGION WITH NEUROGENIC CLAUDICATION: ICD-10-CM

## 2023-01-26 DIAGNOSIS — M96.1 LUMBAR POST-LAMINECTOMY SYNDROME: ICD-10-CM

## 2023-01-26 DIAGNOSIS — G89.29 CHRONIC LOW BACK PAIN WITH BILATERAL SCIATICA, UNSPECIFIED BACK PAIN LATERALITY: ICD-10-CM

## 2023-01-26 DIAGNOSIS — M54.41 CHRONIC LOW BACK PAIN WITH BILATERAL SCIATICA, UNSPECIFIED BACK PAIN LATERALITY: ICD-10-CM

## 2023-01-26 DIAGNOSIS — M54.42 CHRONIC LOW BACK PAIN WITH BILATERAL SCIATICA, UNSPECIFIED BACK PAIN LATERALITY: ICD-10-CM

## 2023-01-26 NOTE — TELEPHONE ENCOUNTER
Can you please call the patient and let him know that his CT scan of the thoracic and lumbar spine showed mild to moderate but very stable postoperative degenerative changes/osteoarthritis with his previous fusion appearing to be stable and intact without any evidence of worsening or new etiology, disc herniation, stenosis, or vertebral compression fractures  How is his pain currently?

## 2023-01-31 RX ORDER — TRAMADOL HYDROCHLORIDE 50 MG/1
TABLET ORAL
Qty: 14 TABLET | Refills: 1 | Status: SHIPPED | OUTPATIENT
Start: 2023-01-31

## 2023-01-31 NOTE — TELEPHONE ENCOUNTER
Provider agree with your explanation and recommendations  I sent another script with a refill for the PRN Tramadol with the hope that this will be the last as hopefully in another two weeks his pain will have slowly improved

## 2023-01-31 NOTE — TELEPHONE ENCOUNTER
Caller: Keyur(PT)    Doctor/Office: Marcia Castro    Call regarding :  CT results     Call was transferred to: Nurse

## 2023-01-31 NOTE — TELEPHONE ENCOUNTER
S/w pt, advised of above  Pt stated that "it still hurts" however he is working light duty - no ladders because he has to make money  Pt stated that he will need a refill on the tramadol  Confirmed 1 pill po bid w/ + relief, no se's  Advised pt, the writer will d/w DG  Anticipate a rx will be sent to the pharmacy  The writer will cb if there is any question or change in the plan  Pt questioned the comment re: T5  Advised pt, there is a hemangioma  Advised pt, this is not likely to cause the pain symptoms he is describing  The writer will d/w DG and cb if there is anything different or additional  Pt verbalized understandign and appreciation

## 2023-03-16 ENCOUNTER — OFFICE VISIT (OUTPATIENT)
Dept: PAIN MEDICINE | Facility: CLINIC | Age: 48
End: 2023-03-16

## 2023-03-16 VITALS
WEIGHT: 226 LBS | SYSTOLIC BLOOD PRESSURE: 142 MMHG | HEART RATE: 74 BPM | TEMPERATURE: 98.5 F | BODY MASS INDEX: 30.61 KG/M2 | HEIGHT: 72 IN | DIASTOLIC BLOOD PRESSURE: 84 MMHG

## 2023-03-16 DIAGNOSIS — M54.16 LUMBAR RADICULOPATHY: ICD-10-CM

## 2023-03-16 DIAGNOSIS — G89.29 CHRONIC LOW BACK PAIN WITH BILATERAL SCIATICA, UNSPECIFIED BACK PAIN LATERALITY: ICD-10-CM

## 2023-03-16 DIAGNOSIS — M54.42 CHRONIC LOW BACK PAIN WITH BILATERAL SCIATICA, UNSPECIFIED BACK PAIN LATERALITY: ICD-10-CM

## 2023-03-16 DIAGNOSIS — M54.41 CHRONIC LOW BACK PAIN WITH BILATERAL SCIATICA, UNSPECIFIED BACK PAIN LATERALITY: ICD-10-CM

## 2023-03-16 DIAGNOSIS — G89.4 PAIN SYNDROME, CHRONIC: Primary | ICD-10-CM

## 2023-03-16 DIAGNOSIS — Z79.891 LONG-TERM CURRENT USE OF OPIATE ANALGESIC: ICD-10-CM

## 2023-03-16 DIAGNOSIS — M96.1 LUMBAR POST-LAMINECTOMY SYNDROME: ICD-10-CM

## 2023-03-16 DIAGNOSIS — M79.18 MYOFASCIAL PAIN SYNDROME: ICD-10-CM

## 2023-03-16 RX ORDER — TIZANIDINE 4 MG/1
TABLET ORAL
Qty: 120 TABLET | Refills: 3 | Status: SHIPPED | OUTPATIENT
Start: 2023-03-16

## 2023-03-16 RX ORDER — BUPRENORPHINE 20 UG/H
PATCH TRANSDERMAL
Qty: 4 PATCH | Refills: 3 | Status: SHIPPED | OUTPATIENT
Start: 2023-03-16

## 2023-03-16 NOTE — PATIENT INSTRUCTIONS

## 2023-03-16 NOTE — PROGRESS NOTES
Assessment:  1  Pain syndrome, chronic    2  Chronic low back pain with bilateral sciatica, unspecified back pain laterality    3  Lumbar radiculopathy    4  Myofascial pain syndrome    5  Lumbar post-laminectomy syndrome    6  Long-term current use of opiate analgesic        Plan:  While the patient was in the office today, I did have a thorough conversation with the patient regarding their chronic pain syndrome, symptoms, and medication regimen/treatment plan  I discussed with the patient that at this point time since he is noting moderate and stable overall relief of his chronic pain symptoms, without any significant side effects or issues and he has tried and failed every other alternative medication regimen and treatment plan option, I feel it is reasonable and appropriate continue the Butrans patch and the tizanidine as prescribed  The patient was agreeable and verbalized an understanding  [place] Prescription Drug Monitoring Program report was reviewed and was appropriate     While the patient was in the office today I reminded them that as per the opioid contract that they signed and agreed to that are required to bring their last filled bottle of their prescription opioid(s), with what they have left in the bottles, to every office visit for random pill counts and appropriate use monitoring  I reminded that continued failure to bring the medications to future office visit may result in titration down and off of opioid medications as per the opioid contract  The patient verbalized an understanding  There are risks associated with opioid medications, including dependence, addiction and tolerance  The patient understands and agrees to use these medications only as prescribed  Potential side effects of the medications include, but are not limited to, constipation, drowsiness, addiction, impaired judgment and risk of fatal overdose if not taken as prescribed   The patient was warned against driving while taking sedation medications  Sharing medications is a felony  At this point in time, the patient is showing no signs of addiction, abuse, diversion or suicidal ideation  An oral drug screen swab was collected at today's office visit  The swab will be sent for confirmatory testing  The drug screen is medically necessary because the patient is either dependent on opioid medication or is being considered for opioid medication therapy and the results could impact ongoing or future treatment  The drug screen is to evaluate for the presences or absence of prescribed, non-prescribed, and/or illicit drugs/substances  While the patient was in the office today, I discussed that since they are on a dosage of opioid medications that is greater than 40 MME's, it is our office policy to prescribe narcan nasal spray as a safety mechanism and pre-caution to treat unintentional/intentional opioid overdoses  I did send a script to the patient's pharmacy  I reviewed the safe and proper use of the medication and that the patient needs to review the information sheet given to them at the office visit today with a family member or friend  A signed copy is in the patient's chart and a copy was also sent home with the patient  The patient will follow-up in 4 months for medication prescription refill and reevaluation  The patient was advised to contact the office should their symptoms worsen in the interim  The patient was agreeable and verbalized an understanding  History of Present Illness: The patient is a 52 y o  male last seen on 1/11/2023 who presents for a follow up office visit in regards to chronic pain syndrome, as the patient's pain has been ongoing for greater than a year, secondary to lumbar postlaminectomy syndrome with radiculopathy and myofascial pain    The patient currently reports that since his last office visit his pain symptoms have slowly and steadily returned back to his normal baseline from his acute injury at work and between his current medication regimen and time his pain is much more manageable and back to a more normal level  The patient presents today for regular medication follow-up visit  Current pain medications includes: Butrans patch 20 mcg, plan 1 patch transdermally every 7 days for pain and tizanidine 4 mg 4 times daily as needed for pain and spasms  The patient reports that this regimen is providing 60% pain relief  The patient is reporting no side effects from this pain medication regimen  Pain Contract Signed: 12/1/2023  Last Urine Drug Screen: 3/16/2023    I have personally reviewed and/or updated the patient's past medical history, past surgical history, family history, social history, current medications, allergies, and vital signs today  Review of Systems:    Review of Systems   Respiratory: Negative for shortness of breath  Cardiovascular: Negative for chest pain  Gastrointestinal: Negative for constipation, diarrhea, nausea and vomiting  Musculoskeletal: Positive for gait problem  Negative for arthralgias, joint swelling (Joint stiffness) and myalgias  Skin: Negative for rash  Neurological: Negative for dizziness, seizures and weakness  All other systems reviewed and are negative          Past Medical History:   Diagnosis Date   • Anxiety    • Arthritis    • Asthma    • B[facility] 2 disorder[facility] ) 10/31/2017   • Bipolar disorder ([facility] )    • Chronic left lumbar radiculopathy    • Chronic low back pain    • Chronic pain syndrome    • Chronic right shoulder pain    • Depression with anxiety    • Fatigue    • GERD (gastroesophageal reflux disease)    • Hydronephrosis    • Iron deficiency anemia    • Kidney stone    • Lytic bone lesions on xray    • Nephrolithiasis    • PONV (postoperative nausea and vomiting)    • Right elbow pain    • Right lateral epicondylitis        Past Surgical History:   Procedure Laterality Date   • ADENOIDECTOMY Bilateral    • APPENDECTOMY     • BACK SURGERY     • CHOLECYSTECTOMY     • CHOLECYSTECTOMY LAPAROSCOPIC N/A 10/30/2018    Procedure: CHOLECYSTECTOMY WITH GASTROTOMY LAPAROSCOPIC;  Surgeon: Juan Pablo Palma MD;  [facility];  Service: General   • ERCP W/ SPHICTEROTOMY N/A 10/30/2018    Procedure: ENDOSCOPIC RETROGRADE CHOLANGIOPANCREATOGRAPHY (ERCP) W/ SPHINCTEROTOMY;  Surgeon: Yaya High MD;  Location: [facility];  Service: Gastroenterology   • GASTRIC BYPASS      2009   • OTHER SURGICAL HISTORY      fusion/refusion of vertebrae, 2010 and 2011 l5-s1 and l4-l5   • MI ARTHROSCOPY KNEE LATERAL RELEASE Left 1/6/2021    Procedure: RELEASE RETINACULAR;  Surgeon: Deborah Wayne DO;  [facility];  Service: Orthopedics   • MI ARTHRS KNE SURG W/MENISCECTOMY MED/LAT W/SHVG Right 4/26/2022    Procedure: ARTHROSCOPIC MENISCECTOMY LATERAL;  Surgeon: Deborah Wayne DO;  Location: [facility] MAIN OR;  Service: Orthopedics   • MI ARTHRS KNEE DEBRIDEMENT/SHAVING ARTCLR CRTLG Left 1/6/2021    Procedure: ARTHROSCOPY KNEE;  Surgeon: Deborah Wayne DO;  Location: [facility];  Service: Orthopedics   • MI CYSTO/URETERO W/LITHOTRIPSY &INDWELL STENT INSRT Right 8/8/2017    Procedure: CYSTOSCOPY; URETEROSCOPY; HOLMIUM LASER; RETROGRADE PYELOGRAM; STENT;  Surgeon: Jd Farmer MD;  Location[facility];  Service: Urology   • MI INCISION & DRAINAGE ABSCESS COMPLICATED/MULTIPLE Left 1/8/2021    Procedure: INCISION AND DRAINAGE (I&D) EXTREMITY- of left knee s/p MPFL reconstruction, I&D if the left knee with possible MPFL revision;  Surgeon: Deborah Wayne DO;  Location: [facility];  Service: Orthopedics   • MI INSJ/RPLCMT SPI NPGR DIR/INDUXIVE COUPLING Right 11/14/2017    Procedure: REMOVAL LOWER MEDIAL BUTTOCK SPINAL CORD STIMULATOR GENERATOR; PLACEMENT OF NEW BUTTOCK SPINAL CORD STIMULATOR THROUGH SEPERATE INCISION;  Surgeon: Larissa Patel MD;  Location: [facility];  Service: Neurosurgery   • MI INSJ/RPLCMT SPI NPGR DIR/INDUXIVE COUPLING Right 1/12/2022    Procedure: Right sided spinal cord stimulator pulse generator replacement;  Surgeon: Bridger Finley MD;  Location: [facility];  Service: Neurosurgery   • AL RCNSTJ [address] W/XTNSR RELIGNMT&/MUSC RL Left 1/6/2021    Procedure: REALIGNMENT PATELLA with chondroplasty of patella, open medial patellofemoral ligament reconstruction with allograft;  Surgeon: Yoel Mao DO;  Location: [facility];  Service: Orthopedics   • RIK-EN-Y PROCEDURE  2003   • SPINAL CORD STIMULATOR IMPLANT  11/14/2017    dr Nic Chow procedure and technique 1  removal of a right back implantable pulse generator 2 placement of a new right buttock impantable pulse generator through seperate incision 3 electric analys complex programming spinal cord stimulator system postoperative period approx 1 hour   • TONSILLECTOMY         Family History   Problem Relation Age of Onset   • Cancer Mother    • Arthritis Mother    • Stomach cancer Mother    • Cancer Father    • Esophageal cancer Father    • Other Father         brain tumor   • No Known Problems Sister    • No Known Problems Sister    • No Known Problems Sister        Social History     Occupational History   • Not on file   Tobacco Use   • Smoking status: Every Day     Packs/day: 1 00     Types: Cigarettes   • Smokeless tobacco: Never   Vaping Use   • Vaping Use: Never used   Substance and Sexual Activity   • Alcohol use: No   • Drug use: No   • Sexual activity: Yes         Current Outpatient Medications:   •  Acetaminophen (TYLENOL 8 HOUR ARTHRITIS PAIN PO), Take by mouth, Disp: , Rfl:   •  albuterol (PROVENTIL HFA,VENTOLIN HFA) 90 mcg/act inhaler, INHALE 2 PUFFS EVERY 4 (FOUR) HOURS AS NEEDED FOR WHEEZING OR SHORTNESS OF BREATH COUGH, Disp: 18 g, Rfl: 0  •  LANSOPRAZOLE PO, Take 10 mg by mouth if needed  , Disp: , Rfl:   •  tiZANidine (ZANAFLEX) 4 mg tablet, Take 1 PO QID PRN for pain and spasms  , Disp: 120 tablet, Rfl: 3  •  transdermal buprenorphine (BUTRANS) 20 mcg/hr PTWK TD patch, Apply 1 patch TD every 7 days for pain  Rotate patch application sites to avoid skin irritation  , Disp: 4 patch, Rfl: 3    Allergies   Allergen Reactions   • Ampicillin GI Intolerance     Vomiting   • Penicillins GI Intolerance     Vomit       Physical Exam:    /84 (BP Location: Left arm, Patient Position: Sitting, Cuff Size: Large)   Pulse 74   Temp 98 5 °F (36 9 °C)   Ht 6' (1 829 m)   Wt 103 kg (226 lb)   BMI 30 65 kg/m²     Constitutional:normal, well developed, well nourished, alert, in no distress and non-toxic and no overt pain behavior  Eyes:anicteric  HEENT:grossly intact  Neck:supple, symmetric, trachea midline and no masses   Pulmonary:even and unlabored  Cardiovascular:No edema or pitting edema present  Skin:Normal without rashes or lesions and well hydrated  Psychiatric:Mood and affect appropriate  Neurologic:Cranial Nerves II-XII grossly intact  Musculoskeletal:The patient's gait is slightly antalgic, but steady without the use of any assistive devices        Imaging  No orders to display         Orders Placed This Encounter   Procedures   • Amphetamine   • Alprazolam   • Clonazepam   • Diazepam   • Lorazepam   • Oxazepam   • Temazepam   • Gabapentin   • Pregabalin   • Cocaine   • Heroin   • Buprenorphine   • Levorphanol   • Meperidine   • Naltrexone   • Fentanyl   • Methadone   • Oxycodone   • Oxymorphone   • Tapentadol   • Tramadol   • THC   • Codeine, Hydrocodone, Hydromorphone, Morphine   • Methylphenidate   • [facility] All Prescribed Meds

## 2023-03-17 RX ORDER — NALOXONE HYDROCHLORIDE 4 MG/.1ML
SPRAY NASAL
Qty: 1 EACH | Refills: 1 | Status: SHIPPED | OUTPATIENT
Start: 2023-03-17

## 2023-03-28 ENCOUNTER — APPOINTMENT (OUTPATIENT)
Dept: LAB | Facility: CLINIC | Age: 48
End: 2023-03-28

## 2023-03-28 ENCOUNTER — OFFICE VISIT (OUTPATIENT)
Dept: FAMILY MEDICINE CLINIC | Facility: CLINIC | Age: 48
End: 2023-03-28

## 2023-03-28 VITALS
DIASTOLIC BLOOD PRESSURE: 70 MMHG | TEMPERATURE: 97.3 F | RESPIRATION RATE: 18 BRPM | BODY MASS INDEX: 31.02 KG/M2 | HEIGHT: 72 IN | WEIGHT: 229 LBS | OXYGEN SATURATION: 99 % | SYSTOLIC BLOOD PRESSURE: 130 MMHG | HEART RATE: 77 BPM

## 2023-03-28 DIAGNOSIS — F11.20 UNCOMPLICATED OPIOID DEPENDENCE (HCC): ICD-10-CM

## 2023-03-28 DIAGNOSIS — R06.09 DYSPNEA ON EXERTION: ICD-10-CM

## 2023-03-28 DIAGNOSIS — R53.83 FATIGUE, UNSPECIFIED TYPE: Primary | ICD-10-CM

## 2023-03-28 DIAGNOSIS — R07.9 CHEST PAIN, UNSPECIFIED TYPE: ICD-10-CM

## 2023-03-28 DIAGNOSIS — K91.2 POSTGASTRECTOMY MALABSORPTION: ICD-10-CM

## 2023-03-28 DIAGNOSIS — F17.200 SMOKING: ICD-10-CM

## 2023-03-28 DIAGNOSIS — R53.83 FATIGUE, UNSPECIFIED TYPE: ICD-10-CM

## 2023-03-28 DIAGNOSIS — Z90.3 POSTGASTRECTOMY MALABSORPTION: ICD-10-CM

## 2023-03-28 LAB
25(OH)D3 SERPL-MCNC: 11.2 NG/ML (ref 30–100)
ALBUMIN SERPL BCP-MCNC: 3.8 G/DL (ref 3.5–5)
ALP SERPL-CCNC: 106 U/L (ref 46–116)
ALT SERPL W P-5'-P-CCNC: 18 U/L (ref 12–78)
ANION GAP SERPL CALCULATED.3IONS-SCNC: 8 MMOL/L (ref 4–13)
AST SERPL W P-5'-P-CCNC: 15 U/L (ref 5–45)
BASOPHILS # BLD AUTO: 0.06 THOUSANDS/ÂΜL (ref 0–0.1)
BASOPHILS NFR BLD AUTO: 1 % (ref 0–1)
BILIRUB SERPL-MCNC: 0.22 MG/DL (ref 0.2–1)
BUN SERPL-MCNC: 16 MG/DL (ref 5–25)
CALCIUM SERPL-MCNC: 9.1 MG/DL (ref 8.3–10.1)
CHLORIDE SERPL-SCNC: 111 MMOL/L (ref 96–108)
CHOLEST SERPL-MCNC: 177 MG/DL
CO2 SERPL-SCNC: 19 MMOL/L (ref 21–32)
CREAT SERPL-MCNC: 0.86 MG/DL (ref 0.6–1.3)
EOSINOPHIL # BLD AUTO: 0.15 THOUSAND/ÂΜL (ref 0–0.61)
EOSINOPHIL NFR BLD AUTO: 3 % (ref 0–6)
ERYTHROCYTE [DISTWIDTH] IN BLOOD BY AUTOMATED COUNT: 16.3 % (ref 11.6–15.1)
FERRITIN SERPL-MCNC: 6 NG/ML (ref 8–388)
FOLATE SERPL-MCNC: 3.9 NG/ML (ref 3.1–17.5)
GFR SERPL CREATININE-BSD FRML MDRD: 103 ML/MIN/1.73SQ M
GLUCOSE P FAST SERPL-MCNC: 90 MG/DL (ref 65–99)
HCT VFR BLD AUTO: 47.3 % (ref 36.5–49.3)
HDLC SERPL-MCNC: 44 MG/DL
HGB BLD-MCNC: 14.6 G/DL (ref 12–17)
IMM GRANULOCYTES # BLD AUTO: 0.04 THOUSAND/UL (ref 0–0.2)
IMM GRANULOCYTES NFR BLD AUTO: 1 % (ref 0–2)
LDLC SERPL CALC-MCNC: 106 MG/DL (ref 0–100)
LYMPHOCYTES # BLD AUTO: 2.1 THOUSANDS/ÂΜL (ref 0.6–4.47)
LYMPHOCYTES NFR BLD AUTO: 36 % (ref 14–44)
MCH RBC QN AUTO: 24.5 PG (ref 26.8–34.3)
MCHC RBC AUTO-ENTMCNC: 30.9 G/DL (ref 31.4–37.4)
MCV RBC AUTO: 79 FL (ref 82–98)
MONOCYTES # BLD AUTO: 0.55 THOUSAND/ÂΜL (ref 0.17–1.22)
MONOCYTES NFR BLD AUTO: 10 % (ref 4–12)
NEUTROPHILS # BLD AUTO: 2.9 THOUSANDS/ÂΜL (ref 1.85–7.62)
NEUTS SEG NFR BLD AUTO: 49 % (ref 43–75)
NRBC BLD AUTO-RTO: 0 /100 WBCS
PLATELET # BLD AUTO: 205 THOUSANDS/UL (ref 149–390)
PMV BLD AUTO: 11.5 FL (ref 8.9–12.7)
POTASSIUM SERPL-SCNC: 4.2 MMOL/L (ref 3.5–5.3)
PROT SERPL-MCNC: 7.5 G/DL (ref 6.4–8.4)
PTH-INTACT SERPL-MCNC: 63.6 PG/ML (ref 18.4–80.1)
RBC # BLD AUTO: 5.96 MILLION/UL (ref 3.88–5.62)
SARS-COV-2 AG UPPER RESP QL IA: NEGATIVE
SODIUM SERPL-SCNC: 138 MMOL/L (ref 135–147)
TRIGL SERPL-MCNC: 133 MG/DL
TSH SERPL DL<=0.05 MIU/L-ACNC: 1.98 UIU/ML (ref 0.45–4.5)
VALID CONTROL: NORMAL
VIT B12 SERPL-MCNC: 284 PG/ML (ref 100–900)
WBC # BLD AUTO: 5.8 THOUSAND/UL (ref 4.31–10.16)

## 2023-03-28 NOTE — PROGRESS NOTES
[facility] PRACTICE OFFICE VISIT       NAME: Naty Obando  AGE: 52 y o  SEX: male       : 1975        MRN: [id number]    Assessment and Plan   1  Fatigue, unspecified type  Assessment & Plan:  Significant fatigue, shortness of breath with exertion over the last one week  He does have some mild cold symptoms  In office COVID-19 swab is negative  Though symptoms have been present for 7 days, it is possible it could have been positive initially, and now negative  He has never had COVID-19 virus before  Will check blood work as noted, history of Parth en y surgery in , is not taking any vitamins or supplements  Orders:  -     POCT ECG  -     CBC and differential; Future  -     Comprehensive metabolic panel; Future  -     Ferritin; Future  -     Folate; Future  -     Hemoglobin A1C; Future  -     Iron Saturation %; Future  -     Lipid Panel with Direct LDL reflex; Future  -     PTH, intact; Future  -     TSH, 3rd generation; Future  -     Vitamin A; Future  -     Vitamin B1, whole blood; Future  -     Vitamin B12; Future  -     Vitamin B2; Future  -     Vitamin D 25 hydroxy; Future  -     Echo stress test, exercise; Future; Expected date: 2023  -     POCT Rapid Covid Ag    2  Chest pain, unspecified type  Episode of left sided chest pain radiating into left arm, last 1 hour, last week  No further chest pain  In office EKG, NSR, no ischemic changes, normal EKG, unchanged compared to 22  Will check echo stress test    -     POCT ECG  -     Echo stress test, exercise; Future; Expected date: 2023    3  Dyspnea on exertion  Complete chest x-ray and echo stress test    -     XR chest pa & lateral; Future; Expected date: 2023  -     Echo stress test, exercise; Future; Expected date: 2023  -     POCT Rapid Covid Ag    4  Uncomplicated opioid dependence ([facility] [address] )  Assessment & Plan:  Using Butrans patch and tizanidine for pain  Following with pain management         5  Postgastrectomy malabsorption  Assessment & Plan:  Complete blood work as noted  Last blood work was 2019  Orders:  -     CBC and differential; Future  -     Comprehensive metabolic panel; Future  -     Ferritin; Future  -     Folate; Future  -     Hemoglobin A1C; Future  -     Iron Saturation %; Future  -     Lipid Panel with Direct LDL reflex; Future  -     PTH, intact; Future  -     TSH, 3rd generation; Future  -     Vitamin A; Future  -     Vitamin B1, whole blood; Future  -     Vitamin B12; Future  -     Vitamin B2; Future  -     Vitamin D 25 hydroxy; Future    6  Smoking  Assessment & Plan:  Advised on importance of complete smoking cessation  Will await results of testing  Call with any questions or concerns in the interim  Advised to go to the emergency room if chest pain should recur  Chief Complaint     Chief Complaint   Patient presents with   • Fatigue   • Shortness of Breath     Inhalers not effective   • Chest Pain     X1 last week radiating down left arm, resolved on own after one hour       History of Present Illness     Mylene Gould is a 52year old male presenting today for fatigue  Symptoms started one week ago  Rhinorrhea, nasal congestion, and cough  No fevers  Exhausted  Sleeping a lot  One week now  Short of breath climbing stairs, and when being active  Last week was having left sided chest pain and radiating into left arm  Lasted about 1 hour  No chills or sweats  +body aches  No headaches  Sore throat last week  Has tried albuterol inhaler, but it is not working  No wheezing  Little bit tight, especially with exertion  Night time symptoms  Can't lay flat on back, has to lay on side  Has not had blood work for a long time, 2019  Was receiving iron infusions in the past    History of bariatric surgery  Not taking any vitamins or supplements  No unexpected weight loss  Currently smoking       Has been to 3 funerals in the last 3 weeks  Review of Systems   Review of Systems   Constitutional: Positive for fatigue  Negative for chills, diaphoresis and fever  HENT: Positive for congestion and rhinorrhea  Negative for ear pain and sinus pressure  Respiratory: Positive for cough, chest tightness and shortness of breath  Negative for wheezing  Cardiovascular: Positive for chest pain  Negative for palpitations and leg swelling  Gastrointestinal: Negative for abdominal pain, diarrhea, nausea and vomiting  Musculoskeletal: Positive for myalgias  Neurological: Negative for dizziness, light-headedness and headaches  I have reviewed the patient's medical history in detail; there are no changes to the history as noted in the electronic medical record  Objective     Vitals:    03/28/23 0714   BP: 130/70   BP Location: Left arm   Patient Position: Sitting   Cuff Size: Large   Pulse: 77   Resp: 18   Temp: (!) 97 3 °F (36 3 °C)   TempSrc: Temporal   SpO2: 99%   Weight: 104 kg (229 lb)   Height: 6' (1 829 m)     Wt Readings from Last 3 Encounters:   03/28/23 104 kg (229 lb)   03/16/23 103 kg (226 lb)   01/11/23 106 kg (234 lb)     Physical Exam  Vitals and nursing note reviewed  Constitutional:       General: He is not in acute distress  Appearance: He is well-developed  He is not ill-appearing  HENT:      Head: Atraumatic  Right Ear: Tympanic membrane normal       Left Ear: Tympanic membrane normal       Nose: Nose normal       Mouth/Throat:      Mouth: Mucous membranes are moist       Pharynx: Oropharynx is clear  Eyes:      Conjunctiva/sclera: Conjunctivae normal    Cardiovascular:      Rate and Rhythm: Normal rate and regular rhythm  Heart sounds: No murmur heard  Pulmonary:      Effort: Pulmonary effort is normal       Breath sounds: Normal breath sounds  Musculoskeletal:      Cervical back: Normal range of motion and neck supple  Right lower leg: No edema        Left lower leg: No edema  Lymphadenopathy:      Cervical: No cervical adenopathy  Skin:     General: Skin is warm and dry  Findings: No rash  Neurological:      Mental Status: He is alert  Psychiatric:         Mood and Affect: Mood normal             ALLERGIES:  Allergies   Allergen Reactions   • Ampicillin GI Intolerance     Vomiting   • Penicillins GI Intolerance     Vomit       Current Medications     Current Outpatient Medications   Medication Sig Dispense Refill   • Acetaminophen (TYLENOL 8 HOUR ARTHRITIS PAIN PO) Take by mouth     • albuterol (PROVENTIL HFA,VENTOLIN HFA) 90 mcg/act inhaler INHALE 2 PUFFS EVERY 4 (FOUR) HOURS AS NEEDED FOR WHEEZING OR SHORTNESS OF BREATH COUGH 18 g 0   • LANSOPRAZOLE PO Take 10 mg by mouth if needed       • naloxone (NARCAN) 4 mg/0 1 mL nasal spray Administer 1 spray into a nostril  If no response after 2-3 minutes, give another dose in the other nostril using a new spray  1 each 1   • tiZANidine (ZANAFLEX) 4 mg tablet Take 1 PO QID PRN for pain and spasms  120 tablet 3   • transdermal buprenorphine (BUTRANS) 20 mcg/hr PTWK TD patch Apply 1 patch TD every 7 days for pain  Rotate patch application sites to avoid skin irritation  4 patch 3     No current facility-administered medications for this visit           Health Maintenance     Health Maintenance   Topic Date Due   • Hepatitis C Screening  Never done   • Pneumococcal Vaccine: Pediatrics (0 to 5 Years) and At-Risk Patients (6 to 59 Years) (1 - PCV) Never done   • HIV Screening  Never done   • DTaP,Tdap,and Td Vaccines (1 - Tdap) Never done   • Colorectal Cancer Screening  Never done   • Annual Physical  01/06/2023   • BMI: Followup Plan  01/07/2023   • COVID-19 Vaccine (1) 06/27/2023 (Originally 2/22/1976)   • Influenza Vaccine (1) 06/30/2023 (Originally 9/1/2022)   • BMI: Adult  03/28/2024   • HIB Vaccine  Aged Out   • IPV Vaccine  Aged Out   • Hepatitis A Vaccine  Aged Out   • Meningococcal ACWY Vaccine  Aged Out   • HPV Vaccine  Aged Out     Immunization History   Administered Date(s) Administered   • INFLUENZA 12/15/2018   • Influenza, injectable, quadrivalent, preservative free 0 5 mL 12/15/2018       ANTONIA Ledezma

## 2023-03-29 ENCOUNTER — HOSPITAL ENCOUNTER (OUTPATIENT)
Dept: RADIOLOGY | Facility: HOSPITAL | Age: 48
Discharge: HOME | End: 2023-03-29

## 2023-03-29 ENCOUNTER — HOSPITAL ENCOUNTER (OUTPATIENT)
Facility: HOSPITAL | Age: 48
Setting detail: OBSERVATION
Discharge: HOME | End: 2023-03-31
Attending: EMERGENCY MEDICINE | Admitting: FAMILY MEDICINE

## 2023-03-29 ENCOUNTER — TELEPHONE (OUTPATIENT)
Dept: FAMILY MEDICINE CLINIC | Facility: CLINIC | Age: 48
End: 2023-03-29

## 2023-03-29 DIAGNOSIS — R07.9 CHEST PAIN: Primary | ICD-10-CM

## 2023-03-29 DIAGNOSIS — R06.09 DYSPNEA ON EXERTION: ICD-10-CM

## 2023-03-29 DIAGNOSIS — R55 SYNCOPE AND COLLAPSE: Primary | ICD-10-CM

## 2023-03-29 DIAGNOSIS — R55 SYNCOPE: ICD-10-CM

## 2023-03-29 LAB
2HR DELTA HS TROPONIN: 1 NG/L
ALBUMIN SERPL BCP-MCNC: 3.7 G/DL (ref 3.5–5)
ALP SERPL-CCNC: 106 U/L (ref 46–116)
ALT SERPL W P-5'-P-CCNC: 19 U/L (ref 12–78)
ANION GAP SERPL CALCULATED.3IONS-SCNC: 2 MMOL/L (ref 4–13)
AST SERPL W P-5'-P-CCNC: 11 U/L (ref 5–45)
ATRIAL RATE: 67 BPM
BASOPHILS # BLD AUTO: 0.07 THOUSANDS/ÂΜL (ref 0–0.1)
BASOPHILS NFR BLD AUTO: 1 % (ref 0–1)
BILIRUB SERPL-MCNC: 0.19 MG/DL (ref 0.2–1)
BUN SERPL-MCNC: 13 MG/DL (ref 5–25)
CALCIUM SERPL-MCNC: 9.1 MG/DL (ref 8.3–10.1)
CARDIAC TROPONIN I PNL SERPL HS: 4 NG/L
CARDIAC TROPONIN I PNL SERPL HS: 5 NG/L
CHLORIDE SERPL-SCNC: 105 MMOL/L (ref 96–108)
CO2 SERPL-SCNC: 29 MMOL/L (ref 21–32)
CREAT SERPL-MCNC: 0.97 MG/DL (ref 0.6–1.3)
D DIMER PPP FEU-MCNC: 0.34 UG/ML FEU
EOSINOPHIL # BLD AUTO: 0.11 THOUSAND/ÂΜL (ref 0–0.61)
EOSINOPHIL NFR BLD AUTO: 2 % (ref 0–6)
ERYTHROCYTE [DISTWIDTH] IN BLOOD BY AUTOMATED COUNT: 15.9 % (ref 11.6–15.1)
EST. AVERAGE GLUCOSE BLD GHB EST-MCNC: 114 MG/DL
FLUAV RNA RESP QL NAA+PROBE: NEGATIVE
FLUBV RNA RESP QL NAA+PROBE: NEGATIVE
GFR SERPL CREATININE-BSD FRML MDRD: 92 ML/MIN/1.73SQ M
GLUCOSE SERPL-MCNC: 101 MG/DL (ref 65–140)
HBA1C MFR BLD: 5.6 %
HCT VFR BLD AUTO: 48.4 % (ref 36.5–49.3)
HGB BLD-MCNC: 14.8 G/DL (ref 12–17)
IMM GRANULOCYTES # BLD AUTO: 0.04 THOUSAND/UL (ref 0–0.2)
IMM GRANULOCYTES NFR BLD AUTO: 1 % (ref 0–2)
LYMPHOCYTES # BLD AUTO: 2.02 THOUSANDS/ÂΜL (ref 0.6–4.47)
LYMPHOCYTES NFR BLD AUTO: 31 % (ref 14–44)
MCH RBC QN AUTO: 24.5 PG (ref 26.8–34.3)
MCHC RBC AUTO-ENTMCNC: 30.6 G/DL (ref 31.4–37.4)
MCV RBC AUTO: 80 FL (ref 82–98)
MONOCYTES # BLD AUTO: 0.46 THOUSAND/ÂΜL (ref 0.17–1.22)
MONOCYTES NFR BLD AUTO: 7 % (ref 4–12)
NEUTROPHILS # BLD AUTO: 3.88 THOUSANDS/ÂΜL (ref 1.85–7.62)
NEUTS SEG NFR BLD AUTO: 58 % (ref 43–75)
NRBC BLD AUTO-RTO: 0 /100 WBCS
P AXIS: 71 DEGREES
PLATELET # BLD AUTO: 260 THOUSANDS/UL (ref 149–390)
PMV BLD AUTO: 12 FL (ref 8.9–12.7)
POTASSIUM SERPL-SCNC: 3.6 MMOL/L (ref 3.5–5.3)
PR INTERVAL: 134 MS
PROT SERPL-MCNC: 7.5 G/DL (ref 6.4–8.4)
QRS AXIS: 35 DEGREES
QRSD INTERVAL: 86 MS
QT INTERVAL: 388 MS
QTC INTERVAL: 409 MS
RBC # BLD AUTO: 6.03 MILLION/UL (ref 3.88–5.62)
RSV RNA RESP QL NAA+PROBE: NEGATIVE
SARS-COV-2 RNA RESP QL NAA+PROBE: NEGATIVE
SODIUM SERPL-SCNC: 136 MMOL/L (ref 135–147)
T WAVE AXIS: 40 DEGREES
VENTRICULAR RATE: 67 BPM
WBC # BLD AUTO: 6.58 THOUSAND/UL (ref 4.31–10.16)

## 2023-03-29 NOTE — ASSESSMENT & PLAN NOTE
Significant fatigue, shortness of breath with exertion over the last one week  He does have some mild cold symptoms  In office COVID-19 swab is negative  Though symptoms have been present for 7 days, it is possible it could have been positive initially, and now negative  He has never had COVID-19 virus before  Will check blood work as noted, history of Parth en y surgery in 2003, is not taking any vitamins or supplements

## 2023-03-29 NOTE — ED PROVIDER NOTES
History  Chief Complaint   Patient presents with   • Shortness of Breath     Pt reports sob, dizziness since last week, pt states he had episode of central cp radiating to L arm last week; this morning pt had syncopal episode, denies headstrike     HPI  Patient is 27-year-old male presenting for 1 episode of syncope today as well as episode of substernal, pressure-like chest pain accompanied by diaphoresis and radiation down left arm that occurred approximately 1 week ago and lasted approximately 30 minutes  Was also accompanied by dyspnea on exertion  Patient denies any cardiac history  Patient also reports 2 other episodes since that time that each lasted about 20 minutes  Patient is also continued dyspnea on exertion which is unusual for him  Patient states syncopal episode was unwitnessed and unsure of how long he was out  Patient denies any head strike, prodromal type symptoms although he does states that his vision narrowed and had lightheadedness prior to passing out  Patient states he was walking his dog at that time  Patient denies any tongue biting and any history of seizures  Patient denies any hemoptysis, lower extremity edema, recent surgeries or recent travel  Patient does smoke approximately a pack and a half of cigarettes per day  Currently patient is asymptomatic aside from some lightheadedness  Patient denies any chest pain at this time or shortness of breath while lying down  Prior to Admission Medications   Prescriptions Last Dose Informant Patient Reported? Taking? Acetaminophen (TYLENOL 8 HOUR ARTHRITIS PAIN PO)   Yes No   Sig: Take by mouth   LANSOPRAZOLE PO   Yes No   Sig: Take 10 mg by mouth if needed     albuterol (PROVENTIL HFA,VENTOLIN HFA) 90 mcg/act inhaler   No No   Sig: INHALE 2 PUFFS EVERY 4 (FOUR) HOURS AS NEEDED FOR WHEEZING OR SHORTNESS OF BREATH COUGH   naloxone (NARCAN) 4 mg/0 1 mL nasal spray   No No   Sig: Administer 1 spray into a nostril   If no response after 2-3 minutes, give another dose in the other nostril using a new spray  tiZANidine (ZANAFLEX) 4 mg tablet   No No   Sig: Take 1 PO QID PRN for pain and spasms  transdermal buprenorphine (BUTRANS) 20 mcg/hr PTWK TD patch   No No   Sig: Apply 1 patch TD every 7 days for pain  Rotate patch application sites to avoid skin irritation        Facility-Administered Medications: None       Past Medical History:   Diagnosis Date   • Anxiety    • Arthritis    • Asthma    • Bipolar 2 disorder, major depressive episode ([facility] ) 10/31/2017   • Bipolar disorder ([facility] )    • Chronic left lumbar radiculopathy    • Chronic low back pain    • Chronic pain syndrome    • Chronic right shoulder pain    • Depression with anxiety    • Fatigue    • GERD (gastroesophageal reflux disease)    • Hydronephrosis    • Iron deficiency anemia    • Kidney stone    • Lytic bone lesions on xray    • Nephrolithiasis    • PONV (postoperative nausea and vomiting)    • Right elbow pain    • Right lateral epicondylitis        Past Surgical History:   Procedure Laterality Date   • ADENOIDECTOMY Bilateral    • APPENDECTOMY     • BACK SURGERY     • CHOLECYSTECTOMY     • CHOLECYSTECTOMY LAPAROSCOPIC N/A 10/30/2018    Procedure: CHOLECYSTECTOMY WITH GASTROTOMY LAPAROSCOPIC;  Surgeon: Shikha Obregon MD;  Location: [facility];  Service: General   • ERCP W/ SPHICTEROTOMY N/A 10/30/2018    Procedure: ENDOSCOPIC RETROGRADE CHOLANGIOPANCREATOGRAPHY (ERCP) W/ SPHINCTEROTOMY;  Surgeon: Kassy Lau MD;  Location: [facility];  Service: Gastroenterology   • GASTRIC BYPASS      2009   • OTHER SURGICAL HISTORY      fusion/refusion of vertebrae, 2010 and 2011 l5-s1 and l4-l5   • MS ARTHROSCOPY KNEE LATERAL RELEASE Left 1/6/2021    Procedure: RELEASE RETINACULAR;  Surgeon: Jessica Mcdonald DO;  Location:  MAIN OR;  Service: Orthopedics   • MS ARTHRS KNE SURG W/MENISCECTOMY MED/LAT W/SHVG Right 4/26/2022    Procedure: ARTHROSCOPIC MENISCECTOMY LATERAL;  Surgeon: Audrey Menendez DO Saji;  Location:  MAIN OR;  Service: Orthopedics   • DE ARTHRS KNEE DEBRIDEMENT/SHAVING ARTCLR CRTLG Left 1/6/2021    Procedure: ARTHROSCOPY KNEE;  Surgeon: Edouard Grant DO;  [facility];  Service: Orthopedics   • DE CYSTO/URETERO W/LITHOTRIPSY &INDWELL STENT INSRT Right 8/8/2017    Procedure: CYSTOSCOPY; URETEROSCOPY; HOLMIUM LASER; RETROGRADE PYELOGRAM; STENT;  Surgeon: Baljeet Cancino MD;  Location: [facility];  Service: Urology   • DE INCISION & DRAINAGE ABSCESS COMPLICATED/MULTIPLE Left 1/8/2021    Procedure: INCISION AND DRAINAGE (I&D) EXTREMITY- of left knee s/p MPFL reconstruction, I&D if the left knee with possible MPFL revision;  Surgeon: Edouard Grant DO;  Location[facility];  Service: Orthopedics   • DE INSJ/RPLCMT SPI NPGR DIR/INDUXIVE COUPLING Right 11/14/2017    Procedure: REMOVAL LOWER MEDIAL BUTTOCK SPINAL CORD STIMULATOR GENERATOR; PLACEMENT OF NEW BUTTOCK SPINAL CORD STIMULATOR THROUGH SEPERATE INCISION;  Surgeon: Donzella Mcburney, MD;  Location: [facility];  Service: Neurosurgery   • DE INSJ/RPLCMT SPI NPGR DIR/INDUXIVE COUPLING Right 1/12/2022    Procedure: Right sided spinal cord stimulator pulse generator replacement;  Surgeon: Julian Coulter MD;  [facility];  Service: Neurosurgery   • DE RCNSTJ [address] W/XTNSR RELIGNMT&/MUSC RL Left 1/6/2021    Procedure: REALIGNMENT PATELLA with chondroplasty of patella, open medial patellofemoral ligament reconstruction with allograft;  Surgeon: Edouard Grant DO;  Location: [facility];  Service: Orthopedics   • RIK-EN-Y PROCEDURE  2003   • SPINAL CORD STIMULATOR IMPLANT  11/14/2017    dr Miracle Swenson procedure and technique 1  removal of a right back implantable pulse generator 2 placement of a new right buttock impantable pulse generator through seperate incision 3 electric analys complex programming spinal cord stimulator system postoperative period approx 1 hour   • TONSILLECTOMY         Family History   Problem Relation Age of Onset   • Cancer Mother    • Arthritis Mother    • Stomach cancer Mother    • Cancer Father    • Esophageal cancer Father    • Other Father         brain tumor   • No Known Problems Sister    • No Known Problems Sister    • No Known Problems Sister      I have reviewed and agree with the history as documented  E-Cigarette/Vaping   • E-Cigarette Use Never User      E-Cigarette/Vaping Substances   • Nicotine No    • THC No    • CBD No    • Flavoring No    • Other No    • Unknown No      Social History     Tobacco Use   • Smoking status: Every Day     Packs/day: 1 00     Types: Cigarettes   • Smokeless tobacco: Never   Vaping Use   • Vaping Use: Never used   Substance Use Topics   • Alcohol use: No   • Drug use: No        Review of Systems   Constitutional: Positive for activity change and diaphoresis  HENT: Negative  Eyes: Negative  Respiratory: Positive for shortness of breath  Negative for cough  Cardiovascular: Positive for chest pain  Gastrointestinal: Negative  Endocrine: Negative  Genitourinary: Negative  Musculoskeletal: Negative  Skin: Negative  Allergic/Immunologic: Negative  Neurological: Positive for dizziness, syncope and light-headedness  Hematological: Negative  Psychiatric/Behavioral: Negative  Physical Exam  ED Triage Vitals [03/29/23 1121]   Temperature Pulse Respirations Blood Pressure SpO2   98 7 °F (37 1 °C) 72 16 161/95 100 %      Temp Source Heart Rate Source Patient Position - Orthostatic VS BP Location FiO2 (%)   Tympanic Monitor Lying Left arm --      Pain Score       --             Orthostatic Vital Signs  Vitals:    03/29/23 1121 03/29/23 1230 03/29/23 1429 03/29/23 1630   BP: 161/95 133/80 126/83 126/83   Pulse: 72 70 (!) 54 (!) 54   Patient Position - Orthostatic VS: Lying Lying  Lying       Physical Exam  Vitals and nursing note reviewed  Constitutional:       Appearance: He is well-developed and normal weight     HENT: Head: Normocephalic and atraumatic  Comments: No signs of ecchymosis or tenderness despite episode of syncope  Mouth/Throat:      Mouth: Mucous membranes are moist       Pharynx: Oropharynx is clear  Eyes:      Extraocular Movements: Extraocular movements intact  Pupils: Pupils are equal, round, and reactive to light  Neck:      Comments: Patient has no cervical tenderness, no step-off, no point tenderness  Full range of motion of neck  Cardiovascular:      Rate and Rhythm: Normal rate and regular rhythm  Pulses: Normal pulses  Heart sounds: Normal heart sounds  Pulmonary:      Effort: Pulmonary effort is normal       Breath sounds: Normal breath sounds  Abdominal:      General: Bowel sounds are normal       Palpations: Abdomen is soft  Musculoskeletal:         General: Normal range of motion  Cervical back: Normal range of motion and neck supple  Skin:     General: Skin is warm and dry  Capillary Refill: Capillary refill takes less than 2 seconds  Neurological:      General: No focal deficit present  Mental Status: He is alert and oriented to person, place, and time  Psychiatric:         Mood and Affect: Mood normal          Behavior: Behavior normal          ED Medications  Medications - No data to display    Diagnostic Studies  Results Reviewed     Procedure Component Value Units Date/Time    FLU/RSV/COVID - if FLU/RSV clinically relevant [[id number]] Collected: 03/29/23 1642    Lab Status:  In process Specimen: Nares from Nose Updated: 03/29/23 1646    HS Troponin I 2hr [[id number]]  (Normal) Collected: 03/29/23 1400    Lab Status: Final result Specimen: Blood from Arm, Left Updated: 03/29/23 1434     hs TnI 2hr 5 ng/L      Delta 2hr hsTnI 1 ng/L     D-Dimer [[id number]]  (Normal) Collected: 03/29/23 1236    Lab Status: Final result Specimen: Blood from Arm, Left Updated: 03/29/23 1300     D-Dimer, Quant 0 34 ug/ml FEU     Comprehensive metabolic panel [[id number]]  (Abnormal) Collected: 03/29/23 1151    Lab Status: Final result Specimen: Blood from Arm, Left Updated: 03/29/23 1231     Sodium 136 mmol/L      Potassium 3 6 mmol/L      Chloride 105 mmol/L      CO2 29 mmol/L      ANION GAP 2 mmol/L      BUN 13 mg/dL      Creatinine 0 97 mg/dL      Glucose 101 mg/dL      Calcium 9 1 mg/dL      AST 11 U/L      ALT 19 U/L      Alkaline Phosphatase 106 U/L      Total Protein 7 5 g/dL      Albumin 3 7 g/dL      Total Bilirubin 0 19 mg/dL      eGFR 92 ml/min/1 73sq m     Narrative:      National Kidney Disease Foundation guidelines for Chronic Kidney Disease (CKD):   •  Stage 1 with normal or high GFR (GFR > 90 mL/min/1 73 square meters)  •  Stage 2 Mild CKD (GFR = 60-89 mL/min/1 73 square meters)  •  Stage 3A Moderate CKD (GFR = 45-59 mL/min/1 73 square meters)  •  Stage 3B Moderate CKD (GFR = 30-44 mL/min/1 73 square meters)  •  Stage 4 Severe CKD (GFR = 15-29 mL/min/1 73 square meters)  •  Stage 5 End Stage CKD (GFR <15 mL/min/1 73 square meters)  Note: GFR calculation is accurate only with a steady state creatinine    HS Troponin 0hr (reflex protocol) [[id number]]  (Normal) Collected: 03/29/23 1151    Lab Status: Final result Specimen: Blood from Arm, Left Updated: 03/29/23 1227     hs TnI 0hr 4 ng/L     HS Troponin I 4hr [[id number]]     Lab Status: No result Specimen: Blood     CBC and differential [[id number]]  (Abnormal) Collected: 03/29/23 1151    Lab Status: Final result Specimen: Blood from Arm, Left Updated: 03/29/23 1203     WBC 6 58 Thousand/uL      RBC 6 03 Million/uL      Hemoglobin 14 8 g/dL      Hematocrit 48 4 %      MCV 80 fL      MCH 24 5 pg      MCHC 30 6 g/dL      RDW 15 9 %      MPV 12 0 fL      Platelets 192 Thousands/uL      nRBC 0 /100 WBCs      Neutrophils Relative 58 %      Immat GRANS % 1 %      Lymphocytes Relative 31 %      Monocytes Relative 7 %      Eosinophils Relative 2 %      Basophils Relative 1 %      Neutrophils Absolute 3 88 Thousands/µL      Immature Grans Absolute 0 04 Thousand/uL      Lymphocytes Absolute 2 02 Thousands/µL      Monocytes Absolute 0 46 Thousand/µL      Eosinophils Absolute 0 11 Thousand/µL      Basophils Absolute 0 07 Thousands/µL                  No orders to display         Procedures  ECG 12 Lead Documentation Only    Date/Time: 3/29/2023 3:43 PM  Performed by: Yanique Yeboah MD  Authorized by: Yanique Yeboah MD     Indications / Diagnosis:  Chest pain  ECG reviewed by me, the ED Provider: yes    Patient location:  ED  Previous ECG:     Comparison to cardiac monitor: Yes    Interpretation:     Interpretation: normal    Rate:     ECG rate assessment: normal    Rhythm:     Rhythm: sinus rhythm    Ectopy:     Ectopy: none    QRS:     QRS axis:  Normal    QRS intervals:  Normal  Conduction:     Conduction: normal    ST segments:     ST segments:  Normal  T waves:     T waves: normal            ED Course  ED Course as of 03/29/23 1706   Wed Mar 29, 2023   1334 D-Dimer, Quant: 0 34   1346 hs TnI 0hr: 4             HEART Risk Score    Flowsheet Row Most Recent Value   Heart Score Risk Calculator    History 2 Filed at: 03/29/2023 1245   ECG 0 Filed at: 03/29/2023 1245   Age 1 Filed at: 03/29/2023 1245   Risk Factors 2 Filed at: 03/29/2023 1245   Troponin 0 Filed at: 03/29/2023 1245   HEART Score 5 Filed at: 03/29/2023 1245              [address] for PE    Flowsheet Row Most Recent Value   PERC Rule for PE    Age >=50 0 Filed at: 03/29/2023 1704   HR >=100 0 Filed at: 03/29/2023 1704   O2 Sat on room air < 95% 0 Filed at: 03/29/2023 1704   History of PE or DVT 0 Filed at: 03/29/2023 1704   Recent trauma or surgery 0 Filed at: 03/29/2023 1704   Hemoptysis 0 Filed at: 03/29/2023 1704   Exogenous estrogen 0 Filed at: 03/29/2023 1704   Unilateral leg swelling 0 Filed at: 03/29/2023 1704   PERC Rule for PE Results 0 Filed at: 03/29/2023 1704              SBIRT 20yo+    Flowsheet Row Most Recent Value   SBIRT (25 yo +)    In order to provide better care to our patients, we are screening all of our patients for alcohol and drug use  Would it be okay to ask you these screening questions? No Filed at: 03/29/2023 1135        CARLOS ALBERTO Risk Score    Flowsheet Row Most Recent Value   Age >= 72 0 Filed at: 03/29/2023 1609   Known CAD (stenosis >= 50%) 0 Filed at: 03/29/2023 1609   Recent (<=24 hrs) Service Angina 0 Filed at: 03/29/2023 1609   ST Deviation >= 0 5 mm 0 Filed at: 03/29/2023 1609   3+ CAD Risk Factors (FHx, HTN, HLP, DM, Smoker) 1 Filed at: 03/29/2023 1609   Aspirin Use Past 7 Days 0 Filed at: 03/29/2023 1609   Elevated Cardiac Markers 0 Filed at: 03/29/2023 1609   CARLOS ALBERTO Risk Score (Calculated) 1 Filed at: 03/29/2023 1609              Medical Decision Making  Patient is 80-year-old male presenting for syncope and chest pain with dyspnea on exertion, diaphoresis and radiation  DDx: ACS, PE, arrhythmia, vasovagal, seizure  Based on patient presentation, history, physical exam findings primary concern is for ACS, PE rule out, arrhythmia  Will obtain full cardiac work-up, document heart score and plan for admission to medicine for further cardiology evaluation  Given patient is currently asymptomatic at this time, no need for any medications  Troponin, EKG, baseline blood work and chest x-ray ordered  We will also order D-dimer for PE rule out given patient episode of syncope and new onset shortness of breath  Chest pain: acute illness or injury  Dyspnea on exertion: acute illness or injury  Syncope: acute illness or injury  Amount and/or Complexity of Data Reviewed  Labs: ordered  Decision-making details documented in ED Course  Risk  Decision regarding hospitalization              Disposition  Final diagnoses:   Chest pain   Syncope   Dyspnea on exertion     Time reflects when diagnosis was documented in both MDM as applicable and the Disposition within this note     Time User Action Codes Description Comment    3/29/2023  3:05 PM Diantha Dago Add [R07 9] Chest pain     3/29/2023  3:05 PM Diantha Dago Add [R55] Syncope     3/29/2023  3:05 PM Diantha Dago Add [R06 09] Dyspnea on exertion       ED Disposition     ED Disposition   Admit    Condition   Stable    Date/Time   Wed Mar 29, 2023  3:15 PM    Comment   Case was discussed with Dr Nadine Núñez and the patient's admission status was agreed to be Admission Status: observation status to the service of Dr Nadine Núñez   Follow-up Information    None         Patient's Medications   Discharge Prescriptions    No medications on file     No discharge procedures on file  PDMP Review       Value Time User    PDMP Reviewed  Yes 3/16/2023  3:47 PM Jaylan Mckeon           ED Provider  Attending physically available and evaluated Jessa Webb I managed the patient along with the ED Attending      Electronically Signed by         Paige Goff MD  03/29/23 5195

## 2023-03-29 NOTE — TELEPHONE ENCOUNTER
Patient called and stated that he left his dogs out this morning and he passed out while outside  He doesn't feel comfortable going to work and climbing ladders  He said he feels ok but still a little lightheaded and dizzy  He would like to know if he could have a note for work to be out the rest of this week and to return back to work on Monday?   Please call patient to advise

## 2023-03-29 NOTE — APP STUDENT NOTE
PHYLLIS STUDENT  Inpatient H&P Exam for TRAINING ONLY  Not Part of Legal Medical Record       H&P Exam - Tomasz Chester 52 y o  male MRN: [id number]  Unit/Bed#: ED 28 Encounter: [id number]    Syncope  · Patient presents to the ED after an unwitnessed syncopal episode this morning  Patient denies hitting his head but is unsure how long he was down for  Patient has never experienced anything like this before  · Labs largely unremarkable  · Serial Troponin: 4//5  · 4hr troponin pending  · D-dimer:  0 34  · Hemoglobin: 14 8  · Monitor on telemetry  · Consult cardiology  · Obtain Echo Stress Test    Chest Pain  · Patient has been experiencing FERMIN for the past 2 weeks  Patient experienced an episode of chest pain on Friday as he was climbing a flight of stairs  This pain radiated to his neck and down his left arm  The pain resolved with rest   · Chest X Ray 03/29:  Pending results  · Labs largely unremarkable  · Serial Troponin: 4//5  · D-dimer:  0 34  · Hemoglobin: 14 8  · Obtain 4hr troponin  · Monitor on telemetry  · Cardiac diet  · Consult cardiology  · Obtain Echo Stress Test  · Patient reports not seeing PCP for annual exam since 2018  Stress importance of regular visits with PCP  Chronic back pain  · Patient has chronic back pain diagnosed as lumbar post-laminectomy syndrome and reports no change to his back pain  Patient has no difficulty with BM or urinating  · CT spine thoracic & lumbar 01/24/2023:  Mildly exaggerated mid to lower thoracic kyphosis  Spinal cord stimulator lead ejects within the posterior aspect of the mid thoracic canal   Postop change at L4-5 and L5-S1 without residual or recurrent canal stenosis/foraminal narrowing  4 mm right upper pole renal calculus  · Continue taking tizanidine 4mg as needed for back spasms    · Ambulate as tolerated    VTE Prophylaxis: Enoxaparin (Lovenox)  / sequential compression device   Code Status: Level 1- full code  POLST: There is no POLST form on "file for this patient (pre-hospital)  Discussion with family: Discussed plan of care with patient and he was agreeable    Anticipated Length of Stay:  Patient will be admitted on an Observation basis with an anticipated length of stay of  < 2 midnights  Justification for Hospital Stay: observation and cardiac workup    Total Time for Visit, including Counseling / Coordination of Care: 30 minutes  Greater than 50% of this total time spent on direct patient counseling and coordination of care  Chief Complaint:   Syncopal episode    History of Present Illness:  Faye Bach is a 52 y o  male with PMH chronic back pain who presents with an unwitnessed syncopal episode this morning  Patient does not believe he hit his head during this episode  Patient started experiencing FERMIN with steps 2 weeks ago and his symptoms have increased in severity since then  Patient also reports waking up from sleep \"gasping for air\"  Patient experienced episode of sharp chest pain and tightness that radiated to his neck and down his left arm as he was climbing steps  The pain resolved spontaneously with rest after 30 minutes  Patient denies radiation of pain to his back or abdomen  Patient denies experiencing pain like this in the past   Patient also reports some blurry vision which began on Friday at the same time as the chest pain  Patient denies headache, N/V/D/C, swelling in his hands or feet, or paresthesia  Patient reports smoking 1 ppd and denies any drug or ETOH use  Patient reports being pretty active as 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\"  Patient reports being told he had an enlarged heart at 15years old but never received any follow up  Patient reports not seeing his PCP for an annual exam since 2018  Review of Systems:    Review of Systems   Constitutional: Positive for fatigue  Negative for chills and fever  HENT: Negative for ear pain and sore throat      Eyes: " Positive for visual disturbance (blurry vision during episodes of chest pain)  Negative for pain  Respiratory: Positive for shortness of breath (significant SOB with exertion)  Negative for cough  Wakes up from sleep gasping for air   Cardiovascular: Positive for chest pain (pain is random and radiates down left arm and up to neck)  Negative for palpitations and leg swelling  Gastrointestinal: Negative for abdominal pain, constipation, diarrhea, nausea and vomiting  Genitourinary: Negative for dysuria and hematuria  Musculoskeletal: Negative for arthralgias and back pain  Skin: Negative for color change and rash  Neurological: Positive for syncope (unwitnessed episode this am)  Negative for seizures and headaches  All other systems reviewed and are negative        Past Medical and Surgical History:     Past Medical History:   Diagnosis Date   • Anxiety    • Arthritis    • Asthma    • Bipolar 2 disorder, major depressive episode ([facility] ) 10/31/2017   • Bipolar disorder ([facility] )    • Chronic left lumbar radiculopathy    • Chronic low back pain    • Chronic pain syndrome    • Chronic right shoulder pain    • Depression with anxiety    • Fatigue    • GERD (gastroesophageal reflux disease)    • Hydronephrosis    • Iron deficiency anemia    • Kidney stone    • Lytic bone lesions on xray    • Nephrolithiasis    • PONV (postoperative nausea and vomiting)    • Right elbow pain    • Right lateral epicondylitis        Past Surgical History:   Procedure Laterality Date   • ADENOIDECTOMY Bilateral    • APPENDECTOMY     • BACK SURGERY     • CHOLECYSTECTOMY     • CHOLECYSTECTOMY LAPAROSCOPIC N/A 10/30/2018    Procedure: CHOLECYSTECTOMY WITH GASTROTOMY LAPAROSCOPIC;  Surgeon: Jono Phipps MD;  Location[facility];  Service: General   • ERCP W/ SPHICTEROTOMY N/A 10/30/2018    Procedure: ENDOSCOPIC RETROGRADE CHOLANGIOPANCREATOGRAPHY (ERCP) W/ SPHINCTEROTOMY;  Surgeon: Almita Escobedo MD;  Location: [facility]; Service: Gastroenterology   • GASTRIC BYPASS      2009   • OTHER SURGICAL HISTORY      fusion/refusion of vertebrae, 2010 and 2011 l5-s1 and l4-l5   • AK ARTHROSCOPY KNEE LATERAL RELEASE Left 1/6/2021    Procedure: RELEASE RETINACULAR;  Surgeon: Oliva Bloom DO;  Location: [facility];  Service: Orthopedics   • AK ARTHRS KNE SURG W/MENISCECTOMY MED/LAT W/SHVG Right 4/26/2022    Procedure: ARTHROSCOPIC MENISCECTOMY LATERAL;  Surgeon: Oliva Bloom DO;  Location: [address] [place] [address] MAIN OR;  Service: Orthopedics   • AK ARTHRS KNEE DEBRIDEMENT/SHAVING ARTCLR CRTLG Left 1/6/2021    Procedure: ARTHROSCOPY KNEE;  Surgeon: Oliva Bloom DO;  Location: [facility];  Service: Orthopedics   • AK CYSTO/URETERO W/LITHOTRIPSY &INDWELL STENT INSRT Right 8/8/2017    Procedure: CYSTOSCOPY; URETEROSCOPY; HOLMIUM LASER; RETROGRADE PYELOGRAM; STENT;  Surgeon: Sherita Martines MD;  Location: [facility];  Service: Urology   • AK INCISION & DRAINAGE ABSCESS COMPLICATED/MULTIPLE Left 1/8/2021    Procedure: INCISION AND DRAINAGE (I&D) EXTREMITY- of left knee s/p MPFL reconstruction, I&D if the left knee with possible MPFL revision;  Surgeon: Oliva Bloom DO;  Location: [facility];  Service: Orthopedics   • AK INSJ/RPLCMT SPI NPGR DIR/INDUXIVE COUPLING Right 11/14/2017    Procedure: REMOVAL LOWER MEDIAL BUTTOCK SPINAL CORD STIMULATOR GENERATOR; PLACEMENT OF NEW BUTTOCK SPINAL CORD STIMULATOR THROUGH SEPERATE INCISION;  Surgeon: Nerissa Muniz MD;  Location: [facility];  Service: Neurosurgery   • AK INSJ/RPLCMT SPI NPGR DIR/INDUXIVE COUPLING Right 1/12/2022    Procedure: Right sided spinal cord stimulator pulse generator replacement;  Surgeon: Marivel Yung MD;  Location: [facility];  Service: Neurosurgery   • AK RCNSTJ [address] W/XTNSR RELIGNMT&/MUSC RL Left 1/6/2021    Procedure: REALIGNMENT PATELLA with chondroplasty of patella, open medial patellofemoral ligament reconstruction with allograft;  Surgeon: Oliva Bloom DO;  Location: [facility];  Service: Orthopedics   • RIK-EN-Y PROCEDURE  2003   • SPINAL CORD STIMULATOR IMPLANT  11/14/2017    dr Gali Lamb procedure and technique 1  removal of a right back implantable pulse generator 2 placement of a new right buttock impantable pulse generator through seperate incision 3 electric analys complex programming spinal cord stimulator system postoperative period approx 1 hour   • TONSILLECTOMY         Meds/Allergies:    Prior to Admission medications    Medication Sig Start Date End Date Taking? Authorizing Provider   Acetaminophen (TYLENOL 8 HOUR ARTHRITIS PAIN PO) Take by mouth    Historical Provider, MD   albuterol (PROVENTIL HFA,VENTOLIN HFA) 90 mcg/act inhaler INHALE 2 PUFFS EVERY 4 (FOUR) HOURS AS NEEDED FOR WHEEZING OR SHORTNESS OF BREATH COUGH 7/21/21   Lupis Lopez BlANTONIA faye   LANSOPRAZOLE PO Take 10 mg by mouth if needed      Historical Provider, MD   naloxone (NARCAN) 4 mg/0 1 mL nasal spray Administer 1 spray into a nostril  If no response after 2-3 minutes, give another dose in the other nostril using a new spray  3/17/23   ANTONIA Prasad   tiZANidine (ZANAFLEX) 4 mg tablet Take 1 PO QID PRN for pain and spasms  3/16/23   ANTONIA Leyva   transdermal buprenorphine (BUTRANS) 20 mcg/hr PTWK TD patch Apply 1 patch TD every 7 days for pain  Rotate patch application sites to avoid skin irritation  3/16/23   [place] ANTONIA Martinez     I have reviewed home medications with patient personally  Allergies:    Allergies   Allergen Reactions   • Ampicillin GI Intolerance     Vomiting   • Penicillins GI Intolerance     Vomit       Social History:     Marital Status:    Occupation:   Patient Pre-hospital Living Situation:   Patient Pre-hospital Level of Mobility: Independent  Patient Pre-hospital Diet Restrictions: None  Substance Use History:   Social History     Substance and Sexual Activity   Alcohol Use No     Social History     Tobacco Use   Smoking Status Every Day   • Packs/day: 1 00   • Types: Cigarettes   Smokeless Tobacco Never     Social History     Substance and Sexual Activity   Drug Use No       Family History:  Patient reports mother had cardiac issues which needed intervention  Family History   Problem Relation Age of Onset   • Cancer Mother    • Arthritis Mother    • Stomach cancer Mother    • Cancer Father    • Esophageal cancer Father    • Other Father         brain tumor   • No Known Problems Sister    • No Known Problems Sister    • No Known Problems Sister        Physical Exam:     Vitals:   Blood Pressure: 126/83 (03/29/23 1429)  Pulse: (!) 54 (03/29/23 1429)  Temperature: 98 7 °F (37 1 °C) (03/29/23 1121)  Temp Source: Tympanic (03/29/23 1121)  Respirations: 16 (03/29/23 1429)  SpO2: 99 % (03/29/23 1429)    Physical Exam  Constitutional:       General: He is not in acute distress  Appearance: Normal appearance  Cardiovascular:      Rate and Rhythm: Normal rate and regular rhythm  Pulses: Normal pulses  Pulmonary:      Effort: Pulmonary effort is normal       Breath sounds: Normal breath sounds  Abdominal:      General: Bowel sounds are normal       Palpations: Abdomen is soft  Musculoskeletal:         General: No swelling  Cervical back: Neck supple  Right lower leg: No edema  Left lower leg: No edema  Neurological:      General: No focal deficit present  Mental Status: He is alert and oriented to person, place, and time  Psychiatric:         Mood and Affect: Mood normal          Behavior: Behavior normal        Additional Data:     Lab Results: I have personally reviewed pertinent reports        Results from last 7 days   Lab Units 03/29/23  1151   WBC Thousand/uL 6 58   HEMOGLOBIN g/dL 14 8   HEMATOCRIT % 48 4   PLATELETS Thousands/uL 260   NEUTROS PCT % 58   LYMPHS PCT % 31   MONOS PCT % 7   EOS PCT % 2     Results from last 7 days   Lab Units 03/29/23  1151   SODIUM mmol/L 136   POTASSIUM mmol/L 3 6   CHLORIDE mmol/L 105   CO2 mmol/L 29   BUN mg/dL 13   CREATININE mg/dL 0 97   ANION GAP mmol/L 2*   CALCIUM mg/dL 9 1   ALBUMIN g/dL 3 7   TOTAL BILIRUBIN mg/dL 0 19*   ALK PHOS U/L 106   ALT U/L 19   AST U/L 11   GLUCOSE RANDOM mg/dL 101             Results from last 7 days   Lab Units 03/28/23  0813   HEMOGLOBIN A1C % 5 6           Imaging: I have personally reviewed pertinent reports  No orders to display       EKG, Pathology, and Other Studies Reviewed on Admission:   · EKG:     [facility] Yes     ** Please Note: This note has been constructed using a voice recognition system   **

## 2023-03-29 NOTE — ASSESSMENT & PLAN NOTE
• Patient has chronic back pain diagnosed as lumbar post-laminectomy syndrome and reports no change to his back pain  Patient has no difficulty with BM or urinating    • Patient uses Butrans patch every 7 days   • Zanaflex as needed

## 2023-03-29 NOTE — ASSESSMENT & PLAN NOTE
• Patient presents to the ED after an unwitnessed syncopal episode this morning  Patient denies hitting his head but is unsure how long he was down for  Patient has never experienced anything like this before  • Labs largely unremarkable  ? Serial Troponin: 4//5  - 4hr troponin pending  ? D-dimer:  0 34  ?  Hemoglobin: 14 8  • Monitor on telemetry  • Obtain Echo Stress Test

## 2023-03-29 NOTE — ED ATTENDING ATTESTATION
3/29/2023  IJonel MD, saw and evaluated the patient  I have discussed the patient with the resident/non-physician practitioner and agree with the resident's/non-physician practitioner's findings, Plan of Care, and MDM as documented in the resident's/non-physician practitioner's note, except where noted  All available labs and Radiology studies were reviewed  I was present for key portions of any procedure(s) performed by the resident/non-physician practitioner and I was immediately available to provide assistance  At this point I agree with the current assessment done in the Emergency Department  I have conducted an independent evaluation of this patient a history and physical is as follows:    Patient presents to the emergency department after an episode of syncope  The patient reports he was in his normal state of good health until approximately 1 week ago when he had an episode of a substernal chest discomfort that radiated to his left arm  The patient admits associated shortness of breath and diaphoresis with that episode  The episode lasted approximately 30 minutes and then resolved with rest   The patient reports he has had 2 other episodes of similar chest discomfort with similar associated symptoms each lasting approximately 20 minutes  In total then the patient has had 3 episodes of substernal chest discomfort  Each of those episodes occurred with exertion and resolved with rest   The patient also reports a 1 to 2-week history of dyspnea on exertion which is unusual for him  The patient reports that if he walks up 1 flight of stairs he gets short of breath  The patient was seen by his primary care provider and had orders placed for outpatient evaluation  This morning the patient woke as normal and walked into his backyard and developed tunnel vision and lightheadedness  The patient did not experience near syncope but next woke on the ground after having apparently passed out  The patient did not lose control of his bowel or bladder and did not bite his tongue  The patient does not describe a postictal period  The patient denies any injuries from the fall and states that he feels normal now without any symptoms  The patient's primary care provider recommended he come to the emergency department for evaluation  The patient denies history of prior episodes of chest discomfort or syncope  The patient denies any VTE risk factors  The patient admits to smoking 1-1/2 packs of cigarettes per day  Physical exam demonstrates a pleasant alert nontoxic male in no acute distress  HEENT exam is normal without evidence of craniofacial trauma  The lungs are clear with equal breath sounds  The heart had a regular rate and rhythm  The abdomen was soft and nontender  The extremities are symmetric and nontender  There is no focal neurologic deficit    Skin was normal   ED Course         Critical Care Time  Procedures

## 2023-03-29 NOTE — ASSESSMENT & PLAN NOTE
• Patient with strong FHx of CAD, has been experiencing FERMIN for the past 2 weeks  Patient experienced an episode of chest pain on Friday as he was climbing a flight of stairs  This pain radiated to his neck and down his left arm  The pain resolved with rest   • Chest X Ray 03/29:  Pending results  • Labs largely unremarkable  ? Serial Troponin neg  ? D-dimer:  0 34  ?  Hemoglobin: 14 8  • Obtain 4hr troponin  • Monitor on telemetry  • Cardiac diet  • Consider Consult cardiology  • Obtain Echocardiogram, Stress Test, lipid/TSH/A1c

## 2023-03-29 NOTE — TELEPHONE ENCOUNTER
Spoke with pt, NP instructions given  Pt will proceed to [facility]  ADT 21 sent

## 2023-03-29 NOTE — H&P
[address] [facility]  H&P  Name: Monica Alvares  MRN: [id number]  Unit/Bed#: ED 28 I Date of Admission: 3/29/2023   Date of Service: 3/29/2023 I Hospital Day: 0      Assessment/Plan   * Chest pain  Assessment & Plan  • Patient with strong FHx of CAD, has been experiencing FERMIN for the past 2 weeks  Patient experienced an episode of chest pain on Friday as he was climbing a flight of stairs  This pain radiated to his neck and down his left arm  The pain resolved with rest   • Chest X Ray 03/29:  Pending results  • Labs largely unremarkable  ? Serial Troponin neg  ? D-dimer:  0 34  ? Hemoglobin: 14 8  • Obtain 4hr troponin  • Monitor on telemetry  • Cardiac diet  • Consider Consult cardiology  • Obtain Echocardiogram, Stress Test, lipid/TSH/A1c    Syncope  Assessment & Plan  • Patient presents to the ED after an unwitnessed syncopal episode this morning  Patient denies hitting his head but is unsure how long he was down for  Patient has never experienced anything like this before  • Labs largely unremarkable  ? Serial Troponin: 4//5  - 4hr troponin pending  ? D-dimer:  0 34  ? Hemoglobin: 14 8  • Monitor on telemetry  • Obtain Echo Stress Test    Smoking  Assessment & Plan  Smoking cessation counseling    Chronic low back pain  Assessment & Plan  • Patient has chronic back pain diagnosed as lumbar post-laminectomy syndrome and reports no change to his back pain  Patient has no difficulty with BM or urinating  • Patient uses Butrans patch every 7 days   • Zanaflex as needed         VTE Prophylaxis: Pharmacologic VTE Prophylaxis contraindicated due to low risk, ambulate patient  / sequential compression device   Code Status: full code       Anticipated Length of Stay:  Patient will be admitted on an Observation basis with an anticipated length of stay of  < 2 midnights     Justification for Hospital Stay: chest pain, need cardiac workup    Total Time for Visit, including "Counseling / Coordination of Care: 60 minutes  Greater than 50% of this total time spent on direct patient counseling and coordination of care  Chief Complaint:   Syncope, chest pain    History of Present Illness:    Meme Delgadillo is a 52 y o  male with no previous cardiac hisotry, PMH chronic back pain who presents with an unwitnessed syncopal episode this morning  Patient does not believe he hit his head during this episode  Patient started experiencing FERMIN with steps 2 weeks ago and his symptoms have increased in severity since then  Patient also reports waking up from sleep \"gasping for air\"  Patient experienced episode of sharp chest pain and tightness that radiated to his neck and down his left arm as he was climbing steps  The pain resolved spontaneously with rest after 30 minutes  Patient denies radiation of pain to his back or abdomen  Patient denies experiencing pain like this in the past    Patient denies headache, N/V/D/C, swelling in his hands or feet, or paresthesia  Patient reports smoking 1 ppd and denies any drug or ETOH use  Patient reports being pretty active as 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\"  Patient reports being told he had an enlarged heart at 15years old but never received any follow up  Review of Systems:    Review of Systems   Constitutional: Positive for fatigue  Negative for activity change and appetite change  HENT: Negative for congestion and sore throat  Eyes: Negative for pain and visual disturbance  Respiratory: Positive for chest tightness and shortness of breath  Negative for cough  Cardiovascular: Positive for chest pain  Negative for leg swelling  Gastrointestinal: Negative for abdominal distention and abdominal pain  Endocrine: Negative for polyuria  Genitourinary: Negative for difficulty urinating  Musculoskeletal: Negative for arthralgias and joint swelling  Skin: Negative for wound   " Allergic/Immunologic: Negative for food allergies  Neurological: Positive for dizziness  Negative for light-headedness and headaches  Hematological: Negative for adenopathy  Psychiatric/Behavioral: Negative for sleep disturbance         Past Medical and Surgical History:     Past Medical History:   Diagnosis Date   • Anxiety    • Arthritis    • Asthma    • Bipolar 2 disorder, [facility] ) 10/31/2017   • Bipolar disorder ([facility] )    • Chronic left lumbar radiculopathy    • Chronic low back pain    • Chronic pain syndrome    • Chronic right shoulder pain    • Depression with anxiety    • Fatigue    • GERD (gastroesophageal reflux disease)    • Hydronephrosis    • Iron deficiency anemia    • Kidney stone    • Lytic bone lesions on xray    • Nephrolithiasis    • PONV (postoperative nausea and vomiting)    • Right elbow pain    • Right lateral epicondylitis        Past Surgical History:   Procedure Laterality Date   • ADENOIDECTOMY Bilateral    • APPENDECTOMY     • BACK SURGERY     • CHOLECYSTECTOMY     • CHOLECYSTECTOMY LAPAROSCOPIC N/A 10/30/2018    Procedure: CHOLECYSTECTOMY WITH GASTROTOMY LAPAROSCOPIC;  Surgeon: Julita Lopez MD;  Location: [facility];  Service: General   • ERCP W/ SPHICTEROTOMY N/A 10/30/2018    Procedure: ENDOSCOPIC RETROGRADE CHOLANGIOPANCREATOGRAPHY (ERCP) W/ SPHINCTEROTOMY;  Surgeon: Poli Sherman MD;  Location: [facility];  Service: Gastroenterology   • GASTRIC BYPASS      2009   • OTHER SURGICAL HISTORY      fusion/refusion of vertebrae, 2010 and 2011 l5-s1 and l4-l5   • DC ARTHROSCOPY KNEE LATERAL RELEASE Left 1/6/2021    Procedure: RELEASE RETINACULAR;  Surgeon: Holden Chase DO;  Location:  MAIN OR;  Service: Orthopedics   • DC ARTHRS KNE SURG W/MENISCECTOMY MED/LAT W/SHVG Right 4/26/2022    Procedure: ARTHROSCOPIC MENISCECTOMY LATERAL;  Surgeon: Holden Chase DO;  Location:  MAIN OR;  Service: Orthopedics   • DC ARTHRS KNEE DEBRIDEMENT/SHAVING ERIC [place] Left 1/6/2021    Procedure: ARTHROSCOPY KNEE;  Surgeon: Renetta Carbajal DO;  Location: [facility];  Service: Orthopedics   • IN CYSTO/URETERO W/LITHOTRIPSY &INDWELL STENT INSRT Right 8/8/2017    Procedure: CYSTOSCOPY; URETEROSCOPY; HOLMIUM LASER; RETROGRADE PYELOGRAM; STENT;  Surgeon: Doc Koyanagi, MD;  Location: [facility];  Service: Urology   • IN INCISION & DRAINAGE ABSCESS COMPLICATED/MULTIPLE Left 1/8/2021    Procedure: INCISION AND DRAINAGE (I&D) EXTREMITY- of left knee s/p MPFL reconstruction, I&D if the left knee with possible MPFL revision;  Surgeon: Renetta Carbajal DO;  Location: [facility];  Service: Orthopedics   • IN INSJ/RPLCMT SPI NPGR DIR/INDUXIVE COUPLING Right 11/14/2017    Procedure: REMOVAL LOWER MEDIAL BUTTOCK SPINAL CORD STIMULATOR GENERATOR; PLACEMENT OF NEW BUTTOCK SPINAL CORD STIMULATOR THROUGH SEPERATE INCISION;  Surgeon: Analy Chen MD;  Location: [facility];  Service: Neurosurgery   • IN INSJ/RPLCMT SPI NPGR DIR/INDUXIVE COUPLING Right 1/12/2022    Procedure: Right sided spinal cord stimulator pulse generator replacement;  Surgeon: Janey Landrum MD;  Location: [facility];  Service: Neurosurgery   • IN RCNSTJ [address] W/XTNSR RELIGNMT&/MUSC RL Left 1/6/2021    Procedure: REALIGNMENT PATELLA with chondroplasty of patella, open medial patellofemoral ligament reconstruction with allograft;  Surgeon: Renetta Carbajal DO;  Location: [facility];  Service: Orthopedics   • RIK-EN-Y PROCEDURE  2003   • SPINAL CORD STIMULATOR IMPLANT  11/14/2017    dr Cora Toth procedure and technique 1  removal of a right back implantable pulse generator 2 placement of a new right buttock impantable pulse generator through seperate incision 3 electric analys complex programming spinal cord stimulator system postoperative period approx 1 hour   • TONSILLECTOMY         Meds/Allergies:    Prior to Admission medications    Medication Sig Start Date End Date Taking?  Authorizing Provider   Acetaminophen (TYLENOL 8 HOUR ARTHRITIS PAIN PO) Take by mouth    Historical Provider, MD   albuterol (PROVENTIL HFA,VENTOLIN HFA) 90 mcg/act inhaler INHALE 2 PUFFS EVERY 4 (FOUR) HOURS AS NEEDED FOR WHEEZING OR SHORTNESS OF BREATH COUGH 7/21/21   ANTONIA Randhawa   LANSOPRAZOLE PO Take 10 mg by mouth if needed      Historical Provider, MD   naloxone (NARCAN) 4 mg/0 1 mL nasal spray Administer 1 spray into a nostril  If no response after 2-3 minutes, give another dose in the other nostril using a new spray  3/17/23   ANTONIA Amaral   tiZANidine (ZANAFLEX) 4 mg tablet Take 1 PO QID PRN for pain and spasms  3/16/23   ANTONIA Torres   transdermal buprenorphine (BUTRANS) 20 mcg/hr PTWK TD patch Apply 1 patch TD every 7 days for pain  Rotate patch application sites to avoid skin irritation  3/16/23   ANTONIA Torres     I have reviewed home medications using allscripts  Allergies:    Allergies   Allergen Reactions   • Ampicillin GI Intolerance     Vomiting   • Penicillins GI Intolerance     Vomit       Social History:     Marital Status:       Substance Use History:   Social History     Substance and Sexual Activity   Alcohol Use No     Social History     Tobacco Use   Smoking Status Every Day   • Packs/day: 1 00   • Types: Cigarettes   Smokeless Tobacco Never     Social History     Substance and Sexual Activity   Drug Use No       Family History:    Family History   Problem Relation Age of Onset   • Cancer Mother    • Arthritis Mother    • Stomach cancer Mother    • Cancer Father    • Esophageal cancer Father    • Other Father         brain tumor   • No Known Problems Sister    • No Known Problems Sister    • No Known Problems Sister        Physical Exam:     Vitals:   Blood Pressure: 126/83 (03/29/23 1630)  Pulse: (!) 54 (03/29/23 1630)  Temperature: 98 7 °F (37 1 °C) (03/29/23 1121)  Temp Source: Tympanic (03/29/23 1121)  Respirations: 18 (03/29/23 1630)  SpO2: 98 % (03/29/23 1630)    Physical Exam  Vitals and nursing note reviewed  Constitutional:       General: He is not in acute distress  Appearance: He is well-developed  HENT:      Head: Normocephalic and atraumatic  Eyes:      Conjunctiva/sclera: Conjunctivae normal    Cardiovascular:      Rate and Rhythm: Normal rate and regular rhythm  Heart sounds: No murmur heard  Pulmonary:      Effort: Pulmonary effort is normal  No respiratory distress  Breath sounds: Normal breath sounds  Abdominal:      Palpations: Abdomen is soft  Tenderness: There is no abdominal tenderness  Musculoskeletal:         General: No swelling  Cervical back: Neck supple  Skin:     General: Skin is warm and dry  Capillary Refill: Capillary refill takes less than 2 seconds  Neurological:      Mental Status: He is alert  Psychiatric:         Mood and Affect: Mood normal           Additional Data:     Lab Results: I have personally reviewed pertinent reports  Results from last 7 days   Lab Units 03/29/23  1151   WBC Thousand/uL 6 58   HEMOGLOBIN g/dL 14 8   HEMATOCRIT % 48 4   PLATELETS Thousands/uL 260   NEUTROS PCT % 58   LYMPHS PCT % 31   MONOS PCT % 7   EOS PCT % 2     Results from last 7 days   Lab Units 03/29/23  1151   SODIUM mmol/L 136   POTASSIUM mmol/L 3 6   CHLORIDE mmol/L 105   CO2 mmol/L 29   BUN mg/dL 13   CREATININE mg/dL 0 97   ANION GAP mmol/L 2*   CALCIUM mg/dL 9 1   ALBUMIN g/dL 3 7   TOTAL BILIRUBIN mg/dL 0 19*   ALK PHOS U/L 106   ALT U/L 19   AST U/L 11   GLUCOSE RANDOM mg/dL 101             Results from last 7 days   Lab Units 03/28/23  0813   HEMOGLOBIN A1C % 5 6           Imaging: I have personally reviewed pertinent reports  No orders to display       EKG, Pathology, and Other Studies Reviewed on Admission:   · EKG: sinus rhythm    All[employer]ri\[facility] [place] [facility]\"" / [facility] Records Reviewed: Yes     ** Please Note: This note has been constructed using a voice recognition system   **

## 2023-03-30 ENCOUNTER — APPOINTMENT (OUTPATIENT)
Dept: RADIOLOGY | Facility: HOSPITAL | Age: 48
End: 2023-03-30

## 2023-03-30 ENCOUNTER — APPOINTMENT (OUTPATIENT)
Dept: NON INVASIVE DIAGNOSTICS | Facility: HOSPITAL | Age: 48
End: 2023-03-30

## 2023-03-30 LAB
ANION GAP SERPL CALCULATED.3IONS-SCNC: 2 MMOL/L (ref 4–13)
BUN SERPL-MCNC: 11 MG/DL (ref 5–25)
CALCIUM SERPL-MCNC: 8.8 MG/DL (ref 8.3–10.1)
CHLORIDE SERPL-SCNC: 106 MMOL/L (ref 96–108)
CHOLEST SERPL-MCNC: 166 MG/DL
CO2 SERPL-SCNC: 28 MMOL/L (ref 21–32)
CREAT SERPL-MCNC: 0.93 MG/DL (ref 0.6–1.3)
ERYTHROCYTE [DISTWIDTH] IN BLOOD BY AUTOMATED COUNT: 15.9 % (ref 11.6–15.1)
GFR SERPL CREATININE-BSD FRML MDRD: 97 ML/MIN/1.73SQ M
GLUCOSE SERPL-MCNC: 88 MG/DL (ref 65–140)
HCT VFR BLD AUTO: 48.2 % (ref 36.5–49.3)
HDLC SERPL-MCNC: 38 MG/DL
HGB BLD-MCNC: 14.7 G/DL (ref 12–17)
LDLC SERPL CALC-MCNC: 105 MG/DL (ref 0–100)
MAGNESIUM SERPL-MCNC: 2.3 MG/DL (ref 1.6–2.6)
MAX DIASTOLIC BP: 100 MMHG
MAX HEART RATE: 160 BPM
MAX PREDICTED HEART RATE: 173 BPM
MAX. SYSTOLIC BP: 222 MMHG
MCH RBC QN AUTO: 24.4 PG (ref 26.8–34.3)
MCHC RBC AUTO-ENTMCNC: 30.5 G/DL (ref 31.4–37.4)
MCV RBC AUTO: 80 FL (ref 82–98)
PLATELET # BLD AUTO: 333 THOUSANDS/UL (ref 149–390)
PMV BLD AUTO: 10.8 FL (ref 8.9–12.7)
POTASSIUM SERPL-SCNC: 4.7 MMOL/L (ref 3.5–5.3)
PROTOCOL NAME: NORMAL
RBC # BLD AUTO: 6.02 MILLION/UL (ref 3.88–5.62)
SODIUM SERPL-SCNC: 136 MMOL/L (ref 135–147)
T4 FREE SERPL-MCNC: 0.86 NG/DL (ref 0.76–1.46)
TARGET HR FORMULA: NORMAL
TEST INDICATION: NORMAL
TIME IN EXERCISE PHASE: NORMAL
TRIGL SERPL-MCNC: 117 MG/DL
TSH SERPL DL<=0.05 MIU/L-ACNC: 4.67 UIU/ML (ref 0.45–4.5)
WBC # BLD AUTO: 6.97 THOUSAND/UL (ref 4.31–10.16)

## 2023-03-30 RX ORDER — ALBUTEROL SULFATE 90 UG/1
1 AEROSOL, METERED RESPIRATORY (INHALATION) EVERY 6 HOURS PRN
Status: DISCONTINUED | OUTPATIENT
Start: 2023-03-30 | End: 2023-03-31 | Stop reason: HOSPADM

## 2023-03-30 RX ORDER — ONDANSETRON 2 MG/ML
4 INJECTION INTRAMUSCULAR; INTRAVENOUS EVERY 6 HOURS PRN
Status: DISCONTINUED | OUTPATIENT
Start: 2023-03-30 | End: 2023-03-31 | Stop reason: HOSPADM

## 2023-03-30 RX ORDER — TIZANIDINE 4 MG/1
4 TABLET ORAL EVERY 8 HOURS PRN
Status: DISCONTINUED | OUTPATIENT
Start: 2023-03-30 | End: 2023-03-31 | Stop reason: HOSPADM

## 2023-03-30 RX ORDER — NICOTINE 21 MG/24HR
1 PATCH, TRANSDERMAL 24 HOURS TRANSDERMAL DAILY
Status: DISCONTINUED | OUTPATIENT
Start: 2023-03-30 | End: 2023-03-31 | Stop reason: HOSPADM

## 2023-03-30 RX ORDER — ACETAMINOPHEN 325 MG/1
650 TABLET ORAL EVERY 6 HOURS PRN
Status: DISCONTINUED | OUTPATIENT
Start: 2023-03-30 | End: 2023-03-31 | Stop reason: HOSPADM

## 2023-03-30 NOTE — ASSESSMENT & PLAN NOTE
• Patient presented to the ED after an unwitnessed syncopal episode the morning of admission  Patient denies hitting his head but is unsure how long he was down for  Patient has never experienced anything like this before    • Denies tongue biting, head strike, incontinence   • Continue telemetry  • Echo pending

## 2023-03-30 NOTE — CONSULTS
Consultation - General Cardiology Team 2  Catie Wise 52 y o  male MRN: [id number]  Unit/Bed#: CW2 211-01 Encounter: [id number]            Inpatient consult to Cardiology     Performed by  Elva Antonio MD     Authorized by Tania Ang PA-C      [place] Protocol   Patient identity confirmed: hospital-assigned identification number             PCP: Terra Michaud MD   Outpatient Cardiologist:   History of Present Illness   Physician Requesting Consult: Bethany See MD  Reason for Consult / Principal Problem: chest pain, syncope       Assessment/Plan     Assessment:  1  Chest pain- concerning for cardiac angina   -Reporting 2-week history of dyspnea on exertion and an episode of chest pain a week ago while patient was climbing a flight of stairs  Also reported orthopnea and PND  - Was seen by PCP days prior to presentation with similar complaint    -Troponins negative on presentation, no acute ischemic changes on ECG   -Risk factors: Significant family history for early onset cardiac disease, chronic smoker, hyperlipidemia, and mild obesity  -Primary team ordered echocardiogram and NM stress test, pending     2  Syncope-reason for presentation  -Had a syncopal episode on the morning of presentation, described as a  facedown fall, history not suggestive of seizure disorder  -Normal vitals on presentation, no ECG findings suggestive of arrhythmias, currently being evaluated for ischemic cardiac changes, normal labs, PE less likely    3  Chronic back pain     Plan:  1  TTE and NM stress pending  2  Normal labs and hemodynamics    Case discussed and reviewed with Dr Tommie Mcdaniel who agrees with my assessment and plan  Thank you for involving us in the care of your patient  Elva Antonio MD  Cardiology Fellow   PGY-4      HPI: Catie Wise is a 52y o  year old male who presented with unwitnessed syncopal episode    He has history of chronic tobacco use disorder, and uncomplicated opioid dependence due to chronic back pain  Patient reports that over the past 2 weeks he has been experiencing progressively worsening dyspnea on exertion and paroxysmal nocturnal dyspnea  Since the past 1 week, he has been experiencing more episodes of sharp chest pain and tightness with radiation to neck and down to the left arm when climbing steps  Pain resolved with rest, and reproduced on exertion  In ED, he had negative troponins and normal ECG  However considering risk factors and high HEART score, patient was admitted for observation  Review of Systems  Review of system was conducted and was negative except for as stated in the HPI        Historical Information   Past Medical History:   Diagnosis Date   • Anxiety    • Arthritis    • Asthma    • Bipolar 2 disorder, major [facility] episode [facility] ) 10/31/2017   • Bipolar disorder ([facility] )    • Chronic left lumbar radiculopathy    • Chronic low back pain    • Chronic pain syndrome    • Chronic right shoulder pain    • Depression with anxiety    • Fatigue    • GERD (gastroesophageal reflux disease)    • Hydronephrosis    • Iron deficiency anemia    • Kidney stone    • Lytic bone lesions on xray    • Nephrolithiasis    • PONV (postoperative nausea and vomiting)    • Right elbow pain    • Right lateral epicondylitis      Past Surgical History:   Procedure Laterality Date   • ADENOIDECTOMY Bilateral    • APPENDECTOMY     • BACK SURGERY     • CHOLECYSTECTOMY     • CHOLECYSTECTOMY LAPAROSCOPIC N/A 10/30/2018    Procedure: CHOLECYSTECTOMY WITH GASTROTOMY LAPAROSCOPIC;  Surgeon: Kate Farmer MD;  Location: [facility];  Service: General   • ERCP W/ SPHICTEROTOMY N/A 10/30/2018    Procedure: ENDOSCOPIC RETROGRADE CHOLANGIOPANCREATOGRAPHY (ERCP) W/ SPHINCTEROTOMY;  Surgeon: Airam Figeuredo MD;  Location: [facility];  Service: Gastroenterology   • GASTRIC BYPASS      2009   • OTHER SURGICAL HISTORY      fusion/refusion of vertebrae, 2010 and 2011 l5-s1 and l4-l5   • NE ARTHROSCOPY KNEE LATERAL RELEASE Left 1/6/2021    Procedure: RELEASE RETINACULAR;  Surgeon: Harsh Aponte DO;  Location: [facility];  Service: Orthopedics   • MA ARTHRS KNE SURG W/MENISCECTOMY MED/LAT W/SHVG Right 4/26/2022    Procedure: ARTHROSCOPIC MENISCECTOMY LATERAL;  Surgeon: Harsh Aponte DO;  Location: [address] [place] [address] MAIN OR;  Service: Orthopedics   • MA ARTHRS KNEE DEBRIDEMENT/SHAVING ARTCLR CRTLG Left 1/6/2021    Procedure: ARTHROSCOPY KNEE;  Surgeon: Harsh Aponte DO;  Location: [facility];  Service: Orthopedics   • MA CYSTO/URETERO W/LITHOTRIPSY &INDWELL STENT INSRT Right 8/8/2017    Procedure: CYSTOSCOPY; URETEROSCOPY; HOLMIUM LASER; RETROGRADE PYELOGRAM; STENT;  Surgeon: Keyur Raymond MD;  Location: [facility] Main OR;  Service: Urology   • MA INCISION & DRAINAGE ABSCESS COMPLICATED/MULTIPLE Left 1/8/2021    Procedure: INCISION AND DRAINAGE (I&D) EXTREMITY- of left knee s/p MPFL reconstruction, I&D if the left knee with possible MPFL revision;  Surgeon: Harsh Aponte DO;  Location: [facility];  Service: Orthopedics   • MA INSJ/RPLCMT SPI NPGR DIR/INDUXIVE COUPLING Right 11/14/2017    Procedure: REMOVAL LOWER MEDIAL BUTTOCK SPINAL CORD STIMULATOR GENERATOR; PLACEMENT OF NEW BUTTOCK SPINAL CORD STIMULATOR THROUGH SEPERATE INCISION;  Surgeon: Dalila Harp MD;  Location: [facility] MAIN OR;  Service: Neurosurgery   • MA INSJ/RPLCMT SPI NPGR DIR/INDUXIVE COUPLING Right 1/12/2022    Procedure: Right sided spinal cord stimulator pulse generator replacement;  Surgeon: Nicole Lewis MD;  Location: [facility];  Service: Neurosurgery   • MA RCNSTJ [address] W/XTNSR RELIGNMT&/MUSC RL Left 1/6/2021    Procedure: REALIGNMENT PATELLA with chondroplasty of patella, open medial patellofemoral ligament reconstruction with allograft;  Surgeon: Harsh Aponte DO;  Location: [facility];  Service: Orthopedics   • RIK-EN-Y PROCEDURE  2003   • SPINAL CORD STIMULATOR IMPLANT  11/14/2017    dr Salas Batista procedure and technique 1  removal of a right back implantable pulse generator 2 placement of a new right buttock impantable pulse generator through seperate incision 3 electric analys complex programming spinal cord stimulator system postoperative period approx 1 hour   • TONSILLECTOMY       Social History     Substance and Sexual Activity   Alcohol Use No     Social History     Substance and Sexual Activity   Drug Use No     Social History     Tobacco Use   Smoking Status Every Day   • Packs/day: 1 00   • Types: Cigarettes   Smokeless Tobacco Never     Family History: non-contributory    Meds/Allergies   Hospital Medications:   Current Facility-Administered Medications   Medication Dose Route Frequency   • acetaminophen (TYLENOL) tablet 650 mg  650 mg Oral Q6H PRN   • albuterol (PROVENTIL HFA,VENTOLIN HFA) inhaler 1 puff  1 puff Inhalation Q6H PRN   • nicotine (NICODERM CQ) 21 mg/24 hr TD 24 hr patch 1 patch  1 patch Transdermal Daily   • ondansetron (ZOFRAN) injection 4 mg  4 mg Intravenous Q6H PRN   • tiZANidine (ZANAFLEX) tablet 4 mg  4 mg Oral Q8H PRN     Home Medications:   Medications Prior to Admission   Medication   • Acetaminophen (TYLENOL 8 HOUR ARTHRITIS PAIN PO)   • albuterol (PROVENTIL HFA,VENTOLIN HFA) 90 mcg/act inhaler   • LANSOPRAZOLE PO   • naloxone (NARCAN) 4 mg/0 1 mL nasal spray   • tiZANidine (ZANAFLEX) 4 mg tablet   • transdermal buprenorphine (BUTRANS) 20 mcg/hr PTWK TD patch       Allergies   Allergen Reactions   • Ampicillin GI Intolerance     Vomiting   • Penicillins GI Intolerance     Vomit       Objective   Vitals: Blood pressure 109/66, pulse 60, temperature 98 °F (36 7 °C), resp  rate 14, SpO2 97 %    Orthostatic Blood Pressures    Flowsheet Row Most Recent Value   Blood Pressure 109/66 filed at 03/30/2023 [id number]   Patient Position - Orthostatic VS Lying filed at 03/29/2023 7180            Invasive Devices     Peripheral Intravenous Line  Duration           Peripheral IV 03/29/23 Left Antecubital 1 day Physical Exam  GEN: Aminta Dawson appears well, alert and oriented x 3, pleasant and cooperative   NECK: No JVD or carotid bruits   HEART: Regular rhythm, normal rate, normal S1 and S2, no murmurs, clicks, gallops or rubs   LUNGS: Clear to auscultation bilaterally; no wheezes, rales, or rhonchi; respiration nonlabored   ABDOMEN:  Normoactive bowel sounds, soft, no tenderness, no distention  EXTREMITIES: peripheral pulses palpable; no edema    Lab Results: I have personally reviewed pertinent lab results  Results from last 7 days   Lab Units 03/29/23  1400 03/29/23  1151   HS TNI 0HR ng/L  --  4   HS TNI 2HR ng/L 5  --          Results from last 7 days   Lab Units 03/30/23  0525 03/29/23  1151 03/28/23  0813   POTASSIUM mmol/L 4 7 3 6 4 2   CO2 mmol/L 28 29 19*   CHLORIDE mmol/L 106 105 111*   BUN mg/dL 11 13 16   CREATININE mg/dL 0 93 0 97 0 86     Results from last 7 days   Lab Units 03/30/23  0525 03/29/23  1151 03/28/23  0813   HEMOGLOBIN g/dL 14 7 14 8 14 6   HEMATOCRIT % 48 2 48 4 47 3   PLATELETS Thousands/uL 333 260 205     Results from last 7 days   Lab Units 03/30/23  0525 03/28/23  0813   TRIGLYCERIDES mg/dL 117 133   HDL mg/dL 38* 44   LDL CALC mg/dL 105* 106*   HEMOGLOBIN A1C %  --  5 6         Imaging: I have personally reviewed pertinent reports  EKG:   Date: 3/29/23  Interpretation: NSR with no acute ischemic changes       Previous STRESS TEST:  No results found for this or any previous visit  No results found for this or any previous visit  No results found for this or any previous visit  Previous Cath/PCI:  No results found for this or any previous visit  No results found for this or any previous visit  No results found for this or any previous visit        ECHO:  Results for orders placed during the hospital encounter of 10/08/19    Echo complete with contrast if indicated    [address]  [place], [place] [address]  [phone]    Transthoracic Echocardiogram  2D, M-mode, Doppler, and Color Doppler    Study date:  08-Oct-2019    Patient: Jaleel Broussard  MR number: [id number]  Account number: [de-identified]  : 1975  Age: 40 years  Gender: Male  Status: Outpatient  Location: [address] [place] [address] [facility] [place]  Height: 72 in  Weight: 155 lb  BP: 114/ 60 mmHg    Indications: Syncope    Diagnoses: R55  - Syncope and collapse    Sonographer:  PATRIA Regalado  Primary Physician:  Eugene Shaver MD  Referring Physician:  ANTONIA Gilmore  Group:  [id number]  Interpreting Physician:  Adalid Contreras MD    SUMMARY    LEFT VENTRICLE:  Systolic function was normal  Ejection fraction was estimated to be 60 %  There were no regional wall motion abnormalities  MITRAL VALVE:  There was trace regurgitation  TRICUSPID VALVE:  There was trace regurgitation  Pulmonary artery systolic pressure was within the normal range  PULMONIC VALVE:  There was trace regurgitation  HISTORY: PRIOR HISTORY: Kathlynn Robb; Depression; Anxiety; Tobacco abuse; PTSD; Opioid dependence  PROCEDURE: The study was performed in the [address] [facility] [place]  The transthoracic approach was used  The study included complete 2D imaging, M-mode, complete spectral Doppler, and color Doppler  The heart rate was 59 bpm, at  the start of the study  Images were obtained from the parasternal, apical, subcostal, and suprasternal notch acoustic windows  Image quality was adequate  LEFT VENTRICLE: Size was normal  Systolic function was normal  Ejection fraction was estimated to be 60 %  There were no regional wall motion abnormalities  Wall thickness was normal  No evidence of apical thrombus   DOPPLER: Left  ventricular diastolic function parameters were normal     RIGHT VENTRICLE: The size was normal  Systolic function was normal  Wall thickness was normal     LEFT ATRIUM: Size was normal     RIGHT ATRIUM: Size was normal     MITRAL VALVE: Valve structure was normal  There was normal leaflet separation  DOPPLER: The transmitral velocity was within the normal range  There was no evidence for stenosis  There was trace regurgitation  AORTIC VALVE: The valve was trileaflet  Leaflets exhibited normal thickness and normal cuspal separation  DOPPLER: Transaortic velocity was within the normal range  There was no evidence for stenosis  There was no significant  regurgitation  TRICUSPID VALVE: The valve structure was normal  There was normal leaflet separation  DOPPLER: The transtricuspid velocity was within the normal range  There was no evidence for stenosis  There was trace regurgitation  Pulmonary artery  systolic pressure was within the normal range  PULMONIC VALVE: Leaflets exhibited normal thickness, no calcification, and normal cuspal separation  DOPPLER: The transpulmonic velocity was within the normal range  There was trace regurgitation  PERICARDIUM: There was no pericardial effusion  The pericardium was normal in appearance  AORTA: The root exhibited normal size  SYSTEMIC VEINS: IVC: The inferior vena cava was normal in size  SYSTEM MEASUREMENT TABLES    2D  %FS: 30 23 %  EDV(Teich): 62 87 ml  EF(Teich): 58 29 %  ESV(Teich): 26 22 ml  IVSd: 0 9 cm  LA Area: 12 77 cm2  LA Diam: 3 65 cm  LVEDV MOD A4C: 117 43 ml  LVEF MOD A4C: 67 86 %  LVESV MOD A4C: 37 74 ml  LVIDd: 3 82 cm  LVIDs: 2 67 cm  LVLd A4C: 8 98 cm  LVLs A4C: 7 24 cm  LVOT Diam: 3 67 cm  LVPWd: 0 89 cm  SV MOD A4C: 79 68 ml  SV(Teich): 36 64 ml    CW  TR Vmax: 2 17 m/s  TR maxP 82 mmHg    PW  E': 0 13 m/s  MV A David: 0 88 m/s  MV E David: 0 46 m/s  MV E/A Ratio: 0 53    [employer] Accredited Echocardiography Laboratory    Prepared and electronically signed by    Pearle Landau, MD  Signed 08-Oct-2019 11:18:16    No results found for this or any previous visit        JANAE:  No results found for this or any previous visit  No results found for this or any previous visit  CMR:  No results found for this or any previous visit  No results found for this or any previous visit  No results found for this or any previous visit  HOLTER  Results for orders placed during the hospital encounter of 10/08/19    Holter monitor - 24 hour    Narrative  PT NAME: Rolf Vo  : 1975  AGE: 40 y o  GENDER: male  MRN: [id number]   PROCEDURE: Holter monitor - 24 hour      24 hour Holter monitor report    INDICATIONS: Syncope    DESCRIPTION OF FINDINGS:    The patient was monitored for a total of 24 hours  The patient was predominantly in sinus rhythm throughout the study  The average heart rate was 66 beats per minute  The heart rate ranged from a low of 41 beats per minute in sinus bradycardia at 5:23am to a maximum of 129 beats per minute in sinus tachycardia with baseline artifact at 12:52pm     Ventricular ectopic activity consisted of 31 beats (0% of total beats), of which, 31 were single PVCs, 0 were ventricular couplets, 0 were in bigeminy and 0 were in trigeminy  There was no sustained or nonsustained ventricular tachycardia  Supraventricular ectopic activity consisted of 49 beats (0 1% of total beats), of which, 38 were single PACs, 2 were atrial couplets, 0 were in bigeminy and 0 were in trigeminy  There were 2 atrial runs, longest of which was 4 beats  There was no evidence of atrial fibrillation or atrial flutter  There were no significant pauses  The longest R-R interval was 1 6 seconds  There was no evidence of advanced degree heart block  There was no diary returned for correlation  Impression  1  Predominantly sinus rhythm, with an average heart rate of 66 beats per minute  2  No significant pauses or advanced degree heart block      Cardiology Fellow: Merlin Galdamez DO  Attending Physician: Kolby Schaffer MD    No results found for this or any previous visit

## 2023-03-30 NOTE — PROGRESS NOTES
[facility]  Progress Note  Name: Derek Noland  MRN: [id number]  Unit/Bed#: OW5 211-01 I Date of Admission: 3/29/2023   Date of Service: 3/30/2023 I Hospital Day: 0    Assessment/Plan   * Chest pain  Assessment & Plan  • Patient has been experiencing FERMIN for the past 2 weeks prior to admission  Patient experienced an episode of chest pain on Friday as he was climbing a flight of stairs  This pain radiated to his neck and down his left arm  The pain resolved with rest   • Patient reports strong family CAD history (mother with CABG x4 at age 52 and maternal cousin with cardiac stents placed x3 at age 48)  • CXR on admission, official read pending   • HS Troponins on admission negative  • D-dimer WNL at 0 34  • Continue to monitor on telemetry  • Cardiac diet  • Cardiology consulted   • Echocardiogram pending  • Stress Test pending     Syncope  Assessment & Plan  • Patient presented to the ED after an unwitnessed syncopal episode the morning of admission  Patient denies hitting his head but is unsure how long he was down for  Patient has never experienced anything like this before  • Denies tongue biting, head strike, incontinence   • Continue telemetry  • Echo pending     Smoking  Assessment & Plan  · Smoking cessation counseling  · Nicotine patch    Chronic low back pain  Assessment & Plan  • Patient has chronic back pain diagnosed as lumbar post-laminectomy syndrome and reports no change to his back pain  Patient has no difficulty with BM or urinating  • Patient uses Butrans patch every 7 days   • Zanaflex as needed           VTE Pharmacologic Prophylaxis:   ambulatory    Patient Centered Rounds: I performed bedside rounds with nursing staff today  Jo Ann Obrien  Discussions with Specialists or Other Care Team Provider: [place] Cardiologist     Education and Discussions with Family / Patient: Patient declined call to        Total Time Spent on Date of Encounter in care of patient: 35 minutes This time was spent on one or more of the following: performing physical exam; counseling and coordination of care; obtaining or reviewing history; documenting in the medical record; reviewing/ordering tests, medications or procedures; communicating with other healthcare professionals and discussing with patient's family/caregivers  Current Length of Stay: 0 day(s)  Current Patient Status: Observation   Certification Statement: The patient, admitted on an observation basis, will now require > 2 midnight hospital stay due to Cardiology consult, stress test, echo  Discharge Plan: pending work up and cardiology recs as above    Code Status: Level 1 - Full Code    Subjective:   Mr Burgess Dahl reports feeling SOB when doing stairs x2 weeks  Also reports an episode of CP last week  Reports an episode of syncope yesterday  Denies head strike  Denies tongue biting, incontinence  Reports episode was unwitnessed except his dogs  Reports mother had CABG x4 at age 52 and maternal cousin just had cardiac stents x3 at age 48    Objective:     Vitals:   Temp (24hrs), Av 1 °F (36 7 °C), Min:97 6 °F (36 4 °C), Max:98 7 °F (37 1 °C)    Temp:  [97 6 °F (36 4 °C)-98 7 °F (37 1 °C)] 98 °F (36 7 °C)  HR:  [52-72] 60  Resp:  [14-20] 14  BP: ()/(52-95) 109/66  SpO2:  [96 %-100 %] 97 %  There is no height or weight on file to calculate BMI  Input and Output Summary (last 24 hours):   No intake or output data in the 24 hours ending 23 [id number]    Physical Exam:   Physical Exam  Vitals reviewed  Constitutional:       Comments: Patient seen sitting in bed, NAD   Cardiovascular:      Rate and Rhythm: Normal rate and regular rhythm  Pulmonary:      Effort: Pulmonary effort is normal  No respiratory distress  Breath sounds: Normal breath sounds  Abdominal:      General: Bowel sounds are normal       Palpations: Abdomen is soft  Tenderness: There is no abdominal tenderness  Musculoskeletal:      Right lower leg: No edema  Left lower leg: No edema  Skin:     General: Skin is warm  Neurological:      Mental Status: He is alert and oriented to person, place, and time  Psychiatric:         Mood and Affect: Mood normal          Behavior: Behavior normal           Additional Data:     Labs:  Results from last 7 days   Lab Units 03/30/23  0525 03/29/23  1151   WBC Thousand/uL 6 97 6 58   HEMOGLOBIN g/dL 14 7 14 8   HEMATOCRIT % 48 2 48 4   PLATELETS Thousands/uL 333 260   NEUTROS PCT %  --  58   LYMPHS PCT %  --  31   MONOS PCT %  --  7   EOS PCT %  --  2     Results from last 7 days   Lab Units 03/30/23  0525 03/29/23  1151   SODIUM mmol/L 136 136   POTASSIUM mmol/L 4 7 3 6   CHLORIDE mmol/L 106 105   CO2 mmol/L 28 29   BUN mg/dL 11 13   CREATININE mg/dL 0 93 0 97   ANION GAP mmol/L 2* 2*   CALCIUM mg/dL 8 8 9 1   ALBUMIN g/dL  --  3 7   TOTAL BILIRUBIN mg/dL  --  0 19*   ALK PHOS U/L  --  106   ALT U/L  --  19   AST U/L  --  11   GLUCOSE RANDOM mg/dL 88 101             Results from last 7 days   Lab Units 03/28/23  0813   HEMOGLOBIN A1C % 5 6           Lines/Drains:  Invasive Devices     Peripheral Intravenous Line  Duration           Peripheral IV 03/29/23 Left Antecubital <1 day                  Telemetry:  Telemetry Orders (From admission, onward)             48 Hour Telemetry Monitoring  Continuous x 48 hours        References:    Telemetry Guidelines   Question:  Reason for 48 Hour Telemetry  Answer:  Acute MI, chest pain - R/O MI, or unstable angina                 Telemetry Reviewed: Normal Sinus Rhythm  Indication for Continued Telemetry Use: Acute MI/Unstable Angina/Rule out ACS             Imaging: No pertinent imaging reviewed      Recent Cultures (last 7 days):         Last 24 Hours Medication List:   Current Facility-Administered Medications   Medication Dose Route Frequency Provider Last Rate   • acetaminophen  650 mg Oral Q6H PRN Ju Mancia MD     • albuterol  1 puff Inhalation Q6H PRN Jennifer Gomez MD     • nicotine  1 patch Transdermal Daily Jennifer Gomez MD     • ondansetron  4 mg Intravenous Q6H PRN Jennifer Gomez MD     • tiZANidine  4 mg Oral Q8H PRN Jennifer Gomez MD          Today, Patient Was Seen By: Jonathan Hood PA-C    **Please Note: This note may have been constructed using a voice recognition system  **

## 2023-03-30 NOTE — ASSESSMENT & PLAN NOTE
• Patient has been experiencing FERMIN for the past 2 weeks prior to admission  Patient experienced an episode of chest pain on Friday as he was climbing a flight of stairs  This pain radiated to his neck and down his left arm    The pain resolved with rest   • Patient reports strong family CAD history (mother with CABG x4 at age 52 and maternal cousin with cardiac stents placed x3 at age 48)  • CXR on admission, official read pending   • HS Troponins on admission negative  • D-dimer WNL at 0 34  • Continue to monitor on telemetry  • Cardiac diet  • Cardiology consulted   • Echocardiogram pending  • Stress Test pending

## 2023-03-31 ENCOUNTER — APPOINTMENT (OUTPATIENT)
Dept: NON INVASIVE DIAGNOSTICS | Facility: HOSPITAL | Age: 48
End: 2023-03-31

## 2023-03-31 ENCOUNTER — TRANSITIONAL CARE MANAGEMENT (OUTPATIENT)
Dept: FAMILY MEDICINE CLINIC | Facility: CLINIC | Age: 48
End: 2023-03-31

## 2023-03-31 VITALS
BODY MASS INDEX: 31.02 KG/M2 | TEMPERATURE: 98.2 F | HEART RATE: 63 BPM | DIASTOLIC BLOOD PRESSURE: 81 MMHG | SYSTOLIC BLOOD PRESSURE: 136 MMHG | OXYGEN SATURATION: 98 % | WEIGHT: 229 LBS | HEIGHT: 72 IN | RESPIRATION RATE: 19 BRPM

## 2023-03-31 LAB
AORTIC ROOT: 3 CM
APICAL FOUR CHAMBER EJECTION FRACTION: 60 %
ASCENDING AORTA: 3.2 CM
E WAVE DECELERATION TIME: 249 MS
FRACTIONAL SHORTENING: 33 % (ref 28–44)
INTERVENTRICULAR SEPTUM IN DIASTOLE (PARASTERNAL SHORT AXIS VIEW): 1 CM
INTERVENTRICULAR SEPTUM: 1 CM (ref 0.6–1.1)
IRON SATN MFR SERPL: 6 % (ref 20–50)
IRON SERPL-MCNC: 26 UG/DL (ref 65–175)
LAAS-AP2: 12 CM2
LAAS-AP4: 18.5 CM2
LEFT ATRIUM AREA SYSTOLE SINGLE PLANE A4C: 16.8 CM2
LEFT ATRIUM SIZE: 3.8 CM
LEFT INTERNAL DIMENSION IN SYSTOLE: 3.2 CM (ref 2.1–4)
LEFT VENTRICLE DIASTOLIC VOLUME (MOD BIPLANE): 111 ML
LEFT VENTRICLE SYSTOLIC VOLUME (MOD BIPLANE): 49 ML
LEFT VENTRICULAR INTERNAL DIMENSION IN DIASTOLE: 4.8 CM (ref 3.5–6)
LEFT VENTRICULAR POSTERIOR WALL IN END DIASTOLE: 1 CM
LEFT VENTRICULAR STROKE VOLUME: 68 ML
LV EF: 56 %
LVSV (TEICH): 68 ML
MAX HR PERCENT: 92 %
MAX HR: 160 BPM
MV E'TISSUE VEL-SEP: 11 CM/S
MV PEAK A VEL: 0.63 M/S
MV PEAK E VEL: 76 CM/S
MV STENOSIS PRESSURE HALF TIME: 72 MS
MV VALVE AREA P 1/2 METHOD: 3.06 CM2
NUC STRESS EJECTION FRACTION: 51 %
RATE PRESSURE PRODUCT: NORMAL
RIGHT ATRIUM AREA SYSTOLE A4C: 12.4 CM2
RIGHT VENTRICLE ID DIMENSION: 3.5 CM
SL CV LEFT ATRIUM LENGTH A2C: 3.8 CM
SL CV LV EF: 65
SL CV PED ECHO LEFT VENTRICLE DIASTOLIC VOLUME (MOD BIPLANE) 2D: 110 ML
SL CV PED ECHO LEFT VENTRICLE SYSTOLIC VOLUME (MOD BIPLANE) 2D: 42 ML
SL CV REST NUCLEAR ISOTOPE DOSE: 11 MCI
SL CV STRESS NUCLEAR ISOTOPE DOSE: 32 MCI
SL CV STRESS RECOVERY BP: NORMAL MMHG
SL CV STRESS RECOVERY HR: 83 BPM
SL CV STRESS RECOVERY O2 SAT: 96 %
SL CV STRESS STAGE REACHED: 2
STRESS ANGINA INDEX: 1
STRESS BASELINE BP: NORMAL MMHG
STRESS BASELINE HR: 80 BPM
STRESS DUKE TREADMILL SCORE: 2
STRESS O2 SAT REST: 98 %
STRESS PEAK HR: 153 BPM
STRESS POST ESTIMATED WORKLOAD: 7 METS
STRESS POST EXERCISE DUR MIN: 6 MIN
STRESS POST EXERCISE DUR SEC: 3 SEC
STRESS POST O2 SAT PEAK: 98 %
STRESS POST PEAK BP: 222 MMHG
STRESS ST DEPRESSION: 0 MM
STRESS/REST PERFUSION RATIO: 1.05
TIBC SERPL-MCNC: 407 UG/DL (ref 250–450)
TR MAX PG: 21 MMHG
TR PEAK VELOCITY: 2.3 M/S
TRICUSPID ANNULAR PLANE SYSTOLIC EXCURSION: 1.8 CM
TRICUSPID VALVE PEAK REGURGITATION VELOCITY: 2.29 M/S
VIT B1 BLD-SCNC: 115.6 NMOL/L (ref 66.5–200)

## 2023-03-31 NOTE — DISCHARGE SUMMARY
[address] [facility]  Discharge- Gretchen Vuong 1975, 52 y o  male MRN: [id number]  Unit/Bed#: CW2 211-01 Encounter: [id number]  Primary Care Provider: Yodit Bentley MD   Date and time admitted to hospital: 3/29/2023 11:25 AM    * Chest pain  Assessment & Plan  • Patient presented with FERMIN for the past 2 weeks prior to admission  Patient experienced an episode of chest pain on Friday as he was climbing a flight of stairs  This pain radiated to his neck and down his left arm  The pain resolved with rest   • Patient reports strong family CAD history (mother with CABG x4 at age 52 and maternal cousin with cardiac stents placed x3 at age 48)  • CXR on admission with no acute cardiopulmonary disease  • HS Troponins on admission negative  • D-dimer WNL at 0 34  • Monitored on telemetry  • Cardiology consult appreciated - they cleared the patient from their standpoint for discharge today  • Echocardiogram with LVEF 65%, normal wall function, normal diastolic function  • NM Stress Test with no evidence of stress induced ischemia on EKG or nuclear imaging     Syncope  Assessment & Plan  • Patient presented to the ED after an unwitnessed syncopal episode the morning of admission  Patient denies hitting his head but is unsure how long he was down for  Patient has never experienced anything like this before    • Denieed tongue biting, head strike, incontinence   • Monitored on tele  • Cardiology cleared from their standpoint for discharge today  • Echo as above    Smoking  Assessment & Plan  · Smoking cessation counseling  · Nicotine patch    Chronic low back pain  Assessment & Plan  • Continue home meds      Medical Problems     Resolved Problems  Date Reviewed: 3/31/2023   None       Discharging Physician / Practitioner: Arnaud Pool PA-C  PCP: Yodit Bentley MD  Admission Date:   Admission Orders (From admission, onward)     Ordered        03/29/23 1514  Place in Observation  Once                      Discharge Date: 03/31/23    Consultations During Hospital Stay:  · Cardiology    Procedures Performed:   · Echo  · NM stress test  · CXR  · CMP  · HS troponin  · D-dimer  · FLU/COVID/RSV  · Lipid panel  · TSH  · fT4    Significant Findings / Test Results:   · Echo: LVEF 65%, normal wall function, normal diastolic function  · NM stress test: no evidence of stress induced ischemia on EKG or nuclear imaging   · CXR: no acute cardiopulmonary disease  · HS troponin: 4, 5  · D-dimer: 0 34  · FLU/COVID/RSV: negative  · Lipid panel: cholesterol 166, triglycerides 117, HDL 38,   · TSH: 4 670  · FT4: 0 86    Incidental Findings:   · None     Test Results Pending at Discharge (will require follow up): · HbA1c - follow up with PCP     Outpatient Tests Requested:  · Follow up with PCP  TFTs in 4-6 weeks     Complications:  None    Reason for Admission: CP, syncope    Hospital Course:   Yvonne Fermin is a 52 y o  male patient who originally presented to the hospital on 3/29/2023 due to 2 week history of FERMIN, episode of CP, and episode of syncope  CXR with no acute cardiopulmonary disease  HS trops negative  D-dimer WNL  TSH elevated but fT4 WNL, recommend repeat TFTs in 4-6 weeks as outpatient  He was monitored on tele and seen in consult by Cardiology  NM stress test revealed no evidence of stress induced ischemia on EKG or nuclear imaging  Echo revealed LVEF 65%, normal wall motion and normal diastolic function  I discussed with Cardiology today who cleared the patient from their standpoint for discharge  Please see above list of diagnoses and related plan for additional information  Condition at Discharge: stable    Discharge Day Visit / Exam:   Subjective:    Mr Landen Victor denies complaint  He denies any further episodes like the one that brought him in       Vitals: Blood Pressure: 136/81 (03/31/23 1051)  Pulse: 63 (03/31/23 1051)  Temperature: 98 2 °F (36 8 °C) (03/31/23 1051)  Temp Source: Oral (03/31/23 0601)  Respirations: 19 (03/31/23 1051)  Height: 6' (182 9 cm) (03/31/23 0805)  Weight - Scale: 104 kg (229 lb) (03/31/23 0805)  SpO2: 98 % (03/31/23 1051)  Exam:   Physical Exam  Vitals reviewed  Constitutional:       Comments: Patient seen sitting in bed, Cardiology fellow present, NAD   Cardiovascular:      Rate and Rhythm: Normal rate and regular rhythm  Pulmonary:      Effort: Pulmonary effort is normal  No respiratory distress  Breath sounds: Normal breath sounds  Abdominal:      General: Bowel sounds are normal       Palpations: Abdomen is soft  Tenderness: There is no abdominal tenderness  Musculoskeletal:      Right lower leg: No edema  Left lower leg: No edema  Skin:     General: Skin is warm  Neurological:      Mental Status: He is alert and oriented to person, place, and time  Psychiatric:         Mood and Affect: Mood normal          Behavior: Behavior normal          Discussion with Family: Patient declined call to   Discharge instructions/Information to patient and family:   See after visit summary for information provided to patient and family  Provisions for Follow-Up Care:  See after visit summary for information related to follow-up care and any pertinent home health orders  Disposition:   Home    Planned Readmission: None     Discharge Statement:  I spent 37 minutes discharging the patient  This time was spent on the day of discharge  I had direct contact with the patient on the day of discharge  Greater than 50% of the total time was spent examining patient, answering all patient questions, arranging and discussing plan of care with patient as well as directly providing post-discharge instructions  Additional time then spent on discharge activities  Discharge Medications:  See after visit summary for reconciled discharge medications provided to patient and/or family        **Please Note: This note may have been constructed using a voice recognition system**

## 2023-03-31 NOTE — PLAN OF CARE
Problem: PAIN - ADULT  Goal: Verbalizes/displays adequate comfort level or baseline comfort level  Description: Interventions:  - Encourage patient to monitor pain and request assistance  - Assess pain using appropriate pain scale  - Administer analgesics based on type and severity of pain and evaluate response  - Implement non-pharmacological measures as appropriate and evaluate response  - Consider cultural and social influences on pain and pain management  - Notify physician/advanced practitioner if interventions unsuccessful or patient reports new pain  Outcome: Progressing     Problem: INFECTION - ADULT  Goal: Absence or prevention of progression during hospitalization  Description: INTERVENTIONS:  - Assess and monitor for signs and symptoms of infection  - Monitor lab/diagnostic results  - Monitor all insertion sites, i e  indwelling lines, tubes, and drains  - Monitor endotracheal if appropriate and nasal secretions for changes in amount and color  - [place] appropriate cooling/warming therapies per order  - Administer medications as ordered  - Instruct and encourage patient and family to use good hand hygiene technique  - Identify and instruct in appropriate isolation precautions for identified infection/condition  Outcome: Progressing

## 2023-03-31 NOTE — PROGRESS NOTES
Cardiology Team 2 Progress Note - Rolando Nguyễn 52 y o  male MRN: [id number]  Unit/Bed#: CW2 211-01 Encounter: [id number]    Subjective:   Patient seen and examined  No significant events overnight  Denies lightheadedness, dizziness, syncope, headache, vision changes, diaphoresis, chest pain, palpitations, shortness of breath, PND, orthopnea, nausea, vomiting, abdominal pain or lower extremity edema  Assessment:  Principal Problem:    Chest pain  Active Problems:    Chronic low back pain    Smoking    Syncope    1  Chest pain- concerning for cardiac angina   -Reporting 2-week history of dyspnea on exertion and an episode of chest pain a week ago while patient was climbing a flight of stairs  Also reported orthopnea and PND      2  Syncope- likely vasovagal   -Had a syncopal episode on the morning of presentation, described as a  facedown fall, history not suggestive of seizure disorder    3  Chronic back pain      Plan:  - Normal TTE study, LVEF of  47% , normal systolic and diastolic function   - NM stress test- normal study, no evidence of perfusion defect   -It is reasonable for the patient to be discharged today with outpatient follow-up with primary care for chronic conditions  Case discussed and reviewed with Dr Bella Meyer who agrees with my assessment and plan  Thank you for involving us in the care of your patient   Viv Haines MD  Cardiology Fellow   PGY-4     Vitals: Blood pressure 98/58, pulse 61, temperature 97 8 °F (36 6 °C), temperature source Oral, resp  rate 20, height 6' (1 829 m), weight 104 kg (229 lb), SpO2 97 %  , Body mass index is 31 06 kg/m² ,   Orthostatic Blood Pressures    Flowsheet Row Most Recent Value   Blood Pressure 98/58 filed at 03/31/2023 0805   Patient Position - Orthostatic VS Lying filed at 03/31/2023 0601            Intake/Output Summary (Last 24 hours) at 3/31/2023 1033  Last data filed at 3/30/2023 2101  Gross per 24 hour   Intake 830 ml   Output --   Net 830 ml       Review of System:  Review of system was conducted and was negative except for as stated in the subjective course      Physical Exam:  GEN: Gustavo Poon appears well, alert and oriented x 3, pleasant and cooperative   NECK: No JVD or carotid bruits   HEART: Regular rhythm, normal rate, normal S1 and S2, no murmurs, clicks, gallops or rubs   LUNGS: Clear to auscultation bilaterally; no wheezes, rales, or rhonchi; respiration nonlabored   ABDOMEN:  Normoactive bowel sounds, soft, no tenderness, no distention  EXTREMITIES: peripheral pulses palpable; no edema        Current Facility-Administered Medications:   •  acetaminophen (TYLENOL) tablet 650 mg, 650 mg, Oral, Q6H PRN, Victor M Gee MD  •  albuterol (PROVENTIL HFA,VENTOLIN HFA) inhaler 1 puff, 1 puff, Inhalation, Q6H PRN, Victor M Gee MD  •  nicotine (NICODERM CQ) 21 mg/24 hr TD 24 hr patch 1 patch, 1 patch, Transdermal, Daily, Jesse Escobar MD  •  ondansetron (ZOFRAN) injection 4 mg, 4 mg, Intravenous, Q6H PRN, Victor M Gee MD  •  tiZANidine (ZANAFLEX) tablet 4 mg, 4 mg, Oral, Q8H PRN, Victor M Gee MD    Labs & Results:  Results from last 7 days   Lab Units 03/29/23  1400 03/29/23  1151   HS TNI 0HR ng/L  --  4   HS TNI 2HR ng/L 5  --          Results from last 7 days   Lab Units 03/30/23  0525 03/29/23  1151 03/28/23  0813   POTASSIUM mmol/L 4 7 3 6 4 2   CO2 mmol/L 28 29 19*   CHLORIDE mmol/L 106 105 111*   BUN mg/dL 11 13 16   CREATININE mg/dL 0 93 0 97 0 86     Results from last 7 days   Lab Units 03/30/23  0525 03/29/23  1151 03/28/23  0813   HEMOGLOBIN g/dL 14 7 14 8 14 6   HEMATOCRIT % 48 2 48 4 47 3   PLATELETS Thousands/uL 333 260 205     Results from last 7 days   Lab Units 03/30/23  0525 03/28/23  0813   TRIGLYCERIDES mg/dL 117 133   HDL mg/dL 38* 44   LDL CALC mg/dL 105* 106*   HEMOGLOBIN A1C %  --  5 6

## 2023-03-31 NOTE — ASSESSMENT & PLAN NOTE
• Patient presented with FERMIN for the past 2 weeks prior to admission  Patient experienced an episode of chest pain on Friday as he was climbing a flight of stairs  This pain radiated to his neck and down his left arm    The pain resolved with rest   • Patient reports strong family CAD history (mother with CABG x4 at age 52 and maternal cousin with cardiac stents placed x3 at age 48)  • CXR on admission with no acute cardiopulmonary disease  • HS Troponins on admission negative  • D-dimer WNL at 0 34  • Monitored on telemetry  • Cardiology consult appreciated - they cleared the patient from their standpoint for discharge today  • Echocardiogram with LVEF 65%, normal wall function, normal diastolic function  • NM Stress Test with no evidence of stress induced ischemia on EKG or nuclear imaging

## 2023-03-31 NOTE — PROGRESS NOTES
Pastoral Care Progress Note    3/31/2023  Patient: Gretchen Vuong : 1975  Admission Date & Time: 3/29/2023 1125  MRN: [id number] [facility]: [id number]         23 1500   Clinical Encounter Type   Visited With Patient   Sacramental Encounters   Sacrament of Sick-Anointing Patient declined anointing     Mellisa Oneill met with the pt and conducted a pastoral visit  The pt declined Fr's offers for prayers, blessings and anointing  No further needs were expressed at this time  Chaplains still remain available

## 2023-03-31 NOTE — ASSESSMENT & PLAN NOTE
• Patient presented to the ED after an unwitnessed syncopal episode the morning of admission  Patient denies hitting his head but is unsure how long he was down for  Patient has never experienced anything like this before    • Denieed tongue biting, head strike, incontinence   • Monitored on tele  • Cardiology cleared from their standpoint for discharge today  • Echo as above

## 2023-04-01 LAB
EST. AVERAGE GLUCOSE BLD GHB EST-MCNC: 114 MG/DL
HBA1C MFR BLD: 5.6 %

## 2023-04-04 PROBLEM — Z82.49 FAMILY HISTORY OF CORONARY ARTERY DISEASE OCCURRING PRIOR TO 55 YEARS OF AGE: Status: ACTIVE | Noted: 2023-04-04

## 2023-04-04 LAB — VIT A SERPL-MCNC: 42.3 UG/DL (ref 20.1–62)

## 2023-04-05 ENCOUNTER — TELEPHONE (OUTPATIENT)
Dept: FAMILY MEDICINE CLINIC | Facility: CLINIC | Age: 48
End: 2023-04-05

## 2023-04-05 ENCOUNTER — HOSPITAL ENCOUNTER (OUTPATIENT)
Dept: NON INVASIVE DIAGNOSTICS | Facility: CLINIC | Age: 48
Discharge: HOME | End: 2023-04-05

## 2023-04-05 LAB — VIT B2 BLD-MCNC: NORMAL UG/L (ref 137–370)

## 2023-04-05 NOTE — TELEPHONE ENCOUNTER
Patient called to see if he could have an updated note allowing him to return to work under light duty starting April 12th  Please advise

## 2023-04-24 ENCOUNTER — CONSULT (OUTPATIENT)
Dept: HEMATOLOGY ONCOLOGY | Facility: CLINIC | Age: 48
End: 2023-04-24

## 2023-04-24 ENCOUNTER — TELEPHONE (OUTPATIENT)
Dept: HEMATOLOGY ONCOLOGY | Facility: CLINIC | Age: 48
End: 2023-04-24

## 2023-04-24 VITALS
RESPIRATION RATE: 17 BRPM | HEART RATE: 74 BPM | OXYGEN SATURATION: 98 % | SYSTOLIC BLOOD PRESSURE: 122 MMHG | WEIGHT: 225 LBS | BODY MASS INDEX: 30.48 KG/M2 | HEIGHT: 72 IN | DIASTOLIC BLOOD PRESSURE: 80 MMHG

## 2023-04-24 DIAGNOSIS — Z98.84 HISTORY OF ROUX-EN-Y GASTRIC BYPASS: ICD-10-CM

## 2023-04-24 DIAGNOSIS — D53.9 NUTRITIONAL ANEMIA: ICD-10-CM

## 2023-04-24 DIAGNOSIS — D50.8 IRON DEFICIENCY ANEMIA SECONDARY TO INADEQUATE DIETARY IRON INTAKE: ICD-10-CM

## 2023-04-24 DIAGNOSIS — D50.8 OTHER IRON DEFICIENCY ANEMIA: Primary | ICD-10-CM

## 2023-04-24 RX ORDER — FOLIC ACID 1 MG/1
1 TABLET ORAL DAILY
Qty: 30 TABLET | Refills: 2 | Status: SHIPPED | OUTPATIENT
Start: 2023-04-24

## 2023-04-24 RX ORDER — SODIUM CHLORIDE 9 MG/ML
20 INJECTION, SOLUTION INTRAVENOUS ONCE
Status: CANCELLED | OUTPATIENT
Start: 2023-05-01

## 2023-04-24 RX ORDER — CYANOCOBALAMIN 1000 UG/ML
1000 INJECTION, SOLUTION INTRAMUSCULAR; SUBCUTANEOUS ONCE
Status: CANCELLED | OUTPATIENT
Start: 2023-05-01 | End: 2023-05-01

## 2023-04-24 NOTE — PROGRESS NOTES
Oncology Outpatient Consult Note  Mylene Gould 52 y o  male MRN: @ Encounter: [id number]        Date:  4/24/2023        CC:  Iron deficiency anemia      HPI:  Mylene Gould is seen for initial consultation 4/24/2023 regarding iron deficiency anemia  Patient has a history of gastric bypass surgery (Parth-en-Y) in 2004  Patient was originally seen by hematology in 2018, s/p Venofer 300 mg x 6 cycles  Recently, patient took his dogs out and had a syncopal episode  Reports increased SOB, lightheadedness, fatigue and headaches over the past two months  Patient unable to work at this time and is on light duty due to his symptoms  Most recent labs serum iron 26, % saturation 6, Ferritin 6, Hgb 14 6, MCV 79, Vitamin B-12 284, Folate 3 9  Patient has been receiving B12 injections with Dr Hair [place] office every two weeks, however, prefers if he can get them with the iron infusions  Patient unable to tolerate oral iron supplements  ROS: As stated in history of present illness otherwise her 14 point review of systems today was negative  ECOG PS: 0      Test Results:    Imaging: XR chest pa & lateral    Result Date: 3/31/2023  Narrative: CHEST INDICATION:   R06 09: Other forms of dyspnea  COMPARISON:  July 22, 2021 EXAM PERFORMED/VIEWS:  XR CHEST PA & LATERAL FINDINGS: Cardiomediastinal silhouette appears unremarkable  The lungs are clear  No pneumothorax or pleural effusion  Spinal stimulator noted  Osseous structures intact     Impression: No acute cardiopulmonary disease  Workstation performed: [id number]     NM myocardial perfusion spect (stress and/or rest)    Result Date: 3/31/2023  Narrative: •  Stress ECG: No ST deviation is noted  The stress ECG is negative for ischemia after maximal exercise, with reproduction of symptoms (nonlimiting chest discomfort) •  Perfusion: There are no perfusion defects   •  Stress Function: Left ventricular function post-stress is normal  Post-stress ejection fraction is 51 %  •  Stress Combined Conclusion: The ECG and SPECT imaging portions of the stress study are concordant with no evidence of stress induced myocardial ischemia  •  Stress ECG: Overall, the patient's exercise capacity was normal for their age  The patient reached stage 2 0 of the protocol after exercising for 6 min and 3 sec and had a maximal HR of 160 bpm (92 % of MPHR) and 7 0 METS  The patient experienced non-limiting angina during the test  No evidence of stress induced ischemia on EKG or Nuclear Imaging  Echo complete w/ contrast if indicated    Result Date: 3/31/2023  Narrative: •  Left Ventricle: Left ventricular cavity size is normal  Wall thickness is normal  The left ventricular ejection fraction is 65%  Systolic function is normal  Wall motion is normal  Diastolic function is normal  •  Tricuspid Valve: There is mild regurgitation  Labs:   Lab Results   Component Value Date    WBC 6 97 03/30/2023    HGB 14 7 03/30/2023    HCT 48 2 03/30/2023    MCV 80 (L) 03/30/2023     03/30/2023     Lab Results   Component Value Date    K 4 7 03/30/2023     03/30/2023    CO2 28 03/30/2023    BUN 11 03/30/2023    CREATININE 0 93 03/30/2023    GLUF 90 03/28/2023    CALCIUM 8 8 03/30/2023    AST 11 03/29/2023    ALT 19 03/29/2023    ALKPHOS 106 03/29/2023    EGFR 97 03/30/2023         Lab Results   Component Value Date    SPEP  08/04/2017     The serum total protein, albumin and electrophoresis are within normal limits  No monoclonal bands noted   Reviewed by: Alonso Madison DO  ([id number])  **Electronic Signature**       No results found for: PSA    No results found for: CEA    No results found for: AFP    Lab Results   Component Value Date    IRON 26 (L) 03/28/2023    TIBC 407 03/28/2023    FERRITIN 6 (L) 03/28/2023       Lab Results   Component Value Date    [id number] 284 03/28/2023               Active Problems:   Patient Active Problem List   Diagnosis   • Chronic low back pain • Depression with anxiety   • Fatigue   • Chronic anemia   • Nephrolithiasis   • Pain syndrome, chronic   • Uncomplicated opioid dependence (HCC)   • Lumbar post-laminectomy syndrome   • Myofascial pain syndrome   • Lumbar radiculopathy   • Spinal stenosis of lumbar region with neurogenic claudication   • BPH with obstruction/lower urinary tract symptoms   • Iron deficiency anemia   • Vitamin B deficiency   • Postgastrectomy malabsorption   • History of Parth-en-Y gastric bypass   • Tobacco abuse   • Vitamin D deficiency   • PTSD (post-traumatic stress disorder)   • Sacroiliitis, not elsewhere classified (HCC)   • Nausea and vomiting   • Acute pain of left knee   • Patellofemoral instability, left   • Asthma   • Smoking   • S/P left knee surgery   • Chest pain   • Syncope   • Family history of coronary artery disease occurring prior to 54years of age   • Nutritional anemia       Past Medical History:   Past Medical History:   Diagnosis Date   • Allergic    • Anxiety    • Arthritis    • Asthma    • Bipolar 2 disorder, major depressive episode ([facility] [address] ) 10/31/2017   • Bipolar disorder ([facility] )    • Chronic left lumbar radiculopathy    • Chronic low back pain    • Chronic pain syndrome    • Chronic right shoulder pain    • Depression with anxiety    • Fatigue    • GERD (gastroesophageal reflux disease)    • Hydronephrosis    • Iron deficiency anemia    • Kidney stone    • Lytic bone lesions on xray    • Nephrolithiasis    • PONV (postoperative nausea and vomiting)    • Right elbow pain    • Right lateral epicondylitis        Surgical History:   Past Surgical History:   Procedure Laterality Date   • ADENOIDECTOMY Bilateral    • APPENDECTOMY     • BACK SURGERY     • CHOLECYSTECTOMY     • CHOLECYSTECTOMY LAPAROSCOPIC N/A 10/30/2018    Procedure: CHOLECYSTECTOMY WITH GASTROTOMY LAPAROSCOPIC;  Surgeon: Rafal Carl MD;  Location[facility];  Service: General   • ERCP W/ SPHICTEROTOMY N/A 10/30/2018    Procedure: ENDOSCOPIC RETROGRADE CHOLANGIOPANCREATOGRAPHY (ERCP) W/ SPHINCTEROTOMY;  Surgeon: Silvia Riley MD;  Location: [facility];  Service: Gastroenterology   • GASTRIC BYPASS      2009   • OTHER SURGICAL HISTORY      fusion/refusion of vertebrae, 2010 and 2011 l5-s1 and l4-l5   • WA ARTHROSCOPY KNEE LATERAL RELEASE Left 01/06/2021    Procedure: RELEASE RETINACULAR;  Surgeon: Yoli Clark DO;  [facility];  Service: Orthopedics   • WA ARTHRS KNE SURG W/MENISCECTOMY MED/LAT W/SHVG Right 04/26/2022    Procedure: ARTHROSCOPIC MENISCECTOMY LATERAL;  Surgeon: Yoli Clark DO;  Location: [facility] MAIN OR;  Service: Orthopedics   • WA ARTHRS KNEE DEBRIDEMENT/SHAVING ARTCLR CRTLG Left 01/06/2021    Procedure: ARTHROSCOPY KNEE;  Surgeon: Yoli Clark DO;  [facility];  Service: Orthopedics   • WA CYSTO/URETERO W/LITHOTRIPSY &INDWELL STENT INSRT Right 08/08/2017    Procedure: John Ennis; URETEROSCOPY; HOLMIUM LASER; RETROGRADE PYELOGRAM; STENT;  Surgeon: Nikko Lau MD;  Location[facility] Main [facility];  Service: Urology   • WA INCISION & DRAINAGE ABSCESS COMPLICATED/MULTIPLE Left 01/08/2021    Procedure: INCISION AND DRAINAGE (I&D) EXTREMITY- of left knee s/p MPFL reconstruction, I&D if the left knee with possible MPFL revision;  Surgeon: Yoli Clark DO;  [facility];  Service: Orthopedics   • WA INSJ/RPLCMT SPI NPGR DIR/INDUXIVE COUPLING Right 11/14/2017    Procedure: REMOVAL LOWER MEDIAL BUTTOCK SPINAL CORD STIMULATOR GENERATOR; PLACEMENT OF NEW BUTTOCK SPINAL CORD [address] [facility];  Surgeon: Triston Richardson MD;  Location: [facility] MAIN OR;  Service: Neurosurgery   • WA INSJ/RPLCMT SPI NPGR DIR/INDUXIVE COUPLING Right 01/12/2022    Procedure: Right sided spinal cord stimulator pulse generator replacement;  Surgeon: Lucrecia Wylie MD;  [facility];  Service: Neurosurgery   • WA RCNSTJ [address] W/XTNSR RELIGNMT&/MUSC RL Left 01/06/2021    Procedure: REALIGNMENT PATELLA with chondroplasty of patella, open medial patellofemoral ligament reconstruction with allograft;  Surgeon: Yoli Clark DO;  Location:  MAIN OR;  Service: Orthopedics   • RIK-EN-Y PROCEDURE  2003   • SPINAL CORD STIMULATOR IMPLANT  11/14/2017    dr Bonnie Lujan procedure and technique 1  removal of a right back implantable pulse generator 2 placement of a new right buttock impantable pulse generator through seperate incision 3 electric analys complex programming spinal cord stimulator system postoperative period approx 1 hour   • SPINE SURGERY     • TONSILLECTOMY         Family History:    Family History   Problem Relation Age of Onset   • Cancer Mother    • Arthritis Mother    • Stomach cancer Mother    • Coronary artery disease Mother    • Heart disease Mother    • Asthma Mother    • COPD Mother    • Cancer Father    • Esophageal cancer Father    • Other Father         brain tumor   • Diabetes Father    • No Known Problems Sister    • No Known Problems Sister    • No Known Problems Sister        Cancer-related family history includes Cancer in his father and mother; Esophageal cancer in his father; Stomach cancer in his mother      Social History:   Social History     Socioeconomic History   • Marital status:      Spouse name: Not on file   • Number of children: Not on file   • Years of education: GED   • Highest education level: Not on file   Occupational History   • Not on file   Tobacco Use   • Smoking status: Every Day     Packs/day: 1 50     Years: 5 00     Pack years: 7 50     Types: Cigarettes, Pipe   • Smokeless tobacco: Never   Vaping Use   • Vaping Use: Never used   Substance and Sexual Activity   • Alcohol use: Not Currently   • Drug use: Never   • Sexual activity: Not Currently     Partners: Female   Other Topics Concern   • Not on file   Social History Narrative    Chooses not to have children    Drinks caffienated tea    Lives with spouse             Social Determinants of Health     Financial Resource Strain: Not on file   Food Insecurity: Not on file   Transportation Needs: Not on file   Physical Activity: Not on file   Stress: Not on file   Social Connections: Not on file   Intimate Partner Violence: Not on file   Housing Stability: Not on file       Current Medications:   Current Outpatient Medications   Medication Sig Dispense Refill   • Acetaminophen (TYLENOL 8 HOUR ARTHRITIS PAIN PO) Take by mouth     • albuterol (PROVENTIL HFA,VENTOLIN HFA) 90 mcg/act inhaler INHALE 2 PUFFS EVERY 4 (FOUR) HOURS AS NEEDED FOR WHEEZING OR SHORTNESS OF BREATH COUGH 18 g 0   • ergocalciferol (VITAMIN D2) 50,000 units Take 1 capsule (50,000 Units total) by mouth once a week 12 capsule 0   • folic acid (FOLVITE) 1 mg tablet Take 1 tablet (1 mg total) by mouth daily 30 tablet 2   • Iron, Ferrous Sulfate, 325 (65 Fe) MG TABS Take 1 tablet daily 90 tablet 1   • LANSOPRAZOLE PO Take 10 mg by mouth if needed       • naloxone (NARCAN) 4 mg/0 1 mL nasal spray Administer 1 spray into a nostril  If no response after 2-3 minutes, give another dose in the other nostril using a new spray  1 each 1   • tiZANidine (ZANAFLEX) 4 mg tablet Take 1 PO QID PRN for pain and spasms  120 tablet 3   • transdermal buprenorphine (BUTRANS) 20 mcg/hr PTWK TD patch Apply 1 patch TD every 7 days for pain  Rotate patch application sites to avoid skin irritation  4 patch 3   • varenicline (Chantix Continuing Month Derrick) 1 mg tablet Take 1 tablet (1 mg total) by mouth 2 (two) times a day 60 tablet 4   • varenicline 0 5 MG X 11 & 1 MG X 42 tablet therapy pack Take 0 5 mg by mouth daily for 3 days, THEN 0 5 mg 2 (two) times a day for 4 days, THEN 1 mg 2 (two) times a day for 23 days  48 each 0     No current facility-administered medications for this visit  Allergies: Allergies   Allergen Reactions   • Ampicillin GI Intolerance     Vomiting   • Penicillins GI Intolerance     Vomit         Physical Exam:    Body surface area is 2 24 meters squared  Wt Readings from Last 3 Encounters:   04/24/23 102 kg (225 lb)   04/04/23 103 kg (227 lb)   03/31/23 104 kg (229 lb)        Temp Readings from Last 3 Encounters:   04/04/23 97 6 °F (36 4 °C) (Temporal)   03/31/23 98 2 °F (36 8 °C)   03/28/23 (!) 97 3 °F (36 3 °C) (Temporal)        BP Readings from Last 3 Encounters:   04/24/23 122/80   04/04/23 132/74   03/31/23 136/81         Pulse Readings from Last 3 Encounters:   04/24/23 74   04/04/23 83   03/31/23 63     @LASTSAO2(3)@    Physical Exam     Constitutional   General appearance: No acute distress, well appearing and well nourished  Eyes   Conjunctiva and lids: No swelling, erythema or discharge  Pupils and irises: Equal, round and reactive to light  Ears, Nose, Mouth, and Throat   External inspection of ears and nose: Normal     Nasal mucosa, septum, and turbinates: Normal without edema or erythema  Oropharynx: Normal with no erythema, edema, exudate or lesions  Pulmonary   Respiratory effort: No increased work of breathing or signs of respiratory distress  Auscultation of lungs: Clear to auscultation  Cardiovascular   Palpation of heart: Normal PMI, no thrills  Auscultation of heart: Normal rate and rhythm, normal S1 and S2, without murmurs  Examination of extremities for edema and/or varicosities: Normal     Carotid pulses: Normal     Abdomen   Abdomen: Non-tender, no masses  Liver and spleen: No hepatomegaly or splenomegaly  Lymphatic   Palpation of lymph nodes in neck: No lymphadenopathy  Musculoskeletal   Gait and station: Normal     Digits and nails: Normal without clubbing or cyanosis  Inspection/palpation of joints, bones, and muscles: Normal     Skin   Skin and subcutaneous tissue: Normal without rashes or lesions  Neurologic   Cranial nerves: Cranial nerves 2-12 intact  Sensation: No sensory loss      Psychiatric   Orientation to person, place, and time: Normal     Mood and affect: Normal  Assessment/ Plan:  Discussed patient's labs and symptoms  Patient aware iron and nutritional deficiencies are due to malabsorption from the gastric bypass surgery  We will schedule IV iron Venofer 300 mg weekly x 4 cycles with B12 injections weekly x 4  He will then resume the B12 injections with his PCP's office  Patient's folate 3 9, lower side of normal, will send folic acid 1 mg to be taken daily  We will repeat labs in 3 months (CBC, CMP, Iron Panel, Vitamin B12, Folate) and if patient requires additional IV iron, we will setup a maintenance plan  We will see Gopi Navas back in the office in 6 months  Patient educated about the iron product, administration and common adverse effects including but not limited to: Anaphylaxis/allergic reaction, arthralgias/myalgias, nausea; agrees to proceed with treatment  A referral to bariatric surgery placed to continue care with them post surgery  He is aware to contact us for worsening symptoms or if he has any additional questions  I spent 50 minutes in chart review, face to face counseling, coordination of care, and documentation

## 2023-04-24 NOTE — TELEPHONE ENCOUNTER
Spoke with patient, per patient he will go to [address] infusion for sooner availability  Patient can do any day in the morning  Please coordinate with  to schedule patient until [facility] has the availability  Thank you!

## 2023-04-24 NOTE — TELEPHONE ENCOUNTER
Left message on answering machine with times and dates of iron infusions, patient aware schedule can be seen on mychart  Patient was provided with my teams number to return my call and confirm appts

## 2023-05-01 ENCOUNTER — HOSPITAL ENCOUNTER (OUTPATIENT)
Dept: INFUSION CENTER | Facility: HOSPITAL | Age: 48
Discharge: HOME | End: 2023-05-01
Attending: INTERNAL MEDICINE

## 2023-05-01 VITALS
DIASTOLIC BLOOD PRESSURE: 90 MMHG | TEMPERATURE: 97.4 F | RESPIRATION RATE: 18 BRPM | SYSTOLIC BLOOD PRESSURE: 127 MMHG | HEART RATE: 102 BPM

## 2023-05-01 DIAGNOSIS — D50.8 IRON DEFICIENCY ANEMIA SECONDARY TO INADEQUATE DIETARY IRON INTAKE: Primary | ICD-10-CM

## 2023-05-01 DIAGNOSIS — D53.9 NUTRITIONAL ANEMIA: ICD-10-CM

## 2023-05-01 DIAGNOSIS — Z98.84 HISTORY OF ROUX-EN-Y GASTRIC BYPASS: ICD-10-CM

## 2023-05-01 RX ORDER — CYANOCOBALAMIN 1000 UG/ML
1000 INJECTION, SOLUTION INTRAMUSCULAR; SUBCUTANEOUS ONCE
Status: COMPLETED | OUTPATIENT
Start: 2023-05-01 | End: 2023-05-01

## 2023-05-01 RX ORDER — SODIUM CHLORIDE 9 MG/ML
20 INJECTION, SOLUTION INTRAVENOUS ONCE
OUTPATIENT
Start: 2023-05-08

## 2023-05-01 RX ORDER — SODIUM CHLORIDE 9 MG/ML
20 INJECTION, SOLUTION INTRAVENOUS ONCE
Status: COMPLETED | OUTPATIENT
Start: 2023-05-01 | End: 2023-05-01

## 2023-05-01 RX ORDER — CYANOCOBALAMIN 1000 UG/ML
1000 INJECTION, SOLUTION INTRAMUSCULAR; SUBCUTANEOUS ONCE
OUTPATIENT
Start: 2023-05-08 | End: 2023-05-08

## 2023-05-01 RX ADMIN — IRON SUCROSE 300 MG: 20 INJECTION, SOLUTION INTRAVENOUS at 10:00

## 2023-05-01 RX ADMIN — CYANOCOBALAMIN 1000 MCG: 1000 INJECTION, SOLUTION INTRAMUSCULAR; SUBCUTANEOUS at 10:01

## 2023-05-01 RX ADMIN — SODIUM CHLORIDE 20 ML/HR: 0.9 INJECTION, SOLUTION INTRAVENOUS at 10:01

## 2023-05-01 NOTE — PROGRESS NOTES
Patient tolerated IV venofer and L arm B12 injection without issue  Next appointment confirmed, AVS declined

## 2023-05-08 ENCOUNTER — HOSPITAL ENCOUNTER (OUTPATIENT)
Dept: INFUSION CENTER | Facility: HOSPITAL | Age: 48
Discharge: HOME | End: 2023-05-08
Attending: INTERNAL MEDICINE

## 2023-05-08 VITALS
RESPIRATION RATE: 20 BRPM | TEMPERATURE: 97.3 F | SYSTOLIC BLOOD PRESSURE: 145 MMHG | HEART RATE: 93 BPM | DIASTOLIC BLOOD PRESSURE: 87 MMHG

## 2023-05-08 DIAGNOSIS — D50.8 IRON DEFICIENCY ANEMIA SECONDARY TO INADEQUATE DIETARY IRON INTAKE: Primary | ICD-10-CM

## 2023-05-08 DIAGNOSIS — Z98.84 HISTORY OF ROUX-EN-Y GASTRIC BYPASS: ICD-10-CM

## 2023-05-08 DIAGNOSIS — D53.9 NUTRITIONAL ANEMIA: ICD-10-CM

## 2023-05-08 RX ORDER — CYANOCOBALAMIN 1000 UG/ML
1000 INJECTION, SOLUTION INTRAMUSCULAR; SUBCUTANEOUS ONCE
Status: COMPLETED | OUTPATIENT
Start: 2023-05-08 | End: 2023-05-08

## 2023-05-08 RX ORDER — CYANOCOBALAMIN 1000 UG/ML
1000 INJECTION, SOLUTION INTRAMUSCULAR; SUBCUTANEOUS ONCE
Status: CANCELLED | OUTPATIENT
Start: 2023-05-15 | End: 2023-05-15

## 2023-05-08 RX ORDER — SODIUM CHLORIDE 9 MG/ML
20 INJECTION, SOLUTION INTRAVENOUS ONCE
Status: CANCELLED | OUTPATIENT
Start: 2023-05-15

## 2023-05-08 RX ORDER — SODIUM CHLORIDE 9 MG/ML
20 INJECTION, SOLUTION INTRAVENOUS ONCE
Status: COMPLETED | OUTPATIENT
Start: 2023-05-08 | End: 2023-05-08

## 2023-05-08 RX ADMIN — SODIUM CHLORIDE 20 ML/HR: 0.9 INJECTION, SOLUTION INTRAVENOUS at 10:40

## 2023-05-08 RX ADMIN — CYANOCOBALAMIN 1000 MCG: 1000 INJECTION INTRAMUSCULAR; SUBCUTANEOUS at 10:41

## 2023-05-08 RX ADMIN — IRON SUCROSE 300 MG: 20 INJECTION, SOLUTION INTRAVENOUS at 10:38

## 2023-05-08 NOTE — PROGRESS NOTES
Pt tolerated venofer infusion and B12 injection to L deltoid without difficulty  No s/s reaction noted  Next appt confirmed  AVS declined  Left ambulatory in stable condition

## 2023-05-15 ENCOUNTER — HOSPITAL ENCOUNTER (OUTPATIENT)
Dept: INFUSION CENTER | Facility: HOSPITAL | Age: 48
Discharge: HOME | End: 2023-05-15
Attending: INTERNAL MEDICINE

## 2023-05-15 VITALS
RESPIRATION RATE: 18 BRPM | TEMPERATURE: 99 F | SYSTOLIC BLOOD PRESSURE: 110 MMHG | OXYGEN SATURATION: 97 % | HEART RATE: 96 BPM | DIASTOLIC BLOOD PRESSURE: 79 MMHG

## 2023-05-15 DIAGNOSIS — D50.8 IRON DEFICIENCY ANEMIA SECONDARY TO INADEQUATE DIETARY IRON INTAKE: Primary | ICD-10-CM

## 2023-05-15 DIAGNOSIS — Z98.84 HISTORY OF ROUX-EN-Y GASTRIC BYPASS: ICD-10-CM

## 2023-05-15 DIAGNOSIS — D53.9 NUTRITIONAL ANEMIA: ICD-10-CM

## 2023-05-15 RX ORDER — CYANOCOBALAMIN 1000 UG/ML
1000 INJECTION, SOLUTION INTRAMUSCULAR; SUBCUTANEOUS ONCE
Status: COMPLETED | OUTPATIENT
Start: 2023-05-15 | End: 2023-05-15

## 2023-05-15 RX ORDER — SODIUM CHLORIDE 9 MG/ML
20 INJECTION, SOLUTION INTRAVENOUS ONCE
Status: COMPLETED | OUTPATIENT
Start: 2023-05-15 | End: 2023-05-15

## 2023-05-15 RX ORDER — SODIUM CHLORIDE 9 MG/ML
20 INJECTION, SOLUTION INTRAVENOUS ONCE
Status: CANCELLED | OUTPATIENT
Start: 2023-05-22

## 2023-05-15 RX ORDER — CYANOCOBALAMIN 1000 UG/ML
1000 INJECTION, SOLUTION INTRAMUSCULAR; SUBCUTANEOUS ONCE
Status: CANCELLED | OUTPATIENT
Start: 2023-05-22 | End: 2023-05-22

## 2023-05-15 RX ADMIN — SODIUM CHLORIDE 20 ML/HR: 0.9 INJECTION, SOLUTION INTRAVENOUS at 09:37

## 2023-05-15 RX ADMIN — CYANOCOBALAMIN 1000 MCG: 1000 INJECTION INTRAMUSCULAR; SUBCUTANEOUS at 09:40

## 2023-05-15 RX ADMIN — IRON SUCROSE 300 MG: 20 INJECTION, SOLUTION INTRAVENOUS at 09:40

## 2023-05-15 NOTE — PROGRESS NOTES
Pt tolerated venofer infusion and B12 injection to R deltoid without difficulty  No s/s reaction noted  Next appt confirmed  AVS declined  Left ambulatory in stable condition

## 2023-05-22 ENCOUNTER — TELEPHONE (OUTPATIENT)
Age: 48
End: 2023-05-22

## 2023-05-22 ENCOUNTER — HOSPITAL ENCOUNTER (OUTPATIENT)
Dept: INFUSION CENTER | Facility: HOSPITAL | Age: 48
Discharge: HOME | End: 2023-05-22
Attending: INTERNAL MEDICINE

## 2023-05-22 VITALS
DIASTOLIC BLOOD PRESSURE: 83 MMHG | HEART RATE: 85 BPM | RESPIRATION RATE: 18 BRPM | SYSTOLIC BLOOD PRESSURE: 140 MMHG | TEMPERATURE: 98.3 F

## 2023-05-22 DIAGNOSIS — R06.02 POST-COVID CHRONIC SHORTNESS OF BREATH: Primary | ICD-10-CM

## 2023-05-22 DIAGNOSIS — U09.9 POST-COVID CHRONIC SHORTNESS OF BREATH: Primary | ICD-10-CM

## 2023-05-22 DIAGNOSIS — D53.9 NUTRITIONAL ANEMIA: ICD-10-CM

## 2023-05-22 DIAGNOSIS — Z98.84 HISTORY OF ROUX-EN-Y GASTRIC BYPASS: ICD-10-CM

## 2023-05-22 DIAGNOSIS — D50.8 IRON DEFICIENCY ANEMIA SECONDARY TO INADEQUATE DIETARY IRON INTAKE: Primary | ICD-10-CM

## 2023-05-22 RX ORDER — CYANOCOBALAMIN 1000 UG/ML
1000 INJECTION, SOLUTION INTRAMUSCULAR; SUBCUTANEOUS ONCE
Status: COMPLETED | OUTPATIENT
Start: 2023-05-22 | End: 2023-05-22

## 2023-05-22 RX ORDER — CYANOCOBALAMIN 1000 UG/ML
1000 INJECTION, SOLUTION INTRAMUSCULAR; SUBCUTANEOUS ONCE
Status: CANCELLED | OUTPATIENT
Start: 2023-05-22 | End: 2023-05-22

## 2023-05-22 RX ORDER — SODIUM CHLORIDE 9 MG/ML
20 INJECTION, SOLUTION INTRAVENOUS ONCE
Status: COMPLETED | OUTPATIENT
Start: 2023-05-22 | End: 2023-05-22

## 2023-05-22 RX ORDER — SODIUM CHLORIDE 9 MG/ML
20 INJECTION, SOLUTION INTRAVENOUS ONCE
Status: CANCELLED | OUTPATIENT
Start: 2023-05-22

## 2023-05-22 RX ADMIN — IRON SUCROSE 300 MG: 20 INJECTION, SOLUTION INTRAVENOUS at 10:12

## 2023-05-22 RX ADMIN — CYANOCOBALAMIN 1000 MCG: 1000 INJECTION, SOLUTION INTRAMUSCULAR at 10:14

## 2023-05-22 RX ADMIN — SODIUM CHLORIDE 20 ML/HR: 0.9 INJECTION, SOLUTION INTRAVENOUS at 10:11

## 2023-05-22 NOTE — PROGRESS NOTES
Venofer infusion complete  Vitamin B12 injection given in AVERY  Patient's final appointment, he is aware of follow up in hematology with blood work prior to appointment

## 2023-05-22 NOTE — TELEPHONE ENCOUNTER
Patient left a message on the voicemail requesting a call back  Patient is still experiencing the same symptoms he had when he saw Naina Melendez at his last appointment  Symptoms are no better, no worse  He said he gets short of breath just walking the dog which is not normal for him  He is concerned about being a covid long-hauler  He said his infusions have not made a difference thus far  He is at his last venofer infusion now  I told him I would relay this message to Torri and give him a call back if she has any recommendations

## 2023-05-23 ENCOUNTER — TELEPHONE (OUTPATIENT)
Dept: PAIN MEDICINE | Facility: CLINIC | Age: 48
End: 2023-05-23

## 2023-05-23 NOTE — TELEPHONE ENCOUNTER
Caller: Keyur    Doctor: Catalino Ulloa    Reason for call: transdermal buprenorphine [place]) 20 mcg/hr [address] TD patch    [id number] [address] [facility] [place] [address]   Phone:  [phone]  Fax:  [phone]   CHARLA #:  --    They told him he need Prior auth  RX BIN # not handy    [facility]    Call back#: [phone]

## 2023-05-30 ENCOUNTER — TELEPHONE (OUTPATIENT)
Dept: HEMATOLOGY ONCOLOGY | Facility: CLINIC | Age: 48
End: 2023-05-30

## 2023-05-30 NOTE — TELEPHONE ENCOUNTER
Called Pulmonary [phone] [facility] with patients demographics for Pulmonary to reach out to the patient to schedule an appointment

## 2023-05-30 NOTE — TELEPHONE ENCOUNTER
Rec'd call from patient asking why pulmonary did not reach out yet  I told him that I would have our  call to set up this appt  Patient verbalized understanding and is in agreement with the plan

## 2023-06-05 ENCOUNTER — TELEPHONE (OUTPATIENT)
Dept: FAMILY MEDICINE CLINIC | Facility: CLINIC | Age: 48
End: 2023-06-05

## 2023-06-05 NOTE — TELEPHONE ENCOUNTER
Please schedule next 2 vitamin B12 injections every 2 weeks  Then patient should have blood work in late June and follow-up with me in early July  We will establish long-term B12 schedule at that time    Thank you

## 2023-06-05 NOTE — TELEPHONE ENCOUNTER
Patient called to schedule for b12 injections but wanted clarification on how often  I saw last office visit it stated Vitamin B12 every 2 weeks x 4 times then every 3 to 4 weeks, but at the infusion center he was getting an injection every week  Last b12 shot two weeks ago, any day or time is ok for scheduling  Please advise

## 2023-06-06 ENCOUNTER — CLINICAL SUPPORT (OUTPATIENT)
Dept: FAMILY MEDICINE CLINIC | Facility: CLINIC | Age: 48
End: 2023-06-06
Payer: COMMERCIAL

## 2023-06-06 DIAGNOSIS — E53.8 B12 DEFICIENCY: Primary | ICD-10-CM

## 2023-06-06 PROCEDURE — 96372 THER/PROPH/DIAG INJ SC/IM: CPT | Performed by: FAMILY MEDICINE

## 2023-06-06 RX ORDER — CYANOCOBALAMIN 1000 UG/ML
1000 INJECTION, SOLUTION INTRAMUSCULAR; SUBCUTANEOUS ONCE
Status: COMPLETED | OUTPATIENT
Start: 2023-06-06 | End: 2023-06-06

## 2023-06-06 RX ADMIN — CYANOCOBALAMIN 1000 MCG: 1000 INJECTION, SOLUTION INTRAMUSCULAR; SUBCUTANEOUS at 11:55

## 2023-06-15 DIAGNOSIS — D50.8 OTHER IRON DEFICIENCY ANEMIA: ICD-10-CM

## 2023-06-15 DIAGNOSIS — E55.9 VITAMIN D DEFICIENCY: ICD-10-CM

## 2023-06-15 RX ORDER — ERGOCALCIFEROL 1.25 MG/1
CAPSULE ORAL
Qty: 12 CAPSULE | Refills: 0 | Status: SHIPPED | OUTPATIENT
Start: 2023-06-15

## 2023-06-20 ENCOUNTER — CLINICAL SUPPORT (OUTPATIENT)
Dept: FAMILY MEDICINE CLINIC | Facility: CLINIC | Age: 48
End: 2023-06-20
Payer: COMMERCIAL

## 2023-06-20 DIAGNOSIS — E53.8 B12 DEFICIENCY: Primary | ICD-10-CM

## 2023-06-20 PROCEDURE — 96372 THER/PROPH/DIAG INJ SC/IM: CPT | Performed by: FAMILY MEDICINE

## 2023-06-20 RX ORDER — CYANOCOBALAMIN 1000 UG/ML
1000 INJECTION, SOLUTION INTRAMUSCULAR; SUBCUTANEOUS ONCE
Status: COMPLETED | OUTPATIENT
Start: 2023-06-20 | End: 2023-06-20

## 2023-06-20 RX ADMIN — CYANOCOBALAMIN 1000 MCG: 1000 INJECTION, SOLUTION INTRAMUSCULAR; SUBCUTANEOUS at 11:35

## 2023-07-03 ENCOUNTER — APPOINTMENT (OUTPATIENT)
Dept: LAB | Facility: CLINIC | Age: 48
End: 2023-07-03
Payer: COMMERCIAL

## 2023-07-03 DIAGNOSIS — Z90.3 POSTGASTRECTOMY MALABSORPTION: ICD-10-CM

## 2023-07-03 DIAGNOSIS — K91.2 POSTGASTRECTOMY MALABSORPTION: ICD-10-CM

## 2023-07-03 LAB
25(OH)D3 SERPL-MCNC: 48.8 NG/ML (ref 30–100)
ALBUMIN SERPL BCP-MCNC: 4 G/DL (ref 3.5–5)
ALP SERPL-CCNC: 93 U/L (ref 46–116)
ALT SERPL W P-5'-P-CCNC: 25 U/L (ref 12–78)
ANION GAP SERPL CALCULATED.3IONS-SCNC: 2 MMOL/L
AST SERPL W P-5'-P-CCNC: 11 U/L (ref 5–45)
BASOPHILS # BLD AUTO: 0.08 THOUSANDS/ÂΜL (ref 0–0.1)
BASOPHILS NFR BLD AUTO: 1 % (ref 0–1)
BILIRUB SERPL-MCNC: 0.41 MG/DL (ref 0.2–1)
BUN SERPL-MCNC: 17 MG/DL (ref 5–25)
CALCIUM SERPL-MCNC: 9.7 MG/DL (ref 8.3–10.1)
CHLORIDE SERPL-SCNC: 106 MMOL/L (ref 96–108)
CO2 SERPL-SCNC: 27 MMOL/L (ref 21–32)
CREAT SERPL-MCNC: 1.29 MG/DL (ref 0.6–1.3)
EOSINOPHIL # BLD AUTO: 0.08 THOUSAND/ÂΜL (ref 0–0.61)
EOSINOPHIL NFR BLD AUTO: 1 % (ref 0–6)
ERYTHROCYTE [DISTWIDTH] IN BLOOD BY AUTOMATED COUNT: 16.7 % (ref 11.6–15.1)
FERRITIN SERPL-MCNC: 79 NG/ML (ref 24–336)
FOLATE SERPL-MCNC: 12.3 NG/ML
GFR SERPL CREATININE-BSD FRML MDRD: 65 ML/MIN/1.73SQ M
GLUCOSE P FAST SERPL-MCNC: 102 MG/DL (ref 65–99)
HCT VFR BLD AUTO: 54.5 % (ref 36.5–49.3)
HGB BLD-MCNC: 17.2 G/DL (ref 12–17)
IMM GRANULOCYTES # BLD AUTO: 0.03 THOUSAND/UL (ref 0–0.2)
IMM GRANULOCYTES NFR BLD AUTO: 1 % (ref 0–2)
IRON SATN MFR SERPL: 29 % (ref 20–50)
IRON SERPL-MCNC: 94 UG/DL (ref 65–175)
LYMPHOCYTES # BLD AUTO: 1.99 THOUSANDS/ÂΜL (ref 0.6–4.47)
LYMPHOCYTES NFR BLD AUTO: 32 % (ref 14–44)
MCH RBC QN AUTO: 26.1 PG (ref 26.8–34.3)
MCHC RBC AUTO-ENTMCNC: 31.6 G/DL (ref 31.4–37.4)
MCV RBC AUTO: 83 FL (ref 82–98)
MONOCYTES # BLD AUTO: 0.52 THOUSAND/ÂΜL (ref 0.17–1.22)
MONOCYTES NFR BLD AUTO: 8 % (ref 4–12)
NEUTROPHILS # BLD AUTO: 3.59 THOUSANDS/ÂΜL (ref 1.85–7.62)
NEUTS SEG NFR BLD AUTO: 57 % (ref 43–75)
NRBC BLD AUTO-RTO: 0 /100 WBCS
PLATELET # BLD AUTO: 232 THOUSANDS/UL (ref 149–390)
PMV BLD AUTO: 11.3 FL (ref 8.9–12.7)
POTASSIUM SERPL-SCNC: 4.5 MMOL/L (ref 3.5–5.3)
PROT SERPL-MCNC: 8.1 G/DL (ref 6.4–8.4)
RBC # BLD AUTO: 6.6 MILLION/UL (ref 3.88–5.62)
SODIUM SERPL-SCNC: 135 MMOL/L (ref 135–147)
TIBC SERPL-MCNC: 329 UG/DL (ref 250–450)
TSH SERPL DL<=0.05 MIU/L-ACNC: 3.06 UIU/ML (ref 0.45–4.5)
VIT B12 SERPL-MCNC: 502 PG/ML (ref 180–914)
WBC # BLD AUTO: 6.29 THOUSAND/UL (ref 4.31–10.16)

## 2023-07-03 PROCEDURE — 84443 ASSAY THYROID STIM HORMONE: CPT

## 2023-07-03 PROCEDURE — 82306 VITAMIN D 25 HYDROXY: CPT

## 2023-07-03 PROCEDURE — 36415 COLL VENOUS BLD VENIPUNCTURE: CPT

## 2023-07-05 ENCOUNTER — CONSULT (OUTPATIENT)
Dept: PULMONOLOGY | Facility: CLINIC | Age: 48
End: 2023-07-05
Payer: COMMERCIAL

## 2023-07-05 VITALS
SYSTOLIC BLOOD PRESSURE: 114 MMHG | TEMPERATURE: 98.4 F | DIASTOLIC BLOOD PRESSURE: 76 MMHG | BODY MASS INDEX: 30.52 KG/M2 | HEART RATE: 77 BPM | WEIGHT: 225 LBS | OXYGEN SATURATION: 97 %

## 2023-07-05 DIAGNOSIS — R06.09 DOE (DYSPNEA ON EXERTION): Primary | ICD-10-CM

## 2023-07-05 DIAGNOSIS — F17.210 NICOTINE DEPENDENCE, CIGARETTES, UNCOMPLICATED: ICD-10-CM

## 2023-07-05 DIAGNOSIS — G47.33 OSA (OBSTRUCTIVE SLEEP APNEA): ICD-10-CM

## 2023-07-05 DIAGNOSIS — U09.9 POST-COVID CHRONIC SHORTNESS OF BREATH: ICD-10-CM

## 2023-07-05 DIAGNOSIS — R06.02 POST-COVID CHRONIC SHORTNESS OF BREATH: ICD-10-CM

## 2023-07-05 DIAGNOSIS — E66.09 CLASS 1 OBESITY DUE TO EXCESS CALORIES WITHOUT SERIOUS COMORBIDITY WITH BODY MASS INDEX (BMI) OF 30.0 TO 30.9 IN ADULT: ICD-10-CM

## 2023-07-05 PROCEDURE — 99244 OFF/OP CNSLTJ NEW/EST MOD 40: CPT | Performed by: INTERNAL MEDICINE

## 2023-07-05 NOTE — PROGRESS NOTES
Pulmonary Consultation   Edi Fonseca 52 y.o. male MRN: [id number]    Encounter: [id number]      Reason for consultation:   Dyspnea on exertion    Requesting physician:  ANTONIA Buck      Impressions:   · Dyspnea on exertion. · Ongoing tobacco use. · Obstructive sleep apnea. · Obesity. Recommendations:  · Complete pulmonary function test.  · Diagnostic polysomnogram.  · Regular exercise to improve stamina. · Follow-up in 3 months. Discussion:  The patient's dyspnea on exertion is nonspecific. It could be due to deconditioning. Other possible cause is underlying chronic lung disease which could be from occupational exposure or smoking. I have ordered a complete pulmonary function test.  Also has symptoms consistent with obstructive sleep apnea. I have ordered a diagnostic polysomnogram.  I have encouraged him to start exercising. Hopefully this will improve his stamina. I will see him in 3 months. History of Present Illness   HPI:  Edi Fonseca is a 52 y.o. male who is here for evaluation of dyspnea on exertion. The patient noticed the shortness of breath on exertion about 5 months ago. It has not changed significantly. He has occasional cough. Sometimes he wheezes. Denies dizziness. He had a cardiac work-up which included 2D echo and nuclear stress test.  All the studies were unremarkable. Review of systems:  12 point review of systems was completed and was otherwise negative except as listed in HPI.       Historical Information   Past Medical History:   Diagnosis Date   • Allergic    • Anxiety    • Arthritis    • Asthma    • Bipolar 2 disorder, major depressive episode ([address]) 10/31/2017   • Bipolar disorder ([address])    • Chronic left lumbar radiculopathy    • Chronic low back pain    • Chronic pain syndrome    • Chronic right shoulder pain    • Depression with anxiety    • Fatigue    • GERD (gastroesophageal reflux disease)    • Hydronephrosis    • Iron deficiency anemia    • Kidney stone    • Lytic bone lesions on xray    • Nephrolithiasis    • PONV (postoperative nausea and vomiting)    • Right elbow pain    • Right lateral epicondylitis      Past Surgical History:   Procedure Laterality Date   • ADENOIDECTOMY Bilateral    • APPENDECTOMY     • BACK SURGERY     • CHOLECYSTECTOMY     • CHOLECYSTECTOMY LAPAROSCOPIC N/A 10/30/2018    Procedure: CHOLECYSTECTOMY WITH GASTROTOMY LAPAROSCOPIC;  Surgeon: Judi Cox MD;  [facility];  Service: General   • ERCP W/ SPHICTEROTOMY N/A 10/30/2018    Procedure: ENDOSCOPIC RETROGRADE CHOLANGIOPANCREATOGRAPHY (ERCP) W/ SPHINCTEROTOMY;  Surgeon: Susy Jeong MD;  Location[facility];  Service: Gastroenterology   • GASTRIC BYPASS      2009   • OTHER SURGICAL HISTORY      fusion/refusion of vertebrae, 2010 and 2011 l5-s1 and l4-l5   • OH ARTHROSCOPY KNEE LATERAL RELEASE Left 01/06/2021    Procedure: RELEASE RETINACULAR;  Surgeon: Stacey Atkinson DO;  [facility];  Service: Orthopedics   • OH Elza Poet W/MENISCECTOMY MED/LAT W/SHVG Right 04/26/2022    Procedure: ARTHROSCOPIC MENISCECTOMY LATERAL;  Surgeon: Stacey Atkinson DO;  Location: [address] [place] [address] MAIN OR;  Service: Orthopedics   • OH ARTHRS KNEE DEBRIDEMENT/SHAVING ARTCLR CRTLG Left 01/06/2021    Procedure: ARTHROSCOPY KNEE;  Surgeon: Stacey Atkinson DO;  [facility]: [facility];  Service: Orthopedics   • OH CYSTO/URETERO W/LITHOTRIPSY &INDWELL STENT INSRT Right 08/08/2017    Procedure: CYSTOSCOPY; URETEROSCOPY; HOLMIUM LASER; RETROGRADE PYELOGRAM; STENT;  Surgeon: Elif Deleon MD;  Location: [facility];  Service: Urology   • OH INCISION & DRAINAGE ABSCESS COMPLICATED/MULTIPLE Left 01/08/2021    Procedure: INCISION AND DRAINAGE (I&D) EXTREMITY- of left knee s/p MPFL reconstruction, I&D if the left knee with possible MPFL revision;  Surgeon: Stacey Atkinson DO;  [facility];  Service: Orthopedics   • OH INSJ/RPLCMT SPI NPGR DIR/INDUXIVE COUPLING Right 11/14/2017 Procedure: REMOVAL LOWER MEDIAL BUTTOCK SPINAL CORD STIMULATOR GENERATOR; PLACEMENT OF NEW BUTTOCK SPINAL CORD STIMULATOR THROUGH SEPERATE INCISION;  Surgeon: Josafat Allen MD;  Location:  MAIN OR;  Service: Neurosurgery   • [place] INSJ/RPLCMT SPI NPGR DIR/INDUXIVE COUPLING Right 01/12/2022    Procedure: Right sided spinal cord stimulator pulse generator replacement;  Surgeon: Dominique Andres MD;  Location:  MAIN OR;  Service: Neurosurgery   • [place] RCNSTJ [address] W/XTNSR RELIGNMT&/MUSC RL Left 01/06/2021    Procedure: REALIGNMENT PATELLA with chondroplasty of patella, open medial patellofemoral ligament reconstruction with allograft;  Surgeon: Glenna Walker DO;  Location:  MAIN OR;  Service: Orthopedics   • RIK-EN-Y PROCEDURE  2003   • SPINAL CORD STIMULATOR IMPLANT  11/14/2017    dr Andrez Andrea procedure and technique 1. removal of a right back implantable pulse generator 2.placement of a new right buttock impantable pulse generator through seperate incision 3 electric analys complex programming spinal cord stimulator system postoperative period approx 1 hour   • SPINE SURGERY     • TONSILLECTOMY       Family History   Problem Relation Age of Onset   • Cancer Mother    • Arthritis Mother    • Stomach cancer Mother    • Coronary artery disease Mother    • Heart disease Mother    • Asthma Mother    • COPD Mother    • Cancer Father    • Esophageal cancer Father    • Other Father         brain tumor   • Diabetes Father    • No Known Problems Sister    • No Known Problems Sister    • No Known Problems Sister        Family History:  Emphysema in his mother. She was a smoker. Social History:  The patient lives with his [address] [place] [address]. He has about 10-pack-year smoking history and continues to smoke. He worked as maintenance for [employer]. Currently he is working for a [employer]. Meds/Allergies   No current facility-administered medications for this visit.      (Not in a hospital admission)    Allergies   Allergen Reactions   • Ampicillin GI Intolerance     Vomiting   • Penicillins GI Intolerance     Vomit       Vitals: Blood pressure 114/76, pulse 77, temperature 98.4 °F (36.9 °C), temperature source Tympanic, weight 102 kg (225 lb), SpO2 97 %. ,      Physical exam:        Head/eyes:    Normocephalic, without obvious abnormality, atraumatic,         PERRL, extraocular muscles intact, no scleral icterus    Nose:   Nares normal, septum midline, mucosa normal, no drainage    or sinus tenderness   Throat:   Moist mucous membranes, no thrush   Neck:   Supple, trachea midline, no adenopathy; no carotid    bruit or JVD   Lungs:    Clear breath sounds. No wheezing or rhonchi. Heart:    Regular rate and rhythm, S1 and S2 normal, no murmur, rub   or gallop   Abdomen:     Soft, non-tender, bowel sounds active all four quadrants,     no masses, no organomegaly   Extremities:   Extremities normal, atraumatic, no cyanosis or edema   Skin:   Warm, dry, turgor normal, no rashes or lesions   Neurologic:   CNII-XII intact, normal strength, non-focal             Imaging and other studies: I have personally reviewed pertinent films in PACS chest x-rays reviewed on the PACS system. It showed no acute or chronic pulmonary findings. Nuclear cardiac stress test report is reviewed. It was unremarkable. 2D echo report is reviewed. Unremarkable.     Lab Results   Component Value Date    WBC 6.29 07/03/2023    HGB 17.2 (H) 07/03/2023    HCT 54.5 (H) 07/03/2023    MCV 83 07/03/2023     07/03/2023     Lab Results   Component Value Date    SODIUM 135 07/03/2023    K 4.5 07/03/2023     07/03/2023    CO2 27 07/03/2023    BUN 17 07/03/2023    CREATININE 1.29 07/03/2023    GLUC 88 03/30/2023    CALCIUM 9.7 07/03/2023             Maria Dolores Laurent MD

## 2023-07-06 ENCOUNTER — OFFICE VISIT (OUTPATIENT)
Dept: FAMILY MEDICINE CLINIC | Facility: CLINIC | Age: 48
End: 2023-07-06
Payer: COMMERCIAL

## 2023-07-06 ENCOUNTER — TELEPHONE (OUTPATIENT)
Dept: HEMATOLOGY ONCOLOGY | Facility: CLINIC | Age: 48
End: 2023-07-06

## 2023-07-06 VITALS
DIASTOLIC BLOOD PRESSURE: 82 MMHG | BODY MASS INDEX: 31.61 KG/M2 | HEIGHT: 72 IN | TEMPERATURE: 98.4 F | HEART RATE: 81 BPM | RESPIRATION RATE: 16 BRPM | SYSTOLIC BLOOD PRESSURE: 118 MMHG | WEIGHT: 233.4 LBS | OXYGEN SATURATION: 96 %

## 2023-07-06 DIAGNOSIS — Z98.84 HISTORY OF ROUX-EN-Y GASTRIC BYPASS: ICD-10-CM

## 2023-07-06 DIAGNOSIS — F17.200 SMOKING: ICD-10-CM

## 2023-07-06 DIAGNOSIS — Z12.12 SCREENING FOR COLORECTAL CANCER: ICD-10-CM

## 2023-07-06 DIAGNOSIS — D53.9 NUTRITIONAL ANEMIA: ICD-10-CM

## 2023-07-06 DIAGNOSIS — Z90.3 POSTGASTRECTOMY MALABSORPTION: ICD-10-CM

## 2023-07-06 DIAGNOSIS — K21.9 CHRONIC GERD: ICD-10-CM

## 2023-07-06 DIAGNOSIS — E55.9 VITAMIN D DEFICIENCY: ICD-10-CM

## 2023-07-06 DIAGNOSIS — K91.2 POSTGASTRECTOMY MALABSORPTION: ICD-10-CM

## 2023-07-06 DIAGNOSIS — Z12.11 SCREENING FOR COLORECTAL CANCER: ICD-10-CM

## 2023-07-06 DIAGNOSIS — Z00.00 ENCOUNTER FOR WELLNESS EXAMINATION IN ADULT: Primary | ICD-10-CM

## 2023-07-06 DIAGNOSIS — R53.83 OTHER FATIGUE: Primary | ICD-10-CM

## 2023-07-06 DIAGNOSIS — D50.8 OTHER IRON DEFICIENCY ANEMIA: ICD-10-CM

## 2023-07-06 DIAGNOSIS — D50.8 IRON DEFICIENCY ANEMIA SECONDARY TO INADEQUATE DIETARY IRON INTAKE: ICD-10-CM

## 2023-07-06 DIAGNOSIS — J45.909 UNCOMPLICATED ASTHMA, UNSPECIFIED ASTHMA SEVERITY, UNSPECIFIED WHETHER PERSISTENT: ICD-10-CM

## 2023-07-06 PROCEDURE — 99396 PREV VISIT EST AGE 40-64: CPT | Performed by: FAMILY MEDICINE

## 2023-07-06 RX ORDER — PANTOPRAZOLE SODIUM 40 MG/1
40 TABLET, DELAYED RELEASE ORAL DAILY
Qty: 90 TABLET | Refills: 3 | Status: SHIPPED | OUTPATIENT
Start: 2023-07-06 | End: 2023-07-14

## 2023-07-06 NOTE — TELEPHONE ENCOUNTER
Reviewed patient's labs. Informed him his ferritin has recovered nicely, up to 79 from 6. His folate is still low 12.9 and he should continue taking the folic acid. Patient still feeling fatigue and sob. Echo was completed 3/2023, EF 65%, pulm asked him to start exercising as there is no underlying cause. Asked him to try and lose weight and we will see how he is feeling. I will put in a cortisol level for completion. I will see him back in 3 months with labs prior.

## 2023-07-06 NOTE — PATIENT INSTRUCTIONS
Please reduce dose of vitamin D to twice a month  I will investigate possibility of nasal B12 with our clinical pharmacist and we will get back to you  Please repeat blood work prior to your visit with hematology in October  If pulmonary function tests confirm possible asthma or COPD-please inquire about possibility of inhaler for your shortness of breath

## 2023-07-07 ENCOUNTER — OFFICE VISIT (OUTPATIENT)
Dept: PAIN MEDICINE | Facility: CLINIC | Age: 48
End: 2023-07-07
Payer: COMMERCIAL

## 2023-07-07 VITALS
HEART RATE: 80 BPM | WEIGHT: 232 LBS | SYSTOLIC BLOOD PRESSURE: 124 MMHG | TEMPERATURE: 99.1 F | DIASTOLIC BLOOD PRESSURE: 78 MMHG | HEIGHT: 72 IN | BODY MASS INDEX: 31.42 KG/M2

## 2023-07-07 DIAGNOSIS — G89.4 PAIN SYNDROME, CHRONIC: Primary | ICD-10-CM

## 2023-07-07 DIAGNOSIS — M54.41 CHRONIC LOW BACK PAIN WITH BILATERAL SCIATICA, UNSPECIFIED BACK PAIN LATERALITY: ICD-10-CM

## 2023-07-07 DIAGNOSIS — F11.20 UNCOMPLICATED OPIOID DEPENDENCE (HCC): ICD-10-CM

## 2023-07-07 DIAGNOSIS — Z79.891 LONG-TERM CURRENT USE OF OPIATE ANALGESIC: ICD-10-CM

## 2023-07-07 DIAGNOSIS — M54.16 LUMBAR RADICULOPATHY: ICD-10-CM

## 2023-07-07 DIAGNOSIS — G89.29 CHRONIC LOW BACK PAIN WITH BILATERAL SCIATICA, UNSPECIFIED BACK PAIN LATERALITY: ICD-10-CM

## 2023-07-07 DIAGNOSIS — M96.1 LUMBAR POST-LAMINECTOMY SYNDROME: ICD-10-CM

## 2023-07-07 DIAGNOSIS — M54.42 CHRONIC LOW BACK PAIN WITH BILATERAL SCIATICA, UNSPECIFIED BACK PAIN LATERALITY: ICD-10-CM

## 2023-07-07 DIAGNOSIS — M79.18 MYOFASCIAL PAIN SYNDROME: ICD-10-CM

## 2023-07-07 PROCEDURE — 99214 OFFICE O/P EST MOD 30 MIN: CPT

## 2023-07-07 RX ORDER — BUPRENORPHINE 20 UG/H
PATCH TRANSDERMAL
Qty: 4 PATCH | Refills: 3 | Status: SHIPPED | OUTPATIENT
Start: 2023-07-18

## 2023-07-07 RX ORDER — TIZANIDINE 4 MG/1
TABLET ORAL
Qty: 120 TABLET | Refills: 3 | Status: SHIPPED | OUTPATIENT
Start: 2023-07-07

## 2023-07-07 NOTE — PATIENT INSTRUCTIONS

## 2023-07-07 NOTE — PROGRESS NOTES
Assessment:  1. Pain syndrome, chronic    2. Chronic low back pain with bilateral sciatica, unspecified back pain laterality    3. Lumbar radiculopathy    4. Lumbar post-laminectomy syndrome    5. Myofascial pain syndrome    6. Long-term current use of opiate analgesic    7. Uncomplicated opioid dependence ([address])        Plan:  The patient is a 55-year-old male with a history of chronic pain secondary to low back pain, lumbar intervertebral disc disorder with radiculopathy, lumbar postlaminectomy syndrome and myofascial pain who presents to the office with ongoing bilateral low back pain and pain that radiates into the left lower extremity that is unchanged since his last office visit. Overall his pain continues to be managed with taking Butrans patch and tizanidine, therefore I will continue him on these medications as prescribed. A prescription for Butrans patch 20mcg with 3 refills was electronically sent to the patient's pharmacy with a do not fill date of 7/18/2023. Refills for tizanidine were also sent to the patient's pharmacy. [place] Prescription Drug Monitoring Program report was reviewed and was appropriate     A urine drug screen was collected at today's office visit as part of our medication management protocol. The point of care testing results were appropriate for what was being prescribed. The specimen will be sent for confirmatory testing. The drug screen is medically necessary because the patient is either dependent on opioid medication or is being considered for opioid medication therapy and the results could impact ongoing or future treatment. The drug screen is to evaluate for the presences or absence of prescribed, non-prescribed, and/or illicit drugs/substances. There are risks associated with opioid medications, including dependence, addiction and tolerance. The patient understands and agrees to use these medications only as prescribed.  Potential side effects of the medications include, but are not limited to, constipation, drowsiness, addiction, impaired judgment and risk of fatal overdose if not taken as prescribed. The patient was warned against driving while taking sedation medications. Sharing medications is a felony. At this point in time, the patient is showing no signs of addiction, abuse, diversion or suicidal ideation. The patient will follow-up in 4 months for medication prescription refill and reevaluation. The patient was advised to contact the office should their symptoms worsen in the interim. The patient was agreeable and verbalized an understanding. History of Present Illness: The patient is a 52 y.o. male with a history of chronic pain secondary to low back pain, lumbar intervertebral disc disorder with radiculopathy, lumbar postlaminectomy syndrome and myofascial pain. He was last seen on 3/16/2023 where he was continued on Butrans patch and tizanidine. He presents to the office with ongoing bilateral low back pain and pain that radiates into the left lower extremity. He states his pain is the same since the last office visit and constant but worse at night. He rates the quality of his pain is burning, sharp, throbbing, shooting, numbness and pins/needles and is currently rating his pain a 7/10 on a numeric scale. Current pain medications include Butrans patch 20mcg 1 patch every 7 days and tizanidine 4 mg 4 times a day as needed for muscle spasms. He states this medication regimen is providing him 70% relief of his pain without side effects. Pain Contract Signed: 12/1/2022  Last Urine Drug Screen: 7/7/2023    I have personally reviewed and/or updated the patient's past medical history, past surgical history, family history, social history, current medications, allergies, and vital signs today. Review of Systems:    Review of Systems   Respiratory: Negative for shortness of breath. Cardiovascular: Negative for chest pain. Gastrointestinal: Negative for constipation, diarrhea, nausea and vomiting. Musculoskeletal: Negative for arthralgias, gait problem, joint swelling (Joint stiffness) and myalgias. Skin: Negative for rash. Neurological: Positive for weakness. Negative for dizziness and seizures. All other systems reviewed and are negative.         Past Medical History:   Diagnosis Date   • Allergic    • Anxiety    • Arthritis    • Asthma    • Bipolar 2 disorder, major depressive episode ([address]) 10/31/2017   • Bipolar disorder ([address])    • Chronic left lumbar radiculopathy    • Chronic low back pain    • Chronic pain syndrome    • Chronic right shoulder pain    • Depression with anxiety    • Fatigue    • GERD (gastroesophageal reflux disease)    • Hydronephrosis    • Iron deficiency anemia    • Kidney stone    • Lytic bone lesions on xray    • Nephrolithiasis    • PONV (postoperative nausea and vomiting)    • Right elbow pain    • Right lateral epicondylitis        Past Surgical History:   Procedure Laterality Date   • ADENOIDECTOMY Bilateral    • APPENDECTOMY     • BACK SURGERY     • CHOLECYSTECTOMY     • CHOLECYSTECTOMY LAPAROSCOPIC N/A 10/30/2018    Procedure: CHOLECYSTECTOMY WITH GASTROTOMY LAPAROSCOPIC;  Surgeon: Farzad Marinelli MD;  [facility];  Service: General   • ERCP W/ SPHICTEROTOMY N/A 10/30/2018    Procedure: ENDOSCOPIC RETROGRADE CHOLANGIOPANCREATOGRAPHY (ERCP) W/ SPHINCTEROTOMY;  Surgeon: Juan Trejo MD;  [facility];  Service: Gastroenterology   • GASTRIC BYPASS      2009   • OTHER SURGICAL HISTORY      fusion/refusion of vertebrae, 2010 and 2011 l5-s1 and l4-l5   • [place] ARTHROSCOPY KNEE LATERAL RELEASE Left 01/06/2021    Procedure: RELEASE RETINACULAR;  Surgeon: Pedro Wellington DO;  Location:  MAIN OR;  Service: Orthopedics   • MI ARTHRS KNE SURG W/MENISCECTOMY MED/LAT W/SHVG Right 04/26/2022    Procedure: ARTHROSCOPIC MENISCECTOMY LATERAL;  Surgeon: Pedro Wellington DO;  Location: [facility] OR;  Service: Orthopedics   • [place] ARTHRS KNEE DEBRIDEMENT/SHAVING ARTCLR CRTLG Left 01/06/2021    Procedure: ARTHROSCOPY KNEE;  Surgeon: Pilo Juarez DO;  [facility];  Service: Orthopedics   • [place] CYSTO/URETERO W/LITHOTRIPSY &INDWELL STENT INSRT Right 08/08/2017    Procedure: CYSTOSCOPY; URETEROSCOPY; HOLMIUM LASER; RETROGRADE PYELOGRAM; STENT;  Surgeon: Raúl Chen MD;  [facility];  Service: Urology   • [place] INCISION & DRAINAGE ABSCESS COMPLICATED/MULTIPLE Left 01/08/2021    Procedure: INCISION AND DRAINAGE (I&D) EXTREMITY- of left knee s/p MPFL reconstruction, I&D if the left knee with possible MPFL revision;  Surgeon: Pilo Juarez DO;  [facility];  Service: Orthopedics   • [place] INSJ/RPLCMT SPI NPGR DIR/INDUXIVE COUPLING Right 11/14/2017    Procedure: REMOVAL LOWER MEDIAL BUTTOCK SPINAL CORD STIMULATOR GENERATOR; PLACEMENT OF NEW BUTTOCK SPINAL CORD STIMULATOR THROUGH SEPERATE INCISION;  Surgeon: Willis Badillo MD;  [facility];  Service: Neurosurgery   • [place] INSJ/RPLCMT SPI NPGR DIR/INDUXIVE COUPLING Right 01/12/2022    Procedure: Right sided spinal cord stimulator pulse generator replacement;  Surgeon: Kapil Fermin MD;  [facility];  Service: Neurosurgery   • [place] RCNSTJ [address] W/XTNSR RELIGNMT&/MUSC RL Left 01/06/2021    Procedure: REALIGNMENT PATELLA with chondroplasty of patella, open medial patellofemoral ligament reconstruction with allograft;  Surgeon: Pilo Juarez DO;  [facility];  Service: Orthopedics   • RIK-EN-Y PROCEDURE  2003   • SPINAL CORD STIMULATOR IMPLANT  11/14/2017    dr Shayy Morris procedure and technique 1. removal of a right back implantable pulse generator 2.placement of a new right buttock impantable pulse generator through seperate incision 3 electric analys complex programming spinal cord stimulator system postoperative period approx 1 hour   • SPINE SURGERY     • TONSILLECTOMY         Family History   Problem Relation Age of Onset   • Cancer Mother    • Arthritis Mother    • Stomach cancer Mother    • Coronary artery disease Mother    • Heart disease Mother    • Asthma Mother    • COPD Mother    • Cancer Father    • Esophageal cancer Father    • Other Father         brain tumor   • Diabetes Father    • No Known Problems Sister    • No Known Problems Sister    • No Known Problems Sister        Social History     Occupational History   • Not on file   Tobacco Use   • Smoking status: Every Day     Packs/day: 1.50     Years: 5.00     Total pack years: 7.50     Types: Cigarettes, Pipe, Cigars     Start date: 1995   • Smokeless tobacco: Never   Vaping Use   • Vaping Use: Never used   Substance and Sexual Activity   • Alcohol use: Not Currently   • Drug use: Never   • Sexual activity: Not Currently     Partners: Female         Current Outpatient Medications:   •  Acetaminophen (TYLENOL 8 HOUR ARTHRITIS PAIN PO), Take by mouth, Disp: , Rfl:   •  ergocalciferol (VITAMIN D2) 50,000 units, take 1 capsule by mouth every week, Disp: 12 capsule, Rfl: 0  •  folic acid (FOLVITE) 1 mg tablet, Take 1 tablet (1 mg total) by mouth daily, Disp: 30 tablet, Rfl: 2  •  LANSOPRAZOLE PO, Take 10 mg by mouth if needed  , Disp: , Rfl:   •  pantoprazole (PROTONIX) 40 mg tablet, Take 1 tablet (40 mg total) by mouth daily, Disp: 90 tablet, Rfl: 3  •  tiZANidine (ZANAFLEX) 4 mg tablet, Take 1 PO QID PRN for pain and spasms. , Disp: 120 tablet, Rfl: 3  •  [START ON 7/18/2023] transdermal buprenorphine (BUTRANS) 20 mcg/hr PTWK TD patch, Apply 1 patch TD every 7 days for pain. Rotate patch application sites to avoid skin irritation. Do not start before July 18, 2023., Disp: 4 patch, Rfl: 3  •  albuterol (PROVENTIL HFA,VENTOLIN HFA) 90 mcg/act inhaler, INHALE 2 PUFFS EVERY 4 (FOUR) HOURS AS NEEDED FOR WHEEZING OR SHORTNESS OF BREATH COUGH, Disp: 18 g, Rfl: 0  •  naloxone (NARCAN) 4 mg/0.1 mL nasal spray, Administer 1 spray into a nostril.  If no response after 2-3 minutes, give another dose in the other nostril using a new spray., Disp: 1 each, Rfl: 1  •  varenicline (Chantix Continuing Month Derrick) 1 mg tablet, Take 1 tablet (1 mg total) by mouth 2 (two) times a day, Disp: 60 tablet, Rfl: 4  •  varenicline 0.5 MG X 11 & 1 MG X 42 tablet therapy pack, Take 0.5 mg by mouth daily for 3 days, THEN 0.5 mg 2 (two) times a day for 4 days, THEN 1 mg 2 (two) times a day for 23 days. , Disp: 53 each, Rfl: 0    Allergies   Allergen Reactions   • Ampicillin GI Intolerance     Vomiting   • Penicillins GI Intolerance     Vomit       Physical Exam:    /78 (BP Location: Left arm, Patient Position: Sitting, Cuff Size: Large)   Pulse 80   Temp 99.1 °F (37.3 °C)   Ht 6' (1.829 m)   Wt 105 kg (232 lb)   BMI 31.46 kg/m²     Constitutional:normal, well developed, well nourished, alert, in no distress and non-toxic and no overt pain behavior.   Eyes:anicteric  HEENT:grossly intact  Neck:supple, symmetric, trachea midline and no masses   Pulmonary:even and unlabored  Cardiovascular:No edema or pitting edema present  Skin:Normal without rashes or lesions and well hydrated  Psychiatric:Mood and affect appropriate  Neurologic:Cranial Nerves II-XII grossly intact  Musculoskeletal:normal      Imaging  No orders to display         Orders Placed This Encounter   Procedures   • [facility] All Prescribed Meds and Special Instructions   • Amphetamines, Methamphetamines   • Butalbital   • Phenobarbital   • Secobarbital   • Alprazolam   • Clonazepam   • Diazepam   • Lorazepam   • Gabapentin   • Pregabalin   • Cocaine   • Heroin   • Buprenorphine   • Levorphanol   • Meperidine   • Naltrexone   • Fentanyl   • Methadone   • Oxycodone   • Tapentadol   • THC   • Tramadol   • Codeine, Hydrocodone, Hydropmorphone, Morphine   • Bath Salts   • Ethyl Glucuronide/Ethyl Sulfate   • Kratom   • Spice   • Methylphenidate   • Phentermine   • Validity Oxidant   • Validity Creatinine   • Validity pH   • Validity Specific

## 2023-07-09 PROBLEM — M25.562 ACUTE PAIN OF LEFT KNEE: Status: RESOLVED | Noted: 2020-08-14 | Resolved: 2023-07-09

## 2023-07-09 PROBLEM — F41.8 DEPRESSION WITH ANXIETY: Chronic | Status: RESOLVED | Noted: 2017-05-02 | Resolved: 2023-07-09

## 2023-07-09 PROBLEM — R07.9 CHEST PAIN: Status: RESOLVED | Noted: 2023-03-29 | Resolved: 2023-07-09

## 2023-07-09 PROBLEM — K21.9 CHRONIC GERD: Status: ACTIVE | Noted: 2023-07-09

## 2023-07-09 PROBLEM — R11.2 NAUSEA AND VOMITING: Status: RESOLVED | Noted: 2019-09-30 | Resolved: 2023-07-09

## 2023-07-09 PROBLEM — D64.9 CHRONIC ANEMIA: Chronic | Status: RESOLVED | Noted: 2017-10-31 | Resolved: 2023-07-09

## 2023-07-09 LAB
6MAM UR QL CFM: NEGATIVE NG/ML
7AMINOCLONAZEPAM UR QL CFM: NEGATIVE NG/ML
A-OH ALPRAZ UR QL CFM: ABNORMAL NG/ML
ACCEPTABLE CREAT UR QL: NORMAL MG/DL
ACCEPTIBLE SP GR UR QL: ABNORMAL
AMPHET UR QL CFM: NEGATIVE NG/ML
BUPRENORPHINE UR QL CFM: NORMAL NG/ML
BUTALBITAL UR QL CFM: NEGATIVE NG/ML
BZE UR QL CFM: NEGATIVE NG/ML
CODEINE UR QL CFM: NEGATIVE NG/ML
EDDP UR QL CFM: NEGATIVE NG/ML
ETHYL GLUCURONIDE UR QL CFM: NEGATIVE NG/ML
ETHYL SULFATE UR QL SCN: NEGATIVE NG/ML
EUTYLONE UR QL: NEGATIVE NG/ML
FENTANYL UR QL CFM: NEGATIVE NG/ML
GLIADIN IGG SER IA-ACNC: NEGATIVE NG/ML
HYDROCODONE UR QL CFM: NEGATIVE NG/ML
HYDROMORPHONE UR QL CFM: NEGATIVE NG/ML
LORAZEPAM UR QL CFM: NEGATIVE NG/ML
ME-PHENIDATE UR QL CFM: NEGATIVE NG/ML
MEPERIDINE UR QL CFM: NEGATIVE NG/ML
METHADONE UR QL CFM: NEGATIVE NG/ML
METHAMPHET UR QL CFM: NEGATIVE NG/ML
MORPHINE UR QL CFM: NEGATIVE NG/ML
NALTREXONE UR QL CFM: NEGATIVE NG/ML
NITRITE UR QL: NORMAL UG/ML
NORBUPRENORPHINE UR QL CFM: NORMAL NG/ML
NORDIAZEPAM UR QL CFM: NEGATIVE NG/ML
NORFENTANYL UR QL CFM: NEGATIVE NG/ML
NORHYDROCODONE UR QL CFM: NEGATIVE NG/ML
NORMEPERIDINE UR QL CFM: NEGATIVE NG/ML
NOROXYCODONE UR QL CFM: NEGATIVE NG/ML
OXAZEPAM UR QL CFM: NEGATIVE NG/ML
OXYCODONE UR QL CFM: NEGATIVE NG/ML
OXYMORPHONE UR QL CFM: NEGATIVE NG/ML
PARA-FLUOROFENTANYL QUANTIFICATION: NORMAL NG/ML
PHENOBARB UR QL CFM: NEGATIVE NG/ML
RESULT ALL_PRESCRIBED MEDS AND SPECIAL INSTRUCTIONS: NORMAL
SECOBARBITAL UR QL CFM: NEGATIVE NG/ML
SL AMB 4-ANPP QUANTIFICATION: NORMAL NG/ML
SL AMB 5F-ADB-M7 METABOLITE QUANTIFICATION: NEGATIVE NG/ML
SL AMB 7-OH-MITRAGYNINE (KRATOM ALKALOID) QUANTIFICATION: NEGATIVE NG/ML
SL AMB AB-FUBINACA-M3 METABOLITE QUANTIFICATION: NEGATIVE NG/ML
SL AMB ACETYL FENTANYL QUANTIFICATION: NORMAL NG/ML
SL AMB ACETYL NORFENTANYL QUANTIFICATION: NORMAL NG/ML
SL AMB ACRYL FENTANYL QUANTIFICATION: NORMAL NG/ML
SL AMB CARFENTANIL QUANTIFICATION: NORMAL NG/ML
SL AMB CTHC (MARIJUANA METABOLITE) QUANTIFICATION: NEGATIVE NG/ML
SL AMB DEXTRORPHAN (DEXTROMETHORPHAN METABOLITE) QUANT: NEGATIVE NG/ML
SL AMB GABAPENTIN QUANTIFICATION: NEGATIVE
SL AMB JWH018 METABOLITE QUANTIFICATION: NEGATIVE NG/ML
SL AMB JWH073 METABOLITE QUANTIFICATION: NEGATIVE NG/ML
SL AMB MDMB-FUBINACA-M1 METABOLITE QUANTIFICATION: NEGATIVE NG/ML
SL AMB METHYLONE QUANTIFICATION: NEGATIVE NG/ML
SL AMB N-DESMETHYL-TRAMADOL QUANTIFICATION: NEGATIVE NG/ML
SL AMB PHENTERMINE QUANTIFICATION: NEGATIVE NG/ML
SL AMB PREGABALIN QUANTIFICATION: NEGATIVE
SL AMB RCS4 METABOLITE QUANTIFICATION: NEGATIVE NG/ML
SL AMB RITALINIC ACID QUANTIFICATION: NEGATIVE NG/ML
SMOOTH MUSCLE AB TITR SER IF: NEGATIVE NG/ML
SPECIMEN DRAWN SERPL: NEGATIVE NG/ML
SPECIMEN PH ACCEPTABLE UR: NORMAL
TAPENTADOL UR QL CFM: NEGATIVE NG/ML
TEMAZEPAM UR QL CFM: NEGATIVE NG/ML
TRAMADOL UR QL CFM: NEGATIVE NG/ML
URATE/CREAT 24H UR: NEGATIVE NG/ML

## 2023-07-09 NOTE — ASSESSMENT & PLAN NOTE
Patient with history of asthma and ongoing dyspnea on exertion.   Negative cardiac work-up including echo and nuclear stress test.  Proceed with pulmonary consult

## 2023-07-09 NOTE — ASSESSMENT & PLAN NOTE
· Follow-up with hematology, patient reports significant improvement of chronic fatigue after start of iron infusions  · Reduce dose of vitamin D2 to 50,000 units twice a months  · B12 deficiency: Patient is currently on B12 injections, interested in intranasal B12

## 2023-07-12 ENCOUNTER — TELEPHONE (OUTPATIENT)
Dept: SLEEP CENTER | Facility: CLINIC | Age: 48
End: 2023-07-12

## 2023-07-12 NOTE — TELEPHONE ENCOUNTER
----- Message from Mary Randhawa MD sent at 7/11/2023  9:15 PM EDT -----  approved  ----- Message -----  From: Jorge Gunderson  Sent: 7/6/2023   7:53 AM EDT  To: Sleep Medicine [facility] Provider    This Diagnostic sleep study needs approval.     If approved please sign and return to clerical pool. If denied please include reasons why. Also provide alternative testing if warranted. Please sign and return to clerical pool.

## 2023-07-13 ENCOUNTER — HOSPITAL ENCOUNTER (OUTPATIENT)
Dept: PULMONOLOGY | Facility: HOSPITAL | Age: 48
End: 2023-07-13
Attending: INTERNAL MEDICINE
Payer: COMMERCIAL

## 2023-07-13 ENCOUNTER — TELEPHONE (OUTPATIENT)
Dept: FAMILY MEDICINE CLINIC | Facility: CLINIC | Age: 48
End: 2023-07-13

## 2023-07-13 DIAGNOSIS — F17.210 NICOTINE DEPENDENCE, CIGARETTES, UNCOMPLICATED: ICD-10-CM

## 2023-07-13 DIAGNOSIS — R06.09 DOE (DYSPNEA ON EXERTION): ICD-10-CM

## 2023-07-13 DIAGNOSIS — K21.9 CHRONIC GERD: Primary | ICD-10-CM

## 2023-07-13 PROCEDURE — 94729 DIFFUSING CAPACITY: CPT | Performed by: INTERNAL MEDICINE

## 2023-07-13 PROCEDURE — 94726 PLETHYSMOGRAPHY LUNG VOLUMES: CPT | Performed by: INTERNAL MEDICINE

## 2023-07-13 PROCEDURE — 94060 EVALUATION OF WHEEZING: CPT | Performed by: INTERNAL MEDICINE

## 2023-07-13 PROCEDURE — 94729 DIFFUSING CAPACITY: CPT

## 2023-07-13 PROCEDURE — 94726 PLETHYSMOGRAPHY LUNG VOLUMES: CPT

## 2023-07-13 PROCEDURE — 94760 N-INVAS EAR/PLS OXIMETRY 1: CPT

## 2023-07-13 PROCEDURE — 94060 EVALUATION OF WHEEZING: CPT

## 2023-07-13 RX ORDER — ALBUTEROL SULFATE 2.5 MG/3ML
2.5 SOLUTION RESPIRATORY (INHALATION) ONCE
Status: COMPLETED | OUTPATIENT
Start: 2023-07-13 | End: 2023-07-13

## 2023-07-13 RX ADMIN — ALBUTEROL SULFATE 2.5 MG: 2.5 SOLUTION RESPIRATORY (INHALATION) at 07:58

## 2023-07-13 NOTE — TELEPHONE ENCOUNTER
Valentín Wallace called to state the Protonix is not working for him and wondering if there was a gel capsule of an alternative that might better. Please advise.

## 2023-07-14 RX ORDER — ESOMEPRAZOLE MAGNESIUM 40 MG/1
40 CAPSULE, DELAYED RELEASE ORAL DAILY
Qty: 30 CAPSULE | Refills: 3 | Status: SHIPPED | OUTPATIENT
Start: 2023-07-14

## 2023-07-14 NOTE — TELEPHONE ENCOUNTER
Called pt, no answer, LM. LM telling him script was sent in and to proceed with Gastro, informed also to call back to clarify or with any questions.

## 2023-07-14 NOTE — TELEPHONE ENCOUNTER
Prescription for Nexium sent to the pharmacy. Please remind patient to proceed with gastroenterology consult as we have discussed.   Thank you

## 2023-07-17 ENCOUNTER — TELEPHONE (OUTPATIENT)
Dept: PAIN MEDICINE | Facility: CLINIC | Age: 48
End: 2023-07-17

## 2023-07-17 NOTE — TELEPHONE ENCOUNTER
Caller: patient    Doctor: Miller Pearce    Reason for call: waiting for butrans patch and tizanidine 4mg to [facility]    Call back#: [phone]

## 2023-07-17 NOTE — TELEPHONE ENCOUNTER
S/w pt, advised that tizanidine was sent on 7/7/23 and butrans is scheduled for tomorrow. Pt verbalized understanding and appreciation.

## 2023-07-24 ENCOUNTER — TELEPHONE (OUTPATIENT)
Dept: FAMILY MEDICINE CLINIC | Facility: CLINIC | Age: 48
End: 2023-07-24

## 2023-07-29 DIAGNOSIS — D53.9 NUTRITIONAL ANEMIA: ICD-10-CM

## 2023-07-31 RX ORDER — FOLIC ACID 1 MG/1
1000 TABLET ORAL DAILY
Qty: 30 TABLET | Refills: 2 | OUTPATIENT
Start: 2023-07-31

## 2023-08-03 ENCOUNTER — TELEPHONE (OUTPATIENT)
Dept: NEUROSURGERY | Facility: CLINIC | Age: 48
End: 2023-08-03

## 2023-08-03 PROCEDURE — 88342 IMHCHEM/IMCYTCHM 1ST ANTB: CPT | Performed by: PATHOLOGY

## 2023-08-03 PROCEDURE — 88305 TISSUE EXAM BY PATHOLOGIST: CPT | Performed by: PATHOLOGY

## 2023-08-03 NOTE — TELEPHONE ENCOUNTER
Patient called to states that he spoke with [employer] [facility] [place] and was advised that his battery is almost at end of life. Heidi Dupree advised patient to call office to make an appointment to discuss battery replacement and he will be available to come in for visit as well. Scheduled 8/14/23 at 8:30am w/TIMMY MARADIAGA. Patient will call Heidi Dupree to advise of appt. Sent msg to Sergio to add on.

## 2023-08-04 ENCOUNTER — LAB REQUISITION (OUTPATIENT)
Dept: LAB | Facility: HOSPITAL | Age: 48
End: 2023-08-04
Payer: COMMERCIAL

## 2023-08-04 DIAGNOSIS — K22.70 BARRETT'S ESOPHAGUS WITHOUT DYSPLASIA: ICD-10-CM

## 2023-08-04 DIAGNOSIS — Z48.815 ENCOUNTER FOR SURGICAL AFTERCARE FOLLOWING SURGERY ON THE DIGESTIVE SYSTEM: ICD-10-CM

## 2023-08-04 DIAGNOSIS — Z12.11 ENCOUNTER FOR SCREENING FOR MALIGNANT NEOPLASM OF COLON: ICD-10-CM

## 2023-08-04 DIAGNOSIS — K21.9 GASTRO-ESOPHAGEAL REFLUX DISEASE WITHOUT ESOPHAGITIS: ICD-10-CM

## 2023-08-07 PROCEDURE — 88342 IMHCHEM/IMCYTCHM 1ST ANTB: CPT | Performed by: PATHOLOGY

## 2023-08-07 PROCEDURE — 88305 TISSUE EXAM BY PATHOLOGIST: CPT | Performed by: PATHOLOGY

## 2023-08-14 ENCOUNTER — OFFICE VISIT (OUTPATIENT)
Dept: NEUROSURGERY | Facility: CLINIC | Age: 48
End: 2023-08-14
Payer: COMMERCIAL

## 2023-08-14 VITALS
SYSTOLIC BLOOD PRESSURE: 118 MMHG | BODY MASS INDEX: 29.39 KG/M2 | DIASTOLIC BLOOD PRESSURE: 78 MMHG | TEMPERATURE: 97.1 F | WEIGHT: 217 LBS | HEART RATE: 85 BPM | RESPIRATION RATE: 16 BRPM | HEIGHT: 72 IN | OXYGEN SATURATION: 97 %

## 2023-08-14 DIAGNOSIS — G89.4 CHRONIC PAIN SYNDROME: ICD-10-CM

## 2023-08-14 DIAGNOSIS — Z45.42 BATTERY END OF LIFE OF SPINAL CORD STIMULATOR: Primary | ICD-10-CM

## 2023-08-14 PROCEDURE — 99203 OFFICE O/P NEW LOW 30 MIN: CPT | Performed by: STUDENT IN AN ORGANIZED HEALTH CARE EDUCATION/TRAINING PROGRAM

## 2023-08-14 RX ORDER — CHLORHEXIDINE GLUCONATE 0.12 MG/ML
15 RINSE ORAL ONCE
OUTPATIENT
Start: 2023-08-14 | End: 2023-08-14

## 2023-08-14 RX ORDER — CEFAZOLIN SODIUM 2 G/50ML
2000 SOLUTION INTRAVENOUS ONCE
OUTPATIENT
Start: 2023-08-14 | End: 2023-08-14

## 2023-08-14 RX ORDER — SODIUM CHLORIDE 9 MG/ML
125 INJECTION, SOLUTION INTRAVENOUS CONTINUOUS
OUTPATIENT
Start: 2023-08-14

## 2023-08-14 NOTE — PROGRESS NOTES
Office Note - Neurosurgery   Kina Bryan 52 y.o. male MRN: [id number]      Assessment and Plan: This is a 51-year-old male with history of failed back surgery syndrome and spinal cord stimulator placement presenting with internal pulse generator end of battery life. The stimulator significantly provides patient with benefit and I believe replacement is warranted. We discussed the different battery options. Given that he only lasted a year and a half with his last replacement, I urged the patient to consider the rechargeable battery. The biggest risk with performing replacements very frequently is infection. If that were to happen, his entire stimulator system would have to be removed. As such I believe stretching out the periods of time for replacement would be advantageous for him and would provide him with the most optimal result. The patient also voiced not wanting to undergo this procedure every couple years. As such he would like to proceed with the rechargeable battery. I sat down with the patient and had an extensive discussion about the procedure including the details of the procedure as well as it's risks and benefits. Risks of the procedure include but are not limited to bleeding, infection, risk of hardware failure and the necessity of future pulse generator replacement surgeries. There is also anesthesia related complications. The patient provided verbal consent to the surgical procedure and signed the consent form. Expected postoperative course, including activity restrictions, expected pain and postoperative medication were reviewed. In the meantime, the patient will obtain medical clearance from their primary care physician. We will proceed with scheduling the patient for the procedure. History, physical examination and diagnostic tests were reviewed and questions answered. Diagnosis, care plan and treatment options were discussed.  The patient understand instructions and will follow up as directed. Follow-up: Schedule for surgery    I spent approximately 20 minutes with the patient. This time was dedicated to acquiring the patient's history, performing physical examination, reviewing pertinent imaging and discussing the treatment plan. Diagnoses and all orders for this visit:    Battery end of life of spinal cord stimulator  -     Case request operating room: Right-sided spinal cord stimulator internal pulse generator replacement; Standing  -     Case request operating room: Right-sided spinal cord stimulator internal pulse generator replacement    Other orders  -     Diet NPO; Sips with meds; Standing  -     Nursing Communication [address] [place] [address] Interventions Implemented; Standing  -     Nursing Communication [facility] bath, have staff wash entire body (neck down) per pre-op bathing protocol. Routine, evening prior to, and day of surgery.; Standing  -     Nursing Communication Swab both nares with Povidone-Iodine solution, EXCLUDE if patient has shellfish/Iodine allergy, and replace with nasal alcohol swabstick. Routine, day of surgery, on call to OR; Standing  -     chlorhexidine (PERIDEX) 0.12 % oral rinse 15 mL  -     Void on call to OR; Standing  -     Insert peripheral IV; Standing  -     sodium chloride 0.9 % infusion  -     ceFAZolin (ANCEF) IVPB (premix in dextrose) 2,000 mg 50 mL        Subjective/Objective     Chief Complaint    Spinal cord stimulator end of battery life    HPI    This is a 49-year-old male with history of lumbar fusion surgery, failed back surgery syndrome with chronic low back pain status post thoracic spinal cord stimulator placement presenting with end of internal pulse generator battery life. The patient underwent replacement of his internal pulse generator in January 2022 with a nonrechargeable battery. His pulse generator has been significantly helping with his symptoms.   Given the high demands of his stimulation, his battery is nearing end-of-life. He is here to discuss replacement. TOMI KRISHNA personally reviewed and updated. Review of Systems   Constitutional: Negative. HENT: Negative. Eyes: Negative. Respiratory: Negative. Asthma   Cardiovascular: Negative. Gastrointestinal: Negative. Endocrine: Negative. Genitourinary: Negative. Musculoskeletal: Positive for back pain (LBP and left leg pain) and myalgias (Lower back cramping). IPG nearing EOL after 1.5 yrs. SCS placed for LBP and left leg. Skin: Negative. Allergic/Immunologic: Negative. Neurological: Positive for numbness (left leg). Hematological: Negative. Psychiatric/Behavioral: Negative.         Family History    Family History   Problem Relation Age of Onset   • Cancer Mother    • Arthritis Mother    • Stomach cancer Mother    • Coronary artery disease Mother    • Heart disease Mother    • Asthma Mother    • COPD Mother    • Cancer Father    • Esophageal cancer Father    • Other Father         brain tumor   • Diabetes Father    • No Known Problems Sister    • No Known Problems Sister    • No Known Problems Sister        Social History    Social History     Socioeconomic History   • Marital status:      Spouse name: Not on file   • Number of children: Not on file   • Years of education: GED   • Highest education level: Not on file   Occupational History   • Not on file   Tobacco Use   • Smoking status: Every Day     Packs/day: 1.50     Years: 5.00     Total pack years: 7.50     Types: Cigarettes, Pipe, Cigars     Start date: 1995   • Smokeless tobacco: Never   Vaping Use   • Vaping Use: Never used   Substance and Sexual Activity   • Alcohol use: Not Currently   • Drug use: Never   • Sexual activity: Not Currently     Partners: Female   Other Topics Concern   • Not on file   Social History Narrative    Chooses not to have children    Drinks caffienated tea    Lives with spouse             Social Determinants of Health Financial Resource Strain: Not on file   Food Insecurity: Not on file   Transportation Needs: Not on file   Physical Activity: Not on file   Stress: Not on file   Social Connections: Not on file   Intimate Partner Violence: Not on file   Housing Stability: Not on file       Past Medical History    Past Medical History:   Diagnosis Date   • Allergic    • Anxiety    • Arthritis    • Asthma    • Bipolar 2 disorder, major depressive episode ([address]) 10/31/2017   • Bipolar disorder ([address])    • Chronic left lumbar radiculopathy    • Chronic low back pain    • Chronic pain syndrome    • Chronic right shoulder pain    • Depression with anxiety    • Fatigue    • GERD (gastroesophageal reflux disease)    • Hydronephrosis    • Iron deficiency anemia    • Kidney stone    • Lytic bone lesions on xray    • Nephrolithiasis    • PONV (postoperative nausea and vomiting)    • Right elbow pain    • Right lateral epicondylitis        Surgical History    Past Surgical History:   Procedure Laterality Date   • ADENOIDECTOMY Bilateral    • APPENDECTOMY     • BACK SURGERY     • CHOLECYSTECTOMY     • CHOLECYSTECTOMY LAPAROSCOPIC N/A 10/30/2018    Procedure: CHOLECYSTECTOMY WITH GASTROTOMY LAPAROSCOPIC;  Surgeon: Karina Hyatt MD;  Location: [facility];  Service: General   • ERCP W/ SPHICTEROTOMY N/A 10/30/2018    Procedure: ENDOSCOPIC RETROGRADE CHOLANGIOPANCREATOGRAPHY (ERCP) W/ SPHINCTEROTOMY;  Surgeon: Hannah Hwang MD;  Location: [facility];  Service: Gastroenterology   • GASTRIC BYPASS      2009   • OTHER SURGICAL HISTORY      fusion/refusion of vertebrae, 2010 and 2011 l5-s1 and l4-l5   • WY ARTHROSCOPY KNEE LATERAL RELEASE Left 01/06/2021    Procedure: RELEASE RETINACULAR;  Surgeon: Jenny Delacruz DO;  Location:  MAIN OR;  Service: Orthopedics   • WY ARTHRS KNE SURG W/MENISCECTOMY MED/LAT W/SHVG Right 04/26/2022    Procedure: ARTHROSCOPIC MENISCECTOMY LATERAL;  Surgeon: Jenny Delacruz DO;  Location:  MAIN OR;  Service: Orthopedics   • NM ARTHRS KNEE DEBRIDEMENT/SHAVING ARTCLR CRTLG Left 01/06/2021    Procedure: ARTHROSCOPY KNEE;  Surgeon: Glenna Walker DO;  Location: [facility];  Service: Orthopedics   • NM CYSTO/URETERO W/LITHOTRIPSY &INDWELL STENT INSRT Right 08/08/2017    Procedure: CYSTOSCOPY; URETEROSCOPY; HOLMIUM LASER; RETROGRADE PYELOGRAM; STENT;  Surgeon: Zoila Acevedo MD;  Location: [facility];  Service: Urology   • NM INCISION & DRAINAGE ABSCESS COMPLICATED/MULTIPLE Left 01/08/2021    Procedure: INCISION AND DRAINAGE (I&D) EXTREMITY- of left knee s/p MPFL reconstruction, I&D if the left knee with possible MPFL revision;  Surgeon: Glenna Walker DO;  Location: [facility];  Service: Orthopedics   • NM INSJ/RPLCMT SPI NPGR DIR/INDUXIVE COUPLING Right 11/14/2017    Procedure: REMOVAL LOWER MEDIAL BUTTOCK SPINAL CORD STIMULATOR GENERATOR; PLACEMENT OF NEW BUTTOCK SPINAL CORD STIMULATOR THROUGH SEPERATE INCISION;  Surgeon: Josafat Allen MD;  Location: [facility];  Service: Neurosurgery   • NM INSJ/RPLCMT SPI NPGR DIR/INDUXIVE COUPLING Right 01/12/2022    Procedure: Right sided spinal cord stimulator pulse generator replacement;  Surgeon: Dominique Andres MD;  Location[facility];  Service: Neurosurgery   • NM RCNSTJ [address] W/XTNSR RELIGNMT&/MUSC RL Left 01/06/2021    Procedure: REALIGNMENT PATELLA with chondroplasty of patella, open medial patellofemoral ligament reconstruction with allograft;  Surgeon: Glenna Walker DO;  Location: [facility];  Service: Orthopedics   • RIK-EN-Y PROCEDURE  2003   • SPINAL CORD STIMULATOR IMPLANT  11/14/2017    dr Andrez Andrea procedure and technique 1. removal of a right back implantable pulse generator 2.placement of a new right buttock impantable pulse generator through seperate incision 3 electric analys complex programming spinal cord stimulator system postoperative period approx 1 hour   • SPINE SURGERY     • TONSILLECTOMY         Medications      Current Outpatient Medications:   •  Acetaminophen (TYLENOL 8 HOUR ARTHRITIS PAIN PO), Take by mouth, Disp: , Rfl:   •  albuterol (PROVENTIL HFA,VENTOLIN HFA) 90 mcg/act inhaler, INHALE 2 PUFFS EVERY 4 (FOUR) HOURS AS NEEDED FOR WHEEZING OR SHORTNESS OF BREATH COUGH, Disp: 18 g, Rfl: 0  •  ergocalciferol (VITAMIN D2) 50,000 units, take 1 capsule by mouth every week, Disp: 12 capsule, Rfl: 0  •  esomeprazole (NexIUM) 40 MG capsule, Take 1 capsule (40 mg total) by mouth in the morning, Disp: 30 capsule, Rfl: 3  •  folic acid (FOLVITE) 1 mg tablet, Take 1 tablet (1 mg total) by mouth daily, Disp: 30 tablet, Rfl: 2  •  LANSOPRAZOLE PO, Take 10 mg by mouth if needed  , Disp: , Rfl:   •  naloxone (NARCAN) 4 mg/0.1 mL nasal spray, Administer 1 spray into a nostril. If no response after 2-3 minutes, give another dose in the other nostril using a new spray., Disp: 1 each, Rfl: 1  •  tiZANidine (ZANAFLEX) 4 mg tablet, Take 1 PO QID PRN for pain and spasms. , Disp: 120 tablet, Rfl: 3  •  transdermal buprenorphine (BUTRANS) 20 mcg/hr PTWK TD patch, Apply 1 patch TD every 7 days for pain. Rotate patch application sites to avoid skin irritation. Do not start before July 18, 2023., Disp: 4 patch, Rfl: 3  •  varenicline (Chantix Continuing Month Derrick) 1 mg tablet, Take 1 tablet (1 mg total) by mouth 2 (two) times a day, Disp: 60 tablet, Rfl: 4  •  varenicline 0.5 MG X 11 & 1 MG X 42 tablet therapy pack, Take 0.5 mg by mouth daily for 3 days, THEN 0.5 mg 2 (two) times a day for 4 days, THEN 1 mg 2 (two) times a day for 23 days. , Disp: 48 each, Rfl: 0    Allergies    Allergies   Allergen Reactions   • Ampicillin GI Intolerance     Vomiting   • Penicillins GI Intolerance     Vomit       The following portions of the patient's history were reviewed and updated as appropriate: allergies, current medications, past family history, past medical history, past social history, past surgical history and problem list.      Physical Exam    Vitals:   There were no vitals taken for this visit. ,There is no height or weight on file to calculate BMI.     General:  Normal, well developed, not in distress/pain     Skin:   No issues, no rashes noted     Musculoskeletal:   5/5 strength throughout all muscle groups  No tenderness to palpation of the spine       Neurologic Exam:  Awake and alert  Oriented x3  Speech clear and fluent  ESPINOSA   Sensation to light touch and pin prick intact throughout  No clark's  No clonus  2+ patellar reflexes     Gait:   normal gait, normal posture

## 2023-08-28 ENCOUNTER — APPOINTMENT (OUTPATIENT)
Dept: LAB | Facility: CLINIC | Age: 48
End: 2023-08-28
Payer: COMMERCIAL

## 2023-08-28 ENCOUNTER — OFFICE VISIT (OUTPATIENT)
Dept: FAMILY MEDICINE CLINIC | Facility: CLINIC | Age: 48
End: 2023-08-28
Payer: COMMERCIAL

## 2023-08-28 VITALS
DIASTOLIC BLOOD PRESSURE: 78 MMHG | HEART RATE: 75 BPM | HEIGHT: 72 IN | SYSTOLIC BLOOD PRESSURE: 122 MMHG | TEMPERATURE: 98 F | RESPIRATION RATE: 16 BRPM | WEIGHT: 232 LBS | OXYGEN SATURATION: 99 % | BODY MASS INDEX: 31.42 KG/M2

## 2023-08-28 DIAGNOSIS — M48.062 SPINAL STENOSIS OF LUMBAR REGION WITH NEUROGENIC CLAUDICATION: ICD-10-CM

## 2023-08-28 DIAGNOSIS — Z72.0 TOBACCO ABUSE: ICD-10-CM

## 2023-08-28 DIAGNOSIS — Z01.818 PREOP EXAMINATION: Primary | ICD-10-CM

## 2023-08-28 DIAGNOSIS — Z45.42 BATTERY END OF LIFE OF SPINAL CORD STIMULATOR: ICD-10-CM

## 2023-08-28 DIAGNOSIS — G89.4 CHRONIC PAIN SYNDROME: ICD-10-CM

## 2023-08-28 LAB
ALBUMIN SERPL BCP-MCNC: 4 G/DL (ref 3.5–5)
ALP SERPL-CCNC: 77 U/L (ref 34–104)
ALT SERPL W P-5'-P-CCNC: 11 U/L (ref 7–52)
ANION GAP SERPL CALCULATED.3IONS-SCNC: 10 MMOL/L
APTT PPP: 34 SECONDS (ref 23–37)
AST SERPL W P-5'-P-CCNC: 12 U/L (ref 13–39)
BACTERIA UR QL AUTO: ABNORMAL /HPF
BASOPHILS # BLD AUTO: 0.09 THOUSANDS/ÂΜL (ref 0–0.1)
BASOPHILS NFR BLD AUTO: 1 % (ref 0–1)
BILIRUB SERPL-MCNC: 0.38 MG/DL (ref 0.2–1)
BILIRUB UR QL STRIP: NEGATIVE
BUN SERPL-MCNC: 13 MG/DL (ref 5–25)
CALCIUM SERPL-MCNC: 9.2 MG/DL (ref 8.4–10.2)
CHLORIDE SERPL-SCNC: 102 MMOL/L (ref 96–108)
CLARITY UR: ABNORMAL
CO2 SERPL-SCNC: 25 MMOL/L (ref 21–32)
COLOR UR: YELLOW
CREAT SERPL-MCNC: 1.1 MG/DL (ref 0.6–1.3)
EOSINOPHIL # BLD AUTO: 0.08 THOUSAND/ÂΜL (ref 0–0.61)
EOSINOPHIL NFR BLD AUTO: 1 % (ref 0–6)
ERYTHROCYTE [DISTWIDTH] IN BLOOD BY AUTOMATED COUNT: 14.3 % (ref 11.6–15.1)
EST. AVERAGE GLUCOSE BLD GHB EST-MCNC: 128 MG/DL
GFR SERPL CREATININE-BSD FRML MDRD: 78 ML/MIN/1.73SQ M
GLUCOSE P FAST SERPL-MCNC: 159 MG/DL (ref 65–99)
GLUCOSE UR STRIP-MCNC: NEGATIVE MG/DL
HBA1C MFR BLD: 6.1 %
HCT VFR BLD AUTO: 54.1 % (ref 36.5–49.3)
HGB BLD-MCNC: 16.6 G/DL (ref 12–17)
HGB UR QL STRIP.AUTO: ABNORMAL
IMM GRANULOCYTES # BLD AUTO: 0.06 THOUSAND/UL (ref 0–0.2)
IMM GRANULOCYTES NFR BLD AUTO: 1 % (ref 0–2)
INR PPP: 0.95 (ref 0.84–1.19)
KETONES UR STRIP-MCNC: ABNORMAL MG/DL
LEUKOCYTE ESTERASE UR QL STRIP: NEGATIVE
LYMPHOCYTES # BLD AUTO: 1.94 THOUSANDS/ÂΜL (ref 0.6–4.47)
LYMPHOCYTES NFR BLD AUTO: 28 % (ref 14–44)
MCH RBC QN AUTO: 26.3 PG (ref 26.8–34.3)
MCHC RBC AUTO-ENTMCNC: 30.7 G/DL (ref 31.4–37.4)
MCV RBC AUTO: 86 FL (ref 82–98)
MONOCYTES # BLD AUTO: 0.43 THOUSAND/ÂΜL (ref 0.17–1.22)
MONOCYTES NFR BLD AUTO: 6 % (ref 4–12)
MUCOUS THREADS UR QL AUTO: ABNORMAL
NEUTROPHILS # BLD AUTO: 4.38 THOUSANDS/ÂΜL (ref 1.85–7.62)
NEUTS SEG NFR BLD AUTO: 63 % (ref 43–75)
NITRITE UR QL STRIP: NEGATIVE
NON-SQ EPI CELLS URNS QL MICRO: ABNORMAL /HPF
NRBC BLD AUTO-RTO: 0 /100 WBCS
PH UR STRIP.AUTO: 6 [PH]
PLATELET # BLD AUTO: 190 THOUSANDS/UL (ref 149–390)
PMV BLD AUTO: 11.4 FL (ref 8.9–12.7)
POTASSIUM SERPL-SCNC: 4.1 MMOL/L (ref 3.5–5.3)
PROT SERPL-MCNC: 7 G/DL (ref 6.4–8.4)
PROT UR STRIP-MCNC: ABNORMAL MG/DL
PROTHROMBIN TIME: 12.9 SECONDS (ref 11.6–14.5)
RBC # BLD AUTO: 6.31 MILLION/UL (ref 3.88–5.62)
RBC #/AREA URNS AUTO: ABNORMAL /HPF
SODIUM SERPL-SCNC: 137 MMOL/L (ref 135–147)
SP GR UR STRIP.AUTO: >=1.03 (ref 1–1.03)
UROBILINOGEN UR STRIP-ACNC: 2 MG/DL
WBC # BLD AUTO: 6.98 THOUSAND/UL (ref 4.31–10.16)
WBC #/AREA URNS AUTO: ABNORMAL /HPF

## 2023-08-28 PROCEDURE — 83036 HEMOGLOBIN GLYCOSYLATED A1C: CPT

## 2023-08-28 PROCEDURE — 99214 OFFICE O/P EST MOD 30 MIN: CPT | Performed by: FAMILY MEDICINE

## 2023-08-28 PROCEDURE — 85730 THROMBOPLASTIN TIME PARTIAL: CPT

## 2023-08-28 PROCEDURE — 85025 COMPLETE CBC W/AUTO DIFF WBC: CPT

## 2023-08-28 PROCEDURE — 36415 COLL VENOUS BLD VENIPUNCTURE: CPT

## 2023-08-28 PROCEDURE — 85610 PROTHROMBIN TIME: CPT

## 2023-08-28 PROCEDURE — 80053 COMPREHEN METABOLIC PANEL: CPT

## 2023-08-28 NOTE — PROGRESS NOTES
Emigdio Murry is a 50 y.o. male with failed back surgery and spinal cord stimulator at end of batter life who is planning to undergo right side spinal cord stimulator internal pulse generator replacement under general by Dr. Curtis Bowman on 9/8/23. Patient has had complications with anesthesia in the past - years ago he heard and felt everything. Never happened again and has had surgeries since then. ROS:   Chest pain: no   Shortness of breath: no  Shortness of breath with exertion: no  Orthopnea: no  Dizziness: no  Unexplained weight change: no    PMH:  CAD: no  HTN: no  CKD: no  DM: no on insulin: no  History of CVA: no     reports that he has been smoking cigarettes, pipe, and cigars. He started smoking about 28 years ago. He has a 7.50 pack-year smoking history. He has never used smokeless tobacco. He reports that he does not currently use alcohol. He reports that he does not use drugs. Lab Results   Component Value Date    CREATININE 1.10 08/28/2023       Vitals:    08/28/23 1303   BP: 122/78   BP Location: Right arm   Patient Position: Sitting   Cuff Size: Standard   Pulse: 75   Resp: 16   Temp: 98 °F (36.7 °C)   TempSrc: Temporal   SpO2: 99%   Weight: 105 kg (232 lb)   Height: 6' (1.829 m)     Physical Exam  Vitals reviewed. Constitutional:       Appearance: Normal appearance. Cardiovascular:      Rate and Rhythm: Normal rate and regular rhythm. Pulses: Normal pulses. Pulmonary:      Effort: Pulmonary effort is normal.      Breath sounds: Normal breath sounds. Skin:     General: Skin is warm and dry. Neurological:      Mental Status: He is alert.    Psychiatric:         Mood and Affect: Mood normal.         Behavior: Behavior normal.          Desmond Haines was seen today for pre-op exam.    Diagnoses and all orders for this visit:    Preop examination    Tobacco abuse    Spinal stenosis of lumbar region with neurogenic claudication        Recommendations:  Emigdio Mrury is undergoing an elective Low Risk surgery. They are at [address] risk for major adverse cardiac event (MACE). They may proceed with surgery as planned without further workup. Quit date set for 9/8 for surgery.

## 2023-08-29 DIAGNOSIS — D53.9 NUTRITIONAL ANEMIA: ICD-10-CM

## 2023-08-29 RX ORDER — FOLIC ACID 1 MG/1
1000 TABLET ORAL DAILY
Qty: 30 TABLET | Refills: 2 | OUTPATIENT
Start: 2023-08-29

## 2023-08-30 ENCOUNTER — TELEPHONE (OUTPATIENT)
Dept: NEUROSURGERY | Facility: CLINIC | Age: 48
End: 2023-08-30

## 2023-08-30 DIAGNOSIS — Z45.42 BATTERY END OF LIFE OF SPINAL CORD STIMULATOR: Primary | ICD-10-CM

## 2023-08-30 RX ORDER — CEPHALEXIN 500 MG/1
500 CAPSULE ORAL EVERY 6 HOURS SCHEDULED
Qty: 28 CAPSULE | Refills: 0 | Status: SHIPPED | OUTPATIENT
Start: 2023-08-30 | End: 2023-09-06

## 2023-08-30 NOTE — TELEPHONE ENCOUNTER
Patient aware to  and start Keflex  ----- Message from Saintclair Elliot, PA-C sent at 8/30/2023  3:18 PM EDT -----  Will cover with keflex x7 days.   Rx to pharmacy.  ----- Message -----  From: Gilma Alvarez  Sent: 8/30/2023   3:12 PM EDT  To: Saintclair Elliot, PA-C    Please see urine results- Surgery 9/8/2023

## 2023-09-01 NOTE — PRE-PROCEDURE INSTRUCTIONS
Pre-Surgery Instructions:   Medication Instructions   • Acetaminophen (TYLENOL 8 HOUR ARTHRITIS PAIN PO) Uses PRN- OK to take day of surgery   • albuterol (PROVENTIL HFA,VENTOLIN HFA) 90 mcg/act inhaler Uses PRN- OK to take day of surgery   • cephalexin (KEFLEX) 500 mg capsule will be completed by sx date    • ergocalciferol (VITAMIN D2) 50,000 units Stop taking 7 days prior to surgery. • esomeprazole (NexIUM) 40 MG capsule Take day of surgery. • folic acid (FOLVITE) 1 mg tablet Stop taking 7 days prior to surgery. • tiZANidine (ZANAFLEX) 4 mg tablet Uses PRN- OK to take day of surgery   • transdermal buprenorphine (BUTRANS) 20 mcg/hr PTWK TD patch not started per pt. Medication instructions for day surgery reviewed. Please use only a sip of water to take your instructed medications. Avoid all over the counter vitamins, supplements and NSAIDS for one week prior to surgery per anesthesia guidelines. Tylenol is ok to take as needed. You will receive a call one business day prior to surgery with an arrival time and hospital directions. If your surgery is scheduled on a Monday, the hospital will be calling you on the Friday prior to your surgery. If you have not heard from anyone by 8pm, please call the hospital supervisor through the hospital  at [phone]. Do not eat or drink anything after midnight the night before your surgery, including candy, mints, lifesavers, or chewing gum. Do not drink alcohol 24hrs before your surgery. Try not to smoke at least 24hrs before your surgery. Follow the pre surgery showering instructions as listed in the [facility] Surgical Experience Booklet” or otherwise provided by your surgeon's office. Do not shave the surgical area 24 hours before surgery. Do not apply any lotions, creams, including makeup, cologne, deodorant, or perfumes after showering on the day of your surgery. No contact lenses, eye make-up, or artificial eyelashes.  Remove nail polish, including gel polish, and any artificial, gel, or acrylic nails if possible. Remove all jewelry including rings and body piercing jewelry. Wear causal clothing that is easy to take on and off. Consider your type of surgery. Keep any valuables, jewelry, piercings at home. Please bring any specially ordered equipment (sling, braces) if indicated. Arrange for a responsible person to drive you to and from the hospital on the day of your surgery. Visitor Guidelines discussed. Call the surgeon's office with any new illnesses, exposures, or additional questions prior to surgery. Please reference your [facility] Surgical Experience Booklet” for additional information to prepare for your upcoming surgery. Pt. Verbalized an understanding of all instructions reviewed and offers no concerns at this time.

## 2023-09-06 DIAGNOSIS — E55.9 VITAMIN D DEFICIENCY: ICD-10-CM

## 2023-09-06 DIAGNOSIS — D50.8 OTHER IRON DEFICIENCY ANEMIA: ICD-10-CM

## 2023-09-06 RX ORDER — ERGOCALCIFEROL 1.25 MG/1
CAPSULE ORAL
Qty: 12 CAPSULE | Refills: 0 | Status: SHIPPED | OUTPATIENT
Start: 2023-09-06

## 2023-09-07 ENCOUNTER — ANESTHESIA EVENT (OUTPATIENT)
Dept: PERIOP | Facility: HOSPITAL | Age: 48
End: 2023-09-07
Payer: COMMERCIAL

## 2023-09-08 ENCOUNTER — TELEPHONE (OUTPATIENT)
Dept: NEUROSURGERY | Facility: CLINIC | Age: 48
End: 2023-09-08

## 2023-09-08 ENCOUNTER — ANESTHESIA (OUTPATIENT)
Dept: PERIOP | Facility: HOSPITAL | Age: 48
End: 2023-09-08
Payer: COMMERCIAL

## 2023-09-08 ENCOUNTER — HOSPITAL ENCOUNTER (OUTPATIENT)
Facility: HOSPITAL | Age: 48
Setting detail: OUTPATIENT SURGERY
Discharge: HOME | End: 2023-09-08
Attending: STUDENT IN AN ORGANIZED HEALTH CARE EDUCATION/TRAINING PROGRAM | Admitting: STUDENT IN AN ORGANIZED HEALTH CARE EDUCATION/TRAINING PROGRAM
Payer: COMMERCIAL

## 2023-09-08 ENCOUNTER — TELEPHONE (OUTPATIENT)
Dept: PAIN MEDICINE | Facility: CLINIC | Age: 48
End: 2023-09-08

## 2023-09-08 VITALS
HEART RATE: 66 BPM | HEIGHT: 72 IN | OXYGEN SATURATION: 97 % | WEIGHT: 230 LBS | SYSTOLIC BLOOD PRESSURE: 123 MMHG | BODY MASS INDEX: 31.15 KG/M2 | TEMPERATURE: 97.6 F | RESPIRATION RATE: 26 BRPM | DIASTOLIC BLOOD PRESSURE: 68 MMHG

## 2023-09-08 DIAGNOSIS — Z45.42 BATTERY END OF LIFE OF SPINAL CORD STIMULATOR: Primary | ICD-10-CM

## 2023-09-08 PROCEDURE — C1820 GENERATOR NEURO RECHG BAT SY: HCPCS | Performed by: STUDENT IN AN ORGANIZED HEALTH CARE EDUCATION/TRAINING PROGRAM

## 2023-09-08 DEVICE — NEUROSTIM INTELLIS SURESCAN MRI W/ ADAPTIVESTIM: Type: IMPLANTABLE DEVICE | Status: FUNCTIONAL

## 2023-09-08 DEVICE — ENVELOPE NMRM6133 ABSORB LRG MR
Type: IMPLANTABLE DEVICE | Status: FUNCTIONAL
Brand: TYRX™

## 2023-09-08 RX ORDER — CHLORHEXIDINE GLUCONATE 0.12 MG/ML
15 RINSE ORAL ONCE
Status: DISCONTINUED | OUTPATIENT
Start: 2023-09-08 | End: 2023-09-08 | Stop reason: HOSPADM

## 2023-09-08 RX ORDER — FENTANYL CITRATE 50 UG/ML
INJECTION, SOLUTION INTRAMUSCULAR; INTRAVENOUS AS NEEDED
Status: DISCONTINUED | OUTPATIENT
Start: 2023-09-08 | End: 2023-09-08

## 2023-09-08 RX ORDER — CEPHALEXIN 500 MG/1
500 CAPSULE ORAL EVERY 6 HOURS SCHEDULED
Qty: 56 CAPSULE | Refills: 0 | Status: SHIPPED | OUTPATIENT
Start: 2023-09-08 | End: 2023-09-22

## 2023-09-08 RX ORDER — FENTANYL CITRATE/PF 50 MCG/ML
25 SYRINGE (ML) INJECTION
Status: COMPLETED | OUTPATIENT
Start: 2023-09-08 | End: 2023-09-08

## 2023-09-08 RX ORDER — MIDAZOLAM HYDROCHLORIDE 2 MG/2ML
INJECTION, SOLUTION INTRAMUSCULAR; INTRAVENOUS AS NEEDED
Status: DISCONTINUED | OUTPATIENT
Start: 2023-09-08 | End: 2023-09-08

## 2023-09-08 RX ORDER — SODIUM CHLORIDE 9 MG/ML
125 INJECTION, SOLUTION INTRAVENOUS CONTINUOUS
Status: DISCONTINUED | OUTPATIENT
Start: 2023-09-08 | End: 2023-09-08 | Stop reason: HOSPADM

## 2023-09-08 RX ORDER — LIDOCAINE HYDROCHLORIDE AND EPINEPHRINE 10; 10 MG/ML; UG/ML
INJECTION, SOLUTION INFILTRATION; PERINEURAL AS NEEDED
Status: DISCONTINUED | OUTPATIENT
Start: 2023-09-08 | End: 2023-09-08 | Stop reason: HOSPADM

## 2023-09-08 RX ORDER — EPHEDRINE SULFATE 50 MG/ML
INJECTION INTRAVENOUS AS NEEDED
Status: DISCONTINUED | OUTPATIENT
Start: 2023-09-08 | End: 2023-09-08

## 2023-09-08 RX ORDER — ONDANSETRON 2 MG/ML
INJECTION INTRAMUSCULAR; INTRAVENOUS AS NEEDED
Status: DISCONTINUED | OUTPATIENT
Start: 2023-09-08 | End: 2023-09-08

## 2023-09-08 RX ORDER — CEFAZOLIN SODIUM 2 G/50ML
2000 SOLUTION INTRAVENOUS ONCE
Status: COMPLETED | OUTPATIENT
Start: 2023-09-08 | End: 2023-09-08

## 2023-09-08 RX ORDER — LIDOCAINE HYDROCHLORIDE 20 MG/ML
INJECTION, SOLUTION EPIDURAL; INFILTRATION; INTRACAUDAL; PERINEURAL AS NEEDED
Status: DISCONTINUED | OUTPATIENT
Start: 2023-09-08 | End: 2023-09-08

## 2023-09-08 RX ORDER — LIDOCAINE HYDROCHLORIDE 10 MG/ML
0.5 INJECTION, SOLUTION EPIDURAL; INFILTRATION; INTRACAUDAL; PERINEURAL ONCE AS NEEDED
Status: DISCONTINUED | OUTPATIENT
Start: 2023-09-08 | End: 2023-09-08 | Stop reason: HOSPADM

## 2023-09-08 RX ORDER — PROPOFOL 10 MG/ML
INJECTION, EMULSION INTRAVENOUS CONTINUOUS PRN
Status: DISCONTINUED | OUTPATIENT
Start: 2023-09-08 | End: 2023-09-08

## 2023-09-08 RX ORDER — HYDROMORPHONE HCL IN WATER/PF 6 MG/30 ML
0.2 PATIENT CONTROLLED ANALGESIA SYRINGE INTRAVENOUS
Status: DISCONTINUED | OUTPATIENT
Start: 2023-09-08 | End: 2023-09-08 | Stop reason: HOSPADM

## 2023-09-08 RX ORDER — SODIUM CHLORIDE, SODIUM LACTATE, POTASSIUM CHLORIDE, CALCIUM CHLORIDE 600; 310; 30; 20 MG/100ML; MG/100ML; MG/100ML; MG/100ML
125 INJECTION, SOLUTION INTRAVENOUS CONTINUOUS
Status: DISCONTINUED | OUTPATIENT
Start: 2023-09-08 | End: 2023-09-08 | Stop reason: HOSPADM

## 2023-09-08 RX ORDER — PROPOFOL 10 MG/ML
INJECTION, EMULSION INTRAVENOUS AS NEEDED
Status: DISCONTINUED | OUTPATIENT
Start: 2023-09-08 | End: 2023-09-08

## 2023-09-08 RX ORDER — DEXAMETHASONE SODIUM PHOSPHATE 10 MG/ML
INJECTION, SOLUTION INTRAMUSCULAR; INTRAVENOUS AS NEEDED
Status: DISCONTINUED | OUTPATIENT
Start: 2023-09-08 | End: 2023-09-08

## 2023-09-08 RX ADMIN — FENTANYL CITRATE 50 MCG: 50 INJECTION, SOLUTION INTRAMUSCULAR; INTRAVENOUS at 08:36

## 2023-09-08 RX ADMIN — CEFAZOLIN SODIUM 2000 MG: 2 SOLUTION INTRAVENOUS at 07:45

## 2023-09-08 RX ADMIN — PROPOFOL 75 MCG/KG/MIN: 10 INJECTION, EMULSION INTRAVENOUS at 07:44

## 2023-09-08 RX ADMIN — DEXAMETHASONE SODIUM PHOSPHATE 10 MG: 10 INJECTION, SOLUTION INTRAMUSCULAR; INTRAVENOUS at 07:50

## 2023-09-08 RX ADMIN — FENTANYL CITRATE 25 MCG: 50 INJECTION, SOLUTION INTRAMUSCULAR; INTRAVENOUS at 09:35

## 2023-09-08 RX ADMIN — FENTANYL CITRATE 25 MCG: 50 INJECTION, SOLUTION INTRAMUSCULAR; INTRAVENOUS at 10:04

## 2023-09-08 RX ADMIN — FENTANYL CITRATE 50 MCG: 50 INJECTION, SOLUTION INTRAMUSCULAR; INTRAVENOUS at 07:42

## 2023-09-08 RX ADMIN — FENTANYL CITRATE 25 MCG: 50 INJECTION, SOLUTION INTRAMUSCULAR; INTRAVENOUS at 09:41

## 2023-09-08 RX ADMIN — ONDANSETRON 4 MG: 2 INJECTION INTRAMUSCULAR; INTRAVENOUS at 07:50

## 2023-09-08 RX ADMIN — PROPOFOL 60 MG: 10 INJECTION, EMULSION INTRAVENOUS at 07:44

## 2023-09-08 RX ADMIN — MIDAZOLAM 2 MG: 1 INJECTION INTRAMUSCULAR; INTRAVENOUS at 07:39

## 2023-09-08 RX ADMIN — FENTANYL CITRATE 25 MCG: 50 INJECTION, SOLUTION INTRAMUSCULAR; INTRAVENOUS at 09:56

## 2023-09-08 RX ADMIN — SODIUM CHLORIDE, SODIUM LACTATE, POTASSIUM CHLORIDE, AND CALCIUM CHLORIDE 125 ML/HR: .6; .31; .03; .02 INJECTION, SOLUTION INTRAVENOUS at 07:06

## 2023-09-08 RX ADMIN — EPHEDRINE SULFATE 10 MG: 50 INJECTION INTRAVENOUS at 08:08

## 2023-09-08 RX ADMIN — LIDOCAINE HYDROCHLORIDE 80 MG: 20 INJECTION, SOLUTION EPIDURAL; INFILTRATION; INTRACAUDAL; PERINEURAL at 07:42

## 2023-09-08 RX ADMIN — EPHEDRINE SULFATE 10 MG: 50 INJECTION INTRAVENOUS at 07:58

## 2023-09-08 RX ADMIN — EPHEDRINE SULFATE 5 MG: 50 INJECTION INTRAVENOUS at 07:54

## 2023-09-08 NOTE — ANESTHESIA PREPROCEDURE EVALUATION
Procedure:  Right-sided spinal cord stimulator internal pulse generator replacement (Right: Chest)    Relevant Problems   ANESTHESIA (within normal limits)   (+) PONV (postoperative nausea and vomiting) (Resolved)      GI/HEPATIC   (+) Chronic GERD (controlled)      /RENAL   (+) BPH with obstruction/lower urinary tract symptoms   (+) Nephrolithiasis      HEMATOLOGY   (+) Iron deficiency anemia      MUSCULOSKELETAL   (+) Chronic low back pain   (+) Myofascial pain syndrome      NEURO/PSYCH   (+) Chronic low back pain   (+) Myofascial pain syndrome   (+) PTSD (post-traumatic stress disorder)   (+) Pain syndrome, chronic      PULMONARY   (+) Asthma   (+) Smoking      Nervous and Auditory   (+) Lumbar radiculopathy      Other   (+) History of Parth-en-Y gastric bypass   (+) Uncomplicated opioid dependence (HCC)    BMI 31    Physical Exam    Airway    Mallampati score: I  TM Distance: >3 FB  Neck ROM: full     Dental   upper dentures and lower dentures,     Cardiovascular      Pulmonary      Other Findings       Lab Results   Component Value Date    WBC 6.98 08/28/2023    HGB 16.6 08/28/2023     08/28/2023     Lab Results   Component Value Date    SODIUM 137 08/28/2023    K 4.1 08/28/2023    BUN 13 08/28/2023    CREATININE 1.10 08/28/2023    EGFR 78 08/28/2023     Lab Results   Component Value Date    PTT 34 08/28/2023      Lab Results   Component Value Date    INR 0.95 08/28/2023     Lab Results   Component Value Date    HGBA1C 6.1 (H) 08/28/2023     Anesthesia Plan  ASA Score- 3     Anesthesia Type- IV sedation with anesthesia with ASA Monitors. Additional Monitors:   Airway Plan:           Plan Factors-Exercise tolerance (METS): >4 METS. Chart reviewed. Existing labs reviewed. Patient summary reviewed. Patient is a current smoker. Induction- intravenous. Postoperative Plan-     Informed Consent- Anesthetic plan and risks discussed with patient and spouse.   I personally reviewed this patient with the CRNA. Discussed and agreed on the Anesthesia Plan with the CRNA. Sarah Marques

## 2023-09-08 NOTE — TELEPHONE ENCOUNTER
S/W pt, pt inquired about post op pain relief options from battery pack change on SCS procedure 9/8/23. Nurse discussed with AS, AS suggested tylenol (not to exceed 3,000mg in 24 hrs) alternating with ibuprofen ( not to exceed 2,400 mg in 24 hrs), ice, along with rest.  Nurse discussed previous with pt, pt verbalized understanding and appreciative of call.

## 2023-09-08 NOTE — TELEPHONE ENCOUNTER
Received a call from patient stating he was just discharged from surgery and was only given an antibiotic but no pain medications. This RN sent a TT to both Dr Mynor Sharpe and Mirza FOWLER who discharged the patient requesting for a script for pain meds. Awaiting response.

## 2023-09-08 NOTE — DISCHARGE INSTR - AVS FIRST PAGE
Post Op Spinal Cord Stimulator Discharge Instructions    Post op Management  After your spinal cord stimulator surgery, you will work closely  with your spinal cord stimulator representative in the postoperative course who will be available for any technical questions you may have concerning the management of your new stimulator. The representatives will do all teaching and education regarding the spinal cord stimulator prior to your discharge from the hospital. You will see your pain management physician 1  week after surgery for management of your pain medications, and you will see you surgeon for your first post-operative  appointment 2 weeks after surgery. Success of this surgery depends on this integrated approach. Expected Hospital Stay  Most patients will be expected to leave the hospital the same  day of the surgery. After the procedure, you will stay in the postop unit where you will be monitored closely. Once you receive education on how to use the  and have had the device programmed by your representative, you will then be able to leave the hospital.    Care of Your Incision  You will have two incisions. One located at the center of your spine usually in the mid to upper back region. The other incision will be located in the right or left upper buttock area. The buttock  incision is the site where the battery will be surgically placed, and you will decide prior to surgery which side you prefer for the battery. Both incisions will be closed in the same manner. Your incisions will be closed with buried stitches that will dissolve within a couple of weeks. Strips of adhesive tape called SteriStrips will be placed over your incisions and will fall off on their own over the course of a week. The surgical dressings should be left on for 3 days after surgery. Then after 3 days, you may shower but you should not scrub your incision or soak in a pool, hot tub or tub bath for at least two  weeks.  It is ok to gently pat it dry. Slight drainage the first day or so, limited swelling or mild  bruising is common and usually not of concern. If there is significant leaking or any marked redness or a large amount of swelling you should call the office. Post-op Pain Management  Most patients after spinal cord stimulator surgery complain of pain and discomfort at the incisional sites. During the surgery, local anesthetic will be used at the site of the incisions and the pain relief effect should last about 6 hours. It is not uncommon to have incisional pain or discomfort later that evening and especially the day after surgery A postoperative pain medication plan will be discussed at the preoperative appointment. In the post-operative period, heat or ice packs may be used asnecessary for incisional discomfort and swelling. However, it is important to understand that post-op pain medications will not take 100% of your pain away, and it is common to still feel discomfort at the incisional sites while taking pain medication. You may take over the counter Tylenol or Advil/Motrin as needed (unless you've been told by your doctor you can't either of these). Script will be sent to your pharmacy for you to take as needed if your pain is uncontrolled with the OTC meds. Take this with food. This medicaiton can make you drowsy, so no driving or operating machinery until off the medication x 24 hours. Activity  Unless you have been instructed otherwise, you should focus on gentle walking the first 2 weeks after the surgery. You should start with brief walks in the house, and gradually increase the time and speed of your walks. It is best to limit stair walking to 1 or 2 times a day for the first week. As you feel better, you should start to take longer walks outside and up inclines. You should avoid driving or being in the car for the first 2 weeks. After that, start with short drives with another person in the car.  You should avoid lifting anything heavier than a half gallon of milk for the first 2 weeks. After that, you can start to lift light objects if you are comfortable. Remember-if it hurts, don’t do it! Sexual activity can be resumed when you feel comfortable. You can discuss returning to an exercise regimen with your physical therapist. For most patients, working with a physical therapist after the surgery can help with the recovery process. Diet and medication  You can resume your regular diet immediately after the surgery. Your regular medications may be restarted right away. However, aspirin is usually started the day after surgery unless otherwise  instructed by your surgeon. If you are taking an anticoagulant or “blood thinner” such as warfarin (coumadin), plavix (clopidogrel), pradaxa (dabigatran), or any other anticoagulant medication,  you will be told when to restart the medication. You should then follow-up with the doctor who prescribes the anticoagulant medicine. Constipation is a common problem after spine surgery. Over the counter stimulants and stool softeners can be beneficial, along with plenty of fresh water. Work  Your return to work will be discussed in your office  follow up appointment. Follow up Appointments  Your first  surgical post-op appointment will be 2 weeks after surgery. What to Watch Out For  These symptoms should cause you to call immediately or dial 911  to come to the emergency department   Paralysis or inability to fully move your legs   Severe chest pain, difficulty breathing   Loss of control of your bowels and bladder. The following symptoms may indicate a problem. You should call the office number listed below.     Fever higher than 101 F Increasing back and/or leg pain   Difficulty passing urine   New numbness or change in symptoms from before surgery   Redness or drainage from the incision   Unusual headache, especially if it is much worse when you  stand up

## 2023-09-08 NOTE — TELEPHONE ENCOUNTER
Received a TT from Dr Mancera Prudent questioning if patient is still using the buprenorphine patch. Confirmed with patient and he is indeed still using the patch. He also stated that he had spoken with PM who suggested he may also take tylenol and use heat/ice while avoiding the incision. Advised he continue this regimen. He questioned what he should do over the weekend should this not be sufficient at managing his pain. Informed patient he is always welcome to call the office and speak to the on call providers. He stated an understanding. Also completed his postop call. Patient reports he is doing well overall and denies any incisional issues or fevers. Patient is able to ambulate around the house and complete ADLs. Educated the patient about the importance of preventing blood clots and provided measures how to prevent them. Patient has moved his bowels since the surgery. Encouraged patient to take an over the counter stool softener, if he is taking narcotic pain medication. Encouraged fiber intake and fluids. Reviewed incision care with the patient. Advised that after three days he may take a shower and gently wash the surgical site with soap and water. Use clean wash cloth, towels, and clothing. Do not submerge in water until cleared by the surgeon. Do not apply any creams, ointments, or lotions to the site. Patient is aware to call the office if any redness, swelling, drainage, dehiscence of incision, or fever >100 F occurs. Patient is aware to call the office if any concerns or questions may arise. Reminded patient of his upcoming appointments with the date/time/location. Patient was appreciative for the call.

## 2023-09-08 NOTE — OP NOTE
OPERATIVE REPORT  PATIENT NAME: Daria Favre    :  1975  MRN: [id number]  Pt Location: [facility] OR ROOM 03    SURGERY DATE: 2023    Surgeon(s) and Role:     * Travis Boucher MD - Primary     * Lizzy Mora PA-C - Assisting    Preop Diagnosis:  Battery end of life of spinal cord stimulator [Z45.42]  Chronic pain syndrome [G89.4]    Post-Op Diagnosis Codes: * Battery end of life of spinal cord stimulator [Z45.42]     * Chronic pain syndrome [G89.4]    Procedure(s):  1) Right-sided spinal cord stimulator internal pulse generator replacement  2) Intraoperative programming    Specimen(s):  * No specimens in log *    Estimated Blood Loss:   Minimal    Drains:  * No LDAs found *    Anesthesia Type:   General    Operative Indications:  Battery end of life of spinal cord stimulator [Z45.42]  Chronic pain syndrome [G89.4]    Operative Findings:  Pulse generator replaced without issues    Complications:   None    Procedure and Technique:  After obtaining written informed consent, the patient was brought to the operating room. The patient was placed under MAC. The patient was placed in lateral decubitus position with the right side up. The prior incision was marked. The patient was then given Vancomycin 1g as perioperative antibiotic. The patient was prepped and draped in the usual sterile fashion. Lidocaine with epinephrine was injected in the surgical site. Surgical time-out was performed. A 10 blade was used to open the prior right paraspinal incision. Dissection was carried down to the top of the stimulator pulse generator using monopolar cautery. The scarred fascia overlying the generator was cut in a linear fashion to the lateral borders of the generator. The 2-0 silk sutures anchoring the generator were cut releasing the generator from its pocket. The pocket was expanded to allow for a nonrestraint fit of the generator. The screws anchoring the leads were loosened and the leads were removed. A new pulse generator was then connected to the leads and final tightened. Impedances were checked and they were within normal limits. The generator was placed in an antibiotic impregnated pouch and then placed into its pocket and it was well fitted. 2-0 silk sutures were used to secure the generator into the fascia underneath the pocket. The wound was copiously irrigated with antibiotic saline solution. 2-0 vicryl sutures were placed in the deep dermal layer. The skin was closed with a 4-0 stratafix suture in a subcuticular fashion. Steri strips were then placed over the incision followed by Bacitracin ointment. Sterile dressing was applied. Surgical sign-out was performed. No qualified resident was available. [employer] was present for the procedure, and provided essential assistance with proper exposure, retraction, hemostasis, stimulator placement, and wound closure, which was necessary secondary to the complex nature of this case. I was present for the entire procedure.     Patient Disposition:  PACU         SIGNATURE: Hamilton Edmond MD  DATE: September 8, 2023  TIME: 9:14 AM

## 2023-09-08 NOTE — H&P
H&P reviewed. After examining the patient I find no changes in the patients condition since the H&P had been written. Patient personally seen and examined. Neurological examination unchanged compared to last office/progress note, with the following exceptions:    BP (!) 89/50   Pulse 63   Temp (!) 97.1 °F (36.2 °C)   Resp 20   Ht 6' (1.829 m)   Wt 104 kg (230 lb)   SpO2 93%   BMI 31.19 kg/m²        Post operative instructions and medications have been reviewed with the patient and family. Assessment and Plan:    All questions have been answered to the patient and family satisfaction. Plan to proceed with Right IPG replacement. They are in agreement with proceeding.

## 2023-09-08 NOTE — TELEPHONE ENCOUNTER
Caller: Keyur    Doctor: Caren Mclaughlin    Reason for call: patient had surgery today does he get pain meds from you or surgeon?     Call back#: [phone]

## 2023-09-11 DIAGNOSIS — D53.9 NUTRITIONAL ANEMIA: ICD-10-CM

## 2023-09-11 RX ORDER — FOLIC ACID 1 MG/1
1000 TABLET ORAL DAILY
Qty: 30 TABLET | Refills: 2 | Status: SHIPPED | OUTPATIENT
Start: 2023-09-11

## 2023-09-22 ENCOUNTER — CLINICAL SUPPORT (OUTPATIENT)
Dept: NEUROSURGERY | Facility: CLINIC | Age: 48
End: 2023-09-22

## 2023-09-22 VITALS — SYSTOLIC BLOOD PRESSURE: 128 MMHG | DIASTOLIC BLOOD PRESSURE: 78 MMHG | TEMPERATURE: 97.3 F

## 2023-09-22 DIAGNOSIS — Z98.890 POST-OPERATIVE STATE: Primary | ICD-10-CM

## 2023-09-22 PROCEDURE — 99024 POSTOP FOLLOW-UP VISIT: CPT

## 2023-09-22 NOTE — PROGRESS NOTES
Post-Op Visit- Neurosurgery    Yomi Montalvo 50 y.o. male MRN: [id number]    Chief Complaint:  Patient presents post: Right-sided spinal cord stimulator internal pulse generator replacement - Right    History of Present Illness:  Patient presents for 2 week POV for incision check. Arrived unaccompanied and ambulated well without assistive device. Reports pain is 8/10 and is at its baseline. He is anxious to program today. Assessment:   Vitals:    09/22/23 0939   BP: 128/78   Temp: (!) 97.3 °F (36.3 °C)       Wound Exam: Incision is clean, dry, and in tact, without edema, erythema, or drainage. See below :        Procedure:  Surgical site assessment. Complications: None. Discussion/Summary:  Doing well postoperatively. Reviewed incision care with patient including daily observation for s/s infection including: increased erythema, edema, drainage, dehiscence of incision or fever >101. Should these be observed, he understands that he is to call and/or return immediately for reassessment. Advised patient to continue cleansing area with mild soap and water and pat dry. Not to apply any lotions, creams, or ointments, & not to submerge in any water for 4 more weeks. He is to maintain activity restrictions for about 4 more weeks. Activity levels were also reviewed with the patient in detail, he is to lift no greater than 10 pounds and ambulation is encouraged as tolerated. He is aware future follow-up will be on an as needed basis. She is to call the office with any further questions or concerns, or if any incisional issues or fevers would arise.

## 2023-09-25 ENCOUNTER — TELEPHONE (OUTPATIENT)
Dept: FAMILY MEDICINE CLINIC | Facility: CLINIC | Age: 48
End: 2023-09-25

## 2023-09-25 ENCOUNTER — APPOINTMENT (OUTPATIENT)
Dept: LAB | Facility: CLINIC | Age: 48
End: 2023-09-25
Payer: COMMERCIAL

## 2023-09-25 DIAGNOSIS — E55.9 VITAMIN D DEFICIENCY: Primary | ICD-10-CM

## 2023-09-25 DIAGNOSIS — R53.83 OTHER FATIGUE: ICD-10-CM

## 2023-09-25 DIAGNOSIS — D50.8 IRON DEFICIENCY ANEMIA SECONDARY TO INADEQUATE DIETARY IRON INTAKE: ICD-10-CM

## 2023-09-25 DIAGNOSIS — Z98.84 HISTORY OF ROUX-EN-Y GASTRIC BYPASS: ICD-10-CM

## 2023-09-25 DIAGNOSIS — E55.9 VITAMIN D DEFICIENCY: ICD-10-CM

## 2023-09-25 DIAGNOSIS — D50.8 OTHER IRON DEFICIENCY ANEMIA: ICD-10-CM

## 2023-09-25 DIAGNOSIS — D53.9 NUTRITIONAL ANEMIA: ICD-10-CM

## 2023-09-25 LAB
25(OH)D3 SERPL-MCNC: 53.7 NG/ML (ref 30–100)
CORTIS SERPL-MCNC: 12.2 UG/DL

## 2023-09-25 PROCEDURE — 82533 TOTAL CORTISOL: CPT

## 2023-09-25 PROCEDURE — 36415 COLL VENOUS BLD VENIPUNCTURE: CPT

## 2023-09-25 PROCEDURE — 82306 VITAMIN D 25 HYDROXY: CPT

## 2023-09-25 RX ORDER — ERGOCALCIFEROL 1.25 MG/1
50000 CAPSULE ORAL
Qty: 6 CAPSULE | Refills: 1 | Status: SHIPPED | OUTPATIENT
Start: 2023-09-25

## 2023-09-25 NOTE — TELEPHONE ENCOUNTER
Blood work indicates normal level of vitamin D now. Patient should reduce dose from once a week to twice a months. New prescription sent. Repeat level in 3 to 4 months.   Thank you

## 2023-09-25 NOTE — TELEPHONE ENCOUNTER
Called pt to inform him, no answer. Phone call just kept disconnecting and hanging up. Unable to leave message, will call back later.

## 2023-10-23 ENCOUNTER — OFFICE VISIT (OUTPATIENT)
Dept: HEMATOLOGY ONCOLOGY | Facility: CLINIC | Age: 48
End: 2023-10-23
Payer: COMMERCIAL

## 2023-10-23 ENCOUNTER — TELEPHONE (OUTPATIENT)
Dept: HEMATOLOGY ONCOLOGY | Facility: CLINIC | Age: 48
End: 2023-10-23

## 2023-10-23 VITALS
HEIGHT: 72 IN | OXYGEN SATURATION: 98 % | DIASTOLIC BLOOD PRESSURE: 80 MMHG | BODY MASS INDEX: 31.42 KG/M2 | HEART RATE: 91 BPM | SYSTOLIC BLOOD PRESSURE: 136 MMHG | TEMPERATURE: 97.8 F | RESPIRATION RATE: 18 BRPM | WEIGHT: 232 LBS

## 2023-10-23 DIAGNOSIS — D50.8 IRON DEFICIENCY ANEMIA SECONDARY TO INADEQUATE DIETARY IRON INTAKE: Primary | ICD-10-CM

## 2023-10-23 DIAGNOSIS — D75.1 ERYTHROCYTOSIS: ICD-10-CM

## 2023-10-23 DIAGNOSIS — D53.9 NUTRITIONAL ANEMIA: ICD-10-CM

## 2023-10-23 DIAGNOSIS — E53.8 VITAMIN B12 DEFICIENCY: ICD-10-CM

## 2023-10-23 DIAGNOSIS — Z98.84 HISTORY OF ROUX-EN-Y GASTRIC BYPASS: ICD-10-CM

## 2023-10-23 PROCEDURE — 99214 OFFICE O/P EST MOD 30 MIN: CPT

## 2023-10-23 RX ORDER — CYANOCOBALAMIN (VITAMIN B-12) 1000 MCG
500 TABLET, SUBLINGUAL SUBLINGUAL DAILY
Qty: 90 TABLET | Refills: 1 | Status: SHIPPED | OUTPATIENT
Start: 2023-10-23

## 2023-10-23 RX ORDER — SODIUM CHLORIDE 9 MG/ML
20 INJECTION, SOLUTION INTRAVENOUS ONCE
Status: CANCELLED | OUTPATIENT
Start: 2023-11-06

## 2023-10-24 ENCOUNTER — TELEPHONE (OUTPATIENT)
Dept: HEMATOLOGY ONCOLOGY | Facility: CLINIC | Age: 48
End: 2023-10-24

## 2023-10-24 ENCOUNTER — OFFICE VISIT (OUTPATIENT)
Dept: PAIN MEDICINE | Facility: CLINIC | Age: 48
End: 2023-10-24
Payer: COMMERCIAL

## 2023-10-24 ENCOUNTER — TELEPHONE (OUTPATIENT)
Dept: SLEEP CENTER | Facility: CLINIC | Age: 48
End: 2023-10-24

## 2023-10-24 VITALS
HEART RATE: 90 BPM | BODY MASS INDEX: 31.42 KG/M2 | TEMPERATURE: 98.5 F | SYSTOLIC BLOOD PRESSURE: 136 MMHG | WEIGHT: 232 LBS | HEIGHT: 72 IN | DIASTOLIC BLOOD PRESSURE: 84 MMHG

## 2023-10-24 DIAGNOSIS — M96.1 LUMBAR POSTLAMINECTOMY SYNDROME: Primary | ICD-10-CM

## 2023-10-24 DIAGNOSIS — M47.816 LUMBAR SPONDYLOSIS: ICD-10-CM

## 2023-10-24 DIAGNOSIS — D75.1 ERYTHROCYTOSIS: Primary | ICD-10-CM

## 2023-10-24 DIAGNOSIS — M54.16 LUMBAR RADICULOPATHY: ICD-10-CM

## 2023-10-24 PROBLEM — E53.8 VITAMIN B12 DEFICIENCY: Status: ACTIVE | Noted: 2018-09-21

## 2023-10-24 PROCEDURE — 99214 OFFICE O/P EST MOD 30 MIN: CPT | Performed by: PHYSICIAN ASSISTANT

## 2023-10-24 RX ORDER — TRAMADOL HYDROCHLORIDE 50 MG/1
50 TABLET ORAL 2 TIMES DAILY PRN
Qty: 60 TABLET | Refills: 2 | Status: SHIPPED | OUTPATIENT
Start: 2023-10-24

## 2023-10-24 NOTE — TELEPHONE ENCOUNTER
Cancel [facility]. Patient does not need a call - I just spoke with him. Spoke with Keyur's informed him that I looked at his CBC and we will have to cancel the maintenance IV. Plan at this time because his H/H is elevated. Patient reports he smokes, pack of cigarettes a day, drinks obesity and likely snores. He had an appointment with sleep medicine in January however he looked canceled on his appointment desk. Provided patient with the number for sleep medicine and asked that he call in appointment to be evaluated. Discussed the possibility cessation of smoking. Also informed him that I see is a diuretic and is not to be considered hydration, recommend that he start increasing his water intake. Plan right now is to get blood work this week a CBC with differential.  We will recheck it again in a month around Thanksgiving, and make sure H/H is going down. We discussed the risk factors of elevated counts such as heart attack, stroke, blood clots. Patient is agreeable with plan I will call him when I see the results of his CBC.

## 2023-10-24 NOTE — PROGRESS NOTES
Assessment:  1. Lumbar postlaminectomy syndrome    2. Lumbar spondylosis    3. Lumbar radiculopathy        Plan:  While the patient was in the office today, I did have a thorough conversation regarding their chronic pain syndrome, medication management, and treatment plan options. Unfortunately the patient is unable to afford the Butrans patch even though it has been quite effective in controlling his pain. After discussing options we will discontinue the patch and replace that with tramadol 50 mg twice daily as needed pain. I have electronically sent this medication to his pharmacy with 2 additional refills. Continue tizanidine which she only takes at nighttime as needed. [place] Prescription Drug Monitoring Program report was reviewed and was appropriate     There are risks associated with opioid medications, including dependence, addiction and tolerance. The patient understands and agrees to use these medications only as prescribed. Potential side effects of the medications include, but are not limited to, constipation, drowsiness, addiction, impaired judgment and risk of fatal overdose if not taken as prescribed. The patient was warned against driving while taking sedation medications. Sharing medications is a felony. At this point in time, the patient is showing no signs of addiction, abuse, diversion or suicidal ideation. The patient will follow-up in 12 weeks for medication prescription refill and reevaluation. The patient was advised to contact the office should their symptoms worsen in the interim. The patient was agreeable and verbalized an understanding. History of Present Illness: The patient is a 50 y.o. male last seen on 7/7/2023 who presents for a follow up office visit in regards to .   The patient currently reports chronic radicular low back pain that he presently rates a 7 out of 10 and describes it as a constant burning, sharp, cramping, shooting type of pain with intermittent numbness and paresthesias in the left leg. Since we last saw him he underwent the spinal cord stimulator battery replacement which is functioning well at this time. Butrans patch has been providing significant relief of pain however he is unable to financially afford it at this time, stating he is paying over $200 a month at this point. Current pain medications includes: Butrans patch and tizanidine as needed. The patient reports that this regimen is providing 60% pain relief. The patient is reporting no side effects from this pain medication regimen. Pain Contract Signed: 12/1/2022  Last Urine Drug Screen: 7/7/2023    I have personally reviewed and/or updated the patient's past medical history, past surgical history, family history, social history, current medications, allergies, and vital signs today. Review of Systems:    Review of Systems   Respiratory:  Negative for shortness of breath. Cardiovascular:  Negative for chest pain. Gastrointestinal:  Negative for constipation, diarrhea, nausea and vomiting. Musculoskeletal:  Positive for gait problem. Negative for arthralgias, joint swelling (Joint stiffness) and myalgias. Skin:  Negative for rash. Neurological:  Negative for dizziness, seizures and weakness. All other systems reviewed and are negative.         Past Medical History:   Diagnosis Date   • Allergic    • Anxiety    • Arthritis    • Asthma    • Bipolar 2 disorder, major depressive episode ([address]) 10/31/2017   • Bipolar disorder ([address])    • Chronic left lumbar radiculopathy    • Chronic low back pain    • Chronic pain syndrome    • Chronic right shoulder pain    • Depression with anxiety    • Fatigue    • GERD (gastroesophageal reflux disease)    • Hydronephrosis    • Iron deficiency anemia    • Kidney stone    • Lytic bone lesions on xray    • Nephrolithiasis    • PONV (postoperative nausea and vomiting)    • Right elbow pain    • Right lateral epicondylitis Past Surgical History:   Procedure Laterality Date   • ADENOIDECTOMY Bilateral    • APPENDECTOMY     • BACK SURGERY     • CHOLECYSTECTOMY     • CHOLECYSTECTOMY LAPAROSCOPIC N/A 10/30/2018    Procedure: CHOLECYSTECTOMY WITH GASTROTOMY LAPAROSCOPIC;  Surgeon: Georgette Rizvi MD;  Location: [facility];  Service: General   • COLONOSCOPY     • ERCP W/ SPHICTEROTOMY N/A 10/30/2018    Procedure: ENDOSCOPIC RETROGRADE CHOLANGIOPANCREATOGRAPHY (ERCP) W/ SPHINCTEROTOMY;  Surgeon: Kinza Kirkpatrick MD;  Location: [facility];  Service: Gastroenterology   • GASTRIC BYPASS      2009   • OTHER SURGICAL HISTORY      fusion/refusion of vertebrae, 2010 and 2011 l5-s1 and l4-l5   • WI ARTHROSCOPY KNEE LATERAL RELEASE Left 01/06/2021    Procedure: RELEASE RETINACULAR;  Surgeon: Chelsey Rodríguez DO;  Location: [facility];  Service: Orthopedics   • WI [employer] W/MENISCECTOMY MED/LAT W/SHVG Right 04/26/2022    Procedure: ARTHROSCOPIC MENISCECTOMY LATERAL;  Surgeon: Chelsey Rodríguez DO;  Location: [address] [place] [address] MAIN OR;  Service: Orthopedics   • WI ARTHRS KNEE DEBRIDEMENT/SHAVING ARTCLR CRTLG Left 01/06/2021    Procedure: ARTHROSCOPY KNEE;  Surgeon: Chelsey Rodríguez DO;  Location: [facility];  Service: Orthopedics   • WI CYSTO/URETERO W/LITHOTRIPSY &INDWELL STENT INSRT Right 08/08/2017    Procedure: CYSTOSCOPY; URETEROSCOPY; HOLMIUM LASER; RETROGRADE PYELOGRAM; STENT;  Surgeon: Romelia Abdi MD;  Location: [facility];  Service: Urology   • WI INCISION & DRAINAGE ABSCESS COMPLICATED/MULTIPLE Left 01/08/2021    Procedure: INCISION AND DRAINAGE (I&D) EXTREMITY- of left knee s/p MPFL reconstruction, I&D if the left knee with possible MPFL revision;  Surgeon: Chelsey Rodríguez DO;  Location: [facility];  Service: Orthopedics   • WI INSJ/RPLCMT SPI NPGR DIR/INDUXIVE COUPLING Right 11/14/2017    Procedure: REMOVAL LOWER MEDIAL BUTTOCK SPINAL CORD STIMULATOR GENERATOR; PLACEMENT OF NEW BUTTOCK SPINAL CORD STIMULATOR THROUGH SEPERATE INCISION;  Surgeon: Lisette Mccain MD;  Location:  MAIN OR;  Service: Neurosurgery   • MO INSJ/RPLCMT SPI NPGR DIR/INDUXIVE COUPLING Right 01/12/2022    Procedure: Right sided spinal cord stimulator pulse generator replacement;  Surgeon: Fabrizio Hoyos MD;  Location:  MAIN OR;  Service: Neurosurgery   • MO INSJ/RPLCMT SPI NPGR DIR/INDUXIVE COUPLING Right 9/8/2023    Procedure: Right-sided spinal cord stimulator internal pulse generator replacement;  Surgeon: Fabrizio Hoyos MD;  Location:  MAIN OR;  Service: Neurosurgery   • MO RCNSTJ [address] W/XTNSR RELIGNMT&/MUSC RL Left 01/06/2021    Procedure: REALIGNMENT PATELLA with chondroplasty of patella, open medial patellofemoral ligament reconstruction with allograft;  Surgeon: Radha Rojas DO;  Location:  MAIN OR;  Service: Orthopedics   • RIK-EN-Y PROCEDURE  2003   • SPINAL CORD STIMULATOR IMPLANT  11/14/2017    dr Sylvia Mondragon procedure and technique 1. removal of a right back implantable pulse generator 2.placement of a new right buttock impantable pulse generator through seperate incision 3 electric analys complex programming spinal cord stimulator system postoperative period approx 1 hour   • SPINE SURGERY     • TONSILLECTOMY         Family History   Problem Relation Age of Onset   • Cancer Mother    • Arthritis Mother    • Stomach cancer Mother    • Coronary artery disease Mother    • Heart disease Mother    • Asthma Mother    • COPD Mother    • Cancer Father    • Esophageal cancer Father    • Other Father         brain tumor   • Diabetes Father    • No Known Problems Sister    • No Known Problems Sister    • No Known Problems Sister        Social History     Occupational History   • Not on file   Tobacco Use   • Smoking status: Every Day     Packs/day: 1.50     Years: 5.00     Total pack years: 7.50     Types: Cigarettes, Pipe, Cigars     Start date: 1995   • Smokeless tobacco: Never   Vaping Use   • Vaping Use: Never used   Substance and Sexual Activity • Alcohol use: Yes     Comment: rare   • Drug use: Never   • Sexual activity: Not Currently     Partners: Female         Current Outpatient Medications:   •  Acetaminophen (TYLENOL 8 HOUR ARTHRITIS PAIN PO), Take by mouth, Disp: , Rfl:   •  albuterol (PROVENTIL HFA,VENTOLIN HFA) 90 mcg/act inhaler, INHALE 2 PUFFS EVERY 4 (FOUR) HOURS AS NEEDED FOR WHEEZING OR SHORTNESS OF BREATH COUGH, Disp: 18 g, Rfl: 0  •  Cyanocobalamin (Vitamin B-12) 500 MCG SUBL, Place 1 tablet (500 mcg total) under the tongue in the morning, Disp: 90 tablet, Rfl: 1  •  ergocalciferol (VITAMIN D2) 50,000 units, Take 1 capsule (50,000 Units total) by mouth every 14 (fourteen) days, Disp: 6 capsule, Rfl: 1  •  esomeprazole (NexIUM) 40 MG capsule, Take 1 capsule (40 mg total) by mouth in the morning, Disp: 30 capsule, Rfl: 3  •  folic acid (FOLVITE) 1 mg tablet, take 1 tablet by mouth once daily, Disp: 30 tablet, Rfl: 2  •  traMADol (Ultram) 50 mg tablet, Take 1 tablet (50 mg total) by mouth 2 (two) times a day as needed for moderate pain For ongoing therapy, Disp: 60 tablet, Rfl: 2  •  varenicline (Chantix Continuing Month Derrick) 1 mg tablet, Take 1 tablet (1 mg total) by mouth 2 (two) times a day, Disp: 60 tablet, Rfl: 4  •  naloxone (NARCAN) 4 mg/0.1 mL nasal spray, Administer 1 spray into a nostril. If no response after 2-3 minutes, give another dose in the other nostril using a new spray. (Patient not taking: Reported on 10/23/2023), Disp: 1 each, Rfl: 1    Allergies   Allergen Reactions   • Ampicillin GI Intolerance     Vomiting   • Penicillins GI Intolerance     Vomit       Physical Exam:    /84 (BP Location: Left arm, Patient Position: Sitting, Cuff Size: Standard)   Pulse 90   Temp 98.5 °F (36.9 °C)   Ht 6' (1.829 m)   Wt 105 kg (232 lb)   BMI 31.46 kg/m²     Constitutional:normal, well developed, well nourished, alert, in no distress and non-toxic and no overt pain behavior.   Eyes:anicteric  HEENT:grossly intact  Neck:supple, symmetric, trachea midline and no masses   Pulmonary:even and unlabored  Cardiovascular:No edema or pitting edema present  Skin:Normal without rashes or lesions and well hydrated  Psychiatric:Mood and affect appropriate  Neurologic:Cranial Nerves II-XII grossly intact  Musculoskeletal:normal      Imaging  No orders to display         No orders of the defined types were placed in this encounter.

## 2023-10-24 NOTE — PROGRESS NOTES
HEMATOLOGY / [address] [place] [address] FOLLOW UP NOTE    Primary Care Provider: Michelle Castillo MD  Referring Provider:    MRN: [id number]  : 1975    Reason for Encounter: Iron deficiency anemia and erythrocytosis         Interval History: Patient presents in the office for follow-up of his iron deficiency anemia. Patient has a history of gastric bypass surgery (Parth-en-Y) in . He was originally seen by hematology in  and received 6 IV iron treatments, Venofer 300 mg. Patient was last seen in 2023 when he took his dogs out and had a syncopal episode. Was found to have a ferritin of 6, serum iron 26 iron saturation 6%. Patient is status post IV iron treatments, Venofer 300 mg weekly x4 doses between 2023 to 2023. We had rechecked his labs in July with ferritin of 79, folate was 12.9, and is continuing folic acid supplementation daily. At times patient was feeling fatigued and continued to be short of breath. An echo was obtained 3/20/2023, EF 65%. Patient did see pulmonology for his shortness of breath, he was asked to lose weight. Patient continues to endorse fatigue, dizziness, SOB. He has not been able to work and he is recently lost his employment as he is not able to climb ladders due to dizziness. He is currently seeking other employment opportunities. Labs:  2023: Hgb 17.6, hematocrit 56.1, RBC 6.63, WBC 5.81, platelets 647,999, serum iron 88, iron saturation 26%, ferritin 47, vitamin B12 525, folate 16.8.  2023: Hgb 16.6, hematocrit 54.1, RBC 6.31,  7/3/2023: Hgb 17.2, hematocrit 54.5, RBC 6.6    She reports he snores, he had a sleep study scheduled however it was canceled. He smokes a pack of cigarettes a day. Patient reports he only drinks ice tea, his water intake is decreased.     REVIEW OF SYSTEMS:  Please note that a 14-point review of systems was performed to include Constitutional, HEENT, Respiratory, CVS, GI, , Musculoskeletal, Integumentary, Neurologic, Rheumatologic, Endocrinologic, Psychiatric, Lymphatic, and Hematologic/Oncologic systems were reviewed and are negative unless otherwise stated in HPI. Positive and negative findings pertinent to this evaluation are incorporated into the history of present illness. ECOG PS: 0    PROBLEM LIST:  Patient Active Problem List   Diagnosis    Chronic low back pain    Fatigue    Nephrolithiasis    Pain syndrome, chronic    Uncomplicated opioid dependence (HCC)    Lumbar post-laminectomy syndrome    Myofascial pain syndrome    Lumbar radiculopathy    Spinal stenosis of lumbar region with neurogenic claudication    BPH with obstruction/lower urinary tract symptoms    Iron deficiency anemia    Vitamin B deficiency    Postgastrectomy malabsorption    History of Parth-en-Y gastric bypass    Tobacco abuse    Vitamin D deficiency    PTSD (post-traumatic stress disorder)    Sacroiliitis, not elsewhere classified (HCC)    Patellofemoral instability, left    Asthma    Smoking    S/P left knee surgery    Syncope    Family history of coronary artery disease occurring prior to 54years of age    Nutritional anemia    Chronic GERD    Preop examination       Assessment / Plan: We reviewed his labs in detail. Patient is mildly symptomatic, his ferritin dropped from 79 to 47. We reviewed that his serum iron is stable. Patient I am concerned about his elevated H/H. Commend patient continue folic acid 1 mg daily, and start vitamin B-12 500 mcg sublingual tablets. Discussed etiologies of erthrocytosis: Primary polycythemia is caused by a mutation, which is either acquired or inherited. Most commonly, acquired polycythemia is caused by an acquired condition such as polycythemia vera or another myeloproliferative disorder.       Secondary polycythemia refers to the increase of RBC mass caused by elevated serum EPO, more often due to hypoxia associated polycythemia such as obstructive sleep apnea, chronic pulmonary disease, cyanotic heart disease, smoking. Not having enough fluids can cause relative erythrocytosis due to increase in plasma volume. Other causes are malignancy, self injection of recombinant EPO, use of androgens or anabolic steroids. Reviewed that his H/H is likely due to smoking, snoring, dehydration. Advised to start taking ASA 81 mg daily, provided number for sleep medicine to go ahead and schedule the sleep study, advised him to cut back or preferably complete cessation of smoking. Recommend he decrease his ice tea intake and increase his water. Like him to repeat CBC, EPO level this week. We will again check a CBC next month. I will call him with the results. Patient is currently set up for follow-up in 6 months. So we will assess as for review results. Patient agreeable with plan. He is aware to contact us for worsening symptoms and/or if he has any additional questions/concerns. I spent 30 minutes on chart review, face to face counseling time, coordination of care and documentation. Past Medical History:   has a past medical history of Allergic, Anxiety, Arthritis, Asthma, Bipolar 2 disorder, major depressive episode ([address]) (10/31/2017), Bipolar disorder ([address]), Chronic left lumbar radiculopathy, Chronic low back pain, Chronic pain syndrome, Chronic right shoulder pain, Depression with anxiety, Fatigue, GERD (gastroesophageal reflux disease), Hydronephrosis, Iron deficiency anemia, Kidney stone, Lytic bone lesions on xray, Nephrolithiasis, PONV (postoperative nausea and vomiting), Right elbow pain, and Right lateral epicondylitis. PAST SURGICAL HISTORY:   has a past surgical history that includes Gastric bypass; Back surgery; Spinal cord stimulator implant (11/14/2017); Appendectomy; pr cysto/uretero w/lithotripsy &indwell stent insrt (Right, 08/08/2017); Tonsillectomy; Adenoidectomy (Bilateral); pr insj/rplcmt spi npgr dir/induxive coupling (Right, 11/14/2017);  Other surgical history; Parth-en-y procedure (2003); ERCP w/ sphicterotomy (N/A, 10/30/2018); CHOLECYSTECTOMY LAPAROSCOPIC (N/A, 10/30/2018); Cholecystectomy; pr rcnstj dislc patella w/xtnsr relignmt&/musc rl (Left, 01/06/2021); pr arthroscopy knee lateral release (Left, 01/06/2021); pr arthrs knee debridement/shaving artclr crtlg (Left, 01/06/2021); pr incision & drainage abscess complicated/multiple (Left, 01/08/2021); pr insj/rplcmt spi npgr dir/induxive coupling (Right, 01/12/2022); pr arthrs kne surg w/meniscectomy med/lat w/shvg (Right, 04/26/2022); Spine surgery; Colonoscopy; and pr insj/rplcmt spi npgr dir/induxive coupling (Right, 9/8/2023). CURRENT MEDICATIONS  Current Outpatient Medications   Medication Sig Dispense Refill    Acetaminophen (TYLENOL 8 HOUR ARTHRITIS PAIN PO) Take by mouth      albuterol (PROVENTIL HFA,VENTOLIN HFA) 90 mcg/act inhaler INHALE 2 PUFFS EVERY 4 (FOUR) HOURS AS NEEDED FOR WHEEZING OR SHORTNESS OF BREATH COUGH 18 g 0    Cyanocobalamin (Vitamin B-12) 500 MCG SUBL Place 1 tablet (500 mcg total) under the tongue in the morning 90 tablet 1    ergocalciferol (VITAMIN D2) 50,000 units Take 1 capsule (50,000 Units total) by mouth every 14 (fourteen) days 6 capsule 1    esomeprazole (NexIUM) 40 MG capsule Take 1 capsule (40 mg total) by mouth in the morning 30 capsule 3    folic acid (FOLVITE) 1 mg tablet take 1 tablet by mouth once daily 30 tablet 2    tiZANidine (ZANAFLEX) 4 mg tablet Take 1 PO QID PRN for pain and spasms. 120 tablet 3    transdermal buprenorphine (BUTRANS) 20 mcg/hr PTWK TD patch Apply 1 patch TD every 7 days for pain. Rotate patch application sites to avoid skin irritation. Do not start before July 18, 2023. 4 patch 3    varenicline (Chantix Continuing Month Derrick) 1 mg tablet Take 1 tablet (1 mg total) by mouth 2 (two) times a day 60 tablet 4    naloxone (NARCAN) 4 mg/0.1 mL nasal spray Administer 1 spray into a nostril.  If no response after 2-3 minutes, give another dose in the other nostril using a new spray. (Patient not taking: Reported on 10/23/2023) 1 each 1     No current facility-administered medications for this visit. [unfilled]    SOCIAL HISTORY:   reports that he has been smoking cigarettes, pipe, and cigars. He started smoking about 28 years ago. He has a 7.50 pack-year smoking history. He has never used smokeless tobacco. He reports current alcohol use. He reports that he does not use drugs. FAMILY HISTORY:  family history includes Arthritis in his mother; Asthma in his mother; COPD in his mother; Cancer in his father and mother; Coronary artery disease in his mother; Diabetes in his father; Esophageal cancer in his father; Heart disease in his mother; No Known Problems in his sister, sister, and sister; Other in his father; Stomach cancer in his mother. ALLERGIES:  is allergic to ampicillin and penicillins. Physical Exam:  Vital Signs:   Visit Vitals  /80   Pulse 91   Temp 97.8 °F (36.6 °C)   Resp 18   Ht 6' (1.829 m)   Wt 105 kg (232 lb)   SpO2 98%   BMI 31.46 kg/m²   Smoking Status Every Day   BSA 2.27 m²     Body mass index is 31.46 kg/m². Body surface area is 2.27 meters squared. GEN: Alert, awake oriented x3, in no acute distress  HEENT- No pallor, icterus, cyanosis, no oral mucosal lesions,   LAD - no palpable cervical, clavicle, axillary, inguinal LAD  Heart- normal S1 S2, regular rate and rhythm, No murmur, rubs.    Lungs- clear breathing sound bilateral.   Abdomen- soft, Non tender, bowel sounds present  Extremities- No cyanosis, clubbing, edema  Neuro- No focal neurological deficit    Labs:  Lab Results   Component Value Date    WBC 5.81 09/25/2023    HGB 17.6 (H) 09/25/2023    HCT 56.1 (H) 09/25/2023    MCV 85 09/25/2023     09/25/2023     Lab Results   Component Value Date    SODIUM 138 09/25/2023    K 4.7 09/25/2023     09/25/2023    CO2 28 09/25/2023    AGAP 9 09/25/2023    BUN 12 09/25/2023    CREATININE 1.07 09/25/2023    GLUC 88 03/30/2023    GLUF 97 09/25/2023    CALCIUM 9.5 09/25/2023    AST 11 (L) 09/25/2023    ALT 14 09/25/2023    ALKPHOS 81 09/25/2023    TP 7.9 09/25/2023    TBILI 0.41 09/25/2023    EGFR 81 09/25/2023

## 2023-11-04 DIAGNOSIS — K21.9 CHRONIC GERD: ICD-10-CM

## 2023-11-04 RX ORDER — ESOMEPRAZOLE MAGNESIUM 40 MG/1
40 CAPSULE, DELAYED RELEASE ORAL EVERY MORNING
Qty: 30 CAPSULE | Refills: 0 | Status: SHIPPED | OUTPATIENT
Start: 2023-11-04

## 2023-11-16 DIAGNOSIS — G47.33 OSA (OBSTRUCTIVE SLEEP APNEA): Primary | ICD-10-CM

## 2023-12-02 DIAGNOSIS — K21.9 CHRONIC GERD: ICD-10-CM

## 2023-12-02 RX ORDER — ESOMEPRAZOLE MAGNESIUM 40 MG/1
40 CAPSULE, DELAYED RELEASE ORAL EVERY MORNING
Qty: 30 CAPSULE | Refills: 0 | Status: SHIPPED | OUTPATIENT
Start: 2023-12-02

## 2023-12-10 DIAGNOSIS — D53.9 NUTRITIONAL ANEMIA: ICD-10-CM

## 2023-12-11 RX ORDER — FOLIC ACID 1 MG/1
1000 TABLET ORAL DAILY
Qty: 30 TABLET | Refills: 2 | Status: SHIPPED | OUTPATIENT
Start: 2023-12-11

## 2023-12-23 NOTE — TELEPHONE ENCOUNTER
Attempted to contact pt, lmom to cb  Provided cb number and office hours  88 year old female with generalized weakness, FTT for 1 week. reports cough, denies fever, NVD, dysuria, chest/abdomnal/back/neck pain, HA, focal weakness/numbness. States that she is not concerned but her daughter has URI symptoms, and advised her to get checked in.

## 2023-12-30 DIAGNOSIS — K21.9 CHRONIC GERD: ICD-10-CM

## 2023-12-31 RX ORDER — ESOMEPRAZOLE MAGNESIUM 40 MG/1
40 CAPSULE, DELAYED RELEASE ORAL EVERY MORNING
Qty: 30 CAPSULE | Refills: 0 | Status: SHIPPED | OUTPATIENT
Start: 2023-12-31

## 2024-01-15 ENCOUNTER — TELEPHONE (OUTPATIENT)
Age: 49
End: 2024-01-15

## 2024-01-15 NOTE — TELEPHONE ENCOUNTER
Pt received automated phone call in reference to appt scheduled for  tomorrow 1/16/24. Pt confirmed appt.

## 2024-01-16 ENCOUNTER — OFFICE VISIT (OUTPATIENT)
Dept: PAIN MEDICINE | Facility: CLINIC | Age: 49
End: 2024-01-16
Payer: COMMERCIAL

## 2024-01-16 VITALS
SYSTOLIC BLOOD PRESSURE: 134 MMHG | DIASTOLIC BLOOD PRESSURE: 82 MMHG | WEIGHT: 224 LBS | BODY MASS INDEX: 30.34 KG/M2 | TEMPERATURE: 97.8 F | HEART RATE: 84 BPM | HEIGHT: 72 IN

## 2024-01-16 DIAGNOSIS — M47.816 LUMBAR SPONDYLOSIS: ICD-10-CM

## 2024-01-16 DIAGNOSIS — M54.16 LUMBAR RADICULOPATHY: ICD-10-CM

## 2024-01-16 DIAGNOSIS — G89.4 CHRONIC PAIN SYNDROME: ICD-10-CM

## 2024-01-16 DIAGNOSIS — Z79.891 LONG-TERM CURRENT USE OF OPIATE ANALGESIC: ICD-10-CM

## 2024-01-16 DIAGNOSIS — M96.1 LUMBAR POSTLAMINECTOMY SYNDROME: Primary | ICD-10-CM

## 2024-01-16 DIAGNOSIS — F11.20 UNCOMPLICATED OPIOID DEPENDENCE (HCC): ICD-10-CM

## 2024-01-16 PROCEDURE — 99214 OFFICE O/P EST MOD 30 MIN: CPT | Performed by: PHYSICIAN ASSISTANT

## 2024-01-16 RX ORDER — TRAMADOL HYDROCHLORIDE 50 MG/1
50 TABLET ORAL 3 TIMES DAILY PRN
Qty: 90 TABLET | Refills: 2 | Status: SHIPPED | OUTPATIENT
Start: 2024-01-16

## 2024-01-16 NOTE — PROGRESS NOTES
Assessment:  1. Lumbar postlaminectomy syndrome    2. Lumbar radiculopathy    3. Lumbar spondylosis    4. Chronic pain syndrome    5. Uncomplicated opioid dependence (HCC)    6. Long-term current use of opiate analgesic        Plan:  While the patient was in the office today, I did have a thorough conversation regarding their chronic pain syndrome, medication management, and treatment plan options.    Patient remains clinically stable on the tramadol without side effects however he is finding that 2 tablets a day has not been adequate in controlling his pain.  After discussing options we will allow him to take 3 times a day for quantity of 90.  On today's visit I have electronically sent this medication to his pharmacy with 2 additional refills.    Continue spinal cord stimulator.    [place] Prescription Drug Monitoring Program report was reviewed and was appropriate     There are risks associated with opioid medications, including dependence, addiction and tolerance. The patient understands and agrees to use these medications only as prescribed. Potential side effects of the medications include, but are not limited to, constipation, drowsiness, addiction, impaired judgment and risk of fatal overdose if not taken as prescribed. The patient was warned against driving while taking sedation medications.  Sharing medications is a felony. At this point in time, the patient is showing no signs of addiction, abuse, diversion or suicidal ideation.    The patient will follow-up in 12 weeks for medication prescription refill and reevaluation. The patient was advised to contact the office should their symptoms worsen in the interim. The patient was agreeable and verbalized an understanding.        History of Present Illness:    The patient is a 48 y.o. male last seen on 10/24/2023 who presents for a follow up office visit in regards to chronic low back pain secondary to lumbar spondylosis, lumbar postlaminectomy pain  syndrome and lumbar stenosis.  The patient currently reports chronic radicular low back pain that he presently rates an 8 out of 10 and describes it as a constant burning, sharp, throbbing and shooting type of pain intermittent radiation into the left leg.  Last visit we discontinue the Butrans patch due to cost and placed him on tramadol 50 mg twice daily.  Although he did find it helpful he states that just twice a day does not seem to be adequate in controlling his pain especially nighttime pain.  Patient utilizes a spinal cord stimulator with no issues.    Current pain medications includes: Tramadol 50 mg twice daily as needed.  The patient reports that this regimen is providing 50% pain relief.  The patient is reporting no side effects from this pain medication regimen.    Pain Contract Signed: 1/16/2024  Last Urine Drug Screen: 7/7/2023    I have personally reviewed and/or updated the patient's past medical history, past surgical history, family history, social history, current medications, allergies, and vital signs today.       Review of Systems:    Review of Systems   Respiratory:  Negative for shortness of breath.    Cardiovascular:  Negative for chest pain.   Gastrointestinal:  Negative for constipation, diarrhea, nausea and vomiting.   Musculoskeletal:  Negative for arthralgias, gait problem, joint swelling (Joint stiffness) and myalgias.   Skin:  Negative for rash.   Neurological:  Negative for dizziness, seizures and weakness.   All other systems reviewed and are negative.        Past Medical History:   Diagnosis Date   • Allergic    • Anxiety    • Arthritis    • Asthma    • Bipolar 2 disorder, major depressive episode (HCC) 10/31/2017   • Bipolar disorder (HCC)    • Chronic left lumbar radiculopathy    • Chronic low back pain    • Chronic pain syndrome    • Chronic right shoulder pain    • Depression with anxiety    • Fatigue    • GERD (gastroesophageal reflux disease)    • Hydronephrosis    • Iron  deficiency anemia    • Kidney stone    • Lytic bone lesions on xray    • Nephrolithiasis    • PONV (postoperative nausea and vomiting)    • Right elbow pain    • Right lateral epicondylitis        Past Surgical History:   Procedure Laterality Date   • ADENOIDECTOMY Bilateral    • APPENDECTOMY     • BACK SURGERY     • CHOLECYSTECTOMY     • CHOLECYSTECTOMY LAPAROSCOPIC N/A 10/30/2018    Procedure: CHOLECYSTECTOMY WITH GASTROTOMY LAPAROSCOPIC;  Surgeon: Oliver Madison MD;  [facility];  Service: General   • COLONOSCOPY     • ERCP W/ SPHICTEROTOMY N/A 10/30/2018    Procedure: ENDOSCOPIC RETROGRADE CHOLANGIOPANCREATOGRAPHY (ERCP) W/ SPHINCTEROTOMY;  Surgeon: Kendrick Meeks MD;  Location: [facility];  Service: Gastroenterology   • GASTRIC BYPASS      2009   • OTHER SURGICAL HISTORY      fusion/refusion of vertebrae, 2010 and 2011 l5-s1 and l4-l5   • OK ARTHROSCOPY KNEE LATERAL RELEASE Left 01/06/2021    Procedure: RELEASE RETINACULAR;  Surgeon: Rogers Lennon DO;  [facility]: [facility];  Service: Orthopedics   • OK ARTHRS KNE SURG W/MENISCECTOMY MED/LAT W/SHVG Right 04/26/2022    Procedure: ARTHROSCOPIC MENISCECTOMY LATERAL;  Surgeon: Rogers Lennon DO;  Location:  MAIN OR;  Service: Orthopedics   • OK ARTHRS KNEE DEBRIDEMENT/SHAVING ARTCLR CRTLG Left 01/06/2021    Procedure: ARTHROSCOPY KNEE;  Surgeon: Rogers Lennon DO;  Location: [facility];  Service: Orthopedics   • OK CYSTO/URETERO W/LITHOTRIPSY &INDWELL STENT INSRT Right 08/08/2017    Procedure: CYSTOSCOPY; URETEROSCOPY; HOLMIUM LASER; RETROGRADE PYELOGRAM; STENT;  Surgeon: Rupesh Romo MD;  Location[facility];  Service: Urology   • OK INCISION & DRAINAGE ABSCESS COMPLICATED/MULTIPLE Left 01/08/2021    Procedure: INCISION AND DRAINAGE (I&D) EXTREMITY- of left knee s/p MPFL reconstruction, I&D if the left knee with possible MPFL revision;  Surgeon: Rogers Lennon DO;  [facility];  Service: Orthopedics   • OK INSJ/RPLCMT SPI NPGR DIR/INDUXIVE  COUPLING Right 11/14/2017    Procedure: REMOVAL LOWER MEDIAL BUTTOCK SPINAL CORD STIMULATOR GENERATOR; PLACEMENT OF NEW BUTTOCK SPINAL CORD STIMULATOR THROUGH SEPERATE INCISION;  Surgeon: Stephan Duque MD;  Location: [facility];  Service: Neurosurgery   • HI INSJ/RPLCMT SPI NPGR DIR/INDUXIVE COUPLING Right 01/12/2022    Procedure: Right sided spinal cord stimulator pulse generator replacement;  Surgeon: Michael Gamez MD;  [facility];  Service: Neurosurgery   • HI INSJ/RPLCMT SPI NPGR DIR/INDUXIVE COUPLING Right 9/8/2023    Procedure: Right-sided spinal cord stimulator internal pulse generator replacement;  Surgeon: Michael Gamez MD;  [facility];  Service: Neurosurgery   • HI RCNSTJ DISLC PATELLA W/XTNSR RELIGNMT&/MUSC RL Left 01/06/2021    Procedure: REALIGNMENT PATELLA with chondroplasty of patella, open medial patellofemoral ligament reconstruction with allograft;  Surgeon: Rogers Lennon DO;  [facility];  Service: Orthopedics   • RIK-EN-Y PROCEDURE  2003   • SPINAL CORD STIMULATOR IMPLANT  11/14/2017    dr duque procedure and technique 1. removal of a right back implantable pulse generator 2.placement of a new right buttock impantable pulse generator through seperate incision 3 electric analys complex programming spinal cord stimulator system postoperative period approx 1 hour   • SPINE SURGERY     • TONSILLECTOMY         Family History   Problem Relation Age of Onset   • Cancer Mother    • Arthritis Mother    • Stomach cancer Mother    • Coronary artery disease Mother    • Heart disease Mother    • Asthma Mother    • COPD Mother    • Cancer Father    • Esophageal cancer Father    • Other Father         brain tumor   • Diabetes Father    • No Known Problems Sister    • No Known Problems Sister    • No Known Problems Sister        Social History     Occupational History   • Not on file   Tobacco Use   • Smoking status: Every Day     Current packs/day: 1.50      Average packs/day: 1.5 packs/day for 29.0 years (43.6 ttl pk-yrs)     Types: Cigarettes, Pipe, Cigars     Start date: 1995   • Smokeless tobacco: Never   Vaping Use   • Vaping status: Never Used   Substance and Sexual Activity   • Alcohol use: Yes     Comment: rare   • Drug use: Never   • Sexual activity: Not Currently     Partners: Female         Current Outpatient Medications:   •  Acetaminophen (TYLENOL 8 HOUR ARTHRITIS PAIN PO), Take by mouth, Disp: , Rfl:   •  albuterol (PROVENTIL HFA,VENTOLIN HFA) 90 mcg/act inhaler, INHALE 2 PUFFS EVERY 4 (FOUR) HOURS AS NEEDED FOR WHEEZING OR SHORTNESS OF BREATH COUGH, Disp: 18 g, Rfl: 0  •  Cyanocobalamin (Vitamin B-12) 500 MCG SUBL, Place 1 tablet (500 mcg total) under the tongue in the morning, Disp: 90 tablet, Rfl: 1  •  ergocalciferol (VITAMIN D2) 50,000 units, Take 1 capsule (50,000 Units total) by mouth every 14 (fourteen) days, Disp: 6 capsule, Rfl: 1  •  esomeprazole (NexIUM) 40 MG capsule, take 1 capsule by mouth every morning, Disp: 30 capsule, Rfl: 0  •  folic acid (FOLVITE) 1 mg tablet, take 1 tablet by mouth once daily, Disp: 30 tablet, Rfl: 2  •  traMADol (Ultram) 50 mg tablet, Take 1 tablet (50 mg total) by mouth 3 (three) times a day as needed for moderate pain For ongoing therapy, Disp: 90 tablet, Rfl: 2  •  naloxone (NARCAN) 4 mg/0.1 mL nasal spray, Administer 1 spray into a nostril. If no response after 2-3 minutes, give another dose in the other nostril using a new spray. (Patient not taking: Reported on 10/23/2023), Disp: 1 each, Rfl: 1  •  varenicline (Chantix Continuing Month Derrick) 1 mg tablet, Take 1 tablet (1 mg total) by mouth 2 (two) times a day, Disp: 60 tablet, Rfl: 4    Allergies   Allergen Reactions   • Ampicillin GI Intolerance     Vomiting   • Penicillins GI Intolerance     Vomit       Physical Exam:    /82 (BP Location: Left arm, Patient Position: Sitting, Cuff Size: Standard)   Pulse 84   Temp 97.8 °F (36.6 °C)   Ht 6' (1.829 m)    Wt 102 kg (224 lb)   BMI 30.38 kg/m²     Constitutional:normal, well developed, well nourished, alert, in no distress and non-toxic and no overt pain behavior.  Eyes:anicteric  HEENT:grossly intact  Neck:supple, symmetric, trachea midline and no masses   Pulmonary:even and unlabored  Cardiovascular:No edema or pitting edema present  Skin:Normal without rashes or lesions and well hydrated  Psychiatric:Mood and affect appropriate  Neurologic:Cranial Nerves II-XII grossly intact  Musculoskeletal: Lumbar scar from prior surgery      Imaging  No orders to display         No orders of the defined types were placed in this encounter.

## 2024-01-26 DIAGNOSIS — K21.9 CHRONIC GERD: ICD-10-CM

## 2024-01-26 RX ORDER — ESOMEPRAZOLE MAGNESIUM 40 MG/1
40 CAPSULE, DELAYED RELEASE ORAL EVERY MORNING
Qty: 90 CAPSULE | Refills: 0 | Status: SHIPPED | OUTPATIENT
Start: 2024-01-26

## 2024-02-20 ENCOUNTER — APPOINTMENT (OUTPATIENT)
Dept: LAB | Facility: CLINIC | Age: 49
End: 2024-02-20
Payer: COMMERCIAL

## 2024-02-20 DIAGNOSIS — D75.1 ERYTHROCYTOSIS: ICD-10-CM

## 2024-02-20 PROCEDURE — 36415 COLL VENOUS BLD VENIPUNCTURE: CPT

## 2024-02-20 PROCEDURE — 82668 ASSAY OF ERYTHROPOIETIN: CPT

## 2024-02-21 LAB — EPO SERPL-ACNC: 4.8 MIU/ML (ref 2.6–18.5)

## 2024-02-22 ENCOUNTER — TELEPHONE (OUTPATIENT)
Dept: HEMATOLOGY ONCOLOGY | Facility: CLINIC | Age: 49
End: 2024-02-22

## 2024-02-22 NOTE — TELEPHONE ENCOUNTER
Spoke to pt regarding his original appointment needing to be rescheduled due to ANTONIA Kmuar wanting to see him sooner to discuss lab results. New appt scheduled for Wednesday March 6 at 10:30 a.m. Pt verbalized understanding.

## 2024-03-06 ENCOUNTER — TELEPHONE (OUTPATIENT)
Dept: HEMATOLOGY ONCOLOGY | Facility: CLINIC | Age: 49
End: 2024-03-06

## 2024-03-06 ENCOUNTER — OFFICE VISIT (OUTPATIENT)
Dept: HEMATOLOGY ONCOLOGY | Facility: CLINIC | Age: 49
End: 2024-03-06
Payer: COMMERCIAL

## 2024-03-06 VITALS
DIASTOLIC BLOOD PRESSURE: 78 MMHG | OXYGEN SATURATION: 97 % | RESPIRATION RATE: 17 BRPM | BODY MASS INDEX: 29.66 KG/M2 | HEART RATE: 73 BPM | WEIGHT: 219 LBS | TEMPERATURE: 98 F | HEIGHT: 72 IN | SYSTOLIC BLOOD PRESSURE: 124 MMHG

## 2024-03-06 DIAGNOSIS — E53.8 VITAMIN B12 DEFICIENCY: ICD-10-CM

## 2024-03-06 DIAGNOSIS — D75.1 ERYTHROCYTOSIS: Primary | ICD-10-CM

## 2024-03-06 DIAGNOSIS — Z98.84 HISTORY OF ROUX-EN-Y GASTRIC BYPASS: ICD-10-CM

## 2024-03-06 DIAGNOSIS — E63.9 NUTRITIONAL DEFICIENCY: ICD-10-CM

## 2024-03-06 DIAGNOSIS — D50.8 IRON DEFICIENCY ANEMIA SECONDARY TO INADEQUATE DIETARY IRON INTAKE: ICD-10-CM

## 2024-03-06 PROCEDURE — 99214 OFFICE O/P EST MOD 30 MIN: CPT

## 2024-03-06 RX ORDER — MAGNESIUM 200 MG
1000 TABLET ORAL DAILY
Qty: 90 TABLET | Refills: 1 | Status: SHIPPED | OUTPATIENT
Start: 2024-03-06

## 2024-03-06 NOTE — PATIENT INSTRUCTIONS
Call insurance about sleep study  Drink more water  Cut back on smoking  Take vitamin B12 1000 mcg daily supplements

## 2024-03-07 PROBLEM — E63.9 NUTRITIONAL DEFICIENCY: Status: ACTIVE | Noted: 2024-03-07

## 2024-03-07 NOTE — PROGRESS NOTES
HEMATOLOGY / ONCOLOGY CLINIC FOLLOW UP NOTE    Primary Care Provider: Elba Newby MD  Referring Provider:    MRN: [id number]  : 1975    Reason for Encounter: Follow-up iron deficiency anemia and erythrocytosis.       Interval History: Patient presents in the office for follow-up of his iron deficiency anemia and erythrocytosis.  Patient has a history of gastric bypass surgery (Parth-en-Y) in .  Patient has continued to have syncopal episodes, most recent one 3 weeks ago.    Patient has a history of iron deficiency anemia and was originally seen in  and received 6 IV iron treatments, Venofer 300 mg.  We originally saw him back in the office 2023 and was found to be iron deficient and he is status post IV iron treatments, Venofer 300 mg weekly x 4 doses (2023 to 2023).    We rechecked his labs in July with a ferritin of 79, folate was 12.9 and he is continuing to take folic acid supplements daily.  Patient is not taking any vitamin B12 supplements.  At that time in 2023, patient continued to feel fatigued and continued to be short of breath.  An echo was obtained on 3/20/2023, EF 65%.  Patient was referred to pulmonology for shortness of breath, he was asked to lose weight.  At this time, patient was put on a maintenance IV iron treatment, which was discontinued due to his H/H being elevated.      Since 2023 patient's H/H continues to be elevated.  Patient was referred to sleep medicine to rule out sleep apnea as patient does snore.  The sleep study was not covered by insurance.  In addition patient continues to smoke cigarettes, 1 pack a day.  Furthermore, we had discussed at the last office visit that he decrease his ice tea intake and increase hydration with just water.  Patient has not done this and continues to drink only iced tea.    Today, patient continues to follow endorse increased fatigue, shortness of breath, had a syncopal episode 3 weeks ago, dizziness,  lightheadedness.  Patient denies pruritus, frequent headaches, erythromelalgia.  He continues to take ASA 81 mg daily.    Labs:  2/20/2024: Hgb 17.6, HCT 55.4, RBC 6.53, MCV 85, WBC 5.73, platelets 211,000, ferritin 56, iron saturation 36%, serum iron, vitamin B12 267, folate 9.3    9/25/2023: Hgb 17.6, hematocrit 56.1, RBC 6.63, WBC 5.81, platelets 231,000, serum iron 88, iron saturation 26%, ferritin 47, vitamin B12 525, folate 16.8.    8/20/2023: Hgb 16.6, hematocrit 54.1, RBC 6.31    7/3/2023: Hgb 17.2, hematocrit 54.5, RBC 6.6    REVIEW OF SYSTEMS:  Please note that a 14-point review of systems was performed to include Constitutional, HEENT, Respiratory, CVS, GI, , Musculoskeletal, Integumentary, Neurologic, Rheumatologic, Endocrinologic, Psychiatric, Lymphatic, and Hematologic/Oncologic systems were reviewed and are negative unless otherwise stated in HPI. Positive and negative findings pertinent to this evaluation are incorporated into the history of present illness.      ECOG PS: 0    PROBLEM LIST:  Patient Active Problem List   Diagnosis    Chronic low back pain    Fatigue    Nephrolithiasis    Pain syndrome, chronic    Uncomplicated opioid dependence (HCC)    Lumbar post-laminectomy syndrome    Myofascial pain syndrome    Lumbar radiculopathy    Spinal stenosis of lumbar region with neurogenic claudication    BPH with obstruction/lower urinary tract symptoms    Iron deficiency anemia    Vitamin B12 deficiency    Postgastrectomy malabsorption    History of Parth-en-Y gastric bypass    Tobacco abuse    Vitamin D deficiency    PTSD (post-traumatic stress disorder)    Sacroiliitis, not elsewhere classified (HCC)    Patellofemoral instability, left    Asthma    Smoking    S/P left knee surgery    Syncope    Family history of coronary artery disease occurring prior to 55 years of age    Nutritional anemia    Chronic GERD    Preop examination    Erythrocytosis       Assessment / Plan: 48-year-old male with iron  deficiency anemia and erythrocytosis.  We discussed that patient does need to be evaluated by sleep medicine, recommend that he call his insurance company and discuss the need for the sleep study.  In addition, I will place another referral to sleep medicine.  Patient is agreeable, referral placed.    We discussed that his erythrocytosis is multifactorial, likely related to his smoking, snoring, and lack of hydration.  Reviewed with patient that I would like to rule out myeloproliferative disorder/neoplasm by ordering an MPN panel.  Patient is agreeable, reviewed the preauthorization process.  In addition, we will obtain an ultrasound of the abdomen to assess patient's spleen.  Recommend patient cut back on smoking and increase his water intake.    Since patient's HCT is 55, recommend 1 phlebotomy treatment to help bring the counts down.  Patient is agreeable, order placed and informed them of our scheduling team will be in touch with him.    At this time, his no longer iron deficient.  Recommend he not take any iron supplementations.  We reviewed that his vitamin B12 is on the low end of normal therefore would like him to start doing B12 1000 mcg sublingual supplements.  Recommend he continue taking folic acid 1 mg daily as his folic acid is on the low end of normal.    We will see patient back in the office in 6 weeks to reassess with labs prior (CBCD, vitamin B12, folate, iron panel, MPN panel).  He is aware to contact us for any additional questions/concerns or worsening symptoms.    I spent 30 minutes on chart review, face to face counseling time, coordination of care and documentation.    Past Medical History:   has a past medical history of Allergic, Anxiety, Arthritis, Asthma, Bipolar 2 disorder, major depressive episode (HCC) (10/31/2017), Bipolar disorder (HCC), Chronic left lumbar radiculopathy, Chronic low back pain, Chronic pain syndrome, Chronic right shoulder pain, Depression with anxiety, Fatigue,  GERD (gastroesophageal reflux disease), Hydronephrosis, Iron deficiency anemia, Kidney stone, Lytic bone lesions on xray, Nephrolithiasis, PONV (postoperative nausea and vomiting), Right elbow pain, and Right lateral epicondylitis.    PAST SURGICAL HISTORY:   has a past surgical history that includes Gastric bypass; Back surgery; Spinal cord stimulator implant (11/14/2017); Appendectomy; pr cysto/uretero w/lithotripsy &indwell stent insrt (Right, 08/08/2017); Tonsillectomy; Adenoidectomy (Bilateral); pr insj/rplcmt spinal npg/rcvr pocket crtj&connj (Right, 11/14/2017); Other surgical history; Parth-en-y procedure (2003); ERCP w/ sphicterotomy (N/A, 10/30/2018); CHOLECYSTECTOMY LAPAROSCOPIC (N/A, 10/30/2018); Cholecystectomy; pr rcnstj dislc patella w/xtnsr relignmt&/musc rl (Left, 01/06/2021); pr arthroscopy knee lateral release (Left, 01/06/2021); pr arthrs knee debridement/shaving artclr crtlg (Left, 01/06/2021); pr incision & drainage abscess complicated/multiple (Left, 01/08/2021); pr insj/rplcmt spinal npg/rcvr pocket crtj&connj (Right, 01/12/2022); pr arthrs kne surg w/meniscectomy med/lat w/shvg (Right, 04/26/2022); Spine surgery; Colonoscopy; and pr insj/rplcmt spinal npg/rcvr pocket crtj&connj (Right, 9/8/2023).    CURRENT MEDICATIONS  Current Outpatient Medications   Medication Sig Dispense Refill    Acetaminophen (TYLENOL 8 HOUR ARTHRITIS PAIN PO) Take by mouth      albuterol (PROVENTIL HFA,VENTOLIN HFA) 90 mcg/act inhaler INHALE 2 PUFFS EVERY 4 (FOUR) HOURS AS NEEDED FOR WHEEZING OR SHORTNESS OF BREATH COUGH 18 g 0    Cyanocobalamin (Vitamin B-12) 1000 MCG SUBL Place 1 tablet (1,000 mcg total) under the tongue in the morning 90 tablet 1    ergocalciferol (VITAMIN D2) 50,000 units Take 1 capsule (50,000 Units total) by mouth every 14 (fourteen) days 6 capsule 1    esomeprazole (NexIUM) 40 MG capsule take 1 capsule by mouth every morning 90 capsule 0    folic acid (FOLVITE) 1 mg tablet take 1 tablet by  mouth once daily 30 tablet 2    traMADol (Ultram) 50 mg tablet Take 1 tablet (50 mg total) by mouth 3 (three) times a day as needed for moderate pain For ongoing therapy 90 tablet 2    naloxone (NARCAN) 4 mg/0.1 mL nasal spray Administer 1 spray into a nostril. If no response after 2-3 minutes, give another dose in the other nostril using a new spray. (Patient not taking: Reported on 10/23/2023) 1 each 1    varenicline (Chantix Continuing Month Pak) 1 mg tablet Take 1 tablet (1 mg total) by mouth 2 (two) times a day 60 tablet 4     No current facility-administered medications for this visit.     [unfilled]    SOCIAL HISTORY:   reports that he has been smoking cigarettes, pipe, and cigars. He started smoking about 29 years ago. He has a 43.8 pack-year smoking history. He has never used smokeless tobacco. He reports current alcohol use. He reports that he does not use drugs.     FAMILY HISTORY:  family history includes Arthritis in his mother; Asthma in his mother; COPD in his mother; Cancer in his father and mother; Coronary artery disease in his mother; Diabetes in his father; Esophageal cancer in his father; Heart disease in his mother; No Known Problems in his sister, sister, and sister; Other in his father; Stomach cancer in his mother.     ALLERGIES:  is allergic to ampicillin and penicillins.      Physical Exam:  Vital Signs:   Visit Vitals  /78 (BP Location: Left arm, Patient Position: Sitting, Cuff Size: Adult)   Pulse 73   Temp 98 °F (36.7 °C)   Resp 17   Ht 6' (1.829 m)   Wt 99.3 kg (219 lb)   SpO2 97%   BMI 29.70 kg/m²   Smoking Status Every Day   BSA 2.21 m²     Body mass index is 29.7 kg/m².  Body surface area is 2.21 meters squared.    GEN: Alert, awake oriented x3, in no acute distress  HEENT- No pallor, icterus, cyanosis, no oral mucosal lesions,   LAD - no palpable cervical, clavicle, axillary, inguinal LAD  Heart- normal S1 S2, regular rate and rhythm, No murmur, rubs.   Lungs- clear breathing  sound bilateral.   Abdomen- soft, Non tender, bowel sounds present  Extremities- No cyanosis, clubbing, edema  Neuro- No focal neurological deficit    Labs:  Lab Results   Component Value Date    WBC 5.73 02/20/2024    HGB 17.6 (H) 02/20/2024    HCT 55.4 (H) 02/20/2024    MCV 85 02/20/2024     02/20/2024     Lab Results   Component Value Date    SODIUM 138 02/20/2024    K 3.8 02/20/2024     02/20/2024    CO2 27 02/20/2024    AGAP 11 02/20/2024    BUN 13 02/20/2024    CREATININE 1.19 02/20/2024    GLUC 88 03/30/2023    GLUF 101 (H) 02/20/2024    CALCIUM 9.4 02/20/2024    AST 16 02/20/2024    ALT 23 02/20/2024    ALKPHOS 81 02/20/2024    TP 7.5 02/20/2024    TBILI 0.53 02/20/2024    EGFR 71 02/20/2024

## 2024-03-09 ENCOUNTER — HOSPITAL ENCOUNTER (OUTPATIENT)
Dept: RADIOLOGY | Facility: HOSPITAL | Age: 49
Discharge: HOME | End: 2024-03-09
Payer: COMMERCIAL

## 2024-03-09 DIAGNOSIS — D75.1 ERYTHROCYTOSIS: ICD-10-CM

## 2024-03-09 PROCEDURE — 76700 US EXAM ABDOM COMPLETE: CPT

## 2024-03-11 NOTE — UTILIZATION REVIEW
"Initial Clinical Review    Admission: Date/Time/Statement:   Admission Orders (From admission, onward)     Ordered        03/29/23 1514  Place in Observation  Once                      Orders Placed This Encounter   Procedures   • Place in Observation     Standing Status:   Standing     Number of Occurrences:   1     Order Specific Question:   Level of Care     Answer:   Med Surg [16]     ED Arrival Information     Expected   3/29/2023     Arrival   3/29/2023 11:13    Acuity   Emergent            Means of arrival   Walk-In    Escorted by   Self    Service   Hospitalist    Admission type   Emergency            Arrival complaint   syncope and collapse           Chief Complaint   Patient presents with   • Shortness of Breath     Pt reports sob, dizziness since last week, pt states he had episode of central cp radiating to L arm last week; this morning pt had syncopal episode, denies headstrike       Initial Presentation: 52 y o  male to ED presents for with an unwitnessed syncopal episode this morning  Patient does not believe he hit his head during this episode   Patient started experiencing FERMIN with steps 2 weeks ago and his symptoms have increased in severity since then  Patient also reports waking up from sleep \"gasping for air\"   Patient experienced episode of sharp chest pain and tightness that radiated to his neck and down his left arm as he was climbing steps   The pain resolved spontaneously with rest after 30 minutes  PMH for chronic back pain  Smokes 1 ppd/day  Admit Observation level of care for Chest pain and Syncope  Trend troponin  Tele monitoring  Echo, Echo Stress Test, Lipid/ TSH/ A1c      3/30   Progress notes; Continue tele monitoring  Cardiology consult pending  Echo pending  Strong family CAD history (mother with CABG x4 at age 52 and maternal cousin with cardiac stents placed x3 at age 48)     Cardiology cons; Chest pain- concerning for cardiac angina   Troponin negative, no acute ischemic " changes on EKG   TTE and NM stress pending      ED Triage Vitals [03/29/23 1121]   Temperature Pulse Respirations Blood Pressure SpO2   98 7 °F (37 1 °C) 72 16 161/95 100 %      Temp Source Heart Rate Source Patient Position - Orthostatic VS BP Location FiO2 (%)   Tympanic Monitor Lying Left arm --      Pain Score       --          Wt Readings from Last 1 Encounters:   03/28/23 104 kg (229 lb)     Additional Vital Signs:   03/30/23 07:39:39 98 °F (36 7 °C) 60 -- 109/66 80 97 % -- --   03/30/23 0725 -- -- -- -- -- -- None (Room air) --   03/30/23 03:19:29 97 6 °F (36 4 °C) 52   Abnormal  14 97/52 67 98 % -- --   03/29/23 23:56:31 98 °F (36 7 °C) 54   Abnormal  14 111/55 74 96 % -- --   03/29/23 18:47:38 98 °F (36 7 °C) 53   Abnormal  20 140/89 106 98 % -- --   03/29/23 1730 -- 52   Abnormal  18 132/75 97 99 % None (Room air) Lying   03/29/23 1630 -- 54   Abnormal  18 126/83 101 98 % None (Room air)      Pertinent Labs/Diagnostic Test Results:   No orders to display     Results from last 7 days   Lab Units 03/29/23  1642   SARS-COV-2  Negative     Results from last 7 days   Lab Units 03/30/23  0525 03/29/23  1151 03/28/23  0813   WBC Thousand/uL 6 97 6 58 5 80   HEMOGLOBIN g/dL 14 7 14 8 14 6   HEMATOCRIT % 48 2 48 4 47 3   PLATELETS Thousands/uL 333 260 205   NEUTROS ABS Thousands/µL  --  3 88 2 90         Results from last 7 days   Lab Units 03/30/23  0525 03/29/23  1151 03/28/23  0813   SODIUM mmol/L 136 136 138   POTASSIUM mmol/L 4 7 3 6 4 2   CHLORIDE mmol/L 106 105 111*   CO2 mmol/L 28 29 19*   ANION GAP mmol/L 2* 2* 8   BUN mg/dL 11 13 16   CREATININE mg/dL 0 93 0 97 0 86   EGFR ml/min/1 73sq m 97 92 103   CALCIUM mg/dL 8 8 9 1 9 1   MAGNESIUM mg/dL 2 3  --   --      Results from last 7 days   Lab Units 03/29/23  1151 03/28/23  0813   AST U/L 11 15   ALT U/L 19 18   ALK PHOS U/L 106 106   TOTAL PROTEIN g/dL 7 5 7 5   ALBUMIN g/dL 3 7 3 8   TOTAL BILIRUBIN mg/dL 0 19* 0 22         Results from last 7 days   Lab Units 03/30/23  0525 03/29/23  1151   GLUCOSE RANDOM mg/dL 88 101         Results from last 7 days   Lab Units 03/28/23  0813   HEMOGLOBIN A1C % 5 6   EAG mg/dl 114       Results from last 7 days   Lab Units 03/29/23  1400 03/29/23  1151   HS TNI 0HR ng/L  --  4   HS TNI 2HR ng/L 5  --    HSTNI D2 ng/L 1  --      Results from last 7 days   Lab Units 03/29/23  1236   D-DIMER QUANTITATIVE ug/ml FEU 0 34         Results from last 7 days   Lab Units 03/30/23  0525 03/28/23  0813   TSH 3RD GENERATON uIU/mL 4 670* 1 980       Results from last 7 days   Lab Units 03/28/23  0813   FERRITIN ng/mL 6*       Results from last 7 days   Lab Units 03/29/23  1642   INFLUENZA A PCR  Negative   INFLUENZA B PCR  Negative   RSV PCR  Negative       ED Treatment:   Medication Administration from 03/29/2023 1013 to 03/29/2023 1842     None        Past Medical History:   Diagnosis Date   • Anxiety    • Arthritis    • Asthma    • Bipolar 2 disorder, major depressive episode ([facility] [address] ) 10/31/2017   • Bipolar disorder ([facility] [address] )    • Chronic left lumbar radiculopathy    • Chronic low back pain    • Chronic pain syndrome    • Chronic right shoulder pain    • Depression with anxiety    • Fatigue    • GERD (gastroesophageal reflux disease)    • Hydronephrosis    • Iron deficiency anemia    • Kidney stone    • Lytic bone lesions on xray    • Nephrolithiasis    • PONV (postoperative nausea and vomiting)    • Right elbow pain    • Right lateral epicondylitis      Present on Admission:  • Smoking  • Chronic low back pain      Admitting Diagnosis: Syncope and collapse [R55]  Syncope [R55]  Chest pain [R07 9]  Dyspnea on exertion [R06 09]  Age/Sex: 52 y o  male     Admission Orders:  Scheduled Medications:  nicotine, 1 patch, Transdermal, Daily      Continuous IV Infusions:     PRN Meds:  acetaminophen, 650 mg, Oral, Q6H PRN  albuterol, 1 puff, Inhalation, Q6H PRN  ondansetron, 4 mg, Intravenous, Q6H PRN  tiZANidine, 4 mg, Oral, Q8H PRN        None    Network Utilization Review Department  ATTENTION: Please call with any questions or concerns to [phone] and carefully listen to the prompts so that you are directed to the right person  All voicemails are confidential   [employer] all requests for admission clinical reviews, approved or denied determinations and any other requests to dedicated fax number below belonging to the campus where the patient is receiving treatment   List of dedicated fax numbers for the Facilities:  [address] DENIALS (Administrative/Medical Necessity) [phone]   [address] (Maternity/NICU/Pediatrics) [phone]   2 Alayna Colon [phone]   [facility] [address] [phone]   [address] [place] [address] [facility] [phone]   1554 [facility] 134 815 [facility] [phone] Left arm;

## 2024-03-18 ENCOUNTER — TELEPHONE (OUTPATIENT)
Dept: INFUSION CENTER | Facility: HOSPITAL | Age: 49
End: 2024-03-18

## 2024-03-19 ENCOUNTER — HOSPITAL ENCOUNTER (OUTPATIENT)
Dept: INFUSION CENTER | Facility: HOSPITAL | Age: 49
Discharge: HOME | End: 2024-03-19
Attending: INTERNAL MEDICINE
Payer: COMMERCIAL

## 2024-03-19 VITALS
TEMPERATURE: 97 F | SYSTOLIC BLOOD PRESSURE: 128 MMHG | RESPIRATION RATE: 18 BRPM | DIASTOLIC BLOOD PRESSURE: 83 MMHG | HEART RATE: 63 BPM

## 2024-03-19 DIAGNOSIS — D75.1 ERYTHROCYTOSIS: ICD-10-CM

## 2024-03-19 DIAGNOSIS — Z98.84 HISTORY OF ROUX-EN-Y GASTRIC BYPASS: Primary | ICD-10-CM

## 2024-03-19 PROCEDURE — 99195 PHLEBOTOMY: CPT

## 2024-03-19 PROCEDURE — 96360 HYDRATION IV INFUSION INIT: CPT

## 2024-03-19 RX ADMIN — SODIUM CHLORIDE 500 ML: 0.9 INJECTION, SOLUTION INTRAVENOUS at 08:31

## 2024-03-19 NOTE — PROGRESS NOTES
Anderson Hart  tolerated treatment well with no complications.      Anderson Hart has no further infusion appts at this time.     AVS printed and given to Anderson Hart:   No (Declined by Anderson Hart)

## 2024-03-19 NOTE — PROGRESS NOTES
Patient here for therapeutic phlebotomy.  Blood Pressure started at 138/72 and ended at 128/83.  450 ml blood removed from RAC.  Gauze dressing applied and pressure held.  Patient tolerated well.  Vital signs repeated and are stable and patient observed for any reactions.

## 2024-03-22 ENCOUNTER — TELEPHONE (OUTPATIENT)
Dept: FAMILY MEDICINE CLINIC | Facility: CLINIC | Age: 49
End: 2024-03-22

## 2024-03-22 DIAGNOSIS — Z90.3 POSTGASTRECTOMY MALABSORPTION: Primary | ICD-10-CM

## 2024-03-22 DIAGNOSIS — D50.8 OTHER IRON DEFICIENCY ANEMIA: ICD-10-CM

## 2024-03-22 DIAGNOSIS — K91.2 POSTGASTRECTOMY MALABSORPTION: Primary | ICD-10-CM

## 2024-03-22 DIAGNOSIS — E55.9 VITAMIN D DEFICIENCY: ICD-10-CM

## 2024-03-22 RX ORDER — ERGOCALCIFEROL 1.25 MG/1
CAPSULE ORAL
Qty: 6 CAPSULE | Refills: 1 | Status: SHIPPED | OUTPATIENT
Start: 2024-03-22

## 2024-03-22 NOTE — TELEPHONE ENCOUNTER
Called patient and leave a message with provider message/instructions. Will try back next business days

## 2024-03-22 NOTE — TELEPHONE ENCOUNTER
Please contact patient.  I refilled vitamin D prescription.  I am adding additional blood work to his panel.  He will be due for annual physical in early July.  Please advise to schedule.  Thank you

## 2024-03-24 DIAGNOSIS — D53.9 NUTRITIONAL ANEMIA: ICD-10-CM

## 2024-03-24 RX ORDER — FOLIC ACID 1 MG/1
1000 TABLET ORAL DAILY
Qty: 30 TABLET | Refills: 2 | Status: SHIPPED | OUTPATIENT
Start: 2024-03-24

## 2024-03-25 ENCOUNTER — APPOINTMENT (OUTPATIENT)
Dept: LAB | Facility: CLINIC | Age: 49
End: 2024-03-25
Payer: COMMERCIAL

## 2024-03-25 DIAGNOSIS — D75.1 ERYTHROCYTOSIS: ICD-10-CM

## 2024-03-25 PROCEDURE — 81339 MPL GENE SEQ ALYS EXON 10: CPT

## 2024-03-25 PROCEDURE — 36415 COLL VENOUS BLD VENIPUNCTURE: CPT

## 2024-03-25 PROCEDURE — 81279 JAK2 GENE TRGT SEQUENCE ALYS: CPT

## 2024-03-25 PROCEDURE — 81270 JAK2 GENE: CPT

## 2024-03-25 PROCEDURE — 81219 CALR GENE COM VARIANTS: CPT

## 2024-03-29 LAB — JAK2 P.V617F BLD/T QL: NORMAL

## 2024-03-31 ENCOUNTER — HOSPITAL ENCOUNTER (OUTPATIENT)
Dept: SLEEP CENTER | Facility: CLINIC | Age: 49
Discharge: HOME | End: 2024-03-31
Payer: COMMERCIAL

## 2024-03-31 DIAGNOSIS — G47.33 OSA (OBSTRUCTIVE SLEEP APNEA): ICD-10-CM

## 2024-03-31 PROCEDURE — G0399 HOME SLEEP TEST/TYPE 3 PORTA: HCPCS

## 2024-04-03 PROBLEM — G47.33 OSA (OBSTRUCTIVE SLEEP APNEA): Status: ACTIVE | Noted: 2024-04-03

## 2024-04-05 PROBLEM — G47.9 SLEEP DISTURBANCE: Status: ACTIVE | Noted: 2024-04-05

## 2024-04-05 PROCEDURE — 95806 SLEEP STUDY UNATT&RESP EFFT: CPT | Performed by: INTERNAL MEDICINE

## 2024-04-09 ENCOUNTER — OFFICE VISIT (OUTPATIENT)
Dept: PAIN MEDICINE | Facility: CLINIC | Age: 49
End: 2024-04-09
Payer: COMMERCIAL

## 2024-04-09 VITALS
SYSTOLIC BLOOD PRESSURE: 130 MMHG | HEIGHT: 72 IN | HEART RATE: 88 BPM | WEIGHT: 213 LBS | DIASTOLIC BLOOD PRESSURE: 82 MMHG | TEMPERATURE: 97.9 F | BODY MASS INDEX: 28.85 KG/M2

## 2024-04-09 DIAGNOSIS — M54.42 CHRONIC LOW BACK PAIN WITH LEFT-SIDED SCIATICA, UNSPECIFIED BACK PAIN LATERALITY: ICD-10-CM

## 2024-04-09 DIAGNOSIS — G89.4 CHRONIC PAIN SYNDROME: ICD-10-CM

## 2024-04-09 DIAGNOSIS — G89.29 CHRONIC LOW BACK PAIN WITH LEFT-SIDED SCIATICA, UNSPECIFIED BACK PAIN LATERALITY: ICD-10-CM

## 2024-04-09 DIAGNOSIS — Z79.891 LONG-TERM CURRENT USE OF OPIATE ANALGESIC: ICD-10-CM

## 2024-04-09 DIAGNOSIS — F11.20 UNCOMPLICATED OPIOID DEPENDENCE (HCC): ICD-10-CM

## 2024-04-09 DIAGNOSIS — M47.816 LUMBAR SPONDYLOSIS: ICD-10-CM

## 2024-04-09 DIAGNOSIS — M96.1 LUMBAR POSTLAMINECTOMY SYNDROME: Primary | ICD-10-CM

## 2024-04-09 PROCEDURE — 99214 OFFICE O/P EST MOD 30 MIN: CPT | Performed by: PHYSICIAN ASSISTANT

## 2024-04-09 RX ORDER — TRAMADOL HYDROCHLORIDE 50 MG/1
50 TABLET ORAL 3 TIMES DAILY PRN
Qty: 90 TABLET | Refills: 0 | Status: SHIPPED | OUTPATIENT
Start: 2024-04-09

## 2024-04-09 NOTE — PROGRESS NOTES
Assessment:  1. Lumbar postlaminectomy syndrome    2. Lumbar spondylosis    3. Chronic low back pain with left-sided sciatica, unspecified back pain laterality    4. Chronic pain syndrome    5. Uncomplicated opioid dependence (HCC)    6. Long-term current use of opiate analgesic        Plan:  While the patient was in the office today, I did have a thorough conversation regarding their chronic pain syndrome, medication management, and treatment plan options.    The patient remains clinically stable and well-controlled with the tramadol 50 mg 3 times daily as needed pain.  On today's visit I have electronically sent a refill to his pharmacy with no additional refills.    [place] Prescription Drug Monitoring Program report was reviewed and was appropriate     There are risks associated with opioid medications, including dependence, addiction and tolerance. The patient understands and agrees to use these medications only as prescribed. Potential side effects of the medications include, but are not limited to, constipation, drowsiness, addiction, impaired judgment and risk of fatal overdose if not taken as prescribed. The patient was warned against driving while taking sedation medications.  Sharing medications is a felony. At this point in time, the patient is showing no signs of addiction, abuse, diversion or suicidal ideation.    Plan on urine drug screen next visit.    The patient will follow-up in 4 weeks for medication prescription refill and reevaluation. The patient was advised to contact the office should their symptoms worsen in the interim. The patient was agreeable and verbalized an understanding.        History of Present Illness:    The patient is a 48 y.o. male last seen on 1/16/2024 who presents for a follow up office visit in regards to chronic low back pain secondary to lumbar spondylosis, degenerative disc disease and a postlaminectomy pain syndrome.  The patient currently reports low back pain  that he presently rates a 7 out of 10 and describes it as a constant burning, dull, aching, sharp, throbbing and shooting pain that radiates down the left lower extremity.  He has intermittent numbness and paresthesias in the left leg.  He has no new symptoms or acute issues on today's visit and remains clinically stable on the medication.    Current pain medications includes: Tramadol 50 mg 3 times daily as needed ?.  The patient reports that this regimen is providing 70% pain relief.  The patient is reporting no side effects from this pain medication regimen.    Pain Contract Signed: 1/16/2024  Last Urine Drug Screen: 4/9/2024    I have personally reviewed and/or updated the patient's past medical history, past surgical history, family history, social history, current medications, allergies, and vital signs today.       Review of Systems:    Review of Systems   Respiratory:  Negative for shortness of breath.    Cardiovascular:  Negative for chest pain.   Gastrointestinal:  Negative for constipation, diarrhea, nausea and vomiting.   Musculoskeletal:  Negative for arthralgias, gait problem, joint swelling and myalgias.   Skin:  Negative for rash.   Neurological:  Negative for dizziness, seizures and weakness.   All other systems reviewed and are negative.        Past Medical History:   Diagnosis Date   • Allergic    • Anxiety    • Arthritis    • Asthma    • Bipolar 2 disorder, major depressive episode (HCC) 10/31/2017   • Bipolar disorder (HCC)    • Chronic left lumbar radiculopathy    • Chronic low back pain    • Chronic pain syndrome    • Chronic right shoulder pain    • Depression with anxiety    • Fatigue    • GERD (gastroesophageal reflux disease)    • Hydronephrosis    • Iron deficiency anemia    • Kidney stone    • Lytic bone lesions on xray    • Nephrolithiasis    • PONV (postoperative nausea and vomiting)    • Right elbow pain    • Right lateral epicondylitis        Past Surgical History:   Procedure  Laterality Date   • ADENOIDECTOMY Bilateral    • APPENDECTOMY     • BACK SURGERY     • CHOLECYSTECTOMY     • CHOLECYSTECTOMY LAPAROSCOPIC N/A 10/30/2018    Procedure: CHOLECYSTECTOMY WITH GASTROTOMY LAPAROSCOPIC;  Surgeon: Oliver Madison MD;  [facility];  Service: General   • COLONOSCOPY     • ERCP W/ SPHICTEROTOMY N/A 10/30/2018    Procedure: ENDOSCOPIC RETROGRADE CHOLANGIOPANCREATOGRAPHY (ERCP) W/ SPHINCTEROTOMY;  Surgeon: Kendrick Meeks MD;  Location: [facility];  Service: Gastroenterology   • GASTRIC BYPASS      2009   • OTHER SURGICAL HISTORY      fusion/refusion of vertebrae, 2010 and 2011 l5-s1 and l4-l5   • NJ ARTHROSCOPY KNEE LATERAL RELEASE Left 01/06/2021    Procedure: RELEASE RETINACULAR;  Surgeon: Rogers Lennon DO;  [facility];  Service: Orthopedics   • NJ ARTHRS KNE SURG W/MENISCECTOMY MED/LAT W/SHVG Right 04/26/2022    Procedure: ARTHROSCOPIC MENISCECTOMY LATERAL;  Surgeon: Rogers Lennon DO;  Location: [facility];  Service: Orthopedics   • NJ ARTHRS KNEE DEBRIDEMENT/SHAVING ARTCLR CRTLG Left 01/06/2021    Procedure: ARTHROSCOPY KNEE;  Surgeon: Rogers Lennon DO;  [facility]: [facility];  Service: Orthopedics   • NJ CYSTO/URETERO W/LITHOTRIPSY &INDWELL STENT INSRT Right 08/08/2017    Procedure: CYSTOSCOPY; URETEROSCOPY; HOLMIUM LASER; RETROGRADE PYELOGRAM; STENT;  Surgeon: Rupesh Romo MD;  Location: [facility];  Service: Urology   • NJ INCISION & DRAINAGE ABSCESS COMPLICATED/MULTIPLE Left 01/08/2021    Procedure: INCISION AND DRAINAGE (I&D) EXTREMITY- of left knee s/p MPFL reconstruction, I&D if the left knee with possible MPFL revision;  Surgeon: Rogers Lennon DO;  [facility];  Service: Orthopedics   • NJ INSJ/RPLCMT SPINAL NPG/RCVR POCKET CRTJ&CONNJ Right 11/14/2017    Procedure: REMOVAL LOWER MEDIAL BUTTOCK SPINAL CORD STIMULATOR GENERATOR; PLACEMENT OF NEW BUTTOCK SPINAL CORD STIMULATOR THROUGH SEPERATE INCISION;  Surgeon: Stephan Duque MD;  Location: [facility];  Service: Neurosurgery   • VT INSJ/RPLCMT SPINAL NPG/RCVR POCKET CRTJ&CONNJ Right 01/12/2022    Procedure: Right sided spinal cord stimulator pulse generator replacement;  Surgeon: Michael Gamez MD;  [facility];  Service: Neurosurgery   • VT INSJ/RPLCMT SPINAL NPG/RCVR POCKET CRTJ&CONNJ Right 9/8/2023    Procedure: Right-sided spinal cord stimulator internal pulse generator replacement;  Surgeon: Michael Gamez MD;  [facility];  Service: Neurosurgery   • VT RCNSTJ DISLC PATELLA W/XTNSR RELIGNMT&/MUSC RL Left 01/06/2021    Procedure: REALIGNMENT PATELLA with chondroplasty of patella, open medial patellofemoral ligament reconstruction with allograft;  Surgeon: Rogers Lennon DO;  [facility];  Service: Orthopedics   • RIK-EN-Y PROCEDURE  2003   • SPINAL CORD STIMULATOR IMPLANT  11/14/2017    dr ross procedure and technique 1. removal of a right back implantable pulse generator 2.placement of a new right buttock impantable pulse generator through seperate incision 3 electric analys complex programming spinal cord stimulator system postoperative period approx 1 hour   • SPINE SURGERY     • TONSILLECTOMY         Family History   Problem Relation Age of Onset   • Cancer Mother    • Arthritis Mother    • Stomach cancer Mother    • Coronary artery disease Mother    • Heart disease Mother    • Asthma Mother    • COPD Mother    • Cancer Father    • Esophageal cancer Father    • Other Father         brain tumor   • Diabetes Father    • No Known Problems Sister    • No Known Problems Sister    • No Known Problems Sister        Social History     Occupational History   • Not on file   Tobacco Use   • Smoking status: Every Day     Current packs/day: 1.50     Average packs/day: 1.5 packs/day for 29.3 years (43.9 ttl pk-yrs)     Types: Cigarettes, Pipe, Cigars     Start date: 1995   • Smokeless tobacco: Never   Vaping Use   • Vaping status: Never Used   Substance and Sexual  Activity   • Alcohol use: Yes     Comment: rare   • Drug use: Never   • Sexual activity: Not Currently     Partners: Female         Current Outpatient Medications:   •  Acetaminophen (TYLENOL 8 HOUR ARTHRITIS PAIN PO), Take by mouth, Disp: , Rfl:   •  albuterol (PROVENTIL HFA,VENTOLIN HFA) 90 mcg/act inhaler, INHALE 2 PUFFS EVERY 4 (FOUR) HOURS AS NEEDED FOR WHEEZING OR SHORTNESS OF BREATH COUGH, Disp: 18 g, Rfl: 0  •  Cyanocobalamin (Vitamin B-12) 1000 MCG SUBL, Place 1 tablet (1,000 mcg total) under the tongue in the morning, Disp: 90 tablet, Rfl: 1  •  ergocalciferol (VITAMIN D2) 50,000 units, TAKE 1 CAPSULE BY MOUTH EVERY 14 DAYS, Disp: 6 capsule, Rfl: 1  •  esomeprazole (NexIUM) 40 MG capsule, take 1 capsule by mouth every morning, Disp: 90 capsule, Rfl: 0  •  folic acid (FOLVITE) 1 mg tablet, take 1 tablet by mouth once daily, Disp: 30 tablet, Rfl: 2  •  traMADol (Ultram) 50 mg tablet, Take 1 tablet (50 mg total) by mouth 3 (three) times a day as needed for moderate pain For ongoing therapy DO NOT FILL BEFORE: 04/11/24, Disp: 90 tablet, Rfl: 0  •  naloxone (NARCAN) 4 mg/0.1 mL nasal spray, Administer 1 spray into a nostril. If no response after 2-3 minutes, give another dose in the other nostril using a new spray. (Patient not taking: Reported on 10/23/2023), Disp: 1 each, Rfl: 1  •  varenicline (Chantix Continuing Month Derrick) 1 mg tablet, Take 1 tablet (1 mg total) by mouth 2 (two) times a day, Disp: 60 tablet, Rfl: 4    Allergies   Allergen Reactions   • Ampicillin GI Intolerance     Vomiting   • Penicillins GI Intolerance     Vomit       Physical Exam:    /82 (BP Location: Left arm, Patient Position: Sitting, Cuff Size: Standard)   Pulse 88   Temp 97.9 °F (36.6 °C)   Ht 6' (1.829 m)   Wt 96.6 kg (213 lb)   BMI 28.89 kg/m²     Constitutional:normal, well developed, well nourished, alert, in no distress and non-toxic and no overt pain behavior.  Eyes:anicteric  HEENT:grossly intact  Neck:supple,  symmetric, trachea midline and no masses   Pulmonary:even and unlabored  Cardiovascular:No edema or pitting edema present  Skin:Normal without rashes or lesions and well hydrated  Psychiatric:Mood and affect appropriate  Neurologic:Cranial Nerves II-XII grossly intact  Musculoskeletal: Gait is stable without the use of assistive devices, lumbar scar from prior surgery      Imaging  No orders to display         No orders of the defined types were placed in this encounter.

## 2024-04-10 ENCOUNTER — TELEPHONE (OUTPATIENT)
Age: 49
End: 2024-04-10

## 2024-04-10 NOTE — TELEPHONE ENCOUNTER
Patient called and said they received a call from us but there were no notes to refer the call to patient is going to listen to the message again and give us a call back.

## 2024-04-10 NOTE — TELEPHONE ENCOUNTER
Patient called back stating the call was from pul with a message to call the office.  No notes in computer.  Pt wondering is maybe someone was calling regarding recent sleep study.    Patient requesting call back   No

## 2024-04-11 ENCOUNTER — APPOINTMENT (OUTPATIENT)
Dept: LAB | Facility: CLINIC | Age: 49
End: 2024-04-11
Payer: COMMERCIAL

## 2024-04-17 ENCOUNTER — OFFICE VISIT (OUTPATIENT)
Dept: HEMATOLOGY ONCOLOGY | Facility: CLINIC | Age: 49
End: 2024-04-17
Payer: COMMERCIAL

## 2024-04-17 ENCOUNTER — TELEPHONE (OUTPATIENT)
Dept: HEMATOLOGY ONCOLOGY | Facility: CLINIC | Age: 49
End: 2024-04-17

## 2024-04-17 VITALS
DIASTOLIC BLOOD PRESSURE: 78 MMHG | WEIGHT: 213 LBS | OXYGEN SATURATION: 98 % | HEIGHT: 72 IN | TEMPERATURE: 97.5 F | HEART RATE: 76 BPM | RESPIRATION RATE: 17 BRPM | SYSTOLIC BLOOD PRESSURE: 128 MMHG | BODY MASS INDEX: 28.85 KG/M2

## 2024-04-17 DIAGNOSIS — D75.1 ERYTHROCYTOSIS: Primary | ICD-10-CM

## 2024-04-17 DIAGNOSIS — E53.8 VITAMIN B12 DEFICIENCY: ICD-10-CM

## 2024-04-17 DIAGNOSIS — E63.9 NUTRITIONAL DEFICIENCY: ICD-10-CM

## 2024-04-17 DIAGNOSIS — D50.8 IRON DEFICIENCY ANEMIA SECONDARY TO INADEQUATE DIETARY IRON INTAKE: ICD-10-CM

## 2024-04-17 DIAGNOSIS — Z98.84 HISTORY OF ROUX-EN-Y GASTRIC BYPASS: ICD-10-CM

## 2024-04-17 PROCEDURE — 99213 OFFICE O/P EST LOW 20 MIN: CPT

## 2024-04-17 NOTE — PROGRESS NOTES
HEMATOLOGY / ONCOLOGY CLINIC FOLLOW UP NOTE    Primary Care Provider: Elba Newby MD  Referring Provider:    MRN: [id number]  : 1975    Reason for Encounter: Follow-up iron deficiency anemia and erythrocytosis          Interval History:  Patient presents in the office for follow-up of his iron deficiency anemia and erythrocytosis. Patient has a history of gastric bypass surgery (Parth-en-Y) in . Patient has not had a syncopal episode however, has had multiple in the past.    Patient has a history of iron deficiency anemia and was originally seen in 2018 and received 6 IV iron treatments, Venofer 300 mg. We originally saw him back in the office 2023 and was found to be iron deficient and he is status post IV iron treatments, Venofer 300 mg weekly x 4 doses (2023 to 2023).     Patient is status post one phlebotomy treatment on 3/19/2024.  He tolerated the treatment without incident.  Patient is taking vitamin B12 supplements 1000 mcg daily, in addition to folic acid 1 mg daily.  He reports that he has been increasing his water intake but still drinks iced tea.  Patient has cut back on his smoking to less than a pack a day, previously smoking 1 pack a day.    Patient did have a home sleep study completed, which was negative for sleep apnea however, patient states that he was waking up every hour.  The sleep medicine center had suggested that he get a sleep study repeated at their facility.  Patient has not pursued this option yet.      Labs:  2024: Hgb 16.8, HCT 51.9, RBC 6.14, MCV 85, WBC 7.6, serum iron 73, iron saturation 24%, ferritin 37, vitamin B12 508, folate WNL, platelets 250,000    3/25/2024: MPN panel  JAK2 V617F mutation -not detected  CALR-not detected  MPL-not detected  A 12-15-not detected    2024: Hgb 17.6, HCT 55.4, RBC 6.53, MCV 85, WBC 5.73, platelets 211,000, ferritin 56, iron saturation 36%, serum iron, vitamin B12 267, folate 9.3     2023: Hgb  17.6, hematocrit 56.1, RBC 6.63, WBC 5.81, platelets 231,000, serum iron 88, iron saturation 26%, ferritin 47, vitamin B12 525, folate 16.8.     8/20/2023: Hgb 16.6, hematocrit 54.1, RBC 6.31     7/3/2023: Hgb 17.2, hematocrit 54.5, RBC 6.6    Treatment:  5/1/2023 - 5/22/2023 - Venofer 300 mg x 4 doses + IM B12 injections x 4 doses plus folate 400 mcg daily     10/23/23/023: Patient noted to have elevated H&H, MPN panel was negative likely secondary cause due to smoking, snoring, dehydration.  Recommend patient increase his hydration, have a sleep study, and decrease his smoking.    3/19/2024: Status post one phlebotomy treatment      REVIEW OF SYSTEMS:  Please note that a 14-point review of systems was performed to include Constitutional, HEENT, Respiratory, CVS, GI, , Musculoskeletal, Integumentary, Neurologic, Rheumatologic, Endocrinologic, Psychiatric, Lymphatic, and Hematologic/Oncologic systems were reviewed and are negative unless otherwise stated in HPI. Positive and negative findings pertinent to this evaluation are incorporated into the history of present illness.      ECOG PS: 0     PROBLEM LIST:  Patient Active Problem List   Diagnosis    Chronic low back pain    Fatigue    Nephrolithiasis    Pain syndrome, chronic    Uncomplicated opioid dependence (HCC)    Lumbar post-laminectomy syndrome    Myofascial pain syndrome    Lumbar radiculopathy    Spinal stenosis of lumbar region with neurogenic claudication    BPH with obstruction/lower urinary tract symptoms    Iron deficiency anemia    Vitamin B12 deficiency    Postgastrectomy malabsorption    History of Parth-en-Y gastric bypass    Tobacco abuse    Vitamin D deficiency    PTSD (post-traumatic stress disorder)    Sacroiliitis, not elsewhere classified (HCC)    Patellofemoral instability, left    Asthma    Smoking    S/P left knee surgery    Syncope    Family history of coronary artery disease occurring prior to 55 years of age    Nutritional anemia     Chronic GERD    Preop examination    Erythrocytosis    Nutritional deficiency    ROMAN (obstructive sleep apnea)    Sleep disturbance       Assessment / Plan: We reviewed patient's labs.  His hematocrit is slightly elevated.  Discussed that his erythrocytosis is likely secondary to smoking, snoring, lack of hydration.      Congratulated patient on cutting back his smoking, encouraged complete smoking cessation.  In addition, encouraged him to completely stop ice tea and only drink water.  Provided patient with Pedialyte flavors to help with hydration.      In addition, encourage patient to make a sleep medicine follow-up visit to get the sleep study done at their facility for more accuracy.  Since patient has a long history of smoking, I would like to rule out sleep apnea.    At this time, recommend one-time phlebotomy treatment with for 50 cc to be taken out and patient to be given 500 cc NSS after treatment.  We discussed that we have to be careful as his ferritin is getting lower.  In addition, recommend he continue his vitamin B12 and folate supplements daily.    Patient is agreeable to one more phlebotomy treatment.  We will see him back in 4 months with repeat labs (CBCD, iron panel, vitamin B12, folate).        I spent 20 minutes on chart review, face to face counseling time, coordination of care and documentation.    Past Medical History:   has a past medical history of Allergic, Anxiety, Arthritis, Asthma, Bipolar 2 disorder, major depressive episode (HCC) (10/31/2017), Bipolar disorder (HCC), Chronic left lumbar radiculopathy, Chronic low back pain, Chronic pain syndrome, Chronic right shoulder pain, Depression with anxiety, Fatigue, GERD (gastroesophageal reflux disease), Hydronephrosis, Iron deficiency anemia, Kidney stone, Lytic bone lesions on xray, Nephrolithiasis, PONV (postoperative nausea and vomiting), Right elbow pain, and Right lateral epicondylitis.    PAST SURGICAL HISTORY:   has a past surgical  history that includes Gastric bypass; Back surgery; Spinal cord stimulator implant (11/14/2017); Appendectomy; pr cysto/uretero w/lithotripsy &indwell stent insrt (Right, 08/08/2017); Tonsillectomy; Adenoidectomy (Bilateral); pr insj/rplcmt spinal npg/rcvr pocket crtj&connj (Right, 11/14/2017); Other surgical history; Parth-en-y procedure (2003); ERCP w/ sphicterotomy (N/A, 10/30/2018); CHOLECYSTECTOMY LAPAROSCOPIC (N/A, 10/30/2018); Cholecystectomy; pr rcnstj dislc patella w/xtnsr relignmt&/musc rl (Left, 01/06/2021); pr arthroscopy knee lateral release (Left, 01/06/2021); pr arthrs knee debridement/shaving artclr crtlg (Left, 01/06/2021); pr incision & drainage abscess complicated/multiple (Left, 01/08/2021); pr insj/rplcmt spinal npg/rcvr pocket crtj&connj (Right, 01/12/2022); pr arthrs kne surg w/meniscectomy med/lat w/shvg (Right, 04/26/2022); Spine surgery; Colonoscopy; and pr insj/rplcmt spinal npg/rcvr pocket crtj&connj (Right, 9/8/2023).    CURRENT MEDICATIONS  Current Outpatient Medications   Medication Sig Dispense Refill    Acetaminophen (TYLENOL 8 HOUR ARTHRITIS PAIN PO) Take by mouth      albuterol (PROVENTIL HFA,VENTOLIN HFA) 90 mcg/act inhaler INHALE 2 PUFFS EVERY 4 (FOUR) HOURS AS NEEDED FOR WHEEZING OR SHORTNESS OF BREATH COUGH 18 g 0    Cyanocobalamin (Vitamin B-12) 1000 MCG SUBL Place 1 tablet (1,000 mcg total) under the tongue in the morning 90 tablet 1    ergocalciferol (VITAMIN D2) 50,000 units TAKE 1 CAPSULE BY MOUTH EVERY 14 DAYS 6 capsule 1    esomeprazole (NexIUM) 40 MG capsule take 1 capsule by mouth every morning 90 capsule 0    folic acid (FOLVITE) 1 mg tablet take 1 tablet by mouth once daily 30 tablet 2    naloxone (NARCAN) 4 mg/0.1 mL nasal spray Administer 1 spray into a nostril. If no response after 2-3 minutes, give another dose in the other nostril using a new spray. 1 each 1    traMADol (Ultram) 50 mg tablet Take 1 tablet (50 mg total) by mouth 3 (three) times a day as needed for  moderate pain For ongoing therapy DO NOT FILL BEFORE: 04/11/24 90 tablet 0    varenicline (Chantix Continuing Month Pak) 1 mg tablet Take 1 tablet (1 mg total) by mouth 2 (two) times a day 60 tablet 4     No current facility-administered medications for this visit.     [unfilled]    SOCIAL HISTORY:   reports that he has been smoking cigarettes, pipe, and cigars. He started smoking about 29 years ago. He has a 43.9 pack-year smoking history. He has never used smokeless tobacco. He reports current alcohol use. He reports that he does not use drugs.     FAMILY HISTORY:  family history includes Arthritis in his mother; Asthma in his mother; COPD in his mother; Cancer in his father and mother; Coronary artery disease in his mother; Diabetes in his father; Esophageal cancer in his father; Heart disease in his mother; No Known Problems in his sister, sister, and sister; Other in his father; Stomach cancer in his mother.     ALLERGIES:  is allergic to ampicillin and penicillins.      Physical Exam:  Vital Signs:   Visit Vitals  /78 (BP Location: Left arm, Patient Position: Sitting, Cuff Size: Adult)   Pulse 76   Temp 97.5 °F (36.4 °C)   Resp 17   Ht 6' (1.829 m)   Wt 96.6 kg (213 lb)   SpO2 98%   BMI 28.89 kg/m²   Smoking Status Every Day   BSA 2.19 m²     Body mass index is 28.89 kg/m².  Body surface area is 2.19 meters squared.    GEN: Alert, awake oriented x3, in no acute distress  HEENT- No pallor, icterus, cyanosis, no oral mucosal lesions,   LAD - no palpable cervical, clavicle, axillary, inguinal LAD  Heart- normal S1 S2, regular rate and rhythm, No murmur, rubs.   Lungs- clear breathing sound bilateral.   Abdomen- soft, Non tender, bowel sounds present  Extremities- No cyanosis, clubbing, edema  Neuro- No focal neurological deficit    Labs:  Lab Results   Component Value Date    WBC 7.60 04/11/2024    HGB 16.8 04/11/2024    HCT 51.9 (H) 04/11/2024    MCV 85 04/11/2024     04/11/2024     Lab Results    Component Value Date    SODIUM 141 04/11/2024    K 4.4 04/11/2024     04/11/2024    CO2 28 04/11/2024    AGAP 9 04/11/2024    BUN 12 04/11/2024    CREATININE 1.21 04/11/2024    GLUC 88 03/30/2023    GLUF 96 04/11/2024    CALCIUM 9.3 04/11/2024    AST 14 04/11/2024    ALT 18 04/11/2024    ALKPHOS 85 04/11/2024    TP 7.2 04/11/2024    TBILI 0.51 04/11/2024    EGFR 70 04/11/2024

## 2024-04-26 ENCOUNTER — TELEPHONE (OUTPATIENT)
Dept: PAIN MEDICINE | Facility: CLINIC | Age: 49
End: 2024-04-26

## 2024-04-26 NOTE — TELEPHONE ENCOUNTER
Caller: Keyur    Doctor: Johny    Reason for call: patient has Kidney Stones is at [facility] calling to make you aware they treating him with medication.     Call back#: [phone]

## 2024-05-01 ENCOUNTER — TELEPHONE (OUTPATIENT)
Dept: INFUSION CENTER | Facility: HOSPITAL | Age: 49
End: 2024-05-01

## 2024-05-01 NOTE — TELEPHONE ENCOUNTER
I called patient and left voicemail requesting return call. As clarified with Torri Kumar, labs from 4/25 fall below parameters and patient does not require therapeutic phlebotomy.  No future appts at this time.  Will see Torri in August as planned.

## 2024-05-02 ENCOUNTER — HOSPITAL ENCOUNTER (OUTPATIENT)
Dept: INFUSION CENTER | Facility: HOSPITAL | Age: 49
Discharge: HOME | End: 2024-05-02
Attending: INTERNAL MEDICINE

## 2024-05-06 DIAGNOSIS — K21.9 CHRONIC GERD: ICD-10-CM

## 2024-05-06 RX ORDER — ESOMEPRAZOLE MAGNESIUM 40 MG/1
40 CAPSULE, DELAYED RELEASE ORAL EVERY MORNING
Qty: 90 CAPSULE | Refills: 0 | Status: SHIPPED | OUTPATIENT
Start: 2024-05-06

## 2024-05-07 ENCOUNTER — OFFICE VISIT (OUTPATIENT)
Dept: PAIN MEDICINE | Facility: CLINIC | Age: 49
End: 2024-05-07
Payer: COMMERCIAL

## 2024-05-07 VITALS
TEMPERATURE: 96.8 F | HEIGHT: 72 IN | WEIGHT: 207 LBS | BODY MASS INDEX: 28.04 KG/M2 | DIASTOLIC BLOOD PRESSURE: 80 MMHG | SYSTOLIC BLOOD PRESSURE: 126 MMHG | HEART RATE: 101 BPM

## 2024-05-07 DIAGNOSIS — Z79.891 LONG-TERM CURRENT USE OF OPIATE ANALGESIC: ICD-10-CM

## 2024-05-07 DIAGNOSIS — M47.816 LUMBAR SPONDYLOSIS: ICD-10-CM

## 2024-05-07 DIAGNOSIS — F11.20 UNCOMPLICATED OPIOID DEPENDENCE (HCC): ICD-10-CM

## 2024-05-07 DIAGNOSIS — G89.4 CHRONIC PAIN SYNDROME: Primary | ICD-10-CM

## 2024-05-07 DIAGNOSIS — M96.1 LUMBAR POSTLAMINECTOMY SYNDROME: ICD-10-CM

## 2024-05-07 PROCEDURE — 99214 OFFICE O/P EST MOD 30 MIN: CPT | Performed by: PHYSICIAN ASSISTANT

## 2024-05-07 RX ORDER — TRAMADOL HYDROCHLORIDE 50 MG/1
50 TABLET ORAL 3 TIMES DAILY PRN
Qty: 90 TABLET | Refills: 3 | Status: SHIPPED | OUTPATIENT
Start: 2024-05-07

## 2024-05-07 RX ORDER — OXYCODONE HYDROCHLORIDE AND ACETAMINOPHEN 5; 325 MG/1; MG/1
TABLET ORAL
COMMUNITY
Start: 2024-04-26

## 2024-05-07 NOTE — PROGRESS NOTES
Assessment:  1. Lumbar postlaminectomy syndrome    2. Lumbar spondylosis    3. Chronic pain syndrome    4. Uncomplicated opioid dependence (HCC)    5. Long-term current use of opiate analgesic        Plan:  While the patient was in the office today, I did have a thorough conversation regarding their chronic pain syndrome, medication management, and treatment plan options.    The patient remains clinically stable and moderately controlled on the current medication regimen of tramadol 50 mg 3 times daily as needed.  I feel it is reasonable for him to continue on this medication therefore I have electronically sent refills for the next 4 months to his pharmacy.    Follow-up with urology today regarding the renal calculi.    [place] Prescription Drug Monitoring Program report was reviewed and was appropriate     A urine drug screen was collected at today's office visit as part of our medication management protocol. The point of care testing results were appropriate for what was being prescribed. The specimen will be sent for confirmatory testing. The drug screen is medically necessary because the patient is either dependent on opioid medication or is being considered for opioid medication therapy and the results could impact ongoing or future treatment. The drug screen is to evaluate for the presences or absence of prescribed, non-prescribed, and/or illicit drugs/substances.    There are risks associated with opioid medications, including dependence, addiction and tolerance. The patient understands and agrees to use these medications only as prescribed. Potential side effects of the medications include, but are not limited to, constipation, drowsiness, addiction, impaired judgment and risk of fatal overdose if not taken as prescribed. The patient was warned against driving while taking sedation medications.  Sharing medications is a felony. At this point in time, the patient is showing no signs of addiction, abuse,  diversion or suicidal ideation.    The patient will follow-up in 4 months for medication prescription refill and reevaluation. The patient was advised to contact the office should their symptoms worsen in the interim. The patient was agreeable and verbalized an understanding.        History of Present Illness:    The patient is a 48 y.o. male last seen on 4/9/2024 who presents for a follow up office visit in regards to chronic low back pain secondary to lumbar postlaminectomy pain syndrome and lumbar spondylosis, degenerative disc disease.  The patient currently reports low back pain that he presently rates a 7 out of 10 and describes it as a constant burning, sharp and shooting pain with intermittent numbness and paresthesias in the lower extremities.  Patient did have reprogramming done with his spinal cord stimulator which is providing more relief.  Unfortunately the patient was recently in the emergency room with hematuria and abdominal pain and was diagnosed with a kidney stone.  He is still in quite a bit of discomfort and has an appointment with urology today.  He was given Percocet to take from the emergency room for this severe/acute pain.    Current pain medications includes: tramadol 50mg TID prn ?.  The patient reports that this regimen is providing 60% pain relief.  The patient is reporting no side effects from this pain medication regimen.    Pain Contract Signed: 1/16/2024  Last Urine Drug Screen: 5/7/2024    I have personally reviewed and/or updated the patient's past medical history, past surgical history, family history, social history, current medications, allergies, and vital signs today.       Review of Systems:    Review of Systems   Respiratory:  Negative for shortness of breath.    Cardiovascular:  Negative for chest pain.   Gastrointestinal:  Negative for constipation, diarrhea, nausea and vomiting.   Musculoskeletal:  Positive for gait problem. Negative for arthralgias, joint swelling and  myalgias.   Skin:  Negative for rash.   Neurological:  Negative for dizziness, seizures and weakness.   All other systems reviewed and are negative.        Past Medical History:   Diagnosis Date   • Allergic    • Anxiety    • Arthritis    • Asthma    • Bipolar 2 disorder, major depressive episode (HCC) 10/31/2017   • Bipolar disorder (HCC)    • Chronic left lumbar radiculopathy    • Chronic low back pain    • Chronic pain syndrome    • Chronic right shoulder pain    • Depression with anxiety    • Fatigue    • GERD (gastroesophageal reflux disease)    • Hydronephrosis    • Iron deficiency anemia    • Kidney stone    • Lytic bone lesions on xray    • Nephrolithiasis    • PONV (postoperative nausea and vomiting)    • Right elbow pain    • Right lateral epicondylitis        Past Surgical History:   Procedure Laterality Date   • ADENOIDECTOMY Bilateral    • APPENDECTOMY     • BACK SURGERY     • CHOLECYSTECTOMY     • CHOLECYSTECTOMY LAPAROSCOPIC N/A 10/30/2018    Procedure: CHOLECYSTECTOMY WITH GASTROTOMY LAPAROSCOPIC;  Surgeon: Oliver Madison MD;  Location:  MAIN OR;  Service: General   • COLONOSCOPY     • ERCP W/ SPHICTEROTOMY N/A 10/30/2018    Procedure: ENDOSCOPIC RETROGRADE CHOLANGIOPANCREATOGRAPHY (ERCP) W/ SPHINCTEROTOMY;  Surgeon: Kendrick Meeks MD;  Location:  MAIN OR;  Service: Gastroenterology   • GASTRIC BYPASS      2009   • OTHER SURGICAL HISTORY      fusion/refusion of vertebrae, 2010 and 2011 l5-s1 and l4-l5   • HI ARTHROSCOPY KNEE LATERAL RELEASE Left 01/06/2021    Procedure: RELEASE RETINACULAR;  Surgeon: Rogers Lennon DO;  Location:  MAIN OR;  Service: Orthopedics   • HI ARTHRS KNE SURG W/MENISCECTOMY MED/LAT W/SHVG Right 04/26/2022    Procedure: ARTHROSCOPIC MENISCECTOMY LATERAL;  Surgeon: Rogers Lennon DO;  Location:  MAIN OR;  Service: Orthopedics   • HI ARTHRS KNEE DEBRIDEMENT/SHAVING ARTCLR CRTLG Left 01/06/2021    Procedure: ARTHROSCOPY KNEE;  Surgeon: Rogers Lennon DO;  Location:   MAIN OR;  Service: Orthopedics   • TX CYSTO/URETERO W/LITHOTRIPSY &INDWELL STENT INSRT Right 08/08/2017    Procedure: CYSTOSCOPY; URETEROSCOPY; HOLMIUM LASER; RETROGRADE PYELOGRAM; STENT;  Surgeon: Rupesh Romo MD;  Location: [facility];  Service: Urology   • TX INCISION & DRAINAGE ABSCESS COMPLICATED/MULTIPLE Left 01/08/2021    Procedure: INCISION AND DRAINAGE (I&D) EXTREMITY- of left knee s/p MPFL reconstruction, I&D if the left knee with possible MPFL revision;  Surgeon: Rogers Lennon DO;  Location: [facility];  Service: Orthopedics   • TX INSJ/RPLCMT SPINAL NPG/RCVR POCKET CRTJ&CONNJ Right 11/14/2017    Procedure: REMOVAL LOWER MEDIAL BUTTOCK SPINAL CORD STIMULATOR GENERATOR; PLACEMENT OF NEW BUTTOCK SPINAL CORD STIMULATOR THROUGH SEPERATE INCISION;  Surgeon: Stephan Ross MD;  Location: [facility];  Service: Neurosurgery   • TX INSJ/RPLCMT SPINAL NPG/RCVR POCKET CRTJ&CONNJ Right 01/12/2022    Procedure: Right sided spinal cord stimulator pulse generator replacement;  Surgeon: Michael Gamez MD;  Location: [facility];  Service: Neurosurgery   • TX INSJ/RPLCMT SPINAL NPG/RCVR POCKET CRTJ&CONNJ Right 9/8/2023    Procedure: Right-sided spinal cord stimulator internal pulse generator replacement;  Surgeon: Michael Gamez MD;  Location: [facility];  Service: Neurosurgery   • TX RCNSTJ DISLC PATELLA W/XTNSR RELIGNMT&/MUSC RL Left 01/06/2021    Procedure: REALIGNMENT PATELLA with chondroplasty of patella, open medial patellofemoral ligament reconstruction with allograft;  Surgeon: Rogers Lennon DO;  Location: [facility];  Service: Orthopedics   • RIK-EN-Y PROCEDURE  2003   • SPINAL CORD STIMULATOR IMPLANT  11/14/2017    dr ross procedure and technique 1. removal of a right back implantable pulse generator 2.placement of a new right buttock impantable pulse generator through seperate incision 3 electric analys complex programming spinal cord stimulator system postoperative period approx 1 hour   •  SPINE SURGERY     • TONSILLECTOMY         Family History   Problem Relation Age of Onset   • Cancer Mother    • Arthritis Mother    • Stomach cancer Mother    • Coronary artery disease Mother    • Heart disease Mother    • Asthma Mother    • COPD Mother    • Cancer Father    • Esophageal cancer Father    • Other Father         brain tumor   • Diabetes Father    • No Known Problems Sister    • No Known Problems Sister    • No Known Problems Sister        Social History     Occupational History   • Not on file   Tobacco Use   • Smoking status: Every Day     Current packs/day: 1.50     Average packs/day: 1.5 packs/day for 29.3 years (44.0 ttl pk-yrs)     Types: Cigarettes, Pipe, Cigars     Start date: 1995   • Smokeless tobacco: Never   Vaping Use   • Vaping status: Never Used   Substance and Sexual Activity   • Alcohol use: Yes     Comment: rare   • Drug use: Never   • Sexual activity: Not Currently     Partners: Female         Current Outpatient Medications:   •  Acetaminophen (TYLENOL 8 HOUR ARTHRITIS PAIN PO), Take by mouth, Disp: , Rfl:   •  albuterol (PROVENTIL HFA,VENTOLIN HFA) 90 mcg/act inhaler, INHALE 2 PUFFS EVERY 4 (FOUR) HOURS AS NEEDED FOR WHEEZING OR SHORTNESS OF BREATH COUGH, Disp: 18 g, Rfl: 0  •  Cyanocobalamin (Vitamin B-12) 1000 MCG SUBL, Place 1 tablet (1,000 mcg total) under the tongue in the morning, Disp: 90 tablet, Rfl: 1  •  ergocalciferol (VITAMIN D2) 50,000 units, TAKE 1 CAPSULE BY MOUTH EVERY 14 DAYS, Disp: 6 capsule, Rfl: 1  •  esomeprazole (NexIUM) 40 MG capsule, take 1 capsule by mouth every morning, Disp: 90 capsule, Rfl: 0  •  folic acid (FOLVITE) 1 mg tablet, take 1 tablet by mouth once daily, Disp: 30 tablet, Rfl: 2  •  oxyCODONE-acetaminophen (PERCOCET) 5-325 mg per tablet, , Disp: , Rfl:   •  traMADol (Ultram) 50 mg tablet, Take 1 tablet (50 mg total) by mouth 3 (three) times a day as needed for moderate pain For ongoing therapy DO NOT FILL BEFORE: 05/09/24, Disp: 90 tablet, Rfl:  3  •  naloxone (NARCAN) 4 mg/0.1 mL nasal spray, Administer 1 spray into a nostril. If no response after 2-3 minutes, give another dose in the other nostril using a new spray., Disp: 1 each, Rfl: 1  •  varenicline (Chantix Continuing Month Derrick) 1 mg tablet, Take 1 tablet (1 mg total) by mouth 2 (two) times a day (Patient not taking: Reported on 5/7/2024), Disp: 60 tablet, Rfl: 4    Allergies   Allergen Reactions   • Ampicillin GI Intolerance     Vomiting   • Penicillins GI Intolerance     Vomit       Physical Exam:    /80 (BP Location: Left arm, Patient Position: Sitting, Cuff Size: Standard)   Pulse 101   Temp (!) 96.8 °F (36 °C)   Ht 6' (1.829 m)   Wt 93.9 kg (207 lb)   BMI 28.07 kg/m²     Constitutional:normal, well developed, well nourished, alert, in no distress and non-toxic and no overt pain behavior.  Eyes:anicteric  HEENT:grossly intact  Neck:supple, symmetric, trachea midline and no masses   Pulmonary:even and unlabored  Cardiovascular:No edema or pitting edema present  Skin:Normal without rashes or lesions and well hydrated  Psychiatric:Mood and affect appropriate  Neurologic:Cranial Nerves II-XII grossly intact  Musculoskeletal: Lumbar scars from prior surgeries      Imaging  No orders to display         No orders of the defined types were placed in this encounter.

## 2024-05-10 ENCOUNTER — TELEPHONE (OUTPATIENT)
Dept: PAIN MEDICINE | Facility: CLINIC | Age: 49
End: 2024-05-10

## 2024-05-10 LAB
4OH-XYLAZINE UR QL CFM: NEGATIVE NG/ML
6MAM UR QL CFM: NEGATIVE NG/ML
7AMINOCLONAZEPAM UR QL CFM: NEGATIVE NG/ML
A-OH ALPRAZ UR QL CFM: NEGATIVE NG/ML
ACCEPTABLE CREAT UR QL: NORMAL MG/DL
ACCEPTIBLE SP GR UR QL: NORMAL
AMPHET UR QL CFM: NEGATIVE NG/ML
BUPRENORPHINE UR QL CFM: NEGATIVE NG/ML
BUTALBITAL UR QL CFM: NEGATIVE NG/ML
BZE UR QL CFM: NEGATIVE NG/ML
CODEINE UR QL CFM: NEGATIVE NG/ML
EDDP UR QL CFM: NEGATIVE NG/ML
ETHYL GLUCURONIDE UR QL CFM: NEGATIVE NG/ML
ETHYL SULFATE UR QL SCN: NEGATIVE NG/ML
EUTYLONE UR QL: NEGATIVE NG/ML
FENTANYL UR QL CFM: NEGATIVE NG/ML
GLIADIN IGG SER IA-ACNC: NEGATIVE NG/ML
HYDROCODONE UR QL CFM: NEGATIVE NG/ML
HYDROMORPHONE UR QL CFM: NEGATIVE NG/ML
LORAZEPAM UR QL CFM: NEGATIVE NG/ML
ME-PHENIDATE UR QL CFM: NEGATIVE NG/ML
MEPERIDINE UR QL CFM: NEGATIVE NG/ML
METHADONE UR QL CFM: NEGATIVE NG/ML
METHAMPHET UR QL CFM: NEGATIVE NG/ML
MORPHINE UR QL CFM: NEGATIVE NG/ML
NALTREXONE UR QL CFM: NEGATIVE NG/ML
NITRITE UR QL: NORMAL UG/ML
NORBUPRENORPHINE UR QL CFM: NEGATIVE NG/ML
NORDIAZEPAM UR QL CFM: NEGATIVE NG/ML
NORFENTANYL UR QL CFM: NEGATIVE NG/ML
NORHYDROCODONE UR QL CFM: NEGATIVE NG/ML
NORMEPERIDINE UR QL CFM: NEGATIVE NG/ML
NOROXYCODONE UR QL CFM: ABNORMAL NG/ML
OXAZEPAM UR QL CFM: NEGATIVE NG/ML
OXYCODONE UR QL CFM: ABNORMAL NG/ML
OXYMORPHONE UR QL CFM: ABNORMAL NG/ML
PARA-FLUOROFENTANYL QUANTIFICATION: NORMAL NG/ML
PHENOBARB UR QL CFM: NEGATIVE NG/ML
RESULT ALL_PRESCRIBED MEDS AND SPECIAL INSTRUCTIONS: NORMAL
SECOBARBITAL UR QL CFM: NEGATIVE NG/ML
SL AMB 4-ANPP QUANTIFICATION: NORMAL NG/ML
SL AMB 5F-ADB-M7 METABOLITE QUANTIFICATION: NEGATIVE NG/ML
SL AMB 7-OH-MITRAGYNINE (KRATOM ALKALOID) QUANTIFICATION: NEGATIVE NG/ML
SL AMB AB-FUBINACA-M3 METABOLITE QUANTIFICATION: NEGATIVE NG/ML
SL AMB ACETYL FENTANYL QUANTIFICATION: NORMAL NG/ML
SL AMB ACETYL NORFENTANYL QUANTIFICATION: NORMAL NG/ML
SL AMB ACRYL FENTANYL QUANTIFICATION: NORMAL NG/ML
SL AMB CARFENTANIL QUANTIFICATION: NORMAL NG/ML
SL AMB CTHC (MARIJUANA METABOLITE) QUANTIFICATION: ABNORMAL NG/ML
SL AMB DEXTRORPHAN (DEXTROMETHORPHAN METABOLITE) QUANT: ABNORMAL NG/ML
SL AMB GABAPENTIN QUANTIFICATION: NEGATIVE NG/ML
SL AMB JWH018 METABOLITE QUANTIFICATION: NEGATIVE NG/ML
SL AMB JWH073 METABOLITE QUANTIFICATION: NEGATIVE NG/ML
SL AMB MDMB-FUBINACA-M1 METABOLITE QUANTIFICATION: NEGATIVE NG/ML
SL AMB METHYLONE QUANTIFICATION: NEGATIVE NG/ML
SL AMB N-DESMETHYL-TRAMADOL QUANTIFICATION: NORMAL NG/ML
SL AMB PHENTERMINE QUANTIFICATION: NEGATIVE NG/ML
SL AMB PREGABALIN QUANTIFICATION: NEGATIVE NG/ML
SL AMB RCS4 METABOLITE QUANTIFICATION: NEGATIVE NG/ML
SL AMB RITALINIC ACID QUANTIFICATION: NEGATIVE NG/ML
SMOOTH MUSCLE AB TITR SER IF: NEGATIVE NG/ML
SPECIMEN DRAWN SERPL: NEGATIVE NG/ML
SPECIMEN PH ACCEPTABLE UR: NORMAL
TAPENTADOL UR QL CFM: NEGATIVE NG/ML
TEMAZEPAM UR QL CFM: NEGATIVE NG/ML
TRAMADOL UR QL CFM: NORMAL NG/ML
URATE/CREAT 24H UR: NORMAL NG/ML
XYLAZINE UR QL CFM: NEGATIVE NG/ML

## 2024-05-10 NOTE — TELEPHONE ENCOUNTER
----- Message from Asiya Mcclain PA-C sent at 5/10/2024  7:03 AM EDT -----  Please call patient and let him know his urine drug screen is positive for marijuana.  I will give a one time warning as this is against our practice policy.  If his next UDS is positive,  I will need to discontinue his opioid prescription.

## 2024-05-14 NOTE — TELEPHONE ENCOUNTER
S/w pt, advised of above. Pt stated that he has only used OTC gummies. Advised pt, the writer cannot comment or advise re: the ingredients in the product he is using. Advised pt that if he is unsure, the writer would recommend discontinuing the product. Pt verbalized understanding and appreciation.

## 2024-07-06 DIAGNOSIS — D53.9 NUTRITIONAL ANEMIA: ICD-10-CM

## 2024-07-08 RX ORDER — FOLIC ACID 1 MG/1
1000 TABLET ORAL DAILY
Qty: 30 TABLET | Refills: 2 | Status: SHIPPED | OUTPATIENT
Start: 2024-07-08

## 2024-07-22 ENCOUNTER — TELEPHONE (OUTPATIENT)
Age: 49
End: 2024-07-22

## 2024-07-22 NOTE — TELEPHONE ENCOUNTER
Keyur called in requesting a prior authorization for his medication, esomeprazole(NexIUM) 40 mg capsule.

## 2024-07-26 NOTE — TELEPHONE ENCOUNTER
Patient called to check the status of the prior auth. I informed him it could take 7-21 days for a answer.

## 2024-07-30 ENCOUNTER — TELEPHONE (OUTPATIENT)
Dept: NEUROSURGERY | Facility: CLINIC | Age: 49
End: 2024-07-30

## 2024-07-30 ENCOUNTER — HOSPITAL ENCOUNTER (INPATIENT)
Facility: HOSPITAL | Age: 49
LOS: 1 days | Discharge: HOME | DRG: 092 | End: 2024-07-31
Attending: EMERGENCY MEDICINE | Admitting: INTERNAL MEDICINE
Payer: COMMERCIAL

## 2024-07-30 DIAGNOSIS — R20.0 SADDLE ANESTHESIA: Primary | ICD-10-CM

## 2024-07-30 LAB
ALBUMIN SERPL BCG-MCNC: 3.8 G/DL (ref 3.5–5)
ALP SERPL-CCNC: 85 U/L (ref 34–104)
ALT SERPL W P-5'-P-CCNC: 20 U/L (ref 7–52)
ANION GAP SERPL CALCULATED.3IONS-SCNC: 7 MMOL/L (ref 4–13)
AST SERPL W P-5'-P-CCNC: 13 U/L (ref 13–39)
BASOPHILS # BLD AUTO: 0.07 THOUSANDS/ÂΜL (ref 0–0.1)
BASOPHILS NFR BLD AUTO: 1 % (ref 0–1)
BILIRUB SERPL-MCNC: 0.29 MG/DL (ref 0.2–1)
BUN SERPL-MCNC: 15 MG/DL (ref 5–25)
CALCIUM SERPL-MCNC: 8.8 MG/DL (ref 8.4–10.2)
CHLORIDE SERPL-SCNC: 103 MMOL/L (ref 96–108)
CO2 SERPL-SCNC: 27 MMOL/L (ref 21–32)
CREAT SERPL-MCNC: 0.91 MG/DL (ref 0.6–1.3)
EOSINOPHIL # BLD AUTO: 0.13 THOUSAND/ÂΜL (ref 0–0.61)
EOSINOPHIL NFR BLD AUTO: 1 % (ref 0–6)
ERYTHROCYTE [DISTWIDTH] IN BLOOD BY AUTOMATED COUNT: 14.2 % (ref 11.6–15.1)
GFR SERPL CREATININE-BSD FRML MDRD: 99 ML/MIN/1.73SQ M
GLUCOSE SERPL-MCNC: 81 MG/DL (ref 65–140)
HCT VFR BLD AUTO: 48.5 % (ref 36.5–49.3)
HGB BLD-MCNC: 15.7 G/DL (ref 12–17)
IMM GRANULOCYTES # BLD AUTO: 0.06 THOUSAND/UL (ref 0–0.2)
IMM GRANULOCYTES NFR BLD AUTO: 1 % (ref 0–2)
LYMPHOCYTES # BLD AUTO: 2.41 THOUSANDS/ÂΜL (ref 0.6–4.47)
LYMPHOCYTES NFR BLD AUTO: 26 % (ref 14–44)
MCH RBC QN AUTO: 26.8 PG (ref 26.8–34.3)
MCHC RBC AUTO-ENTMCNC: 32.4 G/DL (ref 31.4–37.4)
MCV RBC AUTO: 83 FL (ref 82–98)
MONOCYTES # BLD AUTO: 0.58 THOUSAND/ÂΜL (ref 0.17–1.22)
MONOCYTES NFR BLD AUTO: 6 % (ref 4–12)
NEUTROPHILS # BLD AUTO: 6.13 THOUSANDS/ÂΜL (ref 1.85–7.62)
NEUTS SEG NFR BLD AUTO: 65 % (ref 43–75)
NRBC BLD AUTO-RTO: 0 /100 WBCS
PLATELET # BLD AUTO: 256 THOUSANDS/UL (ref 149–390)
PMV BLD AUTO: 10.4 FL (ref 8.9–12.7)
POTASSIUM SERPL-SCNC: 3.8 MMOL/L (ref 3.5–5.3)
PROT SERPL-MCNC: 6.9 G/DL (ref 6.4–8.4)
RBC # BLD AUTO: 5.85 MILLION/UL (ref 3.88–5.62)
SODIUM SERPL-SCNC: 137 MMOL/L (ref 135–147)
WBC # BLD AUTO: 9.38 THOUSAND/UL (ref 4.31–10.16)

## 2024-07-30 PROCEDURE — 99284 EMERGENCY DEPT VISIT MOD MDM: CPT

## 2024-07-30 PROCEDURE — 99285 EMERGENCY DEPT VISIT HI MDM: CPT | Performed by: EMERGENCY MEDICINE

## 2024-07-30 PROCEDURE — 85025 COMPLETE CBC W/AUTO DIFF WBC: CPT

## 2024-07-30 PROCEDURE — 80053 COMPREHEN METABOLIC PANEL: CPT

## 2024-07-30 PROCEDURE — 36415 COLL VENOUS BLD VENIPUNCTURE: CPT

## 2024-07-30 NOTE — TELEPHONE ENCOUNTER
"Received call from POD, patient reporting numbness in both legs. He is s/p IPG replacement in 2023. This RN reviewed symptoms with patient, he admits to lifting heavy boxes this weekend when this all began. He reports that both legs are numb and stated \"I cannot feel my nether regions\", advised him he must go to nearest ED for evaluation for cauda equina syndrome. Explained to him that this may be a neurological emergency. He expressed understanding and would go to ED.   "

## 2024-07-31 ENCOUNTER — APPOINTMENT (INPATIENT)
Dept: RADIOLOGY | Facility: HOSPITAL | Age: 49
DRG: 092 | End: 2024-07-31
Payer: COMMERCIAL

## 2024-07-31 ENCOUNTER — APPOINTMENT (OUTPATIENT)
Dept: RADIOLOGY | Facility: HOSPITAL | Age: 49
DRG: 092 | End: 2024-07-31
Payer: COMMERCIAL

## 2024-07-31 ENCOUNTER — TRANSITIONAL CARE MANAGEMENT (OUTPATIENT)
Dept: FAMILY MEDICINE CLINIC | Facility: CLINIC | Age: 49
End: 2024-07-31

## 2024-07-31 VITALS
OXYGEN SATURATION: 97 % | SYSTOLIC BLOOD PRESSURE: 160 MMHG | TEMPERATURE: 97.7 F | RESPIRATION RATE: 17 BRPM | DIASTOLIC BLOOD PRESSURE: 90 MMHG | HEART RATE: 60 BPM

## 2024-07-31 PROBLEM — R20.0 NUMBNESS IN BOTH LEGS: Status: ACTIVE | Noted: 2024-07-31

## 2024-07-31 PROCEDURE — NC001 PR NO CHARGE: Performed by: PHYSICIAN ASSISTANT

## 2024-07-31 PROCEDURE — 72148 MRI LUMBAR SPINE W/O DYE: CPT

## 2024-07-31 PROCEDURE — 99223 1ST HOSP IP/OBS HIGH 75: CPT | Performed by: PHYSICIAN ASSISTANT

## 2024-07-31 PROCEDURE — 99236 HOSP IP/OBS SAME DATE HI 85: CPT | Performed by: INTERNAL MEDICINE

## 2024-07-31 RX ORDER — FOLIC ACID 1 MG/1
1000 TABLET ORAL DAILY
Status: DISCONTINUED | OUTPATIENT
Start: 2024-07-31 | End: 2024-07-31 | Stop reason: HOSPADM

## 2024-07-31 RX ORDER — ACETAMINOPHEN 325 MG/1
650 TABLET ORAL EVERY 6 HOURS PRN
Status: DISCONTINUED | OUTPATIENT
Start: 2024-07-31 | End: 2024-07-31

## 2024-07-31 RX ORDER — TRAMADOL HYDROCHLORIDE 50 MG/1
50 TABLET ORAL 3 TIMES DAILY PRN
Status: DISCONTINUED | OUTPATIENT
Start: 2024-07-31 | End: 2024-07-31 | Stop reason: HOSPADM

## 2024-07-31 RX ORDER — PANTOPRAZOLE SODIUM 40 MG/1
40 TABLET, DELAYED RELEASE ORAL
Status: DISCONTINUED | OUTPATIENT
Start: 2024-07-31 | End: 2024-07-31 | Stop reason: HOSPADM

## 2024-07-31 RX ORDER — ACETAMINOPHEN 325 MG/1
975 TABLET ORAL EVERY 8 HOURS SCHEDULED
Status: DISCONTINUED | OUTPATIENT
Start: 2024-07-31 | End: 2024-07-31 | Stop reason: HOSPADM

## 2024-07-31 RX ORDER — ALBUTEROL SULFATE 90 UG/1
2 AEROSOL, METERED RESPIRATORY (INHALATION) EVERY 4 HOURS PRN
Status: DISCONTINUED | OUTPATIENT
Start: 2024-07-31 | End: 2024-07-31 | Stop reason: HOSPADM

## 2024-07-31 RX ORDER — ONDANSETRON 2 MG/ML
4 INJECTION INTRAMUSCULAR; INTRAVENOUS EVERY 6 HOURS PRN
Status: DISCONTINUED | OUTPATIENT
Start: 2024-07-31 | End: 2024-07-31 | Stop reason: HOSPADM

## 2024-07-31 RX ADMIN — TRAMADOL HYDROCHLORIDE 50 MG: 50 TABLET, COATED ORAL at 01:24

## 2024-07-31 RX ADMIN — PANTOPRAZOLE SODIUM 40 MG: 40 TABLET, DELAYED RELEASE ORAL at 08:07

## 2024-07-31 RX ADMIN — CYANOCOBALAMIN TAB 500 MCG 1000 MCG: 500 TAB at 08:07

## 2024-07-31 RX ADMIN — TRAMADOL HYDROCHLORIDE 50 MG: 50 TABLET, COATED ORAL at 11:43

## 2024-07-31 RX ADMIN — FOLIC ACID 1000 MCG: 1 TABLET ORAL at 08:07

## 2024-07-31 NOTE — ASSESSMENT & PLAN NOTE
Patient with history of prior L1-L4 fusion with spinal stimulator for pain control presenting with scrotal numbness now radiating to heels of feet and with left lower extremity weakness reported.  Denied fever/chills, bowel/bladder incontinence or apparent retention  Is planned for urgent MRI lumbar spine,was not completed night of admission due to presence of spinal stimulator which is otherwise MRI conditional.    MRI is pending for this AM   Neurosurgery consulted   [facility]

## 2024-07-31 NOTE — ASSESSMENT & PLAN NOTE
With history of L1-L4 fusion with spinal stimulator in place, workup of new numbness as per primary problem with plan for MRI lumbar spine and neurosurgery consultation  PDMP reviewed, continue home dose tramadol as needed

## 2024-07-31 NOTE — TREATMENT PLAN
Patient seen and examined after MRI lumbar spine was completed.  MRI lumbar spine showed posterior spinal fusion with decompressive laminectomies from L3-S1.  No evidence of high-grade central canal stenosis.     Patient was updated on Friday report.  Images were shown to him in the room.  There is no evidence of surgical pathology to explain his present symptoms.  I encouraged him to continue to monitor them for subjective improvement with time.  He was again turned on stimulator.  Encouraged him to continue to be active as tolerated.  Recommended monitoring his symptoms for progression or worsening.  If he has any bowel or bladder dysfunction he is to contact neurosurgery as this is currently maintained presenting symptoms are largely associated with sensation deficits.    No scheduled follow up required at this time

## 2024-07-31 NOTE — QUICK NOTE
Information reviewed to pt. He states symptoms are worsening, and feel as though his \"kidneys are on fire\". The pain continues to be intermittent but \"is definitely worsening\". Pt was encouraged to finish doxycycline course and call clinic if symptoms do not improve by that time. Writer informed pt of plans to provide update to Windy Cohen PA-C, and will notify pt if there are any further recommendations. Pt verbalized understanding and agreement with information reviewed and denies any further questions/concerns at this time.     Please advise if any further recommendations.    pt has a spinal stimulator- dr Silva unaware of this when he approved scan, he was to contact ordering dr to inform we do not scan these on off shifts. i informed RN that we need lumbar xray, sabina santiago aware of this.

## 2024-07-31 NOTE — CONSULTS
[facility]  Consult  Name: Anderson Hart 48 y.o. male I MRN: [id number]  Unit/Bed#: QCF I Date of Admission: 7/30/2024   Date of Service: 7/31/2024 I Hospital Day: 0    Inpatient consult to Neurosurgery  Consult performed by: Chance Contreras PA-C  Consult ordered by: Dawit Pacheco,       Assessment & Plan   * Numbness in scrotum and both legs  Assessment & Plan  Patient presented with acute onset of genital and LE numbness with associated weakness  Repots lifting a bow containing a new bed frame Friday before symptoms onset  No b/b dysfunction aside from decreased sensation   H/o spinal fusion and revision (2012, 2013) with SCS placement (2016) and subsequent battery revisions (2022, 2023)    Imaging:   X-ray thoracolumbar 7/31/2024: Thoracic spinal cord stimulator leads intact.  No broken or abandoned leads.  Spinal hardware L4-S1.    Plan:   ongoing frequent neurological checks.   Recommend MRI lumbar spine to be completed.  MRI order was placed By ER.  Currently scheduled for 10 AM  Patient reports he placed a spinal cord stimulator and MRI safe mode.  Hold all AC/AP medication at this time  Ongoing medical management and pain control per primary team  Discussed with SLIM  CM following for dispo planning   Neurosurgery will continue to follow with completion of MRI lumbar spine. Call with questions or concerns.            History of Present Illness   HPI: Anderson Hart is a 48 y.o. male with PMH including anxiety, bipolar 2 disorder, chronic pain, GERD who presents with complaint of lower extremity and genital numbness since Saturday.  Patient reports on Friday he had a large box delivered for a new bed frame.  He Linda did not help so did not think much of the situation at that time.  Starting on Saturday he was having a bowel movement and went to wipe when he realized that he could not perceive the sensation of touch along his buttocks.  He then noted that  some of the numbness continued to progress.  Involved his scrotum as well as the upper medial thigh.  It then began to progress a little bit further to incorporate the entirety of the left leg all the way down to the foot.  He has some associated sensation changes on the right side although the left is more pronounced.  He does also endorse some worsening weakness in the left lower extremity.  He has his chronic low back pain without any new radicular pain.  He presented to the hospital due to some progression of his symptoms since the weekend.  He has a history of a lumbar spinal fusion in 2012 and then a subsequent revision with additional decompressions completed in 2013.  He has hardware on x-ray imaging from L4-S1.  A spinal cord stimulator was placed in 2016.  He most recently underwent revisions of his implantable pulse generator with [facility] neurosurgery in 2022 and 2023.    Review of Systems   HENT: Negative.     Eyes:  Negative for visual disturbance.   Respiratory: Negative.     Cardiovascular: Negative.    Gastrointestinal: Negative.    Genitourinary:  Negative for difficulty urinating.   Musculoskeletal:  Positive for arthralgias, back pain and myalgias. Negative for neck pain and neck stiffness.   Skin: Negative.    Neurological:  Positive for weakness and numbness (BLE and genitals). Negative for dizziness, facial asymmetry, speech difficulty, light-headedness and headaches.   Psychiatric/Behavioral:  Negative for agitation, behavioral problems and confusion.        Historical Information   Past Medical History:   Diagnosis Date    Allergic     Anxiety     Arthritis     Asthma     Bipolar 2 disorder, major depressive episode (HCC) 10/31/2017    Bipolar disorder (HCC)     Chronic left lumbar radiculopathy     Chronic low back pain     Chronic pain syndrome     Chronic right shoulder pain     Depression with anxiety     Fatigue     GERD (gastroesophageal reflux disease)     Hydronephrosis     Iron  deficiency anemia     Kidney stone     Lytic bone lesions on xray     Nephrolithiasis     PONV (postoperative nausea and vomiting)     Right elbow pain     Right lateral epicondylitis      Past Surgical History:   Procedure Laterality Date    ADENOIDECTOMY Bilateral     APPENDECTOMY      BACK SURGERY      CHOLECYSTECTOMY      CHOLECYSTECTOMY LAPAROSCOPIC N/A 10/30/2018    Procedure: CHOLECYSTECTOMY WITH GASTROTOMY LAPAROSCOPIC;  Surgeon: Oliver Madison MD;  Location[facility];  Service: General    COLONOSCOPY      ERCP W/ SPHICTEROTOMY N/A 10/30/2018    Procedure: ENDOSCOPIC RETROGRADE CHOLANGIOPANCREATOGRAPHY (ERCP) W/ SPHINCTEROTOMY;  Surgeon: Kendrick Meeks MD;  Location: [facility];  Service: Gastroenterology    GASTRIC BYPASS      2009    OTHER SURGICAL HISTORY      fusion/refusion of vertebrae, 2010 and 2011 l5-s1 and l4-l5    FL ARTHROSCOPY KNEE LATERAL RELEASE Left 01/06/2021    Procedure: RELEASE RETINACULAR;  Surgeon: Rogers Lennon DO;  [facility];  Service: Orthopedics    FL ARTHRS KNE SURG W/MENISCECTOMY MED/LAT W/SHVG Right 04/26/2022    Procedure: ARTHROSCOPIC MENISCECTOMY LATERAL;  Surgeon: Rogers Lennon DO;  Location: [facility];  Service: Orthopedics    FL ARTHRS KNEE DEBRIDEMENT/SHAVING ARTCLR CRTLG Left 01/06/2021    Procedure: ARTHROSCOPY KNEE;  Surgeon: Rogers Lennon DO;  [facility];  Service: Orthopedics    FL CYSTO/URETERO W/LITHOTRIPSY &INDWELL STENT INSRT Right 08/08/2017    Procedure: CYSTOSCOPY; URETEROSCOPY; HOLMIUM LASER; RETROGRADE PYELOGRAM; STENT;  Surgeon: Rupesh Romo MD;  Location[facility];  Service: Urology    FL INCISION & DRAINAGE ABSCESS COMPLICATED/MULTIPLE Left 01/08/2021    Procedure: INCISION AND DRAINAGE (I&D) EXTREMITY- of left knee s/p MPFL reconstruction, I&D if the left knee with possible MPFL revision;  Surgeon: Rogers Lennon DO;  [facility];  Service: Orthopedics    FL INSJ/RPLCMT SPINAL NPG/RCVR POCKET CRTJ&CONNJ Right  11/14/2017    Procedure: REMOVAL LOWER MEDIAL BUTTOCK SPINAL CORD STIMULATOR GENERATOR; PLACEMENT OF NEW BUTTOCK SPINAL CORD STIMULATOR THROUGH SEPERATE INCISION;  Surgeon: Stephan Duque MD;  Location: [facility];  Service: Neurosurgery    MN INSJ/RPLCMT SPINAL NPG/RCVR POCKET CRTJ&CONNJ Right 01/12/2022    Procedure: Right sided spinal cord stimulator pulse generator replacement;  Surgeon: Michael Gamez MD;  [facility];  Service: Neurosurgery    MN INSJ/RPLCMT SPINAL NPG/RCVR POCKET CRTJ&CONNJ Right 9/8/2023    Procedure: Right-sided spinal cord stimulator internal pulse generator replacement;  Surgeon: Michael Gamez MD;  [facility];  Service: Neurosurgery    MN RCNSTJ DISLC PATELLA W/XTNSR RELIGNMT&/MUSC RL Left 01/06/2021    Procedure: REALIGNMENT PATELLA with chondroplasty of patella, open medial patellofemoral ligament reconstruction with allograft;  Surgeon: Rogers Lennon DO;  [facility];  Service: Orthopedics    RIK-EN-Y PROCEDURE  2003    SPINAL CORD STIMULATOR IMPLANT  11/14/2017    dr duque procedure and technique 1. removal of a right back implantable pulse generator 2.placement of a new right buttock impantable pulse generator through seperate incision 3 electric analys complex programming spinal cord stimulator system postoperative period approx 1 hour    SPINE SURGERY      TONSILLECTOMY       Social History     Substance and Sexual Activity   Alcohol Use Yes    Comment: rare     Social History     Substance and Sexual Activity   Drug Use Never     Social History     Tobacco Use   Smoking Status Every Day    Current packs/day: 1.50    Average packs/day: 1.5 packs/day for 29.6 years (44.4 ttl pk-yrs)    Types: Cigarettes, Pipe, Cigars    Start date: 1995   Smokeless Tobacco Never     Family History   Problem Relation Age of Onset    Cancer Mother     Arthritis Mother     Stomach cancer Mother     Coronary artery disease Mother     Heart disease Mother      Asthma Mother     COPD Mother     Cancer Father     Esophageal cancer Father     Other Father         brain tumor    Diabetes Father     No Known Problems Sister     No Known Problems Sister     No Known Problems Sister        Meds/Allergies   all current active meds have been reviewed, current meds:   Current Facility-Administered Medications   Medication Dose Route Frequency    acetaminophen (TYLENOL) tablet 975 mg  975 mg Oral Q8H ANILA    albuterol (PROVENTIL HFA,VENTOLIN HFA) inhaler 2 puff  2 puff Inhalation Q4H PRN    cyanocobalamin (VITAMIN B-12) tablet 1,000 mcg  1,000 mcg Oral Daily    folic acid (FOLVITE) tablet 1,000 mcg  1,000 mcg Oral Daily    ondansetron (ZOFRAN) injection 4 mg  4 mg Intravenous Q6H PRN    pantoprazole (PROTONIX) EC tablet 40 mg  40 mg Oral Early Morning    traMADol (ULTRAM) tablet 50 mg  50 mg Oral TID PRN   , and PTA meds:   Prior to Admission Medications   Prescriptions Last Dose Informant Patient Reported? Taking?   Acetaminophen (TYLENOL 8 HOUR ARTHRITIS PAIN PO)  Self Yes No   Sig: Take by mouth   Cyanocobalamin (Vitamin B-12) 1000 MCG SUBL   No No   Sig: Place 1 tablet (1,000 mcg total) under the tongue in the morning   albuterol (PROVENTIL HFA,VENTOLIN HFA) 90 mcg/act inhaler  Self No No   Sig: INHALE 2 PUFFS EVERY 4 (FOUR) HOURS AS NEEDED FOR WHEEZING OR SHORTNESS OF BREATH COUGH   ergocalciferol (VITAMIN D2) 50,000 units   No No   Sig: TAKE 1 CAPSULE BY MOUTH EVERY 14 DAYS   esomeprazole (NexIUM) 40 MG capsule   No No   Sig: take 1 capsule by mouth every morning   folic acid (FOLVITE) 1 mg tablet   No No   Sig: take 1 tablet by mouth once daily   naloxone (NARCAN) 4 mg/0.1 mL nasal spray  Self No No   Sig: Administer 1 spray into a nostril. If no response after 2-3 minutes, give another dose in the other nostril using a new spray.   oxyCODONE-acetaminophen (PERCOCET) 5-325 mg per tablet   Yes No   traMADol (Ultram) 50 mg tablet   No No   Sig: Take 1 tablet (50 mg total) by  mouth 3 (three) times a day as needed for moderate pain For ongoing therapy DO NOT FILL BEFORE: 05/09/24   varenicline (Chantix Continuing Month Derrick) 1 mg tablet  Self No No   Sig: Take 1 tablet (1 mg total) by mouth 2 (two) times a day   Patient not taking: Reported on 5/7/2024      Facility-Administered Medications: None     Allergies   Allergen Reactions    Ampicillin GI Intolerance     Vomiting    Penicillins GI Intolerance     Vomit       Objective   I/O       None            Physical Exam  Constitutional:       Appearance: Normal appearance. He is well-developed.   HENT:      Head: Normocephalic and atraumatic.   Eyes:      Extraocular Movements: Extraocular movements intact and EOM normal.      Pupils: Pupils are equal, round, and reactive to light.   Neck:      Vascular: No JVD.   Cardiovascular:      Rate and Rhythm: Normal rate.   Pulmonary:      Effort: Pulmonary effort is normal.   Musculoskeletal:         General: No deformity. Normal range of motion.      Cervical back: Normal range of motion and neck supple.   Skin:     General: Skin is warm and dry.   Neurological:      Mental Status: He is alert and oriented to person, place, and time.      Cranial Nerves: No cranial nerve deficit.      Sensory: Sensory deficit present.      Motor: Weakness present.      Deep Tendon Reflexes: Reflexes are normal and symmetric.   Psychiatric:         Speech: Speech normal.         Behavior: Behavior normal.         Thought Content: Thought content normal.       Neurologic Exam     Mental Status   Oriented to person, place, and time.   Attention: normal.   Speech: speech is normal   Level of consciousness: alert  Knowledge: good.   Normal comprehension.     Cranial Nerves     CN III, IV, VI   Pupils are equal, round, and reactive to light.  Extraocular motions are normal.   Upgaze: normal  Downgaze: normal    CN VII   Facial expression full, symmetric.     CN VIII   CN VIII normal.   Hearing: intact    Motor Exam  "  Muscle bulk: normal  Right arm tone: normal  Left arm tone: normal  Right leg tone: normal  Left leg tone: normal  LLE:   4/5 HF/knee mobility, DF and PF    RLE:  5/5 HF/knee mobility  4+/5 DF and 5/5 PF     Sensory Exam   Right arm light touch: normal  Left arm light touch: normal  Subjective numbness to light touch throughout the LLE >RLE from upper medial thigh through the whole LE      Gait, Coordination, and Reflexes     Tremor   Resting tremor: absent  Action tremor: absent    Reflexes   Right ankle clonus: absent  Left ankle clonus: absent    Vitals:Blood pressure 160/90, pulse 60, temperature 97.7 °F (36.5 °C), temperature source Temporal, resp. rate 17, SpO2 97%.,There is no height or weight on file to calculate BMI.     Lab Results:   Results from last 7 days   Lab Units 07/30/24  2207   WBC Thousand/uL 9.38   HEMOGLOBIN g/dL 15.7   HEMATOCRIT % 48.5   PLATELETS Thousands/uL 256   SEGS PCT % 65   MONO PCT % 6   EOS PCT % 1     Results from last 7 days   Lab Units 07/30/24  2207   SODIUM mmol/L 137   POTASSIUM mmol/L 3.8   CHLORIDE mmol/L 103   CO2 mmol/L 27   BUN mg/dL 15   CREATININE mg/dL 0.91   CALCIUM mg/dL 8.8   ALK PHOS U/L 85   ALT U/L 20   AST U/L 13                 No results found for: \"TROPONINT\"  ABG:No results found for: \"PHART\", \"CSS1AVX\", \"PO2ART\", \"SCE8FCU\", \"X5UWGDOS\", \"BEART\", \"SOURCE\"    Imaging Studies: I have personally reviewed pertinent reports.   and I have personally reviewed pertinent films in PACS    XR follow up    Result Date: 7/31/2024  Impression: Thoracic spinal cord stimulator leads. No broken or abandoned leads. Additional chronic/incidental findings as detailed above. Workstation performed: [id number]       EKG, Pathology, and Other Studies: I have personally reviewed pertinent reports.      VTE Prophylaxis: None     Code Status: Level 1 - Full Code  Advance Directive and Living Will:      Power of :    POLST:      Counseling / Coordination of Care  I spent 30 " minutes with the patient.

## 2024-07-31 NOTE — ED PROVIDER NOTES
History  Chief Complaint   Patient presents with    Numbness     Patient has had numbness/tingling to one leg since 2012 when he got injured at work. Patient now has numbness/tingling in his entire lower half. Patient has a spinal stim. Patient called his doctor and he told him to come to the ER     HPI    Keyur Hart is a 48 y.o. male PMH L1-L4 fusion with spinal stimulator for pain control presenting with CC scrotal numbness. Patient states he had scrotal numbness starting Sunday. Patient states over the last two days he has had progressing bilateral anesthesia from groin to mid-shin and LLE weakness.  He denies any back pain fevers, chills, IVDU, immune supression, malignancy. He denies any bowel/bladder incontinence.     Prior to Admission Medications   Prescriptions Last Dose Informant Patient Reported? Taking?   Acetaminophen (TYLENOL 8 HOUR ARTHRITIS PAIN PO)  Self Yes No   Sig: Take by mouth   Cyanocobalamin (Vitamin B-12) 1000 MCG SUBL   No No   Sig: Place 1 tablet (1,000 mcg total) under the tongue in the morning   albuterol (PROVENTIL HFA,VENTOLIN HFA) 90 mcg/act inhaler  Self No No   Sig: INHALE 2 PUFFS EVERY 4 (FOUR) HOURS AS NEEDED FOR WHEEZING OR SHORTNESS OF BREATH COUGH   ergocalciferol (VITAMIN D2) 50,000 units   No No   Sig: TAKE 1 CAPSULE BY MOUTH EVERY 14 DAYS   esomeprazole (NexIUM) 40 MG capsule   No No   Sig: take 1 capsule by mouth every morning   folic acid (FOLVITE) 1 mg tablet   No No   Sig: take 1 tablet by mouth once daily   naloxone (NARCAN) 4 mg/0.1 mL nasal spray  Self No No   Sig: Administer 1 spray into a nostril. If no response after 2-3 minutes, give another dose in the other nostril using a new spray.   oxyCODONE-acetaminophen (PERCOCET) 5-325 mg per tablet   Yes No   traMADol (Ultram) 50 mg tablet   No No   Sig: Take 1 tablet (50 mg total) by mouth 3 (three) times a day as needed for moderate pain For ongoing therapy DO NOT FILL BEFORE: 05/09/24   varenicline (Chantix  Continuing Month Derrick) 1 mg tablet  Self No No   Sig: Take 1 tablet (1 mg total) by mouth 2 (two) times a day   Patient not taking: Reported on 5/7/2024      Facility-Administered Medications: None       Past Medical History:   Diagnosis Date    Allergic     Anxiety     Arthritis     Asthma     Bipolar 2 disorder, major depressive episode (HCC) 10/31/2017    Bipolar disorder (HCC)     Chronic left lumbar radiculopathy     Chronic low back pain     Chronic pain syndrome     Chronic right shoulder pain     Depression with anxiety     Fatigue     GERD (gastroesophageal reflux disease)     Hydronephrosis     Iron deficiency anemia     Kidney stone     Lytic bone lesions on xray     Nephrolithiasis     PONV (postoperative nausea and vomiting)     Right elbow pain     Right lateral epicondylitis        Past Surgical History:   Procedure Laterality Date    ADENOIDECTOMY Bilateral     APPENDECTOMY      BACK SURGERY      CHOLECYSTECTOMY      CHOLECYSTECTOMY LAPAROSCOPIC N/A 10/30/2018    Procedure: CHOLECYSTECTOMY WITH GASTROTOMY LAPAROSCOPIC;  Surgeon: Oliver Madison MD;  Location:  MAIN OR;  Service: General    COLONOSCOPY      ERCP W/ SPHICTEROTOMY N/A 10/30/2018    Procedure: ENDOSCOPIC RETROGRADE CHOLANGIOPANCREATOGRAPHY (ERCP) W/ SPHINCTEROTOMY;  Surgeon: Kendrick Meeks MD;  Location:  MAIN OR;  Service: Gastroenterology    GASTRIC BYPASS      2009    OTHER SURGICAL HISTORY      fusion/refusion of vertebrae, 2010 and 2011 l5-s1 and l4-l5    GA ARTHROSCOPY KNEE LATERAL RELEASE Left 01/06/2021    Procedure: RELEASE RETINACULAR;  Surgeon: Rogers Lennon DO;  Location:  MAIN OR;  Service: Orthopedics    GA ARTHRS KNE SURG W/MENISCECTOMY MED/LAT W/SHVG Right 04/26/2022    Procedure: ARTHROSCOPIC MENISCECTOMY LATERAL;  Surgeon: Rogers Lennon DO;  Location:  MAIN OR;  Service: Orthopedics    GA ARTHRS KNEE DEBRIDEMENT/SHAVING ARTCLR CRTLG Left 01/06/2021    Procedure: ARTHROSCOPY KNEE;  Surgeon: Rogers Lennon DO;   Location: [facility];  Service: Orthopedics    RI CYSTO/URETERO W/LITHOTRIPSY &INDWELL STENT INSRT Right 08/08/2017    Procedure: CYSTOSCOPY; URETEROSCOPY; HOLMIUM LASER; RETROGRADE PYELOGRAM; STENT;  Surgeon: Rupesh Romo MD;  Location: [facility];  Service: Urology    RI INCISION & DRAINAGE ABSCESS COMPLICATED/MULTIPLE Left 01/08/2021    Procedure: INCISION AND DRAINAGE (I&D) EXTREMITY- of left knee s/p MPFL reconstruction, I&D if the left knee with possible MPFL revision;  Surgeon: Rogers Lennon DO;  Location: [facility];  Service: Orthopedics    RI INSJ/RPLCMT SPINAL NPG/RCVR POCKET CRTJ&CONNJ Right 11/14/2017    Procedure: REMOVAL LOWER MEDIAL BUTTOCK SPINAL CORD STIMULATOR GENERATOR; PLACEMENT OF NEW BUTTOCK SPINAL CORD STIMULATOR THROUGH SEPERATE INCISION;  Surgeon: Stephan Ross MD;  Location: [facility];  Service: Neurosurgery    RI INSJ/RPLCMT SPINAL NPG/RCVR POCKET CRTJ&CONNJ Right 01/12/2022    Procedure: Right sided spinal cord stimulator pulse generator replacement;  Surgeon: Michael Gamez MD;  Location: [facility];  Service: Neurosurgery    RI INSJ/RPLCMT SPINAL NPG/RCVR POCKET CRTJ&CONNJ Right 9/8/2023    Procedure: Right-sided spinal cord stimulator internal pulse generator replacement;  Surgeon: Michael Gamez MD;  Location: [facility];  Service: Neurosurgery    RI RCNSTJ DISLC PATELLA W/XTNSR RELIGNMT&/MUSC RL Left 01/06/2021    Procedure: REALIGNMENT PATELLA with chondroplasty of patella, open medial patellofemoral ligament reconstruction with allograft;  Surgeon: Rogers Lennon DO;  Location: [facility];  Service: Orthopedics    RIK-EN-Y PROCEDURE  2003    SPINAL CORD STIMULATOR IMPLANT  11/14/2017    dr ross procedure and technique 1. removal of a right back implantable pulse generator 2.placement of a new right buttock impantable pulse generator through seperate incision 3 electric analys complex programming spinal cord stimulator system postoperative period approx 1  hour    SPINE SURGERY      TONSILLECTOMY         Family History   Problem Relation Age of Onset    Cancer Mother     Arthritis Mother     Stomach cancer Mother     Coronary artery disease Mother     Heart disease Mother     Asthma Mother     COPD Mother     Cancer Father     Esophageal cancer Father     Other Father         brain tumor    Diabetes Father     No Known Problems Sister     No Known Problems Sister     No Known Problems Sister      I have reviewed and agree with the history as documented.    E-Cigarette/Vaping    E-Cigarette Use Never User      E-Cigarette/Vaping Substances    Nicotine No     THC No     CBD No     Flavoring No     Other No     Unknown No      Social History     Tobacco Use    Smoking status: Every Day     Current packs/day: 1.50     Average packs/day: 1.5 packs/day for 29.6 years (44.4 ttl pk-yrs)     Types: Cigarettes, Pipe, Cigars     Start date: 1995    Smokeless tobacco: Never   Vaping Use    Vaping status: Never Used   Substance Use Topics    Alcohol use: Yes     Comment: rare    Drug use: Never        Review of Systems   Constitutional:  Negative for activity change, appetite change, chills, diaphoresis and fatigue.   Respiratory:  Negative for apnea and shortness of breath.    Cardiovascular:  Negative for chest pain.   Gastrointestinal:  Negative for diarrhea, nausea and vomiting.   Musculoskeletal:  Positive for gait problem. Negative for back pain.   Skin:  Negative for color change.   Neurological:  Positive for weakness and numbness. Negative for dizziness and syncope.       Physical Exam  ED Triage Vitals [07/30/24 1935]   Temperature Pulse Respirations Blood Pressure SpO2   97.7 °F (36.5 °C) 86 18 153/99 97 %      Temp Source Heart Rate Source Patient Position - Orthostatic VS BP Location FiO2 (%)   Temporal Monitor Sitting Left arm --      Pain Score       --             Orthostatic Vital Signs  Vitals:    07/30/24 1935   BP: 153/99   Pulse: 86   Patient Position -  Orthostatic VS: Sitting       Physical Exam  Constitutional:       Appearance: Normal appearance.   HENT:      Head: Normocephalic and atraumatic.      Nose: Nose normal.   Eyes:      Pupils: Pupils are equal, round, and reactive to light.   Cardiovascular:      Rate and Rhythm: Normal rate.      Pulses: Normal pulses.      Heart sounds: Normal heart sounds.   Pulmonary:      Effort: Pulmonary effort is normal.   Abdominal:      General: Abdomen is flat.      Palpations: Abdomen is soft.   Genitourinary:     Comments: Normal rectal tone  Skin:     General: Skin is warm and dry.      Capillary Refill: Capillary refill takes less than 2 seconds.   Neurological:      Mental Status: He is alert and oriented to person, place, and time.      Sensory: Sensory deficit present.      Motor: Weakness (Weakness LLE on hip flexion, abduction (4/5). Bilateral LE 4/5 on plantar flexion/dorsiflexion) present.      Deep Tendon Reflexes: Reflexes normal.      Comments: Loss of sensation over scrotum, perineum, and bilateral proximal medial thigh         ED Medications  Medications - No data to display    Diagnostic Studies  Results Reviewed       Procedure Component Value Units Date/Time    Comprehensive metabolic panel [[id number]] Collected: 07/30/24 2207    Lab Status: Final result Specimen: Blood from Arm, Left Updated: 07/30/24 2239     Sodium 137 mmol/L      Potassium 3.8 mmol/L      Chloride 103 mmol/L      CO2 27 mmol/L      ANION GAP 7 mmol/L      BUN 15 mg/dL      Creatinine 0.91 mg/dL      Glucose 81 mg/dL      Calcium 8.8 mg/dL      AST 13 U/L      ALT 20 U/L      Alkaline Phosphatase 85 U/L      Total Protein 6.9 g/dL      Albumin 3.8 g/dL      Total Bilirubin 0.29 mg/dL      eGFR 99 ml/min/1.73sq m     Narrative:      National Kid[employer] guidelines for Chronic Kidney Disease (CKD):     Stage 1 with normal or high GFR (GFR > 90 mL/min/1.73 square meters)    Stage 2 Mild CKD (GFR = 60-89 mL/min/1.73 square  meters)    Stage 3A Moderate CKD (GFR = 45-59 mL/min/1.73 square meters)    Stage 3B Moderate CKD (GFR = 30-44 mL/min/1.73 square meters)    Stage 4 Severe CKD (GFR = 15-29 mL/min/1.73 square meters)    Stage 5 End Stage CKD (GFR <15 mL/min/1.73 square meters)  Note: GFR calculation is accurate only with a steady state creatinine    CBC and differential [[id number]]  (Abnormal) Collected: 07/30/24 2207    Lab Status: Final result Specimen: Blood from Arm, Left Updated: 07/30/24 2220     WBC 9.38 Thousand/uL      RBC 5.85 Million/uL      Hemoglobin 15.7 g/dL      Hematocrit 48.5 %      MCV 83 fL      MCH 26.8 pg      MCHC 32.4 g/dL      RDW 14.2 %      MPV 10.4 fL      Platelets 256 Thousands/uL      nRBC 0 /100 WBCs      Segmented % 65 %      Immature Grans % 1 %      Lymphocytes % 26 %      Monocytes % 6 %      Eosinophils Relative 1 %      Basophils Relative 1 %      Absolute Neutrophils 6.13 Thousands/µL      Absolute Immature Grans 0.06 Thousand/uL      Absolute Lymphocytes 2.41 Thousands/µL      Absolute Monocytes 0.58 Thousand/µL      Eosinophils Absolute 0.13 Thousand/µL      Basophils Absolute 0.07 Thousands/µL                    MRI ED/Inpt Order    (Results Pending)         Procedures  Procedures      ED Course  ED Course as of 07/31/24 0023   Tue Jul 30, 2024   2331 Called Radiology Dr. Silva to discuss MRI as it has not been completed yet. Patient has a spine stimulator in place and per radiology can not be cleared by MRI until they are able to call during the day to determine device is MRI compatible despite patient reporting having had an MRI with this device in place before. Did suggest doing CT myelogram but radiology said this can not be done until the morning as well. Per radiology, this imaging can be done emergently in the morning.    2351 Per NSGY attending on call patient's exam does not fit a cauda equina picture and he can get the MRI in the AM                                       Medical  Decision Making  Amount and/or Complexity of Data Reviewed  Labs: ordered.    Risk  Decision regarding hospitalization.      Patient is a 48 y.o. male  who presents to the ED with CC loss of sensation over scrotum, LE weakness.    Vital signs stable, afebrile, normotensive. Exam as listed above    Differential diagnosis includes but is not limited to cauda equina, lumbar radiculopathy, disc herniation, spondylosis/lysis/listhesis      Plan CBC, CMP, discussed case with radiology and ordered STAT MRI Lumbar spine. See rest of plan in ED course .    View ED course above for further discussion on patient workup.    All labs reviewed and utilized in the medical decision making process  All radiology studies independently viewed by me and interpreted by the radiologist.  I reviewed all testing with the patient.     Upon re-evaluation patient still with perineal numbness; admitted to [facility] for MRI in the morning.      Disposition  Final diagnoses:   Saddle anesthesia     Time reflects when diagnosis was documented in both MDM as applicable and the Disposition within this note       Time User Action Codes Description Comment    7/31/2024 12:23 AM Nae Dewitt Add [R20.0] Saddle anesthesia           ED Disposition       ED Disposition   Admit    Condition   Stable    Date/Time   Wed Jul 31, 2024 12:08 AM    Comment   Case was discussed with Dr. Pacheco and the patient's admission status was agreed to be Admission Status: inpatient status to the service of Dr. Pacheco .               Follow-up Information    None         Patient's Medications   Discharge Prescriptions    No medications on file     No discharge procedures on file.    PDMP Review         Value Time User    PDMP Reviewed  Yes 5/7/2024  8:49 AM Asiya Mcclain PA-C             ED Provider  Attending physically available and evaluated Anderson Hart. I managed the patient along with the ED Attending.    Electronically Signed by           Nae Dewitt  MD  07/31/24 0010       Nae Dewitt MD  07/31/24 0023

## 2024-07-31 NOTE — ED NOTES
Pt ambulated independently in hallway; no gait disturbance noted.      Moni Benton RN  07/31/24 0024

## 2024-07-31 NOTE — DISCHARGE SUMMARY
[facility]  Discharge- Anderson Hart 1975, 48 y.o. male MRN: [id number]  Unit/Bed#: QCF Encounter: [id number]  Primary Care Provider: Elba Newby MD   Date and time admitted to hospital: 7/30/2024  8:32 PM    * Numbness in scrotum and both legs  Assessment & Plan  Patient with history of prior L1-L4 fusion with spinal stimulator for pain control presenting with scrotal numbness now radiating to heels of feet and with left lower extremity weakness reported.  Denied fever/chills, bowel/bladder incontinence or apparent retention  Is planned for urgent MRI lumbar spine,was not completed night of admission due to presence of spinal stimulator which is otherwise MRI conditional.    MRI L spine with no acute findings   Neurosurgery consulted.  Given stable MRI, no intervention at this time, he is stable for discharge.  Discussed if he has worsening sx/pain/incontinence/weakness to return for evaluation     Chronic GERD  Assessment & Plan  Symptoms stable, continue PPI    Spinal stenosis of lumbar region with neurogenic claudication  Assessment & Plan  With history of L1-L4 fusion with spinal stimulator in place, workup of new numbness as per primary problem with plan for MRI lumbar spine and neurosurgery consultation  PDMP reviewed, continue home dose tramadol as needed        Medical Problems       Resolved Problems  Date Reviewed: 7/31/2024   None       Discharging Physician / Practitioner: Cha Leo PA-C  PCP: Elba Newby MD  Admission Date:   Admission Orders (From admission, onward)       Ordered        07/31/24 0029  INPATIENT ADMISSION  Once                          Discharge Date: 07/31/24    Consultations During Hospital Stay:  Neurosurgery     Procedures Performed:   None    Significant Findings / Test Results:   MRI L spine: posterior spinal instrumented fusion with decompressive laminectomy from L4-S1.  No evidence of high-grade central canal  stenosis.    Incidental Findings:   None     Test Results Pending at Discharge (will require follow up):   None     Outpatient Tests Requested:  None    Complications:  None    Reason for Admission: saddle anesthesia     Hospital Course:   Anderson Hart is a 48 y.o. male patient who originally presented to the hospital on 7/30/2024 due to numbness in buttock/groin that radiates down to bilateral legs, with some left lower extremity weakness.  This began on Saturday after he was lifting heavy object on Friday.  Began in his buttock and has since spread down to his legs.  He was seen by neurosurgery.  MRI of the lumbar spine with no acute findings.  Unclear etiology for his symptoms, findings on MRI.  No surgical intervention is required at this time.  He is stable for discharge with outpatient follow-up.  Reviewed return symptoms.      Please see above list of diagnoses and related plan for additional information.     Condition at Discharge: good    Discharge Day Visit / Exam:   * Please refer to separate progress note for these details *    Discussion with Family: Patient declined call to .     Discharge instructions/Information to patient and family:   See after visit summary for information provided to patient and family.      Provisions for Follow-Up Care:  See after visit summary for information related to follow-up care and any pertinent home health orders.      Mobility at time of Discharge:           Disposition:   Home    Planned Readmission: no     Discharge Statement:  I spent 15 minutes discharging the patient. This time was spent on the day of discharge. I had direct contact with the patient on the day of discharge. Greater than 50% of the total time was spent examining patient, answering all patient questions, arranging and discussing plan of care with patient as well as directly providing post-discharge instructions.  Additional time then spent on discharge activities.    Discharge  Medications:  See after visit summary for reconciled discharge medications provided to patient and/or family.      **Please Note: This note may have been constructed using a voice recognition system**

## 2024-07-31 NOTE — ASSESSMENT & PLAN NOTE
Patient with history of prior L1-L4 fusion with spinal stimulator for pain control presenting with scrotal numbness now radiating to bilateral mid shins and with left lower extremity weakness reported.  Denied fever/chills, bowel/bladder incontinence or apparent retention, IVDU  Is planned for urgent MRI lumbar spine, unfortunately cannot be completed tonight due to presence of spinal stimulator which is otherwise MRI conditional.  Await completion of this and will appreciate neurosurgery consult additionally  [facility]  Ambulatory trial in a.m.

## 2024-07-31 NOTE — DISCHARGE INSTR - AVS FIRST PAGE
If you experience any worsening numbness, weakness, pain, please return for evaluation    Follow up with primary care in 1 week

## 2024-07-31 NOTE — H&P
[facility]  H&P  Name: Anderson Hart 48 y.o. male I MRN: [id number]  Unit/Bed#: QCF I Date of Admission: 7/30/2024   Date of Service: 7/31/2024 I Hospital Day: 0      Assessment & Plan   * Numbness in scrotum and both legs  Assessment & Plan  Patient with history of prior L1-L4 fusion with spinal stimulator for pain control presenting with scrotal numbness now radiating to bilateral mid shins and with left lower extremity weakness reported.  Denied fever/chills, bowel/bladder incontinence or apparent retention, IVDU  Is planned for urgent MRI lumbar spine, unfortunately cannot be completed tonight due to presence of spinal stimulator which is otherwise MRI conditional.  Await completion of this and will appreciate neurosurgery consult additionally  Neurochecks  Ambulatory trial in a.m.    Chronic GERD  Assessment & Plan  Symptoms stable, continue PPI    Spinal stenosis of lumbar region with neurogenic claudication  Assessment & Plan  With history of L1-L4 fusion with spinal stimulator in place, workup of new numbness as per primary problem with plan for MRI lumbar spine and neurosurgery consultation  PDMP reviewed, continue home dose tramadol as needed             VTE Prophylaxis: Pharmacologic VTE Prophylaxis contraindicated due to pending MRI lumbar spine and neurosurgical consultation   / sequential compression device   Code Status: Level 1 - Full Code   POLST: POLST form is not discussed and not completed at this time.  Discussion with family: Wife at bedside    Anticipated Length of Stay:  Patient will be admitted on an Inpatient basis with an anticipated length of stay of greater than 2 midnights.   Justification for Hospital Stay: Please see detailed plans noted above.    Chief Complaint:     Scrotal numbness  History of Present Illness:  Anderson Hart is a 48 y.o. male who has a past medical history significant for spinal stenosis of lumbar region with neurogenic  claudication with history of L1-L4 fusion previously was spinal stimulator in place for pain control, GERD, asthma presenting with new scrotal numbness.  He states this is developed over the past 2 days now with radiation to bilateral lower extremities and some increased weakness in the left lower extremity.  He otherwise denies any fall/trauma, fever/chills, apparent urinary retention/constipation, bladder/bowel incontinence, known personal history of cancer, or IVDU.  Due to degree of symptoms and difficulty ambulating he was advised to present here by wife for further evaluation.    During ED evaluation plan was for emergent MRI of lumbar spine, however due to presence of spinal stimulator which is otherwise MRI conditional this cannot be accomplished overnight thus patient admitted to facilitate MRI imaging ideally in a.m. additionally with plan for neurosurgical consultation.    Review of Systems:    Constitutional:  Denies fever or chills   Eyes:  Denies change in visual acuity   HENT:  Denies nasal congestion or sore throat   Respiratory:  Denies cough or shortness of breath   Cardiovascular:  Denies chest pain or edema   GI:  Denies abdominal pain, nausea, vomiting, bloody stools or diarrhea   :  Denies dysuria   Musculoskeletal:  Denies back pain or joint pain   Integument:  Denies rash   Neurologic:  Denies headache, focal weakness but reports sensory changes  Endocrine:  Denies polyuria or polydipsia   Lymphatic:  Denies swollen glands   Psychiatric:  Denies depression or anxiety     Past Medical and Surgical History:   Past Medical History:   Diagnosis Date    Allergic     Anxiety     Arthritis     Asthma     Bipolar 2 disorder, major depressive episode (HCC) 10/31/2017    Bipolar disorder (HCC)     Chronic left lumbar radiculopathy     Chronic low back pain     Chronic pain syndrome     Chronic right shoulder pain     Depression with anxiety     Fatigue     GERD (gastroesophageal reflux disease)      Hydronephrosis     Iron deficiency anemia     Kidney stone     Lytic bone lesions on xray     Nephrolithiasis     PONV (postoperative nausea and vomiting)     Right elbow pain     Right lateral epicondylitis      Past Surgical History:   Procedure Laterality Date    ADENOIDECTOMY Bilateral     APPENDECTOMY      BACK SURGERY      CHOLECYSTECTOMY      CHOLECYSTECTOMY LAPAROSCOPIC N/A 10/30/2018    Procedure: CHOLECYSTECTOMY WITH GASTROTOMY LAPAROSCOPIC;  Surgeon: Oliver Madison MD;  Location: [facility];  Service: General    COLONOSCOPY      ERCP W/ SPHICTEROTOMY N/A 10/30/2018    Procedure: ENDOSCOPIC RETROGRADE CHOLANGIOPANCREATOGRAPHY (ERCP) W/ SPHINCTEROTOMY;  Surgeon: Kendrick Meeks MD;  Location: [facility];  Service: Gastroenterology    GASTRIC BYPASS      2009    OTHER SURGICAL HISTORY      fusion/refusion of vertebrae, 2010 and 2011 l5-s1 and l4-l5    OH ARTHROSCOPY KNEE LATERAL RELEASE Left 01/06/2021    Procedure: RELEASE RETINACULAR;  Surgeon: Rogers Lennon DO;  Location: [facility];  Service: Orthopedics    OH ARTHRS KNE SURG W/MENISCECTOMY MED/LAT W/SHVG Right 04/26/2022    Procedure: ARTHROSCOPIC MENISCECTOMY LATERAL;  Surgeon: Rogers Lennon DO;  Location: [facility];  Service: Orthopedics    OH ARTHRS KNEE DEBRIDEMENT/SHAVING ARTCLR CRTLG Left 01/06/2021    Procedure: ARTHROSCOPY KNEE;  Surgeon: Rogers Lennon DO;  Location: [facility];  Service: Orthopedics    OH CYSTO/URETERO W/LITHOTRIPSY &INDWELL STENT INSRT Right 08/08/2017    Procedure: CYSTOSCOPY; URETEROSCOPY; HOLMIUM LASER; RETROGRADE PYELOGRAM; STENT;  Surgeon: Rupesh Romo MD;  Location: [facility];  Service: Urology    OH INCISION & DRAINAGE ABSCESS COMPLICATED/MULTIPLE Left 01/08/2021    Procedure: INCISION AND DRAINAGE (I&D) EXTREMITY- of left knee s/p MPFL reconstruction, I&D if the left knee with possible MPFL revision;  Surgeon: Rogers Lennon DO;  Location: [facility];  Service: Orthopedics    OH INSJ/RPLCMT SPINAL NPG/RCVR  POCKET CRTJ&CONNJ Right 11/14/2017    Procedure: REMOVAL LOWER MEDIAL BUTTOCK SPINAL CORD STIMULATOR GENERATOR; PLACEMENT OF NEW BUTTOCK SPINAL CORD STIMULATOR THROUGH SEPERATE INCISION;  Surgeon: Stephan Duque MD;  Location: [facility];  Service: Neurosurgery    MI INSJ/RPLCMT SPINAL NPG/RCVR POCKET CRTJ&CONNJ Right 01/12/2022    Procedure: Right sided spinal cord stimulator pulse generator replacement;  Surgeon: Michael Gamez MD;  [facility]: [facility];  Service: Neurosurgery    MI INSJ/RPLCMT SPINAL NPG/RCVR POCKET CRTJ&CONNJ Right 9/8/2023    Procedure: Right-sided spinal cord stimulator internal pulse generator replacement;  Surgeon: Michael Gamez MD;  Location: [facility];  Service: Neurosurgery    MI RCNSTJ DISLC PATELLA W/XTNSR RELIGNMT&/MUSC RL Left 01/06/2021    Procedure: REALIGNMENT PATELLA with chondroplasty of patella, open medial patellofemoral ligament reconstruction with allograft;  Surgeon: Rogers Lennon DO;  Location: [facility];  Service: Orthopedics    RIK-EN-Y PROCEDURE  2003    SPINAL CORD STIMULATOR IMPLANT  11/14/2017    dr duque procedure and technique 1. removal of a right back implantable pulse generator 2.placement of a new right buttock impantable pulse generator through seperate incision 3 electric analys complex programming spinal cord stimulator system postoperative period approx 1 hour    SPINE SURGERY      TONSILLECTOMY         Meds/Allergies:  Not in a hospital admission.    Allergies:   Allergies   Allergen Reactions    Ampicillin GI Intolerance     Vomiting    Penicillins GI Intolerance     Vomit     History:  Marital Status: /Civil Union     Substance Use History:   Social History     Substance and Sexual Activity   Alcohol Use Yes    Comment: rare     Social History     Tobacco Use   Smoking Status Every Day    Current packs/day: 1.50    Average packs/day: 1.5 packs/day for 29.6 years (44.4 ttl pk-yrs)    Types: Cigarettes, Pipe, Cigars    Start  date: 1995   Smokeless Tobacco Never     Social History     Substance and Sexual Activity   Drug Use Never       Family History:  Family History   Problem Relation Age of Onset    Cancer Mother     Arthritis Mother     Stomach cancer Mother     Coronary artery disease Mother     Heart disease Mother     Asthma Mother     COPD Mother     Cancer Father     Esophageal cancer Father     Other Father         brain tumor    Diabetes Father     No Known Problems Sister     No Known Problems Sister     No Known Problems Sister        Physical Exam:     Vitals:   Blood Pressure: 160/90 (07/31/24 0200)  Pulse: 60 (07/31/24 0200)  Temperature: 97.7 °F (36.5 °C) (07/30/24 1935)  Temp Source: Temporal (07/30/24 1935)  Respirations: 17 (07/31/24 0200)  SpO2: 97 % (07/31/24 0200)    Constitutional:  Well developed, well nourished, no acute distress, non-toxic appearance   Eyes:  PERRL, conjunctiva normal   HENT:  Atraumatic, external ears normal, nose normal, oropharynx moist, no pharyngeal exudates. Neck- normal range of motion, no tenderness, supple   Respiratory:  No respiratory distress, normal breath sounds, no rales, no wheezing   Cardiovascular:  Normal rate, normal rhythm, no murmurs, no gallops, no rubs   GI:  Soft, nondistended, normal bowel sounds, nontender, no organomegaly, no mass, no rebound, no guarding   :  No costovertebral angle tenderness   Musculoskeletal:  No edema, no tenderness, no deformities. Back- no tenderness  Integument:  Well hydrated, no rash   Lymphatic:  No lymphadenopathy noted   Neurologic:  Alert &awake, communicative, CN 2-12 normal, normal motor function, mildly diminished sensation to left lower extremity to light touch compared to right per patient  Psychiatric:  Speech and behavior appropriate       Lab Results: I have personally reviewed pertinent reports.      Results from last 7 days   Lab Units 07/30/24  2207   WBC Thousand/uL 9.38   HEMOGLOBIN g/dL 15.7   HEMATOCRIT % 48.5    PLATELETS Thousands/uL 256   SEGS PCT % 65   LYMPHO PCT % 26   MONO PCT % 6   EOS PCT % 1     Results from last 7 days   Lab Units 07/30/24  2207   POTASSIUM mmol/L 3.8   CHLORIDE mmol/L 103   CO2 mmol/L 27   BUN mg/dL 15   CREATININE mg/dL 0.91   CALCIUM mg/dL 8.8   ALK PHOS U/L 85   ALT U/L 20   AST U/L 13             ** Please Note: Dragon 360 Dictation voice to text software was used in the creation of this document. **

## 2024-07-31 NOTE — ASSESSMENT & PLAN NOTE
Patient with history of prior L1-L4 fusion with spinal stimulator for pain control presenting with scrotal numbness now radiating to heels of feet and with left lower extremity weakness reported.  Denied fever/chills, bowel/bladder incontinence or apparent retention  Is planned for urgent MRI lumbar spine,was not completed night of admission due to presence of spinal stimulator which is otherwise MRI conditional.    MRI L spine with no acute findings   Neurosurgery consulted.  Given stable MRI, no intervention at this time, he is stable for discharge.  Discussed if he has worsening sx/pain/incontinence/weakness to return for evaluation

## 2024-07-31 NOTE — PROGRESS NOTES
[facility]  Progress Note  Name: Anderson Hart I  MRN: [id number]  Unit/Bed#: QCF I Date of Admission: 7/30/2024   Date of Service: 7/31/2024 I Hospital Day: 0    Assessment & Plan   * Numbness in scrotum and both legs  Assessment & Plan  Patient with history of prior L1-L4 fusion with spinal stimulator for pain control presenting with scrotal numbness now radiating to heels of feet and with left lower extremity weakness reported.  Denied fever/chills, bowel/bladder incontinence or apparent retention  Is planned for urgent MRI lumbar spine,was not completed night of admission due to presence of spinal stimulator which is otherwise MRI conditional.    MRI is pending for this AM   Neurosurgery consulted   Neurochecks    Chronic GERD  Assessment & Plan  Symptoms stable, continue PPI    Spinal stenosis of lumbar region with neurogenic claudication  Assessment & Plan  With history of L1-L4 fusion with spinal stimulator in place, workup of new numbness as per primary problem with plan for MRI lumbar spine and neurosurgery consultation  PDMP reviewed, continue home dose tramadol as needed           VTE Pharmacologic Prophylaxis:   Moderate Risk (Score 3-4) - Pharmacological DVT Prophylaxis Contraindicated. Sequential Compression Devices Ordered.    Mobility:          Patient Centered Rounds: I performed bedside rounds with nursing staff today.   Discussions with Specialists or Other Care Team Provider: neurosurgery     Education and Discussions with Family / Patient: Patient declined call to .     Total Time Spent on Date of Encounter in care of patient: 25 mins. This time was spent on one or more of the following: performing physical exam; counseling and coordination of care; obtaining or reviewing history; documenting in the medical record; reviewing/ordering tests, medications or procedures; communicating with other healthcare professionals and discussing with  patient's family/caregivers.    Current Length of Stay: 0 day(s)  Current Patient Status: Inpatient   Certification Statement: The patient will continue to require additional inpatient hospital stay due to saddle anesthesia needs MRI  Discharge Plan: Anticipate discharge in 24-48 hrs to discharge location to be determined pending rehab evaluations.    Code Status: Level 1 - Full Code    Subjective:   No acute complaints, numbness is the same as it was last night when he came into hospital.  Starts in buttock/groin and goes all the way down to his heels.  LLE>RLE weakness/numbness.  Reports back pain but thinks it is related to stretcher.  He reports on Friday he was lifting heavy bed frame and on Saturday noted groin/buttock numbness which has now progressed down his legs.  He denies incontinence but has no sensation when he is going to the bathroom.     Objective:     Vitals:   Temp (24hrs), Av.7 °F (36.5 °C), Min:97.7 °F (36.5 °C), Max:97.7 °F (36.5 °C)    Temp:  [97.7 °F (36.5 °C)] 97.7 °F (36.5 °C)  HR:  [60-86] 60  Resp:  [17-18] 17  BP: (153-181)/() 160/90  SpO2:  [97 %-98 %] 97 %  There is no height or weight on file to calculate BMI.     Input and Output Summary (last 24 hours):   No intake or output data in the 24 hours ending 24 0929    Physical Exam:   Physical Exam  Vitals reviewed.   Constitutional:       General: He is not in acute distress.     Appearance: He is not toxic-appearing.   HENT:      Head: Normocephalic and atraumatic.   Eyes:      Extraocular Movements: Extraocular movements intact.   Cardiovascular:      Rate and Rhythm: Normal rate and regular rhythm.   Pulmonary:      Effort: Pulmonary effort is normal. No respiratory distress.      Breath sounds: Normal breath sounds.   Abdominal:      General: Bowel sounds are normal. There is no distension.      Palpations: Abdomen is soft.   Musculoskeletal:         General: Normal range of motion.      Comments: Sensation pt  reports he does feel it is diminished and feels like pins/needles sensation    Neurological:      General: No focal deficit present.      Mental Status: He is alert and oriented to person, place, and time.   Psychiatric:         Mood and Affect: Mood normal.         Behavior: Behavior normal.         Thought Content: Thought content normal.          Additional Data:     Labs:  Results from last 7 days   Lab Units 07/30/24  2207   WBC Thousand/uL 9.38   HEMOGLOBIN g/dL 15.7   HEMATOCRIT % 48.5   PLATELETS Thousands/uL 256   SEGS PCT % 65   LYMPHO PCT % 26   MONO PCT % 6   EOS PCT % 1     Results from last 7 days   Lab Units 07/30/24  2207   SODIUM mmol/L 137   POTASSIUM mmol/L 3.8   CHLORIDE mmol/L 103   CO2 mmol/L 27   BUN mg/dL 15   CREATININE mg/dL 0.91   ANION GAP mmol/L 7   CALCIUM mg/dL 8.8   ALBUMIN g/dL 3.8   TOTAL BILIRUBIN mg/dL 0.29   ALK PHOS U/L 85   ALT U/L 20   AST U/L 13   GLUCOSE RANDOM mg/dL 81                       Lines/Drains:  Invasive Devices       Peripheral Intravenous Line  Duration             Peripheral IV 07/30/24 Left Antecubital <1 day                          Imaging: No pertinent imaging reviewed.    Recent Cultures (last 7 days):         Last 24 Hours Medication List:   Current Facility-Administered Medications   Medication Dose Route Frequency Provider Last Rate    acetaminophen  975 mg Oral Q8H ANILA Dawit Juárezi, DO      albuterol  2 puff Inhalation Q4H PRN Dawit Tara, DO      cyanocobalamin  1,000 mcg Oral Daily Dawit Tara, DO      folic acid  1,000 mcg Oral Daily Dawit Tara, DO      ondansetron  4 mg Intravenous Q6H PRN Dawit Tara, DO      pantoprazole  40 mg Oral Early Morning Dawit Tara, DO      traMADol  50 mg Oral TID PRN Dawit Tara, DO          Today, Patient Was Seen By: Cha Leo PA-C    **Please Note: This note may have been constructed using a voice recognition system.**

## 2024-07-31 NOTE — ASSESSMENT & PLAN NOTE
Patient presented with acute onset of genital and LE numbness with associated weakness  Repots lifting a bow containing a new bed frame Friday before symptoms onset  No b/b dysfunction aside from decreased sensation   H/o spinal fusion and revision (2012, 2013) with SCS placement (2016) and subsequent battery revisions (2022, 2023)    Imaging:   X-ray thoracolumbar 7/31/2024: Thoracic spinal cord stimulator leads intact.  No broken or abandoned leads.  Spinal hardware L4-S1.    Plan:   ongoing frequent neurological checks.   Recommend MRI lumbar spine to be completed.  MRI order was placed By ER.  Currently scheduled for 10 AM  Patient reports he placed a spinal cord stimulator and MRI safe mode.  Hold all AC/AP medication at this time  Ongoing medical management and pain control per primary team  Discussed with SLIM  CM following for dispo planning   Neurosurgery will continue to follow with completion of MRI lumbar spine. Call with questions or concerns.

## 2024-08-01 NOTE — UTILIZATION REVIEW
Initial Clinical Review    Admission: Date/Time/Statement:   Admission Orders (From admission, onward)       Ordered        07/31/24 0029  INPATIENT ADMISSION  Once                          Orders Placed This Encounter   Procedures    INPATIENT ADMISSION     Standing Status:   Standing     Number of Occurrences:   1     Order Specific Question:   Level of Care     Answer:   Med Surg [16]     Order Specific Question:   Estimated length of stay     Answer:   More than 2 Midnights     Order Specific Question:   Certification     Answer:   I certify that inpatient services are medically necessary for this patient for a duration of greater than two midnights. See H&P and MD Progress Notes for additional information about the patient's course of treatment.     ED Arrival Information       Expected   -    Arrival   7/30/2024 19:28    Acuity   Urgent              Means of arrival   Walk-In    Escorted by   Self    Service   Hospitalist    Admission type   Emergency              Arrival complaint   Numbness             Chief Complaint   Patient presents with    Numbness     Patient has had numbness/tingling to one leg since 2012 when he got injured at work. Patient now has numbness/tingling in his entire lower half. Patient has a spinal stim. Patient called his doctor and he told him to come to the ER       Initial Presentation: 48 y.o. male with PMHx includes spinal stenosis of lumbar region with neurogenic claudication with history of L1-L4 fusion previously was spinal stimulator in place for pain control, GERD, asthma  who presented on 7/30 to [facility] ED due to new scrotal numbness.  He states this is developed over the past 2 days now with radiation to bilateral lower extremities and some increased weakness in the left lower extremity with difficulty ambulating.  He denies any fall/trauma..       During ED evaluation plan was for emergent MRI of lumbar spine, however due to presence of spinal stimulator  which is otherwise MRI conditional this cannot be accomplished overnight thus patient admitted to facilitate MRI imaging ideally in a.m. additionally with plan for neurosurgical consultation.    Plan:  Admit Inpatient status to med surg dt Numbness in scrotum and both legs: Neurosurgery consult, neuro checks, ambulatory trial in AM. Continue home PPI and tramadol.     Anticipated Length of Stay/Certification Statement: Patient will be admitted on an Inpatient basis with an anticipated length of stay of greater than 2 midnights.      7/31 Per Neurosurgery: X-ray thoracolumbar 7/31/2024: Thoracic spinal cord stimulator leads intact.  No broken or abandoned leads.  Spinal hardware L4-S1. Ongoing frequent neurological checks.   Recommend MRI lumbar spine to be completed.  MRI order was placed By ER.  Currently scheduled for 10 AM  Patient reports he placed a spinal cord stimulator and MRI safe mode.  Hold all AC/AP medication at this time  Ongoing medical management and pain control per primary team, CM consult for dispo planning, Neurosurgery will follow.     7/31 Per Hospitalist Discharge Note: Neurosurgery consulted.  Given stable MRI, no intervention at this time, he is stable for discharge.  Discussed if he has worsening sx/pain/incontinence/weakness to return for evaluation.    ED Triage Vitals   Temperature Pulse Respirations Blood Pressure SpO2 Pain Score   07/30/24 1935 07/30/24 1935 07/30/24 1935 07/30/24 1935 07/30/24 1935 07/31/24 0425   97.7 °F (36.5 °C) 86 18 153/99 97 % No Pain     Weight (last 2 days) before discharge       None            Vital Signs (last 3 days) before discharge       Date/Time Temp Pulse Resp BP MAP (mmHg) SpO2 O2 Device Patient Position - Orthostatic VS Butch Coma Scale Score Pain    07/31/24 1143 -- -- -- -- -- -- -- -- -- 8    07/31/24 0553 -- -- -- -- -- -- -- -- -- No Pain    07/31/24 0425 -- -- -- -- -- -- -- -- -- No Pain    07/31/24 0200 -- 60 17 160/90 116 97 % None (Room  air) Sitting -- --    07/31/24 0100 -- 64 18 168/84 108 97 % None (Room air) Sitting -- --    07/31/24 0042 -- 64 17 181/104 136 98 % None (Room air) Sitting -- --    07/30/24 2200 -- -- -- -- -- -- None (Room air) -- -- --    07/30/24 1935 97.7 °F (36.5 °C) 86 18 153/99 -- 97 % None (Room air) Sitting -- --    07/30/24 0209 -- -- -- -- -- -- -- -- 15 --              Pertinent Labs/Diagnostic Test Results:   Radiology:  MRI lumbar spine wo contrast   Final Interpretation by Jeremias Hargrove MD (07/31 9546)      Posterior spinal instrumented fusion with decompressive laminectomy from L4-S1. No evidence of high-grade central canal stenosis. See narrative above for full details.         Workstation performed: [id number]         XR follow up   Final Interpretation by Steve Rasheed MD (07/31 9287)      Thoracic spinal cord stimulator leads. No broken or abandoned leads.      Additional chronic/incidental findings as detailed above.         Workstation performed: [id number]         MRI lumbar spine wo contrast    (Results Pending)     Cardiology:  No orders to display     GI:  No orders to display           Results from last 7 days   Lab Units 07/30/24 2207   WBC Thousand/uL 9.38   HEMOGLOBIN g/dL 15.7   HEMATOCRIT % 48.5   PLATELETS Thousands/uL 256   TOTAL NEUT ABS Thousands/µL 6.13         Results from last 7 days   Lab Units 07/30/24 2207   SODIUM mmol/L 137   POTASSIUM mmol/L 3.8   CHLORIDE mmol/L 103   CO2 mmol/L 27   ANION GAP mmol/L 7   BUN mg/dL 15   CREATININE mg/dL 0.91   EGFR ml/min/1.73sq m 99   CALCIUM mg/dL 8.8     Results from last 7 days   Lab Units 07/30/24 2207   AST U/L 13   ALT U/L 20   ALK PHOS U/L 85   TOTAL PROTEIN g/dL 6.9   ALBUMIN g/dL 3.8   TOTAL BILIRUBIN mg/dL 0.29         Results from last 7 days   Lab Units 07/30/24  2207   GLUCOSE RANDOM mg/dL 81     ED Treatment-Medication Administration from 07/30/2024 1928 to 08/01/2024 0908         Date/Time Order Dose Route Action      07/31/2024 0807 cyanocobalamin (VITAMIN B-12) tablet 1,000 mcg 1,000 mcg Oral Given     07/31/2024 0807 pantoprazole (PROTONIX) EC tablet 40 mg 40 mg Oral Given     07/31/2024 0807 folic acid (FOLVITE) tablet 1,000 mcg 1,000 mcg Oral Given     07/31/2024 0124 traMADol (ULTRAM) tablet 50 mg 50 mg Oral Given     07/31/2024 1143 traMADol (ULTRAM) tablet 50 mg 50 mg Oral Given            Past Medical History:   Diagnosis Date    Allergic     Anxiety     Arthritis     Asthma     Bipolar 2 disorder, major depressive episode (HCC) 10/31/2017    Bipolar disorder (HCC)     Chronic left lumbar radiculopathy     Chronic low back pain     Chronic pain syndrome     Chronic right shoulder pain     Depression with anxiety     Fatigue     GERD (gastroesophageal reflux disease)     Hydronephrosis     Iron deficiency anemia     Kidney stone     Lytic bone lesions on xray     Nephrolithiasis     PONV (postoperative nausea and vomiting)     Right elbow pain     Right lateral epicondylitis      Present on Admission:   Numbness in scrotum and both legs   Chronic GERD   Spinal stenosis of lumbar region with neurogenic claudication      Admitting Diagnosis: Numbness [R20.0]  Age/Sex: 48 y.o. male  Admission Orders:  Medications 07/30 07/31   acetaminophen (TYLENOL) tablet 975 mg  Dose: 975 mg  Freq: Every 8 hours scheduled Route: PO  Start: 07/31/24 0100 End: 07/31/24 1510     0117) (0589)     1510-D/C'd      cyanocobalamin (VITAMIN B-12) tablet 1,000 mcg  Dose: 1,000 mcg  Freq: Daily Route: PO  Start: 07/31/24 0900 End: 07/31/24 1510     0807     1510-D/C'd      folic acid (FOLVITE) tablet 1,000 mcg  Dose: 1,000 mcg  Freq: Daily Route: PO  Start: 07/31/24 0900 End: 07/31/24 1510     0807     1510-D/C'd      pantoprazole (PROTONIX) EC tablet 40 mg  Dose: 40 mg  Freq: Daily (early morning) Route: PO  Start: 07/31/24 0600 End: 07/31/24 1510   Admin Instructions:        0807     1510-D/C'd      Legend:        Kllfvhqzngt59/2207/2307/2407/2507/2607/2707/2807/2907/3007/31        Continuous Meds none    PRN Meds Sorted by Name  for Keyur Hart as of 07/22/24 through 7/31/24  Legend:       Medications 07/30 07/31   acetaminophen (TYLENOL) tablet 650 mg  Dose: 650 mg  Freq: Every 6 hours PRN Route: PO  PRN Reasons: headaches,fever,mild pain  Start: 07/31/24 0032 End: 07/31/24 0047     0047-D/C'd      albuterol (PROVENTIL HFA,VENTOLIN HFA) inhaler 2 puff  Dose: 2 puff  Freq: Every 4 hours PRN Route: IN  PRN Reasons: wheezing,shortness of breath  Start: 07/31/24 0033 End: 07/31/24 1510   Admin Instructions:        1510-D/C'd      ondansetron (ZOFRAN) injection 4 mg  Dose: 4 mg  Freq: Every 6 hours PRN Route: IV  PRN Reasons: nausea,vomiting  Start: 07/31/24 0046 End: 07/31/24 1510   Admin Instructions:        1510-D/C'd      traMADol (ULTRAM) tablet 50 mg  Dose: 50 mg  Freq: 3 times daily PRN Route: PO  PRN Reasons: moderate pain,severe pain  Start: 07/31/24 0033 End: 07/31/24 1510   Admin Instructions:        0124     1143     1510-D/C'd        IP CONSULT TO NEUROSURGERY    Network Utilization Review Department  ATTENTION: Please call with any questions or concerns to [phone] and carefully listen to the prompts so that you are directed to the right person. All voicemails are confidential.   For Discharge needs, contact Care Management DC Support Team at [phone] opt. 2  Send all requests for admission clinical reviews, approved or denied determinations and any other requests to dedicated fax number below belonging to the campus where the patient is receiving treatment. List of dedicated fax numbers for the Facilities:  FACILITY NAME UR FAX NUMBER   ADMISSION DENIALS (Administrative/Medical Necessity) [phone]   DISCHARGE SUPPORT TEAM (NETWORK) [phone]   PARENT CHILD HEALTH (Maternity/NICU/Pediatrics) [phone]   [facility] [phone]   [facility]  [phone]   [facility] [phone]   [facility] [phone]   [facility] [phone]   [facility] [phone]   [facility] [phone]   [facility] [phone]   [facility] [phone]   [facility] [phone]   [facility] [phone]   [facility] [phone]

## 2024-08-01 NOTE — UTILIZATION REVIEW
"NOTIFICATION OF INPATIENT ADMISSION   AUTHORIZATION REQUEST   SERVICING FACILITY:   [facility]  Address: [address] [place] [address]  Tax ID: [id number]  NPI: [id number] ATTENDING PROVIDER:  Attending Name and NPI#: Sunday Sanchez Md [[id number]]  Address: [address] [place] [address]  Phone: [phone]   ADMISSION INFORMATION:  Place of Service: Inpatient [facility]  Place of Service Code: 21  Inpatient Admission Date/Time: 7/31/24 12:29 AM  Discharge Date/Time: 7/31/2024  1:10 PM  Admitting Diagnosis Code/Description:  Numbness [R20.0]     UTILIZATION REVIEW CONTACT:  Krystina López"Sheila\"Jackson Utilization   Network Utilization Review Department  Phone: [phone]  Fax: [phone]  Email: [email or web address]  Contact for approvals/pending authorizations, clinical reviews, and discharge.     PHYSICIAN ADVISORY SERVICES:  Medical Necessity Denial & Llxx-bn-Fgax Review  Phone: [phone]  Fax: [phone]  Email: [email or web address]     DISCHARGE SUPPORT TEAM:  For Patients Discharge Needs & Updates  Phone: [phone] opt. 2 Fax: [phone]  Email: [email or web address]     "

## 2024-08-05 ENCOUNTER — TELEPHONE (OUTPATIENT)
Age: 49
End: 2024-08-05

## 2024-08-05 NOTE — TELEPHONE ENCOUNTER
Pt was admitted 7/30-7/31 and seen by Chance. Pt is an established pt of Dr. Casas.   Pt called in today complaining of the same symptoms that he went to the hospital for. Numbness, tingling, loss of sensation. Unable to get a nurse on the lone to speak to pt.     Please assist.     Thank you.

## 2024-08-05 NOTE — TELEPHONE ENCOUNTER
Reached out to patient regarding the above. He stated he is concerned regarding the symptoms he had in the hospital are still present and he is unsure of what he should do or how to manage. He stated he still has the pins and needles sensation in his leg however it is now bilaterally. He reports still experiencing loss of sensation in his groin area however does still have full control or BB. Assisted in scheduling patient an appt.     He is aware to present to the ER should he lose BB control or his symptoms worsen. He was appreciative of the call back.

## 2024-08-08 ENCOUNTER — OFFICE VISIT (OUTPATIENT)
Dept: NEUROSURGERY | Facility: CLINIC | Age: 49
End: 2024-08-08
Payer: COMMERCIAL

## 2024-08-08 VITALS
OXYGEN SATURATION: 99 % | TEMPERATURE: 97.8 F | SYSTOLIC BLOOD PRESSURE: 132 MMHG | BODY MASS INDEX: 28.04 KG/M2 | HEIGHT: 72 IN | WEIGHT: 207 LBS | HEART RATE: 85 BPM | DIASTOLIC BLOOD PRESSURE: 84 MMHG

## 2024-08-08 DIAGNOSIS — R20.0 NUMBNESS IN BOTH LEGS: Primary | ICD-10-CM

## 2024-08-08 PROCEDURE — 99213 OFFICE O/P EST LOW 20 MIN: CPT | Performed by: PHYSICIAN ASSISTANT

## 2024-08-08 NOTE — ASSESSMENT & PLAN NOTE
Patient presented with acute onset of genital and LE numbness with associated weakness seen in consultation on 7/31/2024  Reported lifting a box containing a new bed frame Friday before symptoms onset  H/o spinal fusion and revision (2012, 2013) with SCS placement (2016) and subsequent battery revisions (2022, 2023)  Presents today with continuation of symptoms with some progression since prior evaluation     Imaging:   MRI brain 7/31/2024: Posterior spinal instrumented fusion with decompressive laminectomy from L4-S1.  Disc bulge with some facet arthropathy and mild foraminal narrowing at L3-4.  No stenosis noted otherwise.    Plan:   ongoing frequent neurological checks.   Mri lumbar spine without etiology for symptoms as patient only has mild foraminal disease at L3-4.  No surgical pathology  Unfortunately for patient the etiology for his symptoms is still unknown  As patients symptoms are largely associated with sensation changes, recommend BLE EMG   MRI thoracic spine to rule out high pathology for symptoms not explained on MRI lumbar spine. Patient has SCS in place with BLE numbness in a non dermatomal fashion including the genitals and perianal region with associated weakness   Neurosurgery will have patient return to the office to review MRI thoracic spine results. Patient can contact neurosurgery for additional follow up after EMG is scheduled. Call with questions or concerns.

## 2024-08-08 NOTE — PROGRESS NOTES
Neurosurgery Office Note  Anderson Hart 48 y.o. male MRN: [id number]      Assessment & Plan     Numbness in scrotum and both legs  Patient presented with acute onset of genital and LE numbness with associated weakness seen in consultation on 7/31/2024  Reported lifting a box containing a new bed frame Friday before symptoms onset  H/o spinal fusion and revision (2012, 2013) with SCS placement (2016) and subsequent battery revisions (2022, 2023)  Presents today with continuation of symptoms with some progression since prior evaluation     Imaging:   MRI brain 7/31/2024: Posterior spinal instrumented fusion with decompressive laminectomy from L4-S1.  Disc bulge with some facet arthropathy and mild foraminal narrowing at L3-4.  No stenosis noted otherwise.    Plan:   ongoing frequent neurological checks.   Mri lumbar spine without etiology for symptoms as patient only has mild foraminal disease at L3-4.  No surgical pathology  Unfortunately for patient the etiology for his symptoms is still unknown  As patients symptoms are largely associated with sensation changes, recommend BLE EMG   MRI thoracic spine to rule out high pathology for symptoms not explained on MRI lumbar spine. Patient has SCS in place with BLE numbness in a non dermatomal fashion including the genitals and perianal region with associated weakness   Neurosurgery will have patient return to the office to review MRI thoracic spine results. Patient can contact neurosurgery for additional follow up after EMG is scheduled. Call with questions or concerns.        Diagnoses and all orders for this visit:    Numbness in scrotum and both legs  -     EMG 2 limb lower extremity; Future  -     MRI thoracic spine without contrast; Future          I have spent a total time of 25 minutes on 08/08/24 in caring for this patient including Diagnostic results, Prognosis, Risks and benefits of tx options, Instructions for management, Patient and family education,  Importance of tx compliance, Risk factor reductions, Impressions, Counseling / Coordination of care, Documenting in the medical record, Reviewing / ordering tests, medicine, procedures  , and Obtaining or reviewing history  .      CHIEF COMPLAINT    Chief Complaint   Patient presents with    Follow-up     hospital f/u regarding symptoms still present       HISTORY    History of Present Illness     48 y.o. year old male with PMH including anxiety, bipolar 2 disorder, chronic pain, GERD who presented to the ED and was seen on 7/31 with complaint of lower extremity and genital numbness since Saturday.  Patient reports the Friday before he presented he had a large box delivered for a new bed frame.  He carried the box inside without help and did not think much of the situation at that time.  Starting on Saturday he was having a bowel movement and went to wipe when he realized that he could not perceive the sensation of touch along his buttocks.  He then noted that some of the numbness continued to progress.  Involved his scrotum as well as the upper medial thigh.  It then began to progress a little bit further to incorporate the entirety of the left leg all the way down to the foot.  He has some associated sensation changes on the right side although the left is more pronounced.  He does also endorse some worsening weakness in the left lower extremity.  He has his chronic low back pain without any new radicular pain.  He presented to the hospital due to some progression of his symptoms since the weekend.  He has a history of a lumbar spinal fusion in 2012 and then a subsequent revision with additional decompressions completed in 2013.  He has hardware on x-ray imaging from L4-S1.  A spinal cord stimulator was placed in 2016.  He most recently underwent revisions of his implantable pulse generator with [facility] neurosurgery in 2022 and 2023.    Since the hospital patient states hhis RLE numbness has become more  pronounced and appears to incorporate his whole leg in a non dermatomal fashion from the knee distally. Subjective still with paresthesias in the upper portion of the leg. Significant reduction in touch with almost complete numbness from the ankle into the foot.  He states the numbness in his genital region includes to the anus, testicles, penis.  He still has some weakness in the left lower extremity as well.        See Discussion    REVIEW OF SYSTEMS    Review of Systems   Constitutional: Negative.    HENT: Negative.     Eyes: Negative.    Respiratory: Negative.          Asthma   Cardiovascular: Negative.    Gastrointestinal: Negative.    Endocrine: Negative.    Genitourinary: Negative.    Musculoskeletal:  Positive for back pain (MID TO LBP and BL leg pain L>R), gait problem and myalgias (Lower back cramping).        IPG nearing EOL after 1.5 yrs.  SCS placed for LBP and left leg.          NT waist/groin are down to foot, loss of feeling L>R   (2 days after hosp release)  Mid to LBP  Unsteadiness - worse since hosp release  Weaker in left leg   Skin: Negative.    Allergic/Immunologic: Negative.    Neurological:  Positive for weakness and numbness (left leg).   Hematological: Negative.    Psychiatric/Behavioral: Negative.         ROS obtained by MA. Reviewed. See HPI.     Meds/Allergies     Current Outpatient Medications   Medication Sig Dispense Refill    albuterol (PROVENTIL HFA,VENTOLIN HFA) 90 mcg/act inhaler INHALE 2 PUFFS EVERY 4 (FOUR) HOURS AS NEEDED FOR WHEEZING OR SHORTNESS OF BREATH COUGH 18 g 0    Cyanocobalamin (Vitamin B-12) 1000 MCG SUBL Place 1 tablet (1,000 mcg total) under the tongue in the morning 90 tablet 1    ergocalciferol (VITAMIN D2) 50,000 units TAKE 1 CAPSULE BY MOUTH EVERY 14 DAYS 6 capsule 1    esomeprazole (NexIUM) 40 MG capsule take 1 capsule by mouth every morning 90 capsule 0    folic acid (FOLVITE) 1 mg tablet take 1 tablet by mouth once daily 30 tablet 2    traMADol (Ultram) 50  mg tablet Take 1 tablet (50 mg total) by mouth 3 (three) times a day as needed for moderate pain For ongoing therapy DO NOT FILL BEFORE: 05/09/24 90 tablet 3    Acetaminophen (TYLENOL 8 HOUR ARTHRITIS PAIN PO) Take by mouth (Patient not taking: Reported on 8/8/2024)      naloxone (NARCAN) 4 mg/0.1 mL nasal spray Administer 1 spray into a nostril. If no response after 2-3 minutes, give another dose in the other nostril using a new spray. (Patient not taking: Reported on 8/8/2024) 1 each 1    oxyCODONE-acetaminophen (PERCOCET) 5-325 mg per tablet  (Patient not taking: Reported on 8/8/2024)      varenicline (Chantix Continuing Month Derrick) 1 mg tablet Take 1 tablet (1 mg total) by mouth 2 (two) times a day (Patient not taking: Reported on 5/7/2024) 60 tablet 4     No current facility-administered medications for this visit.       Allergies   Allergen Reactions    Ampicillin GI Intolerance     Vomiting    Penicillins GI Intolerance     Vomit       PAST HISTORY    Past Medical History:   Diagnosis Date    Allergic     Anxiety     Arthritis     Asthma     Bipolar 2 disorder, major depressive episode (HCC) 10/31/2017    Bipolar disorder (HCC)     Chronic left lumbar radiculopathy     Chronic low back pain     Chronic pain syndrome     Chronic right shoulder pain     Depression with anxiety     Fatigue     GERD (gastroesophageal reflux disease)     Hydronephrosis     Iron deficiency anemia     Kidney stone     Lytic bone lesions on xray     Nephrolithiasis     PONV (postoperative nausea and vomiting)     Right elbow pain     Right lateral epicondylitis        Past Surgical History:   Procedure Laterality Date    ADENOIDECTOMY Bilateral     APPENDECTOMY      BACK SURGERY      CHOLECYSTECTOMY      CHOLECYSTECTOMY LAPAROSCOPIC N/A 10/30/2018    Procedure: CHOLECYSTECTOMY WITH GASTROTOMY LAPAROSCOPIC;  Surgeon: Oliver Madison MD;  Location[facility];  Service: General    COLONOSCOPY      ERCP W/ SPHICTEROTOMY N/A 10/30/2018     Procedure: ENDOSCOPIC RETROGRADE CHOLANGIOPANCREATOGRAPHY (ERCP) W/ SPHINCTEROTOMY;  Surgeon: Kendrick Meeks MD;  Location: [facility];  Service: Gastroenterology    GASTRIC BYPASS      2009    OTHER SURGICAL HISTORY      fusion/refusion of vertebrae, 2010 and 2011 l5-s1 and l4-l5    PA ARTHROSCOPY KNEE LATERAL RELEASE Left 01/06/2021    Procedure: RELEASE RETINACULAR;  Surgeon: Rogers Lennon DO;  [facility]: [facility];  Service: Orthopedics    PA ARTHRS KNE SURG W/MENISCECTOMY MED/LAT W/SHVG Right 04/26/2022    Procedure: ARTHROSCOPIC MENISCECTOMY LATERAL;  Surgeon: Rogers Lennon DO;  Location: [facility];  Service: Orthopedics    PA ARTHRS KNEE DEBRIDEMENT/SHAVING ARTCLR CRTLG Left 01/06/2021    Procedure: ARTHROSCOPY KNEE;  Surgeon: Rogers Lennon DO;  Location: [facility];  Service: Orthopedics    PA CYSTO/URETERO W/LITHOTRIPSY &INDWELL STENT INSRT Right 08/08/2017    Procedure: CYSTOSCOPY; URETEROSCOPY; HOLMIUM LASER; RETROGRADE PYELOGRAM; STENT;  Surgeon: Rupesh Romo MD;  Location[facility];  Service: Urology    PA INCISION & DRAINAGE ABSCESS COMPLICATED/MULTIPLE Left 01/08/2021    Procedure: INCISION AND DRAINAGE (I&D) EXTREMITY- of left knee s/p MPFL reconstruction, I&D if the left knee with possible MPFL revision;  Surgeon: Rogers Lennon DO;  Location: [facility];  Service: Orthopedics    PA INSJ/RPLCMT SPINAL NPG/RCVR POCKET CRTJ&CONNJ Right 11/14/2017    Procedure: REMOVAL LOWER MEDIAL BUTTOCK SPINAL CORD STIMULATOR GENERATOR; PLACEMENT OF NEW BUTTOCK SPINAL CORD STIMULATOR THROUGH SEPERATE INCISION;  Surgeon: Stephan Duque MD;  Location: [facility];  Service: Neurosurgery    PA INSJ/RPLCMT SPINAL NPG/RCVR POCKET CRTJ&CONNJ Right 01/12/2022    Procedure: Right sided spinal cord stimulator pulse generator replacement;  Surgeon: Michael Gamez MD;  Location: [facility];  Service: Neurosurgery    PA INSJ/RPLCMT SPINAL NPG/RCVR POCKET CRTJ&CONNJ Right 9/8/2023    Procedure: Right-sided  spinal cord stimulator internal pulse generator replacement;  Surgeon: Michael Gamez MD;  [facility];  Service: Neurosurgery    IL RCNSTJ DISLC PATELLA W/XTNSR RELIGNMT&/MUSC RL Left 01/06/2021    Procedure: REALIGNMENT PATELLA with chondroplasty of patella, open medial patellofemoral ligament reconstruction with allograft;  Surgeon: Rogers Lennon DO;  Location: [facility];  Service: Orthopedics    RIK-EN-Y PROCEDURE  2003    SPINAL CORD STIMULATOR IMPLANT  11/14/2017    dr ross procedure and technique 1. removal of a right back implantable pulse generator 2.placement of a new right buttock impantable pulse generator through seperate incision 3 electric analys complex programming spinal cord stimulator system postoperative period approx 1 hour    SPINE SURGERY      TONSILLECTOMY         Social History     Tobacco Use    Smoking status: Every Day     Current packs/day: 1.50     Average packs/day: 1.5 packs/day for 29.6 years (44.4 ttl pk-yrs)     Types: Cigarettes, Pipe, Cigars     Start date: 1995    Smokeless tobacco: Never   Vaping Use    Vaping status: Never Used   Substance Use Topics    Alcohol use: Yes     Comment: rare    Drug use: Never       Family History   Problem Relation Age of Onset    Cancer Mother     Arthritis Mother     Stomach cancer Mother     Coronary artery disease Mother     Heart disease Mother     Asthma Mother     COPD Mother     Cancer Father     Esophageal cancer Father     Other Father         brain tumor    Diabetes Father     No Known Problems Sister     No Known Problems Sister     No Known Problems Sister          Above history personally reviewed.       EXAM    Vitals:Blood pressure 132/84, pulse 85, temperature 97.8 °F (36.6 °C), temperature source Temporal, height 6' (1.829 m), weight 93.9 kg (207 lb), SpO2 99%.,Body mass index is 28.07 kg/m².     Physical Exam  Constitutional:       Appearance: Normal appearance. He is well-developed.   HENT:      Head:  Normocephalic and atraumatic.   Eyes:      Extraocular Movements: Extraocular movements intact and EOM normal.   Neck:      Vascular: No JVD.   Cardiovascular:      Rate and Rhythm: Normal rate.   Pulmonary:      Effort: Pulmonary effort is normal.   Musculoskeletal:         General: No tenderness or deformity. Normal range of motion.      Cervical back: Normal range of motion and neck supple.   Skin:     General: Skin is warm and dry.   Neurological:      Mental Status: He is alert and oriented to person, place, and time.      Cranial Nerves: No cranial nerve deficit.      Sensory: Sensory deficit present.      Motor: Weakness present.      Deep Tendon Reflexes: Reflexes are normal and symmetric.   Psychiatric:         Speech: Speech normal.         Behavior: Behavior normal.         Thought Content: Thought content normal.         Neurologic Exam     Mental Status   Oriented to person, place, and time.   Attention: normal.   Speech: speech is normal   Level of consciousness: alert  Knowledge: good.   Normal comprehension.     Cranial Nerves     CN III, IV, VI   Extraocular motions are normal.   Upgaze: normal  Downgaze: normal    CN VII   Facial expression full, symmetric.     CN VIII   CN VIII normal.   Hearing: intact    Motor Exam   Muscle bulk: normal  Right arm tone: normal  Left arm tone: normal  Right leg tone: normal  Left leg tone: normal    Strength   Strength 5/5 except as noted. 4/5 HF/KF/KE on the L      Sensory Exam   Right arm light touch: normal  Left arm light touch: normal  Right leg light touch: decreased from knee  Left leg light touch: decreased from knee  Decreased light touch from the knee distally. Near complete numbness in the foot bilaterally. Proximal to the knee subjective tingling with light touch. Normal sensation across the lower abdomen      Gait, Coordination, and Reflexes     Gait  Gait: (walking with slight limp secondary to the L leg weakness. no assitive device)    Tremor    Resting tremor: absent  Action tremor: absent    Reflexes   Right ankle clonus: absent  Left ankle clonus: absent    MEDICAL DECISION MAKING    Imaging Studies:     MRI lumbar spine wo contrast    Result Date: 7/31/2024  Narrative: MRI LUMBAR SPINE WITHOUT CONTRAST INDICATION: Concern for cauda equina syndrome. COMPARISON: MRI lumbar spine 8/7/2018. TECHNIQUE:  Multiplanar, multisequence imaging of the lumbar spine was performed. . IMAGE QUALITY:  Diagnostic FINDINGS: VERTEBRAL BODIES: Posterior spinal instrumented fusion with decompressive laminectomy from L4-S1, noting this examination is not tailored for assessment of the hardware. Normal alignment. No acute fracture. SACRUM: No acute abnormality. DISTAL CORD AND CONUS:  Normal size and signal within the distal cord and conus. PARASPINAL SOFT TISSUES: No acute abnormality. LOWER THORACIC DISC SPACES: No significant spondylotic changes. LUMBAR DISC SPACES: Multilevel degenerative disc disease is greatest and marked at L5-S1. L1-L2: No significant neuroforaminal narrowing or spinal canal stenosis. L2-L3: No significant neuroforaminal narrowing or spinal canal stenosis. L3-L4: Disc bulge. Mild-moderate facet arthropathy. Mild-moderate bilateral neuroforaminal narrowing. No significant spinal canal stenosis. L4-L5: Mild bilateral neuroforaminal narrowing. No significant spinal canal stenosis. L5-S1: Mild bilateral neuroforaminal narrowing. No significant spinal canal stenosis.     Impression: Posterior spinal instrumented fusion with decompressive laminectomy from L4-S1. No evidence of high-grade central canal stenosis. See narrative above for full details. Workstation performed: [id number]     XR follow up    Result Date: 7/31/2024  Narrative: XR FOLLOW UP (NO CHARGE) INDICATION: pre mri stim xray. COMPARISON: CT thoracic lumbar without contrast FINDINGS: Thoracic spinal cord stimulator leads. No broken or abandoned leads. Multiple surgical clips overlie upper  abdomen. Lumbar spinal hardware. Clear visualized lungs. Unremarkable cardiomediastinal silhouette. Nonobstructive bowel gas pattern. Mild colonic stool burden.     Impression: Thoracic spinal cord stimulator leads. No broken or abandoned leads. Additional chronic/incidental findings as detailed above. Workstation performed: [id number]       I have personally reviewed pertinent reports.   and I have personally reviewed pertinent films in PACS

## 2024-08-24 DIAGNOSIS — K21.9 CHRONIC GERD: ICD-10-CM

## 2024-08-26 RX ORDER — ESOMEPRAZOLE MAGNESIUM 40 MG/1
40 CAPSULE, DELAYED RELEASE ORAL EVERY MORNING
Qty: 90 CAPSULE | Refills: 1 | Status: SHIPPED | OUTPATIENT
Start: 2024-08-26

## 2024-08-27 ENCOUNTER — TELEPHONE (OUTPATIENT)
Age: 49
End: 2024-08-27

## 2024-08-27 ENCOUNTER — OFFICE VISIT (OUTPATIENT)
Dept: PAIN MEDICINE | Facility: CLINIC | Age: 49
End: 2024-08-27
Payer: COMMERCIAL

## 2024-08-27 VITALS
SYSTOLIC BLOOD PRESSURE: 140 MMHG | HEIGHT: 72 IN | TEMPERATURE: 97.5 F | BODY MASS INDEX: 27.36 KG/M2 | HEART RATE: 93 BPM | DIASTOLIC BLOOD PRESSURE: 82 MMHG | WEIGHT: 202 LBS

## 2024-08-27 DIAGNOSIS — M54.9 MID BACK PAIN: ICD-10-CM

## 2024-08-27 DIAGNOSIS — G89.4 CHRONIC PAIN SYNDROME: ICD-10-CM

## 2024-08-27 DIAGNOSIS — M47.816 LUMBAR SPONDYLOSIS: ICD-10-CM

## 2024-08-27 DIAGNOSIS — M54.16 LUMBAR RADICULOPATHY: Primary | ICD-10-CM

## 2024-08-27 DIAGNOSIS — M79.2 NEURALGIA AND NEURITIS: ICD-10-CM

## 2024-08-27 DIAGNOSIS — M96.1 LUMBAR POST-LAMINECTOMY SYNDROME: ICD-10-CM

## 2024-08-27 DIAGNOSIS — Z79.891 LONG-TERM CURRENT USE OF OPIATE ANALGESIC: ICD-10-CM

## 2024-08-27 DIAGNOSIS — F11.20 UNCOMPLICATED OPIOID DEPENDENCE (HCC): ICD-10-CM

## 2024-08-27 PROCEDURE — 99214 OFFICE O/P EST MOD 30 MIN: CPT | Performed by: PHYSICIAN ASSISTANT

## 2024-08-27 RX ORDER — GABAPENTIN 300 MG/1
300 CAPSULE ORAL 3 TIMES DAILY
Qty: 90 CAPSULE | Refills: 2 | Status: SHIPPED | OUTPATIENT
Start: 2024-08-27

## 2024-08-27 RX ORDER — HYDROCODONE BITARTRATE AND ACETAMINOPHEN 5; 325 MG/1; MG/1
1 TABLET ORAL EVERY 8 HOURS PRN
Qty: 90 TABLET | Refills: 0 | Status: SHIPPED | OUTPATIENT
Start: 2024-08-27

## 2024-08-27 NOTE — TELEPHONE ENCOUNTER
PA for hydro- acet [id number]     via    []CMM-KEY:   [x]Surescripts-Case ID # [id number]   []Faxed to plan   []Other website   []Phone call Case ID #     Office notes sent, clinical questions answered. Awaiting determination    Turnaround time for your insurance to make a decision on your Prior Authorization can take 7-21 business days.

## 2024-08-27 NOTE — PROGRESS NOTES
Assessment:  1. Lumbar radiculopathy    2. Lumbar post-laminectomy syndrome    3. Lumbar spondylosis    4. Mid back pain    5. Neuralgia and neuritis    6. Chronic pain syndrome    7. Uncomplicated opioid dependence (HCC)    8. Long-term current use of opiate analgesic        Plan:  While the patient was in the office today, I did have a thorough conversation regarding their chronic pain syndrome, medication management, and treatment plan options.    Unfortunately the patient has developed significant mid back pain and lower extremity numbness, paresthesias that he states have been progressing from the waist down.  He is under the care of neurosurgery and has an MRI of his thoracic spine scheduled tomorrow and an EMG of the lower extremities ordered.  Patient has ordered a cane for gait stability.    After discussing options, we will discontinue the tramadol and proceed with Norco 5/325 3 times daily as needed moderate-severe pain.  I have electronically sent refills to his pharmacy with the appropriate fill dates.  I have also recommended the initiation of gabapentin 3 9 mg nightly for 3 days and gradually titrating to 3 times daily if tolerated.  Side effects of both medications reviewed with the patient.  Patient was advised not to start new medications at the same time.  Patient was educated on medications most common side effects which include dizziness, drowsiness, and swelling of the hands and feet.  Patient was instructed to call the office with any adverse medication side effects. The patient is also aware that if they would like to come off of the medication, they need to let us know as suddenly stopping the medication puts them at risk to have a seizure.    Patient is aware to go to the emergency room if neurological symptoms progress and/or if he develops bowel or bladder incontinence.    [place] Prescription Drug Monitoring Program report was reviewed and was appropriate     There are risks  associated with opioid medications, including dependence, addiction and tolerance. The patient understands and agrees to use these medications only as prescribed. Potential side effects of the medications include, but are not limited to, constipation, drowsiness, addiction, impaired judgment and risk of fatal overdose if not taken as prescribed. The patient was warned against driving while taking sedation medications.  Sharing medications is a felony. At this point in time, the patient is showing no signs of addiction, abuse, diversion or suicidal ideation.    The patient will follow-up in 12 weeks for medication prescription refill and reevaluation. The patient was advised to contact the office should their symptoms worsen in the interim. The patient was agreeable and verbalized an understanding.        History of Present Illness:    The patient is a 49 y.o. male last seen on 5/7/2024 who presents for a follow up office visit in regards to chronic low back pain secondary to a lumbar postlaminectomy pain syndrome, lumbar spondylosis, spinal cord stimulator implant and mid back pain.  The patient currently reports a significant increase in mid back pain as well as low back pain that he rates a 10 out of 10.  He describes the pain as a constant burning, sharp, throbbing type of pain.  Since we saw him last, he developed saddle anesthesia and bilateral lower extremity numbness which prompted him to seek treatment at the emergency room.  He states this seems to occur a few days after lifting a heavy bed frame.  An MRI of the lumbar spine was ordered at that time which did not reveal any significant abnormality.  He was then seen by neurosurgery as an outpatient and has an MRI of the thoracic spine scheduled for tomorrow as well as an EMG of the lower extremities.  The patient continues to describe numbness, paresthesias and burning in both legs which he states has progressed to his waist.  Patient denies bowel or  bladder incontinence.  He feels his gait is unstable and has ordered a cane.  He does not feel that the tramadol is providing much benefit at all at this point.    Current pain medications includes: Tramadol 50 mg 3 times daily as needed  .  The patient reports that this regimen is providing 30% pain relief.  The patient is reporting no side effects from this pain medication regimen.    Pain Contract Signed: 1/16/2024  Last Urine Drug Screen: 5/7/2024    I have personally reviewed and/or updated the patient's past medical history, past surgical history, family history, social history, current medications, allergies, and vital signs today.       Review of Systems:    Review of Systems   Respiratory:  Negative for shortness of breath.    Cardiovascular:  Negative for chest pain.   Gastrointestinal:  Negative for constipation, diarrhea, nausea and vomiting.   Musculoskeletal:  Positive for gait problem. Negative for arthralgias, joint swelling (Joint stiffness) and myalgias.   Skin:  Negative for rash.   Neurological:  Positive for weakness. Negative for dizziness and seizures.   All other systems reviewed and are negative.        Past Medical History:   Diagnosis Date   • Allergic    • Anxiety    • Arthritis    • Asthma    • Bipolar 2 disorder, major depressive episode (HCC) 10/31/2017   • Bipolar disorder (HCC)    • Chronic left lumbar radiculopathy    • Chronic low back pain    • Chronic pain syndrome    • Chronic right shoulder pain    • Depression with anxiety    • Fatigue    • GERD (gastroesophageal reflux disease)    • Hydronephrosis    • Iron deficiency anemia    • Kidney stone    • Lytic bone lesions on xray    • Nephrolithiasis    • PONV (postoperative nausea and vomiting)    • Right elbow pain    • Right lateral epicondylitis        Past Surgical History:   Procedure Laterality Date   • ADENOIDECTOMY Bilateral    • APPENDECTOMY     • BACK SURGERY     • CHOLECYSTECTOMY     • CHOLECYSTECTOMY LAPAROSCOPIC N/A  10/30/2018    Procedure: CHOLECYSTECTOMY WITH GASTROTOMY LAPAROSCOPIC;  Surgeon: Oliver Madison MD;  [facility];  Service: General   • COLONOSCOPY     • ERCP W/ SPHICTEROTOMY N/A 10/30/2018    Procedure: ENDOSCOPIC RETROGRADE CHOLANGIOPANCREATOGRAPHY (ERCP) W/ SPHINCTEROTOMY;  Surgeon: Kendrick Meeks MD;  Location: [facility];  Service: Gastroenterology   • GASTRIC BYPASS      2009   • OTHER SURGICAL HISTORY      fusion/refusion of vertebrae, 2010 and 2011 l5-s1 and l4-l5   • MN ARTHROSCOPY KNEE LATERAL RELEASE Left 01/06/2021    Procedure: RELEASE RETINACULAR;  Surgeon: Rogers Lennon DO;  Location: [facility];  Service: Orthopedics   • MN ARTHRS KNE SURG W/MENISCECTOMY MED/LAT W/SHVG Right 04/26/2022    Procedure: ARTHROSCOPIC MENISCECTOMY LATERAL;  Surgeon: Rogers Lennon DO;  Location: [facility];  Service: Orthopedics   • MN ARTHRS KNEE DEBRIDEMENT/SHAVING ARTCLR CRTLG Left 01/06/2021    Procedure: ARTHROSCOPY KNEE;  Surgeon: Rogers Lennon DO;  Location: [facility];  Service: Orthopedics   • MN CYSTO/URETERO W/LITHOTRIPSY &INDWELL STENT INSRT Right 08/08/2017    Procedure: CYSTOSCOPY; URETEROSCOPY; HOLMIUM LASER; RETROGRADE PYELOGRAM; STENT;  Surgeon: Rupesh Romo MD;  Location[facility];  Service: Urology   • MN INCISION & DRAINAGE ABSCESS COMPLICATED/MULTIPLE Left 01/08/2021    Procedure: INCISION AND DRAINAGE (I&D) EXTREMITY- of left knee s/p MPFL reconstruction, I&D if the left knee with possible MPFL revision;  Surgeon: Rogers Lennon DO;  Location[facility];  Service: Orthopedics   • MN INSJ/RPLCMT SPINAL NPG/RCVR POCKET CRTJ&CONNJ Right 11/14/2017    Procedure: REMOVAL LOWER MEDIAL BUTTOCK SPINAL CORD STIMULATOR GENERATOR; PLACEMENT OF NEW BUTTOCK SPINAL CORD STIMULATOR THROUGH SEPERATE INCISION;  Surgeon: Stephan Duque MD;  Location: [facility];  Service: Neurosurgery   • MN INSJ/RPLCMT SPINAL NPG/RCVR POCKET CRTJ&CONNJ Right 01/12/2022    Procedure: Right sided spinal cord  stimulator pulse generator replacement;  Surgeon: Michael Gamez MD;  Location: [facility];  Service: Neurosurgery   • NM INSJ/RPLCMT SPINAL NPG/RCVR POCKET CRTJ&CONNJ Right 9/8/2023    Procedure: Right-sided spinal cord stimulator internal pulse generator replacement;  Surgeon: Michael Gamez MD;  [facility];  Service: Neurosurgery   • NM RCNSTJ DISLC PATELLA W/XTNSR RELIGNMT&/MUSC RL Left 01/06/2021    Procedure: REALIGNMENT PATELLA with chondroplasty of patella, open medial patellofemoral ligament reconstruction with allograft;  Surgeon: Rogers Lennon DO;  Location[facility];  Service: Orthopedics   • RIK-EN-Y PROCEDURE  2003   • SPINAL CORD STIMULATOR IMPLANT  11/14/2017    dr ross procedure and technique 1. removal of a right back implantable pulse generator 2.placement of a new right buttock impantable pulse generator through seperate incision 3 electric analys complex programming spinal cord stimulator system postoperative period approx 1 hour   • SPINE SURGERY     • TONSILLECTOMY         Family History   Problem Relation Age of Onset   • Cancer Mother    • Arthritis Mother    • Stomach cancer Mother    • Coronary artery disease Mother    • Heart disease Mother    • Asthma Mother    • COPD Mother    • Cancer Father    • Esophageal cancer Father    • Other Father         brain tumor   • Diabetes Father    • No Known Problems Sister    • No Known Problems Sister    • No Known Problems Sister        Social History     Occupational History   • Not on file   Tobacco Use   • Smoking status: Every Day     Current packs/day: 1.50     Average packs/day: 1.5 packs/day for 29.7 years (44.5 ttl pk-yrs)     Types: Cigarettes, Pipe, Cigars     Start date: 1995   • Smokeless tobacco: Never   Vaping Use   • Vaping status: Never Used   Substance and Sexual Activity   • Alcohol use: Yes     Comment: rare   • Drug use: Never   • Sexual activity: Not Currently     Partners: Female         Current  Outpatient Medications:   •  Acetaminophen (TYLENOL 8 HOUR ARTHRITIS PAIN PO), Take by mouth, Disp: , Rfl:   •  albuterol (PROVENTIL HFA,VENTOLIN HFA) 90 mcg/act inhaler, INHALE 2 PUFFS EVERY 4 (FOUR) HOURS AS NEEDED FOR WHEEZING OR SHORTNESS OF BREATH COUGH, Disp: 18 g, Rfl: 0  •  Cyanocobalamin (Vitamin B-12) 1000 MCG SUBL, Place 1 tablet (1,000 mcg total) under the tongue in the morning, Disp: 90 tablet, Rfl: 1  •  ergocalciferol (VITAMIN D2) 50,000 units, TAKE 1 CAPSULE BY MOUTH EVERY 14 DAYS, Disp: 6 capsule, Rfl: 1  •  esomeprazole (NexIUM) 40 MG capsule, take 1 capsule by mouth every morning, Disp: 90 capsule, Rfl: 1  •  folic acid (FOLVITE) 1 mg tablet, take 1 tablet by mouth once daily, Disp: 30 tablet, Rfl: 2  •  gabapentin (NEURONTIN) 300 mg capsule, Take 1 capsule (300 mg total) by mouth 3 (three) times a day Take 1 cap at bedtime for 3 days, then 1 cap BID x 3 days, then 1 cap TID, Disp: 90 capsule, Rfl: 2  •  HYDROcodone-acetaminophen (NORCO) 5-325 mg per tablet, Take 1 tablet by mouth every 8 (eight) hours as needed for pain For ongoing therapy Max Daily Amount: 3 tablets, Disp: 90 tablet, Rfl: 0  •  HYDROcodone-acetaminophen (NORCO) 5-325 mg per tablet, Take 1 tablet by mouth every 8 (eight) hours as needed for pain For ongoing therapy DO NOT FILL BEFORE: 09/25/24 Max Daily Amount: 3 tablets, Disp: 90 tablet, Rfl: 0  •  HYDROcodone-acetaminophen (NORCO) 5-325 mg per tablet, Take 1 tablet by mouth every 8 (eight) hours as needed for pain For ongoing therapy DO NOT FILL BEFORE: 10/23/24 Max Daily Amount: 3 tablets, Disp: 90 tablet, Rfl: 0  •  naloxone (NARCAN) 4 mg/0.1 mL nasal spray, Administer 1 spray into a nostril. If no response after 2-3 minutes, give another dose in the other nostril using a new spray. (Patient not taking: Reported on 8/8/2024), Disp: 1 each, Rfl: 1  •  oxyCODONE-acetaminophen (PERCOCET) 5-325 mg per tablet, , Disp: , Rfl:   •  varenicline (Chantix Continuing Month Derrick) 1 mg  tablet, Take 1 tablet (1 mg total) by mouth 2 (two) times a day (Patient not taking: Reported on 5/7/2024), Disp: 60 tablet, Rfl: 4    Allergies   Allergen Reactions   • Ampicillin GI Intolerance     Vomiting   • Penicillins GI Intolerance     Vomit       Physical Exam:    /82 (BP Location: Left arm, Patient Position: Sitting, Cuff Size: Standard)   Pulse 93   Temp 97.5 °F (36.4 °C)   Ht 6' (1.829 m)   Wt 91.6 kg (202 lb)   BMI 27.40 kg/m²     Constitutional:normal, well developed, well nourished, alert, in no distress and non-toxic and no overt pain behavior.  Eyes:anicteric  HEENT:grossly intact  Neck:supple, symmetric, trachea midline and no masses   Pulmonary:even and unlabored  Cardiovascular:No edema or pitting edema present  Skin:Normal without rashes or lesions and well hydrated  Psychiatric:Mood and affect appropriate  Neurologic:Cranial Nerves II-XII grossly intact  Musculoskeletal: Gait is slow but stable      Imaging  No orders to display         No orders of the defined types were placed in this encounter.

## 2024-08-27 NOTE — TELEPHONE ENCOUNTER
Caller: pt    Doctor: laura    Reason for call: pt states pharmacy needs clarification on pts gabapentin.    Call back#: [phone]

## 2024-08-27 NOTE — TELEPHONE ENCOUNTER
Left a detailed message on [facility] pharmacist line clarifying gabapentin 300 mg 1 pill qhs x 3 days, increase to 1 pill bid x 3 days then 1 pill po tid ongoing. Dispense #90 / 2 per MG - CHARLA provided. Provided cb number and office hours for questions or issues.

## 2024-08-27 NOTE — TELEPHONE ENCOUNTER
PA for hydro 5/325 APPROVED     Date(s) approved August 27, 2024 to February 23, 2025         Patient advised by          [x] Blottrhart Message  [x] Phone call   []LMOM  []L/M to call office as no active Communication consent on file  []Unable to leave detailed message as VM not approved on Communication consent       Pharmacy advised by    [x]Fax  []Phone call    Approval letter scanned into [employer] Yes

## 2024-08-28 ENCOUNTER — HOSPITAL ENCOUNTER (OUTPATIENT)
Dept: MRI IMAGING | Facility: HOSPITAL | Age: 49
Discharge: HOME | End: 2024-08-28
Payer: COMMERCIAL

## 2024-08-28 DIAGNOSIS — R20.0 NUMBNESS IN BOTH LEGS: ICD-10-CM

## 2024-08-28 PROCEDURE — 72146 MRI CHEST SPINE W/O DYE: CPT

## 2024-08-29 NOTE — TELEPHONE ENCOUNTER
S/w pt and asked pt if he had any questions about message left on machine from 2 days prior, pt stated he had no further questions and appreciative of call.

## 2024-09-10 ENCOUNTER — TELEPHONE (OUTPATIENT)
Age: 49
End: 2024-09-10

## 2024-09-10 NOTE — TELEPHONE ENCOUNTER
Pt called in after completing MRI 8/28/24 to make followup appt, appt is 9/11/24 at 3p with Dr King

## 2024-09-11 ENCOUNTER — PREP FOR PROCEDURE (OUTPATIENT)
Dept: INTERVENTIONAL RADIOLOGY/VASCULAR | Facility: CLINIC | Age: 49
End: 2024-09-11

## 2024-09-11 ENCOUNTER — APPOINTMENT (OUTPATIENT)
Dept: LAB | Facility: CLINIC | Age: 49
End: 2024-09-11
Payer: COMMERCIAL

## 2024-09-11 ENCOUNTER — OFFICE VISIT (OUTPATIENT)
Dept: NEUROSURGERY | Facility: CLINIC | Age: 49
End: 2024-09-11
Payer: COMMERCIAL

## 2024-09-11 VITALS
SYSTOLIC BLOOD PRESSURE: 127 MMHG | OXYGEN SATURATION: 97 % | TEMPERATURE: 98.4 F | BODY MASS INDEX: 27.36 KG/M2 | DIASTOLIC BLOOD PRESSURE: 86 MMHG | HEIGHT: 72 IN | WEIGHT: 202 LBS | HEART RATE: 78 BPM

## 2024-09-11 DIAGNOSIS — R20.0 NUMBNESS IN BOTH LEGS: Primary | ICD-10-CM

## 2024-09-11 DIAGNOSIS — E53.8 VITAMIN B12 DEFICIENCY: ICD-10-CM

## 2024-09-11 DIAGNOSIS — Z98.84 HISTORY OF ROUX-EN-Y GASTRIC BYPASS: ICD-10-CM

## 2024-09-11 DIAGNOSIS — S24.119A: ICD-10-CM

## 2024-09-11 DIAGNOSIS — D75.1 ERYTHROCYTOSIS: ICD-10-CM

## 2024-09-11 DIAGNOSIS — E63.9 NUTRITIONAL DEFICIENCY: ICD-10-CM

## 2024-09-11 DIAGNOSIS — D50.8 IRON DEFICIENCY ANEMIA SECONDARY TO INADEQUATE DIETARY IRON INTAKE: ICD-10-CM

## 2024-09-11 DIAGNOSIS — S24.119A: Primary | ICD-10-CM

## 2024-09-11 LAB
BASOPHILS # BLD AUTO: 0.07 THOUSANDS/ΜL (ref 0–0.1)
BASOPHILS NFR BLD AUTO: 1 % (ref 0–1)
BUN SERPL-MCNC: 14 MG/DL (ref 5–25)
CREAT SERPL-MCNC: 1.06 MG/DL (ref 0.6–1.3)
EOSINOPHIL # BLD AUTO: 0.1 THOUSAND/ΜL (ref 0–0.61)
EOSINOPHIL NFR BLD AUTO: 2 % (ref 0–6)
ERYTHROCYTE [DISTWIDTH] IN BLOOD BY AUTOMATED COUNT: 14.6 % (ref 11.6–15.1)
FERRITIN SERPL-MCNC: 19 NG/ML (ref 24–336)
FOLATE SERPL-MCNC: 10.1 NG/ML
GFR SERPL CREATININE-BSD FRML MDRD: 82 ML/MIN/1.73SQ M
HCT VFR BLD AUTO: 47.3 % (ref 36.5–49.3)
HGB BLD-MCNC: 15.2 G/DL (ref 12–17)
IMM GRANULOCYTES # BLD AUTO: 0.05 THOUSAND/UL (ref 0–0.2)
IMM GRANULOCYTES NFR BLD AUTO: 1 % (ref 0–2)
IRON SATN MFR SERPL: 20 % (ref 15–50)
IRON SERPL-MCNC: 75 UG/DL (ref 50–212)
LYMPHOCYTES # BLD AUTO: 2.17 THOUSANDS/ΜL (ref 0.6–4.47)
LYMPHOCYTES NFR BLD AUTO: 33 % (ref 14–44)
MCH RBC QN AUTO: 27.8 PG (ref 26.8–34.3)
MCHC RBC AUTO-ENTMCNC: 32.1 G/DL (ref 31.4–37.4)
MCV RBC AUTO: 87 FL (ref 82–98)
MONOCYTES # BLD AUTO: 0.4 THOUSAND/ΜL (ref 0.17–1.22)
MONOCYTES NFR BLD AUTO: 6 % (ref 4–12)
NEUTROPHILS # BLD AUTO: 3.89 THOUSANDS/ΜL (ref 1.85–7.62)
NEUTS SEG NFR BLD AUTO: 57 % (ref 43–75)
NRBC BLD AUTO-RTO: 0 /100 WBCS
PLATELET # BLD AUTO: 222 THOUSANDS/UL (ref 149–390)
PMV BLD AUTO: 12.2 FL (ref 8.9–12.7)
RBC # BLD AUTO: 5.46 MILLION/UL (ref 3.88–5.62)
TIBC SERPL-MCNC: 382 UG/DL (ref 250–450)
UIBC SERPL-MCNC: 307 UG/DL (ref 155–355)
VIT B12 SERPL-MCNC: 260 PG/ML (ref 180–914)
WBC # BLD AUTO: 6.68 THOUSAND/UL (ref 4.31–10.16)

## 2024-09-11 PROCEDURE — 83540 ASSAY OF IRON: CPT

## 2024-09-11 PROCEDURE — 82565 ASSAY OF CREATININE: CPT

## 2024-09-11 PROCEDURE — 83918 ORGANIC ACIDS TOTAL QUANT: CPT

## 2024-09-11 PROCEDURE — 99215 OFFICE O/P EST HI 40 MIN: CPT | Performed by: NEUROLOGICAL SURGERY

## 2024-09-11 PROCEDURE — 82607 VITAMIN B-12: CPT

## 2024-09-11 PROCEDURE — 82746 ASSAY OF FOLIC ACID SERUM: CPT

## 2024-09-11 PROCEDURE — 85025 COMPLETE CBC W/AUTO DIFF WBC: CPT

## 2024-09-11 PROCEDURE — 83550 IRON BINDING TEST: CPT

## 2024-09-11 PROCEDURE — 36415 COLL VENOUS BLD VENIPUNCTURE: CPT

## 2024-09-11 PROCEDURE — 82728 ASSAY OF FERRITIN: CPT

## 2024-09-11 PROCEDURE — 84520 ASSAY OF UREA NITROGEN: CPT

## 2024-09-11 RX ORDER — ASPIRIN 81 MG/1
81 TABLET ORAL DAILY
COMMUNITY

## 2024-09-11 RX ORDER — SODIUM CHLORIDE 9 MG/ML
30 INJECTION, SOLUTION INTRAVENOUS CONTINUOUS
Status: CANCELLED | OUTPATIENT
Start: 2024-09-11

## 2024-09-11 NOTE — PROGRESS NOTES
Neurosurgery Office Note  Anderson Hart 49 y.o. male MRN: [id number]      Assessment & Plan        Problem List Items Addressed This Visit       Visit Diagnoses       Abnormal lesion of thoracic spinal cord, initial encounter (HCC)    -  Primary    Relevant Orders    CSF culture and Gram stain    RBC count,CSF    CSF white cell count with differential    Total Protein, CSF    Glucose, CSF    Angiotensin converting enzyme, CSF    IGG, CSF    Myelin basic protein, CSF    CSF VDRL    West Nile virus, CSF    Oligoclonal banding    BUN    Creatinine, serum    MRI thoracic spine w wo contrast    Methylmalonic acid, serum    IR lumbar puncture              Discussion:  Patient was recently evaluated on 8/8/24.    He is a 48-year-old male with h/o bipolar disorder, GERD, chronic pain syndrome s/p thoracic SCS implantation in 2016 at OSH (paddle at T8 on imaging) and L4-S1 fusion (initial surgery in 2011 f/b revision in 2012 at OSH) who was recently hospitalized on 7/30/2024 for several days for acute pain in groin/scrotum a/w complete saddle anesthesia as well as diffuse (non-dermatomal) left leg numbness/paresthesias and weakness, now also involving right leg but milder.    XR and MRI lumbar spine on 7/31/2024 demonstrated no acutely concerning findings. SCS and fusion intact.    MRI thoracic spine without contrast on 8/28/24 showed focal abnormal signal in dorsal spinal cord at T10, non-specific a/w demyelination, infection, vs inflammation.    At this time, I will order MRI thoracic spine with contrast, IR LP for comprehensive CSF analysis, as well as B12 level.    EMG previously ordered, he is trying to expedite.    Follow up after further work-up.    All questions and concerns were addressed during this visit.          CHIEF COMPLAINT    Chief Complaint   Patient presents with    Follow-up      F/u on Numbness in scrotum and both legs       HISTORY    History of Present Illness     49 y.o. year old male      HPI    See Discussion    REVIEW OF SYSTEMS    Review of Systems   Constitutional: Negative.    HENT: Negative.     Eyes: Negative.    Respiratory: Negative.     Cardiovascular: Negative.    Gastrointestinal: Negative.    Endocrine: Negative.    Genitourinary: Negative.    Musculoskeletal:  Positive for back pain and gait problem (unsteady when walking- H/o 4 falls withjin 4 wks).         L/S MRI on 8/28/24  (B) Lbp radiates to b/l legs x 2 months.   No inciting event   +N/W on b/l legs and scrotum   Denies any B/B issues    On Aspirin/ smoker     Skin: Negative.    Allergic/Immunologic: Negative.    Neurological:  Positive for weakness and numbness.   Hematological: Negative.    Psychiatric/Behavioral: Negative.     All other systems reviewed and are negative.        Meds/Allergies     Current Outpatient Medications   Medication Sig Dispense Refill    Cyanocobalamin (Vitamin B-12) 1000 MCG SUBL Place 1 tablet (1,000 mcg total) under the tongue in the morning 90 tablet 1    ergocalciferol (VITAMIN D2) 50,000 units TAKE 1 CAPSULE BY MOUTH EVERY 14 DAYS 6 capsule 1    esomeprazole (NexIUM) 40 MG capsule take 1 capsule by mouth every morning 90 capsule 1    folic acid (FOLVITE) 1 mg tablet take 1 tablet by mouth once daily 30 tablet 2    HYDROcodone-acetaminophen (NORCO) 5-325 mg per tablet Take 1 tablet by mouth every 8 (eight) hours as needed for pain For ongoing therapy Max Daily Amount: 3 tablets 90 tablet 0    Acetaminophen (TYLENOL 8 HOUR ARTHRITIS PAIN PO) Take by mouth      albuterol (PROVENTIL HFA,VENTOLIN HFA) 90 mcg/act inhaler INHALE 2 PUFFS EVERY 4 (FOUR) HOURS AS NEEDED FOR WHEEZING OR SHORTNESS OF BREATH COUGH 18 g 0    aspirin (ECOTRIN LOW STRENGTH) 81 mg EC tablet Take 81 mg by mouth daily      gabapentin (NEURONTIN) 300 mg capsule Take 1 capsule (300 mg total) by mouth 3 (three) times a day Take 1 cap at bedtime for 3 days, then 1 cap BID x 3 days, then 1 cap TID (Patient not taking: Reported on  9/11/2024) 90 capsule 2    HYDROcodone-acetaminophen (NORCO) 5-325 mg per tablet Take 1 tablet by mouth every 8 (eight) hours as needed for pain For ongoing therapy DO NOT FILL BEFORE: 09/25/24 Max Daily Amount: 3 tablets (Patient not taking: Reported on 9/11/2024) 90 tablet 0    HYDROcodone-acetaminophen (NORCO) 5-325 mg per tablet Take 1 tablet by mouth every 8 (eight) hours as needed for pain For ongoing therapy DO NOT FILL BEFORE: 10/23/24 Max Daily Amount: 3 tablets (Patient not taking: Reported on 9/11/2024) 90 tablet 0    naloxone (NARCAN) 4 mg/0.1 mL nasal spray Administer 1 spray into a nostril. If no response after 2-3 minutes, give another dose in the other nostril using a new spray. (Patient not taking: Reported on 8/8/2024) 1 each 1    oxyCODONE-acetaminophen (PERCOCET) 5-325 mg per tablet  (Patient not taking: Reported on 8/8/2024)      varenicline (Chantix Continuing Month Derrick) 1 mg tablet Take 1 tablet (1 mg total) by mouth 2 (two) times a day 60 tablet 4     No current facility-administered medications for this visit.       Allergies   Allergen Reactions    Ampicillin GI Intolerance     Vomiting    Penicillins GI Intolerance     Vomit       PAST HISTORY    Past Medical History:   Diagnosis Date    Allergic     Anxiety     Arthritis     Asthma     Bipolar 2 disorder, major depressive episode (HCC) 10/31/2017    Bipolar disorder (HCC)     Chronic left lumbar radiculopathy     Chronic low back pain     Chronic pain syndrome     Chronic right shoulder pain     Depression with anxiety     Fatigue     GERD (gastroesophageal reflux disease)     Hydronephrosis     Iron deficiency anemia     Kidney stone     Lytic bone lesions on xray     Nephrolithiasis     PONV (postoperative nausea and vomiting)     Right elbow pain     Right lateral epicondylitis        Past Surgical History:   Procedure Laterality Date    ADENOIDECTOMY Bilateral     APPENDECTOMY      BACK SURGERY      CHOLECYSTECTOMY       CHOLECYSTECTOMY LAPAROSCOPIC N/A 10/30/2018    Procedure: CHOLECYSTECTOMY WITH GASTROTOMY LAPAROSCOPIC;  Surgeon: Oliver Madison MD;  [facility];  Service: General    COLONOSCOPY      ERCP W/ SPHICTEROTOMY N/A 10/30/2018    Procedure: ENDOSCOPIC RETROGRADE CHOLANGIOPANCREATOGRAPHY (ERCP) W/ SPHINCTEROTOMY;  Surgeon: Kendrick Meeks MD;  Location: [facility];  Service: Gastroenterology    GASTRIC BYPASS      2009    OTHER SURGICAL HISTORY      fusion/refusion of vertebrae, 2010 and 2011 l5-s1 and l4-l5    VA ARTHROSCOPY KNEE LATERAL RELEASE Left 01/06/2021    Procedure: RELEASE RETINACULAR;  Surgeon: Rogers Lennon DO;  [facility];  Service: Orthopedics    VA ARTHRS KNE SURG W/MENISCECTOMY MED/LAT W/SHVG Right 04/26/2022    Procedure: ARTHROSCOPIC MENISCECTOMY LATERAL;  Surgeon: Rogers Lennon DO;  Location: [facility];  Service: Orthopedics    VA ARTHRS KNEE DEBRIDEMENT/SHAVING ARTCLR CRTLG Left 01/06/2021    Procedure: ARTHROSCOPY KNEE;  Surgeon: Rogers Lennon DO;  Location: [facility];  Service: Orthopedics    VA CYSTO/URETERO W/LITHOTRIPSY &INDWELL STENT INSRT Right 08/08/2017    Procedure: CYSTOSCOPY; URETEROSCOPY; HOLMIUM LASER; RETROGRADE PYELOGRAM; STENT;  Surgeon: Rupesh Romo MD;  Location[facility];  Service: Urology    VA INCISION & DRAINAGE ABSCESS COMPLICATED/MULTIPLE Left 01/08/2021    Procedure: INCISION AND DRAINAGE (I&D) EXTREMITY- of left knee s/p MPFL reconstruction, I&D if the left knee with possible MPFL revision;  Surgeon: Rogers Lennon DO;  Location[facility];  Service: Orthopedics    VA INSJ/RPLCMT SPINAL NPG/RCVR POCKET CRTJ&CONNJ Right 11/14/2017    Procedure: REMOVAL LOWER MEDIAL BUTTOCK SPINAL CORD STIMULATOR GENERATOR; PLACEMENT OF NEW BUTTOCK SPINAL CORD STIMULATOR THROUGH SEPERATE INCISION;  Surgeon: Stephan Duque MD;  Location: [facility];  Service: Neurosurgery    VA INSJ/RPLCMT SPINAL NPG/RCVR POCKET CRTJ&CONNJ Right 01/12/2022    Procedure: Right  sided spinal cord stimulator pulse generator replacement;  Surgeon: Michael Gamez MD;  [facility];  Service: Neurosurgery    FL INSJ/RPLCMT SPINAL NPG/RCVR POCKET CRTJ&CONNJ Right 9/8/2023    Procedure: Right-sided spinal cord stimulator internal pulse generator replacement;  Surgeon: Michael Gamez MD;  [facility];  Service: Neurosurgery    FL RCNSTJ DISLC PATELLA W/XTNSR RELIGNMT&/MUSC RL Left 01/06/2021    Procedure: REALIGNMENT PATELLA with chondroplasty of patella, open medial patellofemoral ligament reconstruction with allograft;  Surgeon: Rogers Lennon DO;  Location[facility];  Service: Orthopedics    RIK-EN-Y PROCEDURE  2003    SPINAL CORD STIMULATOR IMPLANT  11/14/2017    dr ross procedure and technique 1. removal of a right back implantable pulse generator 2.placement of a new right buttock impantable pulse generator through seperate incision 3 electric analys complex programming spinal cord stimulator system postoperative period approx 1 hour    SPINE SURGERY      TONSILLECTOMY         Social History     Tobacco Use    Smoking status: Every Day     Current packs/day: 1.50     Average packs/day: 1.5 packs/day for 29.7 years (44.5 ttl pk-yrs)     Types: Cigarettes, Pipe, Cigars     Start date: 1995    Smokeless tobacco: Never   Vaping Use    Vaping status: Never Used   Substance Use Topics    Alcohol use: Yes     Comment: rare    Drug use: Never       Family History   Problem Relation Age of Onset    Cancer Mother     Arthritis Mother     Stomach cancer Mother     Coronary artery disease Mother     Heart disease Mother     Asthma Mother     COPD Mother     Cancer Father     Esophageal cancer Father     Other Father         brain tumor    Diabetes Father     No Known Problems Sister     No Known Problems Sister     No Known Problems Sister          The following portions of the patient's history were reviewed in this encounter and updated as appropriate: Past  medical, surgical, family, and social history, as well as medications, allergies, and review of systems.        EXAM    Vitals:Blood pressure 127/86, pulse 78, temperature 98.4 °F (36.9 °C), temperature source Temporal, height 6' (1.829 m), weight 91.6 kg (202 lb), SpO2 97%.,Body mass index is 27.4 kg/m².     Physical Exam  Vitals and nursing note reviewed.   Constitutional:       Appearance: Normal appearance. He is normal weight.   HENT:      Head: Normocephalic and atraumatic.   Eyes:      Extraocular Movements: Extraocular movements intact and EOM normal.      Pupils: Pupils are equal, round, and reactive to light.   Cardiovascular:      Rate and Rhythm: Normal rate and regular rhythm.      Pulses: Normal pulses.      Heart sounds: Normal heart sounds.   Pulmonary:      Effort: Pulmonary effort is normal.      Breath sounds: Normal breath sounds.   Abdominal:      General: Abdomen is flat.      Palpations: Abdomen is soft.   Musculoskeletal:         General: Normal range of motion.      Cervical back: Normal range of motion.   Neurological:      General: No focal deficit present.      Mental Status: He is alert and oriented to person, place, and time. Mental status is at baseline.      GCS: GCS eye subscore is 4. GCS verbal subscore is 5. GCS motor subscore is 6.      Sensory: Sensation is intact.      Motor: Motor function is intact.      Coordination: Coordination is intact.      Deep Tendon Reflexes: Reflexes are normal and symmetric.   Psychiatric:         Mood and Affect: Mood normal.         Speech: Speech normal.         Behavior: Behavior normal.         Neurologic Exam     Mental Status   Oriented to person, place, and time.   Attention: normal.   Speech: speech is normal   Level of consciousness: alert    Cranial Nerves     CN II   Visual fields full to confrontation.   Visual acuity: normal  Right visual field deficit: none  Left visual field deficit: none     CN III, IV, VI   Pupils are equal, round,  and reactive to light.  Extraocular motions are normal.   CN III: no CN III palsy  CN VI: no CN VI palsy  Nystagmus: none   Diplopia: none  Ophthalmoparesis: none  Upgaze: normal  Downgaze: normal  Conjugate gaze: present    CN V   Facial sensation intact.   Right facial sensation deficit: none  Left facial sensation deficit: none    CN VII   Facial expression full, symmetric.   Right facial weakness: none  Left facial weakness: none    CN VIII   CN VIII normal.     CN IX, X   CN IX normal.   CN X normal.   Palate: symmetric    CN XI   CN XI normal.   Right sternocleidomastoid strength: normal  Left sternocleidomastoid strength: normal  Right trapezius strength: normal  Left trapezius strength: normal    CN XII   CN XII normal.   Tongue deviation: none    Motor Exam   Muscle bulk: normal  Overall muscle tone: normal  Right arm pronator drift: absent  Left arm pronator drift: absent    Strength   Strength 5/5 except as noted. Distal bilateral leg weakness, left worse than right     Sensory Exam   Diffuse non-dermatomal numbness in BLE below groin level (not abdomen), left leg worse than right, saddle anesthesia improving     Gait, Coordination, and Reflexes     Reflexes   Reflexes 2+ except as noted.         MEDICAL DECISION MAKING    Imaging Studies:     MRI thoracic spine without contrast    Result Date: 8/29/2024  Narrative: MRI THORACIC SPINE WITHOUT CONTRAST INDICATION: R20.0: Anesthesia of skin. COMPARISON: MRI thoracic spine dated 8/2/2018. Lumbar spine MRI dated 7/31/2024. TECHNIQUE:  Multiplanar, multisequence imaging of the thoracic spine was performed. . IMAGE QUALITY: Diagnostic. FINDINGS: ALIGNMENT: No subluxation or scoliosis. No compression fracture.. MARROW SIGNAL: No suspicious marrow signal abnormality. Several stable vertebral body hemangiomas at T5, T7 and L1. No suspicious marrow signal abnormality. THORACIC CORD: There is focal abnormal cord signal in the dorsal cord at T10 that is new since  2018 and was not imaged on recent lumbar MRI. Nonspecific and could be due to demyelination. Infectious/inflammatory etiologies in the differential.. Patient does have a remote history of B12 deficiency  however appearance is not typical for subacute combined degeneration and is considered less likely. Regardless, correlation with B12 levels is suggested. No other definite abnormal cord signal but evaluation is somewhat limited due to motion as well as artifact from spinal stimulator leads. PARAVERTEBRAL SOFT TISSUES:  Normal. THORACIC DEGENERATIVE CHANGE: Multilevel disc desiccation. No disc herniation, canal or foraminal stenosis. OTHER FINDINGS: Spinal cord stimulator enters the dorsal canal at T9-10 with leads terminating at T7. Stable in position.     Impression: New focal abnormal signal in the dorsal cord at T10 that is nonspecific and could be due to demyelination, infectious or inflammatory etiologies. Recommend postcontrast imaging and consider correlation with CSF. Although considered less likely, suggest correlation with B12 levels given remote history of B12 deficiency. The study was marked in EPIC for notification. Workstation performed: [id number]       Personally reviewed the following image studies in PACS and associated radiology reports: MRI spine and xray(s). My interpretation of the radiology images/reports is: above.      Tristan King M.D.  Neurosurgeon

## 2024-09-16 ENCOUNTER — HOSPITAL ENCOUNTER (OUTPATIENT)
Dept: MRI IMAGING | Facility: HOSPITAL | Age: 49
Discharge: HOME | End: 2024-09-16
Payer: COMMERCIAL

## 2024-09-16 DIAGNOSIS — S24.119A: ICD-10-CM

## 2024-09-16 LAB — METHYLMALONATE SERPL-SCNC: 930 NMOL/L (ref 0–378)

## 2024-09-16 PROCEDURE — A9585 GADOBUTROL INJECTION: HCPCS | Performed by: FAMILY MEDICINE

## 2024-09-16 PROCEDURE — 72157 MRI CHEST SPINE W/O & W/DYE: CPT

## 2024-09-16 RX ORDER — GADOBUTROL 604.72 MG/ML
9 INJECTION INTRAVENOUS
Status: COMPLETED | OUTPATIENT
Start: 2024-09-16 | End: 2024-09-16

## 2024-09-16 RX ADMIN — GADOBUTROL 9 ML: 604.72 INJECTION INTRAVENOUS at 12:51

## 2024-09-17 ENCOUNTER — TELEPHONE (OUTPATIENT)
Dept: RADIOLOGY | Facility: HOSPITAL | Age: 49
End: 2024-09-17

## 2024-09-17 NOTE — PRE-PROCEDURE INSTRUCTIONS
Pre-procedure Instructions for Interventional Radiology  [place] [address] [place] [address]   [id number]  INTERVENTIONAL RADIOLOGY [phone]    You are scheduled for a/an lumbar puncture.    On Thursday 9-19-24.    Your arrival time is 10am.  Our Interventional Radiology nurse will notify you a few days before your procedure with the exact arrival time and location to arrive.    To prepare for your procedure:  Please arrange for someone to drive you home after the procedure and stay with you until the next morning if you are instructed to do so.  This is typically for patients receiving some type of sedative or anesthetic for the procedure.  DO NOT EAT OR DRINK ANYTHING after midnight on the evening before your procedure including candy & gum.  ONLY SIPS OF WATER with your medications are allowed on the morning of your procedure.  TAKE ALL OF YOUR REGULAR MEDICATIONS THE MORNING OF YOUR PROCEDURE with sips of water!  We may call you to stop some of your blood sugar, blood pressure and blood thinning medications depending on the procedure.  Please take all of these medications unless we instruct you to stop them.  If you have an allergy to x-ray dye, please contact Interventional Radiology for an x-ray dye preparation which usually consists of an oral steroid and Benadryl.    The day of your procedure:  Bring a list of the medications you take at home.  Bring medications you take for breathing problems (such as inhalers), medications for chest pain, or both.  Bring your insurance card and a form of photo ID.  Bring a case for your glasses or contacts.  Please leave all valuables such as credit cards and jewelry at home.  Report to the registration desk in the main lobby at the [facility].  Ask to be directed to the After Procedure Unit on the 2nd floor.  While your procedure is being performed your family may wait in the Waiting Room on the 2nd floor.  Be prepared to stay overnight just in case.  Sometimes procedures will indicate the need for further observation or treatment.   If you are scheduled for a follow-up visit with the Interventional Radiologist after your procedure, you will be called with a date and time.    Special Instructions (Medications to alter or stop taking before your procedure etc.):

## 2024-09-18 ENCOUNTER — TELEPHONE (OUTPATIENT)
Dept: HEMATOLOGY ONCOLOGY | Facility: CLINIC | Age: 49
End: 2024-09-18

## 2024-09-18 NOTE — TELEPHONE ENCOUNTER
"Spoke with patient regarding his labs.  Patient is unable to follow-up with hematology due to the high co-pay of $80.  We reviewed that his H/H are within normal limits.  He is iron deficient with a ferritin of 19.  Patient has a history of Parth-en-Y therefore will likely not absorb oral iron and patient is not able to tolerate oral iron supplements as he states \"it goes right through me.\"  He is taking vitamin B12 supplements but not sublingual.  Informed him that due to his Parth-en-Y he has to take sublingual to absorb through his mucous membranes as his vitamin B12 is on the low end of normal.  Patient's folic acid is on the low end of normal therefore, recommend he get a multivitamin with a little better iron folic acid vit B12 and take it daily.  Recommend he follow-up with his PCP in 4 months and have this blood work repeated.  Due to affordability, he can see us on an as-needed basis and follow-up with his PCP.  Patient is agreeable.  "

## 2024-09-19 ENCOUNTER — DOCUMENTATION (OUTPATIENT)
Dept: INTERVENTIONAL RADIOLOGY/VASCULAR | Facility: CLINIC | Age: 49
End: 2024-09-19

## 2024-09-19 ENCOUNTER — HOSPITAL ENCOUNTER (OUTPATIENT)
Dept: INTERVENTIONAL RADIOLOGY/VASCULAR | Facility: HOSPITAL | Age: 49
End: 2024-09-19
Attending: RADIOLOGY
Payer: COMMERCIAL

## 2024-09-19 VITALS
HEART RATE: 69 BPM | WEIGHT: 203.1 LBS | SYSTOLIC BLOOD PRESSURE: 119 MMHG | DIASTOLIC BLOOD PRESSURE: 79 MMHG | RESPIRATION RATE: 16 BRPM | OXYGEN SATURATION: 100 % | HEIGHT: 72 IN | BODY MASS INDEX: 27.51 KG/M2 | TEMPERATURE: 97.2 F

## 2024-09-19 DIAGNOSIS — R20.0 NUMBNESS IN BOTH LEGS: ICD-10-CM

## 2024-09-19 DIAGNOSIS — S24.119A: Primary | ICD-10-CM

## 2024-09-19 LAB
APPEARANCE CSF: NORMAL
BASOPHILS # BLD AUTO: 0.08 THOUSANDS/ΜL (ref 0–0.1)
BASOPHILS NFR BLD AUTO: 1 % (ref 0–1)
EOSINOPHIL # BLD AUTO: 0.12 THOUSAND/ΜL (ref 0–0.61)
EOSINOPHIL NFR BLD AUTO: 2 % (ref 0–6)
EOSINOPHIL NFR CSF MANUAL: 1 %
ERYTHROCYTE [DISTWIDTH] IN BLOOD BY AUTOMATED COUNT: 14 % (ref 11.6–15.1)
GLUCOSE CSF-MCNC: 54 MG/DL (ref 40–70)
GRAM STN SPEC: NORMAL
GRAM STN SPEC: NORMAL
HCT VFR BLD AUTO: 49.3 % (ref 36.5–49.3)
HGB BLD-MCNC: 16.4 G/DL (ref 12–17)
HISTIOCYTES NFR CSF: 6 %
IMM GRANULOCYTES # BLD AUTO: 0.02 THOUSAND/UL (ref 0–0.2)
IMM GRANULOCYTES NFR BLD AUTO: 0 % (ref 0–2)
INR PPP: 1.03 (ref 0.85–1.19)
LYMPHOCYTES # BLD AUTO: 1.49 THOUSANDS/ΜL (ref 0.6–4.47)
LYMPHOCYTES NFR BLD AUTO: 26 % (ref 14–44)
LYMPHOCYTES NFR CSF MANUAL: 77 %
MCH RBC QN AUTO: 28.1 PG (ref 26.8–34.3)
MCHC RBC AUTO-ENTMCNC: 33.3 G/DL (ref 31.4–37.4)
MCV RBC AUTO: 85 FL (ref 82–98)
MONOCYTES # BLD AUTO: 0.4 THOUSAND/ΜL (ref 0.17–1.22)
MONOCYTES NFR BLD AUTO: 7 % (ref 4–12)
NEUTROPHILS # BLD AUTO: 3.58 THOUSANDS/ΜL (ref 1.85–7.62)
NEUTROPHILS NFR CSF MANUAL: 13 %
NEUTS BAND NFR CSF MANUAL: 3 %
NEUTS SEG NFR BLD AUTO: 64 % (ref 43–75)
NRBC BLD AUTO-RTO: 0 /100 WBCS
PLATELET # BLD AUTO: 135 THOUSANDS/UL (ref 149–390)
PMV BLD AUTO: 12.5 FL (ref 8.9–12.7)
PROT CSF-MCNC: 60 MG/DL (ref 15–45)
PROTHROMBIN TIME: 13.9 SECONDS (ref 12.3–15)
RBC # BLD AUTO: 5.83 MILLION/UL (ref 3.88–5.62)
RBC # CSF MANUAL: 2019 UL (ref 0–10)
TOTAL CELLS COUNTED BLD: NO
TOTAL CELLS COUNTED SPEC: 100
TOTAL NUCLEATED CELL COUNT, CSF: 2.2 /UL (ref 0–5)
TUBE # CSF: 4
WBC # BLD AUTO: 5.69 THOUSAND/UL (ref 4.31–10.16)
WBC # CSF AUTO: 2.2 /UL (ref 0–5)

## 2024-09-19 PROCEDURE — 82164 ANGIOTENSIN I ENZYME TEST: CPT

## 2024-09-19 PROCEDURE — 85025 COMPLETE CBC W/AUTO DIFF WBC: CPT | Performed by: RADIOLOGY

## 2024-09-19 PROCEDURE — 36415 COLL VENOUS BLD VENIPUNCTURE: CPT

## 2024-09-19 PROCEDURE — 62328 DX LMBR SPI PNXR W/FLUOR/CT: CPT | Performed by: STUDENT IN AN ORGANIZED HEALTH CARE EDUCATION/TRAINING PROGRAM

## 2024-09-19 PROCEDURE — 84157 ASSAY OF PROTEIN OTHER: CPT

## 2024-09-19 PROCEDURE — 99152 MOD SED SAME PHYS/QHP 5/>YRS: CPT | Performed by: STUDENT IN AN ORGANIZED HEALTH CARE EDUCATION/TRAINING PROGRAM

## 2024-09-19 PROCEDURE — 89050 BODY FLUID CELL COUNT: CPT

## 2024-09-19 PROCEDURE — 83873 ASSAY OF CSF PROTEIN: CPT

## 2024-09-19 PROCEDURE — 85610 PROTHROMBIN TIME: CPT | Performed by: RADIOLOGY

## 2024-09-19 PROCEDURE — 62328 DX LMBR SPI PNXR W/FLUOR/CT: CPT

## 2024-09-19 PROCEDURE — 62270 DX LMBR SPI PNXR: CPT

## 2024-09-19 PROCEDURE — 87070 CULTURE OTHR SPECIMN AEROBIC: CPT

## 2024-09-19 PROCEDURE — 82784 ASSAY IGA/IGD/IGG/IGM EACH: CPT

## 2024-09-19 PROCEDURE — 86592 SYPHILIS TEST NON-TREP QUAL: CPT

## 2024-09-19 PROCEDURE — 77003 FLUOROGUIDE FOR SPINE INJECT: CPT

## 2024-09-19 PROCEDURE — 83916 OLIGOCLONAL BANDS: CPT

## 2024-09-19 PROCEDURE — 82945 GLUCOSE OTHER FLUID: CPT

## 2024-09-19 PROCEDURE — 87798 DETECT AGENT NOS DNA AMP: CPT

## 2024-09-19 PROCEDURE — 89051 BODY FLUID CELL COUNT: CPT

## 2024-09-19 RX ORDER — FENTANYL CITRATE 50 UG/ML
INJECTION, SOLUTION INTRAMUSCULAR; INTRAVENOUS AS NEEDED
Status: COMPLETED | OUTPATIENT
Start: 2024-09-19 | End: 2024-09-19

## 2024-09-19 RX ORDER — LIDOCAINE HYDROCHLORIDE 10 MG/ML
INJECTION, SOLUTION EPIDURAL; INFILTRATION; INTRACAUDAL; PERINEURAL AS NEEDED
Status: COMPLETED | OUTPATIENT
Start: 2024-09-19 | End: 2024-09-19

## 2024-09-19 RX ORDER — HYDROCODONE BITARTRATE AND ACETAMINOPHEN 5; 325 MG/1; MG/1
1 TABLET ORAL ONCE
Status: COMPLETED | OUTPATIENT
Start: 2024-09-19 | End: 2024-09-19

## 2024-09-19 RX ORDER — SODIUM CHLORIDE 9 MG/ML
30 INJECTION, SOLUTION INTRAVENOUS CONTINUOUS
Status: DISCONTINUED | OUTPATIENT
Start: 2024-09-19 | End: 2024-09-23 | Stop reason: HOSPADM

## 2024-09-19 RX ORDER — MIDAZOLAM HYDROCHLORIDE 2 MG/2ML
INJECTION, SOLUTION INTRAMUSCULAR; INTRAVENOUS AS NEEDED
Status: COMPLETED | OUTPATIENT
Start: 2024-09-19 | End: 2024-09-19

## 2024-09-19 RX ADMIN — FENTANYL CITRATE 50 MCG: 50 INJECTION, SOLUTION INTRAMUSCULAR; INTRAVENOUS at 11:54

## 2024-09-19 RX ADMIN — FENTANYL CITRATE 50 MCG: 50 INJECTION, SOLUTION INTRAMUSCULAR; INTRAVENOUS at 12:03

## 2024-09-19 RX ADMIN — LIDOCAINE HYDROCHLORIDE 5 ML: 10 INJECTION, SOLUTION EPIDURAL; INFILTRATION; INTRACAUDAL at 12:00

## 2024-09-19 RX ADMIN — SODIUM CHLORIDE 30 ML/HR: 0.9 INJECTION, SOLUTION INTRAVENOUS at 11:43

## 2024-09-19 RX ADMIN — HYDROCODONE BITARTRATE AND ACETAMINOPHEN 1 TABLET: 5; 325 TABLET ORAL at 14:18

## 2024-09-19 RX ADMIN — MIDAZOLAM HYDROCHLORIDE 1 MG: 1 INJECTION, SOLUTION INTRAMUSCULAR; INTRAVENOUS at 12:03

## 2024-09-19 RX ADMIN — FENTANYL CITRATE 50 MCG: 50 INJECTION, SOLUTION INTRAMUSCULAR; INTRAVENOUS at 12:16

## 2024-09-19 RX ADMIN — MIDAZOLAM HYDROCHLORIDE 1 MG: 1 INJECTION, SOLUTION INTRAMUSCULAR; INTRAVENOUS at 11:54

## 2024-09-19 RX ADMIN — MIDAZOLAM HYDROCHLORIDE 1 MG: 1 INJECTION, SOLUTION INTRAMUSCULAR; INTRAVENOUS at 11:45

## 2024-09-19 RX ADMIN — FENTANYL CITRATE 50 MCG: 50 INJECTION, SOLUTION INTRAMUSCULAR; INTRAVENOUS at 11:45

## 2024-09-19 RX ADMIN — MIDAZOLAM HYDROCHLORIDE 1 MG: 1 INJECTION, SOLUTION INTRAMUSCULAR; INTRAVENOUS at 12:16

## 2024-09-19 NOTE — BRIEF OP NOTE (RAD/CATH)
INTERVENTIONAL RADIOLOGY PROCEDURE NOTE    Date: 9/19/2024    Procedure:   Procedure Summary       Date: 09/19/24 Room / Location: [facility] Interventional Radiology    Anesthesia Start:  Anesthesia Stop:     Procedures:       CSF CULTURE AND GRAM STAIN      RED CELL COUNT, CSF      CSF WBC COUNT WITH DIFFERENTIAL      PROTEIN TOTAL, CSF      GLUCOSE, CSF      ANGIOTENSIN CONVERTING ENZYME, CSF      CSF IGG INDEX      MYELIN BASIC PROTEIN, CSF      VDRL, CSF      WEST NILE VIRUS PCR, CSF      OLIGOCLONAL BANDING      IR LUMBAR PUNCTURE Diagnosis:       Complete lesion of thoracic spinal cord, initial encounter (HCC)      Numbness in both legs      (T10 cord lesion - Please measure and document opening pressure and closing pressure. I will also order CSF protein, glucose, RBC, WBC and culture as well as all other ordered CSF labs.)    Scheduled Providers:  Responsible Provider:     Anesthesia Type: Not recorded ASA Status: Not recorded            Preoperative diagnosis:   1. Numbness in scrotum and both legs         Postoperative diagnosis: Same.    Surgeon: William Durant MD     Assistant: None. No qualified resident was available.    Blood loss: Minimal    Specimens: 18 mL CSF, initially blood tinged transitioning to clear.     Findings:   Uncomplicated fluoroscopy guided lumbar puncture at the level of the L4 posterior decompression.    Opening pressure was measured at 12 cm H2O.    18 mL CSF obtained.    Closing pressure was < 9 cm H2O.    Complications: None immediate.    Anesthesia: conscious sedation

## 2024-09-19 NOTE — H&P
"Interventional Radiology  History and Physical 9/19/2024     Anderson Hart   1975   [id number]    Assessment/Plan:  Anderson López"Keyur\" Hailey is a very pleasant 48-year-old man with history of bipolar disorder, GERD, chronic pain syndrome s/p thoracic spinal cord stimulator placement, L4-S1 fusion and posterior decompressions, recently hospitalized with acute pain in the groin with saddle anesthesia and now experiencing bilateral lower extremity paresthesias and weakness.  Workup has noted abnormal signal in the cord at the level of T10.    Image-guided lumbar puncture with opening/closing pressures has been requested for comprehensive CSF analysis.  Will plan to proceed under conscious sedation given history of chronic pain.    Problem List Items Addressed This Visit       Numbness in scrotum and both legs    Relevant Orders    IR lumbar puncture          Subjective:     Patient ID: Anderson Hart is a 49 y.o. male.    History of Present Illness  Anderson \"Keyur\" Hailey is a very pleasant 48-year-old man with history of bipolar disorder, GERD, chronic pain syndrome s/p thoracic spinal cord stimulator placement, L4-S1 fusion and posterior decompressions, recently hospitalized with acute pain in the groin with saddle anesthesia and now experiencing bilateral lower extremity paresthesias and weakness.  Workup has noted abnormal signal in the cord at the level of T10.          Past Medical History:   Diagnosis Date    Allergic     Anxiety     Arthritis     Asthma     Bipolar 2 disorder, major depressive episode (HCC) 10/31/2017    Bipolar disorder (HCC)     Chronic left lumbar radiculopathy     Chronic low back pain     Chronic pain syndrome     Chronic right shoulder pain     Depression with anxiety     Fatigue     GERD (gastroesophageal reflux disease)     Hydronephrosis     Iron deficiency anemia     Kidney stone     Lytic bone lesions on xray     Nephrolithiasis     PONV (postoperative nausea and vomiting)  "    Right elbow pain     Right lateral epicondylitis         Past Surgical History:   Procedure Laterality Date    ADENOIDECTOMY Bilateral     APPENDECTOMY      BACK SURGERY      CHOLECYSTECTOMY      CHOLECYSTECTOMY LAPAROSCOPIC N/A 10/30/2018    Procedure: CHOLECYSTECTOMY WITH GASTROTOMY LAPAROSCOPIC;  Surgeon: Oliver Madison MD;  [facility];  Service: General    COLONOSCOPY      ERCP W/ SPHICTEROTOMY N/A 10/30/2018    Procedure: ENDOSCOPIC RETROGRADE CHOLANGIOPANCREATOGRAPHY (ERCP) W/ SPHINCTEROTOMY;  Surgeon: Kendrick Meeks MD;  Location: [facility];  Service: Gastroenterology    GASTRIC BYPASS      2009    OTHER SURGICAL HISTORY      fusion/refusion of vertebrae, 2010 and 2011 l5-s1 and l4-l5    IA ARTHROSCOPY KNEE LATERAL RELEASE Left 01/06/2021    Procedure: RELEASE RETINACULAR;  Surgeon: Rogers Lennon DO;  [facility];  Service: Orthopedics    IA ARTHRS KNE SURG W/MENISCECTOMY MED/LAT W/SHVG Right 04/26/2022    Procedure: ARTHROSCOPIC MENISCECTOMY LATERAL;  Surgeon: Rogers Lennon DO;  Location: [facility];  Service: Orthopedics    IA ARTHRS KNEE DEBRIDEMENT/SHAVING ARTCLR CRTLG Left 01/06/2021    Procedure: ARTHROSCOPY KNEE;  Surgeon: Rogers Lennon DO;  Location: [facility];  Service: Orthopedics    IA CYSTO/URETERO W/LITHOTRIPSY &INDWELL STENT INSRT Right 08/08/2017    Procedure: CYSTOSCOPY; URETEROSCOPY; HOLMIUM LASER; RETROGRADE PYELOGRAM; STENT;  Surgeon: Rupesh Romo MD;  Location[facility];  Service: Urology    IA INCISION & DRAINAGE ABSCESS COMPLICATED/MULTIPLE Left 01/08/2021    Procedure: INCISION AND DRAINAGE (I&D) EXTREMITY- of left knee s/p MPFL reconstruction, I&D if the left knee with possible MPFL revision;  Surgeon: Rogers Lennon DO;  [facility];  Service: Orthopedics    IA INSJ/RPLCMT SPINAL NPG/RCVR POCKET CRTJ&CONNJ Right 11/14/2017    Procedure: REMOVAL LOWER MEDIAL BUTTOCK SPINAL CORD STIMULATOR GENERATOR; PLACEMENT OF NEW BUTTOCK SPINAL CORD  STIMULATOR THROUGH SEPERATE INCISION;  Surgeon: Stephan Duque MD;  Location: [facility];  Service: Neurosurgery    TN INSJ/RPLCMT SPINAL NPG/RCVR POCKET CRTJ&CONNJ Right 01/12/2022    Procedure: Right sided spinal cord stimulator pulse generator replacement;  Surgeon: Michael Gamez MD;  [facility];  Service: Neurosurgery    TN INSJ/RPLCMT SPINAL NPG/RCVR POCKET CRTJ&CONNJ Right 9/8/2023    Procedure: Right-sided spinal cord stimulator internal pulse generator replacement;  Surgeon: Michael Gamez MD;  [facility];  Service: Neurosurgery    TN RCNSTJ DISLC PATELLA W/XTNSR RELIGNMT&/MUSC RL Left 01/06/2021    Procedure: REALIGNMENT PATELLA with chondroplasty of patella, open medial patellofemoral ligament reconstruction with allograft;  Surgeon: Rogers Lennon DO;  [facility];  Service: Orthopedics    RIK-EN-Y PROCEDURE  2003    SPINAL CORD STIMULATOR IMPLANT  11/14/2017    dr duque procedure and technique 1. removal of a right back implantable pulse generator 2.placement of a new right buttock impantable pulse generator through seperate incision 3 electric analys complex programming spinal cord stimulator system postoperative period approx 1 hour    SPINE SURGERY      TONSILLECTOMY          Social History     Tobacco Use   Smoking Status Every Day    Current packs/day: 1.50    Average packs/day: 1.5 packs/day for 29.7 years (44.6 ttl pk-yrs)    Types: Cigarettes, Pipe, Cigars    Start date: 1995   Smokeless Tobacco Never        Social History     Substance and Sexual Activity   Alcohol Use Yes    Comment: rare        Social History     Substance and Sexual Activity   Drug Use Never        Allergies   Allergen Reactions    Ampicillin GI Intolerance     Vomiting    Penicillins GI Intolerance     Vomit       Current Outpatient Medications   Medication Sig Dispense Refill    Acetaminophen (TYLENOL 8 HOUR ARTHRITIS PAIN PO) Take by mouth      aspirin (ECOTRIN LOW STRENGTH)  81 mg EC tablet Take 81 mg by mouth daily      ergocalciferol (VITAMIN D2) 50,000 units TAKE 1 CAPSULE BY MOUTH EVERY 14 DAYS 6 capsule 1    esomeprazole (NexIUM) 40 MG capsule take 1 capsule by mouth every morning 90 capsule 1    folic acid (FOLVITE) 1 mg tablet take 1 tablet by mouth once daily 30 tablet 2    HYDROcodone-acetaminophen (NORCO) 5-325 mg per tablet Take 1 tablet by mouth every 8 (eight) hours as needed for pain For ongoing therapy Max Daily Amount: 3 tablets 90 tablet 0    albuterol (PROVENTIL HFA,VENTOLIN HFA) 90 mcg/act inhaler INHALE 2 PUFFS EVERY 4 (FOUR) HOURS AS NEEDED FOR WHEEZING OR SHORTNESS OF BREATH COUGH 18 g 0    Cyanocobalamin (Vitamin B-12) 1000 MCG SUBL Place 1 tablet (1,000 mcg total) under the tongue in the morning 90 tablet 1    gabapentin (NEURONTIN) 300 mg capsule Take 1 capsule (300 mg total) by mouth 3 (three) times a day Take 1 cap at bedtime for 3 days, then 1 cap BID x 3 days, then 1 cap TID (Patient not taking: Reported on 9/11/2024) 90 capsule 2    naloxone (NARCAN) 4 mg/0.1 mL nasal spray Administer 1 spray into a nostril. If no response after 2-3 minutes, give another dose in the other nostril using a new spray. (Patient not taking: Reported on 8/8/2024) 1 each 1    oxyCODONE-acetaminophen (PERCOCET) 5-325 mg per tablet  (Patient not taking: Reported on 8/8/2024)      varenicline (Chantix Continuing Month Derrick) 1 mg tablet Take 1 tablet (1 mg total) by mouth 2 (two) times a day 60 tablet 4     Current Facility-Administered Medications   Medication Dose Route Frequency Provider Last Rate Last Admin    sodium chloride 0.9 % infusion  30 mL/hr Intravenous Continuous Ariane Oneal MD              Objective:    Vitals:    09/19/24 1011   BP: 140/83   Pulse: 73   Resp: 12   Temp: (!) 97.4 °F (36.3 °C)   TempSrc: Temporal   SpO2: 99%   Weight: 92.1 kg (203 lb 1.6 oz)   Height: 6' (1.829 m)     Physical Exam:  General: Well appearing, no acute distress.  HEENT: NC/AT.  EOMI.  CV:  "RRR  Chest: Normal respiratory effort.  Speaking in full sentences.  Abdomen: Soft, ND/NT.  Skin: Warm and dry  Neuro: Alert and oriented x 3.           No results found for: \"BNP\"   Lab Results   Component Value Date    WBC 5.69 09/19/2024    HGB 16.4 09/19/2024    HCT 49.3 09/19/2024    MCV 85 09/19/2024     (L) 09/19/2024     Lab Results   Component Value Date    INR 1.03 09/19/2024    INR 0.95 08/28/2023    INR 1.02 01/05/2022    PROTIME 13.9 09/19/2024    PROTIME 12.9 08/28/2023    PROTIME 13.4 01/05/2022     Lab Results   Component Value Date    PTT 34 08/28/2023         I have personally reviewed pertinent imaging and laboratory results.     Code Status: Prior  Advance Directive and Living Will:      Power of :    POLST:      This text is generated with voice recognition software. There may be translation, syntax,  or grammatical errors. If you have any questions, please contact the dictating provider.   "

## 2024-09-19 NOTE — DISCHARGE INSTRUCTIONS
Lumbar Puncture     WHAT YOU NEED TO KNOW:   Lumbar puncture (LP) is a procedure in which a needle is inserted in your back and into your spinal canal. This is usually done to collect cerebrospinal fluid (CSF) to check for an infection, inflammation, bleeding, or other conditions that affect the brain. CSF is a clear, protective fluid that flows around the brain and inside the spinal canal. LP may also be done to remove CSF to reduce pressure in the brain.     DISCHARGE INSTRUCTIONS:     Follow up with your healthcare provider as directed: Write down your questions so you remember to ask them during your visits.     Post-lumbar puncture headache: You may develop a headache during the first few hours after your LP that may last for several days. The headache may be mild to severe and may get worse when you sit or stand. The following may help ease a post-lumbar puncture headache:  Drink plenty of liquids: You should drink more liquid than usual after your LP. Ask how much liquid is right for you. Caffeine may be used to treat a headache. Drinks, such as coffee, tea, or some sodas, have caffeine. Ask a Do not drink alcohol.    Lie down: If you have a headache after your lumbar puncture, it may be helpful to lie down and rest.  You may have a slight soreness over the LP area. This is normal.  Remove the band aid or dressing in 24 hours.    Contact Interventional Radiology imediately  at [phone] ([facility] PATIENTS: Contact Interventional Radiology at [phone]) ([place] PATIENTS: Contact Interventional Radiology at [phone]) if any of the following occur:  You have a severe headache that does not get better after you lie down.  Persistent nausea or vomiting   You have a fever.   You have a stiff neck or have trouble thinking clearly.   Your legs, feet, or other parts below the waist feel numb, tingly, or weak.   You have bleeding or a discharge coming from the area where the needle was put into your back.   You  have severe pain in your back or neck.    Procedural Sedation   WHAT YOU NEED TO KNOW:   Procedural sedation is medicine used during procedures to help you feel relaxed and calm. You will remember little to none of the procedure. After sedation you may feel tired, weak, or unsteady on your feet. You may also have trouble concentrating or short-term memory loss. These symptoms should go away in 24 hours or less.   DISCHARGE INSTRUCTIONS:   Call 911 or have someone else call for any of the following:   You have sudden trouble breathing.     You cannot be woken.     Contact Interventional Radiology at [phone]   [facility] PATIENTS: Contact Interventional Radiology at [phone] [place] PATIENTS: Contact Interventional Radiology at [phone]) if any of the following occur:      You have a severe headache or dizziness.     Your heart is beating faster than usual.    You have a fever or chills.     Your skin is itchy, swollen, or you have a rash.     You have nausea or are vomiting for more than 8 hours after the procedure.      You have questions or concerns about your condition or care.  Self-care:   Have someone stay with you for 24 hours. This person can drive you to errands and help you do things around the house. This person can also watch for problems.      Rest and do quiet activities for 24 hours. Do not exercise, ride a bike, or play sports. Stand up slowly to prevent dizziness and falls. Take short walks around the house with another person. Slowly return to your usual activities the next day.      Do not drive or use dangerous machines or tools for 24 hours. You may injure yourself or others. Examples include a lawnmower, saw, or drill. Do not return to work for 24 hours if you use dangerous machines or tools for work.      Do not make important decisions for 24 hours. For example, do not sign important papers or invest money.      Drink liquids as directed. Liquids help flush the sedation medicine out of  your body. Ask how much liquid to drink each day and which liquids are best for you.      Eat small, frequent meals to prevent nausea and vomiting. Start with clear liquids such as juice or broth. If you do not vomit after clear liquids, you can eat your usual foods.      Do not drink alcohol or take medicines that make you drowsy. This includes medicines that help you sleep and anxiety medicines. Ask your healthcare provider if it is safe for you to take pain medicine.  Follow up with your healthcare provider as directed: Write down your questions so you remember to ask them during your visits.

## 2024-09-19 NOTE — SEDATION DOCUMENTATION
Pt tolerated lumbar puncture. Opening pressure and pressure documented. 18ml total CSF fluids removed and sent to lab. Bandaid to site. Vitals stable

## 2024-09-22 LAB — BACTERIA CSF CULT: NO GROWTH

## 2024-09-23 LAB
ACE CSF-CCNC: <1.5 U/L (ref 0–2.5)
IGG CSF-MCNC: 7.1 MG/DL (ref 0–10.3)
REAGIN AB CSF QL: NON REACTIVE

## 2024-09-25 LAB — OLIGOCLONAL BANDS.IT SER+CSF QL: NORMAL

## 2024-09-26 LAB
MBP CSF-MCNC: 15.2 NG/ML (ref 0–4.7)
WNV RNA SPEC QL NAA+PROBE: NOT DETECTED

## 2024-10-10 ENCOUNTER — TELEPHONE (OUTPATIENT)
Facility: MEDICAL CENTER | Age: 49
End: 2024-10-10

## 2024-10-10 ENCOUNTER — OFFICE VISIT (OUTPATIENT)
Dept: NEUROLOGY | Facility: CLINIC | Age: 49
End: 2024-10-10
Payer: COMMERCIAL

## 2024-10-10 VITALS
SYSTOLIC BLOOD PRESSURE: 120 MMHG | DIASTOLIC BLOOD PRESSURE: 60 MMHG | BODY MASS INDEX: 27.12 KG/M2 | WEIGHT: 200 LBS | HEART RATE: 65 BPM

## 2024-10-10 DIAGNOSIS — S24.119A: ICD-10-CM

## 2024-10-10 DIAGNOSIS — R93.7 ABNORMAL MRI, THORACIC SPINE: Primary | ICD-10-CM

## 2024-10-10 PROBLEM — R90.82 WHITE MATTER ABNORMALITY ON MRI OF BRAIN: Status: ACTIVE | Noted: 2024-10-10

## 2024-10-10 PROCEDURE — 99203 OFFICE O/P NEW LOW 30 MIN: CPT | Performed by: PSYCHIATRY & NEUROLOGY

## 2024-10-10 NOTE — ASSESSMENT & PLAN NOTE
Patient initially presented in July with sudden lower extremity numbness in addition to saddle anesthesia.  Saddle anesthesia is now resolved however he is still experiencing numbness and subjective weakness.  Reported family history of multiple sclerosis in mother.  Concern for demyelinating disorder such as multiple sclerosis or other inflammatory conditions.    Notable workup:  MRI thoracic spine 9/16/2024 showed stable T2 cord signal abnormality involving dorsal cord extending from T10 inferior vertebral level through T10-11 this level with associated marginal enhancement.  Patient on exam today with hyperreflexia of his bilateral lower extremities in addition to sensation abnormalities.  CSF findings notable for 2 oligoclonal bands in addition to elevated myelin basic protein level.    Plan:  MRI of brain and cervical spine region with and without contrast to evaluate for additional demyelinating lesions   Will obtain Lyme antibodies for completeness, patient reports a history of bull's-eye rash and was treated with steroids at the time  Follow-up with ordered visual evoked potential testing    Orders:    Ambulatory referral to Neurology    MRI cervical spine with and without contrast; Future    MRI brain with and without contrast; Future    Lyme Total AB W Reflex to IGM/IGG; Future    Visual evoked potential test; Future    Ambulatory Referral to Neurology; Future

## 2024-10-10 NOTE — PROGRESS NOTES
Ambulatory Visit  Name: Anderson Hart      : 1975      MRN: [id number]  Encounter Provider: Stephan Randolph MD  Encounter Date: 10/10/2024   Encounter department: [facility] [place]    Assessment & Plan  Complete lesion of thoracic spinal cord, initial encounter (HCC)  Patient initially presented in July with sudden lower extremity numbness in addition to saddle anesthesia.  Saddle anesthesia is now resolved however he is still experiencing numbness and subjective weakness.  Reported family history of multiple sclerosis in mother.  Concern for demyelinating disorder such as multiple sclerosis or other inflammatory conditions.    Notable workup:  MRI thoracic spine 2024 showed stable T2 cord signal abnormality involving dorsal cord extending from T10 inferior vertebral level through T10-11 this level with associated marginal enhancement.  Patient on exam today with hyperreflexia of his bilateral lower extremities in addition to sensation abnormalities.  CSF findings notable for 2 oligoclonal bands in addition to elevated myelin basic protein level.    Plan:  MRI of brain and cervical spine region with and without contrast to evaluate for additional demyelinating lesions   Will obtain Lyme antibodies for completeness, patient reports a history of bull's-eye rash and was treated with steroids at the time  Follow-up with ordered visual evoked potential testing    Orders:    Ambulatory referral to Neurology    MRI cervical spine with and without contrast; Future    MRI brain with and without contrast; Future    Lyme Total AB W Reflex to IGM/IGG; Future    Visual evoked potential test; Future    Ambulatory Referral to Neurology; Future      History of Present Illness     Mr. Hart is a 48 yo right handed male with a PMH chronic pain syndrome s/p SCS implantation and L4-S1 fusion presenting for a consultation visit regarding numbness in legs in conjunction with abnormal MRI  findings. Briefly to review, he was hospitalized this past July for complete numbness waist down complicated by complete saddle anesthesia and diffuse left leg weakness. He also sees Pain medicine for his lumbar postlaminectomy pain syndrome and lumbar spondylosis.  He has endorsed continued lower extremity numbness since July.  His saddle anesthesia has resolved.  He denies any episodes of bladder or bowel incontinence.  Symptom onset was abrupt.  Associated symptoms include abnormalities in his left eye.  He reports that initially about a week after the onset of symptoms he could not read words on the television screen if he closes right eye.  He denies any orbital pain or pain upon extraocular movement.  Of note, he had been diagnosed with Lyme disease in the  with a typical bull's-eye rash and at that time he was just given steroids.      Ever symptom free: no   Falls: six times, four times he woke up on the floor. Lives with his wife.   Pain: no  Loss of bladder/bowels: no  Loss of sensation in perineal areas: yes back in July, now resolved  Dizziness or lightheadedness: yes  Neck or back pain: yes but controlled with pain regimen  Family History of TIA, stroke, MS, epilepsy: mother had tingling down to fingertrips and found to have multiple sclerosis  Smokin packs per day for 9 years  Alcohol: no  Drugs: no  Occupation: Retired, was previously a    History of radiation/chemotherapy: no  Fatigue: yes, increased  Lhermitte sign: no   Uhthoff's phenomenon: no   MS hug-like symptom:  no      Previous work up includes:    IMAGING: (MRI)  MRI lumbar spine 2024: Posterior spinal instrumented fusion with decompressive laminectomy from L4-S1.  Disc bulge with some facet arthropathy and mild foraminal narrowing at L3-4.  No stenosis noted otherwise.  MRI thoracic spine 24:  Stable T2 cord signal abnormality involving dorsal cord extending from T10 inferior vertebral level through T10-11  disc level. There is associated marginal enhancement.   LABS:   CSF fluid:   Two (2) oligoclonal bands were observed in the CSF, which were not detected in the serum sample.   [place] undetected  VDRL non reactive  Myelin basic protein elevated, 15.2  IgG within normal range  Glucose normal, protein elevated at 60  B12 260    PROCEDURES: (EMG etc)  EMG previously scheduled    Review of Systems   Constitutional:  Negative for appetite change, fatigue and fever.   HENT: Negative.  Negative for hearing loss, tinnitus, trouble swallowing and voice change.    Eyes: Negative.  Negative for photophobia, pain and visual disturbance.   Respiratory: Negative.  Negative for shortness of breath.    Cardiovascular: Negative.  Negative for palpitations.   Gastrointestinal: Negative.  Negative for nausea and vomiting.   Endocrine: Negative.  Negative for cold intolerance.   Genitourinary: Negative.  Negative for dysuria, frequency and urgency.   Musculoskeletal:  Positive for back pain and gait problem. Negative for myalgias, neck pain and neck stiffness.        Patient states being unsteady on his feet with six falls.    Skin: Negative.  Negative for rash.   Allergic/Immunologic: Negative.    Neurological:  Positive for light-headedness and numbness. Negative for dizziness, tremors, seizures, syncope, facial asymmetry, speech difficulty, weakness and headaches.        Numbness from the wasit down and radiates to both feet. Lightheaded at times.    Hematological: Negative.  Does not bruise/bleed easily.   Psychiatric/Behavioral: Negative.  Negative for confusion, hallucinations and sleep disturbance.      I have personally reviewed the MA's review of systems and made changes as necessary.    Medical History Reviewed by provider this encounter:       Objective     There were no vitals taken for this visit.    Physical Exam  Neurological:      Mental Status: He is oriented to person, place, and time.      Cranial Nerves: Cranial  nerves 2-12 are intact.      Coordination: Heel to Shin Test and Romberg Test normal.      Gait: Gait is intact. Tandem walk normal.      Deep Tendon Reflexes:      Reflex Scores:       Brachioradialis reflexes are 2+ on the right side and 2+ on the left side.       Patellar reflexes are 3+ on the right side and 3+ on the left side.      Neurologic Exam     Mental Status   Oriented to person, place, and time.     Cranial Nerves   Cranial nerves II through XII intact.     CN II   Visual fields full to confrontation.     CN III, IV, VI   Nystagmus: none   No afferent pupillary defect appreciated     Motor Exam   Overall muscle tone: normal    Strength   Right deltoid: 5/5  Left deltoid: 5/5  Right biceps: 5/5  Left biceps: 5/5  Right triceps: 5/5  Left triceps: 5/5  Right wrist flexion: 5/5  Left wrist flexion: 5/5  Right wrist extension: 5/5  Left wrist extension: 5/5  Right interossei: 5/5  Left interossei: 5/5Asymmetric weakness in bilateral lower extremities.  5 out of 5 strength in his right lower extremity, 4+ out of 5 in the left hip extension/flexion, left knee extension/flexion, left dorsiflexion/plantarflexion    5 out of 5 strength in the upper extremities bilaterally     Sensory Exam   Right arm light touch: normal  Left arm light touch: normal  Right leg light touch: decreased from knee  Left leg light touch: decreased from knee  Right arm vibration: normal  Left arm vibration: normal  Right leg vibration: decreased from toes  Left leg vibration: decreased from toes  Proprioception normal.   Right arm pinprick: normal  Left arm pinprick: normal  Right leg pinprick: decreased from knee  Left leg pinprick: decreased from knee  Normal vibration at bilateral medial malleolus, diminished at the toes bilaterally.  Intact proprioception at bilateral toes.    Decreased pinprick sensation and light touch in both lower extremities from the knee down, in the dorsal and ventral aspects of the leg and foot.          Gait, Coordination, and Reflexes     Gait  Gait: normal    Coordination   Romberg: negative  Heel to shin coordination: normal  Tandem walking coordination: normal    Tremor   Resting tremor: absent    Reflexes   Right brachioradialis: 2+  Left brachioradialis: 2+  Right patellar: 3+  Left patellar: 3+  Right plantar: normal  Left plantar: normalBilateral cross adductor response.  Increased Achilles response bilaterally, more so on the right side.  Reflex response elicited by only very gentle tapping of the tendon.  Hyperreflexia at the left biceps tendon, contraction of fingers noted simultaneously as reflex elicited.           Results/Data:  I have reviewed the results and images from the MRI thoracic spine in detail with the patient.

## 2024-10-10 NOTE — TELEPHONE ENCOUNTER
I scheduled Mr. Hart today for 2 of his MRI appointments at [facility]:  One for 10/30/24 @2:15  And one for 11/6/24 @ 2:15.  He was reminded to bring his controller and to make sure everything is fully charged.

## 2024-10-22 ENCOUNTER — APPOINTMENT (OUTPATIENT)
Dept: LAB | Facility: CLINIC | Age: 49
End: 2024-10-22
Payer: COMMERCIAL

## 2024-10-22 DIAGNOSIS — S24.119A: ICD-10-CM

## 2024-10-22 LAB
B BURGDOR IGG SERPL QL IA: POSITIVE
B BURGDOR IGG+IGM SER QL IA: POSITIVE
B BURGDOR IGM SERPL QL IA: NEGATIVE

## 2024-10-22 PROCEDURE — 36415 COLL VENOUS BLD VENIPUNCTURE: CPT

## 2024-10-22 PROCEDURE — 86617 LYME DISEASE ANTIBODY: CPT

## 2024-10-22 PROCEDURE — 86618 LYME DISEASE ANTIBODY: CPT

## 2024-10-27 NOTE — TELEPHONE ENCOUNTER
I am sorry but I do not quite understand what does the notes need to state. I do not see any work notes notes from July 6. Patient has been restricted from certain work duties due to recent diagnosis of iron deficiency and vitamin B12 anemia as well as episode of chest pain.
Letter done, uploaded into [employer] and patient notified
Please compose a work letter for [employer] employer stating that he is not supposed to climb ladders at work.     Thank you
Pt calling, last seen 7/6 and states provider aware of his situation. He was cleared to go back to work but employer requesting note stating he is excused for the past due to going through testing, and now he is ok to work but cannot climb or be up on ladders. Ok to send to [employer].  Please advise
Spoke with patient and he stated that when he came in  For his physical he spoke with you about being dizzy and he should not climb ladders. He just needs the no climbing ladders as a restrictions for work.   Please call to advise
Female

## 2024-10-30 ENCOUNTER — HOSPITAL ENCOUNTER (OUTPATIENT)
Dept: MRI IMAGING | Facility: HOSPITAL | Age: 49
Discharge: HOME | End: 2024-10-30
Payer: COMMERCIAL

## 2024-10-30 DIAGNOSIS — S24.119A: ICD-10-CM

## 2024-10-30 PROCEDURE — A9585 GADOBUTROL INJECTION: HCPCS

## 2024-10-30 PROCEDURE — 70553 MRI BRAIN STEM W/O & W/DYE: CPT

## 2024-10-30 RX ORDER — GADOBUTROL 604.72 MG/ML
9 INJECTION INTRAVENOUS
Status: COMPLETED | OUTPATIENT
Start: 2024-10-30 | End: 2024-10-30

## 2024-10-30 RX ADMIN — GADOBUTROL 9 ML: 604.72 INJECTION INTRAVENOUS at 15:48

## 2024-11-01 ENCOUNTER — OFFICE VISIT (OUTPATIENT)
Dept: NEUROSURGERY | Facility: CLINIC | Age: 49
End: 2024-11-01
Payer: COMMERCIAL

## 2024-11-01 VITALS
DIASTOLIC BLOOD PRESSURE: 101 MMHG | HEART RATE: 82 BPM | BODY MASS INDEX: 27.09 KG/M2 | SYSTOLIC BLOOD PRESSURE: 142 MMHG | OXYGEN SATURATION: 98 % | HEIGHT: 72 IN | WEIGHT: 200 LBS | TEMPERATURE: 98.3 F

## 2024-11-01 DIAGNOSIS — G37.9 DEMYELINATING DISEASE OF CENTRAL NERVOUS SYSTEM (HCC): Primary | ICD-10-CM

## 2024-11-01 PROCEDURE — 99213 OFFICE O/P EST LOW 20 MIN: CPT | Performed by: NEUROLOGICAL SURGERY

## 2024-11-01 NOTE — PROGRESS NOTES
Ambulatory Visit  Name: Anderson Hart      : 1975      MRN: [id number]  Encounter Provider: Tristan King MD  Encounter Date: 2024   Encounter department: [facility]    Assessment & Plan      History of Present Illness     Patient was recently evaluated on 24 and 24.     He is a 49-year-old male with h/o bipolar disorder, GERD, chronic pain syndrome s/p thoracic SCS implantation in  at OSH (paddle at T8 on imaging) and L4-S1 fusion (initial surgery in  f/b revision in  at OSH) who was recently hospitalized on 2024 for several days for acute pain in groin/scrotum a/w complete saddle anesthesia as well as diffuse (non-dermatomal) left leg numbness/paresthesias and weakness, now also involving right leg but milder.     XR and MRI lumbar spine on 2024 demonstrated no acutely concerning findings. SCS and fusion intact.     MRI thoracic spine without contrast on 24 showed focal abnormal signal in dorsal spinal cord at T10, non-specific a/w demyelination, infection, vs inflammation.     CSF from LP showed + Lyme IgG, 2 oligoclonal bands, elevated (15) MBP, elevated protein (60). Negative cultures and other work-up.    B12 WNL.    EMG previously ordered, remains pending.    Per Neurology on 10/10/24: “Clearly he appears to have a demyelinating-type lesion. My suspicion is of multiple sclerosis although he may or may not meet the criteria yet. He needs an MRI study of the cervical spine and of the brain. He has visual complaints as well so I am going to obtain visual evoked responses. Initiation of treatment or further testing will be considered based on the results.”    MRI brain, ordered by Neurology, performed on 10/30/24 showed multifocal periventricular T2/FLAIR hyperintense lesions concerning for demyelinating disease. I personally reviewed with neuro-radiologist Dr. Wayne Camarena.    MRI cervical spine pending, scheduled on  11/6/24.     At this time, I will defer management of demyelinating disease to Neurology. I personally updated Dr. Randolph and Dr. More.     No additional neurosurgical work-up, intervention, follow-up indicated.     All questions and concerns were addressed during this visit.    HPI  Review of Systems   Constitutional: Negative.    HENT: Negative.     Eyes: Negative.    Respiratory: Negative.     Cardiovascular: Negative.    Gastrointestinal: Negative.    Endocrine: Negative.    Genitourinary: Negative.    Musculoskeletal:  Positive for back pain and gait problem.        Complete lesion of thoracic spinal cord   Brain  MRI on 10/30/24, T/S MRI on 9/16/24  EMG on 10/15/24 in media   (B) Lbp radiates to b/l legs x 4 months.    +N/W on b/l legs and scrotum   Unsteady walking- H/o multiple falls   Denies any B/B issues    On Aspirin/ smoker 1 pk/day x 5 yrs    (R) SCS placed in 2022- dr Casas   IPG replacement in 9/2023     Skin: Negative.    Allergic/Immunologic: Negative.    Neurological:  Positive for weakness and numbness.   Hematological: Negative.    Psychiatric/Behavioral:  Positive for decreased concentration.    All other systems reviewed and are negative.    I have personally reviewed the MA's review of systems and made changes as necessary.    BP (!) 142/101   Pulse 82   Temp 98.3 °F (36.8 °C) (Temporal)   Ht 6' (1.829 m)   Wt 90.7 kg (200 lb)   SpO2 98%   BMI 27.12 kg/m²     Physical Exam  Vitals and nursing note reviewed.   Constitutional:       Appearance: Normal appearance. He is normal weight.   HENT:      Head: Normocephalic and atraumatic.   Eyes:      Extraocular Movements: Extraocular movements intact.      Pupils: Pupils are equal, round, and reactive to light.   Cardiovascular:      Rate and Rhythm: Normal rate and regular rhythm.      Pulses: Normal pulses.      Heart sounds: Normal heart sounds.   Pulmonary:      Effort: Pulmonary effort is normal.      Breath sounds: Normal breath sounds.    Abdominal:      General: Abdomen is flat.      Palpations: Abdomen is soft.   Musculoskeletal:         General: Normal range of motion.      Cervical back: Normal range of motion.   Neurological:      General: No focal deficit present.      Mental Status: He is alert and oriented to person, place, and time. Mental status is at baseline.      GCS: GCS eye subscore is 4. GCS verbal subscore is 5. GCS motor subscore is 6.      Cranial Nerves: Cranial nerves 2-12 are intact.      Sensory: Sensation is intact.      Motor: Motor function is intact.      Coordination: Coordination is intact.      Deep Tendon Reflexes: Reflexes are normal and symmetric.   Psychiatric:         Mood and Affect: Mood normal.         Speech: Speech normal.         Behavior: Behavior normal.       Neurologic Exam     Mental Status   Oriented to person, place, and time.   Attention: normal. Concentration: normal.   Speech: speech is normal   Level of consciousness: alert    Cranial Nerves   Cranial nerves II through XII intact.     CN III, IV, VI   Pupils are equal, round, and reactive to light.    Motor Exam Diffuse weakness unchanged     Sensory Exam   Complete saddle anesthesia as well as diffuse (non-dermatomal) left leg numbness/paresthesias and weakness, now also involving right leg but milder.       I have reviewed the following images/report studies in PACS: MRI brain with and without contrast    Result Date: 11/1/2024  1. Scattered white matter lesions in the supratentorial brain as well as in the upper cervical cord having imaging features of demyelinating disease such as multiple sclerosis. Further clinical assessment and follow-up advised. No pathologic enhancement to definitively confirm active demyelination. 2. No acute infarction, intracranial hemorrhage or enhancing mass lesion. I personally discussed this study with Dr. Tristan King on 11/1/2024 10:37 AM. Workstation performed: [id number]       Administrative Statements   I have  spent a total time of 30 minutes in caring for this patient on the day of the visit/encounter including Diagnostic results, Prognosis, Risks and benefits of tx options, Instructions for management, Patient and family education, Importance of tx compliance, Risk factor reductions, Impressions, Counseling / Coordination of care, Documenting in the medical record, Reviewing / ordering tests, medicine, procedures  , Obtaining or reviewing history  , and Communicating with other healthcare professionals .

## 2024-11-06 ENCOUNTER — TELEPHONE (OUTPATIENT)
Age: 49
End: 2024-11-06

## 2024-11-06 ENCOUNTER — HOSPITAL ENCOUNTER (OUTPATIENT)
Dept: MRI IMAGING | Facility: HOSPITAL | Age: 49
Discharge: HOME | End: 2024-11-06
Payer: COMMERCIAL

## 2024-11-06 DIAGNOSIS — S24.119A: ICD-10-CM

## 2024-11-06 PROCEDURE — A9585 GADOBUTROL INJECTION: HCPCS

## 2024-11-06 PROCEDURE — 72156 MRI NECK SPINE W/O & W/DYE: CPT

## 2024-11-06 RX ORDER — GADOBUTROL 604.72 MG/ML
9 INJECTION INTRAVENOUS
Status: COMPLETED | OUTPATIENT
Start: 2024-11-06 | End: 2024-11-06

## 2024-11-06 RX ADMIN — GADOBUTROL 9 ML: 604.72 INJECTION INTRAVENOUS at 14:57

## 2024-11-06 NOTE — TELEPHONE ENCOUNTER
Please assist. Reviewed referral shell and it looks like pt should be seen by Dr. Ila Mcnamara for a VISUAL EVOKED POTENTIAL TEST

## 2024-11-06 NOTE — TELEPHONE ENCOUNTER
Patient called in he missed a call from neuro in regards to scheduling for an eye test for his MS  Patient requesting call back  # [phone]

## 2024-11-13 ENCOUNTER — OFFICE VISIT (OUTPATIENT)
Dept: NEUROLOGY | Facility: CLINIC | Age: 49
End: 2024-11-13
Payer: COMMERCIAL

## 2024-11-13 ENCOUNTER — HOSPITAL ENCOUNTER (OUTPATIENT)
Dept: NEUROLOGY | Facility: CLINIC | Age: 49
Discharge: HOME | End: 2024-11-13
Payer: COMMERCIAL

## 2024-11-13 VITALS
SYSTOLIC BLOOD PRESSURE: 130 MMHG | TEMPERATURE: 98 F | HEIGHT: 72 IN | WEIGHT: 210 LBS | BODY MASS INDEX: 28.44 KG/M2 | HEART RATE: 67 BPM | DIASTOLIC BLOOD PRESSURE: 88 MMHG

## 2024-11-13 DIAGNOSIS — G37.9 DEMYELINATING DISEASE OF CENTRAL NERVOUS SYSTEM (HCC): Primary | ICD-10-CM

## 2024-11-13 DIAGNOSIS — S24.119A: ICD-10-CM

## 2024-11-13 PROCEDURE — 95930 VISUAL EP TEST CNS W/I&R: CPT | Performed by: PSYCHIATRY & NEUROLOGY

## 2024-11-13 PROCEDURE — 99214 OFFICE O/P EST MOD 30 MIN: CPT | Performed by: PSYCHIATRY & NEUROLOGY

## 2024-11-13 PROCEDURE — 95930 VISUAL EP TEST CNS W/I&R: CPT

## 2024-11-13 NOTE — PROGRESS NOTES
Ambulatory Visit  Name: Anderson Hart      : 1975      MRN: [id number]  Encounter Provider: Stephan Randolph MD  Encounter Date: 2024   Encounter department: [facility] [place]    Assessment & Plan  Demyelinating disease of central nervous system (HCC)  Mr Hart presented with symptoms related to a partial thoracic cord lesion.  CSF analysis showed 2 oligoclonal bands.  Subsequent MRI studies showed a partial C2 lesion and multiple intracranial white matter lesions, some of which were perpendicular to the lateral ventricles.  Visual evoked responses have been performed and the results are pending.  I believe he has multiple sclerosis.  He does not have longitudinally extensive lesions within the spinal cord that would suggest neuromyelitis optica.  He has an appointment with Dr. More next month; Dr. More is a subspecialist in this area.  He does not have a neuromuscular disease based on his clinical presentation, his MRI, and his EMG (which showed only chronic lumbar radicular features).  I will defer to Dr. More whether any further testing is needed but I think this is unlikely.  I assured Mr. Charles that he does not have a brain tumor which was his concern based on his family history.  I told him that I suspect his condition is quite treatable and he will keep his upcoming appointment to discuss whether any other testing is necessary before initiating treatment.  All of his questions were answered and he is in agreement with this plan.             History of Present Illness   HPI      Mr Hart is here for follow-up.  Over the summer he developed lower extremity and sphincter disturbance.  He was admitted to the hospital where there was concerned about the possibility of cauda equina syndrome but this possibility was excluded with imaging.  However, he was found to have a partial T10 lesion.  CSF analysis was performed which showed 2 oligoclonal bands, a protein of  60, and an elevated myelin basic protein.  He was discharged home and I saw him in the resident clinic last month.  He continued to have weakness and numbness in the right leg.  I obtained additional MRI studies because I suspected demyelinating disease.   I reviewed the films personally.  His brain showed numerous white matter lesions including lesions that were perpendicular to the lateral ventricles.  His cervical spine showed a partial cord lesion at the C2 level.  He had visual evoked response testing today but the results are not yet available.  He has a history of lumbar spine surgery and reports residual left leg weakness but he adamantly denies other episodic neurologic symptoms such as visual loss, weakness, or numbness.    Review of Systems   Constitutional:  Negative for appetite change, fatigue and fever.   HENT: Negative.  Negative for hearing loss, tinnitus, trouble swallowing and voice change.    Eyes: Negative.  Negative for photophobia, pain and visual disturbance.   Respiratory: Negative.  Negative for shortness of breath.    Cardiovascular: Negative.  Negative for palpitations.   Gastrointestinal: Negative.  Negative for nausea and vomiting.   Endocrine: Negative.  Negative for cold intolerance.   Genitourinary: Negative.  Negative for dysuria, frequency and urgency.   Musculoskeletal:  Negative for back pain, gait problem, myalgias, neck pain and neck stiffness.   Skin: Negative.  Negative for rash.   Allergic/Immunologic: Negative.    Neurological: Negative.  Negative for dizziness, tremors, seizures, syncope, facial asymmetry, speech difficulty, weakness, light-headedness, numbness and headaches.   Hematological: Negative.  Does not bruise/bleed easily.   Psychiatric/Behavioral: Negative.  Negative for confusion, hallucinations and sleep disturbance.    All other systems reviewed and are negative.  Would like to review test results  I have personally reviewed the MA's review of systems and  made changes as necessary.    Pertinent Medical History         Medical History Reviewed by provider this encounter:  Tobacco  Meds  Problems  Med Hx  Surg Hx  Fam Hx       Past Medical History   Past Medical History:   Diagnosis Date    Allergic     Anxiety     Arthritis     Asthma     Bipolar 2 disorder, major depressive episode (HCC) 10/31/2017    Bipolar disorder (HCC)     Chronic left lumbar radiculopathy     Chronic low back pain     Chronic pain syndrome     Chronic right shoulder pain     Depression with anxiety     Fatigue     GERD (gastroesophageal reflux disease)     Hydronephrosis     Iron deficiency anemia     Kidney stone     Lytic bone lesions on xray     Nephrolithiasis     PONV (postoperative nausea and vomiting)     Right elbow pain     Right lateral epicondylitis      Past Surgical History:   Procedure Laterality Date    ADENOIDECTOMY Bilateral     APPENDECTOMY      BACK SURGERY      CHOLECYSTECTOMY      CHOLECYSTECTOMY LAPAROSCOPIC N/A 10/30/2018    Procedure: CHOLECYSTECTOMY WITH GASTROTOMY LAPAROSCOPIC;  Surgeon: Oliver Madison MD;  Location: [facility];  Service: General    COLONOSCOPY      ERCP W/ SPHICTEROTOMY N/A 10/30/2018    Procedure: ENDOSCOPIC RETROGRADE CHOLANGIOPANCREATOGRAPHY (ERCP) W/ SPHINCTEROTOMY;  Surgeon: Kendrick Meeks MD;  Location: [facility];  Service: Gastroenterology    GASTRIC BYPASS      2009    IR LUMBAR PUNCTURE  9/19/2024    OTHER SURGICAL HISTORY      fusion/refusion of vertebrae, 2010 and 2011 l5-s1 and l4-l5    CO ARTHROSCOPY KNEE LATERAL RELEASE Left 01/06/2021    Procedure: RELEASE RETINACULAR;  Surgeon: Rogesr Lennon DO;  Location:  MAIN OR;  Service: Orthopedics    CO ARTHRS KNE SURG W/MENISCECTOMY MED/LAT W/SHVG Right 04/26/2022    Procedure: ARTHROSCOPIC MENISCECTOMY LATERAL;  Surgeon: Rogers Lennon DO;  Location:  MAIN OR;  Service: Orthopedics    CO ARTHRS KNEE DEBRIDEMENT/SHAVING ARTCLR CRTLG Left 01/06/2021    Procedure: ARTHROSCOPY KNEE;   Surgeon: Rogers Lennon DO;  Location: [facility];  Service: Orthopedics    FL CYSTO/URETERO W/LITHOTRIPSY &INDWELL STENT INSRT Right 08/08/2017    Procedure: CYSTOSCOPY; URETEROSCOPY; HOLMIUM LASER; RETROGRADE PYELOGRAM; STENT;  Surgeon: Rupesh Romo MD;  Location: [facility];  Service: Urology    FL INCISION & DRAINAGE ABSCESS COMPLICATED/MULTIPLE Left 01/08/2021    Procedure: INCISION AND DRAINAGE (I&D) EXTREMITY- of left knee s/p MPFL reconstruction, I&D if the left knee with possible MPFL revision;  Surgeon: Rogers Lennon DO;  Location: [facility];  Service: Orthopedics    FL INSJ/RPLCMT SPINAL NPG/RCVR POCKET CRTJ&CONNJ Right 11/14/2017    Procedure: REMOVAL LOWER MEDIAL BUTTOCK SPINAL CORD STIMULATOR GENERATOR; PLACEMENT OF NEW BUTTOCK SPINAL CORD STIMULATOR THROUGH SEPERATE INCISION;  Surgeon: Stephan Ross MD;  Location: [facility];  Service: Neurosurgery    FL INSJ/RPLCMT SPINAL NPG/RCVR POCKET CRTJ&CONNJ Right 01/12/2022    Procedure: Right sided spinal cord stimulator pulse generator replacement;  Surgeon: Michael Gamez MD;  Location: [facility];  Service: Neurosurgery    FL INSJ/RPLCMT SPINAL NPG/RCVR POCKET CRTJ&CONNJ Right 9/8/2023    Procedure: Right-sided spinal cord stimulator internal pulse generator replacement;  Surgeon: Michael Gamez MD;  Location: [facility];  Service: Neurosurgery    FL RCNSTJ DISLC PATELLA W/XTNSR RELIGNMT&/MUSC RL Left 01/06/2021    Procedure: REALIGNMENT PATELLA with chondroplasty of patella, open medial patellofemoral ligament reconstruction with allograft;  Surgeon: Rogers Lennon DO;  Location: [facility];  Service: Orthopedics    RIK-EN-Y PROCEDURE  2003    SPINAL CORD STIMULATOR IMPLANT  11/14/2017    dr ross procedure and technique 1. removal of a right back implantable pulse generator 2.placement of a new right buttock impantable pulse generator through seperate incision 3 electric analys complex programming spinal cord stimulator system  postoperative period approx 1 hour    SPINE SURGERY      TONSILLECTOMY       Family History   Problem Relation Age of Onset    Cancer Mother     Arthritis Mother     Stomach cancer Mother     Coronary artery disease Mother     Heart disease Mother     Asthma Mother     COPD Mother     Cancer Father     Esophageal cancer Father     Other Father         brain tumor    Diabetes Father     No Known Problems Sister     No Known Problems Sister     No Known Problems Sister      Current Outpatient Medications on File Prior to Visit   Medication Sig Dispense Refill    Acetaminophen (TYLENOL 8 HOUR ARTHRITIS PAIN PO) Take by mouth      albuterol (PROVENTIL HFA,VENTOLIN HFA) 90 mcg/act inhaler INHALE 2 PUFFS EVERY 4 (FOUR) HOURS AS NEEDED FOR WHEEZING OR SHORTNESS OF BREATH COUGH 18 g 0    aspirin (ECOTRIN LOW STRENGTH) 81 mg EC tablet Take 81 mg by mouth daily      Cyanocobalamin (Vitamin B-12) 1000 MCG SUBL Place 1 tablet (1,000 mcg total) under the tongue in the morning 90 tablet 1    ergocalciferol (VITAMIN D2) 50,000 units TAKE 1 CAPSULE BY MOUTH EVERY 14 DAYS 6 capsule 1    esomeprazole (NexIUM) 40 MG capsule take 1 capsule by mouth every morning 90 capsule 1    folic acid (FOLVITE) 1 mg tablet take 1 tablet by mouth once daily 30 tablet 2    HYDROcodone-acetaminophen (NORCO) 5-325 mg per tablet Take 1 tablet by mouth every 8 (eight) hours as needed for pain For ongoing therapy Max Daily Amount: 3 tablets 90 tablet 0    gabapentin (NEURONTIN) 300 mg capsule Take 1 capsule (300 mg total) by mouth 3 (three) times a day Take 1 cap at bedtime for 3 days, then 1 cap BID x 3 days, then 1 cap TID (Patient not taking: Reported on 11/13/2024) 90 capsule 2    naloxone (NARCAN) 4 mg/0.1 mL nasal spray Administer 1 spray into a nostril. If no response after 2-3 minutes, give another dose in the other nostril using a new spray. (Patient not taking: Reported on 8/8/2024) 1 each 1    oxyCODONE-acetaminophen (PERCOCET) 5-325 mg per  tablet  (Patient not taking: Reported on 11/13/2024)      varenicline (Chantix Continuing Month Pak) 1 mg tablet Take 1 tablet (1 mg total) by mouth 2 (two) times a day 60 tablet 4     No current facility-administered medications on file prior to visit.     Allergies   Allergen Reactions    Ampicillin GI Intolerance     Vomiting    Penicillins GI Intolerance     Vomit      Current Outpatient Medications on File Prior to Visit   Medication Sig Dispense Refill    Acetaminophen (TYLENOL 8 HOUR ARTHRITIS PAIN PO) Take by mouth      albuterol (PROVENTIL HFA,VENTOLIN HFA) 90 mcg/act inhaler INHALE 2 PUFFS EVERY 4 (FOUR) HOURS AS NEEDED FOR WHEEZING OR SHORTNESS OF BREATH COUGH 18 g 0    aspirin (ECOTRIN LOW STRENGTH) 81 mg EC tablet Take 81 mg by mouth daily      Cyanocobalamin (Vitamin B-12) 1000 MCG SUBL Place 1 tablet (1,000 mcg total) under the tongue in the morning 90 tablet 1    ergocalciferol (VITAMIN D2) 50,000 units TAKE 1 CAPSULE BY MOUTH EVERY 14 DAYS 6 capsule 1    esomeprazole (NexIUM) 40 MG capsule take 1 capsule by mouth every morning 90 capsule 1    folic acid (FOLVITE) 1 mg tablet take 1 tablet by mouth once daily 30 tablet 2    HYDROcodone-acetaminophen (NORCO) 5-325 mg per tablet Take 1 tablet by mouth every 8 (eight) hours as needed for pain For ongoing therapy Max Daily Amount: 3 tablets 90 tablet 0    gabapentin (NEURONTIN) 300 mg capsule Take 1 capsule (300 mg total) by mouth 3 (three) times a day Take 1 cap at bedtime for 3 days, then 1 cap BID x 3 days, then 1 cap TID (Patient not taking: Reported on 11/13/2024) 90 capsule 2    naloxone (NARCAN) 4 mg/0.1 mL nasal spray Administer 1 spray into a nostril. If no response after 2-3 minutes, give another dose in the other nostril using a new spray. (Patient not taking: Reported on 8/8/2024) 1 each 1    oxyCODONE-acetaminophen (PERCOCET) 5-325 mg per tablet  (Patient not taking: Reported on 11/13/2024)      varenicline (Chantix Continuing Month Pak) 1  mg tablet Take 1 tablet (1 mg total) by mouth 2 (two) times a day 60 tablet 4     No current facility-administered medications on file prior to visit.      Social History     Tobacco Use    Smoking status: Every Day     Current packs/day: 1.50     Average packs/day: 1.5 packs/day for 29.9 years (44.8 ttl pk-yrs)     Types: Cigarettes, Pipe, Cigars     Start date: 1995    Smokeless tobacco: Never   Vaping Use    Vaping status: Never Used   Substance and Sexual Activity    Alcohol use: Yes     Comment: rare    Drug use: Never    Sexual activity: Not Currently     Partners: Female     Objective     /88   Pulse 67   Temp 98 °F (36.7 °C)   Ht 6' (1.829 m)   Wt 95.3 kg (210 lb)   BMI 28.48 kg/m²     Physical Exam  Neurologic Exam    Well-developed, well-nourished and in no acute distress  Heart regular rate and rhythm  Speech and cognition normal  Extraocular movements intact.  Pupils equal, round, and reactive to light.  No facial asymmetry.  Hearing appeared intact.  Tongue protruded in midline.  Reflexes 2+ and symmetrical.  No Denton's or Babinski signs today.  Finger-to-nose and heel-to-shin testing were normal  4/5 strength in the right leg; 5/5 strength elsewhere    Results/Data:  I have reviewed the results and images from the MRI studies in detail with the patient.    I personally reviewed the following image studies in PACS and associated radiology reports: MRI brain and MRI spine. My interpretation of the radiology images/reports is: as above.

## 2024-11-13 NOTE — ASSESSMENT & PLAN NOTE
Mr Hart presented with symptoms related to a partial thoracic cord lesion.  CSF analysis showed 2 oligoclonal bands.  Subsequent MRI studies showed a partial C2 lesion and multiple intracranial white matter lesions, some of which were perpendicular to the lateral ventricles.  Visual evoked responses have been performed and the results are pending.  I believe he has multiple sclerosis.  He does not have longitudinally extensive lesions within the spinal cord that would suggest neuromyelitis optica.  He has an appointment with Dr. More next month; Dr. More is a subspecialist in this area.  He does not have a neuromuscular disease based on his clinical presentation, his MRI, and his EMG (which showed only chronic lumbar radicular features).  I will defer to Dr. More whether any further testing is needed but I think this is unlikely.  I assured Mr. Charles that he does not have a brain tumor which was his concern based on his family history.  I told him that I suspect his condition is quite treatable and he will keep his upcoming appointment to discuss whether any other testing is necessary before initiating treatment.  All of his questions were answered and he is in agreement with this plan.

## 2024-11-19 ENCOUNTER — OFFICE VISIT (OUTPATIENT)
Dept: PAIN MEDICINE | Facility: CLINIC | Age: 49
End: 2024-11-19
Payer: COMMERCIAL

## 2024-11-19 VITALS
HEART RATE: 74 BPM | WEIGHT: 213 LBS | BODY MASS INDEX: 28.85 KG/M2 | TEMPERATURE: 98 F | SYSTOLIC BLOOD PRESSURE: 128 MMHG | DIASTOLIC BLOOD PRESSURE: 80 MMHG | HEIGHT: 72 IN

## 2024-11-19 DIAGNOSIS — G37.9 DEMYELINATING DISEASE OF CENTRAL NERVOUS SYSTEM (HCC): ICD-10-CM

## 2024-11-19 DIAGNOSIS — Z79.891 LONG-TERM CURRENT USE OF OPIATE ANALGESIC: ICD-10-CM

## 2024-11-19 DIAGNOSIS — M54.9 MID BACK PAIN: ICD-10-CM

## 2024-11-19 DIAGNOSIS — M96.1 LUMBAR POST-LAMINECTOMY SYNDROME: ICD-10-CM

## 2024-11-19 DIAGNOSIS — G89.4 CHRONIC PAIN SYNDROME: ICD-10-CM

## 2024-11-19 DIAGNOSIS — M79.2 NEURALGIA AND NEURITIS: ICD-10-CM

## 2024-11-19 DIAGNOSIS — M54.16 LUMBAR RADICULOPATHY: Primary | ICD-10-CM

## 2024-11-19 DIAGNOSIS — F11.20 UNCOMPLICATED OPIOID DEPENDENCE (HCC): ICD-10-CM

## 2024-11-19 PROCEDURE — 99214 OFFICE O/P EST MOD 30 MIN: CPT | Performed by: PHYSICIAN ASSISTANT

## 2024-11-19 RX ORDER — HYDROCODONE BITARTRATE AND ACETAMINOPHEN 5; 325 MG/1; MG/1
1 TABLET ORAL EVERY 8 HOURS PRN
Qty: 90 TABLET | Refills: 0 | Status: SHIPPED | OUTPATIENT
Start: 2024-11-19

## 2024-11-19 RX ORDER — GABAPENTIN 300 MG/1
CAPSULE ORAL
Qty: 90 CAPSULE | Refills: 2 | Status: SHIPPED | OUTPATIENT
Start: 2024-11-19

## 2024-11-19 NOTE — PROGRESS NOTES
Assessment:  1. Lumbar radiculopathy    2. Lumbar post-laminectomy syndrome    3. Mid back pain    4. Neuralgia and neuritis    5. Demyelinating disease of central nervous system (HCC)    6. Chronic pain syndrome    7. Uncomplicated opioid dependence (HCC)    8. Long-term current use of opiate analgesic        Plan:  While the patient was in the office today, I did have a thorough conversation regarding their chronic pain syndrome, medication management, and treatment plan options.    The patient remains clinically stable and moderately controlled on the current medications of Norco 5/325 every 8 hours as needed.  I feel it is reasonable to continue as it is alleviating 70% of his pain complaints.  On today's visit I have electronically sent refills for the next 3 months with the appropriate fill dates.    We will add on gabapentin titrating gradually up to 300 mg 3 times daily if tolerated.  Patient was educated on medications most common side effects which include dizziness, drowsiness, and swelling of the hands and feet.  Patient was instructed to call the office with any adverse medication side effects. The patient is also aware that if they would like to come off of the medication, they need to let us know as suddenly stopping the medication puts them at risk to have a seizure.    Patient will follow-up with neurology as scheduled in regards to his recent diagnosis of MS.    [place] Prescription Drug Monitoring Program report was reviewed and was appropriate     There are risks associated with opioid medications, including dependence, addiction and tolerance. The patient understands and agrees to use these medications only as prescribed. Potential side effects of the medications include, but are not limited to, constipation, drowsiness, addiction, impaired judgment and risk of fatal overdose if not taken as prescribed. The patient was warned against driving while taking sedation medications.  Sharing  medications is a felony. At this point in time, the patient is showing no signs of addiction, abuse, diversion or suicidal ideation.    The patient will follow-up in 12 weeks for medication prescription refill and reevaluation. The patient was advised to contact the office should their symptoms worsen in the interim. The patient was agreeable and verbalized an understanding.        History of Present Illness:    The patient is a 49 y.o. male last seen on 8/27/2024 who presents for a follow up office visit in regards to chronic low back pain secondary to lumbar spondylosis, lumbar postlaminectomy pain syndrome as well as midthoracic pain.  The patient currently reports mid back pain and low back pain that he rates a 7 out of 10.  Patient describes the pain as a constant burning, sharp, throbbing and shooting pain with intermittent radiation to the anterior thighs.  He is reporting progressive numbness, paresthesias and weakness of both legs.  Patient denies bowel or bladder incontinence, denies saddle anesthesia.  Since we last saw the patient he was evaluated by neurology and found to have demyelinating disease.  He is scheduled to see an MS specialist/neurologist on 12/12/2024 for further discussion and evaluation in regards to treatment.    Current pain medications includes: Norco 5/325 every 8 hours as needed .  The patient reports that this regimen is providing 70% pain relief.  The patient is reporting no side effects from this pain medication regimen.    Pain Contract Signed: 1/16/2024  Last Urine Drug Screen: 5/7/2024    I have personally reviewed and/or updated the patient's past medical history, past surgical history, family history, social history, current medications, allergies, and vital signs today.       Review of Systems:    Review of Systems   Respiratory:  Negative for shortness of breath.    Cardiovascular:  Negative for chest pain.   Gastrointestinal:  Negative for constipation, diarrhea, nausea  and vomiting.   Musculoskeletal:  Positive for gait problem. Negative for arthralgias, joint swelling (Joint stiffness) and myalgias.   Skin:  Negative for rash.   Neurological:  Positive for dizziness and weakness. Negative for seizures.   All other systems reviewed and are negative.        Past Medical History:   Diagnosis Date    Allergic     Anxiety     Arthritis     Asthma     Bipolar 2 disorder, major depressive episode (HCC) 10/31/2017    Bipolar disorder (HCC)     Chronic left lumbar radiculopathy     Chronic low back pain     Chronic pain syndrome     Chronic right shoulder pain     Depression with anxiety     Fatigue     GERD (gastroesophageal reflux disease)     Hydronephrosis     Iron deficiency anemia     Kidney stone     Lytic bone lesions on xray     Nephrolithiasis     PONV (postoperative nausea and vomiting)     Right elbow pain     Right lateral epicondylitis        Past Surgical History:   Procedure Laterality Date    ADENOIDECTOMY Bilateral     APPENDECTOMY      BACK SURGERY      CHOLECYSTECTOMY      CHOLECYSTECTOMY LAPAROSCOPIC N/A 10/30/2018    Procedure: CHOLECYSTECTOMY WITH GASTROTOMY LAPAROSCOPIC;  Surgeon: Oliver Madison MD;  [facility];  Service: General    COLONOSCOPY      ERCP W/ SPHICTEROTOMY N/A 10/30/2018    Procedure: ENDOSCOPIC RETROGRADE CHOLANGIOPANCREATOGRAPHY (ERCP) W/ SPHINCTEROTOMY;  Surgeon: Kendrick Meeks MD;  Location: [facility];  Service: Gastroenterology    GASTRIC BYPASS      2009    IR LUMBAR PUNCTURE  9/19/2024    OTHER SURGICAL HISTORY      fusion/refusion of vertebrae, 2010 and 2011 l5-s1 and l4-l5    LA ARTHROSCOPY KNEE LATERAL RELEASE Left 01/06/2021    Procedure: RELEASE RETINACULAR;  Surgeon: Rogers Lennon DO;  Location:  MAIN OR;  Service: Orthopedics    LA ARTHRS KNE SURG W/MENISCECTOMY MED/LAT W/SHVG Right 04/26/2022    Procedure: ARTHROSCOPIC MENISCECTOMY LATERAL;  Surgeon: Rogers Lennon DO;  Location:  MAIN OR;  Service: Orthopedics    LA  ARTHRS KNEE DEBRIDEMENT/SHAVING ARTCLR CRTLG Left 01/06/2021    Procedure: ARTHROSCOPY KNEE;  Surgeon: Rogers Lennon DO;  Location: [facility];  Service: Orthopedics    VA CYSTO/URETERO W/LITHOTRIPSY &INDWELL STENT INSRT Right 08/08/2017    Procedure: CYSTOSCOPY; URETEROSCOPY; HOLMIUM LASER; RETROGRADE PYELOGRAM; STENT;  Surgeon: Rupesh Romo MD;  Location[facility];  Service: Urology    VA INCISION & DRAINAGE ABSCESS COMPLICATED/MULTIPLE Left 01/08/2021    Procedure: INCISION AND DRAINAGE (I&D) EXTREMITY- of left knee s/p MPFL reconstruction, I&D if the left knee with possible MPFL revision;  Surgeon: Rogers Lennon DO;  Location: [facility];  Service: Orthopedics    VA INSJ/RPLCMT SPINAL NPG/RCVR POCKET CRTJ&CONNJ Right 11/14/2017    Procedure: REMOVAL LOWER MEDIAL BUTTOCK SPINAL CORD STIMULATOR GENERATOR; PLACEMENT OF NEW BUTTOCK SPINAL CORD STIMULATOR THROUGH SEPERATE INCISION;  Surgeon: Stephan Ross MD;  Location: [facility];  Service: Neurosurgery    VA INSJ/RPLCMT SPINAL NPG/RCVR POCKET CRTJ&CONNJ Right 01/12/2022    Procedure: Right sided spinal cord stimulator pulse generator replacement;  Surgeon: Michael Gamez MD;  [facility];  Service: Neurosurgery    VA INSJ/RPLCMT SPINAL NPG/RCVR POCKET CRTJ&CONNJ Right 9/8/2023    Procedure: Right-sided spinal cord stimulator internal pulse generator replacement;  Surgeon: Michael Gamez MD;  [facility];  Service: Neurosurgery    VA RCNSTJ DISLC PATELLA W/XTNSR RELIGNMT&/MUSC RL Left 01/06/2021    Procedure: REALIGNMENT PATELLA with chondroplasty of patella, open medial patellofemoral ligament reconstruction with allograft;  Surgeon: Rogers Lennon DO;  Location: [facility];  Service: Orthopedics    RIK-EN-Y PROCEDURE  2003    SPINAL CORD STIMULATOR IMPLANT  11/14/2017    dr ross procedure and technique 1. removal of a right back implantable pulse generator 2.placement of a new right buttock impantable pulse generator  through seperate incision 3 electric analys complex programming spinal cord stimulator system postoperative period approx 1 hour    SPINE SURGERY      TONSILLECTOMY         Family History   Problem Relation Age of Onset    Cancer Mother     Arthritis Mother     Stomach cancer Mother     Coronary artery disease Mother     Heart disease Mother     Asthma Mother     COPD Mother     Cancer Father     Esophageal cancer Father     Other Father         brain tumor    Diabetes Father     No Known Problems Sister     No Known Problems Sister     No Known Problems Sister        Social History     Occupational History    Not on file   Tobacco Use    Smoking status: Every Day     Current packs/day: 1.50     Average packs/day: 1.5 packs/day for 29.9 years (44.8 ttl pk-yrs)     Types: Cigarettes, Pipe, Cigars     Start date: 1995    Smokeless tobacco: Never   Vaping Use    Vaping status: Never Used   Substance and Sexual Activity    Alcohol use: Yes     Comment: rare    Drug use: Never    Sexual activity: Not Currently     Partners: Female         Current Outpatient Medications:     Acetaminophen (TYLENOL 8 HOUR ARTHRITIS PAIN PO), Take by mouth, Disp: , Rfl:     albuterol (PROVENTIL HFA,VENTOLIN HFA) 90 mcg/act inhaler, INHALE 2 PUFFS EVERY 4 (FOUR) HOURS AS NEEDED FOR WHEEZING OR SHORTNESS OF BREATH COUGH, Disp: 18 g, Rfl: 0    aspirin (ECOTRIN LOW STRENGTH) 81 mg EC tablet, Take 81 mg by mouth daily, Disp: , Rfl:     Cyanocobalamin (Vitamin B-12) 1000 MCG SUBL, Place 1 tablet (1,000 mcg total) under the tongue in the morning, Disp: 90 tablet, Rfl: 1    ergocalciferol (VITAMIN D2) 50,000 units, TAKE 1 CAPSULE BY MOUTH EVERY 14 DAYS, Disp: 6 capsule, Rfl: 1    esomeprazole (NexIUM) 40 MG capsule, take 1 capsule by mouth every morning, Disp: 90 capsule, Rfl: 1    folic acid (FOLVITE) 1 mg tablet, take 1 tablet by mouth once daily, Disp: 30 tablet, Rfl: 2    gabapentin (NEURONTIN) 300 mg capsule, Take 1 at hs x 5 days, then 1 BID  x 5 days, then 1 TID, Disp: 90 capsule, Rfl: 2    HYDROcodone-acetaminophen (NORCO) 5-325 mg per tablet, Take 1 tablet by mouth every 8 (eight) hours as needed for pain For ongoing therapy DO NOT FILL BEFORE: 12/09/24 Max Daily Amount: 3 tablets, Disp: 90 tablet, Rfl: 0    HYDROcodone-acetaminophen (NORCO) 5-325 mg per tablet, Take 1 tablet by mouth every 8 (eight) hours as needed for pain For ongoing therapy DO NOT FILL BEFORE: 01/07/25 Max Daily Amount: 3 tablets, Disp: 90 tablet, Rfl: 0    HYDROcodone-acetaminophen (NORCO) 5-325 mg per tablet, Take 1 tablet by mouth every 8 (eight) hours as needed for pain For ongoing therapy DO NOT FILL BEFORE: 02/05/25 Max Daily Amount: 3 tablets, Disp: 90 tablet, Rfl: 0    naloxone (NARCAN) 4 mg/0.1 mL nasal spray, Administer 1 spray into a nostril. If no response after 2-3 minutes, give another dose in the other nostril using a new spray. (Patient not taking: Reported on 8/8/2024), Disp: 1 each, Rfl: 1    varenicline (Chantix Continuing Month Derrick) 1 mg tablet, Take 1 tablet (1 mg total) by mouth 2 (two) times a day, Disp: 60 tablet, Rfl: 4    Allergies   Allergen Reactions    Ampicillin GI Intolerance     Vomiting    Penicillins GI Intolerance     Vomit       Physical Exam:    /80 (BP Location: Left arm, Patient Position: Sitting, Cuff Size: Large)   Pulse 74   Temp 98 °F (36.7 °C)   Ht 6' (1.829 m)   Wt 96.6 kg (213 lb)   BMI 28.89 kg/m²     Constitutional:normal, well developed, well nourished, alert, in no distress and non-toxic and no overt pain behavior.  Eyes:anicteric  HEENT:grossly intact  Neck:supple, symmetric, trachea midline and no masses   Pulmonary:even and unlabored  Cardiovascular:No edema or pitting edema present  Skin:Normal without rashes or lesions and well hydrated  Psychiatric:Mood and affect appropriate  Neurologic:Cranial Nerves II-XII grossly intact  Musculoskeletal: Gait is slow but stable without the use of assistive  devices      Imaging  No orders to display         No orders of the defined types were placed in this encounter.

## 2024-12-05 NOTE — ED ATTENDING ATTESTATION
7/30/2024  I, Mendoza Carvajal MD, saw and evaluated the patient. I have discussed the patient with the resident/non-physician practitioner and agree with the resident's/non-physician practitioner's findings, Plan of Care, and MDM as documented in the resident's/non-physician practitioner's note, except where noted. All available labs and Radiology studies were reviewed.  I was present for key portions of any procedure(s) performed by the resident/non-physician practitioner and I was immediately available to provide assistance.       At this point I agree with the current assessment done in the Emergency Department.  I have conducted an independent evaluation of this patient a history and physical is as follows:    48-year-old man with history of lumbar back surgery presenting with new saddle anesthesia starting today as well as some new left lower extremity weakness.  Has some numbness and tingling at baseline.  Not currently having any low back pain.  No incontinence of bowel or bladder.  No urinary retention.  No fever.  No history of malignancy.  On exam patient awake and alert no acute distress.  There is decree strength of the left lower extremity.  There is decreased sensation of the left lower extremity.  There is decreased sensation on  exam.  Normal rectal tone.  Patient has 2+ bilateral patellar DTRs.  He is able to ambulate.  There is still concern for possibility of cauda equina syndrome given the saddle anesthesia and the left lower extremity weakness.  Emergent MRI was ordered.  Patient tells me that while he does have a spinal stimulator, it is MRI compatible and that he is able to set it to MRI mode.  He tells me that he has had prior MRI with the device.  Radiology communicated with us that the patient cannot have MRI tonight until they can speak with the device rep in the morning.  We discussed with radiologist, Dr. Silva, that we are concerned for emergent pathology and the MRI order, and  Provider: ALEM MARTINEZ    Caller: Kelly Stevens    Relationship to Patient: Self    Pharmacy: [facility] [id number] - [facility] [address] [place] [address] RD AT [facility]. - [phone]  - [phone] FX     Phone Number: [phone] (home)   Reason for Call: PT NEEDS MEDICATION FOR MRI DUE TO CLAUSTROPHOBIA. HER MRI IS SCHEDULED FOR TOMORROW 12/6/24 @ 1:45    PLEASE ADVISE  THANK YOU    that we would be concerned with waiting until the morning.  Unfortunately per discussion with him there is a protocol in place that the patient still cannot get MRI until cleared by device rep which would not be until at least tomorrow morning.  We then broached the subject of CT myelogram but he states that this cannot be done tonight as well because IR is not available.  We did clearly communicate that we are concerned for emergent surgical pathology with imaging needing to be performed right away.  However, evidently per protocols and IR availability there is nothing that can be done.  We will now consult neurosurgery emergently.  Patient will be admitted.    ED Course         Critical Care Time  Procedures

## 2024-12-10 ENCOUNTER — TELEMEDICINE (OUTPATIENT)
Dept: FAMILY MEDICINE CLINIC | Facility: CLINIC | Age: 49
End: 2024-12-10
Payer: COMMERCIAL

## 2024-12-10 VITALS — BODY MASS INDEX: 28.85 KG/M2 | HEIGHT: 72 IN | WEIGHT: 213 LBS | TEMPERATURE: 101.4 F

## 2024-12-10 DIAGNOSIS — J32.9 SINUSITIS, UNSPECIFIED CHRONICITY, UNSPECIFIED LOCATION: Primary | ICD-10-CM

## 2024-12-10 PROCEDURE — 99213 OFFICE O/P EST LOW 20 MIN: CPT | Performed by: NURSE PRACTITIONER

## 2024-12-10 RX ORDER — DOXYCYCLINE 100 MG/1
100 CAPSULE ORAL EVERY 12 HOURS SCHEDULED
Qty: 14 CAPSULE | Refills: 0 | Status: SHIPPED | OUTPATIENT
Start: 2024-12-10 | End: 2024-12-17

## 2024-12-12 ENCOUNTER — TELEPHONE (OUTPATIENT)
Dept: NEUROLOGY | Facility: CLINIC | Age: 49
End: 2024-12-12

## 2024-12-12 ENCOUNTER — OFFICE VISIT (OUTPATIENT)
Dept: NEUROLOGY | Facility: CLINIC | Age: 49
End: 2024-12-12
Payer: COMMERCIAL

## 2024-12-12 VITALS
HEART RATE: 78 BPM | BODY MASS INDEX: 28.99 KG/M2 | HEIGHT: 72 IN | DIASTOLIC BLOOD PRESSURE: 80 MMHG | SYSTOLIC BLOOD PRESSURE: 130 MMHG | WEIGHT: 214 LBS | OXYGEN SATURATION: 98 %

## 2024-12-12 DIAGNOSIS — G35 MULTIPLE SCLEROSIS (HCC): Primary | ICD-10-CM

## 2024-12-12 DIAGNOSIS — R55 SYNCOPAL EPISODES: ICD-10-CM

## 2024-12-12 PROCEDURE — 99215 OFFICE O/P EST HI 40 MIN: CPT | Performed by: PSYCHIATRY & NEUROLOGY

## 2024-12-12 NOTE — Clinical Note
Good morning Mandy, This patient would be a new Ocrevus start for his RRMS. (Problem list updated). Labs ordered.   Thank you,   Dr. Derik MD.  12/12/24  11:43 AM

## 2024-12-12 NOTE — PATIENT INSTRUCTIONS
"Mediterranean diet -- There is no single definition of a Mediterranean diet, but such diets are typically high in fruits, vegetables, whole grains, beans, nuts, and seeds; include olive oil as an important source of monounsaturated fat; and allow low to moderate wine consumption. There are typically low to moderate amounts of fish, poultry, and dairy products, with little red meat. The Mediterranean diet is associated with several health benefits; however, it remains uncertain which components of the Mediterranean diet offer the protective benefit or if the benefits result from an aggregation of effects.    The Mediterranean diet incorporates the basics of healthy eating -- plus a splash of flavorful olive oil and perhaps a glass of red wine -- among other components characterizing the traditional cooking style of countries bordering the [place].  Most healthy diets include fruits, vegetables, fish and whole grains, and limit unhealthy fats. While these parts of a healthy diet are tried-and-true, subtle variations or differences in proportions of certain foods may make a difference in your risk of heart disease.     Benefits of the Mediterranean diet  Research has shown that the traditional Mediterranean diet reduces the risk of heart disease. The diet has been associated with a lower level of oxidized low-density lipoprotein (LDL) cholesterol -- the \"bad\" cholesterol that's more likely to build up deposits in your arteries.  In fact, a meta-analysis of more than 1.5 million healthy adults demonstrated that following a Mediterranean diet was associated with a reduced risk of cardiovascular mortality as well as overall mortality.  The Mediterranean diet is also associated with a reduced incidence of cancer, and Parkinson's and Alzheimer's diseases. Women who eat a Mediterranean diet supplemented with extra-virgin olive oil and mixed nuts may have a reduced risk of breast cancer.  For these reasons, most if " not all major scientific organizations encourage healthy adults to adapt a style of eating like that of the Mediterranean diet for prevention of major chronic diseases.     Key components of the Mediterranean diet  The Mediterranean diet emphasizes:  Eating primarily plant-based foods, such as fruits and vegetables, whole grains, legumes and nuts   Replacing butter with healthy fats such as olive oil and canola oil   Using herbs and spices instead of salt to flavor foods   Limiting red meat to no more than a few times a month   Eating fish and poultry at least twice a week   Enjoying meals with family and friends   Drinking red wine in moderation (optional)   Getting plenty of exercise      If you're looking for a heart-healthy eating plan, the Mediterranean diet might be right for you. The Mediterranean diet incorporates the basics of healthy eating -- plus a splash of flavorful olive oil and perhaps even a glass of red wine -- among other components characterizing the traditional cooking style of countries bordering the [place].  Most healthy diets include fruits, vegetables, fish and whole grains, and limit unhealthy fats. While these parts of a healthy diet remain tried-and-true, subtle variations or differences in proportions of certain foods may make a difference in your risk of heart disease.      Research has shown that the traditional Mediterranean diet reduces the risk of heart disease. In fact, an analysis of more than 1.5 million healthy adults demonstrated that following a Mediterranean diet was associated with a reduced risk of death from heart disease and cancer, as well as a reduced incidence of Parkinson's and Alzheimer's diseases.      The Dietary Guidelines for Americans recommends the Mediterranean diet as an eating plan that can help promote health and prevent disease. And the Mediterranean diet is one your whole family can follow for good health.   The Mediterranean diet  "emphasizes:  Eating primarily plant-based foods, such as fruits and vegetables, whole grains,  legumes and nuts  Replacing butter with healthy fats, such as olive oil  Using herbs and spices instead of salt to flavor foods  Limiting red meat to no more than a few times a month  Eating fish and poultry at least twice a week  Drinking red wine in moderation (optional)     The diet also recognizes the importance of being physically active, and enjoying meals with family and friends.     The Mediterranean diet traditionally includes fruits, vegetables and grains. For example, residents of [facility] average six or more servings a day of antioxidant rich fruits and vegetables.     Grains in the Mediterranean region are typically whole grain and usually contain very few unhealthy trans fats, and bread is an important part of the diet. However, throughout the Mediterranean region, bread is eaten plain or dipped in olive oil -- not eaten with butter or margarine, which contains saturated or trans fats.      Nuts are another part of a healthy Mediterranean diet. Nuts are high in fat, but most of the fat is healthy. Because nuts are high in calories, they should not be eaten in large amounts -- generally no more than a handful a day. For the best nutrition, avoid candied or honey-roasted and heavily salted nuts.  The focus of the Mediterranean diet isn't on limiting total fat consumption, but rather on choosing healthier types of fat. The Mediterranean diet discourages saturated fats and hydrogenated oils (trans fats), both of which contribute to heart disease.  The Mediterranean diet features olive oil as the primary source of fat. Olive oil is mainly monounsaturated fat -- a type of fat that can help reduce low-density lipoprotein (LDL) cholesterol levels when used in place of saturated or trans fats. \"Extra-virgin\" and \"virgin\" olive oils (the least processed forms) also contain the highest levels of protective plant compounds " that provide antioxidant effects.  Canola oil and some nuts contain the beneficial linolenic acid (a type of omega-3 fatty acid) in addition to healthy unsaturated fat. Omega-3 fatty acids lower triglycerides, decrease blood clotting, and are associated with decreased incidence of sudden heart attacks, improve the health of your blood vessels, and help moderate blood pressure.      Fatty fish -- such as mackerel, lake trout, herring, sardines, albacore tuna and salmon -- are rich sources of omega-3 fatty acids. Fish is eaten on a regular basis in the Mediterranean diet.     The health effects of alcohol have been debated for many years, and some doctors are reluctant to encourage alcohol consumption because of the health consequences of excessive drinking. However, alcohol -- in moderation -- has been associated with a reduced risk of heart disease in some research studies.  The Mediterranean diet typically includes a moderate amount of wine, usually red wine. This means no more than 5 ounces (148 milliliters) of wine daily for women of all ages and men older than age 65 and no more than 10 ounces (296 milliliters) of wine daily for younger men. More than this may increase the risk of health problems, including increased risk of certain types of cancer.   If you're unable to limit your alcohol intake to the amounts defined above, if you have a personal or family history of alcohol abuse, or if you have heart or liver disease, refrain from drinking wine or any other alcohol.     The Mediterranean diet is a delicious and healthy way to eat. Many people who switch to this style of eating say they'll never eat any other way. Here are some specific steps to get you started:   Eat your veggies and fruits -- and switch to whole grains. [place] of plant foods should make up the majority of your meals. They should be minimally processed -- fresh and whole are best. Include veggies and fruits in every meal and eat them for  snacks as well. Switch to whole-grain bread and cereal, and begin to eat more whole-grain rice and pasta products. Keep baby carrots, apples and bananas on hand for quick, satisfying snacks. Fruit salads are a wonderful way to eat a variety of healthy fruit.  Go nuts. Nuts and seeds are good sources of fiber, protein and healthy fats. Keep almonds, cashews, pistachios and walnuts on hand for a quick snack. Choose natural peanut butter, rather than the kind with hydrogenated fat added. Try blended sesame seeds (tahini) as a dip or spread for bread.  Pass on the butter. Try olive or canola oil as a healthy replacement for butter or margarine. Lightly drizzle it over vegetables. After cooking pasta, add a touch of olive oil, some garlic and green onions for flavoring. Dip bread in flavored olive oil or lightly spread it on whole-grain bread for a tasty alternative to butter. Try tahini as a dip or spread for bread too.  Spice it up. Herbs and spices make food tasty and can  for salt and fat in recipes.  Go fish. Eat fish at least twice a week. Fresh or water-packed tuna, salmon, trout, mackerel and herring are healthy choices. [place], bake or broil fish for great taste and easy cleanup. Avoid breaded and fried fish.  Rein in the red meat. Limit red meat to no more than a few times a month. Substitute fish and poultry for red meat. When choosing red meat, make sure it's lean and keep portions small (about the size of a deck of cards). Also avoid sausage, tolbert and other high-fat, processed meats.  Choose low-fat dairy. Limit higher fat dairy products, such as whole or 2 percent milk, cheese and ice cream. Switch to skim milk, fat-free yogurt and low-fat cheese.   Many patients ask us about exercise and MS. Some common questions include: “should I exercise?”, “how often, for how long, how strenuous?”, and “what should I do?”. The answers are different for everybody however for the most part, exercise is good for  people with MS.    A 2010 study in [place] showed that fatigue, mood and quality of life improved after 12 weeks of progressive resistance training. This benefit was maintained for an additional 12 weeks after the end of their training. [1]    Another study at [facility] in 2004 randomized MS patients into 3 groups: one that did Eileen yoga, one that worked out on a stationary bike, or a control group. The yoga group showed a significant improvement in fatigue compared to controls. [2]    Everybody is different so the type of exercise you do, how often you do it and how intense it is should be tailored to you and based on your abilities. Often times we make referrals to physical therapy where an individualized program can be developed to focus on each person's needs such as walking, managing spasticity, or fatigue. It's important to stick with a program as often the results are not felt until a few weeks into the program.     Many MS patients have worsening of their symptoms during the summer or when it's hot outside. This intolerance to heat is called Uhthoff's Phenomena. For these patients it's recommended to exercise in the morning or in the evening when the sun is not at its peak, or inside in an air conditioned environment. Aquatic exercise such as water aerobics or swimming are beneficial in patients with MS. If exercising in the heat is the only option, there are special clothes designed to keep the body cool including cooling neck wraps and vests. If all of these measures still don't work and symptoms are worse during exercise, it's recommended to discuss this with your neurologist.   Some patients say they are too tired to work out. Fatigue is an extremely common symptom in MS with up to 80% of patients experiencing it. A number of studies have shown that regular exercise, usually with some aerobic component, helps with MS-related fatigue. [3] If you feel too tired to work out you  "could try a restorative or yin yoga class. Yin classes tend to rebuild your energy levels and calm the nervous system. These two types of classes involve mostly stretching and relaxing. If you are new to yoga, it might be best to take a few private classes or go to a local yoga studio. However there are also some great resources online including yogaglo.com and yogavibes.com which both offer a 15-day free trial period to watch yoga videos at home. On these sites you can choose a beginner level 1 class or the style and teacher of your choice. If yoga is not your thing, even going for a short walk every day can improve your energy levels and overall health.     Next time you visit with your neurologist, be sure to discuss exercise and how you can incorporate it into your life.     References:  1. CRUZ Guy, et al. \"Fatigue, mood and quality of life improve in MS patients after progressive resistance training.\" Multiple sclerosis (2010).  2. ELLIE Zuniga, et al. \"Randomized controlled trial of yoga and exercise in multiple sclerosis.\" Neurology 62.11 (2004): 2660-4887.  3. [email or web address]-fatigue-in-multiple-sclerosis.aspx]     Vit D 125 mcg (5000 international units) every day.   Vitamin B 12 1000 mcg every day.   Vitamin B 1 100 mg every day.   Biotin 10,000 mcg every day.   Alpha lipoic acid 600 mg every day  Fish oil/Omega 3-one capsule every day.   Coq-10 100 mg every day.         If you are not able to take all of these Vitamins and supplements daily, you can try to take the first 4 daily.        These symptoms are NOT suggestive for relapses (exaecerbations or flare-ups): headache, stomach pain, nausea, vomiting, sore throat, joint pain, muscle pain, rash, cough, back pain, flu like symptoms.      These symptoms can be MS relapses. Please note they are all neurological symptoms:    - Numbness and tingling affecting a part " of the body and last continuously for days or weeks (on and off symptoms are not suggestive for MS relapses).       - Muscle weakness in arms (more so in legs) that lasts continuously for days or weeks. Short-lasting symptoms and on and off symptoms are not suggestive for an MS relapse.     - Face numbness and weakness that last continuously for days or weeks. Short-lasting, intermittent symptoms or face or eye twitching are not MS relapses.       - Spinning sensation (vertigo), that lasts continuously for days or weeks. Lightheadedness, positional dizziness and short-lasting dizziness are not MS symptoms.     - Imbalance while walking or incoordination in hands and arms that lasts continuously for days or weeks.    - Major, new onset loss of vision (or major blurry vision) in ONE eye (not both eyes), that happen relatively suddenly and worsen over few days and is usually associated with eye pain (with eye movement) can be an MS relapse. Short-lasting change in vision or very long change in vision that can be corrected with glasses ARE NOT MS symptoms.     - Double vision while both eyes are open, which goes away with closing one of the eyes and last days or weeks; can be a symptoms of MS relapse. Momentary double vision, or double vision which still is present with closing either eye; IS NOT an MS symptom.

## 2024-12-12 NOTE — ASSESSMENT & PLAN NOTE
Patient meets criteria for MS given his enhancing and non-enhancing lesions across time and space and clinical symptoms. His has extensive disease burden with correlation in clinical symptoms now being unable to work. After discussion with patient and his wife, we decided to pursue treatment with Ocrevus for best efficacy. The risks and side effects were discussed with patient. We also talked about diet, exercise, and supplements he can take for his symptoms.    IMAGING:   MRI lumbar spine 7/31/2024: Posterior spinal instrumented fusion with decompressive laminectomy from L4-S1.  Disc bulge with some facet arthropathy and mild foraminal narrowing at L3-4.  No stenosis noted otherwise.  MRI thoracic spine 9/16/24:  Stable T2 cord signal abnormality involving dorsal cord extending from T10 inferior vertebral level through T10-11 disc level. There is associated marginal enhancement.   MRI cervical spine 11/6/24: Focal hyperintense T2/STIR lesion within the left anterior hemicord at the C2-3 level suspicious for demyelinating disease. There is no pathologic cord enhancement to suggest acute demyelination. Mild degenerative changes at the C4-5 through the C6-7 levels with mild central canal narrowing at C5-6.  MRI brain 11/6/24:  Scattered white matter lesions in the supratentorial brain as well as in the upper cervical cord having imaging features of demyelinating disease such as multiple sclerosis. Further clinical assessment and follow-up advised. No pathologic enhancement to definitively confirm active demyelination.  Visual fields testing: The amplitudes were low. This is probably secondary to poor visual acuity and nystagmus resulting in difficulty with fixation on the stimulus screen despite his visual Acuity 2. There is prolongation of the N75 and P100 responses on the left. This can represent both nonspecific demyelinating and axonal loss anterior to the optic chiasm. The differential can include a diffuse  ischemic lesion, demyelinating and/or a compressive lesion of the anterior visual pathways on the left. 3. There is a side-to-side difference in the P100 responses left greater than right suggestive of an abnormality in the left anterior visual pathway    LABS:   CSF fluid:   Two (2) oligoclonal bands were observed in the CSF, which were not detected in the serum sample.   [place] undetected  VDRL non reactive  Myelin basic protein elevated, 15.2  IgG within normal range  Glucose normal, protein elevated at 60  Lyme IgG positive, IgM negative    Plan:  Labs ordered prior to starting Ocrevus treatment. Patient will be connected with one of our nurses as well for scheduling and questions  Discussed healthy lifestyle with patient and his wife. They want to first modify their sleep routine prior to starting pharmacotherapy     Orders:    CBC and differential; Future    Comprehensive metabolic panel; Future    Hepatitis B surface antigen; Future    Hepatitis B surface antibody; Future    Hepatitis C antibody; Future    Quantiferon TB Gold Plus Assay; Future    STRATIFY JCV(TM) AB W/RFX TO INHIBITION ASSAY; Future    HIV 1/2 AG/AB w Reflex [facility] for 2 yr old and above; Future

## 2024-12-12 NOTE — TELEPHONE ENCOUNTER
----- Message from Amanda More MD sent at 12/12/2024 11:44 AM EST -----  Regarding: SW help  Good morning,     I saw Mr. Hart for new diagnosis of MS. His wife told me that she had recently lost her job, although the patient has insurance and disability, they might need some guidance for getting some help for their finances.     Thank you so much,       Dr. Derik MD.   12/12/24   11:45 AM

## 2024-12-12 NOTE — TELEPHONE ENCOUNTER
----- Message from Amanda More MD sent at 12/12/2024 11:43 AM EST -----  Good morning Mandy, This patient would be a new Ocrevus start for his RRMS. (Problem list updated). Labs ordered.     Thank you,     Dr. Derik MD.   12/12/24   11:43 AM

## 2024-12-12 NOTE — PROGRESS NOTES
Name: Anderson Hart      : 1975      MRN: [id number]  Encounter Provider: Amanda Nolasco MD  Encounter Date: 2024   Encounter department: [facility]  :  Assessment & Plan  Multiple sclerosis (HCC)  Patient meets criteria for MS given his enhancing and non-enhancing lesions across time and space and clinical symptoms. His has extensive disease burden with correlation in clinical symptoms now being unable to work. After discussion with patient and his wife, we decided to pursue treatment with Ocrevus for best efficacy. The risks and side effects were discussed with patient. We also talked about diet, exercise, and supplements he can take for his symptoms.    IMAGING:   MRI lumbar spine 2024: Posterior spinal instrumented fusion with decompressive laminectomy from L4-S1.  Disc bulge with some facet arthropathy and mild foraminal narrowing at L3-4.  No stenosis noted otherwise.  MRI thoracic spine 24:  Stable T2 cord signal abnormality involving dorsal cord extending from T10 inferior vertebral level through T10-11 disc level. There is associated marginal enhancement.   MRI cervical spine 24: Focal hyperintense T2/STIR lesion within the left anterior hemicord at the C2-3 level suspicious for demyelinating disease. There is no pathologic cord enhancement to suggest acute demyelination. Mild degenerative changes at the C4-5 through the C6-7 levels with mild central canal narrowing at C5-6.  MRI brain 24:  Scattered white matter lesions in the supratentorial brain as well as in the upper cervical cord having imaging features of demyelinating disease such as multiple sclerosis. Further clinical assessment and follow-up advised. No pathologic enhancement to definitively confirm active demyelination.  Visual fields testing: The amplitudes were low. This is probably secondary to poor visual acuity and nystagmus resulting in difficulty with fixation on  the stimulus screen despite his visual Acuity 2. There is prolongation of the N75 and P100 responses on the left. This can represent both nonspecific demyelinating and axonal loss anterior to the optic chiasm. The differential can include a diffuse ischemic lesion, demyelinating and/or a compressive lesion of the anterior visual pathways on the left. 3. There is a side-to-side difference in the P100 responses left greater than right suggestive of an abnormality in the left anterior visual pathway    LABS:   CSF fluid:   Two (2) oligoclonal bands were observed in the CSF, which were not detected in the serum sample.   [place] undetected  VDRL non reactive  Myelin basic protein elevated, 15.2  IgG within normal range  Glucose normal, protein elevated at 60  Lyme IgG positive, IgM negative    Plan:  Labs ordered prior to starting Ocrevus treatment. Patient will be connected with one of our nurses as well for scheduling and questions  Discussed healthy lifestyle with patient and his wife. They want to first modify their sleep routine prior to starting pharmacotherapy     Orders:    CBC and differential; Future    Comprehensive metabolic panel; Future    Hepatitis B surface antigen; Future    Hepatitis B surface antibody; Future    Hepatitis C antibody; Future    Quantiferon TB Gold Plus Assay; Future    STRATIFY JCV(TM) AB W/RFX TO INHIBITION ASSAY; Future    HIV 1/2 AG/AB w Reflex [facility] for 2 yr old and above; Future    Syncopal episodes  Patient with multiple episodes in the past associated with prodromal symptoms and syncope. No abnormal movements noted or urinary incontinence/tongue biting. However, there were multiple white matter changes near his temporal lobe on MRI so will obtain routine EEG to evaluate for seizure-like activity. Patient does not hydrate regularly throughout the day and was complaining of some orthostatic symptoms so he was encouraged to increase his water intake.     Orders:    EEG awake or  "drowsy routine; Future    EEG Routine and awake; Future      Patient Instructions   Mediterranean diet -- There is no single definition of a Mediterranean diet, but such diets are typically high in fruits, vegetables, whole grains, beans, nuts, and seeds; include olive oil as an important source of monounsaturated fat; and allow low to moderate wine consumption. There are typically low to moderate amounts of fish, poultry, and dairy products, with little red meat. The Mediterranean diet is associated with several health benefits; however, it remains uncertain which components of the Mediterranean diet offer the protective benefit or if the benefits result from an aggregation of effects.    The Mediterranean diet incorporates the basics of healthy eating -- plus a splash of flavorful olive oil and perhaps a glass of red wine -- among other components characterizing the traditional cooking style of countries bordering the [place].  Most healthy diets include fruits, vegetables, fish and whole grains, and limit unhealthy fats. While these parts of a healthy diet are tried-and-true, subtle variations or differences in proportions of certain foods may make a difference in your risk of heart disease.     Benefits of the Mediterranean diet  Research has shown that the traditional Mediterranean diet reduces the risk of heart disease. The diet has been associated with a lower level of oxidized low-density lipoprotein (LDL) cholesterol -- the \"bad\" cholesterol that's more likely to build up deposits in your arteries.  In fact, a meta-analysis of more than 1.5 million healthy adults demonstrated that following a Mediterranean diet was associated with a reduced risk of cardiovascular mortality as well as overall mortality.  The Mediterranean diet is also associated with a reduced incidence of cancer, and Parkinson's and Alzheimer's diseases. Women who eat a Mediterranean diet supplemented with extra-virgin olive oil " and mixed nuts may have a reduced risk of breast cancer.  For these reasons, most if not all major scientific organizations encourage healthy adults to adapt a style of eating like that of the Mediterranean diet for prevention of major chronic diseases.     Key components of the Mediterranean diet  The Mediterranean diet emphasizes:  Eating primarily plant-based foods, such as fruits and vegetables, whole grains, legumes and nuts   Replacing butter with healthy fats such as olive oil and canola oil   Using herbs and spices instead of salt to flavor foods   Limiting red meat to no more than a few times a month   Eating fish and poultry at least twice a week   Enjoying meals with family and friends   Drinking red wine in moderation (optional)   Getting plenty of exercise      If you're looking for a heart-healthy eating plan, the Mediterranean diet might be right for you. The Mediterranean diet incorporates the basics of healthy eating -- plus a splash of flavorful olive oil and perhaps even a glass of red wine -- among other components characterizing the traditional cooking style of countries bordering the [place].  Most healthy diets include fruits, vegetables, fish and whole grains, and limit unhealthy fats. While these parts of a healthy diet remain tried-and-true, subtle variations or differences in proportions of certain foods may make a difference in your risk of heart disease.      Research has shown that the traditional Mediterranean diet reduces the risk of heart disease. In fact, an analysis of more than 1.5 million healthy adults demonstrated that following a Mediterranean diet was associated with a reduced risk of death from heart disease and cancer, as well as a reduced incidence of Parkinson's and Alzheimer's diseases.      The Dietary Guidelines for Americans recommends the Mediterranean diet as an eating plan that can help promote health and prevent disease. And the Mediterranean diet is one  "your whole family can follow for good health.   The Mediterranean diet emphasizes:  Eating primarily plant-based foods, such as fruits and vegetables, whole grains,  legumes and nuts  Replacing butter with healthy fats, such as olive oil  Using herbs and spices instead of salt to flavor foods  Limiting red meat to no more than a few times a month  Eating fish and poultry at least twice a week  Drinking red wine in moderation (optional)     The diet also recognizes the importance of being physically active, and enjoying meals with family and friends.     The Mediterranean diet traditionally includes fruits, vegetables and grains. For example, residents of [facility] average six or more servings a day of antioxidant rich fruits and vegetables.     Grains in the Mediterranean region are typically whole grain and usually contain very few unhealthy trans fats, and bread is an important part of the diet. However, throughout the Mediterranean region, bread is eaten plain or dipped in olive oil -- not eaten with butter or margarine, which contains saturated or trans fats.      Nuts are another part of a healthy Mediterranean diet. Nuts are high in fat, but most of the fat is healthy. Because nuts are high in calories, they should not be eaten in large amounts -- generally no more than a handful a day. For the best nutrition, avoid candied or honey-roasted and heavily salted nuts.  The focus of the Mediterranean diet isn't on limiting total fat consumption, but rather on choosing healthier types of fat. The Mediterranean diet discourages saturated fats and hydrogenated oils (trans fats), both of which contribute to heart disease.  The Mediterranean diet features olive oil as the primary source of fat. Olive oil is mainly monounsaturated fat -- a type of fat that can help reduce low-density lipoprotein (LDL) cholesterol levels when used in place of saturated or trans fats. \"Extra-virgin\" and \"virgin\" olive oils (the least " processed forms) also contain the highest levels of protective plant compounds that provide antioxidant effects.  Canola oil and some nuts contain the beneficial linolenic acid (a type of omega-3 fatty acid) in addition to healthy unsaturated fat. Omega-3 fatty acids lower triglycerides, decrease blood clotting, and are associated with decreased incidence of sudden heart attacks, improve the health of your blood vessels, and help moderate blood pressure.      Fatty fish -- such as mackerel, lake trout, herring, sardines, albacore tuna and salmon -- are rich sources of omega-3 fatty acids. Fish is eaten on a regular basis in the Mediterranean diet.     The health effects of alcohol have been debated for many years, and some doctors are reluctant to encourage alcohol consumption because of the health consequences of excessive drinking. However, alcohol -- in moderation -- has been associated with a reduced risk of heart disease in some research studies.  The Mediterranean diet typically includes a moderate amount of wine, usually red wine. This means no more than 5 ounces (148 milliliters) of wine daily for women of all ages and men older than age 65 and no more than 10 ounces (296 milliliters) of wine daily for younger men. More than this may increase the risk of health problems, including increased risk of certain types of cancer.   If you're unable to limit your alcohol intake to the amounts defined above, if you have a personal or family history of alcohol abuse, or if you have heart or liver disease, refrain from drinking wine or any other alcohol.     The Mediterranean diet is a delicious and healthy way to eat. Many people who switch to this style of eating say they'll never eat any other way. Here are some specific steps to get you started:   Eat your veggies and fruits -- and switch to whole grains. Avariety of plant foods should make up the majority of your meals. They should be minimally processed -- fresh  and whole are best. Include veggies and fruits in every meal and eat them for snacks as well. Switch to whole-grain bread and cereal, and begin to eat more whole-grain rice and pasta products. Keep baby carrots, apples and bananas on hand for quick, satisfying snacks. Fruit salads are a wonderful way to eat a variety of healthy fruit.  Go nuts. Nuts and seeds are good sources of fiber, protein and healthy fats. Keep almonds, cashews, pistachios and walnuts on hand for a quick snack. Choose natural peanut butter, rather than the kind with hydrogenated fat added. Try blended sesame seeds (tahini) as a dip or spread for bread.  Pass on the butter. Try olive or canola oil as a healthy replacement for butter or margarine. Lightly drizzle it over vegetables. After cooking pasta, add a touch of olive oil, some garlic and green onions for flavoring. Dip bread in flavored olive oil or lightly spread it on whole-grain bread for a tasty alternative to butter. Try tahini as a dip or spread for bread too.  Spice it up. Herbs and spices make food tasty and can  for salt and fat in recipes.  Go fish. Eat fish at least twice a week. Fresh or water-packed tuna, salmon, trout, mackerel and herring are healthy choices. [place], bake or broil fish for great taste and easy cleanup. Avoid breaded and fried fish.  Rein in the red meat. Limit red meat to no more than a few times a month. Substitute fish and poultry for red meat. When choosing red meat, make sure it's lean and keep portions small (about the size of a deck of cards). Also avoid sausage, tolbert and other high-fat, processed meats.  Choose low-fat dairy. Limit higher fat dairy products, such as whole or 2 percent milk, cheese and ice cream. Switch to skim milk, fat-free yogurt and low-fat cheese.   Many patients ask us about exercise and MS. Some common questions include: “should I exercise?”, “how often, for how long, how strenuous?”, and “what should I do?”. The answers  are different for everybody however for the most part, exercise is good for people with MS.    A 2010 study in [place] showed that fatigue, mood and quality of life improved after 12 weeks of progressive resistance training. This benefit was maintained for an additional 12 weeks after the end of their training. [1]    Another study at [facility] in 2004 randomized MS patients into 3 groups: one that did Eileen yoga, one that worked out on a stationary bike, or a control group. The yoga group showed a significant improvement in fatigue compared to controls. [2]    Everybody is different so the type of exercise you do, how often you do it and how intense it is should be tailored to you and based on your abilities. Often times we make referrals to physical therapy where an individualized program can be developed to focus on each person's needs such as walking, managing spasticity, or fatigue. It's important to stick with a program as often the results are not felt until a few weeks into the program.     Many MS patients have worsening of their symptoms during the summer or when it's hot outside. This intolerance to heat is called Uhthoff's Phenomena. For these patients it's recommended to exercise in the morning or in the evening when the sun is not at its peak, or inside in an air conditioned environment. Aquatic exercise such as water aerobics or swimming are beneficial in patients with MS. If exercising in the heat is the only option, there are special clothes designed to keep the body cool including cooling neck wraps and vests. If all of these measures still don't work and symptoms are worse during exercise, it's recommended to discuss this with your neurologist.   Some patients say they are too tired to work out. Fatigue is an extremely common symptom in MS with up to 80% of patients experiencing it. A number of studies have shown that regular exercise, usually with some aerobic  "component, helps with MS-related fatigue. [3] If you feel too tired to work out you could try a restorative or yin yoga class. Yin classes tend to rebuild your energy levels and calm the nervous system. These two types of classes involve mostly stretching and relaxing. If you are new to yoga, it might be best to take a few private classes or go to a local yoga studio. However there are also some great resources online including yogaglo.com and yogavibes.com which both offer a 15-day free trial period to watch yoga videos at home. On these sites you can choose a beginner level 1 class or the style and teacher of your choice. If yoga is not your thing, even going for a short walk every day can improve your energy levels and overall health.     Next time you visit with your neurologist, be sure to discuss exercise and how you can incorporate it into your life.     References:  1. CRUZ Guy, et al. \"Fatigue, mood and quality of life improve in MS patients after progressive resistance training.\" Multiple sclerosis (2010).  2. ELLIE Zuniga, et al. \"Randomized controlled trial of yoga and exercise in multiple sclerosis.\" Neurology 62.11 (2004): 3847-0568.  3. http://[email or web address].org/neurological_institute/[place]-multiple-sclerosis/patient-education/hic-fatigue-in-multiple-sclerosis.aspx]     Vit D 125 mcg (5000 international units) every day.   Vitamin B 12 1000 mcg every day.   Vitamin B 1 100 mg every day.   Biotin 10,000 mcg every day.   Alpha lipoic acid 600 mg every day  Fish oil/Omega 3-one capsule every day.   Coq-10 100 mg every day.         If you are not able to take all of these Vitamins and supplements daily, you can try to take the first 4 daily.        These symptoms are NOT suggestive for relapses (exaecerbations or flare-ups): headache, stomach pain, nausea, vomiting, sore throat, joint pain, muscle pain, rash, cough, back pain, flu like symptoms.      These symptoms can be MS relapses. Please " note they are all neurological symptoms:    - Numbness and tingling affecting a part of the body and last continuously for days or weeks (on and off symptoms are not suggestive for MS relapses).       - Muscle weakness in arms (more so in legs) that lasts continuously for days or weeks. Short-lasting symptoms and on and off symptoms are not suggestive for an MS relapse.     - Face numbness and weakness that last continuously for days or weeks. Short-lasting, intermittent symptoms or face or eye twitching are not MS relapses.       - Spinning sensation (vertigo), that lasts continuously for days or weeks. Lightheadedness, positional dizziness and short-lasting dizziness are not MS symptoms.     - Imbalance while walking or incoordination in hands and arms that lasts continuously for days or weeks.    - Major, new onset loss of vision (or major blurry vision) in ONE eye (not both eyes), that happen relatively suddenly and worsen over few days and is usually associated with eye pain (with eye movement) can be an MS relapse. Short-lasting change in vision or very long change in vision that can be corrected with glasses ARE NOT MS symptoms.     - Double vision while both eyes are open, which goes away with closing one of the eyes and last days or weeks; can be a symptoms of MS relapse. Momentary double vision, or double vision which still is present with closing either eye; IS NOT an MS symptom.          History of Present Illness     Mr. Hart is a 48 yo right handed male with a PMH of recently diagnosed demyelinating lesions and chronic pain syndrome s/p SCS implantation and L4-S1 fusion who presents for a follow-up regarding his abnormal MRI findings and clinical history suggesting a demyelinating disease such as MS. Briefly to review, he was hospitalized July 2024 for complete numbness waist down complicated by complete saddle anesthesia and diffuse left leg weakness. He also sees Pain medicine for his lumbar  postlaminectomy pain syndrome and lumbar spondylosis and is on Norco and gabapentin currently.  At our last visit in October 2024, he endorsed continued lower extremity numbness since July. His saddle anesthesia had resolved.  He denied any episodes of bladder or bowel incontinence.  He also reported associated symptoms in his left eye.  He reported that initially about a week after the onset of symptoms he could not read words on the television screen if he closes right eye.  He denied any orbital pain or pain upon extraocular movement.     Since our last visit, he underwent visual fields testing which was abnormal and suggestive of a demyelinating condition.  He is accompanied by his wife today who also reports seeing more of a brain fog and him.  He occasionally will forget what he was talking about in the middle of the conversation.  This is going on for 4 to 6 months now.  When asked about sleep, he reports that he sleeps roughly 3 hours every night but naps throughout the day.  He had a sleep study done in the past which was negative for any sleep apnea.  He and his wife note that his sleep is limited by his dogs waking him up throughout the night.  He does not snore.  Of note, he has had 1 fall since his last visit with me in October.  This was in the setting of him getting up to walk and feeling lightheaded and dizziness with a rush of blood to his head.  He did not hit his head at all but lost consciousness for a few seconds and woke up without any confusion.  No tongue biting or urinary incontinence.  Prior to our visit last October, he had several falls with similar prodromal symptoms.  He denies any chest pain or palpitations.  When asked about hydration, he reports that he mostly drinks iced tea throughout the day.  He does not drink a lot of water.    Ever symptom free: no   Falls: Lives with his wife. Had a most recent fall three weeks ago-- came in to make a sandwhich got lightheaded felt like he  couldn't breathe. No chest pain or palpitations. Felt prodromal symptoms  Pain: Well-controlled  Loss of bladder/bowels: no  Loss of sensation in perineal areas: yes back in July, now resolved  Dizziness or lightheadedness: yes  Visual symptoms: still experiencing some blurry vision in left eye, on/off  Weakness: both legs and arms, everything feels very heavy  Neck or back pain: yes but controlled with pain regimen  Family History of TIA, stroke, MS, epilepsy: mother had tingling down to fingertips and numbness  Smokin packs per day for 9 years  Alcohol: no  Drugs: no  Occupation: Retired, was previously a    History of radiation/chemotherapy: no  Fatigue: yes, increased  Lhermitte sign: no   Uhthoff's phenomenon: yes  MS hug-like symptom:  no  He endorses lightheadedness when getting out of hot room or hot shower    Previous work up includes:     IMAGING:   MRI lumbar spine 2024: Posterior spinal instrumented fusion with decompressive laminectomy from L4-S1.  Disc bulge with some facet arthropathy and mild foraminal narrowing at L3-4.  No stenosis noted otherwise.  MRI thoracic spine 24:  Stable T2 cord signal abnormality involving dorsal cord extending from T10 inferior vertebral level through T10-11 disc level. There is associated marginal enhancement.   MRI cervical spine 24: Focal hyperintense T2/STIR lesion within the left anterior hemicord at the C2-3 level suspicious for demyelinating disease. There is no pathologic cord enhancement to suggest acute demyelination. Mild degenerative changes at the C4-5 through the C6-7 levels with mild central canal narrowing at C5-6.  MRI brain 24:  Scattered white matter lesions in the supratentorial brain as well as in the upper cervical cord having imaging features of demyelinating disease such as multiple sclerosis. Further clinical assessment and follow-up advised. No pathologic enhancement to definitively confirm active  demyelination.  Visual fields testing: The amplitudes were low. This is probably secondary to poor visual acuity and nystagmus resulting in difficulty with fixation on the stimulus screen despite his visual Acuity 2. There is prolongation of the N75 and P100 responses on the left. This can represent both nonspecific demyelinating and axonal loss anterior to the optic chiasm. The differential can include a diffuse ischemic lesion, demyelinating and/or a compressive lesion of the anterior visual pathways on the left. 3. There is a side-to-side difference in the P100 responses left greater than right suggestive of an abnormality in the left anterior visual pathway      LABS:   CSF fluid:   Two (2) oligoclonal bands were observed in the CSF, which were not detected in the serum sample.   [place] undetected  VDRL non reactive  Myelin basic protein elevated, 15.2  IgG within normal range  Glucose normal, protein elevated at 60  Lyme IgG positive, IgM negative  B12 260           Objective   /80 (BP Location: Left arm, Patient Position: Sitting, Cuff Size: Extra-Large)   Pulse 78   Ht 6' (1.829 m)   Wt 97.1 kg (214 lb)   SpO2 98%   BMI 29.02 kg/m²     Physical Exam  Vitals reviewed.   Constitutional:       General: He is awake.      Appearance: He is not diaphoretic.   HENT:      Mouth/Throat:      Pharynx: No oropharyngeal exudate.   Eyes:      General: Lids are normal.      Pupils: Pupils are equal, round, and reactive to light.   Pulmonary:      Effort: Pulmonary effort is normal. No respiratory distress.   Neurological:      Deep Tendon Reflexes:      Reflex Scores:       Bicep reflexes are 2+ on the right side and 2+ on the left side.       Brachioradialis reflexes are 2+ on the right side and 2+ on the left side.       Patellar reflexes are 3+ on the right side and 3+ on the left side.       Achilles reflexes are 3+ on the right side and 2+ on the left side.  Psychiatric:         Mood and Affect: Mood  normal.         Speech: Speech normal.         Behavior: Behavior normal.         Thought Content: Thought content normal.       Neurological Exam  Mental Status  Awake. Oriented to person, place and time. Speech is normal. Language is fluent with no aphasia.    Cranial Nerves  CN II: Visual fields full to confrontation.  CN III, IV, VI: Normal lids and orbits bilaterally. Pupils equal round and reactive to light bilaterally.  Uvula slightly deviated to left. Otherwise rest of cranial nerves in tact.    Motor  Normal muscle bulk throughout. No fasciculations present. No abnormal involuntary movements.                                               Right                     Left   Shoulder abduction               4-                          5  Elbow flexion                         4-                          5  Elbow extension                    4-                          5  Finger abduction                    4-                          5  Hip flexion                              4+                          4+  Knee flexion                           5                          5  Knee extension                      5                          5  Plantarflexion                         5                          5  Dorsiflexion                            5                          5  Decreased strength in the right upper extremity compared to the left. Lower extremity strength mostly symmetric.    Sensory  Light touch abnormality: Temperature abnormality: Vibration abnormality:   Light touch and temperature in tact throughout upper extremity. Sensation diminished to light touch and temperature in a stocking like distribution in lower extremity. .    Reflexes                                            Right                      Left  Brachioradialis                    2+                         2+  Biceps                                 2+                         2+  Patellar                                3+                          3+  Achilles                                3+                         2+    Right pathological reflexes: Homer's absent. Crossed adductor present. Ankle clonus absent.  Left pathological reflexes: Homer's absent. Crossed adductor absent. Ankle clonus absent.    Coordination  Right: Finger-to-nose normal.      Radiology Results Review: I have reviewed radiology reports from [facility] including: MRI brain and MRI spine.

## 2024-12-12 NOTE — PROGRESS NOTES
Virtual Regular Visit  Name: Anderson Hart      : 1975      MRN: [id number]  Encounter Provider: ANTONIA Alcantar  Encounter Date: 12/10/2024   Encounter department: [facility]      Verification of patient location:  Patient is located at Home in the following state in which I hold an active license [place] :Assessment & Plan  Sinusitis, unspecified chronicity, unspecified location    Orders:  •  doxycycline hyclate (VIBRAMYCIN) 100 mg capsule; Take 1 capsule (100 mg total) by mouth every 12 (twelve) hours for 7 days      Sinusitis, unspecified chronicity, unspecified location    Start doxycycline.   Call for any worsening or persisting symptoms.   Continue supportive care.                  Encounter provider ANTONIA Alcantar    The patient was identified by name and date of birth. Anderson Hart was informed that this is a telemedicine visit and that the visit is being conducted through the Epic Embedded platform. He agrees to proceed..  My office door was closed. No one else was in the room.  He acknowledged consent and understanding of privacy and security of the video platform. The patient has agreed to participate and understands they can discontinue the visit at any time.    Patient is aware this is a billable service.     History of Present Illness     Anderson Hart is a 49 year old male presenting today for cold symptoms.     Symptoms started 5 days ago.   Symptoms include: chills, sweats, fever, congestion, cough, rhinorrhea.   No chest pain, just tight.   Some shortness of breath.   No sore throat or headaches.   Current smoker. Could not tolerated chantix due to nausea.   Fever max 101.7.    His wife was sick last week.         Review of Systems   Constitutional:  Positive for chills, diaphoresis, fatigue and fever.   HENT:  Positive for congestion, postnasal drip, sinus pressure and sinus pain. Negative for ear pain and sore throat.    Respiratory:  Positive for cough,  chest tightness and shortness of breath. Negative for wheezing.    Cardiovascular:  Negative for chest pain, palpitations and leg swelling.   Neurological:  Negative for headaches.       Objective   Temp (!) 101.4 °F (38.6 °C) (Oral)   Ht 6' (1.829 m)   Wt 96.6 kg (213 lb)   BMI 28.89 kg/m²     Physical Exam  Vitals and nursing note reviewed.   Constitutional:       General: He is not in acute distress.     Appearance: Normal appearance. He is not ill-appearing.   Pulmonary:      Effort: Pulmonary effort is normal. No respiratory distress.   Neurological:      Mental Status: He is alert.   Psychiatric:         Mood and Affect: Mood normal.         Visit Time  Total Visit Duration: 10 minutes

## 2024-12-13 NOTE — TELEPHONE ENCOUNTER
[facility].     Enrollment submitted on [employer] portal.     Labs still need to be completed.

## 2024-12-13 NOTE — TELEPHONE ENCOUNTER
SW called patient at [phone].  Spouse lost her job recently and medical insurance ends after December.  Keyur said that his wife just found out she was offered a job in which she will begin in January.  Patient does not feel they will have financial concerns and patient said he has Medicare coverage including office visits, scripts anyway.      Patient did comment that he feels depressed and wonders if Dr. More would prescribe something for him.  SW advised to call office as this writer is not sure.  Recommended that if Dr. More is unable to assist to ask his PCP next.  Patient has had the same PCP for some time.  Explained that last option is to find a psychiatrist to prescribe medication.  SW offered to provide him with a list of psychiatrists in case needed.  Patient agreeable and will get off his MyChart.      Patient is new to MS.  He is not familiar with the MS foundations.  SW strongly encouraged speaking with a nurse navigator from the MS Society.  SW will place other resources for new MS on patient's MyChart.  Patient was appreciative of the help.  SW remains available.

## 2024-12-20 ENCOUNTER — HOSPITAL ENCOUNTER (OUTPATIENT)
Dept: NEUROLOGY | Facility: CLINIC | Age: 49
End: 2024-12-20
Payer: COMMERCIAL

## 2024-12-20 ENCOUNTER — APPOINTMENT (OUTPATIENT)
Dept: LAB | Facility: CLINIC | Age: 49
End: 2024-12-20
Payer: COMMERCIAL

## 2024-12-20 DIAGNOSIS — R55 SYNCOPAL EPISODES: ICD-10-CM

## 2024-12-20 DIAGNOSIS — G35 MULTIPLE SCLEROSIS (HCC): ICD-10-CM

## 2024-12-20 LAB
ALBUMIN SERPL BCG-MCNC: 4.4 G/DL (ref 3.5–5)
ALP SERPL-CCNC: 78 U/L (ref 34–104)
ALT SERPL W P-5'-P-CCNC: 12 U/L (ref 7–52)
ANION GAP SERPL CALCULATED.3IONS-SCNC: 9 MMOL/L (ref 4–13)
AST SERPL W P-5'-P-CCNC: 14 U/L (ref 13–39)
BASOPHILS # BLD AUTO: 0.07 THOUSANDS/ΜL (ref 0–0.1)
BASOPHILS NFR BLD AUTO: 1 % (ref 0–1)
BILIRUB SERPL-MCNC: 0.56 MG/DL (ref 0.2–1)
BUN SERPL-MCNC: 11 MG/DL (ref 5–25)
CALCIUM SERPL-MCNC: 9.8 MG/DL (ref 8.4–10.2)
CHLORIDE SERPL-SCNC: 103 MMOL/L (ref 96–108)
CO2 SERPL-SCNC: 26 MMOL/L (ref 21–32)
CREAT SERPL-MCNC: 0.95 MG/DL (ref 0.6–1.3)
EOSINOPHIL # BLD AUTO: 0.05 THOUSAND/ΜL (ref 0–0.61)
EOSINOPHIL NFR BLD AUTO: 1 % (ref 0–6)
ERYTHROCYTE [DISTWIDTH] IN BLOOD BY AUTOMATED COUNT: 13.3 % (ref 11.6–15.1)
GFR SERPL CREATININE-BSD FRML MDRD: 93 ML/MIN/1.73SQ M
GLUCOSE SERPL-MCNC: 85 MG/DL (ref 65–140)
HBV SURFACE AB SER-ACNC: 284 MIU/ML
HBV SURFACE AG SER QL: NORMAL
HCT VFR BLD AUTO: 51.2 % (ref 36.5–49.3)
HCV AB SER QL: NORMAL
HGB BLD-MCNC: 16.6 G/DL (ref 12–17)
HIV 1+2 AB+HIV1 P24 AG SERPL QL IA: NORMAL
HIV 2 AB SERPL QL IA: NORMAL
HIV1 AB SERPL QL IA: NORMAL
HIV1 P24 AG SERPL QL IA: NORMAL
IMM GRANULOCYTES # BLD AUTO: 0.03 THOUSAND/UL (ref 0–0.2)
IMM GRANULOCYTES NFR BLD AUTO: 0 % (ref 0–2)
LYMPHOCYTES # BLD AUTO: 1.9 THOUSANDS/ΜL (ref 0.6–4.47)
LYMPHOCYTES NFR BLD AUTO: 24 % (ref 14–44)
MCH RBC QN AUTO: 27.1 PG (ref 26.8–34.3)
MCHC RBC AUTO-ENTMCNC: 32.4 G/DL (ref 31.4–37.4)
MCV RBC AUTO: 84 FL (ref 82–98)
MONOCYTES # BLD AUTO: 0.48 THOUSAND/ΜL (ref 0.17–1.22)
MONOCYTES NFR BLD AUTO: 6 % (ref 4–12)
NEUTROPHILS # BLD AUTO: 5.57 THOUSANDS/ΜL (ref 1.85–7.62)
NEUTS SEG NFR BLD AUTO: 68 % (ref 43–75)
NRBC BLD AUTO-RTO: 0 /100 WBCS
PLATELET # BLD AUTO: 264 THOUSANDS/UL (ref 149–390)
PMV BLD AUTO: 11.4 FL (ref 8.9–12.7)
POTASSIUM SERPL-SCNC: 4.1 MMOL/L (ref 3.5–5.3)
PROT SERPL-MCNC: 7.4 G/DL (ref 6.4–8.4)
RBC # BLD AUTO: 6.13 MILLION/UL (ref 3.88–5.62)
SODIUM SERPL-SCNC: 138 MMOL/L (ref 135–147)
WBC # BLD AUTO: 8.1 THOUSAND/UL (ref 4.31–10.16)

## 2024-12-20 PROCEDURE — 86803 HEPATITIS C AB TEST: CPT

## 2024-12-20 PROCEDURE — 95816 EEG AWAKE AND DROWSY: CPT

## 2024-12-20 PROCEDURE — 36415 COLL VENOUS BLD VENIPUNCTURE: CPT

## 2024-12-20 PROCEDURE — 87340 HEPATITIS B SURFACE AG IA: CPT

## 2024-12-20 PROCEDURE — 86706 HEP B SURFACE ANTIBODY: CPT

## 2024-12-20 PROCEDURE — 80053 COMPREHEN METABOLIC PANEL: CPT

## 2024-12-20 PROCEDURE — 87389 HIV-1 AG W/HIV-1&-2 AB AG IA: CPT

## 2024-12-20 PROCEDURE — 86711 JOHN CUNNINGHAM ANTIBODY: CPT

## 2024-12-20 PROCEDURE — 86480 TB TEST CELL IMMUN MEASURE: CPT

## 2024-12-20 PROCEDURE — 95816 EEG AWAKE AND DROWSY: CPT | Performed by: STUDENT IN AN ORGANIZED HEALTH CARE EDUCATION/TRAINING PROGRAM

## 2024-12-20 PROCEDURE — 85025 COMPLETE CBC W/AUTO DIFF WBC: CPT

## 2024-12-21 LAB
GAMMA INTERFERON BACKGROUND BLD IA-ACNC: 0 IU/ML
M TB IFN-G BLD-IMP: NEGATIVE
M TB IFN-G CD4+ BCKGRND COR BLD-ACNC: 0.02 IU/ML
M TB IFN-G CD4+ BCKGRND COR BLD-ACNC: 0.03 IU/ML
MITOGEN IGNF BCKGRD COR BLD-ACNC: 6.39 IU/ML

## 2024-12-27 ENCOUNTER — RESULTS FOLLOW-UP (OUTPATIENT)
Age: 49
End: 2024-12-27

## 2024-12-27 NOTE — RESULT ENCOUNTER NOTE
EEG test results were reviewed and are reported within normal limits.  Updated the patient through [employer]

## 2024-12-28 LAB
GALACTOMANNAN AG SERPL IA-ACNC: 0.83
JCPYV AB SERPL QL IA: ABNORMAL
JCPYV AB SERPL QL IA: POSITIVE
SPECIMEN STATUS: ABNORMAL

## 2024-12-31 NOTE — TELEPHONE ENCOUNTER
JCV result received.     BI not complete. PA is likely needed. Pt may also need to be enrolled in copay assistance. Consent not given to Ocrevus team by patient.     [employer] message sent to pt advising him to give consent to [facility] team.    Will f/u with pt's insurance company as well.

## 2025-01-03 NOTE — TELEPHONE ENCOUNTER
Per [employer] portal, pt provided consent today.     Awaiting DELFINO.     [facility] [phone]

## 2025-01-07 NOTE — TELEPHONE ENCOUNTER
BI complete- [place] [employer] was terminated on 12/31/24.     [employer] message sent to pt requesting updated insurance information.

## 2025-01-09 NOTE — TELEPHONE ENCOUNTER
Spoke with pt. He reports he will have Medicare A/B, possibly a medicaid plan. He states he is working to obtain insurance information however he must wait for this to be mailed to his home. He will keep office updated once coverage information is available.     Discussed if pt only has Medicare A/B, may need to apply for free drug.     Attempted to add prior Medicare coverage. Received plan mismatch error. Awaiting insurance information from pt.

## 2025-01-14 NOTE — TELEPHONE ENCOUNTER
01/14/25    Patient called office today to Provided Social Security Claim Number [id number].    Per Patient he stated that's the Information that our office was waiting for, and he received in the mail.      Any questions, please contact Patient.  Thank You.

## 2025-01-16 NOTE — TELEPHONE ENCOUNTER
Unable to verify pt's insurance coverage with that ID. Received message previously through epic stating pt may have Medicare part A only. This would not cover infusions. Will need to obtain copies of pt's insurance card (s).

## 2025-01-17 NOTE — TELEPHONE ENCOUNTER
Called pt. He reports he received letter in the mail verifying coverage with Medicare. He believes he only has coverage with Medicare A/B and part D. He will send picture of letter via [employer].     Discussed Medicare will cover 80%. Ocrevus infusions may cost $100,000+. Could result in bill over $20,000. Pt agreeable to application for free drug.

## 2025-01-17 NOTE — TELEPHONE ENCOUNTER
Pt sent copy of letter from [facility] via [employer]. This states pt is eligible for hospital insurance starting May 2014. This is a temporary notice of eligibility. It appears pt should be receiving an insurance card. Insurance claim # [id number] is listed on letter.     Attempted to add coverage under Medicare Part A. Did locate eligibility. This states [place] is primary coverage, Medicare Part A is secondary. Medicare part B is inactive. Coverage with [place] termed. Pt needs to notify Medicare of this change.     Pt does not have any coverage for infusions (would be under part B).     Prescriber [employer] form completed. Awaiting signature.    [employer] message sent to pt with update. Included patient portion of free drug application.

## 2025-01-22 NOTE — TELEPHONE ENCOUNTER
Patient called in following up on infusions and would like to know when he can schedule them.    Please advise

## 2025-01-23 NOTE — TELEPHONE ENCOUNTER
Called [employer] patient [facility] and spoke with Alison. She reports pt is approved for free drug for Ocrevus. However, pt reported possible new insurance at the end of February. If coverage is established, pt may be removed from program.     Call transferred to [facility] where I spoke with Raymon. Scheduled delivery of Ocrevus free drug (two 300mg vials) for 1/28/25. Shipping to [facility] infusion center, confirmed address. Order # [id number].    Therapy plan entered and sent for signature.    Will schedule pt.

## 2025-01-24 DIAGNOSIS — G35 MULTIPLE SCLEROSIS (HCC): Primary | ICD-10-CM

## 2025-01-24 RX ORDER — SODIUM CHLORIDE 9 MG/ML
20 INJECTION, SOLUTION INTRAVENOUS ONCE
OUTPATIENT
Start: 2025-02-19

## 2025-01-24 RX ORDER — ACETAMINOPHEN 325 MG/1
650 TABLET ORAL ONCE
OUTPATIENT
Start: 2025-02-19

## 2025-01-24 RX ORDER — HYDROCORTISONE SODIUM SUCCINATE 100 MG/2ML
100 INJECTION INTRAMUSCULAR; INTRAVENOUS ONCE
OUTPATIENT
Start: 2025-02-19 | End: 2025-02-07

## 2025-01-24 NOTE — TELEPHONE ENCOUNTER
Patient called in as he was informed his Ocrevus is fully covered. He is looking to lalo his infusions.    He is able to get infusions any date and time as he is not currently working.    CB # [phone], may leave a detailed msg

## 2025-01-24 NOTE — TELEPHONE ENCOUNTER
Called [facility] infusion center and spoke with Olivia. Scheduled Ocrevus infusions for the followin/19/25 @ 7:30am  3/5/25 @ 8:30am    Pt made aware, he is agreeable. Did advise him to contact the   team to obtain potential OOP costs as only the Ocrevus medication is covered by [employer].     Also advised pt labs will be needed 1 week prior to first infusion appt.

## 2025-01-27 DIAGNOSIS — K21.9 CHRONIC GERD: ICD-10-CM

## 2025-01-28 RX ORDER — ESOMEPRAZOLE MAGNESIUM 40 MG/1
40 CAPSULE, DELAYED RELEASE ORAL EVERY MORNING
Qty: 90 CAPSULE | Refills: 1 | Status: SHIPPED | OUTPATIENT
Start: 2025-01-28

## 2025-01-29 ENCOUNTER — NURSE TRIAGE (OUTPATIENT)
Age: 50
End: 2025-01-29

## 2025-01-29 NOTE — TELEPHONE ENCOUNTER
Regarding: Worsening symptoms  ----- Message from Chiquis MCADAMS sent at 1/29/2025  5:13 PM EST -----  Patient called to report some worsening symptoms and events he has been having.    Last night his wife witnessed him passed out twice and when he came to there was agitation and now he is starting to feel more forgetful after event  I did ask if wife was with him to get more information and she was downstairs finishing work    Please advise

## 2025-01-29 NOTE — TELEPHONE ENCOUNTER
"Outbound call made to patient and he was triaged for symptoms. Patient advised to go to ED for new onset of confusion and forgetfulness combined with fall where he hit his face on the ground from standing position.     Patient is questioning if confusion, agitation, and forgetfulness are signs of a MS flare up. Patient made aware he does need to be evaluated.     Please note in \"other symptoms\" Patient did say he has thoughts of self harm but no plan. It is unknown if Patient was alone when he answered as CTS was on phone. Patient encouraged to discuss this with providers as it could be an important symptom they can assist with.     Patient states he will go to [facility] and his wife will drive him.     Dr. More: Please be aware and advise, thank you!    Reason for Disposition   Nursing judgment    Answer Assessment - Initial Assessment Questions  1. REASON FOR CALL: \"What is your main concern right now?\"      Patient had some episodes of unresponsiveness last night. Last episode was November he estimates. He is concerned because he never had confusion before like he has been having lately. Patient said he has been forgetting more lately, he put soup in the microwave and forgot about it. Patient gets up and does not remember why he got up for. Patient said he has been agitated lately for no reason.     Jeannette gave the phone to his wife Aydee to recount the episodes she witnessed. Patient wife said he was completely out the first time it happened, they were watching TV and she left to get something to drink and when she came back his arm was hanging off the couch. Patient wife was unsure how long he was responsive maybe 5-6 min but it \"felt like forever.\" The second time he had been making food in the kitchen and came out to the room where his wife was and said to her he felt dizzy, he was standing up, and he fell forward and hit his head face first on the floor. Patient was unresponsive 2-3 " "minutes and when he came to he was confused. Patient said everything was amplified, sounds sounded very loud when he gained consciousness. Patient said he did not remember making food in the kitchen before he became dizzy. Patient said his face is \"just sore\" and rates it a 1 out of 10 on pain scale. Patient said his back pain is a 7-8 out of ten but that is normal for him and he is used to it.     2. ONSET: \"When did the episode start?\"      Last night was probably the 5th or 6th time he had syncopate episode but he has had them for years but before it ws thought to be deficiency. Patient never experienced confusion like he did last night.     4. FEVER: \"Do you have a fever?\"      Patient denied. Patient had a \"fever sore\" last week.     5. OTHER SYMPTOMS: \"Do you have any other new symptoms?\"      Patient confused and space which has never happened before. Patient is \"itchy everywhere,\" started a month ago.     Patient wife said she is not afraid of him but his demeaner is different and he is struggling with depression but would not would never admit to the providers. Patient wife clarified she does not think he will harm others but does not know if he would harm himself. Patient wife told CTS this when the Patient was not in the room.     CTS asked Patient if he has thoughts of self harm. Patient states he has had suicidal thoughts but has no plan to harm himself. Patient said the last time he had suicidal thoughts was after an argument at [employer]. Patient refused the crisis number offered by CTS.      .MULTIPLESCLEROSIS  New numbness or tingling? Nothing new, all the same numbness of feet and hand.     New Weakness/muscle spasms? Weaker walking up stairs, wears him out which started last week. Muscles feel shaky. Spasms in legs and feet.     Bowel/Bladder Changes? No    UTI symptoms? Urgency     Vision changes? Not that he has noticed.     Recent illness? Paitent denies.     Taking any disease " modifying medication ? No    New or worsening fatigue? Yes/No    Date and type of last MRI? Cervical MRI 11/06/2024, Brain MRI 10/30/2024, Thoracic 09/16/2024    History of IV steroids? No    Protocols used: No Protocol Available - Sick Adult-ADULT-[place]

## 2025-01-30 ENCOUNTER — HOSPITAL ENCOUNTER (EMERGENCY)
Facility: HOSPITAL | Age: 50
Discharge: HOME | End: 2025-01-30
Attending: EMERGENCY MEDICINE

## 2025-01-30 ENCOUNTER — APPOINTMENT (EMERGENCY)
Dept: CT IMAGING | Facility: HOSPITAL | Age: 50
End: 2025-01-30

## 2025-01-30 ENCOUNTER — APPOINTMENT (EMERGENCY)
Dept: RADIOLOGY | Facility: HOSPITAL | Age: 50
End: 2025-01-30

## 2025-01-30 VITALS
RESPIRATION RATE: 18 BRPM | TEMPERATURE: 98.1 F | DIASTOLIC BLOOD PRESSURE: 89 MMHG | HEART RATE: 71 BPM | SYSTOLIC BLOOD PRESSURE: 177 MMHG | OXYGEN SATURATION: 98 %

## 2025-01-30 DIAGNOSIS — R07.89 CHEST WALL PAIN: ICD-10-CM

## 2025-01-30 DIAGNOSIS — F41.9 ANXIETY: ICD-10-CM

## 2025-01-30 DIAGNOSIS — R55 SYNCOPE: ICD-10-CM

## 2025-01-30 DIAGNOSIS — R45.851 SUICIDAL IDEATIONS: Primary | ICD-10-CM

## 2025-01-30 LAB
2HR DELTA HS TROPONIN: 0 NG/L
ALBUMIN SERPL BCG-MCNC: 4.8 G/DL (ref 3.5–5)
ALP SERPL-CCNC: 83 U/L (ref 34–104)
ALT SERPL W P-5'-P-CCNC: 14 U/L (ref 7–52)
ANION GAP SERPL CALCULATED.3IONS-SCNC: 8 MMOL/L (ref 4–13)
AST SERPL W P-5'-P-CCNC: 19 U/L (ref 13–39)
BASOPHILS # BLD AUTO: 0.07 THOUSANDS/ΜL (ref 0–0.1)
BASOPHILS NFR BLD AUTO: 1 % (ref 0–1)
BILIRUB SERPL-MCNC: 0.72 MG/DL (ref 0.2–1)
BUN SERPL-MCNC: 14 MG/DL (ref 5–25)
CALCIUM SERPL-MCNC: 10 MG/DL (ref 8.4–10.2)
CARDIAC TROPONIN I PNL SERPL HS: 9 NG/L (ref ?–50)
CARDIAC TROPONIN I PNL SERPL HS: 9 NG/L (ref ?–50)
CHLORIDE SERPL-SCNC: 102 MMOL/L (ref 96–108)
CO2 SERPL-SCNC: 29 MMOL/L (ref 21–32)
CREAT SERPL-MCNC: 1.11 MG/DL (ref 0.6–1.3)
EOSINOPHIL # BLD AUTO: 0.04 THOUSAND/ΜL (ref 0–0.61)
EOSINOPHIL NFR BLD AUTO: 0 % (ref 0–6)
ERYTHROCYTE [DISTWIDTH] IN BLOOD BY AUTOMATED COUNT: 14.5 % (ref 11.6–15.1)
ETHANOL EXG-MCNC: 0 MG/DL
GFR SERPL CREATININE-BSD FRML MDRD: 77 ML/MIN/1.73SQ M
GLUCOSE SERPL-MCNC: 124 MG/DL (ref 65–140)
HCT VFR BLD AUTO: 48.8 % (ref 36.5–49.3)
HGB BLD-MCNC: 15.7 G/DL (ref 12–17)
IMM GRANULOCYTES # BLD AUTO: 0.03 THOUSAND/UL (ref 0–0.2)
IMM GRANULOCYTES NFR BLD AUTO: 0 % (ref 0–2)
LYMPHOCYTES # BLD AUTO: 2.65 THOUSANDS/ΜL (ref 0.6–4.47)
LYMPHOCYTES NFR BLD AUTO: 28 % (ref 14–44)
MCH RBC QN AUTO: 27.7 PG (ref 26.8–34.3)
MCHC RBC AUTO-ENTMCNC: 32.2 G/DL (ref 31.4–37.4)
MCV RBC AUTO: 86 FL (ref 82–98)
MONOCYTES # BLD AUTO: 0.66 THOUSAND/ΜL (ref 0.17–1.22)
MONOCYTES NFR BLD AUTO: 7 % (ref 4–12)
NEUTROPHILS # BLD AUTO: 5.92 THOUSANDS/ΜL (ref 1.85–7.62)
NEUTS SEG NFR BLD AUTO: 64 % (ref 43–75)
NRBC BLD AUTO-RTO: 0 /100 WBCS
PLATELET # BLD AUTO: 271 THOUSANDS/UL (ref 149–390)
PMV BLD AUTO: 10.7 FL (ref 8.9–12.7)
POTASSIUM SERPL-SCNC: 3.7 MMOL/L (ref 3.5–5.3)
PROT SERPL-MCNC: 7.8 G/DL (ref 6.4–8.4)
RBC # BLD AUTO: 5.66 MILLION/UL (ref 3.88–5.62)
SODIUM SERPL-SCNC: 139 MMOL/L (ref 135–147)
WBC # BLD AUTO: 9.37 THOUSAND/UL (ref 4.31–10.16)

## 2025-01-30 PROCEDURE — 99285 EMERGENCY DEPT VISIT HI MDM: CPT

## 2025-01-30 PROCEDURE — 99285 EMERGENCY DEPT VISIT HI MDM: CPT | Performed by: EMERGENCY MEDICINE

## 2025-01-30 PROCEDURE — 84484 ASSAY OF TROPONIN QUANT: CPT

## 2025-01-30 PROCEDURE — 71045 X-RAY EXAM CHEST 1 VIEW: CPT

## 2025-01-30 PROCEDURE — 93005 ELECTROCARDIOGRAM TRACING: CPT

## 2025-01-30 PROCEDURE — 80053 COMPREHEN METABOLIC PANEL: CPT

## 2025-01-30 PROCEDURE — 82075 ASSAY OF BREATH ETHANOL: CPT

## 2025-01-30 PROCEDURE — 70450 CT HEAD/BRAIN W/O DYE: CPT

## 2025-01-30 PROCEDURE — 36415 COLL VENOUS BLD VENIPUNCTURE: CPT

## 2025-01-30 PROCEDURE — 85025 COMPLETE CBC W/AUTO DIFF WBC: CPT

## 2025-01-30 RX ORDER — HYDROXYZINE HYDROCHLORIDE 25 MG/1
25 TABLET, FILM COATED ORAL EVERY 6 HOURS PRN
Qty: 120 TABLET | Refills: 0 | Status: SHIPPED | OUTPATIENT
Start: 2025-01-30 | End: 2025-01-30

## 2025-01-30 RX ORDER — HYDROXYZINE HYDROCHLORIDE 25 MG/1
25 TABLET, FILM COATED ORAL EVERY 6 HOURS PRN
Qty: 30 TABLET | Refills: 0 | Status: SHIPPED | OUTPATIENT
Start: 2025-01-30 | End: 2025-02-14 | Stop reason: SDUPTHER

## 2025-01-30 RX ORDER — HYDROXYZINE HYDROCHLORIDE 25 MG/1
25 TABLET, FILM COATED ORAL ONCE
Status: COMPLETED | OUTPATIENT
Start: 2025-01-30 | End: 2025-01-30

## 2025-01-30 RX ADMIN — HYDROXYZINE HYDROCHLORIDE 25 MG: 25 TABLET ORAL at 21:41

## 2025-01-30 NOTE — TELEPHONE ENCOUNTER
"Outbound call made to Patient and he was made aware of Dr. More's advisement. Jeannette verbalized understanding and said he had \"another episode last night\" of anger and then was dissociated and walked [address] before being picked up by his wife. Patient states he does not want to go to a hospital because he does not want to be in a locked unit for \"what he did last night.\" Patient clarified he \"did have plans\" last night and it was going to be a \"bridge or a .\" Patient clarified he had plans to self harm yesterday. Jeannette denies that he has plans now. Patient promised that he will go to the Emergency room in [facility]. Patient refuses CTS to call EMS and states his wife will take him to the ED. Jeannette states we may speak to his wife if needed.     Outbound call made to Patient wife, unsuccessful. Generic voicemail was left.     Outbound call made to Patient and he placed his wife Aydee on the phone. Patient wife states last night was \"bad\" be cause he \"stormed off.\" Patient wife was advised Patient was agreeable to an ED admission and that he said she would take him.  Patient wife said they did not have money for an ED trip. Jeannette wife was asked if they would be comfortable with EMS or an officer doing a wellness check with the Patient at their home. Patient wife states a wellness check is not needed and agreed she would take the Patient to the [facility] ED but he does not want to be evaluated for any psych symptoms. Patient wife was made aware since the patient hit his face on the ground from standing position he should be evaluated. Patient wife was in agreement to take him to ED. Patient wife aware we have an after hours service if there is any emergency or we need to call EMS.    Dr. More sent for your awareness. Thank you!  "

## 2025-01-30 NOTE — TELEPHONE ENCOUNTER
I agree that he needs to be evaluated in the ER.     Thank you,     Dr. Derik MD.   01/30/25   11:07 AM

## 2025-01-31 NOTE — DISCHARGE INSTRUCTIONS
Safe Discharge Plan   This writer discussed the patients current presentation and recommended discharge plan with DO Claudia. They agree with the patient being discharged at this time with referrals and/or information about: hotline / warm line numbers; walk-in clinic information; local outpatient resource list for therapists / counselors, psychologists, psychiatrists, and partial programs; and, online / internet resources and support groups.      Advised to utilize natural and existing supports. Advised for safety planning and effective coping skills. Advised to return to ED if necessary.       National Suicide Hotline: 1-920.364.5576  Crisis Text Line: Text Connect to 750350      The patient was Instructed to follow up with their PCP and/or established providers.      Continue medications as prescribed.      This writer and the patient completed a safety plan.  The patient was provided with a copy of their safety plan with encouragement to utilize the plan following discharge.      In addition, the patient was instructed to call [facility] crisis, other crisis services, 911 or to go to the nearest ER immediately if their situation changes at any time.      This writer discussed discharge plans with the patient, who agrees with and understands the discharge plans.         SAFETY PLAN    Warning Signs (thoughts, images, mood, behavior, situations) of a potential crisis:      Thoughts  Excessive worrying or rumination  Suicidal ideation or thoughts of self-harm  Delusions or hallucinations  Inability to concentrate or make decisions  Obsessive or intrusive thoughts  Catastrophic thinking or negative self-talk  Feeling hopeless or helpless  Feeling disconnected from reality  Feeling like a burden to others  Feeling like life is meaningless    Images  Visualizations of self-harm or violence  Flashbacks of traumatic events  Seeing things that aren't there  Distorted perceptions of reality  Recurrent nightmares or  night terrors  Seeing oneself in a negative or distorted light    Mood  Extreme sadness or depression  Intense anger or irritability  Excessive anxiety or fear  Numbness or detachment  Euphoria or amie  Rapid mood swings  Feeling overwhelmed or hopeless  Feeling empty or void  Increased sensitivity to rejection or criticism  Loss of interest in activities once enjoyed    Behavior  Withdrawal from social activities  Changes in sleep patterns  Changes in appetite or eating habits  Neglect of personal hygiene  Increase in substance use  Reckless or impulsive behavior  Agitation or restlessness  Isolation or avoidance of others  Difficulty controlling emotions  Engaging in self-harm behaviors    Situations  Significant life stressors (e.g., job loss, divorce, death of a loved one)  History of trauma or abuse  Chronic illness or pain  Financial difficulties  Relationship problems  Legal troubles  Discrimination or prejudice  Natural disasters or other crises  Feeling overwhelmed or unable to cope  Experiencing a major life transition       Coping Skills (what can I do to take my mind off the problem, or to keep myself safe):     Coping Skills with Apps and Websites    Grounding Techniques  Deep breathing: Many meditation apps like Calm, Headspace, and Insight Timer offer guided deep breathing exercises.  Mindfulness: Apps like Calm, Headspace, and Smiling Mind provide mindfulness meditations and exercises.  Sensory focus: No specific apps, but encourage engaging the senses directly, like noticing textures, sounds, or smells in the environment.    Physical Activity  Exercise: Fitness apps like [employer] Training Club, Sweet Cred, and [employer] provide workout routines and tracking.  Yoga: Apps like Yoga Studio, Down Dog, and Peter Moves offer yoga classes for different levels.  Sports: Consider joining local sports teams or clubs.    Social Connection  Spending time with loved ones: No specific harley, but encourage face-to-face  interaction.  Joining support groups: Apps like [employer] can help find local support groups. Online support groups are available on platforms like Avaz and Facebook.    Healthy Lifestyle  Balanced diet: Nutrition apps like MyFitnessPal, Cronometer, and Lose It! can help track food intake.  Sufficient sleep: Sleep tracking apps like Sleep Cycle, Sleep Score, and AutoSleep can monitor sleep patterns.  Limiting caffeine and alcohol: No specific apps, but encourage awareness of consumption and moderation.    Relaxation Techniques  Progressive muscle relaxation: Many meditation and relaxation apps offer guided progressive muscle relaxation exercises.  Meditation: Apps like Calm, Headspace, and Insight Timer provide meditation practices.    Time Management  Prioritizing tasks: Productivity apps like Todoist, Trello, and Asana can help organize and prioritize tasks.  Setting realistic goals: Goal-setting apps like Strides and Habitica can assist in creating and tracking goals.    Problem-Solving  Breaking down problems: Mind mapping apps like MindMeister and Coggle can help visualize problems and solutions.  Seeking support: Therapy apps like [employer] and EnergyHub offer professional guidance. Support groups can be found through [employer] or online platforms.    Distraction Techniques  Hobbies: Explore various hobby-related apps or websites based on individual interests (e.g., art, music, gisele, cooking).  Creative outlets: Apps like EXO5 (for drawing), Viralica (for music), and MascotaNube (for design) can stimulate creativity.    Seeking Professional Help  Therapy: Apps like [employer], EnergyHub, and EnergyHub offer online therapy.  Medication: Consult with a psychiatrist or primary care physician.    Self-Care  Spending time in nature: No specific harley, but encourage outdoor activities.  Taking breaks: Time management apps can help schedule breaks.  Practicing self-compassion: Mindfulness and meditation apps can  support self-compassion.    Remember, these apps and websites are tools to assist in coping. Professional help is essential for addressing underlying issues.       Outside Support (who can I reach out to for support and help): Wife, Family Doctor, return to ED      National Suicide Prevention Hotline:  985      [facility] [phone] - Crisis   [facility] [phone] - LVF Crisis/Mobile Crisis   [phone] - SLPF Crisis   [facility]: [phone]  [facility]: [phone]   [facility] [phone] - Crisis   [facility] [phone] - Crisis     [facility] [phone] - Crisis   [facility] [phone] - Crisis   [phone] - Peer Support Talk Line (1-9pm daily)  [phone] - Teen Support Talk Line (1-9pm daily)  [phone] - [facility] [phone]- Crisis    [facility] [phone] - Crisis   [facility] [phone] - Crisis    [facility]) [phone] - [facility]

## 2025-01-31 NOTE — ED NOTES
Safe Discharge Plan   This writer discussed the patients current presentation and recommended discharge plan with DO Claudia. They agree with the patient being discharged at this time with referrals and/or information about: hotline / warm line numbers; walk-in clinic information; local outpatient resource list for therapists / counselors, psychologists, psychiatrists, and partial programs; and, online / internet resources and support groups.      Advised to utilize natural and existing supports. Advised for safety planning and effective coping skills. Advised to return to ED if necessary.       National Suicide Hotline: 1-139.881.1979  Crisis Text Line: Text Connect to 974195      The patient was Instructed to follow up with their PCP and/or established providers.      Continue medications as prescribed.      This writer and the patient completed a safety plan.  The patient was provided with a copy of their safety plan with encouragement to utilize the plan following discharge.      In addition, the patient was instructed to call [facility] crisis, other crisis services, 911 or to go to the nearest ER immediately if their situation changes at any time.      This writer discussed discharge plans with the patient, who agrees with and understands the discharge plans.         SAFETY PLAN    Warning Signs (thoughts, images, mood, behavior, situations) of a potential crisis:      Thoughts  Excessive worrying or rumination  Suicidal ideation or thoughts of self-harm  Delusions or hallucinations  Inability to concentrate or make decisions  Obsessive or intrusive thoughts  Catastrophic thinking or negative self-talk  Feeling hopeless or helpless  Feeling disconnected from reality  Feeling like a burden to others  Feeling like life is meaningless    Images  Visualizations of self-harm or violence  Flashbacks of traumatic events  Seeing things that aren't there  Distorted perceptions of reality  Recurrent nightmares or  night terrors  Seeing oneself in a negative or distorted light    Mood  Extreme sadness or depression  Intense anger or irritability  Excessive anxiety or fear  Numbness or detachment  Euphoria or amie  Rapid mood swings  Feeling overwhelmed or hopeless  Feeling empty or void  Increased sensitivity to rejection or criticism  Loss of interest in activities once enjoyed    Behavior  Withdrawal from social activities  Changes in sleep patterns  Changes in appetite or eating habits  Neglect of personal hygiene  Increase in substance use  Reckless or impulsive behavior  Agitation or restlessness  Isolation or avoidance of others  Difficulty controlling emotions  Engaging in self-harm behaviors    Situations  Significant life stressors (e.g., job loss, divorce, death of a loved one)  History of trauma or abuse  Chronic illness or pain  Financial difficulties  Relationship problems  Legal troubles  Discrimination or prejudice  Natural disasters or other crises  Feeling overwhelmed or unable to cope  Experiencing a major life transition       Coping Skills (what can I do to take my mind off the problem, or to keep myself safe):     Coping Skills with Apps and Websites    Grounding Techniques  Deep breathing: Many meditation apps like Calm, Headspace, and Insight Timer offer guided deep breathing exercises.  Mindfulness: Apps like Calm, Headspace, and Smiling Mind provide mindfulness meditations and exercises.  Sensory focus: No specific apps, but encourage engaging the senses directly, like noticing textures, sounds, or smells in the environment.    Physical Activity  Exercise: Fitness apps like [employer] Training Club, Bizzingo, and [employer] Systems provide workout routines and tracking.  Yoga: Apps like Yoga Studio, Down Dog, and Peter Moves offer yoga classes for different levels.  Sports: Consider joining local sports teams or clubs.    Social Connection  Spending time with loved ones: No specific harley, but encourage face-to-face  interaction.  Joining support groups: Apps like [employer] can help find local support groups. Online support groups are available on platforms like Virally and Facebook.    Healthy Lifestyle  Balanced diet: Nutrition apps like MyFitnessPal, Cronometer, and Lose It! can help track food intake.  Sufficient sleep: Sleep tracking apps like Sleep Cycle, Sleep Score, and AutoSleep can monitor sleep patterns.  Limiting caffeine and alcohol: No specific apps, but encourage awareness of consumption and moderation.    Relaxation Techniques  Progressive muscle relaxation: Many meditation and relaxation apps offer guided progressive muscle relaxation exercises.  Meditation: Apps like Calm, Headspace, and Insight Timer provide meditation practices.    Time Management  Prioritizing tasks: Productivity apps like Todoist, Trello, and Asana can help organize and prioritize tasks.  Setting realistic goals: Goal-setting apps like Strides and Habitica can assist in creating and tracking goals.    Problem-Solving  Breaking down problems: Mind mapping apps like MindMeister and Coggle can help visualize problems and solutions.  Seeking support: Therapy apps like [employer]aco and [employer] offer professional guidance. Support groups can be found through [employer] or online platforms.    Distraction Techniques  Hobbies: Explore various hobby-related apps or websites based on individual interests (e.g., art, music, gisele, cooking).  Creative outlets: Apps like Agile (for drawing), Getui (for music), and Zollo (for design) can stimulate creativity.    Seeking Professional Help  Therapy: Apps like [employer]aco, [employer], and [employer] offer online therapy.  Medication: Consult with a psychiatrist or primary care physician.    Self-Care  Spending time in nature: No specific harley, but encourage outdoor activities.  Taking breaks: Time management apps can help schedule breaks.  Practicing self-compassion: Mindfulness and meditation apps can  support self-compassion.    Remember, these apps and websites are tools to assist in coping. Professional help is essential for addressing underlying issues.       Outside Support (who can I reach out to for support and help): Wife, Family Doctor, return to ED      National Suicide Prevention Hotline:  980      [facility] [phone] - Crisis   [facility] [phone] - LVF Crisis/Mobile Crisis   [phone] - SLPF Crisis   [facility]: [phone]  [facility]: [phone]   [facility] [phone] - Crisis   [facility] [phone] - Crisis     [facility] [phone] - Crisis   [facility] [phone] - Crisis   [phone] - Peer Support Talk Line (1-9pm daily)  [phone] - Teen Support Talk Line (1-9pm daily)  [phone] - [facility] [phone]- Crisis    [facility] [phone] - Crisis   [facility] [phone] - Crisis    [facility]) [phone] - [facility]

## 2025-01-31 NOTE — ED PROVIDER NOTES
Time reflects when diagnosis was documented in both MDM as applicable and the Disposition within this note       Time User Action Codes Description Comment    1/30/2025 10:14 PM Mariam Rivera Add [R45.851] Suicidal ideations     1/30/2025 10:14 PM Mariam Rivera Modify [R45.851] Suicidal ideations resolved    1/30/2025 10:49 PM Mariam Rivera Add [R55] Syncope     1/30/2025 10:50 PM Mariam Rivera Add [R07.89] Chest wall pain     1/30/2025 10:50 PM Mariam Rivera Add [F41.9] Anxiety           ED Disposition       ED Disposition   Discharge    Condition   Stable    Date/Time   Thu Jan 30, 2025 10:51 PM    Comment   Anderson Rotundo discharge to home/self care.                   Assessment & Plan       Medical Decision Making  Amount and/or Complexity of Data Reviewed  Labs: ordered. Decision-making details documented in ED Course.  Radiology: ordered and independent interpretation performed. Decision-making details documented in ED Course.    Risk  Prescription drug management.      50 yo M presented to ED for syncopal episodes. Associated symptoms: hit head, R chest wall pain. Exam findings: Anxious appearing, normal neuroexam, heart is regular rate and rhythm, lungs clear to auscultate bilaterally.      Differentials diagnoses considered: Dysrhythmia versus electrolyte disturbance versus ACS versus rib fracture versus intracranial hemorrhage versus other.     See ED course for more details on lab and imaging interpretation, as well as pertinent information that occurred during patient's ED stay.  Based on these results and H&P, chronic syncope, has been evaluated by neurology and cardiology for this in the past; anxiety/suicidal ideations resolved. Results and clinical impressions were discussed with patient and family. They expressed understanding. Plan: Discharged home with instructions to follow-up with primary care doctor, prescription for Atarax for anxiety, instructed to return to the emergency  department for any worsening symptoms including having suicidal thoughts, safety plan made with crisis team.. This plan was also discussed with patient, who was agreeable with this plan. Patient was given the opportunity to ask questions in ED. All questions and concerns were addressed in ED.     This document was created using speech voice recognition software.   Grammatical errors, random word insertions, pronoun errors, and incomplete sentences are an occasional consequence of this system due to software limitations, ambient noise, and hardware issues.   Any formal questions or concerns about content, text, or information contained within the body of this dictation should be directly addressed to the provider for clarification.       ED Course as of 01/30/25 2306   Thu Jan 30, 2025 2055 CBC and differential(!)  No leukocytosis or leukopenia.  Hemoglobin hematocrit within normal limits.  Platelets within normal limits.  Reassuring differential.    2055 hs TnI 0hr: 9  EKG does not show any acute ischemic changes   2055 Comprehensive metabolic panel  WNL   2140 XR chest 1 view portable  No acute cardiopulmonary process visualized.   2150 Patient told my attending that last night he had suicidal thoughts was walking towards [address] to walk into traffic but decided against it.  Patient denies SI at this time.  Patient states he would like help.  Will consult crisis as well as reaching out to neurology on-call since patient has a history of MS   2153 Per neurology on-call, would not expect his acute behavior change to be related to his MS   2205 Crisis to eval when POCT alcohol is completed   2218 EXTBreath Alcohol: 0  Crisis to eval   2218 CT head without contrast  IMPRESSION:     No acute intracranial hemorrhage, significant mass effect or midline shift.           2231 hs TnI 2hr: 9  Delta 0   2248 Patient states he currently feels less anxious this time.  Discussed patient feeling safe to go home, patient states he  does feel safe to go home at this time.  Patient states he understands that he if he were to feel that he wanted to harm self again he is welcome to come back to the emergency department is agreeable he will come back to the emergency department feels that way.       Medications   hydrOXYzine HCL (ATARAX) tablet 25 mg (25 mg Oral Given 1/30/25 2141)       ED Risk Strat Scores                          SBIRT 22yo+      Flowsheet Row Most Recent Value   Initial Alcohol Screen: US AUDIT-C     1. How often do you have a drink containing alcohol? 0 Filed at: 01/30/2025 1938   2. How many drinks containing alcohol do you have on a typical day you are drinking?  0 Filed at: 01/30/2025 1938   3a. Male UNDER 65: How often do you have five or more drinks on one occasion? 0 Filed at: 01/30/2025 1938   3b. FEMALE Any Age, or MALE 65+: How often do you have 4 or more drinks on one occassion? 0 Filed at: 01/30/2025 1938   Audit-C Score 0 Filed at: 01/30/2025 1938   GORAN: How many times in the past year have you...    Used an illegal drug or used a prescription medication for non-medical reasons? Never Filed at: 01/30/2025 1938                            History of Present Illness       Chief Complaint   Patient presents with    Syncope     Pt states he passed out 2x Tuesday night and c/o brain fog. Hx of MS. Pt also tripped over curb yesterday and fell. C/o CP after event. Denies thinners, takes aspirin daily.        Past Medical History:   Diagnosis Date    Allergic     Anxiety     Arthritis     Asthma     Bipolar 2 disorder, major depressive episode (HCC) 10/31/2017    Bipolar disorder (HCC)     Chronic left lumbar radiculopathy     Chronic low back pain     Chronic pain syndrome     Chronic right shoulder pain     Depression with anxiety     Fatigue     GERD (gastroesophageal reflux disease)     Hydronephrosis     Iron deficiency anemia     Kidney stone     Lytic bone lesions on xray     Nephrolithiasis     PONV (postoperative  nausea and vomiting)     Right elbow pain     Right lateral epicondylitis       Past Surgical History:   Procedure Laterality Date    ADENOIDECTOMY Bilateral     APPENDECTOMY      BACK SURGERY      CHOLECYSTECTOMY      CHOLECYSTECTOMY LAPAROSCOPIC N/A 10/30/2018    Procedure: CHOLECYSTECTOMY WITH GASTROTOMY LAPAROSCOPIC;  Surgeon: Oliver Madison MD;  [facility];  Service: General    COLONOSCOPY      ERCP W/ SPHICTEROTOMY N/A 10/30/2018    Procedure: ENDOSCOPIC RETROGRADE CHOLANGIOPANCREATOGRAPHY (ERCP) W/ SPHINCTEROTOMY;  Surgeon: Kendrick Meeks MD;  Location: [facility];  Service: Gastroenterology    GASTRIC BYPASS      2009    IR LUMBAR PUNCTURE  09/19/2024    LAMINECTOMY  09/2011    OTHER SURGICAL HISTORY      fusion/refusion of vertebrae, 2010 and 2011 l5-s1 and l4-l5    SC ARTHROSCOPY KNEE LATERAL RELEASE Left 01/06/2021    Procedure: RELEASE RETINACULAR;  Surgeon: Rogers Lennon DO;  [facility];  Service: Orthopedics    SC ARTHRS KNE SURG W/MENISCECTOMY MED/LAT W/SHVG Right 04/26/2022    Procedure: ARTHROSCOPIC MENISCECTOMY LATERAL;  Surgeon: Rogers Lennon DO;  Location: [facility];  Service: Orthopedics    SC ARTHRS KNEE DEBRIDEMENT/SHAVING ARTCLR CRTLG Left 01/06/2021    Procedure: ARTHROSCOPY KNEE;  Surgeon: Rogers Lennon DO;  Location: [facility];  Service: Orthopedics    SC CYSTO/URETERO W/LITHOTRIPSY &INDWELL STENT INSRT Right 08/08/2017    Procedure: CYSTOSCOPY; URETEROSCOPY; HOLMIUM LASER; RETROGRADE PYELOGRAM; STENT;  Surgeon: Rupesh Romo MD;  Location[facility];  Service: Urology    SC INCISION & DRAINAGE ABSCESS COMPLICATED/MULTIPLE Left 01/08/2021    Procedure: INCISION AND DRAINAGE (I&D) EXTREMITY- of left knee s/p MPFL reconstruction, I&D if the left knee with possible MPFL revision;  Surgeon: Rogers Lennon DO;  Location: [facility];  Service: Orthopedics    SC INSJ/RPLCMT SPINAL NPG/RCVR POCKET CRTJ&CONNJ Right 11/14/2017    Procedure: REMOVAL LOWER MEDIAL BUTTOCK  SPINAL CORD STIMULATOR GENERATOR; PLACEMENT OF NEW BUTTOCK SPINAL CORD STIMULATOR THROUGH SEPERATE INCISION;  Surgeon: Stephan Duque MD;  Location: [facility];  Service: Neurosurgery    NC INSJ/RPLCMT SPINAL NPG/RCVR POCKET CRTJ&CONNJ Right 01/12/2022    Procedure: Right sided spinal cord stimulator pulse generator replacement;  Surgeon: Michael Gamez MD;  [facility];  Service: Neurosurgery    NC INSJ/RPLCMT SPINAL NPG/RCVR POCKET CRTJ&CONNJ Right 09/08/2023    Procedure: Right-sided spinal cord stimulator internal pulse generator replacement;  Surgeon: Michael Gamez MD;  [facility];  Service: Neurosurgery    NC RCNSTJ DISLC PATELLA W/XTNSR RELIGNMT&/MUSC RL Left 01/06/2021    Procedure: REALIGNMENT PATELLA with chondroplasty of patella, open medial patellofemoral ligament reconstruction with allograft;  Surgeon: Rogers Lennon DO;  [facility];  Service: Orthopedics    RIK-EN-Y PROCEDURE  2003    SPINAL CORD STIMULATOR IMPLANT  11/14/2017    dr duque procedure and technique 1. removal of a right back implantable pulse generator 2.placement of a new right buttock impantable pulse generator through seperate incision 3 electric analys complex programming spinal cord stimulator system postoperative period approx 1 hour    SPINE SURGERY      TONSILLECTOMY        Family History   Problem Relation Age of Onset    Cancer Mother     Arthritis Mother     Stomach cancer Mother     Coronary artery disease Mother     Heart disease Mother     Asthma Mother     COPD Mother     Cancer Father     Esophageal cancer Father     Other Father         brain tumor    Diabetes Father     No Known Problems Sister     No Known Problems Sister     No Known Problems Sister       Social History     Tobacco Use    Smoking status: Every Day     Current packs/day: 1.50     Average packs/day: 1.5 packs/day for 30.1 years (45.1 ttl pk-yrs)     Types: Cigarettes     Start date: 1995    Smokeless  tobacco: Never   Vaping Use    Vaping status: Never Used   Substance Use Topics    Alcohol use: Not Currently     Comment: rare    Drug use: Yes     Types: Marijuana      E-Cigarette/Vaping    E-Cigarette Use Never User       E-Cigarette/Vaping Substances    Nicotine No     THC No     CBD No     Flavoring No     Other No     Unknown No       I have reviewed and agree with the history as documented.     HPI  49-year-old M with h/o GERD, chronic back pain, bipolar disorder, MS presenting to ED with multiple syncopal episodes.  Patient's wife states patient had 2 syncopal episode Tuesday, 1 was last he was lying on the couch.  The second 1 was upon standing.  Patient has a history of syncopal episodes in the past.  Patient also had another syncopal episode 1 day ago, was on a walk after being agitated at home, fell and hit head and syncopal episode.  Patient states he hit the right side of his chest when he fell.  Also hit head.  Denies fever, chills, SOB, abd pain, NV, bowel/bladder changes, focal weakness, paresthesias, and any other sxs.      Review of Systems  ROS otherwise negative unless stated above.       Objective       ED Triage Vitals [01/30/25 1936]   Temperature Pulse Blood Pressure Respirations SpO2 Patient Position - Orthostatic VS   98.1 °F (36.7 °C) 71 (!) 177/89 18 98 % --      Temp Source Heart Rate Source BP Location FiO2 (%) Pain Score    Oral Monitor Right arm -- --      Vitals      Date and Time Temp Pulse SpO2 Resp BP Pain Score FACES Pain Rating User   01/30/25 1936 98.1 °F (36.7 °C) 71 98 % 18 177/89 -- -- ROSHAN            Physical Exam  General appearance: resting comfortably, no acute distress   HENT: Normocephalic, atraumatic, hearing grossly intact, and mucous membranes moist   Eyes: Conjunctiva normal, PERRL, EOM intact   Lungs/Chest: Respirations even and unlabored, breath sounds normal. Right anterior chest wall tenderness over R 4th and 5th ribs  Cardiovascular: Normal rate, regular  rhythm  Abdomen: soft, non-distended, non-tender  Musculoskeletal: extremities normal, atraumatic, no cyanosis or edema   Pulses: radial / dorsalis pedis pulses palpable  Skin: warm and dry   Neurologic: Awake and alert, no apparent focal deficit  Awake, alert, oriented. No apparent focal deficit  Face symmetric, PERRL. EOM Intact  Speaks spontaneously, actively following commands.   Full strength, moving all extremities equally. Sensation intact to light touch B/L UE and LE.  Psych: Mood consistent with affect     Results Reviewed       Procedure Component Value Units Date/Time    HS Troponin I 2hr [[id number]]  (Normal) Collected: 01/30/25 2145    Lab Status: Final result Specimen: Blood from Arm, Left Updated: 01/30/25 2231     hs TnI 2hr 9 ng/L      Delta 2hr hsTnI 0 ng/L     POCT alcohol breath test [[id number]]  (Normal) Resulted: 01/30/25 2216    Lab Status: Final result Updated: 01/30/25 2216     EXTBreath Alcohol 0    HS Troponin 0hr (reflex protocol) [[id number]]  (Normal) Collected: 01/30/25 1939    Lab Status: Final result Specimen: Blood from Arm, Left Updated: 01/30/25 2017     hs TnI 0hr 9 ng/L     Comprehensive metabolic panel [[id number]] Collected: 01/30/25 1939    Lab Status: Final result Specimen: Blood from Arm, Left Updated: 01/30/25 2009     Sodium 139 mmol/L      Potassium 3.7 mmol/L      Chloride 102 mmol/L      CO2 29 mmol/L      ANION GAP 8 mmol/L      BUN 14 mg/dL      Creatinine 1.11 mg/dL      Glucose 124 mg/dL      Calcium 10.0 mg/dL      AST 19 U/L      ALT 14 U/L      Alkaline Phosphatase 83 U/L      Total Protein 7.8 g/dL      Albumin 4.8 g/dL      Total Bilirubin 0.72 mg/dL      eGFR 77 ml/min/1.73sq m     Narrative:      National [employer] guidelines for Chronic Kidney Disease (CKD):     Stage 1 with normal or high GFR (GFR > 90 mL/min/1.73 square meters)    Stage 2 Mild CKD (GFR = 60-89 mL/min/1.73 square meters)    Stage 3A Moderate CKD (GFR = 45-59 mL/min/1.73  square meters)    Stage 3B Moderate CKD (GFR = 30-44 mL/min/1.73 square meters)    Stage 4 Severe CKD (GFR = 15-29 mL/min/1.73 square meters)    Stage 5 End Stage CKD (GFR <15 mL/min/1.73 square meters)  Note: GFR calculation is accurate only with a steady state creatinine    CBC and differential [[id number]]  (Abnormal) Collected: 01/30/25 1939    Lab Status: Final result Specimen: Blood from Arm, Left Updated: 01/30/25 1949     WBC 9.37 Thousand/uL      RBC 5.66 Million/uL      Hemoglobin 15.7 g/dL      Hematocrit 48.8 %      MCV 86 fL      MCH 27.7 pg      MCHC 32.2 g/dL      RDW 14.5 %      MPV 10.7 fL      Platelets 271 Thousands/uL      nRBC 0 /100 WBCs      Segmented % 64 %      Immature Grans % 0 %      Lymphocytes % 28 %      Monocytes % 7 %      Eosinophils Relative 0 %      Basophils Relative 1 %      Absolute Neutrophils 5.92 Thousands/µL      Absolute Immature Grans 0.03 Thousand/uL      Absolute Lymphocytes 2.65 Thousands/µL      Absolute Monocytes 0.66 Thousand/µL      Eosinophils Absolute 0.04 Thousand/µL      Basophils Absolute 0.07 Thousands/µL             CT head without contrast   Final Interpretation by Jeremias Hargrove MD (01/30 2213)      No acute intracranial hemorrhage, significant mass effect or midline shift.                  Workstation performed: [id number]         XR chest 1 view portable   ED Interpretation by Mariam Rivera DO (01/30 2140)   No acute cardiopulmonary process visualized.          ECG 12 Lead Documentation Only    Date/Time: 1/30/2025 8:53 PM    Performed by: Mariam Rivera DO  Authorized by: Mariam Rivera DO    Patient location:  ED  Previous ECG:     Previous ECG:  Compared to current    Similarity:  Changes noted  Interpretation:     Interpretation: non-specific    Rate:     ECG rate:  69    ECG rate assessment: normal    Rhythm:     Rhythm: sinus rhythm    Ectopy:     Ectopy: PAC    QRS:     QRS axis:  Normal    QRS intervals:   Normal  Conduction:     Conduction: normal    ST segments:     ST segments:  Normal  T waves:     T waves: normal    Comments:      Shortened NE interval, new when compared to old from March 29, 2023.      ED Medication and Procedure Management   Prior to Admission Medications   Prescriptions Last Dose Informant Patient Reported? Taking?   Acetaminophen (TYLENOL 8 HOUR ARTHRITIS PAIN PO)  Self Yes No   Sig: Take by mouth   Cyanocobalamin (Vitamin B-12) 1000 MCG SUBL   No No   Sig: Place 1 tablet (1,000 mcg total) under the tongue in the morning   HYDROcodone-acetaminophen (NORCO) 5-325 mg per tablet   No No   Sig: Take 1 tablet by mouth every 8 (eight) hours as needed for pain For ongoing therapy DO NOT FILL BEFORE: 12/09/24 Max Daily Amount: 3 tablets   HYDROcodone-acetaminophen (NORCO) 5-325 mg per tablet   No No   Sig: Take 1 tablet by mouth every 8 (eight) hours as needed for pain For ongoing therapy DO NOT FILL BEFORE: 02/05/25 Max Daily Amount: 3 tablets   albuterol (PROVENTIL HFA,VENTOLIN HFA) 90 mcg/act inhaler  Self No No   Sig: INHALE 2 PUFFS EVERY 4 (FOUR) HOURS AS NEEDED FOR WHEEZING OR SHORTNESS OF BREATH COUGH   aspirin (ECOTRIN LOW STRENGTH) 81 mg EC tablet   Yes No   Sig: Take 81 mg by mouth daily   ergocalciferol (VITAMIN D2) 50,000 units   No No   Sig: TAKE 1 CAPSULE BY MOUTH EVERY 14 DAYS   esomeprazole (NexIUM) 40 MG capsule   No No   Sig: take 1 capsule by mouth every morning   folic acid (FOLVITE) 1 mg tablet   No No   Sig: take 1 tablet by mouth once daily   naloxone (NARCAN) 4 mg/0.1 mL nasal spray  Self No No   Sig: Administer 1 spray into a nostril. If no response after 2-3 minutes, give another dose in the other nostril using a new spray.   Patient not taking: Reported on 8/8/2024      Facility-Administered Medications: None     Discharge Medication List as of 1/30/2025 10:51 PM        START taking these medications    Details   hydrOXYzine HCL (ATARAX) 25 mg tablet Take 1 tablet (25 mg  total) by mouth every 6 (six) hours as needed for anxiety, Starting Thu 1/30/2025, Until Sat 3/1/2025 at 2359, Normal           CONTINUE these medications which have NOT CHANGED    Details   Acetaminophen (TYLENOL 8 HOUR ARTHRITIS PAIN PO) Take by mouth, Historical Med      albuterol (PROVENTIL HFA,VENTOLIN HFA) 90 mcg/act inhaler INHALE 2 PUFFS EVERY 4 (FOUR) HOURS AS NEEDED FOR WHEEZING OR SHORTNESS OF BREATH COUGH, Normal      aspirin (ECOTRIN LOW STRENGTH) 81 mg EC tablet Take 81 mg by mouth daily, Historical Med      Cyanocobalamin (Vitamin B-12) 1000 MCG SUBL Place 1 tablet (1,000 mcg total) under the tongue in the morning, Starting Wed 3/6/2024, Normal      ergocalciferol (VITAMIN D2) 50,000 units TAKE 1 CAPSULE BY MOUTH EVERY 14 DAYS, Normal      esomeprazole (NexIUM) 40 MG capsule take 1 capsule by mouth every morning, Starting Tue 1/28/2025, Normal      folic acid (FOLVITE) 1 mg tablet take 1 tablet by mouth once daily, Starting Mon 7/8/2024, Normal      !! HYDROcodone-acetaminophen (NORCO) 5-325 mg per tablet Take 1 tablet by mouth every 8 (eight) hours as needed for pain For ongoing therapy DO NOT FILL BEFORE: 12/09/24 Max Daily Amount: 3 tablets, Starting Tue 11/19/2024, Normal      !! HYDROcodone-acetaminophen (NORCO) 5-325 mg per tablet Take 1 tablet by mouth every 8 (eight) hours as needed for pain For ongoing therapy DO NOT FILL BEFORE: 02/05/25 Max Daily Amount: 3 tablets, Starting Tue 11/19/2024, Normal      naloxone (NARCAN) 4 mg/0.1 mL nasal spray Administer 1 spray into a nostril. If no response after 2-3 minutes, give another dose in the other nostril using a new spray., Normal       !! - Potential duplicate medications found. Please discuss with provider.        No discharge procedures on file.  ED SEPSIS DOCUMENTATION   Time reflects when diagnosis was documented in both MDM as applicable and the Disposition within this note       Time User Action Codes Description Comment    1/30/2025 10:14  PM Mariam Rivera Add [R45.851] Suicidal ideations     1/30/2025 10:14 PM Mariam Rivera Modify [R45.851] Suicidal ideations resolved    1/30/2025 10:49 PM Mariam Rivera Add [R55] Syncope     1/30/2025 10:50 PM Mariam Rivera Add [R07.89] Chest wall pain     1/30/2025 10:50 PM Mariam Rivera Add [F41.9] Anxiety                  Mariam Rivera,   01/30/25 3722

## 2025-01-31 NOTE — ED ATTENDING ATTESTATION
"1/30/2025  I, Karen More, , saw and evaluated the patient. I have discussed the patient with the resident/non-physician practitioner and agree with the resident's/non-physician practitioner's findings, Plan of Care, and MDM as documented in the resident's/non-physician practitioner's note, except where noted. All available labs and Radiology studies were reviewed.  I was present for key portions of any procedure(s) performed by the resident/non-physician practitioner and I was immediately available to provide assistance.       At this point I agree with the current assessment done in the Emergency Department.  I have conducted an independent evaluation of this patient a history and physical is as follows:    49-year-old male presenting to emergency department with fall and syncopal episodes.  On Tuesday he had 2 syncopal episodes 1 while sitting on the couch and 1 afterwards when he stood up and fell and struck his head.  He has had issues with syncope for the last several years, has seen cardiology and neurology for this.  Recent diagnosis of MS, is supposed to get started on medications for MS in February.  Does get prodromal symptoms with his syncopal episodes including lightheadedness, weakness in the legs, nausea, palpitations.  Yesterday he states that he was outside and he fell and hit his chest on the right side and may have hit his head but uncertain.  He also states over the last few weeks he has been very agitated and has had periods of anxiety, intermittent suicidal thoughts but states he would never do anything to hurt himself.  He states that he walked outside yesterday and he was walking towards \"22\", that he was thinking of walking into traffic.  However he did not do it turned around and that is when he fell.    Upon physical examination, patient does have right mid to lower chest wall tenderness to palpation, heart regular rate and rhythm, lungs clear to auscultation bilaterally, head " atraumatic/normocephalic, no focal neurological deficit, no abdominal tenderness to palpation.    Patient has had an extensive workup for syncope, patient states they have not found a cause for why he passes out.  Does not have any focal neurological deficits currently, GCS 15.  Obtain imaging with results as below.  Per my depend interpretation of chest x-ray, no acute cardiopulmonary processes.  Per my independent interpretation of EKG sinus rhythm heart of 69, narrow QRS, short HI interval however no delta waves, QTc reassuring, no STEMI, no evidence of Brugada syndrome, PVC is noted.  Blood work is reassuring.  Discussed with on-call neurologist, his behavioral change/suicidal thoughts are not related to MS.    Crisis evaluated patient, patient received Atarax, he is currently not suicidal, does not have a plan, feels safe going home, states he would never act on it.  He feels much better after Atarax.  He is provided prescription for Atarax.  Outpatient resources were provided and discussed.  He was updated on results.  Advised PCP and urology follow-up as well, return cautions were discussed.    ED Course         Critical Care Time  Procedures    CT head without contrast   Final Result      No acute intracranial hemorrhage, significant mass effect or midline shift.                  Workstation performed: [id number]         XR chest 1 view portable   ED Interpretation   No acute cardiopulmonary process visualized.

## 2025-01-31 NOTE — ED NOTES
Pt is a 49 y.o. male who was brought to the ED with   Chief Complaint   Patient presents with    Syncope     Pt states he passed out 2x Tuesday night and c/o brain fog. Hx of MS. Pt also tripped over curb yesterday and fell. C/o CP after event. Denies thinners, takes aspirin daily.      Intake Assessment completed, Safety risk Assessment completed  Pt will be admitted DC with Safety plan       Yana Kwok

## 2025-02-02 LAB
ATRIAL RATE: 69 BPM
P AXIS: 33 DEGREES
PR INTERVAL: 108 MS
QRS AXIS: 30 DEGREES
QRSD INTERVAL: 88 MS
QT INTERVAL: 406 MS
QTC INTERVAL: 435 MS
T WAVE AXIS: 28 DEGREES
VENTRICULAR RATE: 69 BPM

## 2025-02-02 PROCEDURE — 93010 ELECTROCARDIOGRAM REPORT: CPT | Performed by: INTERNAL MEDICINE

## 2025-02-03 ENCOUNTER — TELEPHONE (OUTPATIENT)
Age: 50
End: 2025-02-03

## 2025-02-03 NOTE — TELEPHONE ENCOUNTER
I can send him a script for this month as a one time exception;  he'll need to be seen for any additional refills thereafter.

## 2025-02-03 NOTE — TELEPHONE ENCOUNTER
S/w pt, advised of rx with DNF 2/5/25. This rx will take him to the beginning of march. Please cb when he has a date that his insurance takes effect - we will reschedule his ov and  rx's at that point. Pt verbalized understanding and appreciation and requested that the 2/11/25 ov be cancelled.     2/11/25 ov cancelled as requested.

## 2025-02-03 NOTE — TELEPHONE ENCOUNTER
Ok; I did not realize he had a prescription to fill for February.  If he expects the new insurance to start at the end of February/beginning of March then he can schedule an appointment for around that time.

## 2025-02-03 NOTE — TELEPHONE ENCOUNTER
Caller: Patient    Doctor: Asiya Mcclain    Reason for call: Patient is without insurance at the time being until his wife's new job and benefits start. Patient is unable to make 2/11/25 appt and would like to discuss with a nurse his options for med refills until he is reinstated.    Please assist.    Call back#: [phone]

## 2025-02-03 NOTE — TELEPHONE ENCOUNTER
S/w pt, advised the writer will need to discuss with MG and cb to advise. Per pt, his wife terminated on 1/4/25, he expects the new insurance will start at the end of Feb / beginning of March. Advised pt, the writer will d/w MG and cb to advise. Pt verbalized understanding and appreciation.

## 2025-02-06 ENCOUNTER — TELEPHONE (OUTPATIENT)
Dept: NEUROLOGY | Facility: CLINIC | Age: 50
End: 2025-02-06

## 2025-02-06 DIAGNOSIS — Z76.89 ENCOUNTER BEFORE STARTING MEDICATION: ICD-10-CM

## 2025-02-06 DIAGNOSIS — G35 MULTIPLE SCLEROSIS (HCC): Primary | ICD-10-CM

## 2025-02-06 NOTE — TELEPHONE ENCOUNTER
Pt is scheduled for 1st Ocrevus infusion on 2/19/25.     Pt is approved for free drug with the [employer] patient foundation. Two 300mg vials of Ocrevus were to be delivered to the [facility] infusion center on 1/28/25.See 12/12/24 encounter.     The following labs are required and have been entered as appropriate per protocol:    CBC  CMP  Immunoglobulins    HIV 12/20/24  Quantiferon TB gold 12/20/24  Hepatitis panel 12/20/24    [employer] message sent to pt reminding them of needed labs.    Awaiting labs.

## 2025-02-07 ENCOUNTER — APPOINTMENT (OUTPATIENT)
Dept: LAB | Facility: CLINIC | Age: 50
End: 2025-02-07

## 2025-02-07 DIAGNOSIS — G35 MULTIPLE SCLEROSIS (HCC): ICD-10-CM

## 2025-02-07 DIAGNOSIS — Z76.89 ENCOUNTER BEFORE STARTING MEDICATION: ICD-10-CM

## 2025-02-07 LAB
ALBUMIN SERPL BCG-MCNC: 4.2 G/DL (ref 3.5–5)
ALP SERPL-CCNC: 73 U/L (ref 34–104)
ALT SERPL W P-5'-P-CCNC: 13 U/L (ref 7–52)
ANION GAP SERPL CALCULATED.3IONS-SCNC: 9 MMOL/L (ref 4–13)
AST SERPL W P-5'-P-CCNC: 16 U/L (ref 13–39)
BASOPHILS # BLD AUTO: 0.07 THOUSANDS/ΜL (ref 0–0.1)
BASOPHILS NFR BLD AUTO: 1 % (ref 0–1)
BILIRUB SERPL-MCNC: 0.52 MG/DL (ref 0.2–1)
BUN SERPL-MCNC: 14 MG/DL (ref 5–25)
CALCIUM SERPL-MCNC: 9.3 MG/DL (ref 8.4–10.2)
CHLORIDE SERPL-SCNC: 104 MMOL/L (ref 96–108)
CO2 SERPL-SCNC: 29 MMOL/L (ref 21–32)
CREAT SERPL-MCNC: 1.06 MG/DL (ref 0.6–1.3)
EOSINOPHIL # BLD AUTO: 0.14 THOUSAND/ΜL (ref 0–0.61)
EOSINOPHIL NFR BLD AUTO: 2 % (ref 0–6)
ERYTHROCYTE [DISTWIDTH] IN BLOOD BY AUTOMATED COUNT: 14.1 % (ref 11.6–15.1)
GFR SERPL CREATININE-BSD FRML MDRD: 82 ML/MIN/1.73SQ M
GLUCOSE P FAST SERPL-MCNC: 131 MG/DL (ref 65–99)
HCT VFR BLD AUTO: 51.1 % (ref 36.5–49.3)
HGB BLD-MCNC: 15.6 G/DL (ref 12–17)
IGA SERPL-MCNC: 200 MG/DL (ref 66–433)
IGG SERPL-MCNC: 916 MG/DL (ref 635–1741)
IGM SERPL-MCNC: 187 MG/DL (ref 45–281)
IMM GRANULOCYTES # BLD AUTO: 0.03 THOUSAND/UL (ref 0–0.2)
IMM GRANULOCYTES NFR BLD AUTO: 1 % (ref 0–2)
LYMPHOCYTES # BLD AUTO: 1.85 THOUSANDS/ΜL (ref 0.6–4.47)
LYMPHOCYTES NFR BLD AUTO: 30 % (ref 14–44)
MCH RBC QN AUTO: 27 PG (ref 26.8–34.3)
MCHC RBC AUTO-ENTMCNC: 30.5 G/DL (ref 31.4–37.4)
MCV RBC AUTO: 89 FL (ref 82–98)
MONOCYTES # BLD AUTO: 0.31 THOUSAND/ΜL (ref 0.17–1.22)
MONOCYTES NFR BLD AUTO: 5 % (ref 4–12)
NEUTROPHILS # BLD AUTO: 3.75 THOUSANDS/ΜL (ref 1.85–7.62)
NEUTS SEG NFR BLD AUTO: 61 % (ref 43–75)
NRBC BLD AUTO-RTO: 0 /100 WBCS
PLATELET # BLD AUTO: 182 THOUSANDS/UL (ref 149–390)
PMV BLD AUTO: 12.3 FL (ref 8.9–12.7)
POTASSIUM SERPL-SCNC: 4.3 MMOL/L (ref 3.5–5.3)
PROT SERPL-MCNC: 7.1 G/DL (ref 6.4–8.4)
RBC # BLD AUTO: 5.77 MILLION/UL (ref 3.88–5.62)
SODIUM SERPL-SCNC: 142 MMOL/L (ref 135–147)
WBC # BLD AUTO: 6.15 THOUSAND/UL (ref 4.31–10.16)

## 2025-02-07 PROCEDURE — 80053 COMPREHEN METABOLIC PANEL: CPT

## 2025-02-07 PROCEDURE — 36415 COLL VENOUS BLD VENIPUNCTURE: CPT

## 2025-02-07 PROCEDURE — 85025 COMPLETE CBC W/AUTO DIFF WBC: CPT

## 2025-02-07 PROCEDURE — 82784 ASSAY IGA/IGD/IGG/IGM EACH: CPT

## 2025-02-11 NOTE — TELEPHONE ENCOUNTER
Pt's wife Aydee called in and advised she will be starting a new job with a smaller company who advised her to have her  use marketplace insurance, she stated she was on the [employer] website and wants assistance choosing the right marketplace insurance that will be able to cover all of pt's MS treatments and medications.

## 2025-02-12 DIAGNOSIS — F41.9 ANXIETY: ICD-10-CM

## 2025-02-12 RX ORDER — HYDROXYZINE HYDROCHLORIDE 25 MG/1
25 TABLET, FILM COATED ORAL EVERY 6 HOURS PRN
Qty: 30 TABLET | Refills: 0 | OUTPATIENT
Start: 2025-02-12

## 2025-02-12 NOTE — TELEPHONE ENCOUNTER
Pt seen in the ER and has no insurance. Pt pays out of pocket. Last visit- Telemedicine on 12/2024      Reason for call:   [x] Refill   [] Prior Auth  [] Other:     Office:   [x] PCP/Provider -   [] Specialty/Provider -     Medication: Hydroxyzine     Dose/Frequency: 25 mg    Quantity: 30 tablets    Pharmacy: [id number] [place] - [id number]     Does the patient have enough for 3 days?   [] Yes   [x] No - Send as HP to POD

## 2025-02-13 ENCOUNTER — TELEPHONE (OUTPATIENT)
Dept: NEUROLOGY | Facility: CLINIC | Age: 50
End: 2025-02-13

## 2025-02-13 NOTE — TELEPHONE ENCOUNTER
Yes please proceed with Ocrevus infusion.     Thank you,     Dr. Derik MD.   02/13/25   10:21 AM

## 2025-02-13 NOTE — TELEPHONE ENCOUNTER
Mary Riley Signed 2/11/2025       Pt's wife Aydee called in and advised she will be starting a new job with a smaller company who advised her to have her  use marketplace insurance, she stated she was on the [employer] website and wants assistance choosing the right marketplace insurance that will be able to cover all of pt's MS treatments and medications.

## 2025-02-13 NOTE — TELEPHONE ENCOUNTER
Labs complete, in chart.     Since 12/12/24 neuro OV:  -EEG 12/20/24  -ED 1/30/25 for syncope and suicidal ideations    Okay to proceed with first Ocrevus infusion on 2/19/25?     Will address pt's wife's question in another encounter.

## 2025-02-13 NOTE — LETTER
Anderson Hart  [address] [address]      Our office has been unsuccessful in trying to reach you by phone regarding:      ? Your phone call to our office        Please contact our office at your earliest convenience.  Please call [phone]  and follow the prompts.      Sincerely,       [facility]

## 2025-02-14 DIAGNOSIS — F41.9 ANXIETY: ICD-10-CM

## 2025-02-14 NOTE — TELEPHONE ENCOUNTER
No way to know for sure which plan would definitely cover pt's medications etc. Many plans require PA which may or may not be approved. Pt should pick the best plan for him. Pt can try contacting possible insurance plans to see if they at least have Ocrevus on their formulary medication list.     Called and Left a message on [employer] machine for a call back.

## 2025-02-15 RX ORDER — HYDROXYZINE HYDROCHLORIDE 25 MG/1
25 TABLET, FILM COATED ORAL 3 TIMES DAILY PRN
Qty: 45 TABLET | Refills: 0 | Status: SHIPPED | OUTPATIENT
Start: 2025-02-15

## 2025-02-17 ENCOUNTER — RESULTS FOLLOW-UP (OUTPATIENT)
Age: 50
End: 2025-02-17

## 2025-02-19 ENCOUNTER — HOSPITAL ENCOUNTER (OUTPATIENT)
Dept: INFUSION CENTER | Facility: HOSPITAL | Age: 50
Discharge: HOME | End: 2025-02-19
Attending: PSYCHIATRY & NEUROLOGY

## 2025-02-19 VITALS
TEMPERATURE: 97.5 F | SYSTOLIC BLOOD PRESSURE: 147 MMHG | DIASTOLIC BLOOD PRESSURE: 100 MMHG | RESPIRATION RATE: 16 BRPM | HEART RATE: 99 BPM

## 2025-02-19 DIAGNOSIS — G35 MULTIPLE SCLEROSIS (HCC): Primary | ICD-10-CM

## 2025-02-19 PROCEDURE — 96375 TX/PRO/DX INJ NEW DRUG ADDON: CPT

## 2025-02-19 PROCEDURE — 96415 CHEMO IV INFUSION ADDL HR: CPT

## 2025-02-19 PROCEDURE — 96367 TX/PROPH/DG ADDL SEQ IV INF: CPT

## 2025-02-19 PROCEDURE — 96413 CHEMO IV INFUSION 1 HR: CPT

## 2025-02-19 RX ORDER — ACETAMINOPHEN 325 MG/1
650 TABLET ORAL ONCE
Status: CANCELLED | OUTPATIENT
Start: 2025-03-05

## 2025-02-19 RX ORDER — SODIUM CHLORIDE 9 MG/ML
20 INJECTION, SOLUTION INTRAVENOUS ONCE
Status: COMPLETED | OUTPATIENT
Start: 2025-02-19 | End: 2025-02-19

## 2025-02-19 RX ORDER — SODIUM CHLORIDE 9 MG/ML
20 INJECTION, SOLUTION INTRAVENOUS ONCE
Status: CANCELLED | OUTPATIENT
Start: 2025-03-05

## 2025-02-19 RX ORDER — ACETAMINOPHEN 325 MG/1
650 TABLET ORAL ONCE
Status: COMPLETED | OUTPATIENT
Start: 2025-02-19 | End: 2025-02-19

## 2025-02-19 RX ORDER — SODIUM CHLORIDE 9 MG/ML
20 INJECTION, SOLUTION INTRAVENOUS ONCE
Status: CANCELLED | OUTPATIENT
Start: 2025-03-06

## 2025-02-19 RX ORDER — ACETAMINOPHEN 325 MG/1
650 TABLET ORAL ONCE
Status: CANCELLED | OUTPATIENT
Start: 2025-03-06

## 2025-02-19 RX ORDER — HYDROCORTISONE SODIUM SUCCINATE 100 MG/2ML
100 INJECTION INTRAMUSCULAR; INTRAVENOUS ONCE
Status: CANCELLED | OUTPATIENT
Start: 2025-03-06 | End: 2025-03-05

## 2025-02-19 RX ORDER — HYDROCORTISONE SODIUM SUCCINATE 100 MG/2ML
100 INJECTION INTRAMUSCULAR; INTRAVENOUS ONCE
Status: COMPLETED | OUTPATIENT
Start: 2025-02-19 | End: 2025-02-19

## 2025-02-19 RX ADMIN — OCRELIZUMAB 300 MG: 300 INJECTION INTRAVENOUS at 09:42

## 2025-02-19 RX ADMIN — SODIUM CHLORIDE 20 ML/HR: 0.9 INJECTION, SOLUTION INTRAVENOUS at 08:07

## 2025-02-19 RX ADMIN — ACETAMINOPHEN 650 MG: 325 TABLET, FILM COATED ORAL at 07:48

## 2025-02-19 RX ADMIN — DIPHENHYDRAMINE HYDROCHLORIDE 50 MG: 50 INJECTION, SOLUTION INTRAMUSCULAR; INTRAVENOUS at 08:07

## 2025-02-19 RX ADMIN — HYDROCORTISONE SODIUM SUCCINATE 100 MG: 100 INJECTION, POWDER, FOR SOLUTION INTRAMUSCULAR; INTRAVENOUS at 09:02

## 2025-02-19 RX ADMIN — SODIUM CHLORIDE 125 MG: 0.9 INJECTION, SOLUTION INTRAVENOUS at 08:30

## 2025-02-19 RX ADMIN — FAMOTIDINE 20 MG: 10 INJECTION INTRAVENOUS at 07:45

## 2025-02-19 NOTE — PROGRESS NOTES
No complications noted during pt's one hour post ocrevus infusion.    Pt aware of next appt at [facility] on 3/6 at 0800.

## 2025-02-20 ENCOUNTER — TELEPHONE (OUTPATIENT)
Dept: NEUROLOGY | Facility: CLINIC | Age: 50
End: 2025-02-20

## 2025-02-20 NOTE — TELEPHONE ENCOUNTER
Called and Left a message on [employer] machine for a call back.     2nd attempt.     Letter written. Please mail to pt. Thanks!

## 2025-02-20 NOTE — TELEPHONE ENCOUNTER
Pt is scheduled for 2nd Ocrevus infusion on 3/6/25.      Pt is approved for free drug with the [employer] patient foundation. Two 300mg vials of Ocrevus were to be delivered to the [facility] infusion center on 1/28/25.See 12/12/24 encounter.      The following labs are required and have been entered as appropriate per protocol:     CBC 2/7/25  CMP 2/7/25  Immunoglobulins 2/7/25  HIV 12/20/24  Quantiferon TB gold 12/20/24  Hepatitis panel 12/20/24     First infusion completed yesterday 2/19/25. No complications or reactions noted.     Okay to proceed with 2nd Ocrevus infusion on 3/6/25 with current lab results on file?

## 2025-02-21 NOTE — TELEPHONE ENCOUNTER
Labs reviewed.     Please proceed with the second Ocrevus infusion.     Thank you,     Dr. Derik MD.   02/21/25   8:29 AM

## 2025-02-24 DIAGNOSIS — F41.9 ANXIETY: ICD-10-CM

## 2025-02-25 RX ORDER — HYDROXYZINE HYDROCHLORIDE 25 MG/1
25 TABLET, FILM COATED ORAL 3 TIMES DAILY
Qty: 45 TABLET | Refills: 0 | OUTPATIENT
Start: 2025-02-25

## 2025-02-28 ENCOUNTER — APPOINTMENT (OUTPATIENT)
Dept: LAB | Facility: CLINIC | Age: 50
End: 2025-02-28

## 2025-02-28 DIAGNOSIS — G35 MULTIPLE SCLEROSIS (HCC): ICD-10-CM

## 2025-02-28 LAB
ALBUMIN SERPL BCG-MCNC: 4.2 G/DL (ref 3.5–5)
ALP SERPL-CCNC: 81 U/L (ref 34–104)
ALT SERPL W P-5'-P-CCNC: 13 U/L (ref 7–52)
ANION GAP SERPL CALCULATED.3IONS-SCNC: 8 MMOL/L (ref 4–13)
AST SERPL W P-5'-P-CCNC: 12 U/L (ref 13–39)
BASOPHILS # BLD AUTO: 0.09 THOUSANDS/ÂΜL (ref 0–0.1)
BASOPHILS NFR BLD AUTO: 1 % (ref 0–1)
BILIRUB SERPL-MCNC: 0.63 MG/DL (ref 0.2–1)
BUN SERPL-MCNC: 9 MG/DL (ref 5–25)
CALCIUM SERPL-MCNC: 9.5 MG/DL (ref 8.4–10.2)
CHLORIDE SERPL-SCNC: 104 MMOL/L (ref 96–108)
CO2 SERPL-SCNC: 27 MMOL/L (ref 21–32)
CREAT SERPL-MCNC: 1.05 MG/DL (ref 0.6–1.3)
EOSINOPHIL # BLD AUTO: 0.1 THOUSAND/ÂΜL (ref 0–0.61)
EOSINOPHIL NFR BLD AUTO: 2 % (ref 0–6)
ERYTHROCYTE [DISTWIDTH] IN BLOOD BY AUTOMATED COUNT: 13.8 % (ref 11.6–15.1)
GFR SERPL CREATININE-BSD FRML MDRD: 82 ML/MIN/1.73SQ M
GLUCOSE P FAST SERPL-MCNC: 134 MG/DL (ref 65–99)
HCT VFR BLD AUTO: 47.8 % (ref 36.5–49.3)
HGB BLD-MCNC: 15.5 G/DL (ref 12–17)
IMM GRANULOCYTES # BLD AUTO: 0.03 THOUSAND/UL (ref 0–0.2)
IMM GRANULOCYTES NFR BLD AUTO: 1 % (ref 0–2)
LYMPHOCYTES # BLD AUTO: 1.36 THOUSANDS/ÂΜL (ref 0.6–4.47)
LYMPHOCYTES NFR BLD AUTO: 22 % (ref 14–44)
MCH RBC QN AUTO: 27.8 PG (ref 26.8–34.3)
MCHC RBC AUTO-ENTMCNC: 32.4 G/DL (ref 31.4–37.4)
MCV RBC AUTO: 86 FL (ref 82–98)
MONOCYTES # BLD AUTO: 0.38 THOUSAND/ÂΜL (ref 0.17–1.22)
MONOCYTES NFR BLD AUTO: 6 % (ref 4–12)
NEUTROPHILS # BLD AUTO: 4.36 THOUSANDS/ÂΜL (ref 1.85–7.62)
NEUTS SEG NFR BLD AUTO: 68 % (ref 43–75)
NRBC BLD AUTO-RTO: 0 /100 WBCS
PLATELET # BLD AUTO: 255 THOUSANDS/UL (ref 149–390)
PMV BLD AUTO: 12 FL (ref 8.9–12.7)
POTASSIUM SERPL-SCNC: 4.3 MMOL/L (ref 3.5–5.3)
PROT SERPL-MCNC: 6.7 G/DL (ref 6.4–8.4)
RBC # BLD AUTO: 5.57 MILLION/UL (ref 3.88–5.62)
SODIUM SERPL-SCNC: 139 MMOL/L (ref 135–147)
WBC # BLD AUTO: 6.32 THOUSAND/UL (ref 4.31–10.16)

## 2025-02-28 PROCEDURE — 36415 COLL VENOUS BLD VENIPUNCTURE: CPT

## 2025-02-28 PROCEDURE — 85025 COMPLETE CBC W/AUTO DIFF WBC: CPT

## 2025-02-28 PROCEDURE — 80053 COMPREHEN METABOLIC PANEL: CPT

## 2025-03-03 ENCOUNTER — RESULTS FOLLOW-UP (OUTPATIENT)
Age: 50
End: 2025-03-03

## 2025-03-04 ENCOUNTER — TELEPHONE (OUTPATIENT)
Age: 50
End: 2025-03-04

## 2025-03-04 DIAGNOSIS — M54.16 LUMBAR RADICULOPATHY: ICD-10-CM

## 2025-03-04 DIAGNOSIS — G89.4 CHRONIC PAIN SYNDROME: ICD-10-CM

## 2025-03-04 DIAGNOSIS — M79.2 NEURALGIA AND NEURITIS: ICD-10-CM

## 2025-03-04 RX ORDER — HYDROCODONE BITARTRATE AND ACETAMINOPHEN 5; 325 MG/1; MG/1
TABLET ORAL
Qty: 21 TABLET | Refills: 0 | Status: SHIPPED | OUTPATIENT
Start: 2025-03-04 | End: 2025-03-12 | Stop reason: SDUPTHER

## 2025-03-04 NOTE — TELEPHONE ENCOUNTER
Called and spoke to patient regarding insurance information. Patient stated that he would not have insurance until April. Patient then asked about rescheduling appointment. Explained to patient that provider's next available opening would not be until August. Patient then stated he would keep appointment. Told patient how much would need to be paid at time of visit (self-pay estimate) and patient stated that he would figure something out.

## 2025-03-04 NOTE — TELEPHONE ENCOUNTER
S/w pt, advised of above. Provided weaning instructions. Advised pt, the writer will forward his information to the clerical staff - anticipate a cb if they can schedule an ov without the insurance info. Otherwise, please cb when the insurance info is available. Pt verbalized understanding and appreciation. Will proceed as discussed.

## 2025-03-04 NOTE — TELEPHONE ENCOUNTER
Pt stated his wife's new insurance does not start till the beginning of April. He said he will schedule an appointment once his wife's insurance starts.    Reason for call:   [x] Refill   [] Prior Auth  [] Other:     Office:   [] PCP/Provider -   [x] Specialty/Provider - SPA    Medication: HYDROcodone-acetaminophen (NORCO) 5-325 mg per tablet     Dose/Frequency: 1 tablet, Oral, Every 8 hours PRN     Quantity: 90    Pharmacy: [id number] - [place] [place] - [address]      Does the patient have enough for 3 days?   [] Yes   [x] No - Send as HP to POD

## 2025-03-04 NOTE — TELEPHONE ENCOUNTER
Caller: Patient    Doctor: Dr. LEZAMA    Reason for call: Returning missed call  Transfer to RN    Call back#:

## 2025-03-05 ENCOUNTER — HOSPITAL ENCOUNTER (OUTPATIENT)
Dept: INFUSION CENTER | Facility: HOSPITAL | Age: 50
Discharge: HOME | End: 2025-03-05
Attending: PSYCHIATRY & NEUROLOGY

## 2025-03-06 ENCOUNTER — HOSPITAL ENCOUNTER (OUTPATIENT)
Dept: INFUSION CENTER | Facility: HOSPITAL | Age: 50
Discharge: HOME | End: 2025-03-06
Attending: PSYCHIATRY & NEUROLOGY

## 2025-03-06 VITALS
SYSTOLIC BLOOD PRESSURE: 159 MMHG | TEMPERATURE: 97.7 F | DIASTOLIC BLOOD PRESSURE: 84 MMHG | RESPIRATION RATE: 18 BRPM | HEART RATE: 74 BPM

## 2025-03-06 DIAGNOSIS — G35 MULTIPLE SCLEROSIS (HCC): Primary | ICD-10-CM

## 2025-03-06 PROCEDURE — 96375 TX/PRO/DX INJ NEW DRUG ADDON: CPT

## 2025-03-06 PROCEDURE — 96367 TX/PROPH/DG ADDL SEQ IV INF: CPT

## 2025-03-06 PROCEDURE — 96415 CHEMO IV INFUSION ADDL HR: CPT

## 2025-03-06 PROCEDURE — 96413 CHEMO IV INFUSION 1 HR: CPT

## 2025-03-06 RX ORDER — HYDROCORTISONE SODIUM SUCCINATE 100 MG/2ML
100 INJECTION INTRAMUSCULAR; INTRAVENOUS ONCE
Status: COMPLETED | OUTPATIENT
Start: 2025-03-06 | End: 2025-03-06

## 2025-03-06 RX ORDER — ACETAMINOPHEN 325 MG/1
650 TABLET ORAL ONCE
OUTPATIENT
Start: 2025-03-14

## 2025-03-06 RX ORDER — ACETAMINOPHEN 325 MG/1
650 TABLET ORAL ONCE
Status: CANCELLED | OUTPATIENT
Start: 2025-03-14

## 2025-03-06 RX ORDER — ACETAMINOPHEN 325 MG/1
650 TABLET ORAL ONCE
Status: COMPLETED | OUTPATIENT
Start: 2025-03-06 | End: 2025-03-06

## 2025-03-06 RX ORDER — SODIUM CHLORIDE 9 MG/ML
20 INJECTION, SOLUTION INTRAVENOUS ONCE
Status: COMPLETED | OUTPATIENT
Start: 2025-03-06 | End: 2025-03-06

## 2025-03-06 RX ORDER — HYDROCORTISONE SODIUM SUCCINATE 100 MG/2ML
100 INJECTION INTRAMUSCULAR; INTRAVENOUS ONCE
Status: CANCELLED | OUTPATIENT
Start: 2025-03-14 | End: 2025-03-14

## 2025-03-06 RX ORDER — SODIUM CHLORIDE 9 MG/ML
20 INJECTION, SOLUTION INTRAVENOUS ONCE
OUTPATIENT
Start: 2025-03-14

## 2025-03-06 RX ORDER — SODIUM CHLORIDE 9 MG/ML
20 INJECTION, SOLUTION INTRAVENOUS ONCE
Status: CANCELLED | OUTPATIENT
Start: 2025-03-14

## 2025-03-06 RX ADMIN — DIPHENHYDRAMINE HYDROCHLORIDE 50 MG: 50 INJECTION, SOLUTION INTRAMUSCULAR; INTRAVENOUS at 08:41

## 2025-03-06 RX ADMIN — ACETAMINOPHEN 650 MG: 325 TABLET ORAL at 09:02

## 2025-03-06 RX ADMIN — HYDROCORTISONE SODIUM SUCCINATE 100 MG: 100 INJECTION, POWDER, FOR SOLUTION INTRAMUSCULAR; INTRAVENOUS at 09:03

## 2025-03-06 RX ADMIN — FAMOTIDINE 20 MG: 10 INJECTION INTRAVENOUS at 08:18

## 2025-03-06 RX ADMIN — OCRELIZUMAB 300 MG: 300 INJECTION INTRAVENOUS at 09:50

## 2025-03-06 RX ADMIN — SODIUM CHLORIDE 20 ML/HR: 0.9 INJECTION, SOLUTION INTRAVENOUS at 08:17

## 2025-03-06 RX ADMIN — SODIUM CHLORIDE 125 MG: 0.9 INJECTION, SOLUTION INTRAVENOUS at 09:05

## 2025-03-06 NOTE — PROGRESS NOTES
Patient presents today for Ocrevus infusion offering no complaints. PIV placed with positive blood return. Patient tolerated infusion well and post observation. PIV removed. AVS declined. Patient stated he will call to schedule next appointment after his provider visit.

## 2025-03-12 ENCOUNTER — OFFICE VISIT (OUTPATIENT)
Age: 50
End: 2025-03-12

## 2025-03-12 VITALS
WEIGHT: 208 LBS | BODY MASS INDEX: 28.17 KG/M2 | OXYGEN SATURATION: 97 % | DIASTOLIC BLOOD PRESSURE: 92 MMHG | SYSTOLIC BLOOD PRESSURE: 140 MMHG | HEIGHT: 72 IN | HEART RATE: 82 BPM

## 2025-03-12 DIAGNOSIS — G35 MULTIPLE SCLEROSIS (HCC): Primary | ICD-10-CM

## 2025-03-12 DIAGNOSIS — H81.10 BPPV (BENIGN PAROXYSMAL POSITIONAL VERTIGO): ICD-10-CM

## 2025-03-12 DIAGNOSIS — R06.02 SOB (SHORTNESS OF BREATH): ICD-10-CM

## 2025-03-12 DIAGNOSIS — F41.9 ANXIETY: ICD-10-CM

## 2025-03-12 DIAGNOSIS — R20.0 NUMBNESS: ICD-10-CM

## 2025-03-12 PROCEDURE — 99215 OFFICE O/P EST HI 40 MIN: CPT | Performed by: PSYCHIATRY & NEUROLOGY

## 2025-03-12 RX ORDER — AMANTADINE HYDROCHLORIDE 100 MG/1
100 CAPSULE, GELATIN COATED ORAL
Qty: 90 CAPSULE | Refills: 3 | Status: SHIPPED | OUTPATIENT
Start: 2025-03-12

## 2025-03-12 RX ORDER — HYDROXYZINE HYDROCHLORIDE 25 MG/1
25 TABLET, FILM COATED ORAL 3 TIMES DAILY PRN
Qty: 90 TABLET | Refills: 0 | Status: SHIPPED | OUTPATIENT
Start: 2025-03-12

## 2025-03-12 RX ORDER — PREGABALIN 50 MG/1
50 CAPSULE ORAL 3 TIMES DAILY
Qty: 90 CAPSULE | Refills: 3 | Status: SHIPPED | OUTPATIENT
Start: 2025-03-12

## 2025-03-12 NOTE — PROGRESS NOTES
Neurology Follow up Patient Ambulatory Visit Note    Name: Anderson Hart       : 1975       MRN: [id number]   Encounter Provider: Amanda Nolasco MD   Encounter Date: 3/12/2025  Encounter department: [facility]    History of Present Illness     Anderson Hart is a 49 y.o. male presenting with Multiple Sclerosis        INTERIM HISTORY:  Current DMT: Ocrevus  Side effects: denies having any side effects  Baseline safety labs: stable.     Mr. Hart is a very pleasant 49 y.o. male with multiple sclerosis, presents for follow-up and management of symptoms.    He experiences persistent numbness and tingling in both legs and feet. A transient tingling sensation on his face, described as 'like I walked through cobwebs,' lasted about an hour and then resolved. He notes a right foot that 'falls a little bit slower to react,' but he has not experienced any falls in months. No new numbness is reported.    He reports episodes of vertigo and dizziness, particularly with exertion, such as climbing a ladder, which causes him to feel lightheaded and wobbly. He experiences shortness of breath with minor activities, similar to when he used to run across the road with a wire, leaving him unable to catch his breath. This symptom began around 8361-2676. He has a history of sports asthma since childhood.    He reports slurring of speech and memory issues, such as leaving the stove on. He denies any new vision changes or loss of vision.    He recently received an Ocrevus infusion, which was long but did not cause any noticeable side effects, except for increased urination, which he is unsure if it is related to the treatment. He is not currently using any medication for numbness. He reports frequent urination and is unsure if it is due to a bladder infection or the Ocrevus infusion. No bowel issues are noted.    He is currently taking Atarax 20 mg three times a day for anxiety, which he  finds helpful, and is also on vitamin D and B12 supplements.    He is currently out of work and has applied for disability due to a back injury and multiple sclerosis. He was previously on social security for a back injury and had a spinal stimulator placed. He is working with a group to assist with his disability application.      PRIOR NOTES:  Mr. Hart is a 48 yo right handed male with a PMH of recently diagnosed demyelinating lesions and chronic pain syndrome s/p SCS implantation and L4-S1 fusion who presents for a follow-up regarding his abnormal MRI findings and clinical history suggesting a demyelinating disease such as MS. Briefly to review, he was hospitalized July 2024 for complete numbness waist down complicated by complete saddle anesthesia and diffuse left leg weakness. He also sees Pain medicine for his lumbar postlaminectomy pain syndrome and lumbar spondylosis and is on Norco and gabapentin currently.  At our last visit in October 2024, he endorsed continued lower extremity numbness since July. His saddle anesthesia had resolved.  He denied any episodes of bladder or bowel incontinence.  He also reported associated symptoms in his left eye.  He reported that initially about a week after the onset of symptoms he could not read words on the television screen if he closes right eye.  He denied any orbital pain or pain upon extraocular movement.     Since our last visit, he underwent visual fields testing which was abnormal and suggestive of a demyelinating condition.  He is accompanied by his wife today who also reports seeing more of a brain fog and him.  He occasionally will forget what he was talking about in the middle of the conversation.  This is going on for 4 to 6 months now.  When asked about sleep, he reports that he sleeps roughly 3 hours every night but naps throughout the day.  He had a sleep study done in the past which was negative for any sleep apnea.  He and his wife note that his  sleep is limited by his dogs waking him up throughout the night.  He does not snore.  Of note, he has had 1 fall since his last visit with me in October.  This was in the setting of him getting up to walk and feeling lightheaded and dizziness with a rush of blood to his head.  He did not hit his head at all but lost consciousness for a few seconds and woke up without any confusion.  No tongue biting or urinary incontinence.  Prior to our visit last October, he had several falls with similar prodromal symptoms.  He denies any chest pain or palpitations.  When asked about hydration, he reports that he mostly drinks iced tea throughout the day.  He does not drink a lot of water.    Ever symptom free: no   Falls: Lives with his wife. Had a most recent fall three weeks ago-- came in to make a sandwhich got lightheaded felt like he couldn't breathe. No chest pain or palpitations. Felt prodromal symptoms  Pain: Well-controlled  Loss of bladder/bowels: no  Loss of sensation in perineal areas: yes back in July, now resolved  Dizziness or lightheadedness: yes  Visual symptoms: still experiencing some blurry vision in left eye, on/off  Weakness: both legs and arms, everything feels very heavy  Neck or back pain: yes but controlled with pain regimen  Family History of TIA, stroke, MS, epilepsy: mother had tingling down to fingertips and numbness  Smokin packs per day for 9 years  Alcohol: no  Drugs: no  Occupation: Retired, was previously a    History of radiation/chemotherapy: no  Fatigue: yes, increased  Lhermitte sign: no   Uhthoff's phenomenon: yes  MS hug-like symptom:  no  He endorses lightheadedness when getting out of hot room or hot shower    Objective   /92 (BP Location: Left arm, Patient Position: Sitting, Cuff Size: Extra-Large) Comment: pt did not take medication this a.m  Pulse 82   Ht 6' (1.829 m)   Wt 94.3 kg (208 lb)   SpO2 97%   BMI 28.21 kg/m²        Neurological examination:  "    Mental status exam: Alert     Cranial nerve exam:    I Not tested   II Normal visual fields to finger counting.   II, Ill,  IV, VI Pupils were symmetric, briskly reactive. No afferent pupillary defect.  Eye movements are full without nystagmus. No ptosis.   V Facial sensation were intact   VII Facial movements were symmetrical    VIII Hearing was intact   IX, X Intact   XI Shoulder shrug and head turn was normal   XII Tongue protrusion is in the mid line.          Motor exam      MOTOR: Muscle bulk and tone are normal, Strength is 4/5 in right arm and left iliopsoas, 4/5 in right knee extensor, 5/5 in left arm, Intact fine motor movement without pronator drift  REFLEXES: 3+ in lower extremities, No clonus, Negative Babinski  SENSATION: Decreased sensation on left side, otherwise intact to light touch, pinprick,    Coordination:  Normal finger to nose testing  Finger tapping test was normal    Gait and Station:    normal        Pertinent diagnostic testing:  Labs:  Lab Results   Component Value Date    WBC 6.32 02/28/2025     Lab Results   Component Value Date    HGB 15.5 02/28/2025     Lab Results   Component Value Date     02/28/2025     Lab Results   Component Value Date    LYMPHSABS 1.36 02/28/2025     No results found for: \"LABLYMP\"  Lab Results   Component Value Date    EOSABS 0.10 02/28/2025     No components found for: \"NEUTROPHILS\"    No results found for: \"LABMONO\"         No results found for: \"LABGLUC\"  Lab Results   Component Value Date    CREATININE 1.05 02/28/2025       Lab Results   Component Value Date    AST 12 (L) 02/28/2025     Lab Results   Component Value Date    ALT 13 02/28/2025     Lab Results   Component Value Date    ALKPHOS 81 02/28/2025     Lab Results   Component Value Date    AST 12 (L) 02/28/2025        Lab Results   Component Value Date    FOLATE 10.1 09/11/2024     Lab Results   Component Value Date    YFFRISSL30 260 09/11/2024     No results found for: \"COPPER\"  No " "results found for: \"METHYLMALON\"  No results found for: \"VITAMINB6\"  No components found for: \"DTXYFAYU3CB\"  No components found for: \"VITAMINE\"  No components found for: \"ANADIRECT\"     No components found for: \"HIVPCR\"     No components found for: \"GLUCOSECSF\"  No components found for: \"CSFINTERP\"  Lab Results   Component Value Date    RBCCSF 2,019 (H) 09/19/2024     Lab Results   Component Value Date    WBCCSF 2.2 09/19/2024     No results found for: \"IGGSYNRATE\"  No components found for: \"IGGINDEX\"  Lab Results   Component Value Date    PROTEINCSF 60 (H) 09/19/2024     No components found for: \"CSFMONO\"  No components found for: \"FUNGUSCX\"  No components found for: \"CSFCS\"  No results found for: \"CRYPTOAG\"  No components found for: \"DRLCSF\"  No components found for: \"FLOWCYTEVAL\"     Lab Results   Component Value Date    HEPBSAG Non-reactive 12/20/2024    HEPBSAB 284.00 12/20/2024    HEPCAB Non-reactive 12/20/2024        CSF fluid:   Two (2) oligoclonal bands were observed in the CSF, which were not detected in the serum sample.   [place] undetected  VDRL non reactive  Myelin basic protein elevated, 15.2  IgG within normal range  Glucose normal, protein elevated at 60  Lyme IgG positive, IgM negative    Neuroimaging:   Results for orders placed during the hospital encounter of 10/30/24    MRI brain with and without contrast    Impression  1. Scattered white matter lesions in the supratentorial brain as well as in the upper cervical cord having imaging features of demyelinating disease such as multiple sclerosis. Further clinical assessment and follow-up advised. No pathologic enhancement  to definitively confirm active demyelination.  2. No acute infarction, intracranial hemorrhage or enhancing mass lesion.      I personally discussed this study with Dr. Tristan King on 11/1/2024 10:37 AM.        Workstation performed: [id number]                 Results for orders placed during the hospital encounter of " 11/06/24    MRI cervical spine with and without contrast    Impression  Focal hyperintense T2/STIR lesion within the left anterior hemicord at the C2-3 level suspicious for demyelinating disease. There is no pathologic cord enhancement to suggest acute demyelination.    Mild degenerative changes at the C4-5 through the C6-7 levels with mild central canal narrowing at C5-6.      Workstation performed: [id number]      Results for orders placed during the hospital encounter of 09/16/24    MRI thoracic spine w wo contrast    Impression  1.  Stable T2 cord signal abnormality involving dorsal cord extending from T10 inferior vertebral level through T10-11 disc level. There is associated marginal enhancement. Demyelinating, infectious, or inflammatory etiologies remain the leading  differential considerations. B12 deficiency is much less favored. Given nonexpansile appearance of the finding neoplastic process is less favored. Recommend correlation with CSF analysis and 3 months follow-up MRI without and with contrast.    2.  Stable mild degenerative canal narrowing at level T6-7              This study was marked in EPIC for notification and follow-up.              Workstation performed: [id number]      Results for orders placed during the hospital encounter of 08/02/18    MRI thoracic spine with and without contrast    Impression  No significant central or foraminal narrowing.  Dorsal column stimulator device noted in place.        Workstation performed: [id number]      Results for orders placed during the hospital encounter of 01/30/25    CT head without contrast    Impression  No acute intracranial hemorrhage, significant mass effect or midline shift.            Workstation performed: [id number]      Visual fields testing: The amplitudes were low. This is probably secondary to poor visual acuity and nystagmus resulting in difficulty with fixation on the stimulus screen despite his visual Acuity 2. There is  prolongation of the N75 and P100 responses on the left. This can represent both nonspecific demyelinating and axonal loss anterior to the optic chiasm. The differential can include a diffuse ischemic lesion, demyelinating and/or a compressive lesion of the anterior visual pathways on the left. 3. There is a side-to-side difference in the P100 responses left greater than right suggestive of an abnormality in the left anterior visual pathway              Assessment & Plan  Multiple sclerosis (HCC)  Mr. Hart is a very pleasant 49 y.o. male presenting with MS follow up. Currently on Ocrevus to prevent disease progression and new relapses. Reports increased urination, possibly a side effect of Ocrevus. Persistent numbness and tingling in both legs and feet, occasional facial tingling, right foot weakness, vertigo, dizziness, and exertional dyspnea. No new vision changes or significant memory issues, but reports slurred speech and leaving the stove on. Examination reveals slight weakness in the right arm and left iliopsoas, hyperreflexia in lower extremities, and decreased sensation on the left side. Ocrevus is expected to control the disease and improve symptoms over time. Lyrica is chosen for numbness due to gabapentin allergy.      - Continue Ocrevus with next dose in six months  - Start Lyrica 50 mg three times a day for numbness and tingling  - Order urinalysis to rule out UTI  - If no UTI, start oxybutynin for bladder symptoms  - Refer to pulmonologist for dyspnea  - Refer to vestibular therapy for vertigo  - Order MRI in six months    Orders:    MRI brain w wo contrast; Future    MRI cervical spine w wo contrast; Future    MRI thoracic spine w wo contrast; Future    Urinalysis with microscopic; Future    amantadine (SYMMETREL) 100 mg capsule; Take 1 capsule (100 mg total) by mouth daily in the early morning    Anxiety  Currently taking Atarax 25 mg three times a day for anxiety, which causes drowsiness. Reports  running out of medication soon and primary care provider will not refill until seen again. Atarax is effective for anxiety management. Transition to Zoloft is planned to reduce drowsiness.    - Prescribe Atarax with instructions to taper off after one month  - Start Zoloft 50 mg once a day    Orders:    hydrOXYzine HCL (ATARAX) 25 mg tablet; Take 1 tablet (25 mg total) by mouth 3 (three) times a day as needed for anxiety    sertraline (Zoloft) 50 mg tablet; Take 1 tablet (50 mg total) by mouth daily    SOB (shortness of breath)  Sports asthma since childhood. Shortness of breath may be related to asthma or another condition. Referral to pulmonologist to evaluate dyspnea and assess asthma management.  - Refer to pulmonologist to evaluate dyspnea and assess asthma management  Orders:    Ambulatory Referral to Pulmonology; Future    Numbness    Orders:    pregabalin (LYRICA) 50 mg capsule; Take 1 capsule (50 mg total) by mouth 3 (three) times a day    BPPV (benign paroxysmal positional vertigo)    Orders:    Ambulatory Referral to Physical Therapy; Future              Mr. Hart will Return in about 6 months (around 9/12/2025), or 60 min follow up please.    Administrative Statements   The management plan was discussed in detail with the patient and all questions were answered.     Mr. Hart was encouraged to contact our office with any questions or concerns and to contact the clinic or go to the nearest emergency room if symptoms change or worsen.     I have spent a total time of 42 minutes in caring for this patient on the day of the visit/encounter including Diagnostic results, Prognosis, Risks and benefits of tx options, Instructions for management, Patient and family education, Importance of tx compliance, Risk factor reductions, Impressions, Counseling / Coordination of care, Documenting in the medical record, Reviewing/placing orders in the medical record (including tests, medications, and/or procedures), and  Obtaining or reviewing history  .         Amanda More MD.   Staff Neurologist,   Neuroimmunology and Neuroinfectious disease  03/12/25     This report has been created through the use of voice recognition/text compilation software.  Typographical and content errors may occur with this process.  While efforts are made to detect and correct such errors, in some cases errors will persist.  For this reason, wording in this document should be considered in the proper context and not strictly verbatim.  If, when reviewing the document, an error is discovered, please let the office know at [phone]

## 2025-03-13 ENCOUNTER — TELEPHONE (OUTPATIENT)
Age: 50
End: 2025-03-13

## 2025-03-13 NOTE — TELEPHONE ENCOUNTER
Attempted to contact patient per referral for shortness of breath as he would be in need of a follow up visit with our office. He was last seen by  in [place] on 7/5/23. I left a message for patient to return our call. Thank you

## 2025-05-08 ENCOUNTER — HOSPITAL ENCOUNTER (EMERGENCY)
Facility: HOSPITAL | Age: 50
Discharge: HOME | End: 2025-05-08
Attending: EMERGENCY MEDICINE

## 2025-05-08 ENCOUNTER — APPOINTMENT (EMERGENCY)
Dept: RADIOLOGY | Facility: HOSPITAL | Age: 50
End: 2025-05-08

## 2025-05-08 VITALS
DIASTOLIC BLOOD PRESSURE: 78 MMHG | OXYGEN SATURATION: 98 % | SYSTOLIC BLOOD PRESSURE: 125 MMHG | TEMPERATURE: 97.9 F | HEART RATE: 75 BPM | RESPIRATION RATE: 20 BRPM

## 2025-05-08 DIAGNOSIS — R07.9 CHEST PAIN: Primary | ICD-10-CM

## 2025-05-08 LAB
2HR DELTA HS TROPONIN: 2 NG/L
ALBUMIN SERPL BCG-MCNC: 4.3 G/DL (ref 3.5–5)
ALP SERPL-CCNC: 99 U/L (ref 34–104)
ALT SERPL W P-5'-P-CCNC: 14 U/L (ref 7–52)
ANION GAP SERPL CALCULATED.3IONS-SCNC: 9 MMOL/L (ref 4–13)
AST SERPL W P-5'-P-CCNC: 17 U/L (ref 13–39)
BASOPHILS # BLD AUTO: 0.08 THOUSANDS/ÂΜL (ref 0–0.1)
BASOPHILS NFR BLD AUTO: 1 % (ref 0–1)
BILIRUB SERPL-MCNC: 0.44 MG/DL (ref 0.2–1)
BUN SERPL-MCNC: 20 MG/DL (ref 5–25)
CALCIUM SERPL-MCNC: 9.3 MG/DL (ref 8.4–10.2)
CARDIAC TROPONIN I PNL SERPL HS: 29 NG/L (ref ?–50)
CARDIAC TROPONIN I PNL SERPL HS: 31 NG/L (ref ?–50)
CHLORIDE SERPL-SCNC: 108 MMOL/L (ref 96–108)
CO2 SERPL-SCNC: 22 MMOL/L (ref 21–32)
CREAT SERPL-MCNC: 1.35 MG/DL (ref 0.6–1.3)
EOSINOPHIL # BLD AUTO: 0.02 THOUSAND/ÂΜL (ref 0–0.61)
EOSINOPHIL NFR BLD AUTO: 0 % (ref 0–6)
ERYTHROCYTE [DISTWIDTH] IN BLOOD BY AUTOMATED COUNT: 12.9 % (ref 11.6–15.1)
GFR SERPL CREATININE-BSD FRML MDRD: 61 ML/MIN/1.73SQ M
GLUCOSE SERPL-MCNC: 103 MG/DL (ref 65–140)
HCT VFR BLD AUTO: 47 % (ref 36.5–49.3)
HGB BLD-MCNC: 15.5 G/DL (ref 12–17)
IMM GRANULOCYTES # BLD AUTO: 0.09 THOUSAND/UL (ref 0–0.2)
IMM GRANULOCYTES NFR BLD AUTO: 1 % (ref 0–2)
LYMPHOCYTES # BLD AUTO: 0.79 THOUSANDS/ÂΜL (ref 0.6–4.47)
LYMPHOCYTES NFR BLD AUTO: 7 % (ref 14–44)
MCH RBC QN AUTO: 27.7 PG (ref 26.8–34.3)
MCHC RBC AUTO-ENTMCNC: 33 G/DL (ref 31.4–37.4)
MCV RBC AUTO: 84 FL (ref 82–98)
MONOCYTES # BLD AUTO: 0.6 THOUSAND/ÂΜL (ref 0.17–1.22)
MONOCYTES NFR BLD AUTO: 5 % (ref 4–12)
NEUTROPHILS # BLD AUTO: 10.15 THOUSANDS/ÂΜL (ref 1.85–7.62)
NEUTS SEG NFR BLD AUTO: 86 % (ref 43–75)
NRBC BLD AUTO-RTO: 0 /100 WBCS
PLATELET # BLD AUTO: 168 THOUSANDS/UL (ref 149–390)
PMV BLD AUTO: 12.4 FL (ref 8.9–12.7)
POTASSIUM SERPL-SCNC: 4.1 MMOL/L (ref 3.5–5.3)
PROT SERPL-MCNC: 7.1 G/DL (ref 6.4–8.4)
RBC # BLD AUTO: 5.59 MILLION/UL (ref 3.88–5.62)
SODIUM SERPL-SCNC: 139 MMOL/L (ref 135–147)
WBC # BLD AUTO: 11.73 THOUSAND/UL (ref 4.31–10.16)

## 2025-05-08 PROCEDURE — 96374 THER/PROPH/DIAG INJ IV PUSH: CPT

## 2025-05-08 PROCEDURE — 36415 COLL VENOUS BLD VENIPUNCTURE: CPT

## 2025-05-08 PROCEDURE — 99285 EMERGENCY DEPT VISIT HI MDM: CPT | Performed by: EMERGENCY MEDICINE

## 2025-05-08 PROCEDURE — 80053 COMPREHEN METABOLIC PANEL: CPT

## 2025-05-08 PROCEDURE — 71046 X-RAY EXAM CHEST 2 VIEWS: CPT

## 2025-05-08 PROCEDURE — 93005 ELECTROCARDIOGRAM TRACING: CPT

## 2025-05-08 PROCEDURE — 99285 EMERGENCY DEPT VISIT HI MDM: CPT

## 2025-05-08 PROCEDURE — 85025 COMPLETE CBC W/AUTO DIFF WBC: CPT

## 2025-05-08 PROCEDURE — 84484 ASSAY OF TROPONIN QUANT: CPT

## 2025-05-08 PROCEDURE — 96361 HYDRATE IV INFUSION ADD-ON: CPT

## 2025-05-08 RX ORDER — HYDROMORPHONE HCL IN WATER/PF 6 MG/30 ML
0.2 PATIENT CONTROLLED ANALGESIA SYRINGE INTRAVENOUS ONCE
Status: COMPLETED | OUTPATIENT
Start: 2025-05-08 | End: 2025-05-08

## 2025-05-08 RX ORDER — DIPHENHYDRAMINE HYDROCHLORIDE AND LIDOCAINE HYDROCHLORIDE AND ALUMINUM HYDROXIDE AND MAGNESIUM HYDRO
10 KIT ONCE
Status: COMPLETED | OUTPATIENT
Start: 2025-05-08 | End: 2025-05-08

## 2025-05-08 RX ADMIN — SODIUM CHLORIDE 1000 ML: 0.9 INJECTION, SOLUTION INTRAVENOUS at 22:46

## 2025-05-08 RX ADMIN — HYDROMORPHONE HYDROCHLORIDE 0.2 MG: 0.2 INJECTION, SOLUTION INTRAMUSCULAR; INTRAVENOUS; SUBCUTANEOUS at 23:02

## 2025-05-08 RX ADMIN — DIPHENHYDRAMINE HYDROCHLORIDE AND LIDOCAINE HYDROCHLORIDE AND ALUMINUM HYDROXIDE AND MAGNESIUM HYDRO 10 ML: KIT at 21:04

## 2025-05-09 ENCOUNTER — TELEPHONE (OUTPATIENT)
Dept: CARDIOLOGY CLINIC | Facility: CLINIC | Age: 50
End: 2025-05-09

## 2025-05-09 NOTE — ED ATTENDING ATTESTATION
5/8/2025  I, Mike Bunch MD, saw and evaluated the patient. I have discussed the patient with the resident/non-physician practitioner and agree with the resident's/non-physician practitioner's findings, Plan of Care, and MDM as documented in the resident's/non-physician practitioner's note, except where noted. All available labs and Radiology studies were reviewed.  I was present for key portions of any procedure(s) performed by the resident/non-physician practitioner and I was immediately available to provide assistance.       At this point I agree with the current assessment done in the Emergency Department.  I have conducted an independent evaluation of this patient a history and physical is as follows:    ED Course     49-year-old male, smoker, history of chronic cough, presenting to the emergency department for midsternal chest discomfort which has been present since yesterday.  No new shortness of breath.  No new leg pain or swelling.  Cough is chronic and nonproductive.  Patient has a history of GERD.  He describes the chest discomfort as burning.    The patient is resting comfortably on a stretcher in no acute respiratory distress. The patient appears nontoxic. HEENT reveals moist mucous membranes. Head is normocephalic and atraumatic. Conjunctiva and sclera are normal. Neck is nontender and supple with full range of motion to flexion, extension, lateral rotation. No meningismus appreciated. No masses are appreciated. Lungs are clear to auscultation bilaterally without any wheezes, rales or rhonchi. Heart is regular rate and rhythm without any murmurs, rubs or gallops. Abdomen is soft and nontender without any rebound or guarding. Extremities appear grossly normal without any significant arthropathy. Patient is awake, alert, and oriented x3. The patient has normal interaction.  Cranial nerves grossly intact.  Motor is 5 out of 5 bilateral upper and lower extremities.    MEDICAL DECISION  MAKING    Number and Complexity of Problems  Differential diagnosis: Gastroesophageal reflux disease, musculoskeletal chest pain, acute coronary syndrome.  Patient is low pretest probability for PE and is PE RC rule negative.    Medical Decision Making Data  External documents reviewed: Reviewed last neurology note from 3/12/2025.  My EKG interpretation: Normal sinus rhythm at 82 bpm.  Normal intervals.  Patient with new Q wave in lead III as well as inverted T wave in lead aVF.  My X-ray interpretation: No acute cardiopulmonary disease.      XR chest 2 views   ED Interpretation   No acute cardiopulmonary disease.          Labs Reviewed   CBC AND DIFFERENTIAL - Abnormal       Result Value Ref Range Status    WBC 11.73 (*) 4.31 - 10.16 Thousand/uL Final    RBC 5.59  3.88 - 5.62 Million/uL Final    Hemoglobin 15.5  12.0 - 17.0 g/dL Final    Hematocrit 47.0  36.5 - 49.3 % Final    MCV 84  82 - 98 fL Final    MCH 27.7  26.8 - 34.3 pg Final    MCHC 33.0  31.4 - 37.4 g/dL Final    RDW 12.9  11.6 - 15.1 % Final    MPV 12.4  8.9 - 12.7 fL Final    Platelets 168  149 - 390 Thousands/uL Final    nRBC 0  /100 WBCs Final    Segmented % 86 (*) 43 - 75 % Final    Immature Grans % 1  0 - 2 % Final    Lymphocytes % 7 (*) 14 - 44 % Final    Monocytes % 5  4 - 12 % Final    Eosinophils Relative 0  0 - 6 % Final    Basophils Relative 1  0 - 1 % Final    Absolute Neutrophils 10.15 (*) 1.85 - 7.62 Thousands/µL Final    Absolute Immature Grans 0.09  0.00 - 0.20 Thousand/uL Final    Absolute Lymphocytes 0.79  0.60 - 4.47 Thousands/µL Final    Absolute Monocytes 0.60  0.17 - 1.22 Thousand/µL Final    Eosinophils Absolute 0.02  0.00 - 0.61 Thousand/µL Final    Basophils Absolute 0.08  0.00 - 0.10 Thousands/µL Final   COMPREHENSIVE METABOLIC PANEL - Abnormal    Sodium 139  135 - 147 mmol/L Final    Potassium 4.1  3.5 - 5.3 mmol/L Final    Chloride 108  96 - 108 mmol/L Final    CO2 22  21 - 32 mmol/L Final    ANION GAP 9  4 - 13 mmol/L Final  "   BUN 20  5 - 25 mg/dL Final    Creatinine 1.35 (*) 0.60 - 1.30 mg/dL Final    Comment: Standardized to IDMS reference method    Glucose 103  65 - 140 mg/dL Final    Comment: If the patient is fasting, the ADA then defines impaired fasting glucose as > 100 mg/dL and diabetes as > or equal to 123 mg/dL.    Calcium 9.3  8.4 - 10.2 mg/dL Final    AST 17  13 - 39 U/L Final    ALT 14  7 - 52 U/L Final    Comment: Specimen collection should occur prior to Sulfasalazine administration due to the potential for falsely depressed results.     Alkaline Phosphatase 99  34 - 104 U/L Final    Total Protein 7.1  6.4 - 8.4 g/dL Final    Albumin 4.3  3.5 - 5.0 g/dL Final    Total Bilirubin 0.44  0.20 - 1.00 mg/dL Final    Comment: Use of this assay is not recommended for patients undergoing treatment with eltrombopag due to the potential for falsely elevated results.  N-acetyl-p-benzoquinone imine (metabolite of Acetaminophen) will generate erroneously low results in samples for patients that have taken an overdose of Acetaminophen.    eGFR 61  ml/min/1.73sq m Final    Narrative:     [employer] guidelines for Chronic Kidney Disease (CKD):     Stage 1 with normal or high GFR (GFR > 90 mL/min/1.73 square meters)    Stage 2 Mild CKD (GFR = 60-89 mL/min/1.73 square meters)    Stage 3A Moderate CKD (GFR = 45-59 mL/min/1.73 square meters)    Stage 3B Moderate CKD (GFR = 30-44 mL/min/1.73 square meters)    Stage 4 Severe CKD (GFR = 15-29 mL/min/1.73 square meters)    Stage 5 End Stage CKD (GFR <15 mL/min/1.73 square meters)  Note: GFR calculation is accurate only with a steady state creatinine   HS TROPONIN I 0HR - Normal    hs TnI 0hr 29  \"Refer to ACS Flowchart\"- see link ng/L Final    Comment:                                              Initial (time 0) result  If >=50 ng/L, Myocardial injury suggested ;  Type of myocardial injury and treatment strategy  to be determined.  If 5-49 ng/L, a delta result at 2 " hours and or 4 hours will be needed to further evaluate.  If <4 ng/L, and chest pain has been >3 hours since onset, patient may qualify for discharge based on the HEART score in the ED.  If <5 ng/L and <3hours since onset of chest pain, a delta result at 2 hours will be needed to further evaluate.    HS Troponin 99th Percentile URL of a Health Population=12 ng/L with a 95% Confidence Interval of 8-18 ng/L.    Second Troponin (time 2 hours)  If calculated delta >= 20 ng/L,  Myocardial injury suggested ; Type of myocardial injury and treatment strategy to be determined.  If 5-49 ng/L and the calculated delta is 5-19 ng/L, consult medical service for evaluation.  Continue evaluation for ischemia on ecg and other possible etiology and repeat hs troponin at 4 hours.  If delta is <5 ng/L at 2 hours, consider discharge based on risk stratification via the HEART score (if in ED), or CARLOS ALBERTO risk score in IP/Observation.    HS Troponin 99th Percentile URL of a Health Population=12 ng/L with a 95% Confidence Interval of 8-18 ng/L.   HS TROPONIN I 2HR       Labs reviewed by me are significant for:  Initial troponin 29.    Clinical decision rules/scores are significant for: Heart score of 4.    Discussed case with: ***  Considered admission for: ***    Treatment and Disposition  ED course: ***  Shared decision making: ***  Code status: ***              Critical Care Time  Procedures       injury suggested ; Type of myocardial injury and treatment strategy to be determined.  If 5-49 ng/L and the calculated delta is 5-19 ng/L, consult medical service for evaluation.  Continue evaluation for ischemia on ecg and other possible etiology and repeat hs troponin at 4 hours.  If delta is <5 ng/L at 2 hours, consider discharge based on risk stratification via the HEART score (if in ED), or CARLOS ALBERTO risk score in IP/Observation.    HS Troponin 99th Percentile URL of a Health Population=12 ng/L with a 95% Confidence Interval of 8-18 ng/L.    Delta 2hr hsTnI 2  <20 ng/L Final       Labs reviewed by me are significant for:  Initial troponin 29.  Delta troponin 2.    Clinical decision rules/scores are significant for: Heart score of 4.    Treatment and Disposition  ED course: Patient with stable delta troponin.  Recommend follow-up with cardiology as an outpatient.  Shared decision making: Patient agreeable with plan.  Code status: Full code.              Critical Care Time  Procedures

## 2025-05-09 NOTE — DISCHARGE INSTRUCTIONS
Patient Education     Chest Pain, Adult ED   General Information   You came to the Emergency Department (ED) for chest pain. The doctor feels that the risk of a serious cause for your chest pain is low. Many things can cause chest pain. Some are serious things like heart disease or lung disease. Less serious things like stomach problems can also cause chest pain.  The doctors may not be able to find all serious causes of chest pain the first time they see you. It is important that you follow up with your doctor. You may be waiting on some test results. The staff will notify you if there are concerning results.  What care is needed at home?   Call your regular doctor to let them know you were in the ED. Make a follow-up appointment if you were told to.  Follow your regular doctor’s orders to keep your blood pressure, cholesterol, and high blood sugar (diabetes) under control.  If you smoke, try to quit. Your doctor or nurse can help.  Keep a healthy weight. If you are too heavy, lose weight.  Eat a healthy diet, as discussed with your doctor or nurse.  When do I need to get emergency help?   Call for an ambulance if:   You have signs of a heart attack, which may include:  Severe chest pain, pressure, or discomfort with:  Breathing trouble; sweating; upset stomach; or cold, clammy skin.  Pain in your arms, back, or jaw.  Worse pain with activity like walking up stairs.  Fast or irregular heartbeat.  Feeling dizzy, faint, or weak.  When do I need to call the doctor?   You have a fever of 100.4°F (38°C) or higher or chills.  You cough up yellow or green mucus.  You are more tired than normal or more trouble breathing with activity.  You have new or worsening symptoms.  Last Reviewed Date   2020-06-28  Consumer Information Use and Disclaimer   This generalized information is a limited summary of diagnosis, treatment, and/or medication information. It is not meant to be comprehensive and should be used as a tool to help  the user understand and/or assess potential diagnostic and treatment options. It does NOT include all information about conditions, treatments, medications, side effects, or risks that may apply to a specific patient. It is not intended to be medical advice or a substitute for the medical advice, diagnosis, or treatment of a health care provider based on the health care provider's examination and assessment of a patient’s specific and unique circumstances. Patients must speak with a health care provider for complete information about their health, medical questions, and treatment options, including any risks or benefits regarding use of medications. This information does not endorse any treatments or medications as safe, effective, or approved for treating a specific patient. [employer] and its affiliates disclaim any warranty or liability relating to this information or the use thereof. The use of this information is governed by the Terms of Use, available at https://www.[employer].com/en/know/clinical-effectiveness-terms   Copyright   Copyright © 2024 [employer] and its affiliates and/or licensors. All rights reserved.

## 2025-05-09 NOTE — ED PROVIDER NOTES
Time reflects when diagnosis was documented in both MDM as applicable and the Disposition within this note       Time User Action Codes Description Comment    5/8/2025 11:30 PM Britt Luz Add [R07.9] Chest pain           ED Disposition       ED Disposition   Discharge    Condition   Stable    Date/Time   Thu May 8, 2025 11:30 PM    Comment   Anderson Hart discharge to home/self care.                   Assessment & Plan       Medical Decision Making  Anderson Hart is a 49 y.o. who presents with complaints of chest pain since yesterday morning    Vital signs are HD stable, afebrile    Ddx: ACS, GERD, MSK chest pain, PTX    Plan: EKG showing inferior t-wave inversions, otherwise WNL  Initial trop 29, delta 2  HEART score 4  Blood work significant for ALEXIS, otherwise WNL  1L IVF's given  Cxr negative for cardiopulmonary abnormalities  Pain improved with symptomatic treatment   Referral to cardiology provided   Recommend PCP follow up in interim  Supportive care instructions and return precautions provided  Patient understands and is agreeable to plan    Disposition: patient stable for discharge home.       Amount and/or Complexity of Data Reviewed  Labs: ordered.  Radiology: ordered and independent interpretation performed.    Risk  Prescription drug management.             Medications   diphenhydramine, lidocaine, Al/Mg hydroxide, simethicone (Magic Mouthwash) oral solution 10 mL (10 mL Oral Given 5/8/25 2104)   sodium chloride 0.9 % bolus 1,000 mL (0 mL Intravenous Stopped 5/8/25 2358)   HYDROmorphone HCl (DILAUDID) injection 0.2 mg (0.2 mg Intravenous Given 5/8/25 2302)       ED Risk Strat Scores   HEART Risk Score      Flowsheet Row Most Recent Value   Heart Score Risk Calculator    History 0 Filed at: 05/08/2025 2329   ECG 1 Filed at: 05/08/2025 2329   Age 1 Filed at: 05/08/2025 2329   Risk Factors 1 Filed at: 05/08/2025 2329   Troponin 1 Filed at: 05/08/2025 2329   HEART Score 4 Filed at: 05/08/2025  2329          HEART Risk Score      Flowsheet Row Most Recent Value   Heart Score Risk Calculator    History 0 Filed at: 05/08/2025 2329   ECG 1 Filed at: 05/08/2025 2329   Age 1 Filed at: 05/08/2025 2329   Risk Factors 1 Filed at: 05/08/2025 2329   Troponin 1 Filed at: 05/08/2025 2329   HEART Score 4 Filed at: 05/08/2025 2329                      No data recorded        SBIRT 20yo+      Flowsheet Row Most Recent Value   Initial Alcohol Screen: US AUDIT-C     1. How often do you have a drink containing alcohol? 0 Filed at: 05/08/2025 1947   2. How many drinks containing alcohol do you have on a typical day you are drinking?  0 Filed at: 05/08/2025 1947   3a. Male UNDER 65: How often do you have five or more drinks on one occasion? 0 Filed at: 05/08/2025 1947   3b. FEMALE Any Age, or MALE 65+: How often do you have 4 or more drinks on one occassion? 0 Filed at: 05/08/2025 1947   Audit-C Score 0 Filed at: 05/08/2025 1947   GORAN: How many times in the past year have you...    Used an illegal drug or used a prescription medication for non-medical reasons? Never Filed at: 05/08/2025 1947                            History of Present Illness       Chief Complaint   Patient presents with    Chest Pain     Patient reports chest pain that started yesterday. States chest pain is in the center when taking a deep breath. Reports today being on a ladder and had an episode of dizziness and became diaphoretic/lightheaded. Also states had two episodes of vomiting today.        Past Medical History:   Diagnosis Date    Allergic     Anxiety     Arthritis     Asthma     Bipolar 2 disorder, major depressive episode (HCC) 10/31/2017    Bipolar disorder (HCC)     Chronic left lumbar radiculopathy     Chronic low back pain     Chronic pain syndrome     Chronic right shoulder pain     Depression with anxiety     Fatigue     GERD (gastroesophageal reflux disease)     Hydronephrosis     Iron deficiency anemia     Kidney stone     Lytic bone  lesions on xray     Nephrolithiasis     PONV (postoperative nausea and vomiting)     Right elbow pain     Right lateral epicondylitis       Past Surgical History:   Procedure Laterality Date    ADENOIDECTOMY Bilateral     APPENDECTOMY      BACK SURGERY      CHOLECYSTECTOMY      CHOLECYSTECTOMY LAPAROSCOPIC N/A 10/30/2018    Procedure: CHOLECYSTECTOMY WITH GASTROTOMY LAPAROSCOPIC;  Surgeon: Oliver Madison MD;  [facility];  Service: General    COLONOSCOPY      ERCP W/ SPHICTEROTOMY N/A 10/30/2018    Procedure: ENDOSCOPIC RETROGRADE CHOLANGIOPANCREATOGRAPHY (ERCP) W/ SPHINCTEROTOMY;  Surgeon: Kendrick Meeks MD;  Location: [facility];  Service: Gastroenterology    GASTRIC BYPASS      2009    IR LUMBAR PUNCTURE  09/19/2024    LAMINECTOMY  09/2011    OTHER SURGICAL HISTORY      fusion/refusion of vertebrae, 2010 and 2011 l5-s1 and l4-l5    CT ARTHROSCOPY KNEE LATERAL RELEASE Left 01/06/2021    Procedure: RELEASE RETINACULAR;  Surgeon: Rogers Lennon DO;  [facility];  Service: Orthopedics    CT ARTHRS KNE SURG W/MENISCECTOMY MED/LAT W/SHVG Right 04/26/2022    Procedure: ARTHROSCOPIC MENISCECTOMY LATERAL;  Surgeon: Rogers Lennon DO;  Location: [facility];  Service: Orthopedics    CT ARTHRS KNEE DEBRIDEMENT/SHAVING ARTCLR CRTLG Left 01/06/2021    Procedure: ARTHROSCOPY KNEE;  Surgeon: Rogers Lennon DO;  [facility]: [facility];  Service: Orthopedics    CT CYSTO/URETERO W/LITHOTRIPSY &INDWELL STENT INSRT Right 08/08/2017    Procedure: CYSTOSCOPY; URETEROSCOPY; HOLMIUM LASER; RETROGRADE PYELOGRAM; STENT;  Surgeon: Rupesh Romo MD;  Location[facility];  Service: Urology    CT INCISION & DRAINAGE ABSCESS COMPLICATED/MULTIPLE Left 01/08/2021    Procedure: INCISION AND DRAINAGE (I&D) EXTREMITY- of left knee s/p MPFL reconstruction, I&D if the left knee with possible MPFL revision;  Surgeon: Rogers Lennon DO;  [facility];  Service: Orthopedics    CT INSJ/RPLCMT SPINAL NPG/RCVR POCKET CRTJ&CONNJ  Right 11/14/2017    Procedure: REMOVAL LOWER MEDIAL BUTTOCK SPINAL CORD STIMULATOR GENERATOR; PLACEMENT OF NEW BUTTOCK SPINAL CORD STIMULATOR THROUGH SEPERATE INCISION;  Surgeon: Stephan Duque MD;  Location: [facility];  Service: Neurosurgery    NM INSJ/RPLCMT SPINAL NPG/RCVR POCKET CRTJ&CONNJ Right 01/12/2022    Procedure: Right sided spinal cord stimulator pulse generator replacement;  Surgeon: Michael Gamez MD;  [facility];  Service: Neurosurgery    NM INSJ/RPLCMT SPINAL NPG/RCVR POCKET CRTJ&CONNJ Right 09/08/2023    Procedure: Right-sided spinal cord stimulator internal pulse generator replacement;  Surgeon: Michael Gamez MD;  [facility];  Service: Neurosurgery    NM RCNSTJ DISLC PATELLA W/XTNSR RELIGNMT&/MUSC RL Left 01/06/2021    Procedure: REALIGNMENT PATELLA with chondroplasty of patella, open medial patellofemoral ligament reconstruction with allograft;  Surgeon: Rogers Lennon DO;  [facility];  Service: Orthopedics    RIK-EN-Y PROCEDURE  2003    SPINAL CORD STIMULATOR IMPLANT  11/14/2017    dr duque procedure and technique 1. removal of a right back implantable pulse generator 2.placement of a new right buttock impantable pulse generator through seperate incision 3 electric analys complex programming spinal cord stimulator system postoperative period approx 1 hour    SPINE SURGERY      TONSILLECTOMY        Family History   Problem Relation Age of Onset    Cancer Mother     Arthritis Mother     Stomach cancer Mother     Coronary artery disease Mother     Heart disease Mother     Asthma Mother     COPD Mother     Cancer Father     Esophageal cancer Father     Other Father         brain tumor    Diabetes Father     No Known Problems Sister     No Known Problems Sister     No Known Problems Sister       Social History     Tobacco Use    Smoking status: Every Day     Current packs/day: 1.50     Average packs/day: 1.5 packs/day for 30.4 years (45.5 ttl pk-yrs)      Types: Cigarettes     Start date: 1995    Smokeless tobacco: Never   Vaping Use    Vaping status: Never Used   Substance Use Topics    Alcohol use: Not Currently     Comment: rare    Drug use: Yes     Types: Marijuana      E-Cigarette/Vaping    E-Cigarette Use Never User       E-Cigarette/Vaping Substances    Nicotine No     THC No     CBD No     Flavoring No     Other No     Unknown No       I have reviewed and agree with the history as documented.     Patient is a 49-year-old male with pertinent PMH of chronic cough, GERD, and tobacco dependence smoking 1 ppd who presents for evaluation of chest pain.  Patient states that the pain started yesterday morning upon waking up.  He states that pain has since been constant.  Describes pain as burning in his chest and epigastric area. Endorses chronic non-productive cough.  Denies SOB, nausea, vomiting, fever, chills, diarrhea, constipation, blood in stool, congestion, leg pain, or leg swelling.         Review of Systems   Respiratory:  Positive for cough (chronic).    Cardiovascular:  Positive for chest pain.   All other systems reviewed and are negative.          Objective       ED Triage Vitals [05/08/25 1946]   Temperature Pulse Blood Pressure Respirations SpO2 Patient Position - Orthostatic VS   97.9 °F (36.6 °C) 81 152/81 18 97 % Sitting      Temp Source Heart Rate Source BP Location FiO2 (%) Pain Score    Temporal Monitor Left arm -- 7      Vitals      Date and Time Temp Pulse SpO2 Resp BP Pain Score FACES Pain Rating User   05/08/25 2302 -- -- -- -- -- 9 -- KG   05/08/25 2214 -- 75 98 % 20 125/78 4 -- WW   05/08/25 1946 97.9 °F (36.6 °C) 81 97 % 18 152/81 7 -- OO            Physical Exam  Constitutional:       General: He is not in acute distress.     Appearance: Normal appearance. He is well-developed. He is not ill-appearing, toxic-appearing or diaphoretic.   HENT:      Head: Normocephalic and atraumatic.      Mouth/Throat:      Mouth: Mucous membranes are  moist.   Eyes:      Extraocular Movements: Extraocular movements intact.      Conjunctiva/sclera: Conjunctivae normal.   Cardiovascular:      Rate and Rhythm: Normal rate and regular rhythm.      Pulses: Normal pulses.      Heart sounds: Normal heart sounds.   Pulmonary:      Effort: Pulmonary effort is normal.      Breath sounds: Normal breath sounds.   Abdominal:      General: Abdomen is flat. There is no distension.      Palpations: Abdomen is soft.      Tenderness: There is no abdominal tenderness. There is no guarding or rebound.   Musculoskeletal:         General: Normal range of motion.      Cervical back: Normal range of motion and neck supple.   Skin:     General: Skin is warm and dry.      Capillary Refill: Capillary refill takes less than 2 seconds.   Neurological:      Mental Status: He is alert and oriented to person, place, and time.   Psychiatric:         Mood and Affect: Mood normal.         Behavior: Behavior normal.         Results Reviewed       Procedure Component Value Units Date/Time    HS Troponin I 2hr [[id number]]  (Normal) Collected: 05/08/25 2218    Lab Status: Final result Specimen: Blood from Arm, Left Updated: 05/08/25 2248     hs TnI 2hr 31 ng/L      Delta 2hr hsTnI 2 ng/L     HS Troponin 0hr (reflex protocol) [[id number]]  (Normal) Collected: 05/08/25 2021    Lab Status: Final result Specimen: Blood from Arm, Left Updated: 05/08/25 2116     hs TnI 0hr 29 ng/L     Comprehensive metabolic panel [[id number]]  (Abnormal) Collected: 05/08/25 2021    Lab Status: Final result Specimen: Blood from Arm, Left Updated: 05/08/25 2103     Sodium 139 mmol/L      Potassium 4.1 mmol/L      Chloride 108 mmol/L      CO2 22 mmol/L      ANION GAP 9 mmol/L      BUN 20 mg/dL      Creatinine 1.35 mg/dL      Glucose 103 mg/dL      Calcium 9.3 mg/dL      AST 17 U/L      ALT 14 U/L      Alkaline Phosphatase 99 U/L      Total Protein 7.1 g/dL      Albumin 4.3 g/dL      Total Bilirubin 0.44 mg/dL      eGFR 61  ml/min/1.73sq m     Narrative:      National Kid[employer] guidelines for Chronic Kidney Disease (CKD):     Stage 1 with normal or high GFR (GFR > 90 mL/min/1.73 square meters)    Stage 2 Mild CKD (GFR = 60-89 mL/min/1.73 square meters)    Stage 3A Moderate CKD (GFR = 45-59 mL/min/1.73 square meters)    Stage 3B Moderate CKD (GFR = 30-44 mL/min/1.73 square meters)    Stage 4 Severe CKD (GFR = 15-29 mL/min/1.73 square meters)    Stage 5 End Stage CKD (GFR <15 mL/min/1.73 square meters)  Note: GFR calculation is accurate only with a steady state creatinine    CBC and differential [[id number]]  (Abnormal) Collected: 05/08/25 2021    Lab Status: Final result Specimen: Blood from Arm, Left Updated: 05/08/25 2042     WBC 11.73 Thousand/uL      RBC 5.59 Million/uL      Hemoglobin 15.5 g/dL      Hematocrit 47.0 %      MCV 84 fL      MCH 27.7 pg      MCHC 33.0 g/dL      RDW 12.9 %      MPV 12.4 fL      Platelets 168 Thousands/uL      nRBC 0 /100 WBCs      Segmented % 86 %      Immature Grans % 1 %      Lymphocytes % 7 %      Monocytes % 5 %      Eosinophils Relative 0 %      Basophils Relative 1 %      Absolute Neutrophils 10.15 Thousands/µL      Absolute Immature Grans 0.09 Thousand/uL      Absolute Lymphocytes 0.79 Thousands/µL      Absolute Monocytes 0.60 Thousand/µL      Eosinophils Absolute 0.02 Thousand/µL      Basophils Absolute 0.08 Thousands/µL             XR chest 2 views   ED Interpretation by Mike Bunch MD (05/08 2132)   No acute cardiopulmonary disease.      Final Interpretation by Paco Rodas MD (05/09 0904)      No acute cardiopulmonary disease.            Workstation performed: [id number]             ECG 12 Lead Documentation Only    Date/Time: 5/9/2025 3:22 PM    Performed by: Britt Luz MD  Authorized by: Britt Luz MD    Indications / Diagnosis:  Chest pain  Patient location:  ED  Rate:     ECG rate:  82    ECG rate assessment: normal    Rhythm:     Rhythm:  sinus rhythm    Ectopy:     Ectopy: none    QRS:     QRS axis:  Normal    QRS intervals:  Normal  Conduction:     Conduction: normal    ST segments:     ST segments:  Normal  T waves:     T waves: inverted      Inverted:  III, aVR and aVF      ED Medication and Procedure Management   Prior to Admission Medications   Prescriptions Last Dose Informant Patient Reported? Taking?   Acetaminophen (TYLENOL 8 HOUR ARTHRITIS PAIN PO)  Self Yes No   Sig: Take by mouth   Cyanocobalamin (Vitamin B-12) 1000 MCG SUBL   No No   Sig: Place 1 tablet (1,000 mcg total) under the tongue in the morning   HYDROcodone-acetaminophen (NORCO) 5-325 mg per tablet   No No   Sig: Take 1 tablet by mouth every 8 (eight) hours as needed for pain For ongoing therapy DO NOT FILL BEFORE: 12/09/24 Max Daily Amount: 3 tablets   albuterol (PROVENTIL HFA,VENTOLIN HFA) 90 mcg/act inhaler  Self No No   Sig: INHALE 2 PUFFS EVERY 4 (FOUR) HOURS AS NEEDED FOR WHEEZING OR SHORTNESS OF BREATH COUGH   amantadine (SYMMETREL) 100 mg capsule   No No   Sig: Take 1 capsule (100 mg total) by mouth daily in the early morning   aspirin (ECOTRIN LOW STRENGTH) 81 mg EC tablet   Yes No   Sig: Take 81 mg by mouth daily   ergocalciferol (VITAMIN D2) 50,000 units   No No   Sig: TAKE 1 CAPSULE BY MOUTH EVERY 14 DAYS   esomeprazole (NexIUM) 40 MG capsule   No No   Sig: take 1 capsule by mouth every morning   folic acid (FOLVITE) 1 mg tablet   No No   Sig: take 1 tablet by mouth once daily   hydrOXYzine HCL (ATARAX) 25 mg tablet   No No   Sig: Take 1 tablet (25 mg total) by mouth 3 (three) times a day as needed for anxiety   naloxone (NARCAN) 4 mg/0.1 mL nasal spray  Self No No   Sig: Administer 1 spray into a nostril. If no response after 2-3 minutes, give another dose in the other nostril using a new spray.   Patient not taking: Reported on 8/8/2024   pregabalin (LYRICA) 50 mg capsule   No No   Sig: Take 1 capsule (50 mg total) by mouth 3 (three) times a day   sertraline  (Zoloft) 50 mg tablet   No No   Sig: Take 1 tablet (50 mg total) by mouth daily      Facility-Administered Medications: None     Discharge Medication List as of 5/8/2025 11:33 PM        CONTINUE these medications which have NOT CHANGED    Details   Acetaminophen (TYLENOL 8 HOUR ARTHRITIS PAIN PO) Take by mouth, Historical Med      albuterol (PROVENTIL HFA,VENTOLIN HFA) 90 mcg/act inhaler INHALE 2 PUFFS EVERY 4 (FOUR) HOURS AS NEEDED FOR WHEEZING OR SHORTNESS OF BREATH COUGH, Normal      amantadine (SYMMETREL) 100 mg capsule Take 1 capsule (100 mg total) by mouth daily in the early morning, Starting Wed 3/12/2025, Normal      aspirin (ECOTRIN LOW STRENGTH) 81 mg EC tablet Take 81 mg by mouth daily, Historical Med      Cyanocobalamin (Vitamin B-12) 1000 MCG SUBL Place 1 tablet (1,000 mcg total) under the tongue in the morning, Starting Wed 3/6/2024, Normal      ergocalciferol (VITAMIN D2) 50,000 units TAKE 1 CAPSULE BY MOUTH EVERY 14 DAYS, Normal      esomeprazole (NexIUM) 40 MG capsule take 1 capsule by mouth every morning, Starting Tue 1/28/2025, Normal      folic acid (FOLVITE) 1 mg tablet take 1 tablet by mouth once daily, Starting Mon 7/8/2024, Normal      HYDROcodone-acetaminophen (NORCO) 5-325 mg per tablet Take 1 tablet by mouth every 8 (eight) hours as needed for pain For ongoing therapy DO NOT FILL BEFORE: 12/09/24 Max Daily Amount: 3 tablets, Starting Tue 11/19/2024, Normal      hydrOXYzine HCL (ATARAX) 25 mg tablet Take 1 tablet (25 mg total) by mouth 3 (three) times a day as needed for anxiety, Starting Wed 3/12/2025, Normal      naloxone (NARCAN) 4 mg/0.1 mL nasal spray Administer 1 spray into a nostril. If no response after 2-3 minutes, give another dose in the other nostril using a new spray., Normal      pregabalin (LYRICA) 50 mg capsule Take 1 capsule (50 mg total) by mouth 3 (three) times a day, Starting Wed 3/12/2025, Normal      sertraline (Zoloft) 50 mg tablet Take 1 tablet (50 mg total) by mouth  daily, Starting Wed 3/12/2025, Normal             ED SEPSIS DOCUMENTATION   Time reflects when diagnosis was documented in both MDM as applicable and the Disposition within this note       Time User Action Codes Description Comment    5/8/2025 11:30 PM Britt Luz Add [R07.9] Chest pain                  Britt Luz MD  05/09/25 152

## 2025-05-09 NOTE — TELEPHONE ENCOUNTER
Called patient to schedule New Patient hospital follow up from referral in chart (ED 5/8 for chest pain). Patient said he will call back later today to schedule.

## 2025-05-10 LAB
ATRIAL RATE: 82 BPM
P AXIS: 63 DEGREES
PR INTERVAL: 140 MS
QRS AXIS: 22 DEGREES
QRSD INTERVAL: 84 MS
QT INTERVAL: 360 MS
QTC INTERVAL: 421 MS
T WAVE AXIS: -2 DEGREES
VENTRICULAR RATE: 82 BPM

## 2025-05-10 PROCEDURE — 93010 ELECTROCARDIOGRAM REPORT: CPT | Performed by: INTERNAL MEDICINE

## 2025-06-17 ENCOUNTER — TELEPHONE (OUTPATIENT)
Dept: NEUROLOGY | Facility: CLINIC | Age: 50
End: 2025-06-17

## 2025-06-17 NOTE — TELEPHONE ENCOUNTER
Reviewing WQ. Next Ocrevus infusion due 8/18/25. Not scheduled. [facility].     Pt receives free drug from [facility]. Will also need to schedule medication delivery.

## 2025-06-18 DIAGNOSIS — K21.9 CHRONIC GERD: ICD-10-CM

## 2025-06-18 RX ORDER — ESOMEPRAZOLE MAGNESIUM 40 MG/1
40 CAPSULE, DELAYED RELEASE ORAL EVERY MORNING
Qty: 90 CAPSULE | Refills: 1 | Status: SHIPPED | OUTPATIENT
Start: 2025-06-18

## 2025-06-26 NOTE — TELEPHONE ENCOUNTER
Called [facility] infusion center. Scheduled Ocrevus infusion for 8/18/25 @ 8:30am.    [facility] message sent to pt. Awaiting confirmation. Also asked to confirm insurance coverage.

## 2025-07-05 DIAGNOSIS — R20.0 NUMBNESS: ICD-10-CM

## 2025-07-07 RX ORDER — PREGABALIN 50 MG/1
50 CAPSULE ORAL 3 TIMES DAILY
Qty: 90 CAPSULE | Refills: 1 | Status: SHIPPED | OUTPATIENT
Start: 2025-07-07

## 2025-07-18 NOTE — TELEPHONE ENCOUNTER
Called [facility] at [phone] and scheduled medication delivery via automated system.     Ocrevus free drug is scheduled to be delivered to [facility] infusion center on 7/30/25. Address confirmed. Order # [id number]

## 2025-08-05 ENCOUNTER — TELEPHONE (OUTPATIENT)
Dept: NEUROLOGY | Facility: CLINIC | Age: 50
End: 2025-08-05

## 2025-08-07 ENCOUNTER — APPOINTMENT (OUTPATIENT)
Dept: LAB | Facility: CLINIC | Age: 50
End: 2025-08-07
Attending: PSYCHIATRY & NEUROLOGY

## 2025-08-18 ENCOUNTER — HOSPITAL ENCOUNTER (OUTPATIENT)
Dept: INFUSION CENTER | Facility: HOSPITAL | Age: 50
Discharge: HOME | End: 2025-08-18
Attending: PSYCHIATRY & NEUROLOGY

## 2025-08-18 VITALS — TEMPERATURE: 98.1 F | DIASTOLIC BLOOD PRESSURE: 83 MMHG | HEART RATE: 97 BPM | SYSTOLIC BLOOD PRESSURE: 135 MMHG

## 2025-08-18 DIAGNOSIS — G35 MULTIPLE SCLEROSIS (HCC): Primary | ICD-10-CM

## 2025-08-18 PROCEDURE — 96367 TX/PROPH/DG ADDL SEQ IV INF: CPT

## 2025-08-18 PROCEDURE — 96415 CHEMO IV INFUSION ADDL HR: CPT

## 2025-08-18 PROCEDURE — 96413 CHEMO IV INFUSION 1 HR: CPT

## 2025-08-18 RX ORDER — SODIUM CHLORIDE 9 MG/ML
20 INJECTION, SOLUTION INTRAVENOUS ONCE
Status: COMPLETED | OUTPATIENT
Start: 2025-08-18 | End: 2025-08-18

## 2025-08-18 RX ORDER — ACETAMINOPHEN 325 MG/1
650 TABLET ORAL ONCE
Status: COMPLETED | OUTPATIENT
Start: 2025-08-18 | End: 2025-08-18

## 2025-08-18 RX ORDER — ACETAMINOPHEN 325 MG/1
650 TABLET ORAL ONCE
OUTPATIENT
Start: 2026-02-03

## 2025-08-18 RX ORDER — SODIUM CHLORIDE 9 MG/ML
20 INJECTION, SOLUTION INTRAVENOUS ONCE
OUTPATIENT
Start: 2026-02-03

## 2025-08-18 RX ADMIN — ACETAMINOPHEN 650 MG: 325 TABLET ORAL at 08:36

## 2025-08-18 RX ADMIN — SODIUM CHLORIDE 100 MG: 0.9 INJECTION, SOLUTION INTRAVENOUS at 09:35

## 2025-08-18 RX ADMIN — OCRELIZUMAB 600 MG: 300 INJECTION INTRAVENOUS at 10:28

## 2025-08-18 RX ADMIN — FAMOTIDINE 20 MG: 10 INJECTION INTRAVENOUS at 08:33

## 2025-08-18 RX ADMIN — SODIUM CHLORIDE 20 ML/HR: 0.9 INJECTION, SOLUTION INTRAVENOUS at 08:33

## 2025-08-18 RX ADMIN — DIPHENHYDRAMINE HYDROCHLORIDE 50 MG: 50 INJECTION, SOLUTION INTRAMUSCULAR; INTRAVENOUS at 08:58

## (undated) DEVICE — VAPR COOLPULSE 90 ELECTRODE 90 DEGREES SUCTION WITH INTEGRATED HANDPIECE: Brand: VAPR COOLPULSE

## (undated) DEVICE — 3M™ IOBAN™ 2 ANTIMICROBIAL INCISE DRAPE 6640EZ: Brand: IOBAN™ 2

## (undated) DEVICE — ENDOPOUCH RETRIEVER SPECIMEN RETRIEVAL BAGS: Brand: ENDOPOUCH RETRIEVER

## (undated) DEVICE — PREP SURGICAL PURPREP 26ML

## (undated) DEVICE — STERILE POLYISOPRENE POWDER-FREE SURGICAL GLOVES WITH EMOLLIENT COATING: Brand: PROTEXIS

## (undated) DEVICE — SKIN MARKER DUAL TIP WITH RULER CAP, FLEXIBLE RULER AND LABELS: Brand: DEVON

## (undated) DEVICE — POV-IOD SOLUTION 4OZ BT

## (undated) DEVICE — ARTHROSCOPY FLOOR MAT

## (undated) DEVICE — ACE WRAP 6 IN UNSTERILE

## (undated) DEVICE — PACK PBDS LAP CHOLE RF

## (undated) DEVICE — SUT VICRYL 0 CT-1 27 IN J260H

## (undated) DEVICE — SPONGE STICK WITH PVP-I: Brand: KENDALL

## (undated) DEVICE — PLUMEPEN PRO 10FT

## (undated) DEVICE — PAD GROUNDING ADULT

## (undated) DEVICE — VIAL DECANTER

## (undated) DEVICE — ELECTRODE BLADE MOD E-Z CLEAN 2.5IN 6.4CM -0012M

## (undated) DEVICE — ENDOPATH XCEL UNIVERSAL TROCAR STABLILITY SLEEVES: Brand: ENDOPATH XCEL

## (undated) DEVICE — CAST PADDING 4 IN STERILE

## (undated) DEVICE — GLOVE INDICATOR PI UNDERGLOVE SZ 7 BLUE

## (undated) DEVICE — VAPR HOOK ELECTRODE 3.5MM HOOK ELECTRODE WITH INTEGRATED HANDPIECE: Brand: VAPR

## (undated) DEVICE — NEEDLE BLUNT 18 G X 1 1/2IN

## (undated) DEVICE — GAUZE SPONGES,16 PLY: Brand: CURITY

## (undated) DEVICE — GLOVE SRG BIOGEL 8

## (undated) DEVICE — GLOVE SRG LF STRL BGL SKNSNS 6.5 PF

## (undated) DEVICE — GLOVE SRG BIOGEL ORTHOPEDIC 8

## (undated) DEVICE — SUT VICRYL 2-0 SH 27 IN UNDYED J417H

## (undated) DEVICE — STOCKINETTE,IMPERVIOUS,12X48,STERILE: Brand: MEDLINE

## (undated) DEVICE — ADHESIVE SKIN HIGH VISCOSITY EXOFIN 1ML

## (undated) DEVICE — TUBING SUCTION 5MM X 12 FT

## (undated) DEVICE — GLOVE INDICATOR PI UNDERGLOVE SZ 8.5 BLUE

## (undated) DEVICE — CASE PATIENT PROGRAMMER ACCY

## (undated) DEVICE — ENDOPATH 5MM CURVED SCISSORS WITH MONOPOLAR CAUTERY: Brand: ENDOPATH

## (undated) DEVICE — NEEDLE FILTER 5 MICR 19G X 1.5IN

## (undated) DEVICE — PENCIL ELECTROSURG E-Z CLEAN -0035H

## (undated) DEVICE — INTENDED FOR TISSUE SEPARATION, AND OTHER PROCEDURES THAT REQUIRE A SHARP SURGICAL BLADE TO PUNCTURE OR CUT.: Brand: BARD-PARKER ® SAFETYLOCK CARBON RIB-BACK BLADES

## (undated) DEVICE — GLOVE INDICATOR PI UNDERGLOVE SZ 7.5 BLUE

## (undated) DEVICE — 3M™ STERI-STRIP™ REINFORCED ADHESIVE SKIN CLOSURES, R1547, 1/2 IN X 4 IN (12 MM X 100 MM), 6 STRIPS/ENVELOPE: Brand: 3M™ STERI-STRIP™

## (undated) DEVICE — BETHLEHEM UNIV MAJ EXT ,KIT: Brand: CARDINAL HEALTH

## (undated) DEVICE — PREMIUM DRY TRAY LF: Brand: MEDLINE INDUSTRIES, INC.

## (undated) DEVICE — CATH URETERAL 5FR X 70 CM FLEX TIP POLYUR BARD

## (undated) DEVICE — CHLORAPREP HI-LITE 26ML ORANGE

## (undated) DEVICE — DRAPE SHEET THREE QUARTER

## (undated) DEVICE — INTENDED FOR TISSUE SEPARATION, AND OTHER PROCEDURES THAT REQUIRE A SHARP SURGICAL BLADE TO PUNCTURE OR CUT.: Brand: BARD-PARKER SAFETY BLADES SIZE 11, STERILE

## (undated) DEVICE — Device

## (undated) DEVICE — LIGAMAX 5 MM ENDOSCOPIC MULTIPLE CLIP APPLIER: Brand: LIGAMAX

## (undated) DEVICE — SCD SEQUENTIAL COMPRESSION COMFORT SLEEVE MEDIUM KNEE LENGTH: Brand: KENDALL SCD

## (undated) DEVICE — STIRRUP STRAP ADULT DISP

## (undated) DEVICE — PACK TUR

## (undated) DEVICE — CAST PADDING 6 IN STERILE

## (undated) DEVICE — BETHLEHEM UNIVERSAL MINOR GEN: Brand: CARDINAL HEALTH

## (undated) DEVICE — SUT STRATAFIX SPIRAL MONOCRYL PLUS 4-0 PS-2 45CM SXMP1B118

## (undated) DEVICE — GLOVE SRG LF STRL BGL SKNSNS 8.5 PF

## (undated) DEVICE — OCCLUSIVE GAUZE STRIP,3% BISMUTH TRIBROMOPHENATE IN PETROLATUM BLEND: Brand: XEROFORM

## (undated) DEVICE — SURGI KIT INSTRUMENT ORGANIZER

## (undated) DEVICE — ANTIBACTERIAL VIOLET BRAIDED (POLYGLACTIN 910), SYNTHETIC ABSORBABLE SUTURE: Brand: COATED VICRYL

## (undated) DEVICE — REM POLYHESIVE ADULT PATIENT RETURN ELECTRODE: Brand: VALLEYLAB

## (undated) DEVICE — DRAPE C-ARMOUR

## (undated) DEVICE — SUT MONOCRYL 4-0 PS-2 18 IN Y496G

## (undated) DEVICE — SUT ETHILON 3-0 PS-1 18 IN 1663H

## (undated) DEVICE — BLADE SHAVER DISSECTOR 4MM 13CM COOLCUT

## (undated) DEVICE — UROCATCH BAG

## (undated) DEVICE — PUDDLE VAC

## (undated) DEVICE — SUT VICRYL 4-0 PS-2 27 IN J426H

## (undated) DEVICE — KIT DISP 3MM PLLA SHOULDER

## (undated) DEVICE — 2000CC GUARDIAN II: Brand: GUARDIAN

## (undated) DEVICE — LASER FIBER HOLMIUM 272MICRON

## (undated) DEVICE — DISPOSABLE OR TOWEL: Brand: CARDINAL HEALTH

## (undated) DEVICE — DRESSING MEPILEX AG BORDER 4 X 4 IN

## (undated) DEVICE — 3000CC GUARDIAN II: Brand: GUARDIAN

## (undated) DEVICE — THE ECHELON, ECHELON ENDOPATH™ AND ECHELON FLEX™ FAMILIES OF ENDOSCOPIC LINEAR CUTTERS AND RELOADS ARE STERILE, SINGLE PATIENT USE INSTRUMENTS THAT SIMULTANEOUSLY CUT AND STAPLE TISSUE. THERE ARE SIX STAGGERED ROWS OF STAPLES, THREE ON EITHER SIDE OF THE CUT LINE. THE 45 MM INSTRUMENTS HAVE A STAPLE LINE THATIS APPROXIMATELY 45 MM LONG AND A CUT LINE THAT IS APPROXIMATELY 42 MM LONG. THE SHAFT CAN ROTATE FREELY IN BOTH DIRECTIONS AND AN ARTICULATION MECHANISM ON ARTICULATING INSTRUMENTS ENABLES BENDING THE DISTAL PORTIONOF THE SHAFT TO FACILITATE LATERAL ACCESS OF THE OPERATIVE SITE.THE INSTRUMENTS ARE SHIPPED WITHOUT A RELOAD AND MUST BE LOADED PRIOR TO USE. A STAPLE RETAINING CAP ON THE RELOAD PROTECTS THE STAPLE LEG POINTS DURING SHIPPING AND TRANSPORTATION. THE INSTRUMENTS’ LOCK-OUT FEATURE IS DESIGNED TO PREVENT A USED RELOAD FROM BEING REFIRED.: Brand: ECHELON ENDOPATH

## (undated) DEVICE — PROXIMATE PLUS MD MULTI-DIRECTIONAL RELEASE SKIN STAPLERS CONTAINS 35 STAINLESS STEEL STAPLES APPROXIMATE CLOSED DIMENSIONS: 6.9MM X 3.9MM WIDE: Brand: PROXIMATE

## (undated) DEVICE — SPONGE SCRUB 4 PCT CHLORHEXIDINE

## (undated) DEVICE — SYRINGE 3ML LL

## (undated) DEVICE — INTENDED FOR TISSUE SEPARATION, AND OTHER PROCEDURES THAT REQUIRE A SHARP SURGICAL BLADE TO PUNCTURE OR CUT.: Brand: BARD-PARKER ® CARBON RIB-BACK BLADES

## (undated) DEVICE — MEDI-VAC YANKAUER SUCTION HANDLE W/STRAIGHT TIP & CONTROL VENT: Brand: CARDINAL HEALTH

## (undated) DEVICE — THE EXPRO ELITE SNARES ARE INTENDED FOR USE IN THE CARDIOVASCULAR SYSTEM AND HOLLOW VISCUS TO RETRIEVE AND/OR MANIPULATE OBJECTS USING MINIMALLY INVASIVE SURGICAL PROCEDURES. MANIPULATION PROCEDURES INCLUDE RETRIEVAL AND/OR REPOSITIONING OF INTRAVASCULAR FOREIGN OBJECTS SUCH AS COILS, BALLOONS, CATHETERS AND/OR GUIDEWIRES WITHIN THE CARDIOVASCULAR SYSTEM.: Brand: EXPRO ELITE™ SNARE

## (undated) DEVICE — ABDOMINAL PAD: Brand: DERMACEA

## (undated) DEVICE — CATH URET .038 10FR 50CM DUAL LUMEN

## (undated) DEVICE — INTENDED FOR TISSUE SEPARATION, AND OTHER PROCEDURES THAT REQUIRE A SHARP SURGICAL BLADE TO PUNCTURE OR CUT.: Brand: BARD-PARKER SAFETY BLADES SIZE 15, STERILE

## (undated) DEVICE — STRL UNIVERSAL MINOR GENERAL: Brand: CARDINAL HEALTH

## (undated) DEVICE — CUFF TOURNIQUET 30 X 4 IN QUICK CONNECT DISP 1BLA

## (undated) DEVICE — 3M™ STERI-DRAPE™ U-DRAPE 1015: Brand: STERI-DRAPE™

## (undated) DEVICE — SHEATH URETERAL ACCESS 12/14FR 35CM PROXIS

## (undated) DEVICE — GLOVE INDICATOR PI UNDERGLOVE SZ 8 BLUE

## (undated) DEVICE — ADHESIVE SKN CLSR HISTOACRYL FLEX 0.5ML LF

## (undated) DEVICE — INTENDED FOR TISSUE SEPARATION, AND OTHER PROCEDURES THAT REQUIRE A SHARP SURGICAL BLADE TO PUNCTURE OR CUT.: Brand: BARD-PARKER SAFETY BLADES SIZE 10, STERILE

## (undated) DEVICE — LASER HOLMIUM FIBER 365 MIC

## (undated) DEVICE — NEEDLE 25G X 1 1/2

## (undated) DEVICE — INTENT TO BE USED WITH SUTURE MATERIAL FOR TISSUE CLOSURE: Brand: RICHARD-ALLAN® NEEDLE 1/2 CIRCLE TAPER

## (undated) DEVICE — NEEDLE 25GA X 1 IN SAFETY GLIDE

## (undated) DEVICE — NEEDLE SPINAL18G X 3.5 IN QUINCKE

## (undated) DEVICE — GLOVE SRG BIOGEL 7.5

## (undated) DEVICE — SUT 2 FIBERLOOP AR-7234

## (undated) DEVICE — PROGRAMMER PATIENT THERAPHY MANAGER RS2

## (undated) DEVICE — GLOVE SRG BIOGEL 7

## (undated) DEVICE — ENDOPATH XCEL BLADELESS TROCARS WITH STABILITY SLEEVES: Brand: ENDOPATH XCEL

## (undated) DEVICE — BLADE SHAVER DISSECTOR  4MM 13CM CRV COOLCUT

## (undated) DEVICE — RECHARGER PRGRMR THERAPY MANAGER RS2

## (undated) DEVICE — BETHLEHEM UNIVERSAL  ARTHRO PK: Brand: CARDINAL HEALTH

## (undated) DEVICE — 3M™ IOBAN™ 2 ANTIMICROBIAL INCISE DRAPE 6650EZ: Brand: IOBAN™ 2

## (undated) DEVICE — FILTER STRAW 1.7

## (undated) DEVICE — PADDING CAST 6IN COTTON STRL

## (undated) DEVICE — CONTROLLER INTELLIS PTM NONTRIAL

## (undated) DEVICE — SPECIMEN CONTAINER STERILE PEEL PACK

## (undated) DEVICE — SUT VICRYL 0 UR-6 27 IN J603H

## (undated) DEVICE — TIBURON EXTREMITY DRAPE WITH ARMBOARD COVERS: Brand: CONVERTORS

## (undated) DEVICE — LIGHT GLOVE GREEN

## (undated) DEVICE — 10FR FRAZIER SUCTION HANDLE: Brand: CARDINAL HEALTH

## (undated) DEVICE — SURGICAL GOWN, XL SMARTSLEEVE: Brand: CONVERTORS

## (undated) DEVICE — GLOVE SRG BIOGEL 8.5

## (undated) DEVICE — SYRINGE 30ML LL

## (undated) DEVICE — BETADINE SCRUB BRUSH

## (undated) DEVICE — GRASPER ATRAUMATIC FEN 5MM X 33CM ROTATING THUMB LOOP GENI-SURGE

## (undated) DEVICE — BANDAGE, ESMARK LF STR 6"X9' (20/CS): Brand: CYPRESS

## (undated) DEVICE — PROGRAMMER NEUROSTIM VANTA HANDSET KIT

## (undated) DEVICE — NEPTUNE E-SEP SMOKE EVACUATION PENCIL, COATED, 70MM BLADE, PUSH BUTTON SWITCH: Brand: NEPTUNE E-SEP

## (undated) DEVICE — SURGICAL CLIPPER BLADE GENERAL USE

## (undated) DEVICE — 4-PORT MANIFOLD: Brand: NEPTUNE 2

## (undated) DEVICE — TUBING ARTHROSCOPIC WAVE  MAIN PUMP

## (undated) DEVICE — THE ECHELON FLEX POWERED PLUS ARTICULATING ENDOSCOPIC LINEAR CUTTERS ARE STERILE, SINGLE PATIENT USE INSTRUMENTS THAT SIMULTANEOUSLYCUT AND STAPLE TISSUE. THERE ARE SIX STAGGERED ROWS OF STAPLES, THREE ON EITHER SIDE OF THE CUT LINE. THE ECHELON FLEX 45 POWERED PLUSINSTRUMENTS HAVE A STAPLE LINE THAT IS APPROXIMATELY 45 MM LONG AND A CUT LINE THAT IS APPROXIMATELY 42 MM LONG. THE SHAFT CAN ROTATE FREELYIN BOTH DIRECTIONS AND AN ARTICULATION MECHANISM ENABLES THE DISTAL PORTION OF THE SHAFT TO PIVOT TO FACILITATE LATERAL ACCESS TO THE OPERATIVESITE.THE INSTRUMENTS ARE PACKAGED WITH A PRIMARY LITHIUM BATTERY PACK THAT MUST BE INSTALLED PRIOR TO USE. THERE ARE SPECIFIC REQUIREMENTS FORDISPOSING OF THE BATTERY PACK. REFER TO THE BATTERY PACK DISPOSAL SECTION.THE INSTRUMENTS ARE PACKAGED WITHOUT A RELOAD AND MUST BE LOADED PRIOR TO USE. A STAPLE RETAINING CAP ON THE RELOAD PROTECTS THE STAPLE LEGPOINTS DURING SHIPPING AND TRANSPORTATION. THE INSTRUMENTS’ LOCK-OUT FEATURE IS DESIGNED TO PREVENT A USED OR IMPROPERLY INSTALLED RELOADFROM BEING REFIRED OR AN INSTRUMENT FROM BEING FIRED WITHOUT A RELOAD.: Brand: ECHELON FLEX

## (undated) DEVICE — GLOVE SRG BIOGEL ORTHOPEDIC 7.5

## (undated) DEVICE — CORONARY VENOUS STEROID-ELUTING BIPOLAR PACE/SENSE LEAD: Brand: EASYTRAK® 2

## (undated) DEVICE — 3M™ TEGADERM™ TRANSPARENT FILM DRESSING FRAME STYLE, 1626W, 4 IN X 4-3/4 IN (10 CM X 12 CM), 50/CT 4CT/CASE: Brand: 3M™ TEGADERM™

## (undated) DEVICE — SUT MONOCRYL 4-0 PS-2 27 IN Y426H

## (undated) DEVICE — DRESSING TELFA 2 X 3 IN STRL

## (undated) DEVICE — SUT PROLENE 2-0 FS 18 IN 8685H

## (undated) DEVICE — NEEDLE 18 G X 1 1/2

## (undated) DEVICE — COMMINICATOR SCS

## (undated) DEVICE — GUIDEWIRE STRGHT TIP 0.035 IN  SOLO PLUS

## (undated) DEVICE — ELECTRODE LAP J HOOK SPLIT STEM E-Z CLEAN 33CM -0021S

## (undated) DEVICE — DRAPE C-ARM X-RAY